# Patient Record
Sex: MALE | Race: BLACK OR AFRICAN AMERICAN | Employment: OTHER | URBAN - METROPOLITAN AREA
[De-identification: names, ages, dates, MRNs, and addresses within clinical notes are randomized per-mention and may not be internally consistent; named-entity substitution may affect disease eponyms.]

---

## 2017-01-03 ENCOUNTER — HOSPITAL ENCOUNTER (OUTPATIENT)
Dept: PHARMACY | Age: 67
Discharge: HOME | End: 2017-01-03
Payer: MEDICARE

## 2017-01-03 DIAGNOSIS — I48.91 ATRIAL FIBRILLATION, UNSPECIFIED TYPE (HCC): ICD-10-CM

## 2017-01-03 LAB
INR BLD: 2.1
PROTIME: 24.9 SECONDS

## 2017-01-03 PROCEDURE — 85610 PROTHROMBIN TIME: CPT

## 2017-01-03 PROCEDURE — G0463 HOSPITAL OUTPT CLINIC VISIT: HCPCS

## 2017-01-04 ENCOUNTER — TELEPHONE (OUTPATIENT)
Dept: UROLOGY | Age: 67
End: 2017-01-04

## 2017-01-05 ENCOUNTER — OFFICE VISIT (OUTPATIENT)
Dept: UROLOGY | Age: 67
End: 2017-01-05

## 2017-01-05 VITALS
DIASTOLIC BLOOD PRESSURE: 70 MMHG | HEIGHT: 71 IN | BODY MASS INDEX: 29.85 KG/M2 | SYSTOLIC BLOOD PRESSURE: 120 MMHG | HEART RATE: 90 BPM | WEIGHT: 213.2 LBS

## 2017-01-05 DIAGNOSIS — N48.1 BALANITIS: Primary | ICD-10-CM

## 2017-01-05 PROCEDURE — 99214 OFFICE O/P EST MOD 30 MIN: CPT | Performed by: UROLOGY

## 2017-01-05 RX ORDER — CLOTRIMAZOLE AND BETAMETHASONE DIPROPIONATE 10; .64 MG/G; MG/G
CREAM TOPICAL
Qty: 45 G | Refills: 2 | Status: ON HOLD | OUTPATIENT
Start: 2017-01-05 | End: 2017-11-03

## 2017-01-24 ENCOUNTER — HOSPITAL ENCOUNTER (OUTPATIENT)
Dept: PHARMACY | Age: 67
Discharge: HOME | End: 2017-01-24
Payer: MEDICARE

## 2017-01-24 DIAGNOSIS — I48.91 ATRIAL FIBRILLATION, UNSPECIFIED TYPE (HCC): ICD-10-CM

## 2017-01-24 LAB
INR BLD: 1.8
PROTIME: 21.6 SECONDS

## 2017-01-24 PROCEDURE — G0463 HOSPITAL OUTPT CLINIC VISIT: HCPCS

## 2017-01-24 PROCEDURE — 85610 PROTHROMBIN TIME: CPT

## 2017-01-30 ENCOUNTER — OFFICE VISIT (OUTPATIENT)
Dept: SURGERY | Age: 67
End: 2017-01-30

## 2017-01-30 VITALS
HEART RATE: 72 BPM | BODY MASS INDEX: 28.98 KG/M2 | HEIGHT: 71 IN | WEIGHT: 207 LBS | SYSTOLIC BLOOD PRESSURE: 90 MMHG | DIASTOLIC BLOOD PRESSURE: 58 MMHG

## 2017-01-30 DIAGNOSIS — E11.42 DIABETIC POLYNEUROPATHY ASSOCIATED WITH TYPE 2 DIABETES MELLITUS (HCC): ICD-10-CM

## 2017-01-30 LAB — GLUCOSE BLD-MCNC: 270 MG/DL

## 2017-01-30 PROCEDURE — G8427 DOCREV CUR MEDS BY ELIG CLIN: HCPCS | Performed by: INTERNAL MEDICINE

## 2017-01-30 PROCEDURE — G8598 ASA/ANTIPLAT THER USED: HCPCS | Performed by: INTERNAL MEDICINE

## 2017-01-30 PROCEDURE — 99213 OFFICE O/P EST LOW 20 MIN: CPT | Performed by: INTERNAL MEDICINE

## 2017-01-30 PROCEDURE — 82962 GLUCOSE BLOOD TEST: CPT | Performed by: INTERNAL MEDICINE

## 2017-01-30 PROCEDURE — G8484 FLU IMMUNIZE NO ADMIN: HCPCS | Performed by: INTERNAL MEDICINE

## 2017-01-30 PROCEDURE — 3046F HEMOGLOBIN A1C LEVEL >9.0%: CPT | Performed by: INTERNAL MEDICINE

## 2017-01-30 PROCEDURE — 4004F PT TOBACCO SCREEN RCVD TLK: CPT | Performed by: INTERNAL MEDICINE

## 2017-01-30 PROCEDURE — 3017F COLORECTAL CA SCREEN DOC REV: CPT | Performed by: INTERNAL MEDICINE

## 2017-01-30 PROCEDURE — G8419 CALC BMI OUT NRM PARAM NOF/U: HCPCS | Performed by: INTERNAL MEDICINE

## 2017-01-30 RX ORDER — GABAPENTIN 300 MG/1
300 CAPSULE ORAL 3 TIMES DAILY
Qty: 270 CAPSULE | Refills: 3 | Status: SHIPPED | OUTPATIENT
Start: 2017-01-30 | End: 2017-01-30 | Stop reason: SDUPTHER

## 2017-01-30 RX ORDER — ALBUTEROL SULFATE 2.5 MG/3ML
2.5 SOLUTION RESPIRATORY (INHALATION) EVERY 4 HOURS PRN
COMMUNITY
End: 2017-11-13 | Stop reason: SDUPTHER

## 2017-01-30 RX ORDER — ALLOPURINOL 100 MG/1
100 TABLET ORAL DAILY
COMMUNITY
End: 2017-03-31

## 2017-01-30 RX ORDER — BLOOD SUGAR DIAGNOSTIC
1 STRIP MISCELLANEOUS 2 TIMES DAILY
COMMUNITY
End: 2017-02-03 | Stop reason: SDUPTHER

## 2017-01-30 RX ORDER — GABAPENTIN 300 MG/1
300 CAPSULE ORAL 3 TIMES DAILY
Qty: 90 CAPSULE | Refills: 3 | Status: SHIPPED | OUTPATIENT
Start: 2017-01-30 | End: 2019-01-04 | Stop reason: SDUPTHER

## 2017-02-03 RX ORDER — SYRINGE-NEEDLE,INSULIN,0.5 ML 31 GX5/16"
SYRINGE, EMPTY DISPOSABLE MISCELLANEOUS
Qty: 300 EACH | Refills: 3 | Status: SHIPPED | OUTPATIENT
Start: 2017-02-03 | End: 2018-04-30 | Stop reason: SDUPTHER

## 2017-02-07 ASSESSMENT — ENCOUNTER SYMPTOMS: EYES NEGATIVE: 1

## 2017-02-14 ENCOUNTER — HOSPITAL ENCOUNTER (OUTPATIENT)
Dept: PHARMACY | Age: 67
Discharge: HOME | End: 2017-02-14
Payer: MEDICARE

## 2017-02-14 DIAGNOSIS — I48.91 ATRIAL FIBRILLATION, UNSPECIFIED TYPE (HCC): ICD-10-CM

## 2017-02-14 LAB
INR BLD: 2.5
PROTIME: 30.1 SECONDS

## 2017-02-14 PROCEDURE — G0463 HOSPITAL OUTPT CLINIC VISIT: HCPCS

## 2017-02-14 PROCEDURE — 85610 PROTHROMBIN TIME: CPT

## 2017-02-16 ENCOUNTER — HOSPITAL ENCOUNTER (OUTPATIENT)
Dept: GENERAL RADIOLOGY | Age: 67
Discharge: HOME | End: 2017-02-16
Payer: MEDICARE

## 2017-02-16 DIAGNOSIS — R06.02 SHORTNESS OF BREATH: ICD-10-CM

## 2017-02-16 PROCEDURE — 71020 XR CHEST STANDARD TWO VW: CPT

## 2017-03-10 ENCOUNTER — HOSPITAL ENCOUNTER (OUTPATIENT)
Dept: CARDIOLOGY | Age: 67
Discharge: HOME | End: 2017-03-10
Payer: MEDICARE

## 2017-03-10 PROCEDURE — 93290 INTERROG DEV EVAL ICPMS IP: CPT

## 2017-03-10 PROCEDURE — 93283 PRGRMG EVAL IMPLANTABLE DFB: CPT

## 2017-03-14 ENCOUNTER — HOSPITAL ENCOUNTER (OUTPATIENT)
Dept: PHARMACY | Age: 67
Discharge: HOME | End: 2017-03-14
Payer: MEDICARE

## 2017-03-14 DIAGNOSIS — I48.91 ATRIAL FIBRILLATION, UNSPECIFIED TYPE (HCC): ICD-10-CM

## 2017-03-14 DIAGNOSIS — E11.9 TYPE 2 DIABETES MELLITUS WITHOUT COMPLICATION, UNSPECIFIED LONG TERM INSULIN USE STATUS: ICD-10-CM

## 2017-03-14 LAB
INR BLD: 2.7
PROTIME: 32.9 SECONDS

## 2017-03-14 PROCEDURE — 85610 PROTHROMBIN TIME: CPT | Performed by: PHARMACIST

## 2017-03-14 PROCEDURE — G0463 HOSPITAL OUTPT CLINIC VISIT: HCPCS | Performed by: PHARMACIST

## 2017-03-14 RX ORDER — WARFARIN SODIUM 5 MG/1
TABLET ORAL
Qty: 15 TABLET | Refills: 0 | Status: SHIPPED | OUTPATIENT
Start: 2017-03-14 | End: 2017-05-31 | Stop reason: SDUPTHER

## 2017-03-14 RX ORDER — WARFARIN SODIUM 5 MG/1
TABLET ORAL
Qty: 110 TABLET | Refills: 1 | Status: SHIPPED | OUTPATIENT
Start: 2017-03-14 | End: 2017-03-14 | Stop reason: SDUPTHER

## 2017-03-28 ENCOUNTER — OFFICE VISIT (OUTPATIENT)
Dept: SURGERY | Age: 67
End: 2017-03-28

## 2017-03-28 VITALS
DIASTOLIC BLOOD PRESSURE: 74 MMHG | BODY MASS INDEX: 31.64 KG/M2 | SYSTOLIC BLOOD PRESSURE: 116 MMHG | WEIGHT: 226 LBS | HEIGHT: 71 IN | HEART RATE: 94 BPM

## 2017-03-28 LAB
GLUCOSE BLD-MCNC: 217 MG/DL
HBA1C MFR BLD: 10.9 %

## 2017-03-28 PROCEDURE — 3046F HEMOGLOBIN A1C LEVEL >9.0%: CPT | Performed by: INTERNAL MEDICINE

## 2017-03-28 PROCEDURE — 82962 GLUCOSE BLOOD TEST: CPT | Performed by: INTERNAL MEDICINE

## 2017-03-28 PROCEDURE — 4040F PNEUMOC VAC/ADMIN/RCVD: CPT | Performed by: INTERNAL MEDICINE

## 2017-03-28 PROCEDURE — 83036 HEMOGLOBIN GLYCOSYLATED A1C: CPT | Performed by: INTERNAL MEDICINE

## 2017-03-28 PROCEDURE — G8598 ASA/ANTIPLAT THER USED: HCPCS | Performed by: INTERNAL MEDICINE

## 2017-03-28 PROCEDURE — G8484 FLU IMMUNIZE NO ADMIN: HCPCS | Performed by: INTERNAL MEDICINE

## 2017-03-28 PROCEDURE — 3017F COLORECTAL CA SCREEN DOC REV: CPT | Performed by: INTERNAL MEDICINE

## 2017-03-28 PROCEDURE — 99213 OFFICE O/P EST LOW 20 MIN: CPT | Performed by: INTERNAL MEDICINE

## 2017-03-28 PROCEDURE — G8427 DOCREV CUR MEDS BY ELIG CLIN: HCPCS | Performed by: INTERNAL MEDICINE

## 2017-03-28 PROCEDURE — 1123F ACP DISCUSS/DSCN MKR DOCD: CPT | Performed by: INTERNAL MEDICINE

## 2017-03-28 PROCEDURE — G8417 CALC BMI ABV UP PARAM F/U: HCPCS | Performed by: INTERNAL MEDICINE

## 2017-03-28 PROCEDURE — 4004F PT TOBACCO SCREEN RCVD TLK: CPT | Performed by: INTERNAL MEDICINE

## 2017-03-28 ASSESSMENT — ENCOUNTER SYMPTOMS: EYES NEGATIVE: 1

## 2017-03-31 ENCOUNTER — OFFICE VISIT (OUTPATIENT)
Dept: FAMILY MEDICINE CLINIC | Age: 67
End: 2017-03-31

## 2017-03-31 VITALS
DIASTOLIC BLOOD PRESSURE: 66 MMHG | SYSTOLIC BLOOD PRESSURE: 110 MMHG | HEART RATE: 91 BPM | BODY MASS INDEX: 32.35 KG/M2 | TEMPERATURE: 97.5 F | OXYGEN SATURATION: 92 % | WEIGHT: 226 LBS | RESPIRATION RATE: 25 BRPM | HEIGHT: 70 IN

## 2017-03-31 DIAGNOSIS — I50.42 HEART FAILURE, SYSTOLIC AND DIASTOLIC, CHRONIC (HCC): ICD-10-CM

## 2017-03-31 DIAGNOSIS — I50.23 ACUTE ON CHRONIC SYSTOLIC HEART FAILURE (HCC): ICD-10-CM

## 2017-03-31 DIAGNOSIS — I48.91 ATRIAL FIBRILLATION, UNSPECIFIED TYPE (HCC): ICD-10-CM

## 2017-03-31 DIAGNOSIS — I50.1 LEFT HEART FAILURE (HCC): ICD-10-CM

## 2017-03-31 DIAGNOSIS — I48.92 ATRIAL FLUTTER, UNSPECIFIED TYPE (HCC): ICD-10-CM

## 2017-03-31 DIAGNOSIS — J43.9 PULMONARY EMPHYSEMA, UNSPECIFIED EMPHYSEMA TYPE (HCC): Primary | ICD-10-CM

## 2017-03-31 PROCEDURE — G8926 SPIRO NO PERF OR DOC: HCPCS | Performed by: FAMILY MEDICINE

## 2017-03-31 PROCEDURE — G8484 FLU IMMUNIZE NO ADMIN: HCPCS | Performed by: FAMILY MEDICINE

## 2017-03-31 PROCEDURE — 99213 OFFICE O/P EST LOW 20 MIN: CPT | Performed by: FAMILY MEDICINE

## 2017-03-31 PROCEDURE — G8417 CALC BMI ABV UP PARAM F/U: HCPCS | Performed by: FAMILY MEDICINE

## 2017-03-31 PROCEDURE — 1123F ACP DISCUSS/DSCN MKR DOCD: CPT | Performed by: FAMILY MEDICINE

## 2017-03-31 PROCEDURE — 4004F PT TOBACCO SCREEN RCVD TLK: CPT | Performed by: FAMILY MEDICINE

## 2017-03-31 PROCEDURE — 3017F COLORECTAL CA SCREEN DOC REV: CPT | Performed by: FAMILY MEDICINE

## 2017-03-31 PROCEDURE — 4040F PNEUMOC VAC/ADMIN/RCVD: CPT | Performed by: FAMILY MEDICINE

## 2017-03-31 PROCEDURE — G8598 ASA/ANTIPLAT THER USED: HCPCS | Performed by: FAMILY MEDICINE

## 2017-03-31 PROCEDURE — 3023F SPIROM DOC REV: CPT | Performed by: FAMILY MEDICINE

## 2017-03-31 PROCEDURE — G8427 DOCREV CUR MEDS BY ELIG CLIN: HCPCS | Performed by: FAMILY MEDICINE

## 2017-03-31 RX ORDER — SACUBITRIL AND VALSARTAN 24; 26 MG/1; MG/1
1 TABLET, FILM COATED ORAL 2 TIMES DAILY
Refills: 11 | Status: ON HOLD | COMMUNITY
Start: 2017-02-16 | End: 2020-09-12 | Stop reason: SDUPTHER

## 2017-03-31 RX ORDER — PREDNISOLONE ACETATE 10 MG/ML
SUSPENSION/ DROPS OPHTHALMIC
Refills: 1 | Status: ON HOLD | COMMUNITY
Start: 2017-02-17 | End: 2017-11-03

## 2017-03-31 RX ORDER — KETOROLAC TROMETHAMINE 5 MG/ML
SOLUTION OPHTHALMIC
Refills: 1 | Status: ON HOLD | COMMUNITY
Start: 2017-03-23 | End: 2017-11-03

## 2017-03-31 ASSESSMENT — ENCOUNTER SYMPTOMS
ABDOMINAL PAIN: 0
SHORTNESS OF BREATH: 1

## 2017-04-12 ENCOUNTER — TELEPHONE (OUTPATIENT)
Dept: PHARMACY | Age: 67
End: 2017-04-12

## 2017-04-12 ENCOUNTER — ANTI-COAG VISIT (OUTPATIENT)
Dept: PHARMACY | Age: 67
End: 2017-04-12

## 2017-04-12 DIAGNOSIS — I48.91 ATRIAL FIBRILLATION, UNSPECIFIED TYPE (HCC): ICD-10-CM

## 2017-04-23 RX ORDER — COLCHICINE 0.6 MG/1
TABLET, FILM COATED ORAL
Qty: 90 TABLET | Refills: 1 | Status: SHIPPED | OUTPATIENT
Start: 2017-04-23 | End: 2017-10-24 | Stop reason: SDUPTHER

## 2017-04-25 ENCOUNTER — ANTI-COAG VISIT (OUTPATIENT)
Dept: PHARMACY | Age: 67
End: 2017-04-25

## 2017-04-25 DIAGNOSIS — I48.91 ATRIAL FIBRILLATION, UNSPECIFIED TYPE (HCC): ICD-10-CM

## 2017-04-28 ENCOUNTER — HOSPITAL ENCOUNTER (OUTPATIENT)
Dept: PHARMACY | Age: 67
Discharge: HOME | End: 2017-04-28
Payer: MEDICARE

## 2017-04-28 DIAGNOSIS — I48.91 ATRIAL FIBRILLATION, UNSPECIFIED TYPE (HCC): ICD-10-CM

## 2017-04-28 LAB
INR BLD: 1.6
PROTIME: 19.7 SECONDS

## 2017-04-28 PROCEDURE — 85610 PROTHROMBIN TIME: CPT | Performed by: PHARMACIST

## 2017-04-28 PROCEDURE — 99211 OFF/OP EST MAY X REQ PHY/QHP: CPT | Performed by: PHARMACIST

## 2017-05-10 ENCOUNTER — HOSPITAL ENCOUNTER (INPATIENT)
Age: 67
LOS: 4 days | Discharge: HOME | DRG: 291 | End: 2017-05-14
Attending: FAMILY MEDICINE | Admitting: FAMILY MEDICINE
Payer: MEDICARE

## 2017-05-10 ENCOUNTER — APPOINTMENT (OUTPATIENT)
Dept: GENERAL RADIOLOGY | Age: 67
DRG: 291 | End: 2017-05-10
Payer: MEDICARE

## 2017-05-10 DIAGNOSIS — J18.9 PNEUMONIA DUE TO ORGANISM: Primary | ICD-10-CM

## 2017-05-10 DIAGNOSIS — I50.9 CONGESTIVE HEART FAILURE, UNSPECIFIED CONGESTIVE HEART FAILURE CHRONICITY, UNSPECIFIED CONGESTIVE HEART FAILURE TYPE: ICD-10-CM

## 2017-05-10 DIAGNOSIS — R74.8 CARDIAC ENZYMES ELEVATED: ICD-10-CM

## 2017-05-10 LAB
ALBUMIN SERPL-MCNC: 3.6 G/DL (ref 3.9–4.9)
ALP BLD-CCNC: 125 U/L (ref 35–104)
ALT SERPL-CCNC: 20 U/L (ref 0–41)
ANION GAP SERPL CALCULATED.3IONS-SCNC: 14 MEQ/L (ref 7–13)
AST SERPL-CCNC: 26 U/L (ref 0–40)
BILIRUB SERPL-MCNC: 2.8 MG/DL (ref 0–1.2)
BUN BLDV-MCNC: 18 MG/DL (ref 8–23)
CALCIUM SERPL-MCNC: 8.5 MG/DL (ref 8.6–10.2)
CHLORIDE BLD-SCNC: 92 MEQ/L (ref 98–107)
CK MB: 1.8 NG/ML (ref 0–6.7)
CO2: 22 MEQ/L (ref 22–29)
CREAT SERPL-MCNC: 1.02 MG/DL (ref 0.7–1.2)
CREATINE KINASE-MB INDEX: 0.4 % (ref 0–3.5)
GFR AFRICAN AMERICAN: >60
GFR NON-AFRICAN AMERICAN: >60
GLOBULIN: 3.6 G/DL (ref 2.3–3.5)
GLUCOSE BLD-MCNC: 208 MG/DL (ref 74–109)
GLUCOSE BLD-MCNC: 276 MG/DL (ref 60–115)
GLUCOSE BLD-MCNC: 303 MG/DL (ref 60–115)
HCT VFR BLD CALC: 35.9 % (ref 42–52)
HEMOGLOBIN: 11.4 G/DL (ref 14–18)
LIPASE: 10 U/L (ref 13–60)
MCH RBC QN AUTO: 27.1 PG (ref 27–31.3)
MCHC RBC AUTO-ENTMCNC: 31.7 % (ref 33–37)
MCV RBC AUTO: 85.6 FL (ref 80–100)
PDW BLD-RTO: 17.5 % (ref 11.5–14.5)
PERFORMED ON: ABNORMAL
PERFORMED ON: ABNORMAL
PLATELET # BLD: 198 K/UL (ref 130–400)
POTASSIUM SERPL-SCNC: 4.2 MEQ/L (ref 3.5–5.1)
PRO-BNP: 4729 PG/ML
RBC # BLD: 4.19 M/UL (ref 4.7–6.1)
SODIUM BLD-SCNC: 128 MEQ/L (ref 132–144)
TOTAL CK: 436 U/L (ref 0–190)
TOTAL PROTEIN: 7.2 G/DL (ref 6.4–8.1)
TROPONIN: 0.01 NG/ML (ref 0–0.01)
WBC # BLD: 10.8 K/UL (ref 4.8–10.8)

## 2017-05-10 PROCEDURE — 94640 AIRWAY INHALATION TREATMENT: CPT

## 2017-05-10 PROCEDURE — 6370000000 HC RX 637 (ALT 250 FOR IP): Performed by: PHYSICIAN ASSISTANT

## 2017-05-10 PROCEDURE — 82553 CREATINE MB FRACTION: CPT

## 2017-05-10 PROCEDURE — 36415 COLL VENOUS BLD VENIPUNCTURE: CPT

## 2017-05-10 PROCEDURE — 71010 XR CHEST PORTABLE: CPT

## 2017-05-10 PROCEDURE — 99285 EMERGENCY DEPT VISIT HI MDM: CPT

## 2017-05-10 PROCEDURE — 87040 BLOOD CULTURE FOR BACTERIA: CPT

## 2017-05-10 PROCEDURE — 6360000002 HC RX W HCPCS: Performed by: FAMILY MEDICINE

## 2017-05-10 PROCEDURE — 84484 ASSAY OF TROPONIN QUANT: CPT

## 2017-05-10 PROCEDURE — 83690 ASSAY OF LIPASE: CPT

## 2017-05-10 PROCEDURE — 96375 TX/PRO/DX INJ NEW DRUG ADDON: CPT

## 2017-05-10 PROCEDURE — 6370000000 HC RX 637 (ALT 250 FOR IP): Performed by: FAMILY MEDICINE

## 2017-05-10 PROCEDURE — 1210000000 HC MED SURG R&B

## 2017-05-10 PROCEDURE — 82550 ASSAY OF CK (CPK): CPT

## 2017-05-10 PROCEDURE — 6360000002 HC RX W HCPCS: Performed by: PHYSICIAN ASSISTANT

## 2017-05-10 PROCEDURE — 96365 THER/PROPH/DIAG IV INF INIT: CPT

## 2017-05-10 PROCEDURE — 96366 THER/PROPH/DIAG IV INF ADDON: CPT

## 2017-05-10 PROCEDURE — 93005 ELECTROCARDIOGRAM TRACING: CPT

## 2017-05-10 PROCEDURE — 2580000003 HC RX 258: Performed by: PHYSICIAN ASSISTANT

## 2017-05-10 PROCEDURE — 2500000003 HC RX 250 WO HCPCS: Performed by: FAMILY MEDICINE

## 2017-05-10 PROCEDURE — 85027 COMPLETE CBC AUTOMATED: CPT

## 2017-05-10 PROCEDURE — 80053 COMPREHEN METABOLIC PANEL: CPT

## 2017-05-10 PROCEDURE — 83880 ASSAY OF NATRIURETIC PEPTIDE: CPT

## 2017-05-10 PROCEDURE — 2580000003 HC RX 258: Performed by: FAMILY MEDICINE

## 2017-05-10 PROCEDURE — 94664 DEMO&/EVAL PT USE INHALER: CPT

## 2017-05-10 RX ORDER — SODIUM CHLORIDE 0.9 % (FLUSH) 0.9 %
10 SYRINGE (ML) INJECTION EVERY 12 HOURS SCHEDULED
Status: DISCONTINUED | OUTPATIENT
Start: 2017-05-10 | End: 2017-05-14 | Stop reason: HOSPADM

## 2017-05-10 RX ORDER — ACETAMINOPHEN 325 MG/1
650 TABLET ORAL EVERY 4 HOURS PRN
Status: DISCONTINUED | OUTPATIENT
Start: 2017-05-10 | End: 2017-05-14 | Stop reason: HOSPADM

## 2017-05-10 RX ORDER — IPRATROPIUM BROMIDE AND ALBUTEROL SULFATE 2.5; .5 MG/3ML; MG/3ML
1 SOLUTION RESPIRATORY (INHALATION) 3 TIMES DAILY
Status: DISCONTINUED | OUTPATIENT
Start: 2017-05-10 | End: 2017-05-14 | Stop reason: HOSPADM

## 2017-05-10 RX ORDER — METHYLPREDNISOLONE SODIUM SUCCINATE 125 MG/2ML
125 INJECTION, POWDER, LYOPHILIZED, FOR SOLUTION INTRAMUSCULAR; INTRAVENOUS ONCE
Status: COMPLETED | OUTPATIENT
Start: 2017-05-10 | End: 2017-05-10

## 2017-05-10 RX ORDER — DEXTROSE MONOHYDRATE 50 MG/ML
100 INJECTION, SOLUTION INTRAVENOUS PRN
Status: DISCONTINUED | OUTPATIENT
Start: 2017-05-10 | End: 2017-05-14 | Stop reason: HOSPADM

## 2017-05-10 RX ORDER — ASPIRIN 81 MG/1
324 TABLET, CHEWABLE ORAL ONCE
Status: COMPLETED | OUTPATIENT
Start: 2017-05-10 | End: 2017-05-10

## 2017-05-10 RX ORDER — DEXTROSE MONOHYDRATE 25 G/50ML
12.5 INJECTION, SOLUTION INTRAVENOUS PRN
Status: DISCONTINUED | OUTPATIENT
Start: 2017-05-10 | End: 2017-05-14 | Stop reason: HOSPADM

## 2017-05-10 RX ORDER — METHYLPREDNISOLONE SODIUM SUCCINATE 40 MG/ML
40 INJECTION, POWDER, LYOPHILIZED, FOR SOLUTION INTRAMUSCULAR; INTRAVENOUS EVERY 8 HOURS
Status: DISCONTINUED | OUTPATIENT
Start: 2017-05-10 | End: 2017-05-12

## 2017-05-10 RX ORDER — SODIUM CHLORIDE 0.9 % (FLUSH) 0.9 %
10 SYRINGE (ML) INJECTION PRN
Status: DISCONTINUED | OUTPATIENT
Start: 2017-05-10 | End: 2017-05-14 | Stop reason: HOSPADM

## 2017-05-10 RX ORDER — ONDANSETRON 2 MG/ML
4 INJECTION INTRAMUSCULAR; INTRAVENOUS EVERY 6 HOURS PRN
Status: DISCONTINUED | OUTPATIENT
Start: 2017-05-10 | End: 2017-05-14 | Stop reason: HOSPADM

## 2017-05-10 RX ORDER — NICOTINE POLACRILEX 4 MG
15 LOZENGE BUCCAL PRN
Status: DISCONTINUED | OUTPATIENT
Start: 2017-05-10 | End: 2017-05-14 | Stop reason: HOSPADM

## 2017-05-10 RX ORDER — IPRATROPIUM BROMIDE AND ALBUTEROL SULFATE 2.5; .5 MG/3ML; MG/3ML
1 SOLUTION RESPIRATORY (INHALATION)
Status: DISCONTINUED | OUTPATIENT
Start: 2017-05-10 | End: 2017-05-10

## 2017-05-10 RX ORDER — FUROSEMIDE 10 MG/ML
40 INJECTION INTRAMUSCULAR; INTRAVENOUS 2 TIMES DAILY
Status: DISCONTINUED | OUTPATIENT
Start: 2017-05-10 | End: 2017-05-11

## 2017-05-10 RX ADMIN — IPRATROPIUM BROMIDE AND ALBUTEROL SULFATE 1 AMPULE: 2.5; .5 SOLUTION RESPIRATORY (INHALATION) at 19:44

## 2017-05-10 RX ADMIN — METHYLPREDNISOLONE SODIUM SUCCINATE 40 MG: 40 INJECTION, POWDER, FOR SOLUTION INTRAMUSCULAR; INTRAVENOUS at 20:30

## 2017-05-10 RX ADMIN — INSULIN LISPRO 4 UNITS: 100 INJECTION, SOLUTION INTRAVENOUS; SUBCUTANEOUS at 22:09

## 2017-05-10 RX ADMIN — CEFTRIAXONE SODIUM 1 G: 1 INJECTION, POWDER, FOR SOLUTION INTRAMUSCULAR; INTRAVENOUS at 12:05

## 2017-05-10 RX ADMIN — IPRATROPIUM BROMIDE AND ALBUTEROL SULFATE 1 AMPULE: 2.5; .5 SOLUTION RESPIRATORY (INHALATION) at 11:24

## 2017-05-10 RX ADMIN — ASPIRIN 81 MG 324 MG: 81 TABLET ORAL at 12:32

## 2017-05-10 RX ADMIN — METHYLPREDNISOLONE SODIUM SUCCINATE 125 MG: 125 INJECTION, POWDER, FOR SOLUTION INTRAMUSCULAR; INTRAVENOUS at 11:19

## 2017-05-10 RX ADMIN — IPRATROPIUM BROMIDE AND ALBUTEROL SULFATE 1 AMPULE: 2.5; .5 SOLUTION RESPIRATORY (INHALATION) at 17:30

## 2017-05-10 RX ADMIN — DOXYCYCLINE 100 MG: 100 INJECTION, POWDER, LYOPHILIZED, FOR SOLUTION INTRAVENOUS at 15:44

## 2017-05-10 RX ADMIN — INSULIN LISPRO 6 UNITS: 100 INJECTION, SOLUTION INTRAVENOUS; SUBCUTANEOUS at 17:34

## 2017-05-10 RX ADMIN — NITROGLYCERIN 1 INCH: 20 OINTMENT TOPICAL at 12:33

## 2017-05-10 RX ADMIN — AZITHROMYCIN 500 MG: 500 INJECTION, POWDER, LYOPHILIZED, FOR SOLUTION INTRAVENOUS at 12:32

## 2017-05-10 RX ADMIN — SODIUM CHLORIDE, PRESERVATIVE FREE 10 ML: 5 INJECTION INTRAVENOUS at 20:33

## 2017-05-10 RX ADMIN — FUROSEMIDE 40 MG: 10 INJECTION, SOLUTION INTRAVENOUS at 17:35

## 2017-05-10 RX ADMIN — ENOXAPARIN SODIUM 40 MG: 40 INJECTION SUBCUTANEOUS at 15:44

## 2017-05-10 ASSESSMENT — PAIN DESCRIPTION - LOCATION: LOCATION: CHEST

## 2017-05-10 ASSESSMENT — ENCOUNTER SYMPTOMS
ABDOMINAL DISTENTION: 0
COLOR CHANGE: 0
EYE DISCHARGE: 0
RHINORRHEA: 0
NAUSEA: 0
CHOKING: 0
DIARRHEA: 0
SHORTNESS OF BREATH: 1
WHEEZING: 1
VOMITING: 0
STRIDOR: 0
COUGH: 1
CONSTIPATION: 0
SORE THROAT: 0
ABDOMINAL PAIN: 0

## 2017-05-10 ASSESSMENT — PAIN DESCRIPTION - PAIN TYPE: TYPE: ACUTE PAIN

## 2017-05-10 ASSESSMENT — PAIN DESCRIPTION - FREQUENCY: FREQUENCY: INTERMITTENT

## 2017-05-10 ASSESSMENT — PAIN SCALES - GENERAL: PAINLEVEL_OUTOF10: 3

## 2017-05-10 ASSESSMENT — PAIN DESCRIPTION - ORIENTATION: ORIENTATION: LEFT

## 2017-05-10 ASSESSMENT — PAIN DESCRIPTION - DESCRIPTORS: DESCRIPTORS: ACHING;TIGHTNESS

## 2017-05-10 ASSESSMENT — PAIN DESCRIPTION - ONSET: ONSET: PROGRESSIVE

## 2017-05-11 ENCOUNTER — APPOINTMENT (OUTPATIENT)
Dept: GENERAL RADIOLOGY | Age: 67
DRG: 291 | End: 2017-05-11
Payer: MEDICARE

## 2017-05-11 LAB
ANION GAP SERPL CALCULATED.3IONS-SCNC: 14 MEQ/L (ref 7–13)
BUN BLDV-MCNC: 33 MG/DL (ref 8–23)
CALCIUM SERPL-MCNC: 8.4 MG/DL (ref 8.6–10.2)
CHLORIDE BLD-SCNC: 95 MEQ/L (ref 98–107)
CO2: 23 MEQ/L (ref 22–29)
CREAT SERPL-MCNC: 1.16 MG/DL (ref 0.7–1.2)
GFR AFRICAN AMERICAN: >60
GFR NON-AFRICAN AMERICAN: >60
GLUCOSE BLD-MCNC: 235 MG/DL (ref 74–109)
GLUCOSE BLD-MCNC: 250 MG/DL (ref 60–115)
GLUCOSE BLD-MCNC: 260 MG/DL (ref 60–115)
GLUCOSE BLD-MCNC: 265 MG/DL (ref 60–115)
GLUCOSE BLD-MCNC: 289 MG/DL (ref 60–115)
HCT VFR BLD CALC: 32.4 % (ref 42–52)
HEMOGLOBIN: 10.6 G/DL (ref 14–18)
MAGNESIUM: 2 MG/DL (ref 1.7–2.3)
MCH RBC QN AUTO: 27.1 PG (ref 27–31.3)
MCHC RBC AUTO-ENTMCNC: 32.7 % (ref 33–37)
MCV RBC AUTO: 82.8 FL (ref 80–100)
PDW BLD-RTO: 17.5 % (ref 11.5–14.5)
PERFORMED ON: ABNORMAL
PLATELET # BLD: 190 K/UL (ref 130–400)
POTASSIUM SERPL-SCNC: 4.1 MEQ/L (ref 3.5–5.1)
RBC # BLD: 3.91 M/UL (ref 4.7–6.1)
SODIUM BLD-SCNC: 132 MEQ/L (ref 132–144)
WBC # BLD: 12.9 K/UL (ref 4.8–10.8)

## 2017-05-11 PROCEDURE — 71020 XR CHEST STANDARD TWO VW: CPT

## 2017-05-11 PROCEDURE — 6370000000 HC RX 637 (ALT 250 FOR IP): Performed by: INTERNAL MEDICINE

## 2017-05-11 PROCEDURE — 2700000000 HC OXYGEN THERAPY PER DAY

## 2017-05-11 PROCEDURE — 1210000000 HC MED SURG R&B

## 2017-05-11 PROCEDURE — 80048 BASIC METABOLIC PNL TOTAL CA: CPT

## 2017-05-11 PROCEDURE — 83735 ASSAY OF MAGNESIUM: CPT

## 2017-05-11 PROCEDURE — 94640 AIRWAY INHALATION TREATMENT: CPT

## 2017-05-11 PROCEDURE — 85027 COMPLETE CBC AUTOMATED: CPT

## 2017-05-11 PROCEDURE — 6370000000 HC RX 637 (ALT 250 FOR IP): Performed by: FAMILY MEDICINE

## 2017-05-11 PROCEDURE — 36415 COLL VENOUS BLD VENIPUNCTURE: CPT

## 2017-05-11 PROCEDURE — 2580000003 HC RX 258: Performed by: FAMILY MEDICINE

## 2017-05-11 PROCEDURE — 6360000002 HC RX W HCPCS: Performed by: FAMILY MEDICINE

## 2017-05-11 PROCEDURE — 2500000003 HC RX 250 WO HCPCS: Performed by: FAMILY MEDICINE

## 2017-05-11 RX ORDER — ASPIRIN 81 MG/1
81 TABLET, CHEWABLE ORAL DAILY
Status: DISCONTINUED | OUTPATIENT
Start: 2017-05-11 | End: 2017-05-13

## 2017-05-11 RX ORDER — METOPROLOL SUCCINATE 25 MG/1
25 TABLET, EXTENDED RELEASE ORAL DAILY
Status: DISCONTINUED | OUTPATIENT
Start: 2017-05-11 | End: 2017-05-14 | Stop reason: HOSPADM

## 2017-05-11 RX ORDER — CALCIUM CARBONATE 300MG(750)
400 TABLET,CHEWABLE ORAL DAILY
Status: DISCONTINUED | OUTPATIENT
Start: 2017-05-11 | End: 2017-05-11

## 2017-05-11 RX ORDER — WARFARIN SODIUM 5 MG/1
5 TABLET ORAL DAILY
Status: DISCONTINUED | OUTPATIENT
Start: 2017-05-11 | End: 2017-05-13

## 2017-05-11 RX ORDER — PREDNISOLONE ACETATE 10 MG/ML
1 SUSPENSION/ DROPS OPHTHALMIC 4 TIMES DAILY
Status: DISCONTINUED | OUTPATIENT
Start: 2017-05-11 | End: 2017-05-14 | Stop reason: HOSPADM

## 2017-05-11 RX ORDER — AMIODARONE HYDROCHLORIDE 200 MG/1
200 TABLET ORAL DAILY
Status: DISCONTINUED | OUTPATIENT
Start: 2017-05-11 | End: 2017-05-14 | Stop reason: HOSPADM

## 2017-05-11 RX ORDER — NITROGLYCERIN 0.4 MG/1
0.4 TABLET SUBLINGUAL EVERY 5 MIN PRN
Status: DISCONTINUED | OUTPATIENT
Start: 2017-05-11 | End: 2017-05-14 | Stop reason: HOSPADM

## 2017-05-11 RX ORDER — ALLOPURINOL 100 MG/1
100 TABLET ORAL DAILY
Status: DISCONTINUED | OUTPATIENT
Start: 2017-05-11 | End: 2017-05-14 | Stop reason: HOSPADM

## 2017-05-11 RX ORDER — DIGOXIN 125 MCG
125 TABLET ORAL DAILY
Status: DISCONTINUED | OUTPATIENT
Start: 2017-05-11 | End: 2017-05-14 | Stop reason: HOSPADM

## 2017-05-11 RX ORDER — KETOROLAC TROMETHAMINE 5 MG/ML
1 SOLUTION OPHTHALMIC 4 TIMES DAILY
Status: DISCONTINUED | OUTPATIENT
Start: 2017-05-11 | End: 2017-05-14 | Stop reason: HOSPADM

## 2017-05-11 RX ORDER — ATORVASTATIN CALCIUM 80 MG/1
80 TABLET, FILM COATED ORAL DAILY
Status: DISCONTINUED | OUTPATIENT
Start: 2017-05-11 | End: 2017-05-14 | Stop reason: HOSPADM

## 2017-05-11 RX ORDER — POTASSIUM CHLORIDE 20 MEQ/1
40 TABLET, EXTENDED RELEASE ORAL DAILY
Status: DISCONTINUED | OUTPATIENT
Start: 2017-05-11 | End: 2017-05-14 | Stop reason: HOSPADM

## 2017-05-11 RX ORDER — CLOPIDOGREL BISULFATE 75 MG/1
75 TABLET ORAL DAILY
Status: DISCONTINUED | OUTPATIENT
Start: 2017-05-11 | End: 2017-05-14 | Stop reason: HOSPADM

## 2017-05-11 RX ORDER — GABAPENTIN 100 MG/1
100 CAPSULE ORAL 3 TIMES DAILY
Status: DISCONTINUED | OUTPATIENT
Start: 2017-05-11 | End: 2017-05-12

## 2017-05-11 RX ORDER — FUROSEMIDE 40 MG/1
40 TABLET ORAL DAILY
Status: DISCONTINUED | OUTPATIENT
Start: 2017-05-11 | End: 2017-05-14 | Stop reason: HOSPADM

## 2017-05-11 RX ADMIN — ALLOPURINOL 100 MG: 100 TABLET ORAL at 19:02

## 2017-05-11 RX ADMIN — INSULIN LISPRO 6 UNITS: 100 INJECTION, SOLUTION INTRAVENOUS; SUBCUTANEOUS at 12:24

## 2017-05-11 RX ADMIN — IPRATROPIUM BROMIDE AND ALBUTEROL SULFATE 1 AMPULE: 2.5; .5 SOLUTION RESPIRATORY (INHALATION) at 19:44

## 2017-05-11 RX ADMIN — SODIUM CHLORIDE, PRESERVATIVE FREE 10 ML: 5 INJECTION INTRAVENOUS at 22:12

## 2017-05-11 RX ADMIN — METHYLPREDNISOLONE SODIUM SUCCINATE 40 MG: 40 INJECTION, POWDER, FOR SOLUTION INTRAMUSCULAR; INTRAVENOUS at 12:23

## 2017-05-11 RX ADMIN — INSULIN LISPRO 6 UNITS: 100 INJECTION, SOLUTION INTRAVENOUS; SUBCUTANEOUS at 17:55

## 2017-05-11 RX ADMIN — INSULIN LISPRO 3 UNITS: 100 INJECTION, SOLUTION INTRAVENOUS; SUBCUTANEOUS at 22:33

## 2017-05-11 RX ADMIN — ASPIRIN 81 MG 81 MG: 81 TABLET ORAL at 19:02

## 2017-05-11 RX ADMIN — FUROSEMIDE 40 MG: 10 INJECTION, SOLUTION INTRAVENOUS at 17:55

## 2017-05-11 RX ADMIN — ATORVASTATIN CALCIUM 80 MG: 80 TABLET, FILM COATED ORAL at 19:02

## 2017-05-11 RX ADMIN — IPRATROPIUM BROMIDE AND ALBUTEROL SULFATE 1 AMPULE: 2.5; .5 SOLUTION RESPIRATORY (INHALATION) at 09:22

## 2017-05-11 RX ADMIN — POTASSIUM CHLORIDE 40 MEQ: 20 TABLET, EXTENDED RELEASE ORAL at 19:02

## 2017-05-11 RX ADMIN — AMIODARONE HYDROCHLORIDE 200 MG: 200 TABLET ORAL at 19:02

## 2017-05-11 RX ADMIN — GABAPENTIN 100 MG: 100 CAPSULE ORAL at 21:20

## 2017-05-11 RX ADMIN — METHYLPREDNISOLONE SODIUM SUCCINATE 40 MG: 40 INJECTION, POWDER, FOR SOLUTION INTRAMUSCULAR; INTRAVENOUS at 03:41

## 2017-05-11 RX ADMIN — CLOPIDOGREL BISULFATE 75 MG: 75 TABLET, FILM COATED ORAL at 19:02

## 2017-05-11 RX ADMIN — METHYLPREDNISOLONE SODIUM SUCCINATE 40 MG: 40 INJECTION, POWDER, FOR SOLUTION INTRAMUSCULAR; INTRAVENOUS at 19:02

## 2017-05-11 RX ADMIN — PREDNISOLONE ACETATE 1 DROP: 10 SUSPENSION/ DROPS OPHTHALMIC at 22:12

## 2017-05-11 RX ADMIN — IPRATROPIUM BROMIDE AND ALBUTEROL SULFATE 1 AMPULE: 2.5; .5 SOLUTION RESPIRATORY (INHALATION) at 15:32

## 2017-05-11 RX ADMIN — KETOROLAC TROMETHAMINE 1 DROP: 5 SOLUTION OPHTHALMIC at 22:12

## 2017-05-11 RX ADMIN — ENOXAPARIN SODIUM 40 MG: 40 INJECTION SUBCUTANEOUS at 08:07

## 2017-05-11 RX ADMIN — DOXYCYCLINE 100 MG: 100 INJECTION, POWDER, LYOPHILIZED, FOR SOLUTION INTRAVENOUS at 03:41

## 2017-05-11 RX ADMIN — FUROSEMIDE 40 MG: 10 INJECTION, SOLUTION INTRAVENOUS at 08:07

## 2017-05-11 RX ADMIN — Medication 400 MG: at 19:02

## 2017-05-11 RX ADMIN — DOXYCYCLINE 100 MG: 100 INJECTION, POWDER, LYOPHILIZED, FOR SOLUTION INTRAVENOUS at 16:07

## 2017-05-11 RX ADMIN — ACETAMINOPHEN 650 MG: 325 TABLET ORAL at 06:48

## 2017-05-11 RX ADMIN — METOPROLOL SUCCINATE 25 MG: 25 TABLET, EXTENDED RELEASE ORAL at 19:02

## 2017-05-11 RX ADMIN — SACUBITRIL AND VALSARTAN 2 TABLET: 24; 26 TABLET, FILM COATED ORAL at 21:19

## 2017-05-11 RX ADMIN — INSULIN LISPRO 6 UNITS: 100 INJECTION, SOLUTION INTRAVENOUS; SUBCUTANEOUS at 08:07

## 2017-05-11 RX ADMIN — SODIUM CHLORIDE, PRESERVATIVE FREE 10 ML: 5 INJECTION INTRAVENOUS at 08:07

## 2017-05-11 RX ADMIN — DIGOXIN 125 MCG: 0.12 TABLET ORAL at 19:02

## 2017-05-11 ASSESSMENT — PAIN SCALES - GENERAL
PAINLEVEL_OUTOF10: 0
PAINLEVEL_OUTOF10: 6
PAINLEVEL_OUTOF10: 5

## 2017-05-12 ENCOUNTER — ANTI-COAG VISIT (OUTPATIENT)
Dept: PHARMACY | Age: 67
End: 2017-05-12

## 2017-05-12 DIAGNOSIS — I48.91 ATRIAL FIBRILLATION, UNSPECIFIED TYPE (HCC): ICD-10-CM

## 2017-05-12 LAB
ALBUMIN SERPL-MCNC: 3.3 G/DL (ref 3.9–4.9)
ALP BLD-CCNC: 100 U/L (ref 35–104)
ALT SERPL-CCNC: 18 U/L (ref 0–41)
ANION GAP SERPL CALCULATED.3IONS-SCNC: 13 MEQ/L (ref 7–13)
AST SERPL-CCNC: 19 U/L (ref 0–40)
BILIRUB SERPL-MCNC: 1.8 MG/DL (ref 0–1.2)
BUN BLDV-MCNC: 55 MG/DL (ref 8–23)
C-REACTIVE PROTEIN, HIGH SENSITIVITY: 126.6 MG/L (ref 0–5)
CALCIUM SERPL-MCNC: 8.4 MG/DL (ref 8.6–10.2)
CHLORIDE BLD-SCNC: 95 MEQ/L (ref 98–107)
CO2: 22 MEQ/L (ref 22–29)
CREAT SERPL-MCNC: 1.48 MG/DL (ref 0.7–1.2)
DIGOXIN LEVEL: 1 NG/ML (ref 0.8–2)
GFR AFRICAN AMERICAN: 57.7
GFR NON-AFRICAN AMERICAN: 47.7
GLOBULIN: 3.3 G/DL (ref 2.3–3.5)
GLUCOSE BLD-MCNC: 120 MG/DL (ref 60–115)
GLUCOSE BLD-MCNC: 191 MG/DL (ref 60–115)
GLUCOSE BLD-MCNC: 284 MG/DL (ref 74–109)
GLUCOSE BLD-MCNC: 299 MG/DL (ref 60–115)
GLUCOSE BLD-MCNC: 342 MG/DL (ref 60–115)
HCT VFR BLD CALC: 32.5 % (ref 42–52)
HEMOGLOBIN: 10.6 G/DL (ref 14–18)
INR BLD: 1.5
MAGNESIUM: 2.1 MG/DL (ref 1.7–2.3)
MCH RBC QN AUTO: 27.1 PG (ref 27–31.3)
MCHC RBC AUTO-ENTMCNC: 32.7 % (ref 33–37)
MCV RBC AUTO: 82.8 FL (ref 80–100)
PDW BLD-RTO: 17.6 % (ref 11.5–14.5)
PERFORMED ON: ABNORMAL
PLATELET # BLD: 223 K/UL (ref 130–400)
POTASSIUM SERPL-SCNC: 4.2 MEQ/L (ref 3.5–5.1)
PROTHROMBIN TIME: 15.9 SEC (ref 8.1–13.7)
RBC # BLD: 3.93 M/UL (ref 4.7–6.1)
SEDIMENTATION RATE, ERYTHROCYTE: 55 MM (ref 0–20)
SODIUM BLD-SCNC: 130 MEQ/L (ref 132–144)
TOTAL PROTEIN: 6.6 G/DL (ref 6.4–8.1)
WBC # BLD: 13 K/UL (ref 4.8–10.8)

## 2017-05-12 PROCEDURE — 80162 ASSAY OF DIGOXIN TOTAL: CPT

## 2017-05-12 PROCEDURE — 36415 COLL VENOUS BLD VENIPUNCTURE: CPT

## 2017-05-12 PROCEDURE — 1210000000 HC MED SURG R&B

## 2017-05-12 PROCEDURE — 85610 PROTHROMBIN TIME: CPT

## 2017-05-12 PROCEDURE — 86141 C-REACTIVE PROTEIN HS: CPT

## 2017-05-12 PROCEDURE — 6360000002 HC RX W HCPCS: Performed by: FAMILY MEDICINE

## 2017-05-12 PROCEDURE — 85027 COMPLETE CBC AUTOMATED: CPT

## 2017-05-12 PROCEDURE — 2580000003 HC RX 258: Performed by: FAMILY MEDICINE

## 2017-05-12 PROCEDURE — 6370000000 HC RX 637 (ALT 250 FOR IP): Performed by: FAMILY MEDICINE

## 2017-05-12 PROCEDURE — 2700000000 HC OXYGEN THERAPY PER DAY

## 2017-05-12 PROCEDURE — 94640 AIRWAY INHALATION TREATMENT: CPT

## 2017-05-12 PROCEDURE — 6370000000 HC RX 637 (ALT 250 FOR IP): Performed by: INTERNAL MEDICINE

## 2017-05-12 PROCEDURE — 83735 ASSAY OF MAGNESIUM: CPT

## 2017-05-12 PROCEDURE — 80053 COMPREHEN METABOLIC PANEL: CPT

## 2017-05-12 PROCEDURE — 2500000003 HC RX 250 WO HCPCS: Performed by: FAMILY MEDICINE

## 2017-05-12 PROCEDURE — 85652 RBC SED RATE AUTOMATED: CPT

## 2017-05-12 RX ORDER — METHYLPREDNISOLONE SODIUM SUCCINATE 40 MG/ML
40 INJECTION, POWDER, LYOPHILIZED, FOR SOLUTION INTRAMUSCULAR; INTRAVENOUS EVERY 12 HOURS
Status: DISCONTINUED | OUTPATIENT
Start: 2017-05-13 | End: 2017-05-14

## 2017-05-12 RX ORDER — GUAIFENESIN 600 MG/1
600 TABLET, EXTENDED RELEASE ORAL 2 TIMES DAILY
Status: DISCONTINUED | OUTPATIENT
Start: 2017-05-12 | End: 2017-05-14 | Stop reason: HOSPADM

## 2017-05-12 RX ORDER — PREGABALIN 75 MG/1
75 CAPSULE ORAL 2 TIMES DAILY
Status: DISCONTINUED | OUTPATIENT
Start: 2017-05-12 | End: 2017-05-14 | Stop reason: HOSPADM

## 2017-05-12 RX ADMIN — INSULIN LISPRO 8 UNITS: 100 INJECTION, SOLUTION INTRAVENOUS; SUBCUTANEOUS at 12:41

## 2017-05-12 RX ADMIN — DOXYCYCLINE 100 MG: 100 INJECTION, POWDER, LYOPHILIZED, FOR SOLUTION INTRAVENOUS at 03:07

## 2017-05-12 RX ADMIN — PREDNISOLONE ACETATE 1 DROP: 10 SUSPENSION/ DROPS OPHTHALMIC at 12:39

## 2017-05-12 RX ADMIN — POTASSIUM CHLORIDE 40 MEQ: 20 TABLET, EXTENDED RELEASE ORAL at 08:43

## 2017-05-12 RX ADMIN — ASPIRIN 81 MG 81 MG: 81 TABLET ORAL at 08:44

## 2017-05-12 RX ADMIN — IPRATROPIUM BROMIDE AND ALBUTEROL SULFATE 1 AMPULE: 2.5; .5 SOLUTION RESPIRATORY (INHALATION) at 21:03

## 2017-05-12 RX ADMIN — KETOROLAC TROMETHAMINE 1 DROP: 5 SOLUTION OPHTHALMIC at 12:44

## 2017-05-12 RX ADMIN — IPRATROPIUM BROMIDE AND ALBUTEROL SULFATE 1 AMPULE: 2.5; .5 SOLUTION RESPIRATORY (INHALATION) at 07:58

## 2017-05-12 RX ADMIN — IPRATROPIUM BROMIDE AND ALBUTEROL SULFATE 1 AMPULE: 2.5; .5 SOLUTION RESPIRATORY (INHALATION) at 13:25

## 2017-05-12 RX ADMIN — ATORVASTATIN CALCIUM 80 MG: 80 TABLET, FILM COATED ORAL at 22:39

## 2017-05-12 RX ADMIN — KETOROLAC TROMETHAMINE 1 DROP: 5 SOLUTION OPHTHALMIC at 08:51

## 2017-05-12 RX ADMIN — PREDNISOLONE ACETATE 1 DROP: 10 SUSPENSION/ DROPS OPHTHALMIC at 08:51

## 2017-05-12 RX ADMIN — FUROSEMIDE 40 MG: 40 TABLET ORAL at 08:44

## 2017-05-12 RX ADMIN — DIGOXIN 125 MCG: 0.12 TABLET ORAL at 08:43

## 2017-05-12 RX ADMIN — PREGABALIN 75 MG: 75 CAPSULE ORAL at 22:38

## 2017-05-12 RX ADMIN — SACUBITRIL AND VALSARTAN 2 TABLET: 24; 26 TABLET, FILM COATED ORAL at 22:38

## 2017-05-12 RX ADMIN — GUAIFENESIN 600 MG: 600 TABLET, EXTENDED RELEASE ORAL at 12:03

## 2017-05-12 RX ADMIN — DOXYCYCLINE 100 MG: 100 INJECTION, POWDER, LYOPHILIZED, FOR SOLUTION INTRAVENOUS at 16:02

## 2017-05-12 RX ADMIN — GUAIFENESIN 600 MG: 600 TABLET, EXTENDED RELEASE ORAL at 22:39

## 2017-05-12 RX ADMIN — Medication 400 MG: at 08:44

## 2017-05-12 RX ADMIN — METHYLPREDNISOLONE SODIUM SUCCINATE 40 MG: 40 INJECTION, POWDER, FOR SOLUTION INTRAMUSCULAR; INTRAVENOUS at 03:11

## 2017-05-12 RX ADMIN — AMIODARONE HYDROCHLORIDE 200 MG: 200 TABLET ORAL at 08:44

## 2017-05-12 RX ADMIN — SODIUM CHLORIDE, PRESERVATIVE FREE 10 ML: 5 INJECTION INTRAVENOUS at 22:39

## 2017-05-12 RX ADMIN — PREGABALIN 75 MG: 75 CAPSULE ORAL at 12:03

## 2017-05-12 RX ADMIN — INSULIN LISPRO 55 UNITS: 100 INJECTION, SUSPENSION SUBCUTANEOUS at 17:57

## 2017-05-12 RX ADMIN — INSULIN LISPRO 55 UNITS: 100 INJECTION, SUSPENSION SUBCUTANEOUS at 08:47

## 2017-05-12 RX ADMIN — METHYLPREDNISOLONE SODIUM SUCCINATE 40 MG: 40 INJECTION, POWDER, FOR SOLUTION INTRAMUSCULAR; INTRAVENOUS at 12:03

## 2017-05-12 RX ADMIN — KETOROLAC TROMETHAMINE 1 DROP: 5 SOLUTION OPHTHALMIC at 22:41

## 2017-05-12 RX ADMIN — METOPROLOL SUCCINATE 25 MG: 25 TABLET, EXTENDED RELEASE ORAL at 08:44

## 2017-05-12 RX ADMIN — SACUBITRIL AND VALSARTAN 2 TABLET: 24; 26 TABLET, FILM COATED ORAL at 08:43

## 2017-05-12 RX ADMIN — WARFARIN SODIUM 5 MG: 5 TABLET ORAL at 17:55

## 2017-05-12 RX ADMIN — SODIUM CHLORIDE, PRESERVATIVE FREE 10 ML: 5 INJECTION INTRAVENOUS at 08:47

## 2017-05-12 RX ADMIN — ALLOPURINOL 100 MG: 100 TABLET ORAL at 08:45

## 2017-05-12 RX ADMIN — KETOROLAC TROMETHAMINE 1 DROP: 5 SOLUTION OPHTHALMIC at 17:07

## 2017-05-12 RX ADMIN — INSULIN LISPRO 6 UNITS: 100 INJECTION, SOLUTION INTRAVENOUS; SUBCUTANEOUS at 08:49

## 2017-05-12 RX ADMIN — GABAPENTIN 100 MG: 100 CAPSULE ORAL at 08:43

## 2017-05-12 RX ADMIN — INSULIN LISPRO 2 UNITS: 100 INJECTION, SOLUTION INTRAVENOUS; SUBCUTANEOUS at 17:57

## 2017-05-12 RX ADMIN — PREDNISOLONE ACETATE 1 DROP: 10 SUSPENSION/ DROPS OPHTHALMIC at 17:04

## 2017-05-12 RX ADMIN — CLOPIDOGREL BISULFATE 75 MG: 75 TABLET, FILM COATED ORAL at 08:45

## 2017-05-12 RX ADMIN — PREDNISOLONE ACETATE 1 DROP: 10 SUSPENSION/ DROPS OPHTHALMIC at 22:41

## 2017-05-12 ASSESSMENT — PAIN SCALES - GENERAL
PAINLEVEL_OUTOF10: 5
PAINLEVEL_OUTOF10: 2
PAINLEVEL_OUTOF10: 0
PAINLEVEL_OUTOF10: 5

## 2017-05-12 ASSESSMENT — PAIN DESCRIPTION - LOCATION: LOCATION: FOOT

## 2017-05-12 ASSESSMENT — PAIN DESCRIPTION - DESCRIPTORS: DESCRIPTORS: PINS AND NEEDLES

## 2017-05-12 ASSESSMENT — PAIN DESCRIPTION - PAIN TYPE: TYPE: ACUTE PAIN

## 2017-05-12 ASSESSMENT — PAIN DESCRIPTION - FREQUENCY: FREQUENCY: CONTINUOUS

## 2017-05-13 LAB
ANION GAP SERPL CALCULATED.3IONS-SCNC: 12 MEQ/L (ref 7–13)
BUN BLDV-MCNC: 49 MG/DL (ref 8–23)
CALCIUM SERPL-MCNC: 8.7 MG/DL (ref 8.6–10.2)
CHLORIDE BLD-SCNC: 102 MEQ/L (ref 98–107)
CO2: 22 MEQ/L (ref 22–29)
CREAT SERPL-MCNC: 1.1 MG/DL (ref 0.7–1.2)
GFR AFRICAN AMERICAN: >60
GFR NON-AFRICAN AMERICAN: >60
GLUCOSE BLD-MCNC: 120 MG/DL (ref 60–115)
GLUCOSE BLD-MCNC: 126 MG/DL (ref 74–109)
GLUCOSE BLD-MCNC: 153 MG/DL (ref 60–115)
GLUCOSE BLD-MCNC: 186 MG/DL (ref 60–115)
GLUCOSE BLD-MCNC: 228 MG/DL (ref 60–115)
HCT VFR BLD CALC: 34.2 % (ref 42–52)
HEMOGLOBIN: 11 G/DL (ref 14–18)
INR BLD: 1.4
MAGNESIUM: 2.6 MG/DL (ref 1.7–2.3)
MCH RBC QN AUTO: 26.8 PG (ref 27–31.3)
MCHC RBC AUTO-ENTMCNC: 32.1 % (ref 33–37)
MCV RBC AUTO: 83.6 FL (ref 80–100)
PDW BLD-RTO: 17.7 % (ref 11.5–14.5)
PERFORMED ON: ABNORMAL
PLATELET # BLD: 239 K/UL (ref 130–400)
POTASSIUM SERPL-SCNC: 4.7 MEQ/L (ref 3.5–5.1)
PROTHROMBIN TIME: 14.8 SEC (ref 8.1–13.7)
RBC # BLD: 4.09 M/UL (ref 4.7–6.1)
SODIUM BLD-SCNC: 136 MEQ/L (ref 132–144)
WBC # BLD: 12.6 K/UL (ref 4.8–10.8)

## 2017-05-13 PROCEDURE — 94640 AIRWAY INHALATION TREATMENT: CPT

## 2017-05-13 PROCEDURE — 83735 ASSAY OF MAGNESIUM: CPT

## 2017-05-13 PROCEDURE — 6370000000 HC RX 637 (ALT 250 FOR IP): Performed by: FAMILY MEDICINE

## 2017-05-13 PROCEDURE — 85027 COMPLETE CBC AUTOMATED: CPT

## 2017-05-13 PROCEDURE — 2700000000 HC OXYGEN THERAPY PER DAY

## 2017-05-13 PROCEDURE — 85610 PROTHROMBIN TIME: CPT

## 2017-05-13 PROCEDURE — 6360000002 HC RX W HCPCS: Performed by: INTERNAL MEDICINE

## 2017-05-13 PROCEDURE — 94664 DEMO&/EVAL PT USE INHALER: CPT

## 2017-05-13 PROCEDURE — 1210000000 HC MED SURG R&B

## 2017-05-13 PROCEDURE — 6370000000 HC RX 637 (ALT 250 FOR IP): Performed by: INTERNAL MEDICINE

## 2017-05-13 PROCEDURE — 2580000003 HC RX 258: Performed by: FAMILY MEDICINE

## 2017-05-13 PROCEDURE — 2500000003 HC RX 250 WO HCPCS: Performed by: FAMILY MEDICINE

## 2017-05-13 PROCEDURE — 80048 BASIC METABOLIC PNL TOTAL CA: CPT

## 2017-05-13 PROCEDURE — 36415 COLL VENOUS BLD VENIPUNCTURE: CPT

## 2017-05-13 RX ORDER — WARFARIN SODIUM 7.5 MG/1
7.5 TABLET ORAL DAILY
Status: DISCONTINUED | OUTPATIENT
Start: 2017-05-13 | End: 2017-05-14 | Stop reason: HOSPADM

## 2017-05-13 RX ORDER — DOXYCYCLINE HYCLATE 100 MG
100 TABLET ORAL 2 TIMES DAILY
Qty: 14 TABLET | Refills: 0 | Status: SHIPPED | OUTPATIENT
Start: 2017-05-13 | End: 2017-05-20

## 2017-05-13 RX ADMIN — IPRATROPIUM BROMIDE AND ALBUTEROL SULFATE 1 AMPULE: 2.5; .5 SOLUTION RESPIRATORY (INHALATION) at 14:16

## 2017-05-13 RX ADMIN — DOXYCYCLINE 100 MG: 100 INJECTION, POWDER, LYOPHILIZED, FOR SOLUTION INTRAVENOUS at 02:57

## 2017-05-13 RX ADMIN — INSULIN LISPRO 2 UNITS: 100 INJECTION, SOLUTION INTRAVENOUS; SUBCUTANEOUS at 08:46

## 2017-05-13 RX ADMIN — DOXYCYCLINE 100 MG: 100 INJECTION, POWDER, LYOPHILIZED, FOR SOLUTION INTRAVENOUS at 14:51

## 2017-05-13 RX ADMIN — DIGOXIN 125 MCG: 0.12 TABLET ORAL at 08:38

## 2017-05-13 RX ADMIN — CLOPIDOGREL BISULFATE 75 MG: 75 TABLET, FILM COATED ORAL at 08:38

## 2017-05-13 RX ADMIN — PREDNISOLONE ACETATE 1 DROP: 10 SUSPENSION/ DROPS OPHTHALMIC at 21:32

## 2017-05-13 RX ADMIN — ACETAMINOPHEN 650 MG: 325 TABLET ORAL at 17:35

## 2017-05-13 RX ADMIN — PREDNISOLONE ACETATE 1 DROP: 10 SUSPENSION/ DROPS OPHTHALMIC at 08:36

## 2017-05-13 RX ADMIN — GUAIFENESIN 600 MG: 600 TABLET, EXTENDED RELEASE ORAL at 21:44

## 2017-05-13 RX ADMIN — IPRATROPIUM BROMIDE AND ALBUTEROL SULFATE 1 AMPULE: 2.5; .5 SOLUTION RESPIRATORY (INHALATION) at 21:06

## 2017-05-13 RX ADMIN — IPRATROPIUM BROMIDE AND ALBUTEROL SULFATE 1 AMPULE: 2.5; .5 SOLUTION RESPIRATORY (INHALATION) at 07:58

## 2017-05-13 RX ADMIN — Medication 400 MG: at 08:39

## 2017-05-13 RX ADMIN — GUAIFENESIN 600 MG: 600 TABLET, EXTENDED RELEASE ORAL at 08:38

## 2017-05-13 RX ADMIN — ATORVASTATIN CALCIUM 80 MG: 80 TABLET, FILM COATED ORAL at 21:44

## 2017-05-13 RX ADMIN — SODIUM CHLORIDE, PRESERVATIVE FREE 10 ML: 5 INJECTION INTRAVENOUS at 03:57

## 2017-05-13 RX ADMIN — KETOROLAC TROMETHAMINE 1 DROP: 5 SOLUTION OPHTHALMIC at 21:45

## 2017-05-13 RX ADMIN — KETOROLAC TROMETHAMINE 1 DROP: 5 SOLUTION OPHTHALMIC at 18:35

## 2017-05-13 RX ADMIN — SACUBITRIL AND VALSARTAN 2 TABLET: 24; 26 TABLET, FILM COATED ORAL at 21:48

## 2017-05-13 RX ADMIN — INSULIN LISPRO 2 UNITS: 100 INJECTION, SOLUTION INTRAVENOUS; SUBCUTANEOUS at 12:27

## 2017-05-13 RX ADMIN — METHYLPREDNISOLONE SODIUM SUCCINATE 40 MG: 40 INJECTION, POWDER, FOR SOLUTION INTRAMUSCULAR; INTRAVENOUS at 01:23

## 2017-05-13 RX ADMIN — INSULIN LISPRO 55 UNITS: 100 INJECTION, SUSPENSION SUBCUTANEOUS at 08:43

## 2017-05-13 RX ADMIN — POTASSIUM CHLORIDE 40 MEQ: 20 TABLET, EXTENDED RELEASE ORAL at 08:38

## 2017-05-13 RX ADMIN — FUROSEMIDE 40 MG: 40 TABLET ORAL at 08:39

## 2017-05-13 RX ADMIN — SODIUM CHLORIDE, PRESERVATIVE FREE 10 ML: 5 INJECTION INTRAVENOUS at 02:57

## 2017-05-13 RX ADMIN — ENOXAPARIN SODIUM 40 MG: 40 INJECTION SUBCUTANEOUS at 21:44

## 2017-05-13 RX ADMIN — PREDNISOLONE ACETATE 1 DROP: 10 SUSPENSION/ DROPS OPHTHALMIC at 13:00

## 2017-05-13 RX ADMIN — ALLOPURINOL 100 MG: 100 TABLET ORAL at 08:38

## 2017-05-13 RX ADMIN — SODIUM CHLORIDE, PRESERVATIVE FREE 10 ML: 5 INJECTION INTRAVENOUS at 21:54

## 2017-05-13 RX ADMIN — ASPIRIN 81 MG 81 MG: 81 TABLET ORAL at 08:39

## 2017-05-13 RX ADMIN — WARFARIN SODIUM 7.5 MG: 7.5 TABLET ORAL at 18:46

## 2017-05-13 RX ADMIN — KETOROLAC TROMETHAMINE 1 DROP: 5 SOLUTION OPHTHALMIC at 08:37

## 2017-05-13 RX ADMIN — PREGABALIN 75 MG: 75 CAPSULE ORAL at 21:44

## 2017-05-13 RX ADMIN — SODIUM CHLORIDE, PRESERVATIVE FREE 10 ML: 5 INJECTION INTRAVENOUS at 08:43

## 2017-05-13 RX ADMIN — SACUBITRIL AND VALSARTAN 2 TABLET: 24; 26 TABLET, FILM COATED ORAL at 08:38

## 2017-05-13 RX ADMIN — AMIODARONE HYDROCHLORIDE 200 MG: 200 TABLET ORAL at 08:39

## 2017-05-13 RX ADMIN — INSULIN LISPRO 55 UNITS: 100 INJECTION, SUSPENSION SUBCUTANEOUS at 18:35

## 2017-05-13 RX ADMIN — METOPROLOL SUCCINATE 25 MG: 25 TABLET, EXTENDED RELEASE ORAL at 08:38

## 2017-05-13 RX ADMIN — KETOROLAC TROMETHAMINE 1 DROP: 5 SOLUTION OPHTHALMIC at 13:00

## 2017-05-13 RX ADMIN — ENOXAPARIN SODIUM 40 MG: 40 INJECTION SUBCUTANEOUS at 14:52

## 2017-05-13 RX ADMIN — INSULIN LISPRO 2 UNITS: 100 INJECTION, SOLUTION INTRAVENOUS; SUBCUTANEOUS at 21:50

## 2017-05-13 RX ADMIN — PREGABALIN 75 MG: 75 CAPSULE ORAL at 08:38

## 2017-05-13 RX ADMIN — METHYLPREDNISOLONE SODIUM SUCCINATE 40 MG: 40 INJECTION, POWDER, FOR SOLUTION INTRAMUSCULAR; INTRAVENOUS at 12:27

## 2017-05-13 RX ADMIN — PREDNISOLONE ACETATE 1 DROP: 10 SUSPENSION/ DROPS OPHTHALMIC at 18:35

## 2017-05-13 ASSESSMENT — PAIN SCALES - GENERAL
PAINLEVEL_OUTOF10: 3
PAINLEVEL_OUTOF10: 5

## 2017-05-13 ASSESSMENT — PAIN DESCRIPTION - PAIN TYPE: TYPE: ACUTE PAIN

## 2017-05-13 ASSESSMENT — PAIN DESCRIPTION - LOCATION: LOCATION: FOOT

## 2017-05-13 ASSESSMENT — PAIN DESCRIPTION - ORIENTATION: ORIENTATION: LEFT

## 2017-05-13 ASSESSMENT — PAIN DESCRIPTION - FREQUENCY: FREQUENCY: CONTINUOUS

## 2017-05-14 VITALS
HEIGHT: 70 IN | SYSTOLIC BLOOD PRESSURE: 114 MMHG | HEART RATE: 86 BPM | BODY MASS INDEX: 31.5 KG/M2 | DIASTOLIC BLOOD PRESSURE: 69 MMHG | OXYGEN SATURATION: 94 % | WEIGHT: 220 LBS | TEMPERATURE: 97.3 F | RESPIRATION RATE: 16 BRPM

## 2017-05-14 LAB
GLUCOSE BLD-MCNC: 128 MG/DL (ref 60–115)
GLUCOSE BLD-MCNC: 138 MG/DL (ref 60–115)
INR BLD: 1.6
PERFORMED ON: ABNORMAL
PERFORMED ON: ABNORMAL
PROTHROMBIN TIME: 17.4 SEC (ref 8.1–13.7)

## 2017-05-14 PROCEDURE — 36415 COLL VENOUS BLD VENIPUNCTURE: CPT

## 2017-05-14 PROCEDURE — 6370000000 HC RX 637 (ALT 250 FOR IP): Performed by: INTERNAL MEDICINE

## 2017-05-14 PROCEDURE — 6360000002 HC RX W HCPCS: Performed by: INTERNAL MEDICINE

## 2017-05-14 PROCEDURE — 2580000003 HC RX 258: Performed by: FAMILY MEDICINE

## 2017-05-14 PROCEDURE — 85610 PROTHROMBIN TIME: CPT

## 2017-05-14 PROCEDURE — 6370000000 HC RX 637 (ALT 250 FOR IP): Performed by: FAMILY MEDICINE

## 2017-05-14 PROCEDURE — 93005 ELECTROCARDIOGRAM TRACING: CPT

## 2017-05-14 PROCEDURE — 94640 AIRWAY INHALATION TREATMENT: CPT

## 2017-05-14 PROCEDURE — 2500000003 HC RX 250 WO HCPCS: Performed by: FAMILY MEDICINE

## 2017-05-14 RX ORDER — METOPROLOL SUCCINATE 25 MG/1
25 TABLET, EXTENDED RELEASE ORAL DAILY
Qty: 30 TABLET | Refills: 3 | Status: SHIPPED | OUTPATIENT
Start: 2017-05-14 | End: 2017-05-14 | Stop reason: HOSPADM

## 2017-05-14 RX ORDER — WARFARIN SODIUM 7.5 MG/1
7.5 TABLET ORAL
Status: COMPLETED | OUTPATIENT
Start: 2017-05-14 | End: 2017-05-14

## 2017-05-14 RX ORDER — GUAIFENESIN 600 MG/1
600 TABLET, EXTENDED RELEASE ORAL 2 TIMES DAILY
Qty: 10 TABLET | Refills: 0 | Status: SHIPPED | OUTPATIENT
Start: 2017-05-14 | End: 2017-05-19

## 2017-05-14 RX ADMIN — DIGOXIN 125 MCG: 0.12 TABLET ORAL at 08:05

## 2017-05-14 RX ADMIN — FUROSEMIDE 40 MG: 40 TABLET ORAL at 08:05

## 2017-05-14 RX ADMIN — POTASSIUM CHLORIDE 40 MEQ: 20 TABLET, EXTENDED RELEASE ORAL at 08:05

## 2017-05-14 RX ADMIN — GUAIFENESIN 600 MG: 600 TABLET, EXTENDED RELEASE ORAL at 08:06

## 2017-05-14 RX ADMIN — IPRATROPIUM BROMIDE AND ALBUTEROL SULFATE 1 AMPULE: 2.5; .5 SOLUTION RESPIRATORY (INHALATION) at 07:23

## 2017-05-14 RX ADMIN — PREGABALIN 75 MG: 75 CAPSULE ORAL at 08:05

## 2017-05-14 RX ADMIN — SACUBITRIL AND VALSARTAN 2 TABLET: 24; 26 TABLET, FILM COATED ORAL at 08:05

## 2017-05-14 RX ADMIN — Medication 400 MG: at 08:05

## 2017-05-14 RX ADMIN — DOXYCYCLINE 100 MG: 100 INJECTION, POWDER, LYOPHILIZED, FOR SOLUTION INTRAVENOUS at 04:17

## 2017-05-14 RX ADMIN — PREDNISOLONE ACETATE 1 DROP: 10 SUSPENSION/ DROPS OPHTHALMIC at 08:03

## 2017-05-14 RX ADMIN — IPRATROPIUM BROMIDE AND ALBUTEROL SULFATE 1 AMPULE: 2.5; .5 SOLUTION RESPIRATORY (INHALATION) at 13:09

## 2017-05-14 RX ADMIN — PREDNISOLONE ACETATE 1 DROP: 10 SUSPENSION/ DROPS OPHTHALMIC at 12:34

## 2017-05-14 RX ADMIN — AMIODARONE HYDROCHLORIDE 200 MG: 200 TABLET ORAL at 08:05

## 2017-05-14 RX ADMIN — METOPROLOL SUCCINATE 25 MG: 25 TABLET, EXTENDED RELEASE ORAL at 08:06

## 2017-05-14 RX ADMIN — KETOROLAC TROMETHAMINE 1 DROP: 5 SOLUTION OPHTHALMIC at 08:03

## 2017-05-14 RX ADMIN — ALLOPURINOL 100 MG: 100 TABLET ORAL at 08:05

## 2017-05-14 RX ADMIN — WARFARIN SODIUM 7.5 MG: 7.5 TABLET ORAL at 14:35

## 2017-05-14 RX ADMIN — SODIUM CHLORIDE, PRESERVATIVE FREE 10 ML: 5 INJECTION INTRAVENOUS at 08:06

## 2017-05-14 RX ADMIN — INSULIN LISPRO 55 UNITS: 100 INJECTION, SUSPENSION SUBCUTANEOUS at 07:59

## 2017-05-14 RX ADMIN — ENOXAPARIN SODIUM 40 MG: 40 INJECTION SUBCUTANEOUS at 08:04

## 2017-05-14 RX ADMIN — CLOPIDOGREL BISULFATE 75 MG: 75 TABLET, FILM COATED ORAL at 08:06

## 2017-05-14 RX ADMIN — METHYLPREDNISOLONE SODIUM SUCCINATE 40 MG: 40 INJECTION, POWDER, FOR SOLUTION INTRAMUSCULAR; INTRAVENOUS at 00:55

## 2017-05-14 RX ADMIN — KETOROLAC TROMETHAMINE 1 DROP: 5 SOLUTION OPHTHALMIC at 12:34

## 2017-05-15 ENCOUNTER — CARE COORDINATION (OUTPATIENT)
Dept: CASE MANAGEMENT | Age: 67
End: 2017-05-15

## 2017-05-15 LAB
BLOOD CULTURE, ROUTINE: NORMAL
CULTURE, BLOOD 2: NORMAL
EKG ATRIAL RATE: 77 BPM
EKG ATRIAL RATE: 99 BPM
EKG P AXIS: 49 DEGREES
EKG P AXIS: 54 DEGREES
EKG P-R INTERVAL: 188 MS
EKG P-R INTERVAL: 208 MS
EKG Q-T INTERVAL: 370 MS
EKG Q-T INTERVAL: 402 MS
EKG QRS DURATION: 114 MS
EKG QRS DURATION: 118 MS
EKG QTC CALCULATION (BAZETT): 454 MS
EKG QTC CALCULATION (BAZETT): 474 MS
EKG R AXIS: -25 DEGREES
EKG R AXIS: -28 DEGREES
EKG T AXIS: 118 DEGREES
EKG T AXIS: 123 DEGREES
EKG VENTRICULAR RATE: 77 BPM
EKG VENTRICULAR RATE: 99 BPM

## 2017-05-17 ENCOUNTER — HOSPITAL ENCOUNTER (OUTPATIENT)
Dept: PHARMACY | Age: 67
Discharge: HOME | End: 2017-05-17
Payer: MEDICARE

## 2017-05-17 ENCOUNTER — TELEPHONE (OUTPATIENT)
Dept: PHARMACY | Age: 67
End: 2017-05-17

## 2017-05-17 DIAGNOSIS — I48.91 ATRIAL FIBRILLATION, UNSPECIFIED TYPE (HCC): ICD-10-CM

## 2017-05-17 LAB
INR BLD: 1.8
PROTIME: 21.4 SECONDS

## 2017-05-17 PROCEDURE — 85610 PROTHROMBIN TIME: CPT

## 2017-05-17 PROCEDURE — 99211 OFF/OP EST MAY X REQ PHY/QHP: CPT

## 2017-05-31 ENCOUNTER — HOSPITAL ENCOUNTER (OUTPATIENT)
Dept: PHARMACY | Age: 67
Discharge: HOME | End: 2017-05-31
Payer: MEDICARE

## 2017-05-31 DIAGNOSIS — I48.91 ATRIAL FIBRILLATION, UNSPECIFIED TYPE (HCC): ICD-10-CM

## 2017-05-31 DIAGNOSIS — E11.9 TYPE 2 DIABETES MELLITUS WITHOUT COMPLICATION, UNSPECIFIED LONG TERM INSULIN USE STATUS: ICD-10-CM

## 2017-05-31 LAB
INR BLD: 1.9
PROTIME: 23.2 SECONDS

## 2017-05-31 PROCEDURE — 85610 PROTHROMBIN TIME: CPT

## 2017-05-31 PROCEDURE — 99211 OFF/OP EST MAY X REQ PHY/QHP: CPT

## 2017-05-31 RX ORDER — WARFARIN SODIUM 5 MG/1
TABLET ORAL
Qty: 120 TABLET | Refills: 1 | Status: ON HOLD | OUTPATIENT
Start: 2017-05-31 | End: 2017-11-07

## 2017-06-01 ENCOUNTER — OFFICE VISIT (OUTPATIENT)
Dept: FAMILY MEDICINE CLINIC | Age: 67
End: 2017-06-01

## 2017-06-01 VITALS
SYSTOLIC BLOOD PRESSURE: 102 MMHG | DIASTOLIC BLOOD PRESSURE: 62 MMHG | WEIGHT: 226.2 LBS | OXYGEN SATURATION: 95 % | HEIGHT: 71 IN | BODY MASS INDEX: 31.67 KG/M2 | RESPIRATION RATE: 19 BRPM | TEMPERATURE: 98.6 F | HEART RATE: 91 BPM

## 2017-06-01 DIAGNOSIS — I50.23 ACUTE ON CHRONIC SYSTOLIC HEART FAILURE (HCC): Primary | ICD-10-CM

## 2017-06-01 DIAGNOSIS — J18.9 PNEUMONIA DUE TO ORGANISM: ICD-10-CM

## 2017-06-01 DIAGNOSIS — J43.9 PULMONARY EMPHYSEMA, UNSPECIFIED EMPHYSEMA TYPE (HCC): ICD-10-CM

## 2017-06-01 PROCEDURE — 1111F DSCHRG MED/CURRENT MED MERGE: CPT | Performed by: FAMILY MEDICINE

## 2017-06-01 PROCEDURE — 1123F ACP DISCUSS/DSCN MKR DOCD: CPT | Performed by: FAMILY MEDICINE

## 2017-06-01 PROCEDURE — 3017F COLORECTAL CA SCREEN DOC REV: CPT | Performed by: FAMILY MEDICINE

## 2017-06-01 PROCEDURE — 99213 OFFICE O/P EST LOW 20 MIN: CPT | Performed by: FAMILY MEDICINE

## 2017-06-01 PROCEDURE — G8598 ASA/ANTIPLAT THER USED: HCPCS | Performed by: FAMILY MEDICINE

## 2017-06-01 PROCEDURE — 4004F PT TOBACCO SCREEN RCVD TLK: CPT | Performed by: FAMILY MEDICINE

## 2017-06-01 PROCEDURE — G8417 CALC BMI ABV UP PARAM F/U: HCPCS | Performed by: FAMILY MEDICINE

## 2017-06-01 PROCEDURE — G8926 SPIRO NO PERF OR DOC: HCPCS | Performed by: FAMILY MEDICINE

## 2017-06-01 PROCEDURE — 3023F SPIROM DOC REV: CPT | Performed by: FAMILY MEDICINE

## 2017-06-01 PROCEDURE — G8427 DOCREV CUR MEDS BY ELIG CLIN: HCPCS | Performed by: FAMILY MEDICINE

## 2017-06-01 PROCEDURE — 4040F PNEUMOC VAC/ADMIN/RCVD: CPT | Performed by: FAMILY MEDICINE

## 2017-06-01 RX ORDER — POTASSIUM CHLORIDE 1500 MG/1
TABLET, EXTENDED RELEASE ORAL
Refills: 3 | Status: ON HOLD | COMMUNITY
Start: 2017-05-24 | End: 2017-11-03

## 2017-06-01 RX ORDER — METOLAZONE 2.5 MG/1
TABLET ORAL
Refills: 3 | Status: ON HOLD | COMMUNITY
Start: 2017-05-24 | End: 2017-11-03

## 2017-06-01 ASSESSMENT — ENCOUNTER SYMPTOMS: SHORTNESS OF BREATH: 0

## 2017-06-14 ENCOUNTER — HOSPITAL ENCOUNTER (OUTPATIENT)
Dept: PHARMACY | Age: 67
Setting detail: THERAPIES SERIES
Discharge: HOME | End: 2017-06-14
Payer: MEDICARE

## 2017-06-14 DIAGNOSIS — I48.91 ATRIAL FIBRILLATION, UNSPECIFIED TYPE (HCC): ICD-10-CM

## 2017-06-14 LAB
INR BLD: 4.4
PROTIME: 53.2 SECONDS

## 2017-06-14 PROCEDURE — 99211 OFF/OP EST MAY X REQ PHY/QHP: CPT

## 2017-06-14 PROCEDURE — 85610 PROTHROMBIN TIME: CPT

## 2017-06-28 ENCOUNTER — OFFICE VISIT (OUTPATIENT)
Dept: SURGERY | Age: 67
End: 2017-06-28

## 2017-06-28 ENCOUNTER — CARE COORDINATION (OUTPATIENT)
Dept: CASE MANAGEMENT | Age: 67
End: 2017-06-28

## 2017-06-28 ENCOUNTER — HOSPITAL ENCOUNTER (OUTPATIENT)
Dept: PHARMACY | Age: 67
Setting detail: THERAPIES SERIES
Discharge: HOME | End: 2017-06-28
Payer: MEDICARE

## 2017-06-28 VITALS
HEIGHT: 71 IN | BODY MASS INDEX: 31.22 KG/M2 | HEART RATE: 84 BPM | WEIGHT: 223 LBS | SYSTOLIC BLOOD PRESSURE: 116 MMHG | DIASTOLIC BLOOD PRESSURE: 74 MMHG

## 2017-06-28 DIAGNOSIS — I48.91 ATRIAL FIBRILLATION, UNSPECIFIED TYPE (HCC): ICD-10-CM

## 2017-06-28 LAB
GLUCOSE BLD-MCNC: 169 MG/DL
HBA1C MFR BLD: 7.5 %
INR BLD: 1.8
PROTIME: 21.8 SECONDS

## 2017-06-28 PROCEDURE — 83036 HEMOGLOBIN GLYCOSYLATED A1C: CPT | Performed by: INTERNAL MEDICINE

## 2017-06-28 PROCEDURE — 99213 OFFICE O/P EST LOW 20 MIN: CPT | Performed by: INTERNAL MEDICINE

## 2017-06-28 PROCEDURE — 82962 GLUCOSE BLOOD TEST: CPT | Performed by: INTERNAL MEDICINE

## 2017-06-28 PROCEDURE — 3046F HEMOGLOBIN A1C LEVEL >9.0%: CPT | Performed by: INTERNAL MEDICINE

## 2017-06-28 PROCEDURE — 4004F PT TOBACCO SCREEN RCVD TLK: CPT | Performed by: INTERNAL MEDICINE

## 2017-06-28 PROCEDURE — 3017F COLORECTAL CA SCREEN DOC REV: CPT | Performed by: INTERNAL MEDICINE

## 2017-06-28 PROCEDURE — G8427 DOCREV CUR MEDS BY ELIG CLIN: HCPCS | Performed by: INTERNAL MEDICINE

## 2017-06-28 PROCEDURE — G8417 CALC BMI ABV UP PARAM F/U: HCPCS | Performed by: INTERNAL MEDICINE

## 2017-06-28 PROCEDURE — 4040F PNEUMOC VAC/ADMIN/RCVD: CPT | Performed by: INTERNAL MEDICINE

## 2017-06-28 PROCEDURE — 1123F ACP DISCUSS/DSCN MKR DOCD: CPT | Performed by: INTERNAL MEDICINE

## 2017-06-28 PROCEDURE — G8598 ASA/ANTIPLAT THER USED: HCPCS | Performed by: INTERNAL MEDICINE

## 2017-06-28 PROCEDURE — 99211 OFF/OP EST MAY X REQ PHY/QHP: CPT

## 2017-06-28 PROCEDURE — 85610 PROTHROMBIN TIME: CPT

## 2017-06-28 ASSESSMENT — ENCOUNTER SYMPTOMS: EYES NEGATIVE: 1

## 2017-07-11 RX ORDER — ALLOPURINOL 100 MG/1
TABLET ORAL
Qty: 90 TABLET | Refills: 0 | Status: SHIPPED | OUTPATIENT
Start: 2017-07-11 | End: 2017-10-09 | Stop reason: SDUPTHER

## 2017-07-19 ENCOUNTER — HOSPITAL ENCOUNTER (OUTPATIENT)
Dept: PHARMACY | Age: 67
Setting detail: THERAPIES SERIES
Discharge: HOME | End: 2017-07-19
Payer: MEDICARE

## 2017-07-19 DIAGNOSIS — I48.91 ATRIAL FIBRILLATION, UNSPECIFIED TYPE (HCC): ICD-10-CM

## 2017-07-19 LAB
INR BLD: 2
PROTIME: 23.9 SECONDS

## 2017-07-19 PROCEDURE — 99211 OFF/OP EST MAY X REQ PHY/QHP: CPT

## 2017-07-19 PROCEDURE — 85610 PROTHROMBIN TIME: CPT

## 2017-07-26 ENCOUNTER — CARE COORDINATION (OUTPATIENT)
Dept: CARE COORDINATION | Age: 67
End: 2017-07-26

## 2017-08-16 ENCOUNTER — HOSPITAL ENCOUNTER (OUTPATIENT)
Dept: CARDIOLOGY | Age: 67
Discharge: HOME | End: 2017-08-16
Payer: MEDICARE

## 2017-08-16 ENCOUNTER — HOSPITAL ENCOUNTER (OUTPATIENT)
Dept: PHARMACY | Age: 67
Setting detail: THERAPIES SERIES
Discharge: HOME | End: 2017-08-16
Payer: MEDICARE

## 2017-08-16 DIAGNOSIS — I48.91 ATRIAL FIBRILLATION, UNSPECIFIED TYPE (HCC): ICD-10-CM

## 2017-08-16 LAB
INR BLD: 2
PROTIME: 24 SECONDS

## 2017-08-16 PROCEDURE — 99211 OFF/OP EST MAY X REQ PHY/QHP: CPT

## 2017-08-16 PROCEDURE — 93290 INTERROG DEV EVAL ICPMS IP: CPT

## 2017-08-16 PROCEDURE — 85610 PROTHROMBIN TIME: CPT

## 2017-08-16 PROCEDURE — 93283 PRGRMG EVAL IMPLANTABLE DFB: CPT

## 2017-08-25 ENCOUNTER — CARE COORDINATION (OUTPATIENT)
Dept: CARE COORDINATION | Age: 67
End: 2017-08-25

## 2017-09-13 ENCOUNTER — HOSPITAL ENCOUNTER (OUTPATIENT)
Dept: PHARMACY | Age: 67
Setting detail: THERAPIES SERIES
Discharge: HOME | End: 2017-09-13
Payer: MEDICARE

## 2017-09-13 DIAGNOSIS — I48.91 ATRIAL FIBRILLATION, UNSPECIFIED TYPE (HCC): ICD-10-CM

## 2017-09-13 LAB
INR BLD: 1.5
PROTIME: 18.1 SECONDS

## 2017-09-13 PROCEDURE — 99211 OFF/OP EST MAY X REQ PHY/QHP: CPT

## 2017-09-13 PROCEDURE — 85610 PROTHROMBIN TIME: CPT

## 2017-09-25 ENCOUNTER — HOSPITAL ENCOUNTER (OUTPATIENT)
Dept: PHARMACY | Age: 67
Setting detail: THERAPIES SERIES
Discharge: HOME | End: 2017-09-25
Payer: MEDICARE

## 2017-09-25 DIAGNOSIS — I48.91 ATRIAL FIBRILLATION, UNSPECIFIED TYPE (HCC): ICD-10-CM

## 2017-09-25 LAB
INR BLD: 2.1
PROTIME: 25.8 SECONDS

## 2017-09-25 PROCEDURE — 85610 PROTHROMBIN TIME: CPT

## 2017-09-25 PROCEDURE — 99211 OFF/OP EST MAY X REQ PHY/QHP: CPT

## 2017-09-28 ENCOUNTER — OFFICE VISIT (OUTPATIENT)
Dept: SURGERY | Age: 67
End: 2017-09-28

## 2017-09-28 VITALS
BODY MASS INDEX: 30.52 KG/M2 | HEIGHT: 71 IN | HEART RATE: 84 BPM | SYSTOLIC BLOOD PRESSURE: 108 MMHG | WEIGHT: 218 LBS | DIASTOLIC BLOOD PRESSURE: 70 MMHG

## 2017-09-28 DIAGNOSIS — Z23 ENCOUNTER FOR IMMUNIZATION: ICD-10-CM

## 2017-09-28 LAB
GLUCOSE BLD-MCNC: 216 MG/DL
HBA1C MFR BLD: 10.3 %

## 2017-09-28 PROCEDURE — G8427 DOCREV CUR MEDS BY ELIG CLIN: HCPCS | Performed by: INTERNAL MEDICINE

## 2017-09-28 PROCEDURE — 1123F ACP DISCUSS/DSCN MKR DOCD: CPT | Performed by: INTERNAL MEDICINE

## 2017-09-28 PROCEDURE — 3046F HEMOGLOBIN A1C LEVEL >9.0%: CPT | Performed by: INTERNAL MEDICINE

## 2017-09-28 PROCEDURE — 90662 IIV NO PRSV INCREASED AG IM: CPT | Performed by: INTERNAL MEDICINE

## 2017-09-28 PROCEDURE — G0008 ADMIN INFLUENZA VIRUS VAC: HCPCS | Performed by: INTERNAL MEDICINE

## 2017-09-28 PROCEDURE — 4004F PT TOBACCO SCREEN RCVD TLK: CPT | Performed by: INTERNAL MEDICINE

## 2017-09-28 PROCEDURE — 83036 HEMOGLOBIN GLYCOSYLATED A1C: CPT | Performed by: INTERNAL MEDICINE

## 2017-09-28 PROCEDURE — 3017F COLORECTAL CA SCREEN DOC REV: CPT | Performed by: INTERNAL MEDICINE

## 2017-09-28 PROCEDURE — 82962 GLUCOSE BLOOD TEST: CPT | Performed by: INTERNAL MEDICINE

## 2017-09-28 PROCEDURE — 4040F PNEUMOC VAC/ADMIN/RCVD: CPT | Performed by: INTERNAL MEDICINE

## 2017-09-28 PROCEDURE — 99213 OFFICE O/P EST LOW 20 MIN: CPT | Performed by: INTERNAL MEDICINE

## 2017-09-28 PROCEDURE — G8417 CALC BMI ABV UP PARAM F/U: HCPCS | Performed by: INTERNAL MEDICINE

## 2017-09-28 PROCEDURE — G8598 ASA/ANTIPLAT THER USED: HCPCS | Performed by: INTERNAL MEDICINE

## 2017-09-28 ASSESSMENT — ENCOUNTER SYMPTOMS: EYES NEGATIVE: 1

## 2017-09-28 NOTE — PROGRESS NOTES
solution, Take 2.5 mg by nebulization every 4 hours as needed for Wheezing, Disp: , Rfl:     gabapentin (NEURONTIN) 300 MG capsule, Take 1 capsule by mouth 3 times daily, Disp: 90 capsule, Rfl: 3    clotrimazole-betamethasone (LOTRISONE) 1-0.05 % cream, Apply topically 2 times daily. , Disp: 45 g, Rfl: 2    metoprolol succinate (TOPROL XL) 25 MG extended release tablet, Take 1 tablet by mouth daily, Disp: 30 tablet, Rfl: 3    nitroGLYCERIN (NITROSTAT) 0.4 MG SL tablet, Place 0.4 mg under the tongue every 5 minutes as needed for Chest pain, Disp: , Rfl:     clopidogrel (PLAVIX) 75 MG tablet, Take 75 mg by mouth daily, Disp: , Rfl:     atorvastatin (LIPITOR) 80 MG tablet, Take 80 mg by mouth daily, Disp: , Rfl:     amiodarone (CORDARONE) 200 MG tablet, Take 200 mg by mouth daily, Disp: , Rfl:     Magnesium 400 MG TABS, Take 400 mg by mouth daily , Disp: , Rfl:     DIGITEK 125 MCG tablet, TAKE 1 TABLET DAILY, Disp: 90 tablet, Rfl: 3    furosemide (LASIX) 40 MG tablet, TAKE 1 TABLET DAILY, Disp: 90 tablet, Rfl: 1    albuterol (PROAIR HFA) 108 (90 BASE) MCG/ACT inhaler, Inhale 2 puffs into the lungs every 4 hours as needed for Wheezing, Disp: 3 Inhaler, Rfl: 0      Review of Systems   Eyes: Negative. Genitourinary: Positive for impotence. Neurological: Positive for dizziness and tremors. All other systems reviewed and are negative. Vitals:    09/28/17 1100   BP: 108/70   Site: Left Arm   Position: Sitting   Cuff Size: Large Adult   Pulse: 84   Weight: 218 lb (98.9 kg)   Height: 5' 11\" (1.803 m)       Objective:   Physical Exam   Constitutional: He appears well-developed and well-nourished. HENT:   Head: Normocephalic and atraumatic. Cardiovascular: Normal rate and normal heart sounds. Pulmonary/Chest: Effort normal. He has no wheezes. He has no rales. Abdominal:   Obese    Musculoskeletal: Normal range of motion. Neurological: He is alert. Skin: Skin is warm.    Psychiatric: He has a normal mood and affect. Assessment:      1. Uncontrolled type 2 diabetes mellitus with complication, with long-term current use of insulin (HCC)  POCT Glucose    POCT glycosylated hemoglobin (Hb A1C)    Microalbumin / Creatinine Urine Ratio           Plan:      The current medical regimen is effective;  continue present plan and medications.     Orders Placed This Encounter   Procedures    POCT Glucose    POCT glycosylated hemoglobin (Hb A1C)     Orders Placed This Encounter   Procedures    INFLUENZA, HIGH DOSE, 65 YRS +, IM, PF, PREFILL SYR, 0.5ML (FLUZONE HD)    Microalbumin / Creatinine Urine Ratio     Standing Status:   Future     Standing Expiration Date:   9/28/2018    Basic Metabolic Panel     Standing Status:   Future     Standing Expiration Date:   9/28/2018    Hemoglobin A1C     Standing Status:   Future     Standing Expiration Date:   9/28/2018    POCT Glucose    POCT glycosylated hemoglobin (Hb A1C)

## 2017-09-28 NOTE — MR AVS SNAPSHOT
After Visit Summary             Hien Altamirano   2017 11:00 AM   Office Visit    Description:  Male : 1950   Provider:  Harsha Weinstein MD   Department:   [address] and Future Appointments         Below is a list of your follow-up and future appointments. This may not be a complete list as you may have made appointments directly with providers that we are not aware of or your providers may have made some for you. Please call your providers to confirm appointments. It is important to keep your appointments. Please bring your current insurance card, photo ID, co-pay, and all medication bottles to your appointment. If self-pay, payment is expected at the time of service. Your To-Do List     Future Appointments Provider Department Dept Phone    10/9/2017 10:45 AM [place] [facility] [phone]    2017 11:00 AM Kelly Beard MD [facility] [place] [facility] [phone]    Please arrive 15 minutes prior to appointment time, bring insurance card and photo ID.      Future Orders Complete By Expires    Basic Metabolic Panel [PDJ07 Custom]  2017    Hemoglobin A1C [LAB90 Custom]  2017    Microalbumin / Creatinine Urine Ratio [DYD640 Custom]  2017         Information from Your Visit        Department     Name Address Phone Fax    [facility] [place] [address] [facility] [address] [facility] [phone]      You Were Seen for:         Comments    Uncontrolled type 2 diabetes mellitus with complication, with long-term current use [facility] insulin [facility]   [[id number]]         Vital Signs     Blood Pressure Pulse Height Weight Body Mass Index Smoking Status    108/70 (Site: Left Arm, Position: Sitting, Cuff Size: Large Adult) 84 5' 11\" (1.803 m) 218 lb (98.9 kg) 30.4 kg/m2 Former Smoker      Additional Information about your Body Mass Index (BMI) Your BMI as listed above is considered obese (30 or more). BMI is an estimate of body fat, calculated from your height and weight. The higher your BMI, the greater your risk of heart disease, high blood pressure, type 2 diabetes, stroke, gallstones, arthritis, sleep apnea, and certain cancers. BMI is not perfect. It may overestimate body fat in athletes and people who are more muscular. Even a small weight loss (between 5 and 10 percent of your current weight) by decreasing your calorie intake and becoming more physically active will help lower your risk of developing or worsening diseases associated with obesity. Learn more at: [email or web address]             Medications and Orders      Your Current Medications Are              allopurinol (ZYLOPRIM) 100 MG tablet TAKE 1 TABLET DAILY    MUCINEX 600 MG extended release tablet TAKE 1 TABLET BY MOUTH 2 TIMES DAILY    metolazone (ZAROXOLYN) 2.5 MG tablet TAKE 1 TABLET BY MOUTH ONCE A DAY ON THURSDAY AND SUNDAY ONLY **TAKE 1/2 HOUR BEFRE MORNING LASIX**    KLOR-CON M20 20 MEQ extended release tablet TAKE 2 TABLETS BY MOUTH DAILY ON THURSDAY AND SUNDAY    warfarin (COUMADIN) 5 MG tablet Take as directed by [facility] EMERGENC[facility] AT [place] [facility]. Quantity equals 90 day supply.     COLCRYS 0.6 MG tablet TAKE 1 TABLET DAILY    ENTRESTO 24-26 MG per tablet TAKE 1 TABLET BY MOUTH EVERY MORNING    ketorolac (ACULAR) 0.5 % ophthalmic solution USE 1 DROP IN EYE 4 TIMES DAILY THE DAY PRIOR TO SURGERY, ONCE THE MORNING OF, AND 4 X PER DAY AFTER    prednisoLONE acetate (PRED FORTE) 1 % ophthalmic suspension PLACE 1 DROP IN BOTH EYES FOUR TIMES DAILY    insulin 70-30 (NOVOLIN 70/30 RELION) (70-30) 100 UNIT per ML injection vial 55 units twice a day    BD INSULIN SYRINGE ULTRAFINE 31G X 5/16\" 0.5 ML MISC USE TWICE A DAY    aspirin 81 MG tablet Take 81 mg by mouth daily albuterol (PROVENTIL) (2.5 MG/3ML) 0.083% nebulizer solution Take 2.5 mg by nebulization every 4 hours as needed for Wheezing    gabapentin (NEURONTIN) 300 MG capsule Take 1 capsule by mouth 3 times daily    clotrimazole-betamethasone (LOTRISONE) 1-0.05 % cream Apply topically 2 times daily.     metoprolol succinate (TOPROL XL) 25 MG extended release tablet Take 1 tablet by mouth daily    nitroGLYCERIN (NITROSTAT) 0.4 MG SL tablet Place 0.4 mg under the tongue every 5 minutes as needed for Chest pain    clopidogrel (PLAVIX) 75 MG tablet Take 75 mg by mouth daily    atorvastatin (LIPITOR) 80 MG tablet Take 80 mg by mouth daily    amiodarone (CORDARONE) 200 MG tablet Take 200 mg by mouth daily    Magnesium 400 MG TABS Take 400 mg by mouth daily     DIGITEK 125 MCG tablet TAKE 1 TABLET DAILY    furosemide (LASIX) 40 MG tablet TAKE 1 TABLET DAILY    albuterol (PROAIR HFA) 108 (90 BASE) MCG/ACT inhaler Inhale 2 puffs into the lungs every 4 hours as needed for Wheezing      Allergies              Penicillins Anaphylaxis      We Ordered/Performed the following           INFLUENZA, HIGH DOSE, 65 YRS +, IM, PF, PREFILL SYR, 0.5ML (FLUZONE HD)     POCT Glucose     POCT glycosylated hemoglobin (Hb A1C)          Result Summary for POCT Glucose      Result Information     Status          Final result (Collected: 9/28/2017 11:07 AM)           9/28/2017 11:07 AM      Component Results     Component Value Ref Range & Units Status    Glucose 216 mg/dL Final    random         Result Summary for POCT glycosylated hemoglobin (Hb A1C)      Result Information       Status          Abnormal Final result (Collected: 9/28/2017 11:07 AM)           9/28/2017 11:11 AM      Component Results     Component Value Ref Range & Units Status    Hemoglobin A1C 10.3 % Final               Additional Information        Basic Information     Date Of Birth Sex Race Ethnicity Preferred Language    1950 Male Black Non-/Non  [place]

## 2017-10-05 ENCOUNTER — OFFICE VISIT (OUTPATIENT)
Dept: UROLOGY | Age: 67
End: 2017-10-05

## 2017-10-05 VITALS — SYSTOLIC BLOOD PRESSURE: 110 MMHG | HEART RATE: 63 BPM | DIASTOLIC BLOOD PRESSURE: 66 MMHG

## 2017-10-05 DIAGNOSIS — N47.1 PHIMOSIS: ICD-10-CM

## 2017-10-05 DIAGNOSIS — N48.1 BALANITIS: Primary | ICD-10-CM

## 2017-10-05 PROCEDURE — G8427 DOCREV CUR MEDS BY ELIG CLIN: HCPCS | Performed by: UROLOGY

## 2017-10-05 PROCEDURE — 1123F ACP DISCUSS/DSCN MKR DOCD: CPT | Performed by: UROLOGY

## 2017-10-05 PROCEDURE — G8417 CALC BMI ABV UP PARAM F/U: HCPCS | Performed by: UROLOGY

## 2017-10-05 PROCEDURE — 4040F PNEUMOC VAC/ADMIN/RCVD: CPT | Performed by: UROLOGY

## 2017-10-05 PROCEDURE — 99214 OFFICE O/P EST MOD 30 MIN: CPT | Performed by: UROLOGY

## 2017-10-05 PROCEDURE — 4004F PT TOBACCO SCREEN RCVD TLK: CPT | Performed by: UROLOGY

## 2017-10-05 PROCEDURE — G8598 ASA/ANTIPLAT THER USED: HCPCS | Performed by: UROLOGY

## 2017-10-05 PROCEDURE — G8484 FLU IMMUNIZE NO ADMIN: HCPCS | Performed by: UROLOGY

## 2017-10-05 PROCEDURE — 3017F COLORECTAL CA SCREEN DOC REV: CPT | Performed by: UROLOGY

## 2017-10-05 NOTE — PROGRESS NOTES
[facility] EVALUATION NOTE                                                 H&P                                                                                                                                                 Reason for Visit  Phimosis    History of Present Illness  72-year-old male with history of phimosis  Recent cataract surgery  Patient also on Plavix and Coumadin for TIA      Urologic Review of Systems/Symptoms  Denies hematuria  Denies dysuria  Denies incontinence  Denies flank pain  Other Urologic: Phimosis has gotten worse with inability to retract foreskin    Review of Systems  Head and neck: No issues/reviewed  Cardiac: No recent issues/reviewed  Pulmonary: No issues/reviewed  Gastrointestinal: No issues/reviewed  Neurologic: No recent issues/reviewed  Extremities: No issues/reviewed  Lymphatics: No lymphadenopathy no change  Genitourinary: See above  Skin: No issues/reviewed  Hospitalization: Recent cataract surgery  Continues to take Coumadin and Plavix  All 14 categories of Review of Systems otherwise reviewed no other findings reported.     Past Medical History:   Diagnosis Date    CAD (coronary artery disease)     [facility]     COPD (chronic obstructive pulmonary disease) [facility]     Defibrillator activation     Diabetes mellitus with insulin therapy [facility]     Generalized and unspecified atherosclerosis     Gout     Hyperlipidemia     Hypertension     Pain in joint, multiple sites     Status post angioplasty     TIA (transient ischemic attack)     Tobacco abuse     Type II or unspecified type diabetes mellitus without mention of complication, not stated as uncontrolled      Past Surgical History:   Procedure Laterality Date    CORONARY ANGIOPLASTY  4/29/11    Dr Erik Kenney Bilateral     Cataracts     OTHER SURGICAL HISTORY      difibrillator      Social History     Social History    Marital status: Single Apply topically 2 times daily. 45 g 2    metoprolol succinate (TOPROL XL) 25 MG extended release tablet Take 1 tablet by mouth daily 30 tablet 3    nitroGLYCERIN (NITROSTAT) 0.4 MG SL tablet Place 0.4 mg under the tongue every 5 minutes as needed for Chest pain      clopidogrel (PLAVIX) 75 MG tablet Take 75 mg by mouth daily      atorvastatin (LIPITOR) 80 MG tablet Take 80 mg by mouth daily      amiodarone (CORDARONE) 200 MG tablet Take 200 mg by mouth daily      Magnesium 400 MG TABS Take 400 mg by mouth daily       DIGITEK 125 MCG tablet TAKE 1 TABLET DAILY 90 tablet 3    furosemide (LASIX) 40 MG tablet TAKE 1 TABLET DAILY 90 tablet 1    albuterol (PROAIR HFA) 108 (90 BASE) MCG/ACT inhaler Inhale 2 puffs into the lungs every 4 hours as needed for Wheezing 3 Inhaler 0     No current facility-administered medications for this visit. Penicillins  All reviewed and verified by Dr Sophie Knox on today's visit    PSA   Date Value Ref Range Status   04/20/2016 0.83 0.00 - 5.40 ng/mL Final     Comment:     When the Total PSA is between 3.00 and 10.00 ng/mL, consider  requesting a Free PSA to aid in diagnosis. 09/11/2013 1.0 0.0 - 4.0 ng/mL Final     Comment:     When the Total PSA is between 3.00 and 10.00 ng/mL, consider  requesting a Free PSA to aid in diagnosis. No results found for this visit on 10/05/17. Physical Exam  Vitals:    10/05/17 1326   BP: 110/66   Pulse: 63     Constitutional: patient is oriented to person, place, and time. patient appears well-developed. not in distress. Ears: Adequate hearing/no hearing loss  Head: Normocephalic. Atraumatic  Neck: Normal range of motion. Cardiovascular: Normal rate, BP reviewed. sinus rhythm  Pulmonary/Chest: Normal respiratory effort  no shortness of breath  Abdominal: Not distended. No hernias  Urologic Exam  Severe phimosis with mild balanitis. Testis within normal limits. Prostate exam not indicated .   Musculoskeletal: Normal range of motion. Patient is ambulatory with cane. Extremities: No lower extremity edema   Neurological: Cranial nerves intact no deficits   Skin: Skin is warm and dry. No lesions. Severe phimosis with balanitis and foreskin otherwise no lesions   Psychiatric:  Alert and oriented ×3, normal affect. Assessment  Circumcision under general anesthetic  All risks and benefits explained including infection, bleeding, swelling. Cardiology clearance  Plan  Patient has been scheduled tentatively October 25, 2017  All preoperative instructions given  Greater than 50% of 25 minutes spent consulting patient face-to-face  No orders of the defined types were placed in this encounter. No orders of the defined types were placed in this encounter. Arline Varma MD       Please note this report has been partially produced using speech recognition software  And may cause contain errors related to that system including grammar, punctuation and spelling as well as words and phrases that may seem inappropriate. If there are questions or concerns please feel free to contact me to clarify.

## 2017-10-05 NOTE — PROGRESS NOTES
Chaperone for Intimate Exam    1. Was chaperone offered as part of the rooming process? offered, declined   2. If Chaperone is declined by patient, NA: chaperone was available and exam completed  3.  Chaperone is n/a

## 2017-10-09 ENCOUNTER — HOSPITAL ENCOUNTER (OUTPATIENT)
Dept: PHARMACY | Age: 67
Setting detail: THERAPIES SERIES
Discharge: HOME | End: 2017-10-09
Payer: MEDICARE

## 2017-10-09 ENCOUNTER — CARE COORDINATION (OUTPATIENT)
Dept: CARE COORDINATION | Age: 67
End: 2017-10-09

## 2017-10-09 DIAGNOSIS — I48.91 ATRIAL FIBRILLATION, UNSPECIFIED TYPE (HCC): ICD-10-CM

## 2017-10-09 LAB
INR BLD: 1.5
PROTIME: 18.2 SECONDS

## 2017-10-09 PROCEDURE — 85610 PROTHROMBIN TIME: CPT | Performed by: PHARMACIST

## 2017-10-09 PROCEDURE — 99211 OFF/OP EST MAY X REQ PHY/QHP: CPT | Performed by: PHARMACIST

## 2017-10-09 RX ORDER — ALLOPURINOL 100 MG/1
TABLET ORAL
Qty: 90 TABLET | Refills: 0 | Status: SHIPPED | OUTPATIENT
Start: 2017-10-09 | End: 2018-01-07 | Stop reason: SDUPTHER

## 2017-10-09 NOTE — CARE COORDINATION
Ambulatory Care Coordination Note  10/9/2017  CM Risk Score: 11  Tasia Mortality Risk Score: 8.64    ACC: Jhoan Walters, RN    Summary Note: Called pt to discuss [facility] program, current needs and complete enrollment. LVMM requesting a call back to discuss. Goals Addressed     None          Prior to Admission medications    Medication Sig Start Date End Date Taking? Authorizing Provider   allopurinol (ZYLOPRIM) 100 MG tablet TAKE 1 TABLET DAILY 10/9/17   Jose Mendez MD   MUCINEX 600 MG extended release tablet TAKE 1 TABLET BY MOUTH 2 TIMES DAILY 5/14/17   Historical Provider, MD   metolazone (ZAROXOLYN) 2.5 MG tablet TAKE 1 TABLET BY MOUTH ONCE A DAY ON THURSDAY AND SUNDAY ONLY **TAKE 1/2 HOUR BEFRE MORNING LASIX** 5/24/17   Historical Provider, MD   KLOR-CON M20 20 MEQ extended release tablet TAKE 2 TABLETS BY MOUTH DAILY ON THURSDAY AND SUNDAY 5/24/17   Historical Provider, MD   warfarin (COUMADIN) 5 MG tablet Take as directed by [facility] AT [place] [facility]. Quantity equals 90 day supply.  5/31/17   Kenrick Mccarty MD   COLCRYS 0.6 MG tablet TAKE 1 TABLET DAILY 4/23/17   Jose Mendez MD   ENTRESTO 24-26 MG per tablet TAKE 1 TABLET BY MOUTH EVERY MORNING 2/16/17   Historical Provider, MD   ketorolac (ACULAR) 0.5 % ophthalmic solution USE 1 DROP IN EYE 4 TIMES DAILY THE DAY PRIOR TO SURGERY, ONCE THE MORNING OF, AND 4 X PER DAY AFTER 3/23/17   Historical Provider, MD   prednisoLONE acetate (PRED FORTE) 1 % ophthalmic suspension PLACE 1 DROP IN BOTH EYES FOUR TIMES DAILY 2/17/17   Historical Provider, MD   insulin 70-30 (NOVOLIN 70/30 RELION) (70-30) 100 UNIT per ML injection vial 55 units twice a day 3/28/17   Jackqueline Sandifer, MD   BD INSULIN SYRINGE ULTRAFINE 31G X 5/16\" 0.5 ML MISC USE TWICE A DAY 2/3/17   Jackqueline Sandifer, MD   aspirin 81 MG tablet Take 81 mg by mouth daily    Historical Provider, MD   albuterol (PROVENTIL) (2.5 MG/3ML) 0.083% nebulizer solution Take 2.5 mg by

## 2017-10-23 ENCOUNTER — HOSPITAL ENCOUNTER (OUTPATIENT)
Dept: PREADMISSION TESTING | Age: 67
Discharge: HOME | End: 2017-10-23
Payer: MEDICARE

## 2017-10-24 ENCOUNTER — OFFICE VISIT (OUTPATIENT)
Dept: UROLOGY | Age: 67
End: 2017-10-24

## 2017-10-24 VITALS
DIASTOLIC BLOOD PRESSURE: 60 MMHG | SYSTOLIC BLOOD PRESSURE: 130 MMHG | HEIGHT: 71 IN | HEART RATE: 102 BPM | WEIGHT: 226 LBS | BODY MASS INDEX: 31.64 KG/M2

## 2017-10-24 DIAGNOSIS — N48.1 BALANITIS: Primary | ICD-10-CM

## 2017-10-24 PROCEDURE — 4004F PT TOBACCO SCREEN RCVD TLK: CPT | Performed by: UROLOGY

## 2017-10-24 PROCEDURE — G8427 DOCREV CUR MEDS BY ELIG CLIN: HCPCS | Performed by: UROLOGY

## 2017-10-24 PROCEDURE — G8417 CALC BMI ABV UP PARAM F/U: HCPCS | Performed by: UROLOGY

## 2017-10-24 PROCEDURE — G8484 FLU IMMUNIZE NO ADMIN: HCPCS | Performed by: UROLOGY

## 2017-10-24 PROCEDURE — G8598 ASA/ANTIPLAT THER USED: HCPCS | Performed by: UROLOGY

## 2017-10-24 PROCEDURE — 1123F ACP DISCUSS/DSCN MKR DOCD: CPT | Performed by: UROLOGY

## 2017-10-24 PROCEDURE — 3017F COLORECTAL CA SCREEN DOC REV: CPT | Performed by: UROLOGY

## 2017-10-24 PROCEDURE — 4040F PNEUMOC VAC/ADMIN/RCVD: CPT | Performed by: UROLOGY

## 2017-10-24 PROCEDURE — 99213 OFFICE O/P EST LOW 20 MIN: CPT | Performed by: UROLOGY

## 2017-10-24 RX ORDER — CLOTRIMAZOLE AND BETAMETHASONE DIPROPIONATE 10; .64 MG/G; MG/G
CREAM TOPICAL
Qty: 45 G | Refills: 3 | Status: ON HOLD | OUTPATIENT
Start: 2017-10-24 | End: 2017-11-03

## 2017-10-24 NOTE — PROGRESS NOTES
Chaperone for Intimate Exam    1. Was chaperone offered as part of the rooming process? offered, declined   2. If Chaperone is declined by patient, NA: chaperone was available and exam completed  3.  Chaperone is n/a
History    Marital status: Single     Spouse name: N/A    Number of children: N/A    Years of education: N/A     Social History Main Topics    Smoking status: Former Smoker     Quit date: 1/1/2012    Smokeless tobacco: Current User    Alcohol use No    Drug use: Unknown    Sexual activity: Not Asked     Other Topics Concern    None     Social History Narrative    None     Family History   Problem Relation Age of Onset    Cancer Brother      Current Outpatient Prescriptions   Medication Sig Dispense Refill    clotrimazole-betamethasone (LOTRISONE) 1-0.05 % cream Apply topically 2 times daily. 45 g 3    allopurinol (ZYLOPRIM) 100 MG tablet TAKE 1 TABLET DAILY 90 tablet 0    MUCINEX 600 MG extended release tablet TAKE 1 TABLET BY MOUTH 2 TIMES DAILY  0    metolazone (ZAROXOLYN) 2.5 MG tablet TAKE 1 TABLET BY MOUTH ONCE A DAY ON THURSDAY AND SUNDAY ONLY **TAKE 1/2 HOUR BEFRE MORNING LASIX**  3    KLOR-CON M20 20 MEQ extended release tablet TAKE 2 TABLETS BY MOUTH DAILY ON THURSDAY AND SUNDAY  3    warfarin (COUMADIN) 5 MG tablet Take as directed by [facility] AT [place] [facility]. Quantity equals 90 day supply.  120 tablet 1    ENTRESTO 24-26 MG per tablet TAKE 1 TABLET BY MOUTH EVERY MORNING  11    ketorolac (ACULAR) 0.5 % ophthalmic solution USE 1 DROP IN EYE 4 TIMES DAILY THE DAY PRIOR TO SURGERY, ONCE THE MORNING OF, AND 4 X PER DAY AFTER  1    prednisoLONE acetate (PRED FORTE) 1 % ophthalmic suspension PLACE 1 DROP IN BOTH EYES FOUR TIMES DAILY  1    insulin 70-30 (NOVOLIN 70/30 RELION) (70-30) 100 UNIT per ML injection vial 55 units twice a day 8 vial 3    BD INSULIN SYRINGE ULTRAFINE 31G X 5/16\" 0.5 ML MISC USE TWICE A  each 3    aspirin 81 MG tablet Take 81 mg by mouth daily      albuterol (PROVENTIL) (2.5 MG/3ML) 0.083% nebulizer solution Take 2.5 mg by nebulization every 4 hours as needed for Wheezing      gabapentin (NEURONTIN) 300 MG capsule Take 1 capsule by

## 2017-11-03 ENCOUNTER — HOSPITAL ENCOUNTER (INPATIENT)
Age: 67
LOS: 4 days | Discharge: HOME | DRG: 813 | End: 2017-11-07
Attending: INTERNAL MEDICINE | Admitting: INTERNAL MEDICINE
Payer: MEDICARE

## 2017-11-03 ENCOUNTER — APPOINTMENT (OUTPATIENT)
Dept: CT IMAGING | Age: 67
DRG: 813 | End: 2017-11-03
Payer: MEDICARE

## 2017-11-03 ENCOUNTER — APPOINTMENT (OUTPATIENT)
Dept: GENERAL RADIOLOGY | Age: 67
DRG: 813 | End: 2017-11-03
Payer: MEDICARE

## 2017-11-03 DIAGNOSIS — E11.9 TYPE 2 DIABETES MELLITUS WITHOUT COMPLICATION, UNSPECIFIED LONG TERM INSULIN USE STATUS: ICD-10-CM

## 2017-11-03 DIAGNOSIS — J44.1 COPD EXACERBATION (HCC): ICD-10-CM

## 2017-11-03 DIAGNOSIS — R04.2 HEMOPTYSIS: Primary | ICD-10-CM

## 2017-11-03 DIAGNOSIS — R06.03 RESPIRATORY DISTRESS: ICD-10-CM

## 2017-11-03 DIAGNOSIS — I48.92 ATRIAL FLUTTER, UNSPECIFIED TYPE (HCC): ICD-10-CM

## 2017-11-03 LAB
ALBUMIN SERPL-MCNC: 3.9 G/DL (ref 3.9–4.9)
ALP BLD-CCNC: 132 U/L (ref 35–104)
ALT SERPL-CCNC: 14 U/L (ref 0–41)
ANION GAP SERPL CALCULATED.3IONS-SCNC: 15 MEQ/L (ref 7–13)
APTT: 29.3 SEC (ref 21.6–35.4)
AST SERPL-CCNC: 17 U/L (ref 0–40)
BILIRUB SERPL-MCNC: 1.8 MG/DL (ref 0–1.2)
BUN BLDV-MCNC: 12 MG/DL (ref 8–23)
CALCIUM SERPL-MCNC: 8.6 MG/DL (ref 8.6–10.2)
CHLORIDE BLD-SCNC: 97 MEQ/L (ref 98–107)
CO2: 24 MEQ/L (ref 22–29)
CREAT SERPL-MCNC: 0.93 MG/DL (ref 0.7–1.2)
EKG ATRIAL RATE: 111 BPM
EKG P AXIS: 72 DEGREES
EKG P-R INTERVAL: 208 MS
EKG Q-T INTERVAL: 362 MS
EKG QRS DURATION: 120 MS
EKG QTC CALCULATION (BAZETT): 492 MS
EKG R AXIS: -17 DEGREES
EKG T AXIS: 131 DEGREES
EKG VENTRICULAR RATE: 111 BPM
GFR AFRICAN AMERICAN: >60
GFR NON-AFRICAN AMERICAN: >60
GLOBULIN: 2.9 G/DL (ref 2.3–3.5)
GLUCOSE BLD-MCNC: 106 MG/DL (ref 74–109)
GLUCOSE BLD-MCNC: 174 MG/DL (ref 60–115)
HCT VFR BLD CALC: 37.8 % (ref 42–52)
HEMOGLOBIN: 12.3 G/DL (ref 14–18)
INR BLD: 1.4
LACTIC ACID: 2 MMOL/L (ref 0.5–2.2)
MCH RBC QN AUTO: 27.5 PG (ref 27–31.3)
MCHC RBC AUTO-ENTMCNC: 32.5 % (ref 33–37)
MCV RBC AUTO: 84.6 FL (ref 80–100)
PDW BLD-RTO: 15.4 % (ref 11.5–14.5)
PERFORMED ON: ABNORMAL
PLATELET # BLD: 162 K/UL (ref 130–400)
POTASSIUM SERPL-SCNC: 3.6 MEQ/L (ref 3.5–5.1)
PRO-BNP: 2920 PG/ML
PROTHROMBIN TIME: 15.1 SEC (ref 8.1–13.7)
RAPID INFLUENZA  B AGN: NEGATIVE
RAPID INFLUENZA A AGN: NEGATIVE
RBC # BLD: 4.46 M/UL (ref 4.7–6.1)
SODIUM BLD-SCNC: 136 MEQ/L (ref 132–144)
TOTAL PROTEIN: 6.8 G/DL (ref 6.4–8.1)
TROPONIN: <0.01 NG/ML (ref 0–0.01)
WBC # BLD: 5.3 K/UL (ref 4.8–10.8)

## 2017-11-03 PROCEDURE — 99285 EMERGENCY DEPT VISIT HI MDM: CPT

## 2017-11-03 PROCEDURE — C9113 INJ PANTOPRAZOLE SODIUM, VIA: HCPCS | Performed by: INTERNAL MEDICINE

## 2017-11-03 PROCEDURE — 83880 ASSAY OF NATRIURETIC PEPTIDE: CPT

## 2017-11-03 PROCEDURE — 6370000000 HC RX 637 (ALT 250 FOR IP): Performed by: NURSE PRACTITIONER

## 2017-11-03 PROCEDURE — 2580000003 HC RX 258: Performed by: NURSE PRACTITIONER

## 2017-11-03 PROCEDURE — 94640 AIRWAY INHALATION TREATMENT: CPT

## 2017-11-03 PROCEDURE — 94760 N-INVAS EAR/PLS OXIMETRY 1: CPT

## 2017-11-03 PROCEDURE — 6370000000 HC RX 637 (ALT 250 FOR IP): Performed by: INTERNAL MEDICINE

## 2017-11-03 PROCEDURE — 6360000002 HC RX W HCPCS: Performed by: INTERNAL MEDICINE

## 2017-11-03 PROCEDURE — 6360000004 HC RX CONTRAST MEDICATION: Performed by: STUDENT IN AN ORGANIZED HEALTH CARE EDUCATION/TRAINING PROGRAM

## 2017-11-03 PROCEDURE — 94664 DEMO&/EVAL PT USE INHALER: CPT

## 2017-11-03 PROCEDURE — 36415 COLL VENOUS BLD VENIPUNCTURE: CPT

## 2017-11-03 PROCEDURE — 2580000003 HC RX 258: Performed by: STUDENT IN AN ORGANIZED HEALTH CARE EDUCATION/TRAINING PROGRAM

## 2017-11-03 PROCEDURE — 84484 ASSAY OF TROPONIN QUANT: CPT

## 2017-11-03 PROCEDURE — 85027 COMPLETE CBC AUTOMATED: CPT

## 2017-11-03 PROCEDURE — 85610 PROTHROMBIN TIME: CPT

## 2017-11-03 PROCEDURE — 85730 THROMBOPLASTIN TIME PARTIAL: CPT

## 2017-11-03 PROCEDURE — 87040 BLOOD CULTURE FOR BACTERIA: CPT

## 2017-11-03 PROCEDURE — 83605 ASSAY OF LACTIC ACID: CPT

## 2017-11-03 PROCEDURE — 71010 XR CHEST PORTABLE: CPT

## 2017-11-03 PROCEDURE — 1210000000 HC MED SURG R&B

## 2017-11-03 PROCEDURE — 86403 PARTICLE AGGLUT ANTBDY SCRN: CPT

## 2017-11-03 PROCEDURE — 87070 CULTURE OTHR SPECIMN AEROBIC: CPT

## 2017-11-03 PROCEDURE — 93005 ELECTROCARDIOGRAM TRACING: CPT

## 2017-11-03 PROCEDURE — 6360000002 HC RX W HCPCS: Performed by: NURSE PRACTITIONER

## 2017-11-03 PROCEDURE — 2580000003 HC RX 258: Performed by: INTERNAL MEDICINE

## 2017-11-03 PROCEDURE — 87205 SMEAR GRAM STAIN: CPT

## 2017-11-03 PROCEDURE — 96374 THER/PROPH/DIAG INJ IV PUSH: CPT

## 2017-11-03 PROCEDURE — 80053 COMPREHEN METABOLIC PANEL: CPT

## 2017-11-03 PROCEDURE — 71275 CT ANGIOGRAPHY CHEST: CPT

## 2017-11-03 RX ORDER — ATORVASTATIN CALCIUM 80 MG/1
80 TABLET, FILM COATED ORAL DAILY
Status: DISCONTINUED | OUTPATIENT
Start: 2017-11-03 | End: 2017-11-07 | Stop reason: HOSPADM

## 2017-11-03 RX ORDER — 0.9 % SODIUM CHLORIDE 0.9 %
10 VIAL (ML) INJECTION 2 TIMES DAILY
Status: DISCONTINUED | OUTPATIENT
Start: 2017-11-03 | End: 2017-11-05

## 2017-11-03 RX ORDER — SODIUM CHLORIDE 0.9 % (FLUSH) 0.9 %
10 SYRINGE (ML) INJECTION PRN
Status: DISCONTINUED | OUTPATIENT
Start: 2017-11-03 | End: 2017-11-07 | Stop reason: HOSPADM

## 2017-11-03 RX ORDER — SODIUM CHLORIDE 0.9 % (FLUSH) 0.9 %
10 SYRINGE (ML) INJECTION EVERY 12 HOURS SCHEDULED
Status: DISCONTINUED | OUTPATIENT
Start: 2017-11-03 | End: 2017-11-04 | Stop reason: SDUPTHER

## 2017-11-03 RX ORDER — MORPHINE SULFATE 4 MG/ML
4 INJECTION, SOLUTION INTRAMUSCULAR; INTRAVENOUS
Status: DISCONTINUED | OUTPATIENT
Start: 2017-11-03 | End: 2017-11-07 | Stop reason: HOSPADM

## 2017-11-03 RX ORDER — SODIUM CHLORIDE 0.9 % (FLUSH) 0.9 %
10 SYRINGE (ML) INJECTION
Status: COMPLETED | OUTPATIENT
Start: 2017-11-03 | End: 2017-11-03

## 2017-11-03 RX ORDER — WARFARIN SODIUM 5 MG/1
5 TABLET ORAL DAILY
Status: DISCONTINUED | OUTPATIENT
Start: 2017-11-04 | End: 2017-11-03 | Stop reason: DRUGHIGH

## 2017-11-03 RX ORDER — ALLOPURINOL 100 MG/1
100 TABLET ORAL DAILY
Status: DISCONTINUED | OUTPATIENT
Start: 2017-11-04 | End: 2017-11-07 | Stop reason: HOSPADM

## 2017-11-03 RX ORDER — DEXTROSE MONOHYDRATE 50 MG/ML
100 INJECTION, SOLUTION INTRAVENOUS PRN
Status: DISCONTINUED | OUTPATIENT
Start: 2017-11-03 | End: 2017-11-07 | Stop reason: HOSPADM

## 2017-11-03 RX ORDER — DEXTROSE MONOHYDRATE 25 G/50ML
12.5 INJECTION, SOLUTION INTRAVENOUS PRN
Status: DISCONTINUED | OUTPATIENT
Start: 2017-11-03 | End: 2017-11-07 | Stop reason: HOSPADM

## 2017-11-03 RX ORDER — OXYCODONE HYDROCHLORIDE AND ACETAMINOPHEN 5; 325 MG/1; MG/1
2 TABLET ORAL EVERY 4 HOURS PRN
Status: DISCONTINUED | OUTPATIENT
Start: 2017-11-03 | End: 2017-11-07 | Stop reason: HOSPADM

## 2017-11-03 RX ORDER — METHYLPREDNISOLONE SODIUM SUCCINATE 40 MG/ML
40 INJECTION, POWDER, LYOPHILIZED, FOR SOLUTION INTRAMUSCULAR; INTRAVENOUS EVERY 8 HOURS
Status: DISCONTINUED | OUTPATIENT
Start: 2017-11-03 | End: 2017-11-03

## 2017-11-03 RX ORDER — METOPROLOL SUCCINATE 25 MG/1
25 TABLET, EXTENDED RELEASE ORAL DAILY
Status: DISCONTINUED | OUTPATIENT
Start: 2017-11-04 | End: 2017-11-07 | Stop reason: HOSPADM

## 2017-11-03 RX ORDER — METHYLPREDNISOLONE SODIUM SUCCINATE 125 MG/2ML
125 INJECTION, POWDER, LYOPHILIZED, FOR SOLUTION INTRAMUSCULAR; INTRAVENOUS ONCE
Status: COMPLETED | OUTPATIENT
Start: 2017-11-03 | End: 2017-11-03

## 2017-11-03 RX ORDER — PANTOPRAZOLE SODIUM 40 MG/10ML
40 INJECTION, POWDER, LYOPHILIZED, FOR SOLUTION INTRAVENOUS 2 TIMES DAILY
Status: DISCONTINUED | OUTPATIENT
Start: 2017-11-03 | End: 2017-11-05

## 2017-11-03 RX ORDER — WARFARIN SODIUM 5 MG/1
5 TABLET ORAL
Status: COMPLETED | OUTPATIENT
Start: 2017-11-03 | End: 2017-11-03

## 2017-11-03 RX ORDER — NICOTINE POLACRILEX 4 MG
15 LOZENGE BUCCAL PRN
Status: DISCONTINUED | OUTPATIENT
Start: 2017-11-03 | End: 2017-11-07 | Stop reason: HOSPADM

## 2017-11-03 RX ORDER — FAMOTIDINE 20 MG/1
20 TABLET, FILM COATED ORAL 2 TIMES DAILY
Status: DISCONTINUED | OUTPATIENT
Start: 2017-11-03 | End: 2017-11-03

## 2017-11-03 RX ORDER — PREDNISONE 20 MG/1
40 TABLET ORAL DAILY
Status: DISCONTINUED | OUTPATIENT
Start: 2017-11-03 | End: 2017-11-04

## 2017-11-03 RX ORDER — CLOPIDOGREL BISULFATE 75 MG/1
75 TABLET ORAL DAILY
Status: DISCONTINUED | OUTPATIENT
Start: 2017-11-04 | End: 2017-11-03

## 2017-11-03 RX ORDER — ONDANSETRON 2 MG/ML
4 INJECTION INTRAMUSCULAR; INTRAVENOUS EVERY 6 HOURS PRN
Status: DISCONTINUED | OUTPATIENT
Start: 2017-11-03 | End: 2017-11-07 | Stop reason: HOSPADM

## 2017-11-03 RX ORDER — OXYCODONE HYDROCHLORIDE AND ACETAMINOPHEN 5; 325 MG/1; MG/1
1 TABLET ORAL EVERY 4 HOURS PRN
Status: DISCONTINUED | OUTPATIENT
Start: 2017-11-03 | End: 2017-11-07 | Stop reason: HOSPADM

## 2017-11-03 RX ORDER — IPRATROPIUM BROMIDE AND ALBUTEROL SULFATE 2.5; .5 MG/3ML; MG/3ML
1 SOLUTION RESPIRATORY (INHALATION) ONCE
Status: COMPLETED | OUTPATIENT
Start: 2017-11-03 | End: 2017-11-03

## 2017-11-03 RX ORDER — DIGOXIN 125 MCG
125 TABLET ORAL DAILY
Status: DISCONTINUED | OUTPATIENT
Start: 2017-11-03 | End: 2017-11-07 | Stop reason: HOSPADM

## 2017-11-03 RX ORDER — MORPHINE SULFATE 2 MG/ML
2 INJECTION, SOLUTION INTRAMUSCULAR; INTRAVENOUS
Status: DISCONTINUED | OUTPATIENT
Start: 2017-11-03 | End: 2017-11-07 | Stop reason: HOSPADM

## 2017-11-03 RX ORDER — FUROSEMIDE 40 MG/1
40 TABLET ORAL DAILY
Status: DISCONTINUED | OUTPATIENT
Start: 2017-11-04 | End: 2017-11-04

## 2017-11-03 RX ORDER — ACETAMINOPHEN 325 MG/1
650 TABLET ORAL EVERY 4 HOURS PRN
Status: DISCONTINUED | OUTPATIENT
Start: 2017-11-03 | End: 2017-11-07 | Stop reason: HOSPADM

## 2017-11-03 RX ORDER — IPRATROPIUM BROMIDE AND ALBUTEROL SULFATE 2.5; .5 MG/3ML; MG/3ML
1 SOLUTION RESPIRATORY (INHALATION)
Status: DISCONTINUED | OUTPATIENT
Start: 2017-11-03 | End: 2017-11-04

## 2017-11-03 RX ORDER — IPRATROPIUM BROMIDE AND ALBUTEROL SULFATE 2.5; .5 MG/3ML; MG/3ML
1 SOLUTION RESPIRATORY (INHALATION)
Status: DISCONTINUED | OUTPATIENT
Start: 2017-11-04 | End: 2017-11-03

## 2017-11-03 RX ORDER — NITROGLYCERIN 0.4 MG/1
0.4 TABLET SUBLINGUAL EVERY 5 MIN PRN
Status: DISCONTINUED | OUTPATIENT
Start: 2017-11-03 | End: 2017-11-07 | Stop reason: HOSPADM

## 2017-11-03 RX ADMIN — SODIUM CHLORIDE, PRESERVATIVE FREE 10 ML: 5 INJECTION INTRAVENOUS at 16:24

## 2017-11-03 RX ADMIN — PREDNISONE 40 MG: 20 TABLET ORAL at 23:20

## 2017-11-03 RX ADMIN — IPRATROPIUM BROMIDE AND ALBUTEROL SULFATE 1 AMPULE: .5; 3 SOLUTION RESPIRATORY (INHALATION) at 22:08

## 2017-11-03 RX ADMIN — IOPAMIDOL 100 ML: 755 INJECTION, SOLUTION INTRAVENOUS at 16:24

## 2017-11-03 RX ADMIN — SODIUM CHLORIDE, PRESERVATIVE FREE 10 ML: 5 INJECTION INTRAVENOUS at 23:23

## 2017-11-03 RX ADMIN — ATORVASTATIN CALCIUM 80 MG: 80 TABLET, FILM COATED ORAL at 23:20

## 2017-11-03 RX ADMIN — METHYLPREDNISOLONE SODIUM SUCCINATE 125 MG: 125 INJECTION, POWDER, FOR SOLUTION INTRAMUSCULAR; INTRAVENOUS at 15:07

## 2017-11-03 RX ADMIN — WARFARIN SODIUM 5 MG: 5 TABLET ORAL at 23:20

## 2017-11-03 RX ADMIN — ALBUTEROL SULFATE 5 MG: 2.5 SOLUTION RESPIRATORY (INHALATION) at 15:02

## 2017-11-03 RX ADMIN — PANTOPRAZOLE SODIUM 40 MG: 40 INJECTION, POWDER, FOR SOLUTION INTRAVENOUS at 23:20

## 2017-11-03 RX ADMIN — SACUBITRIL AND VALSARTAN 1 TABLET: 24; 26 TABLET, FILM COATED ORAL at 23:20

## 2017-11-03 RX ADMIN — IPRATROPIUM BROMIDE AND ALBUTEROL SULFATE 1 AMPULE: .5; 3 SOLUTION RESPIRATORY (INHALATION) at 14:57

## 2017-11-03 RX ADMIN — INSULIN LISPRO 1 UNITS: 100 INJECTION, SOLUTION INTRAVENOUS; SUBCUTANEOUS at 23:24

## 2017-11-03 RX ADMIN — SODIUM CHLORIDE, PRESERVATIVE FREE 10 ML: 5 INJECTION INTRAVENOUS at 23:22

## 2017-11-03 RX ADMIN — DIGOXIN 125 MCG: 0.12 TABLET ORAL at 23:20

## 2017-11-03 ASSESSMENT — ENCOUNTER SYMPTOMS
SORE THROAT: 0
COUGH: 1
ABDOMINAL DISTENTION: 0
ABDOMINAL PAIN: 0
SINUS CONGESTION: 0
DIARRHEA: 0
SHORTNESS OF BREATH: 1
WHEEZING: 1
CONSTIPATION: 0
NAUSEA: 0
EYE DISCHARGE: 0
RHINORRHEA: 0
VOMITING: 0

## 2017-11-03 ASSESSMENT — PULMONARY FUNCTION TESTS
PEFR_L/MIN: 190
PEFR_L/MIN: 150

## 2017-11-03 NOTE — PROGRESS NOTES
Attempted to call report nurse unable as she is giving report to [facility] for transfer. Will try again.

## 2017-11-03 NOTE — ED PROVIDER NOTES
[facility] ED  eMERGENCY dEPARTMENT eNCOUnter      Pt Name: Zina Dean  MRN: [id number]  [place] 1950  Date of evaluation: 11/3/2017  Provider: Cecily Contreras NP     [address] [place] [address]       Chief Complaint   Patient presents with    Cough     onset yest known COPD    Shortness of Breath       HISTORY OF PRESENT ILLNESS   (Location/Symptom, Timing/Onset, Context/Setting, Quality, Duration, Modifying Factors, Severity) Note limiting factors. A 53-year-old male patient who presents to the emergency room with a cough and shortness of breath that he states that he has had for about 3 days. Patient does have a known history of COPD. Upon arrival to the emergency room patient was tachypnea can using accessory muscles to breathe. Hemoptysis upon arrival in multiple times throughout his ER stay. Patient denies any chest pain, shortness of breath with exertion and at rest. Patient denies any fever, chills, nausea, vomiting or diarrhea. Patient denies any recent travel or sick contacts. The history is provided by the patient. No  was used. Cough   Cough characteristics:  Productive  Sputum characteristics:  Bloody  Severity:  Severe  Duration:  3 days  Timing:  Constant  Progression:  Worsening  Chronicity:  Recurrent  Smoker: yes    Context: not animal exposure, not exposure to allergens, not fumes, not occupational exposure, not sick contacts, not smoke exposure, not upper respiratory infection, not weather changes and not with activity    Relieved by:  Nothing  Worsened by:   Activity, deep breathing, exposure to cold air, lying down and smoking  Associated symptoms: shortness of breath and wheezing    Associated symptoms: no chest pain, no chills, no diaphoresis, no ear fullness, no ear pain, no eye discharge, no fever, no headaches, no myalgias, no rash, no rhinorrhea, no sinus congestion, no sore throat and no weight loss    Risk factors: no chemical exposure, no recent infection and no recent travel    Shortness of Breath   Associated symptoms: cough and wheezing    Associated symptoms: no abdominal pain, no chest pain, no diaphoresis, no ear pain, no fever, no headaches, no rash, no sore throat and no vomiting        Nursing Notes were reviewed. REVIEW OF SYSTEMS    (2+ for level 4; 10+ for level 5)     Review of Systems   Constitutional: Negative for chills, diaphoresis, fever and weight loss. HENT: Negative for ear pain, rhinorrhea and sore throat. Eyes: Negative for discharge. Respiratory: Positive for cough, shortness of breath and wheezing. Cardiovascular: Positive for leg swelling. Negative for chest pain. Gastrointestinal: Negative for abdominal distention, abdominal pain, constipation, diarrhea, nausea and vomiting. Genitourinary: Negative for decreased urine volume, difficulty urinating and dysuria. Musculoskeletal: Negative for myalgias. Skin: Negative for rash. Neurological: Negative for dizziness, weakness, numbness and headaches. Psychiatric/Behavioral: Negative for agitation and behavioral problems. Except as noted above the remainder of the review of systems was reviewed and negative.      PAST MEDICAL HISTORY     Past Medical History:   Diagnosis Date    CAD (coronary artery disease)     Cardiomyopathy [facility]     COPD (chronic obstructive pulmonary disease) ([facility]     Defibrillator activation     Diabetes mellitus with insulin therapy ([facility]     Generalized and unspecified atherosclerosis     Gout     Hyperlipidemia     Hypertension     Pain in joint, multiple sites     Status post angioplasty     TIA (transient ischemic attack)     Tobacco abuse     Type II or unspecified type diabetes mellitus without mention of complication, not stated as uncontrolled        SURGICAL HISTORY       Past Surgical History:   Procedure Laterality Date    CORONARY ANGIOPLASTY  4/29/11    Dr Deborah Ramos  EYE SURGERY Bilateral     Cataracts     OTHER SURGICAL HISTORY      difibrillator        CURRENT MEDICATIONS       Previous Medications    ALBUTEROL (PROAIR HFA) 108 (90 BASE) MCG/ACT INHALER    Inhale 2 puffs into the lungs every 4 hours as needed for Wheezing    ALBUTEROL (PROVENTIL) (2.5 MG/3ML) 0.083% NEBULIZER SOLUTION    Take 2.5 mg by nebulization every 4 hours as needed for Wheezing    ALLOPURINOL (ZYLOPRIM) 100 MG TABLET    TAKE 1 TABLET DAILY    AMIODARONE (CORDARONE) 200 MG TABLET    Take 200 mg by mouth daily    ASPIRIN 81 MG TABLET    Take 81 mg by mouth daily    ATORVASTATIN (LIPITOR) 80 MG TABLET    Take 80 mg by mouth daily    BD INSULIN SYRINGE ULTRAFINE 31G X 5/16\" 0.5 ML MISC    USE TWICE A DAY    CLOPIDOGREL (PLAVIX) 75 MG TABLET    Take 75 mg by mouth daily    CLOTRIMAZOLE-BETAMETHASONE (LOTRISONE) 1-0.05 % CREAM    Apply topically 2 times daily. CLOTRIMAZOLE-BETAMETHASONE (LOTRISONE) 1-0.05 % CREAM    Apply topically 2 times daily.     COLCRYS 0.6 MG TABLET    TAKE 1 TABLET DAILY    DIGITEK 125 MCG TABLET    TAKE 1 TABLET DAILY    ENTRESTO 24-26 MG PER TABLET    TAKE 1 TABLET BY MOUTH EVERY MORNING    FUROSEMIDE (LASIX) 40 MG TABLET    TAKE 1 TABLET DAILY    GABAPENTIN (NEURONTIN) 300 MG CAPSULE    Take 1 capsule by mouth 3 times daily    INSULIN 70-30 (NOVOLIN 70/30 RELION) (70-30) 100 UNIT PER ML INJECTION VIAL    55 units twice a day    KETOROLAC (ACULAR) 0.5 % OPHTHALMIC SOLUTION    USE 1 DROP IN EYE 4 TIMES DAILY THE DAY PRIOR TO SURGERY, ONCE THE MORNING OF, AND 4 X PER DAY AFTER    KLOR-CON M20 20 MEQ EXTENDED RELEASE TABLET    TAKE 2 TABLETS BY MOUTH DAILY ON THURSDAY AND SUNDAY    MAGNESIUM 400 MG TABS    Take 400 mg by mouth daily     METOLAZONE (ZAROXOLYN) 2.5 MG TABLET    TAKE 1 TABLET BY MOUTH ONCE A DAY ON THURSDAY AND SUNDAY ONLY **TAKE 1/2 HOUR BEFRE MORNING LASIX**    METOPROLOL SUCCINATE (TOPROL XL) 25 MG EXTENDED RELEASE TABLET    Take 1 tablet by mouth daily

## 2017-11-03 NOTE — ED NOTES
resp called for treatment Dr Josemanuel Cameron alerted need for treatments          Rafa Duran RN  11/03/17 [id number]

## 2017-11-03 NOTE — H&P
Historical Provider, MD   metolazone (ZAROXOLYN) 2.5 MG tablet TAKE 1 TABLET BY MOUTH ONCE A DAY ON THURSDAY AND SUNDAY ONLY **TAKE 1/2 HOUR BEFRE MORNING LASIX** 5/24/17   Historical Provider, MD   KLOR-CON M20 20 MEQ extended release tablet TAKE 2 TABLETS BY MOUTH DAILY ON THURSDAY AND SUNDAY 5/24/17   Historical Provider, MD   warfarin (COUMADIN) 5 MG tablet Take as directed by [facility]. Quantity equals 90 day supply. 5/31/17   Howard Chan MD   ENTRESTO 24-26 MG per tablet TAKE 1 TABLET BY MOUTH EVERY MORNING 2/16/17   Historical Provider, MD   ketorolac (ACULAR) 0.5 % ophthalmic solution USE 1 DROP IN EYE 4 TIMES DAILY THE DAY PRIOR TO SURGERY, ONCE THE MORNING OF, AND 4 X PER DAY AFTER 3/23/17   Historical Provider, MD   prednisoLONE acetate (PRED FORTE) 1 % ophthalmic suspension PLACE 1 DROP IN BOTH EYES FOUR TIMES DAILY 2/17/17   Historical Provider, MD   insulin 70-30 (NOVOLIN 70/30 RELION) (70-30) 100 UNIT per ML injection vial 55 units twice a day 3/28/17   Ania Gloria MD   BD INSULIN SYRINGE ULTRAFINE 31G X 5/16\" 0.5 ML MISC USE TWICE A DAY 2/3/17   Ania Gloria MD   aspirin 81 MG tablet Take 81 mg by mouth daily    Historical Provider, MD   albuterol (PROVENTIL) (2.5 MG/3ML) 0.083% nebulizer solution Take 2.5 mg by nebulization every 4 hours as needed for Wheezing    Historical Provider, MD   gabapentin (NEURONTIN) 300 MG capsule Take 1 capsule by mouth 3 times daily 1/30/17   Ania Gloria MD   clotrimazole-betamethasone (LOTRISONE) 1-0.05 % cream Apply topically 2 times daily.  1/5/17   Annette Small MD   metoprolol succinate (TOPROL XL) 25 MG extended release tablet Take 1 tablet by mouth daily 12/12/16   Howard Chan MD   nitroGLYCERIN (NITROSTAT) 0.4 MG SL tablet Place 0.4 mg under the tongue every 5 minutes as needed for Chest pain    Historical Provider, MD   clopidogrel (PLAVIX) 75 MG tablet Take 75 mg by mouth daily    Historical Provider, MD monitor                 DVT Prophylaxis: coumadin and plavix  Diet:    Code Status: Prior    PT/OT Eval Status: eval and treat    Dispo - inpatient       Jennifer Baca CNP    Thank you Gemma Pandey MD for the opportunity to be involved in this patient's care. If you have any questions or concerns please feel free to contact me.

## 2017-11-04 ENCOUNTER — APPOINTMENT (OUTPATIENT)
Dept: GENERAL RADIOLOGY | Age: 67
DRG: 813 | End: 2017-11-04
Payer: MEDICARE

## 2017-11-04 PROBLEM — I50.42 CHRONIC COMBINED SYSTOLIC AND DIASTOLIC HEART FAILURE (HCC): Status: ACTIVE | Noted: 2017-11-04

## 2017-11-04 PROBLEM — R04.2 HEMOPTYSIS: Status: ACTIVE | Noted: 2017-11-04

## 2017-11-04 LAB
ANION GAP SERPL CALCULATED.3IONS-SCNC: 16 MEQ/L (ref 7–13)
BUN BLDV-MCNC: 18 MG/DL (ref 8–23)
CALCIUM SERPL-MCNC: 8.7 MG/DL (ref 8.6–10.2)
CHLORIDE BLD-SCNC: 97 MEQ/L (ref 98–107)
CO2: 25 MEQ/L (ref 22–29)
CREAT SERPL-MCNC: 1.06 MG/DL (ref 0.7–1.2)
GFR AFRICAN AMERICAN: >60
GFR NON-AFRICAN AMERICAN: >60
GLUCOSE BLD-MCNC: 180 MG/DL (ref 60–115)
GLUCOSE BLD-MCNC: 183 MG/DL (ref 74–109)
GLUCOSE BLD-MCNC: 209 MG/DL (ref 60–115)
GLUCOSE BLD-MCNC: 261 MG/DL (ref 60–115)
GLUCOSE BLD-MCNC: 61 MG/DL (ref 60–115)
HBA1C MFR BLD: 10.4 % (ref 4.8–5.9)
HCT VFR BLD CALC: 37.9 % (ref 42–52)
HEMOGLOBIN: 11.9 G/DL (ref 14–18)
HEMOGLOBIN: 12 G/DL (ref 14–18)
INR BLD: 1.6
MCH RBC QN AUTO: 27.2 PG (ref 27–31.3)
MCHC RBC AUTO-ENTMCNC: 31.7 % (ref 33–37)
MCV RBC AUTO: 85.8 FL (ref 80–100)
PDW BLD-RTO: 15.6 % (ref 11.5–14.5)
PERFORMED ON: ABNORMAL
PERFORMED ON: NORMAL
PLATELET # BLD: 164 K/UL (ref 130–400)
POTASSIUM SERPL-SCNC: 4.3 MEQ/L (ref 3.5–5.1)
PROTHROMBIN TIME: 17.3 SEC (ref 8.1–13.7)
RBC # BLD: 4.41 M/UL (ref 4.7–6.1)
SODIUM BLD-SCNC: 138 MEQ/L (ref 132–144)
WBC # BLD: 5.1 K/UL (ref 4.8–10.8)

## 2017-11-04 PROCEDURE — 85018 HEMOGLOBIN: CPT

## 2017-11-04 PROCEDURE — 99223 1ST HOSP IP/OBS HIGH 75: CPT | Performed by: INTERNAL MEDICINE

## 2017-11-04 PROCEDURE — 80048 BASIC METABOLIC PNL TOTAL CA: CPT

## 2017-11-04 PROCEDURE — 6360000002 HC RX W HCPCS: Performed by: INTERNAL MEDICINE

## 2017-11-04 PROCEDURE — 6370000000 HC RX 637 (ALT 250 FOR IP): Performed by: INTERNAL MEDICINE

## 2017-11-04 PROCEDURE — 71020 XR CHEST STANDARD TWO VW: CPT

## 2017-11-04 PROCEDURE — 85610 PROTHROMBIN TIME: CPT

## 2017-11-04 PROCEDURE — G8978 MOBILITY CURRENT STATUS: HCPCS

## 2017-11-04 PROCEDURE — 97162 PT EVAL MOD COMPLEX 30 MIN: CPT

## 2017-11-04 PROCEDURE — 6370000000 HC RX 637 (ALT 250 FOR IP): Performed by: NURSE PRACTITIONER

## 2017-11-04 PROCEDURE — 1210000000 HC MED SURG R&B

## 2017-11-04 PROCEDURE — 83036 HEMOGLOBIN GLYCOSYLATED A1C: CPT

## 2017-11-04 PROCEDURE — 2700000000 HC OXYGEN THERAPY PER DAY

## 2017-11-04 PROCEDURE — 2580000003 HC RX 258: Performed by: NURSE PRACTITIONER

## 2017-11-04 PROCEDURE — C9113 INJ PANTOPRAZOLE SODIUM, VIA: HCPCS | Performed by: INTERNAL MEDICINE

## 2017-11-04 PROCEDURE — G8979 MOBILITY GOAL STATUS: HCPCS

## 2017-11-04 PROCEDURE — 85027 COMPLETE CBC AUTOMATED: CPT

## 2017-11-04 PROCEDURE — 93306 TTE W/DOPPLER COMPLETE: CPT

## 2017-11-04 PROCEDURE — 2580000003 HC RX 258: Performed by: INTERNAL MEDICINE

## 2017-11-04 PROCEDURE — 36415 COLL VENOUS BLD VENIPUNCTURE: CPT

## 2017-11-04 PROCEDURE — G8980 MOBILITY D/C STATUS: HCPCS

## 2017-11-04 PROCEDURE — 94640 AIRWAY INHALATION TREATMENT: CPT

## 2017-11-04 RX ORDER — METHYLPREDNISOLONE SODIUM SUCCINATE 40 MG/ML
40 INJECTION, POWDER, LYOPHILIZED, FOR SOLUTION INTRAMUSCULAR; INTRAVENOUS EVERY 12 HOURS
Status: DISCONTINUED | OUTPATIENT
Start: 2017-11-04 | End: 2017-11-07 | Stop reason: HOSPADM

## 2017-11-04 RX ORDER — ALBUTEROL SULFATE 2.5 MG/3ML
2.5 SOLUTION RESPIRATORY (INHALATION)
Status: DISCONTINUED | OUTPATIENT
Start: 2017-11-04 | End: 2017-11-07 | Stop reason: HOSPADM

## 2017-11-04 RX ORDER — SODIUM CHLORIDE, SODIUM LACTATE, POTASSIUM CHLORIDE, CALCIUM CHLORIDE 600; 310; 30; 20 MG/100ML; MG/100ML; MG/100ML; MG/100ML
INJECTION, SOLUTION INTRAVENOUS CONTINUOUS
Status: DISCONTINUED | OUTPATIENT
Start: 2017-11-04 | End: 2017-11-05

## 2017-11-04 RX ORDER — FUROSEMIDE 10 MG/ML
20 INJECTION INTRAMUSCULAR; INTRAVENOUS 2 TIMES DAILY
Status: DISCONTINUED | OUTPATIENT
Start: 2017-11-04 | End: 2017-11-07 | Stop reason: HOSPADM

## 2017-11-04 RX ORDER — FUROSEMIDE 10 MG/ML
40 INJECTION INTRAMUSCULAR; INTRAVENOUS 2 TIMES DAILY
Status: DISCONTINUED | OUTPATIENT
Start: 2017-11-04 | End: 2017-11-04

## 2017-11-04 RX ORDER — WARFARIN SODIUM 7.5 MG/1
7.5 TABLET ORAL
Status: DISCONTINUED | OUTPATIENT
Start: 2017-11-04 | End: 2017-11-04

## 2017-11-04 RX ORDER — 0.9 % SODIUM CHLORIDE 0.9 %
10 VIAL (ML) INJECTION EVERY 12 HOURS SCHEDULED
Status: DISCONTINUED | OUTPATIENT
Start: 2017-11-05 | End: 2017-11-07 | Stop reason: HOSPADM

## 2017-11-04 RX ORDER — METOLAZONE 2.5 MG/1
2.5 TABLET ORAL ONCE
Status: DISCONTINUED | OUTPATIENT
Start: 2017-11-04 | End: 2017-11-06

## 2017-11-04 RX ORDER — 0.9 % SODIUM CHLORIDE 0.9 %
10 VIAL (ML) INJECTION PRN
Status: DISCONTINUED | OUTPATIENT
Start: 2017-11-04 | End: 2017-11-07 | Stop reason: HOSPADM

## 2017-11-04 RX ORDER — IPRATROPIUM BROMIDE AND ALBUTEROL SULFATE 2.5; .5 MG/3ML; MG/3ML
1 SOLUTION RESPIRATORY (INHALATION) 3 TIMES DAILY
Status: DISCONTINUED | OUTPATIENT
Start: 2017-11-04 | End: 2017-11-07 | Stop reason: HOSPADM

## 2017-11-04 RX ADMIN — IPRATROPIUM BROMIDE AND ALBUTEROL SULFATE 1 AMPULE: .5; 3 SOLUTION RESPIRATORY (INHALATION) at 08:29

## 2017-11-04 RX ADMIN — SODIUM CHLORIDE, POTASSIUM CHLORIDE, SODIUM LACTATE AND CALCIUM CHLORIDE: 600; 310; 30; 20 INJECTION, SOLUTION INTRAVENOUS at 13:47

## 2017-11-04 RX ADMIN — ALLOPURINOL 100 MG: 100 TABLET ORAL at 13:58

## 2017-11-04 RX ADMIN — FUROSEMIDE 20 MG: 10 INJECTION, SOLUTION INTRAVENOUS at 14:04

## 2017-11-04 RX ADMIN — INSULIN LISPRO 55 UNITS: 100 INJECTION, SUSPENSION SUBCUTANEOUS at 17:50

## 2017-11-04 RX ADMIN — DIGOXIN 125 MCG: 0.12 TABLET ORAL at 13:58

## 2017-11-04 RX ADMIN — IPRATROPIUM BROMIDE AND ALBUTEROL SULFATE 1 AMPULE: .5; 3 SOLUTION RESPIRATORY (INHALATION) at 14:10

## 2017-11-04 RX ADMIN — FUROSEMIDE 20 MG: 10 INJECTION, SOLUTION INTRAVENOUS at 17:29

## 2017-11-04 RX ADMIN — PANTOPRAZOLE SODIUM 40 MG: 40 INJECTION, POWDER, FOR SOLUTION INTRAVENOUS at 10:20

## 2017-11-04 RX ADMIN — SODIUM CHLORIDE, PRESERVATIVE FREE 10 ML: 5 INJECTION INTRAVENOUS at 21:16

## 2017-11-04 RX ADMIN — PREDNISONE 40 MG: 20 TABLET ORAL at 13:54

## 2017-11-04 RX ADMIN — METOPROLOL SUCCINATE 25 MG: 25 TABLET, FILM COATED, EXTENDED RELEASE ORAL at 13:55

## 2017-11-04 RX ADMIN — SODIUM CHLORIDE, PRESERVATIVE FREE 10 ML: 5 INJECTION INTRAVENOUS at 10:21

## 2017-11-04 RX ADMIN — IPRATROPIUM BROMIDE AND ALBUTEROL SULFATE 1 AMPULE: .5; 3 SOLUTION RESPIRATORY (INHALATION) at 21:48

## 2017-11-04 RX ADMIN — SACUBITRIL AND VALSARTAN 1 TABLET: 24; 26 TABLET, FILM COATED ORAL at 13:59

## 2017-11-04 RX ADMIN — SODIUM CHLORIDE, PRESERVATIVE FREE 10 ML: 5 INJECTION INTRAVENOUS at 10:22

## 2017-11-04 ASSESSMENT — PAIN SCALES - GENERAL: PAINLEVEL_OUTOF10: 0

## 2017-11-04 NOTE — PROGRESS NOTES
[facility] Respiratory Therapy Evaluation   Current Order:  Fadi Levy Q4 W/A      Home Regimen: PRN     Ordering Physician: Alphonse Irby  Re-evaluation Date:  11/6     Diagnosis: COPD Exac     Patient Status: Stable / Unstable + Physician notified    The following MDI Criteria must be met in order to convert aerosol to MDI with spacer. If unable to meet, MDI will be converted to aerosol:  []  Patient able to demonstrate the ability to use MDI effectively  []  Patient alert and cooperative  []  Patient able to take deep breath with 5-10 second hold  []  Medication(s) available in this delivery method   []  Peak flow greater than or equal to 200 ml/min            Current Order Substituted To  (same drug, same frequency)   Aerosol to MDI [] Albuterol Sulfate 0.083% unit dose by aerosol Albuterol Sulfate MDI 2 puffs by inhalation with spacer    [] Levalbuterol 1.25 mg unit dose by aerosol Levalbuterol MDI 2 puffs by inhalation with spacer    [] Levalbuterol 0.63 mg unit dose by aerosol Levalbuterol MDI 2 puffs by inhalation with spacer    [] Ipratropium Bromide 0.02% unit dose by aerosol Ipratropium Bromide MDI 2 puffs by inhalation with spacer    [] Duoneb (Ipratropium + Albuterol) unit dose by aerosol Ipratropium MDI + Albuterol MDI 2 puffs by inhalation w/spacer   MDI to Aerosol [] Albuterol Sulfate MDI Albuterol Sulfate 0.083% unit dose by aerosol    [] Levalbuterol MDI 2 puffs by inhalation Levalbuterol 1.25 mg unit dose by aerosol    [] Ipratropium Bromide MDI by inhalation Ipratropium Bromide 0.02% unit dose by aerosol    [] Combivent (Ipratropium + Albuterol) MDI by inhalation Duoneb (Ipratropium + Albuterol) unit dose by aerosol   Treatment Assessment [Frequency/Schedule]:  Change frequency to: ___________Duoneb TID & Albuterol Q2 PRN_______________________________________per Protocol, P&T, [facility]      Points 0 1 2 3 4   Pulmonary Status  Non-Smoker  []   Smoking history   < 20 pack years  []   Smoking history  ?  20 pack years  []   Pulmonary Disorder  (acute or chronic)  []   Severe or Chronic w/ Exacerbation  [x]     Surgical Status No [x]   Surgeries     General []   Surgery Lower []   Abdominal Thoracic or []   Upper Abdominal Thoracic with  PulmonaryDisorder  []     Chest X-ray Clear/Not  Ordered     []  Chronic Changes  Results Pending  [x]  Infiltrates, atelectasis, pleural effusion, or edema  []  Infiltrates in more than one lobe []  Infiltrate + Atelectasis, &/or pleural effusion  []    Respiratory Pattern Regular,  RR = 12-20 [x]  Increased,  RR = 21-25 []  DAVIS, irregular,  or RR = 26-30 []  Decreased FEV1  or RR = 31-35 []  Severe SOB, use  of accessory muscles, or RR ? 35  []    Mental Status Alert, oriented,  Cooperative [x]  Confused but Follows commands []  Lethargic or unable to follow commands []  Obtunded  []  Comatose  []    Breath Sounds Clear to  auscultation  []  Decreased unilaterally or  in bases only []  Decreased  bilaterally  [x]  Crackles or intermittent wheezes []  Wheezes []    Cough Strong, Spontan., & nonproductive [x]  Strong,  spontaneous, &  productive []  Weak,  Nonproductive []  Weak, productive or  with wheezes []  No spontaneous  cough or may require suctioning []    Level of Activity Ambulatory []  Ambulatory w/ Assist  [x]  Non-ambulatory []  Paraplegic []  Quadriplegic []    Total    Score:__8_____     Triage Score:____4____      Tri       Triage:     1. (>20) Freq: Q3    2. (16-20) Freq: Q4   3. (11-15) Freq: QID & Albuterol Q2 PRN    4. (6-10) Freq: TID & Albuterol Q2 PRN    5. (0-5) Freq Q4prn

## 2017-11-04 NOTE — PROGRESS NOTES
dose to as low as reasonably achievable. Assessment/Plan:  75 y/o M with a  PMHx of DMII, CAD, systolic and diastolic CHF, COPD who presented with SOB, two episodes of hemoptysis who then had a witnessed episode of hematemesis. Acute hypoxic respiratory failure secondary to a COPD Exacerbation       - PO Prednisone 40mg QD; bronchodilators and pulm consult       Chronic systolic and diastolic HF       - Pt euvolemic on my examination; cardiology was consulted; continue cardiac meds (Beta blocker and Entresto)       Hemoptysis and hemetemesis       - Will stop pts plavix and coumadin for now; will need to be restarted after assessment by GI       - Although it was reported he had 2 small specks of hemoptysis; afterwards there was a witnessed episode of hemetemesis; picture was shown to me about about a cupful       - GI consult; Q8H Hb, IV protonix 40mg BID; NPO for possible EGD       - Pulm consult as well      CAD       - Continue patients beta blocker; holding his plavix       A Fib      - Given that he's possible GIB will place him on telemetry      - Coumadin was stopped; pt was subtherapeutic    Active Hospital Problems    Diagnosis Date Noted    Chronic combined systolic and diastolic heart failure ([address] [I50.42] 11/04/2017    Hemoptysis [R04.2] 11/04/2017    COPD (chronic obstructive pulmonary disease) ([address] [J44.9]     Hyperlipidemia [E78.5]     Hypertension [I10]     Atrial flutter ([address] [I48.92] 04/10/2013    Uncontrolled type 2 diabetes mellitus with complication, with long-term current use of insulin ([address] [E11.8, E11.65, Z79.4] 05/09/2012    CAD (coronary artery disease) [I25.10] 05/09/2012     Additional work up or/and treatment plan may be added today or then after based on clinical progression. I am managing a portion of pt care. Some medical issues are handled by other specialists.  Additional work up and treatment should be done in out pt setting by pt PCP and other out pt providers. In addition to examining and evaluating pt, I spent additional time explaining care, normal and abnormal findings, and treatment plan. All of pt questions were answered. Counseling, diet and education were  provided. Case will be discussed with nursing staff when appropriate. Family will be updated if and when appropriate.       Diet: Diet NPO, After Midnight          - As he's NPO will start gentle maintenance fluids; will have to be careful as he has a history of CHF    Code Status: Full Code    PT/OT Eval     Electronically signed by Summer Rhoades MD on 11/4/2017 at 10:28 AM

## 2017-11-04 NOTE — CONSULTS
While he was in the emergency room, had  development of emesis with bloody sputum. PHYSICAL EXAMINATION:  GENERAL:  On exam, the patient appears to be in no acute distress,  wearing oxygen. HEENT:  Atraumatic, normocephalic. NECK:  There is no obvious bruits, but he does have some hepatojugular  reflux. HEART:  He has got mostly regular rhythm with occasional ectopic beat. LUNGS:  Clear to auscultation. No rales or wheeze. Clear to  auscultation, but has generalized decreased breath sounds and may be a  faint rhonchi. ABDOMEN:  Mildly protuberant and mildly distended; however, there are  no peritoneal signs and no liver tenderness is noted. EXTREMITIES:  Upper extremities are both symmetrical.  Lower  extremities show may be 1+ pitting edema. In general, he has had a diagnosis of COPD; although, there may be  some element of heart failure. The patient in general also has diabetes. He has a history of  hypertension. He is on history of paroxysmal atrial fibrillation, for  which he is on his Coumadin therapy. He had a mitral valve repair  without replacement. ASSESSMENT:  1. The patient with known coronary artery disease, status post  stenting, bypass, and subsequent stenting after bypass according to  the patient's history. 2.  Mitral valve repair. 3.  Paroxysmal atrial fibrillation - had been on Tikosyn, he is now on  Pacerone. 4.  Shortness of breath. 5.  Digoxin therapy. 6.  Entresto therapy. PLAN:  1. In general, we will renew his medications. 2.  We will review chest x-ray. 3.  The patient apparently has not had an echocardiogram in quite some  time. We will look at that. 4.  The patient does have an AICD and we can perhaps see electively if  that has been reviewed; although, there does not appear to be any  particular emergency to that. 5.  Further recommendations are pending.         Ridge Kirby MD    D: 11/04/2017 61:20:19       T: 11/04/2017 16:36:38 [email or web address]  Job#: [id number]     Doc#: [id number]

## 2017-11-04 NOTE — PROGRESS NOTES
Physical Therapy Med Surg Initial Assessment  Facility/Department: [facility]  Room: [place] [address]       NAME: Shanon Weaver  : 1950 (74 y.o.)  MRN: [id number]  CODE STATUS: Full Code    Date of Service: 2017    Patient Diagnosis(es): COPD (chronic obstructive pulmonary disease) ([facility] [J44.9]   Chief Complaint   Patient presents with    Cough     onset yest known COPD    Shortness of Breath     Patient Active Problem List    Diagnosis Date Noted    Chronic combined systolic and diastolic heart failure ([address] 2017    Hemoptysis 2017    Atrial fibrillation ([address] 10/01/2016    Pain in joint, multiple sites     COPD (chronic obstructive pulmonary disease) ([address]     Diabetes mellitus with insulin therapy ([address]     Hyperlipidemia     Hypertension     Generalized atherosclerosis     TIA (transient ischemic attack)     Mitral valve insufficiency 2013    Elevation of level of transaminase or lactic acid dehydrogenase (LDH) 04/10/2013    Atrial flutter ([address] 04/10/2013    Hyponatremia 2013    Disorder of pancreas 2013    Generalized ischemic myocardial dysfunction 2013    Coronary arteriosclerosis in native artery 2013    Acute on chronic systolic heart failure ([address] 2013    Uncontrolled type 2 diabetes mellitus with complication, with long-term current use of insulin ([address] 2012    CAD (coronary artery disease) 2012    Noncompliance 2012    Disorder of muscle 10/06/2004    Acute lymphadenitis 10/06/2004    Irritable bowel syndrome 10/06/2004    Left heart failure ([address] 2003    Atherosclerosis of coronary artery 2003    Tobacco dependence syndrome 2002        Past Medical History:   Diagnosis Date    CAD (coronary artery disease)     Cardiomyopathy ([address]     COPD (chronic obstructive pulmonary disease) ([address]     Defibrillator activation     Diabetes mellitus with insulin therapy ([address]  Generalized and unspecified atherosclerosis     Gout     Hyperlipidemia     Hypertension     Pain in joint, multiple sites     Status post angioplasty     TIA (transient ischemic attack)     Tobacco abuse     Type II or unspecified type diabetes mellitus without mention of complication, not stated as uncontrolled      Past Surgical History:   Procedure Laterality Date    CORONARY ANGIOPLASTY  4/29/11    Dr Kendrick Palacio Bilateral     Cataracts     OTHER SURGICAL HISTORY      difibrillator        Chart Reviewed: Yes  Patient assessed for rehabilitation services?: Yes  Family / Caregiver Present: No    Restrictions:  Restrictions/Precautions: Fall Risk  Body mass index is 31.52 kg/m². SUBJECTIVE: Subjective: \"I am stil not breathing very good. \"       Post Treatment Pain Screening:   Pain Screening  Patient Currently in Pain: No    Prior Level of Function:  Social/Functional History  Lives With: Alone  Type of Home: House  Home Layout: One level  Home Access: Stairs to enter without rails  Entrance Stairs - Number of Steps: 3  Bathroom Shower/Tub: Tub/Shower unit  Receives Help From: Family (cousin)  ADL Assistance: Independent  Homemaking Assistance: Independent  Homemaking Responsibilities: Yes  Ambulation Assistance: Independent  Transfer Assistance: Independent  Active : Yes    OBJECTIVE:   Vision/Hearing:  Vision: Within Functional Limits  Hearing: Within functional limits    Cognition:  Overall Orientation Status: Impaired  Orientation Level: Oriented X4  Follows Commands: Within Functional Limits    Observation/Palpation  Observation: no acute distress    ROM:  RLE AROM: WFL  LLE AROM : WFL    Strength:  Strength RLE  Strength RLE: WFL  Strength LLE  Strength LLE: WFL    Neuro:  Balance  Posture: Good  Sitting - Static: Good  Sitting - Dynamic: Good  Standing - Static: Good  Standing - Dynamic: Good     Motor Control  Gross Motor?: WFL  Sensation  Overall Sensation Status: Impaired    Bed mobility  Supine to Sit: Modified independent  Sit to Supine: Modified independent    Transfers  Sit to Stand: Independent  Stand to sit: Independent    Ambulation  Ambulation?: Yes  Ambulation 1  Surface: level tile  Device: No Device  Other Apparatus: O2 (3 LO2)  Assistance: Independent  Quality of Gait: reciprocal, no significant deviations  Distance: 110ft in room with turns  Comments: pt rated dyspnea 7/10 at end of amb trial, improved with seated rest break, ed in pursed lip breathing    Activity Tolerance  Activity Tolerance: Patient Tolerated treatment well          ASSESSMENT:   Body structures, Functions, Activity limitations: Decreased endurance  Decision Making: Low Complexity  History: med  Exam: low  Clinical Presentation: stable  No Skilled PT: Independent with functional mobility     Prognosis: Good  Patient Education: Pt educated in role of acute care PT, PT POC, benefits of mobility while in house, safety techniques, use of call light for assistance, d/c planning, d/c recommendation. Barriers to Learning: none    DISCHARGE RECOMMENDATIONS:  No Skilled PT: Independent with functional mobility     Pt is independent with all functional mobility. Pt states that he has no d/c needs or concerns for safe return home with PRN assistance from family. No skilled PT needs identified at this time. Please re-consult if there is a change in functional mobility status. REQUIRES PT FOLLOW UP: Yes      PLAN OF CARE:  Plan  Times per week: eval only  Safety Devices  Type of devices:  All fall risk precautions in place    G-Code:  PT G-Codes  Functional Assessment Tool Used: clinical judgement    Goals:  Patient goals : \"go home\"    Therapy Time:   Individual   Time In 1615   Time Out 1630   Minutes 16[phone] [facility], 11/04/17 at 4:50 PM

## 2017-11-04 NOTE — CONSULTS
Consults  See dictated consult on 11/4/2017  Assessment:  SOB---ct scan does not show any pleural effusions but cxr ? chf  Has significant Bullae on ct scan   Hx of CAD/Mitral valve repair/AICD/PAF/amiodarone rx  Hematemesis   Plan:  D/d the coumadin and hold plavix  IV lasix   Echo  Needs rx for copd as well  Batsheva Pinedo MD

## 2017-11-04 NOTE — PROGRESS NOTES
Clinical Pharmacy Note    Mk Son is a 74 y.o. male for whom pharmacy has been asked to manage warfarin therapy. Reason for Admission: COPD    Consulting Physician: Deepa Rob CNP  Warfarin dose prior to admission: 7.5mg MWF, 5mg STRS  (42.5mg TWD)  Warfarin Indication: afib  Target INR range: 2-3  Order for concomitant anticoagulant therapy: n/a    Outpatient warfarin provider: Dr Brittny Duarte / Thomas Silverman    Past Medical History:   Diagnosis Date    CAD (coronary artery disease)     [facility]     COPD (chronic obstructive pulmonary disease) ([facility]     Defibrillator activation     Diabetes mellitus with insulin therapy ([facility]     Generalized and unspecified atherosclerosis     Gout     Hyperlipidemia     Hypertension     Pain in joint, multiple sites     Status post angioplasty     TIA (transient ischemic attack)     Tobacco abuse     Type II or unspecified type diabetes mellitus without mention of complication, not stated as uncontrolled                Recent Labs      11/03/17   1619   INR  1.4     Recent Labs      11/03/17   1430   HGB  12.3*   HCT  37.8*   PLT  162       Current warfarin drug-drug interactions: Plavix, Zyloprim, prednisone    Date INR Warfarin Dose   11-03-17 1.4 Total 12.5mg                                    Outpatient warfarin dose= 42.5mg/week but last in-range INR= 2.1 on 9-25-17. Per RN, patient had taken 7.5mg warfarin today prior to admit. Will give additional 5mg warfarin now for total 12.5mg today. Daily PT/INR until stable within therapeutic range. Thank you for the consult.   Sandra Rebolledo [facility]  11/03/17  8:27 PM

## 2017-11-05 ENCOUNTER — ANESTHESIA (OUTPATIENT)
Dept: OPERATING ROOM | Age: 67
DRG: 813 | End: 2017-11-05
Payer: MEDICARE

## 2017-11-05 ENCOUNTER — ANESTHESIA EVENT (OUTPATIENT)
Dept: OPERATING ROOM | Age: 67
DRG: 813 | End: 2017-11-05
Payer: MEDICARE

## 2017-11-05 VITALS — SYSTOLIC BLOOD PRESSURE: 121 MMHG | OXYGEN SATURATION: 96 % | DIASTOLIC BLOOD PRESSURE: 63 MMHG

## 2017-11-05 LAB
ANION GAP SERPL CALCULATED.3IONS-SCNC: 13 MEQ/L (ref 7–13)
BUN BLDV-MCNC: 21 MG/DL (ref 8–23)
CALCIUM SERPL-MCNC: 8.5 MG/DL (ref 8.6–10.2)
CHLORIDE BLD-SCNC: 99 MEQ/L (ref 98–107)
CO2: 26 MEQ/L (ref 22–29)
CREAT SERPL-MCNC: 0.95 MG/DL (ref 0.7–1.2)
CULTURE, RESPIRATORY: ABNORMAL
CULTURE, RESPIRATORY: ABNORMAL
DIGOXIN LEVEL: 0.9 NG/ML (ref 0.8–2)
GFR AFRICAN AMERICAN: >60
GFR NON-AFRICAN AMERICAN: >60
GLUCOSE BLD-MCNC: 100 MG/DL (ref 60–115)
GLUCOSE BLD-MCNC: 117 MG/DL (ref 60–115)
GLUCOSE BLD-MCNC: 204 MG/DL (ref 60–115)
GLUCOSE BLD-MCNC: 237 MG/DL (ref 60–115)
GLUCOSE BLD-MCNC: 337 MG/DL (ref 60–115)
GLUCOSE BLD-MCNC: 75 MG/DL (ref 74–109)
GRAM STAIN RESULT: ABNORMAL
HEMOGLOBIN: 11.1 G/DL (ref 14–18)
HEMOGLOBIN: 11.2 G/DL (ref 14–18)
HEMOGLOBIN: 12.1 G/DL (ref 14–18)
ORGANISM: ABNORMAL
PERFORMED ON: ABNORMAL
PERFORMED ON: NORMAL
POTASSIUM SERPL-SCNC: 4 MEQ/L (ref 3.5–5.1)
SODIUM BLD-SCNC: 138 MEQ/L (ref 132–144)

## 2017-11-05 PROCEDURE — 99233 SBSQ HOSP IP/OBS HIGH 50: CPT | Performed by: INTERNAL MEDICINE

## 2017-11-05 PROCEDURE — 6370000000 HC RX 637 (ALT 250 FOR IP): Performed by: INTERNAL MEDICINE

## 2017-11-05 PROCEDURE — 2580000003 HC RX 258: Performed by: SPECIALIST

## 2017-11-05 PROCEDURE — 2700000000 HC OXYGEN THERAPY PER DAY

## 2017-11-05 PROCEDURE — 6360000002 HC RX W HCPCS: Performed by: INTERNAL MEDICINE

## 2017-11-05 PROCEDURE — 80048 BASIC METABOLIC PNL TOTAL CA: CPT

## 2017-11-05 PROCEDURE — 80162 ASSAY OF DIGOXIN TOTAL: CPT

## 2017-11-05 PROCEDURE — 2580000003 HC RX 258: Performed by: INTERNAL MEDICINE

## 2017-11-05 PROCEDURE — 2580000003 HC RX 258: Performed by: ANESTHESIOLOGY

## 2017-11-05 PROCEDURE — 3609012400 HC EGD TRANSORAL BIOPSY SINGLE/MULTIPLE: Performed by: SPECIALIST

## 2017-11-05 PROCEDURE — 6360000002 HC RX W HCPCS: Performed by: ANESTHESIOLOGY

## 2017-11-05 PROCEDURE — 2060000000 HC ICU INTERMEDIATE R&B

## 2017-11-05 PROCEDURE — 36415 COLL VENOUS BLD VENIPUNCTURE: CPT

## 2017-11-05 PROCEDURE — 94640 AIRWAY INHALATION TREATMENT: CPT

## 2017-11-05 PROCEDURE — C9113 INJ PANTOPRAZOLE SODIUM, VIA: HCPCS | Performed by: INTERNAL MEDICINE

## 2017-11-05 PROCEDURE — 3700000001 HC ADD 15 MINUTES (ANESTHESIA): Performed by: SPECIALIST

## 2017-11-05 PROCEDURE — 0DJ08ZZ INSPECTION OF UPPER INTESTINAL TRACT, VIA NATURAL OR ARTIFICIAL OPENING ENDOSCOPIC: ICD-10-PCS | Performed by: SPECIALIST

## 2017-11-05 PROCEDURE — 6370000000 HC RX 637 (ALT 250 FOR IP): Performed by: NURSE PRACTITIONER

## 2017-11-05 PROCEDURE — 7100000000 HC PACU RECOVERY - FIRST 15 MIN: Performed by: SPECIALIST

## 2017-11-05 PROCEDURE — 3700000000 HC ANESTHESIA ATTENDED CARE: Performed by: SPECIALIST

## 2017-11-05 PROCEDURE — 85018 HEMOGLOBIN: CPT

## 2017-11-05 RX ORDER — MAGNESIUM HYDROXIDE 1200 MG/15ML
LIQUID ORAL PRN
Status: DISCONTINUED | OUTPATIENT
Start: 2017-11-05 | End: 2017-11-05 | Stop reason: HOSPADM

## 2017-11-05 RX ORDER — DOBUTAMINE HYDROCHLORIDE 200 MG/100ML
2.5 INJECTION INTRAVENOUS CONTINUOUS
Status: DISCONTINUED | OUTPATIENT
Start: 2017-11-05 | End: 2017-11-07 | Stop reason: HOSPADM

## 2017-11-05 RX ORDER — SODIUM CHLORIDE 9 MG/ML
INJECTION, SOLUTION INTRAVENOUS CONTINUOUS
Status: DISCONTINUED | OUTPATIENT
Start: 2017-11-05 | End: 2017-11-05

## 2017-11-05 RX ORDER — SODIUM CHLORIDE, SODIUM LACTATE, POTASSIUM CHLORIDE, CALCIUM CHLORIDE 600; 310; 30; 20 MG/100ML; MG/100ML; MG/100ML; MG/100ML
INJECTION, SOLUTION INTRAVENOUS CONTINUOUS PRN
Status: DISCONTINUED | OUTPATIENT
Start: 2017-11-05 | End: 2017-11-05 | Stop reason: SDUPTHER

## 2017-11-05 RX ORDER — METOLAZONE 2.5 MG/1
2.5 TABLET ORAL ONCE
Status: COMPLETED | OUTPATIENT
Start: 2017-11-05 | End: 2017-11-05

## 2017-11-05 RX ORDER — LIDOCAINE HYDROCHLORIDE 10 MG/ML
1 INJECTION, SOLUTION EPIDURAL; INFILTRATION; INTRACAUDAL; PERINEURAL
Status: DISCONTINUED | OUTPATIENT
Start: 2017-11-05 | End: 2017-11-05 | Stop reason: HOSPADM

## 2017-11-05 RX ORDER — CLOPIDOGREL BISULFATE 75 MG/1
75 TABLET ORAL DAILY
Status: DISCONTINUED | OUTPATIENT
Start: 2017-11-05 | End: 2017-11-05

## 2017-11-05 RX ORDER — SODIUM CHLORIDE 0.9 % (FLUSH) 0.9 %
10 SYRINGE (ML) INJECTION PRN
Status: DISCONTINUED | OUTPATIENT
Start: 2017-11-05 | End: 2017-11-05 | Stop reason: HOSPADM

## 2017-11-05 RX ORDER — PANTOPRAZOLE SODIUM 40 MG/1
40 TABLET, DELAYED RELEASE ORAL
Status: DISCONTINUED | OUTPATIENT
Start: 2017-11-06 | End: 2017-11-07 | Stop reason: HOSPADM

## 2017-11-05 RX ORDER — PROPOFOL 10 MG/ML
INJECTION, EMULSION INTRAVENOUS PRN
Status: DISCONTINUED | OUTPATIENT
Start: 2017-11-05 | End: 2017-11-05 | Stop reason: SDUPTHER

## 2017-11-05 RX ORDER — WARFARIN SODIUM 2.5 MG/1
2.5 TABLET ORAL DAILY
Status: DISCONTINUED | OUTPATIENT
Start: 2017-11-05 | End: 2017-11-05

## 2017-11-05 RX ORDER — SODIUM CHLORIDE 0.9 % (FLUSH) 0.9 %
10 SYRINGE (ML) INJECTION EVERY 12 HOURS SCHEDULED
Status: DISCONTINUED | OUTPATIENT
Start: 2017-11-05 | End: 2017-11-05 | Stop reason: HOSPADM

## 2017-11-05 RX ADMIN — SODIUM CHLORIDE, POTASSIUM CHLORIDE, SODIUM LACTATE AND CALCIUM CHLORIDE: 600; 310; 30; 20 INJECTION, SOLUTION INTRAVENOUS at 08:20

## 2017-11-05 RX ADMIN — METHYLPREDNISOLONE SODIUM SUCCINATE 40 MG: 40 INJECTION, POWDER, FOR SOLUTION INTRAMUSCULAR; INTRAVENOUS at 00:02

## 2017-11-05 RX ADMIN — SODIUM CHLORIDE, POTASSIUM CHLORIDE, SODIUM LACTATE AND CALCIUM CHLORIDE: 600; 310; 30; 20 INJECTION, SOLUTION INTRAVENOUS at 00:04

## 2017-11-05 RX ADMIN — METOLAZONE 2.5 MG: 2.5 TABLET ORAL at 13:15

## 2017-11-05 RX ADMIN — IPRATROPIUM BROMIDE AND ALBUTEROL SULFATE 1 AMPULE: .5; 3 SOLUTION RESPIRATORY (INHALATION) at 08:15

## 2017-11-05 RX ADMIN — SODIUM CHLORIDE, PRESERVATIVE FREE 10 ML: 5 INJECTION INTRAVENOUS at 08:20

## 2017-11-05 RX ADMIN — SACUBITRIL AND VALSARTAN 1 TABLET: 24; 26 TABLET, FILM COATED ORAL at 00:02

## 2017-11-05 RX ADMIN — IPRATROPIUM BROMIDE AND ALBUTEROL SULFATE 1 AMPULE: .5; 3 SOLUTION RESPIRATORY (INHALATION) at 13:33

## 2017-11-05 RX ADMIN — PANTOPRAZOLE SODIUM 40 MG: 40 INJECTION, POWDER, FOR SOLUTION INTRAVENOUS at 08:20

## 2017-11-05 RX ADMIN — PROPOFOL 30 MG: 10 INJECTION, EMULSION INTRAVENOUS at 10:49

## 2017-11-05 RX ADMIN — FUROSEMIDE 20 MG: 10 INJECTION, SOLUTION INTRAVENOUS at 17:20

## 2017-11-05 RX ADMIN — IPRATROPIUM BROMIDE AND ALBUTEROL SULFATE 1 AMPULE: .5; 3 SOLUTION RESPIRATORY (INHALATION) at 19:49

## 2017-11-05 RX ADMIN — INSULIN LISPRO 55 UNITS: 100 INJECTION, SUSPENSION SUBCUTANEOUS at 17:21

## 2017-11-05 RX ADMIN — METHYLPREDNISOLONE SODIUM SUCCINATE 40 MG: 40 INJECTION, POWDER, FOR SOLUTION INTRAMUSCULAR; INTRAVENOUS at 12:48

## 2017-11-05 RX ADMIN — SODIUM CHLORIDE, PRESERVATIVE FREE 10 ML: 5 INJECTION INTRAVENOUS at 20:56

## 2017-11-05 RX ADMIN — IPRATROPIUM BROMIDE AND ALBUTEROL SULFATE 1 AMPULE: .5; 3 SOLUTION RESPIRATORY (INHALATION) at 04:19

## 2017-11-05 RX ADMIN — METOPROLOL SUCCINATE 25 MG: 25 TABLET, FILM COATED, EXTENDED RELEASE ORAL at 08:20

## 2017-11-05 RX ADMIN — PROPOFOL 50 MG: 10 INJECTION, EMULSION INTRAVENOUS at 10:46

## 2017-11-05 RX ADMIN — SODIUM CHLORIDE, POTASSIUM CHLORIDE, SODIUM LACTATE AND CALCIUM CHLORIDE: 600; 310; 30; 20 INJECTION, SOLUTION INTRAVENOUS at 10:41

## 2017-11-05 RX ADMIN — INSULIN LISPRO 2 UNITS: 100 INJECTION, SOLUTION INTRAVENOUS; SUBCUTANEOUS at 21:01

## 2017-11-05 RX ADMIN — PANTOPRAZOLE SODIUM 40 MG: 40 INJECTION, POWDER, FOR SOLUTION INTRAVENOUS at 00:02

## 2017-11-05 RX ADMIN — SACUBITRIL AND VALSARTAN 1 TABLET: 24; 26 TABLET, FILM COATED ORAL at 20:56

## 2017-11-05 RX ADMIN — ATORVASTATIN CALCIUM 80 MG: 80 TABLET, FILM COATED ORAL at 00:05

## 2017-11-05 RX ADMIN — DOBUTAMINE IN DEXTROSE 2.5 MCG/KG/MIN: 200 INJECTION, SOLUTION INTRAVENOUS at 16:14

## 2017-11-05 ASSESSMENT — PAIN SCALES - GENERAL: PAINLEVEL_OUTOF10: 0

## 2017-11-05 NOTE — PROGRESS NOTES
0. 95   GLUCOSE  106  183*  75   CALCIUM  8.6  8.7  8.5*   PROT  6.8   --    --    LABALBU  3.9   --    --    BILITOT  1.8*   --    --    ALKPHOS  132*   --    --    AST  17   --    --    ALT  14   --    --    LABGLOM  >60.0  >60.0  >60.0   GFRAA  >60.0  >60.0  >60.0   GLOB  2.9   --    --        MV Settings:     FiO2 : 3 %    No results for input(s): PHART, VAT4PWW, PO2ART, WOY3TFW, BEART, L0IQBYLF in the last 72 hours. O2 Device: Nasal cannula  O2 Flow Rate (L/min): 3 L/min    DIET CARDIAC; Carb Control: 5 carbs/meal (approximate 2000 kcals/day); Low Sodium (2 GM); Daily Fluid Restriction: 1500 ml     MEDICATIONS during current hospitalization:    Continuous Infusions:   DOBUTamine      dextrose         Scheduled Meds:   [START ON 11/6/2017] pantoprazole  40 mg Oral QAM AC    ipratropium-albuterol  1 ampule Inhalation TID    sacubitril-valsartan  1 tablet Oral BID    metolazone  2.5 mg Oral Once    furosemide  20 mg Intravenous BID    sodium chloride (PF)  10 mL Intravenous 2 times per day    methylPREDNISolone  40 mg Intravenous Q12H    allopurinol  100 mg Oral Daily    metoprolol succinate  25 mg Oral Daily    atorvastatin  80 mg Oral Daily    digoxin  125 mcg Oral Daily    insulin lispro protamine & lispro  55 Units Subcutaneous BID WC    insulin lispro  0-6 Units Subcutaneous TID WC    insulin lispro  0-3 Units Subcutaneous Nightly       PRN Meds:albuterol, sodium chloride (PF), nitroGLYCERIN, sodium chloride flush, acetaminophen, oxyCODONE-acetaminophen **OR** oxyCODONE-acetaminophen, morphine **OR** morphine, magnesium hydroxide, ondansetron, glucose, dextrose, glucagon (rDNA), dextrose    Radiology  Xr Chest Standard (2 Vw)    Result Date: 11/4/2017  XR CHEST STANDARD TWO VW: 11/4/2017 CLINICAL HISTORY:  COPD . Shortness of breath. COMPARISON: Portable chest and chest CTA 11/3/2017. Upright PA and lateral radiographs of the chest were obtained.  FINDINGS: Hyperinflation and coarsening of reconstruction, and/or weight based dosing when appropriate to reduce radiation dose to as low as reasonably achievable. Xr Chest Portable    Result Date: 11/3/2017  EXAMINATION: XR CHEST PORTABLE CLINICAL HISTORY:  shortness of breath COMPARISONS: May 11, 2017 FINDINGS: Single AP portable view the chest is obtained on November 3, 2017 at 1446 hours. There are multiple sternotomy sutures. There is an aortic valve prosthesis. There is moderate cardiomegaly. There is mild vascular congestion. There is a trace of perihilar edema. There is no large effusion. The mediastinum is not widened or shifted. The calcified aorta is not dilated. There is an unremarkable ICD/permanent pacemaker in place in left. CONCLUSION: CARDIOMEGALY. MILD CONGESTIVE HEART FAILURE FINDINGS. IMPRESSION AND SUGGESTION:  1. Acute hypoxic respiratory failure secondary to COPD exacerbation and CHF  2. Chronic systolic and diastolic congestive heart failure  3. Hemoptysis , improved. Likely due to anticoagulation rule out other etiology  4. Coronary artery disease  5. Atrial fibrillation  6. Hypertension  7. Diabetes mellitus     Patient is not coughing any blood today. Patient is currently on 3 lit O2.anticoagulation is on hold. If he has hemoptysis again recommend bronchoscopy. Hold anticoagulation at this time. He had a EGD which was negative for active bleeding. he is on nebulizer with DuoNeb 3 times a day and albuterol every 2 hours when necessary. IV Solu-Medrol 40 mg every 12 hourly. He is on Protonix 40 mg daily IV. CT chest report revealed no pulmonary embolism findings suggestive of severe bullous emphysema and developing interstitial pulmonary fibrosis.   Will follow       Electronically signed by Teresa Ramos MD, [facility] on 11/5/2017 at 2:14 PM

## 2017-11-05 NOTE — ANESTHESIA PRE PROCEDURE
Department of Anesthesiology  Preprocedure Note       Name:  Zina Dean   Age:  74 y.o.  :  1950                                          MRN:  [id number]         Date:  2017      Surgeon: Apolonia Baird):  Zulma Hebert MD    Procedure: Procedure(s):  EGD BIOPSY    Medications prior to admission:   Prior to Admission medications    Medication Sig Start Date End Date Taking? Authorizing Provider   COLCRYS 0.6 MG tablet TAKE 1 TABLET DAILY 10/24/17  Yes Jose Mendez MD   allopurinol (ZYLOPRIM) 100 MG tablet TAKE 1 TABLET DAILY 10/9/17  Yes Jose Mendez MD   warfarin (COUMADIN) 5 MG tablet Take as directed by [facility]. Quantity equals 90 day supply. Patient taking differently: Take 7.5 mg by mouth daily Take as directed by [facility]. Quantity equals 90 day supply.  17  Yes Kenrick Mccarty MD   ENTRESTO 24-26 MG per tablet TAKE 1 TABLET BY MOUTH EVERY MORNING 17  Yes Historical Provider, MD   insulin 70-30 (NOVOLIN 70/30 RELION) (70-30) 100 UNIT per ML injection vial 55 units twice a day 3/28/17  Yes Jackqueline Sandifer, MD   albuterol (PROVENTIL) (2.5 MG/3ML) 0.083% nebulizer solution Take 2.5 mg by nebulization every 4 hours as needed for Wheezing   Yes Historical Provider, MD   metoprolol succinate (TOPROL XL) 25 MG extended release tablet Take 1 tablet by mouth daily 16  Yes Kenrick Mccarty MD   clopidogrel (PLAVIX) 75 MG tablet Take 75 mg by mouth daily   Yes Historical Provider, MD   atorvastatin (LIPITOR) 80 MG tablet Take 80 mg by mouth daily   Yes Historical Provider, MD   DIGITEK 125 MCG tablet TAKE 1 TABLET DAILY 8/2/15  Yes Leona Sher MD   furosemide (LASIX) 40 MG tablet TAKE 1 TABLET DAILY 7/27/15  Yes Leona Sher MD   albuterol (PROAIR HFA) 108 (90 BASE) MCG/ACT inhaler Inhale 2 puffs into the lungs every 4 hours as needed for Wheezing 5/11/15  Yes Leona Sher MD   BD INSULIN SYRINGE ULTRAFINE 31G X 5/16\" 0.5 ML MISC USE TWICE A DAY 2/3/17   Kenny Franklin MD   gabapentin (NEURONTIN) 300 MG capsule Take 1 capsule by mouth 3 times daily 1/30/17   Kenny Franklin MD   nitroGLYCERIN (NITROSTAT) 0.4 MG SL tablet Place 0.4 mg under the tongue every 5 minutes as needed for Chest pain    Historical Provider, MD   amiodarone (CORDARONE) 200 MG tablet Take 200 mg by mouth daily    Historical Provider, MD       Current medications:    Current Facility-Administered Medications   Medication Dose Route Frequency Provider Last Rate Last Dose    sterile water for irrigation    PRN Maria Elena Justice MD   100 mL at 11/05/17 0925    ipratropium-albuterol (DUONEB) nebulizer solution 1 ampule  1 ampule Inhalation TID Joni Godinez MD   1 ampule at 11/05/17 0815    albuterol (PROVENTIL) nebulizer solution 2.5 mg  2.5 mg Nebulization Q2H PRN Joni Godinez MD        lactated ringers infusion   Intravenous Continuous Yessi Quinones  mL/hr at 11/05/17 0820      sacubitril-valsartan (ENTRESTO) 24-26 MG per tablet 1 tablet  1 tablet Oral BID Evy Kaminski MD   1 tablet at 11/05/17 0002    metolazone (ZAROXOLYN) tablet 2.5 mg  2.5 mg Oral Once Evy Kaminski MD   Stopped at 11/04/17 1353    furosemide (LASIX) injection 20 mg  20 mg Intravenous BID Evy Kaminski MD   20 mg at 11/04/17 1729    sodium chloride (PF) 0.9 % injection 10 mL  10 mL Intravenous 2 times per day Maria Elena Justice MD        sodium chloride (PF) 0.9 % injection 10 mL  10 mL Intravenous PRN Maria Elena Justice MD        methylPREDNISolone sodium (SOLU-MEDROL) injection 40 mg  40 mg Intravenous Q12H Joni Godinez MD   40 mg at 11/05/17 0002    allopurinol (ZYLOPRIM) tablet 100 mg  100 mg Oral Daily Domonique Banuelos CNP   100 mg at 11/04/17 1358    metoprolol succinate (TOPROL XL) extended release tablet 25 mg  25 mg Oral Daily Domonique Banuelos CNP   25 mg at 11/05/17 0820    atorvastatin (LIPITOR) tablet 80 mg  80 mg Oral Daily [place], [place]   Stopped at 11/05/17 3381    digoxin (LANOXIN) tablet 125 mcg  125 mcg Oral Daily [place], CNP   125 mcg at 11/04/17 1358    insulin lispro protamine & lispro (HUMALOG MIX) (75-25) 100 UNIT per ML injection vial SUSP 55 Units  55 Units Subcutaneous BID WC [place], CNP   55 Units at 11/04/17 1750    nitroGLYCERIN (NITROSTAT) SL tablet 0.4 mg  0.4 mg Sublingual Q5 Min PRN [place], CNP        sodium chloride flush 0.9 % injection 10 mL  10 mL Intravenous PRN [place], CNP        acetaminophen (TYLENOL) tablet 650 mg  650 mg Oral Q4H PRN [place], CNP        oxyCODONE-acetaminophen (PERCOCET) 5-325 MG per tablet 1 tablet  1 tablet Oral Q4H PRN [place], CNP        Or    oxyCODONE-acetaminophen (PERCOCET) 5-325 MG per tablet 2 tablet  2 tablet Oral Q4H PRN [place], CNP        morphine injection 2 mg  2 mg Intravenous Q2H PRN [place], CNP        Or    morphine injection 4 mg  4 mg Intravenous Q2H PRN [place], CNP        magnesium hydroxide (MILK OF MAGNESIA) 400 MG/5ML suspension 30 mL  30 mL Oral Daily PRN [place], CNP        ondansetron [facility]) injection 4 mg  4 mg Intravenous Q6H PRN [place], CNP        insulin lispro (HUMALOG) injection vial 0-6 Units  0-6 Units Subcutaneous TID WC [place], CNP   3 Units at 11/04/17 1744    insulin lispro (HUMALOG) injection vial 0-3 Units  0-3 Units Subcutaneous Nightly [place], CNP   1 Units at 11/03/17 2324    glucose (GLUTOSE) 40 % oral gel 15 g  15 g Oral PRN [place], CNP        dextrose 50 % solution 12.5 g  12.5 g Intravenous PRN [place], CNP        glucagon (rDNA) injection 1 mg  1 mg Intramuscular PRN [place], CNP        dextrose 5 % solution  100 mL/hr Intravenous PRN [place], CNP        pantoprazole (PROTONIX) injection 40 mg  40 mg Intravenous BID Aldair Chu MD   40 mg at 11/05/17 0820 unspecified type diabetes mellitus without mention of complication, not stated as uncontrolled        Past Surgical History:        Procedure Laterality Date    CORONARY ANGIOPLASTY  4/29/11    Dr Julien Primrose Bilateral     Cataracts     OTHER SURGICAL HISTORY      difibrillator        Social History:    Social History   Substance Use Topics    Smoking status: Former Smoker     Quit date: 1/1/2012    Smokeless tobacco: Current User    Alcohol use No                                Ready to quit: Not Answered  Counseling given: Not Answered      Vital Signs (Current):   Vitals:    11/04/17 2150 11/04/17 2151 11/05/17 0128 11/05/17 0801   BP:   120/77 117/65   Pulse:   82 93   Resp: 18  18 18   Temp:   97.2 °F (36.2 °C) 98.4 °F (36.9 °C)   TempSrc:   Oral Oral   SpO2: 100% 100% 99% 93%   Weight:       Height:                                                  BP Readings from Last 3 Encounters:   11/05/17 117/65   10/24/17 130/60   10/05/17 110/66       NPO Status:                                                                                 BMI:   Wt Readings from Last 3 Encounters:   11/03/17 226 lb (102.5 kg)   10/24/17 226 lb (102.5 kg)   09/28/17 218 lb (98.9 kg)     Body mass index is 31.52 kg/m².     CBC:   Lab Results   Component Value Date    WBC 5.1 11/04/2017    RBC 4.41 11/04/2017    RBC 4.75 09/16/2011    HGB 11.1 11/05/2017    HCT 37.9 11/04/2017    MCV 85.8 11/04/2017    RDW 15.6 11/04/2017     11/04/2017       CMP:   Lab Results   Component Value Date     11/05/2017    K 4.0 11/05/2017    CL 99 11/05/2017    CO2 26 11/05/2017    BUN 21 11/05/2017    CREATININE 0.95 11/05/2017    GFRAA >60.0 11/05/2017    LABGLOM >60.0 11/05/2017    GLUCOSE 75 11/05/2017    GLUCOSE 128 05/09/2012    PROT 6.8 11/03/2017    CALCIUM 8.5 11/05/2017    BILITOT 1.8 11/03/2017    ALKPHOS 132 11/03/2017    AST 17 11/03/2017    ALT 14 11/03/2017       POC Tests: Recent Labs      11/05/17   0815   POCGLU  100       Coags:   Lab Results   Component Value Date    PROTIME 17.3 11/04/2017    PROTIME 18.2 10/09/2017    PROTIME 19.8 09/20/2016    INR 1.6 11/04/2017    APTT 29.3 11/03/2017       HCG (If Applicable): No results found for: PREGTESTUR, PREGSERUM, HCG, HCGQUANT     ABGs: No results found for: PHART, PO2ART, AST0UDP, FLC9PQK, BEART, W8QJBBBA     Type & Screen (If Applicable):  No results found for: LABABO, LABRH    Anesthesia Evaluation    Airway: Mallampati: I  TM distance: >3 FB   Neck ROM: full  Mouth opening: > = 3 FB Dental:    (+) upper dentures and lower dentures      Pulmonary: breath sounds clear to auscultation  (+) COPD:                             Cardiovascular:    (+) hypertension:, CAD:, CABG/stent:, dysrhythmias: atrial fibrillation,       ECG reviewed  Rhythm: regular                      Neuro/Psych:   (+) TIA,             GI/Hepatic/Renal:             Endo/Other: negative ROS   (+) Type II DM, , blood dyscrasia: anemia and anticoagulation therapy:. Abdominal:           Vascular:                                    Anesthesia Plan      MAC     ASA 3 - emergent       Induction: intravenous. MIPS: Prophylactic antiemetics administered. Anesthetic plan and risks discussed with patient. Use of blood products discussed with patient whom.                    Linda Hollins MD   11/5/2017

## 2017-11-05 NOTE — PROGRESS NOTES
1140-Patient back into room 489 post EGD. Up ambulatory, steady. Denies pain. VSS and charted. Diet changed from NPO to Cardiac/DM, given boxed lunch. 1200-Notified Jossie Martinez of patient needing to be transferred to [address] [place] [address] due to Dobutamine drip.   Tigist put in.    1239-Calling report to [address] [place] [address].   Electronically signed by Ok Huggins RN on 11/5/2017 at 12:40 PM

## 2017-11-05 NOTE — PROGRESS NOTES
Report called to [facility] on [address]  Electronically signed by Javan Chow RN on 11/5/2017 at 1:46 PM

## 2017-11-05 NOTE — CONSULTS
Yahaira Sanz [address] [place] [address], [address] [facility]                                 CONSULTATION    PATIENT NAME: Em Ivy                   :             1950  MED REC NO:   [id number]                           ROOM:           A938  ACCOUNT NO:   [de-identified]                          ADMISSION DATE:  2017  PROVIDER:     Fauzia Clancy MD    CONSULT DATE:  2017    REASON FOR CONSULTATION:  GI bleeding. HISTORY OF PRESENT ILLNESS:  The patient is a 24-year-old male who was  admitted because of some exacerbation of his COPD. The patient also  had an episode of coughing up blood and vomiting blood. He has a  history of occasional heartburn. No history of any dysphagia. No  prior history of any ulcer disease. No history of any melena. PAST MEDICAL HISTORY:  Includes history of COPD, coronary artery  disease. He has a defibrillator, status post AICD, diabetes, gout,  hyperlipidemia, hypertension, and arthritis. SURGICAL HISTORY:  Includes coronary artery bypass graft surgery, had  angioplasties in the past.    HOME MEDICATIONS: Coumadin, Zyloprim, Mucinex, Zaroxolyn, ______,  prednisolone, Neurontin, Nitrostat, Plavix, Lipitor, amiodarone,  Digitek. ALLERGIES:  INCLUDE PENICILLIN. SOCIAL HISTORY:  He used to smoke in the past which he stopped  recently. No history of any alcohol use. FAMILY HISTORY:  Unremarkable. REVIEW OF SYSTEM:  Experiences shortness of breath on moderate to  severe exertion. No leg swelling. No fever, no chills. No  dysphagia. No urinary symptoms. No headache or seizures. PHYSICAL EXAMINATION  GENERAL:  A 24-year-old male who seems to be in no acute distress. VITAL SIGNS:  His respiratory rate is around 20 per minute. He is on  03 L of nasal cannula. Heart rate is around 88, fairly regular,  afebrile, blood pressure is 110/60. HEENT:  Oropharynx is normal.  NECK:  Supple.   HEART:  S1,

## 2017-11-05 NOTE — PROGRESS NOTES
[facility] Note      Patient: Diana Schneider  Unit/Bed: R218/X158-20  YOB: 1950  MRN: [id number]  Admit Date:  11/3/2017  Hospital Day: 2    Subjective Complaint:   No chest pain but some sob. Back from EGD--apparently no source of bleeding was found. Physical Examination:     BP (!) 101/55   Pulse 89   Temp 97.6 °F (36.4 °C) (Temporal)   Resp 16   Ht 5' 11\" (1.803 m)   Wt 226 lb (102.5 kg)   SpO2 98%   BMI 31.52 kg/m²       Intake/Output Summary (Last 24 hours) at 11/05/17 1155  Last data filed at 11/05/17 1118   Gross per 24 hour   Intake             2039 ml   Output              150 ml   Net             1889 ml                  Diana Schneider examined at bedside in in no apparent distress. Focused exam reveals:     Cardiac: Heart regular rate and rhythm     Lungs:  rales present- bilaterally    Extremities:   1+ edema    Telemetry:      normal sinus , atrial fibrillation - controlled, atrial fibrillation - rapid and paced         LABS:   CBC:   Recent Labs      11/03/17   1430   11/04/17   0648  11/04/17   1629  11/05/17   0149  11/05/17   0617   WBC  5.3   --   5.1   --    --    --    HGB  12.3*   < >  11.9*  12.0*  12.0*  11.2*  11.1*   PLT  162   --   164   --    --    --     < > = values in this interval not displayed. BMP:  Recent Labs      11/03/17   1430  11/04/17   0648  11/05/17   0617   NA  136  138  138   K  3.6  4.3  4.0   CL  97*  97*  99   CO2  24  25  26   BUN  12  18  21   CREATININE  0.93  1.06  0.95   GLUCOSE  106  183*  75              Troponin: No results for input(s): TROPONINT in the last 72 hours. BNP:   Recent Labs      11/03/17   1430   PROBNP  2,920      INR:   Recent Labs      11/03/17   1619  11/04/17   0647   INR  1.4  1.6      Mg: No results for input(s): MG in the last 72 hours.     Cardiac Testing:    EGD showed no source of bleeding   Echo shows ef about 10% with significant mr and pulm htn    Assessment:   With negative EGD, one wonders if pt has hemoptysis and swallowed the heme  Severe LV dysfunction and mr and sx's of sob  Plan:   1. Low dose dobutamine--not to titrate  2. Hold further anti-coagulation till source of bleeding is noted  3. Increase the diuretics  4.  See orders  Electronically signed by Mitesh Haro MD on 11/5/2017 at 11:55 AM

## 2017-11-05 NOTE — PROGRESS NOTES
Hospitalist Progress Note      PCP: Manohar Morrell MD    Date of Admission: 11/3/2017    Chief Complaint:   SOB    Subjective:  Pt has not had hemoptysis this morning; still feeling SOB. 10 point ROS was negative. Medications:  Reviewed    Infusion Medications    DOBUTamine      dextrose       Scheduled Medications    ipratropium-albuterol  1 ampule Inhalation TID    sacubitril-valsartan  1 tablet Oral BID    metolazone  2.5 mg Oral Once    furosemide  20 mg Intravenous BID    sodium chloride (PF)  10 mL Intravenous 2 times per day    methylPREDNISolone  40 mg Intravenous Q12H    allopurinol  100 mg Oral Daily    metoprolol succinate  25 mg Oral Daily    atorvastatin  80 mg Oral Daily    digoxin  125 mcg Oral Daily    insulin lispro protamine & lispro  55 Units Subcutaneous BID WC    insulin lispro  0-6 Units Subcutaneous TID WC    insulin lispro  0-3 Units Subcutaneous Nightly    pantoprazole  40 mg Intravenous BID    And    sodium chloride (PF)  10 mL Intravenous BID     PRN Meds: albuterol, sodium chloride (PF), nitroGLYCERIN, sodium chloride flush, acetaminophen, oxyCODONE-acetaminophen **OR** oxyCODONE-acetaminophen, morphine **OR** morphine, magnesium hydroxide, ondansetron, glucose, dextrose, glucagon (rDNA), dextrose      Intake/Output Summary (Last 24 hours) at 11/05/17 1359  Last data filed at 11/05/17 1118   Gross per 24 hour   Intake             2039 ml   Output              150 ml   Net             1889 ml     Exam:    /85   Pulse 89   Temp 97.6 °F (36.4 °C) (Temporal)   Resp 20   Ht 5' 11\" (1.803 m)   Wt 226 lb (102.5 kg)   SpO2 95%   BMI 31.52 kg/m²     General appearance: No apparent distress, appears stated age and cooperative. HEENT: Pupils equal, round, and reactive to light. Conjunctivae/corneas clear. Neck: Supple, with full range of motion. No jugular venous distention. Trachea midline.   Respiratory:  Decreased breath sounds throughout with end expiratory wheezes  Cardiovascular: Regular rate and rhythm with normal S1/S2 without murmurs, rubs or gallops. Abdomen: Soft, non-tender, non-distended with normal bowel sounds. Musculoskeletal: No clubbing, cyanosis or edema bilaterally. Skin: Skin color, texture, turgor normal.  No rashes or lesions. Neuro: Non Focal. Intact and symmetric  Psychiatric: Alert and oriented, thought content appropriate, normal insight  Capillary Refill: Brisk,< 3 seconds   Peripheral Pulses: +2 palpable, equal bilaterally     Labs:   Recent Labs      11/03/17   1430   11/04/17   0648  11/04/17   1629  11/05/17   0149  11/05/17   0617   WBC  5.3   --   5.1   --    --    --    HGB  12.3*   < >  11.9*  12.0*  12.0*  11.2*  11.1*   HCT  37.8*   --   37.9*   --    --    --    PLT  162   --   164   --    --    --     < > = values in this interval not displayed. Recent Labs      11/03/17   1430  11/04/17   0648  11/05/17   0617   NA  136  138  138   K  3.6  4.3  4.0   CL  97*  97*  99   CO2  24  25  26   BUN  12  18  21   CREATININE  0.93  1.06  0.95   CALCIUM  8.6  8.7  8.5*     Recent Labs      11/03/17   1430   AST  17   ALT  14   BILITOT  1.8*   ALKPHOS  132*     Recent Labs      11/03/17   1619  11/04/17   0647   INR  1.4  1.6     Recent Labs      11/03/17   1430   TROPONINI  <0.010       Urinalysis:      Lab Results   Component Value Date    NITRU Negative 10/05/2016    WBCUA 6-10 10/05/2016    BACTERIA Few 10/05/2016    RBCUA 3-5 10/05/2016    BLOODU SMALL 10/05/2016    SPECGRAV 1.025 10/05/2016    GLUCOSEU 100 10/05/2016     Radiology:  XR CHEST STANDARD (2 VW)   Final Result      STABLE CHEST. XR Chest Portable   Final Result      CTA Chest W WO Contrast   Final Result   1. IMPRESSION:   2.  NO EVIDENCE OF CENTRAL OR SEGMENTAL PULMONARY EMBOLISM. 3.  THERE IS SEVERE BULLOUS EMPHYSEMATOUS DISEASE WITH FINDINGS SUGGESTING DEVELOPING INTERSTITIAL PULMONARY FIBROSIS   4.  MEDIASTINAL ADENOPATHY.    5.  SMALL HIATAL HERNIA      All CT scans at this facility use dose modulation, iterative reconstruction, and/or weight based dosing when appropriate to reduce radiation dose to as low as reasonably achievable. Assessment/Plan:  75 y/o M with a  PMHx of DMII, CAD, systolic and diastolic CHF, COPD who presented with SOB, two episodes of hemoptysis who then had a witnessed episode of hematemesis. Acute hypoxic respiratory failure secondary to a COPD Exacerbation       - Pulm on board; Q12H IV steroids       Chronic systolic and diastolic HF       - Continue cardiac meds (Beta blocker and Entresto); pt going to be placed on a dobutamine drip today per their note       Hemoptysis and hemetemesis       - Seen by GI; no acute GIB; will change protonix to PO       - Pulm may need to do a bronchoscopy if he continues to have hemoptysis prior to restarting plavix and coumadin      CAD       - Continue patients beta blocker; holding his plavix       A Fib      -  Coumadin was stopped for concerns of a GIB; given the patient did have witnessed hemoptysis will defer a/c for now as per cardiology     - Discussed compliance with patient; he states he was subtherapeutic because he stopped his coumadin in anticipation of a circumcision    Active Hospital Problems    Diagnosis Date Noted    Chronic combined systolic and diastolic heart failure ([facility] [I50.42] 11/04/2017    Hemoptysis [R04.2] 11/04/2017    COPD [facility] obstructive [facility] [J44.9]     Hyperlipidemia [E78.5]     Hypertension [I10]     Atrial flutter ([facility] [I48.92] 04/10/2013    Uncontrolled type 2 diabetes mellitus with complication, with long-term current use of insulin ([facility] [E11.8, E11.65, Z79.4] 05/09/2012    CAD (coronary artery disease) [I25.10] 05/09/2012     Additional work up or/and treatment plan may be added today or then after based on clinical progression. I am managing a portion of pt care.  Some medical issues are handled by other specialists. Additional work up and treatment should be done in out pt setting by pt PCP and other out pt providers. In addition to examining and evaluating pt, I spent additional time explaining care, normal and abnormal findings, and treatment plan. All of pt questions were answered. Counseling, diet and education were  provided. Case will be discussed with nursing staff when appropriate. Family will be updated if and when appropriate. Diet: DIET CARDIAC; Carb Control: 5 carbs/meal (approximate 2000 kcals/day); Low Sodium (2 GM);  Daily Fluid Restriction: 1500 ml          - As he's NPO will start gentle maintenance fluids; will have to be careful as he has a history of CHF    Code Status: Full Code    PT/OT Eval     Electronically signed by Mica Bull MD on 11/5/2017 at 1:59 PM

## 2017-11-05 NOTE — PROGRESS NOTES
Pt expressed need for Prn breathing treatment, denied pain or nausea. Called Respiratory and they said they would come by and do that for her now. Patient assessment complete. Will continue to monitor pt.      Electronically signed by Catherine Sewell RN on 11/5/2017 at 4:14 AM

## 2017-11-06 LAB
ANION GAP SERPL CALCULATED.3IONS-SCNC: 13 MEQ/L (ref 7–13)
BUN BLDV-MCNC: 23 MG/DL (ref 8–23)
CALCIUM SERPL-MCNC: 9.4 MG/DL (ref 8.6–10.2)
CHLORIDE BLD-SCNC: 95 MEQ/L (ref 98–107)
CO2: 29 MEQ/L (ref 22–29)
CREAT SERPL-MCNC: 0.96 MG/DL (ref 0.7–1.2)
DIGOXIN LEVEL: 0.7 NG/ML (ref 0.8–2)
GFR AFRICAN AMERICAN: >60
GFR NON-AFRICAN AMERICAN: >60
GLUCOSE BLD-MCNC: 104 MG/DL (ref 60–115)
GLUCOSE BLD-MCNC: 104 MG/DL (ref 60–115)
GLUCOSE BLD-MCNC: 227 MG/DL (ref 60–115)
GLUCOSE BLD-MCNC: 239 MG/DL (ref 60–115)
GLUCOSE BLD-MCNC: 90 MG/DL (ref 74–109)
HEMOGLOBIN: 12.4 G/DL (ref 14–18)
HEMOGLOBIN: 13 G/DL (ref 14–18)
INR BLD: 3.5
PERFORMED ON: ABNORMAL
PERFORMED ON: ABNORMAL
PERFORMED ON: NORMAL
PERFORMED ON: NORMAL
POTASSIUM SERPL-SCNC: 4 MEQ/L (ref 3.5–5.1)
PROTHROMBIN TIME: 37.6 SEC (ref 8.1–13.7)
SODIUM BLD-SCNC: 137 MEQ/L (ref 132–144)

## 2017-11-06 PROCEDURE — 6370000000 HC RX 637 (ALT 250 FOR IP): Performed by: NURSE PRACTITIONER

## 2017-11-06 PROCEDURE — 2060000000 HC ICU INTERMEDIATE R&B

## 2017-11-06 PROCEDURE — 6370000000 HC RX 637 (ALT 250 FOR IP): Performed by: INTERNAL MEDICINE

## 2017-11-06 PROCEDURE — 85018 HEMOGLOBIN: CPT

## 2017-11-06 PROCEDURE — 94664 DEMO&/EVAL PT USE INHALER: CPT

## 2017-11-06 PROCEDURE — 80162 ASSAY OF DIGOXIN TOTAL: CPT

## 2017-11-06 PROCEDURE — 94640 AIRWAY INHALATION TREATMENT: CPT

## 2017-11-06 PROCEDURE — 2580000003 HC RX 258: Performed by: SPECIALIST

## 2017-11-06 PROCEDURE — 6360000002 HC RX W HCPCS: Performed by: INTERNAL MEDICINE

## 2017-11-06 PROCEDURE — 36415 COLL VENOUS BLD VENIPUNCTURE: CPT

## 2017-11-06 PROCEDURE — 99232 SBSQ HOSP IP/OBS MODERATE 35: CPT | Performed by: INTERNAL MEDICINE

## 2017-11-06 PROCEDURE — 2700000000 HC OXYGEN THERAPY PER DAY

## 2017-11-06 PROCEDURE — 93010 ELECTROCARDIOGRAM REPORT: CPT | Performed by: INTERNAL MEDICINE

## 2017-11-06 PROCEDURE — 85610 PROTHROMBIN TIME: CPT

## 2017-11-06 PROCEDURE — 80048 BASIC METABOLIC PNL TOTAL CA: CPT

## 2017-11-06 RX ORDER — DOXYCYCLINE HYCLATE 100 MG/1
100 CAPSULE ORAL EVERY 12 HOURS SCHEDULED
Status: DISCONTINUED | OUTPATIENT
Start: 2017-11-06 | End: 2017-11-07 | Stop reason: HOSPADM

## 2017-11-06 RX ADMIN — INSULIN LISPRO 25 UNITS: 100 INJECTION, SUSPENSION SUBCUTANEOUS at 12:10

## 2017-11-06 RX ADMIN — SACUBITRIL AND VALSARTAN 1 TABLET: 24; 26 TABLET, FILM COATED ORAL at 20:24

## 2017-11-06 RX ADMIN — PANTOPRAZOLE SODIUM 40 MG: 40 TABLET, DELAYED RELEASE ORAL at 05:43

## 2017-11-06 RX ADMIN — DOBUTAMINE IN DEXTROSE 2.5 MCG/KG/MIN: 200 INJECTION, SOLUTION INTRAVENOUS at 17:19

## 2017-11-06 RX ADMIN — DIGOXIN 125 MCG: 0.12 TABLET ORAL at 08:34

## 2017-11-06 RX ADMIN — METHYLPREDNISOLONE SODIUM SUCCINATE 40 MG: 40 INJECTION, POWDER, FOR SOLUTION INTRAMUSCULAR; INTRAVENOUS at 00:37

## 2017-11-06 RX ADMIN — METOPROLOL SUCCINATE 25 MG: 25 TABLET, FILM COATED, EXTENDED RELEASE ORAL at 08:33

## 2017-11-06 RX ADMIN — IPRATROPIUM BROMIDE AND ALBUTEROL SULFATE 1 AMPULE: .5; 3 SOLUTION RESPIRATORY (INHALATION) at 19:54

## 2017-11-06 RX ADMIN — FUROSEMIDE 20 MG: 10 INJECTION, SOLUTION INTRAVENOUS at 08:33

## 2017-11-06 RX ADMIN — INSULIN LISPRO 1 UNITS: 100 INJECTION, SOLUTION INTRAVENOUS; SUBCUTANEOUS at 21:16

## 2017-11-06 RX ADMIN — METHYLPREDNISOLONE SODIUM SUCCINATE 40 MG: 40 INJECTION, POWDER, FOR SOLUTION INTRAMUSCULAR; INTRAVENOUS at 12:09

## 2017-11-06 RX ADMIN — ATORVASTATIN CALCIUM 80 MG: 80 TABLET, FILM COATED ORAL at 08:33

## 2017-11-06 RX ADMIN — IPRATROPIUM BROMIDE AND ALBUTEROL SULFATE 1 AMPULE: .5; 3 SOLUTION RESPIRATORY (INHALATION) at 13:35

## 2017-11-06 RX ADMIN — FUROSEMIDE 20 MG: 10 INJECTION, SOLUTION INTRAVENOUS at 17:10

## 2017-11-06 RX ADMIN — IPRATROPIUM BROMIDE AND ALBUTEROL SULFATE 1 AMPULE: .5; 3 SOLUTION RESPIRATORY (INHALATION) at 07:25

## 2017-11-06 RX ADMIN — ALLOPURINOL 100 MG: 100 TABLET ORAL at 08:34

## 2017-11-06 RX ADMIN — SACUBITRIL AND VALSARTAN 1 TABLET: 24; 26 TABLET, FILM COATED ORAL at 08:34

## 2017-11-06 RX ADMIN — DOXYCYCLINE HYCLATE 100 MG: 100 CAPSULE, GELATIN COATED ORAL at 20:24

## 2017-11-06 RX ADMIN — SODIUM CHLORIDE, PRESERVATIVE FREE 10 ML: 5 INJECTION INTRAVENOUS at 08:34

## 2017-11-06 ASSESSMENT — PAIN SCALES - GENERAL
PAINLEVEL_OUTOF10: 0

## 2017-11-06 NOTE — PROGRESS NOTES
Cardiology Progress Note      Patient:  Kemal Barrera  YOB: 1950  MRN: [id number]   Acct: [de-identified]  Admit Date:  11/3/2017      SHARED VISIT CNP: Chet Morales NP  PRIMARY CARE PHYSICIAN: Bindu Cordova MD  CARDIOLOGIST:Dr. Mikaela Rob      Impression/Plan:  1. Acute hypoxemic respiratory failure secondary to COPD exacerbation/decompensated HF  2. Hemoptysis, no recurrence. Warfarin on hold since 11/3, appears to have received a dose 11/3, none since. INR this am supratherapeutic at 3.5, with no recurrence. 3. Hematemesis, s/p EGD with no obvious source of bleeding. Previously on dual antiplatelet tx with ASA/plavix, ideally would resume at minimum low dose ASA. Last interventional procedure was GAYLE to LIMA in 2015  4. ICM with LVEF 10%, mild lower extremity edema, but no signs central congestion. Initiated on low dose IV dobutamine yesterday. Repeat echo pending  5. NSVT s/p ICD  6. PAF, presently in NSR. Confusion regarding medications on admission, review of office notes shows patient to be taking amiodarone 200mg daily, this has not been resumed on admission. May need to re-evaluate PFTs given pulmonary fibrosis seen on CT of chest. Currently on toprol XL, no recurrence of AF. Anticoagulation presently on hold, will need input from pulmonary prior to resumption. 7. CAD s/p CABG 2013, PCI/DESof LIMA 1025  8.  VHD, s/p mitral annuloplasty 2013, repeat echo pending    Chief Complaint/Active Symptoms: No further hemoptysis, improved dyspnea, denies chest pain    Objective:   BP (!) 118/54   Pulse 96   Temp 98.2 °F (36.8 °C) (Oral)   Resp 18   Ht 5' 11\" (1.803 m)   Wt 222 lb 10.6 oz (101 kg)   SpO2 96%   BMI 31.06 kg/m²    Wt Readings from Last 3 Encounters:   11/06/17 222 lb 10.6 oz (101 kg)   10/24/17 226 lb (102.5 kg)   09/28/17 218 lb (98.9 kg)       TELEMETRY: normal sinus                             Rhythm Strip: NSR-ST  NYHA Class: III    Physical Exam:  Physical Exam Constitutional: He is oriented to person, place, and time. He appears well-developed and well-nourished. No distress. HENT:   Head: Normocephalic. Eyes: Pupils are equal, round, and reactive to light. Neck: No JVD present. Cardiovascular: Normal rate. Intermittent tachycardia  2/6 systolic murmur   Pulmonary/Chest: Effort normal.   Diminished at bases otherwise CTA   Abdominal: Soft. Bowel sounds are normal. He exhibits no distension. Musculoskeletal: Normal range of motion. Trace bilateral ankle edema   Neurological: He is alert and oriented to person, place, and time. Skin: Skin is warm and dry. Psychiatric: He has a normal mood and affect.         Medications:    pantoprazole  40 mg Oral QAM AC    ipratropium-albuterol  1 ampule Inhalation TID    sacubitril-valsartan  1 tablet Oral BID    metolazone  2.5 mg Oral Once    furosemide  20 mg Intravenous BID    sodium chloride (PF)  10 mL Intravenous 2 times per day    methylPREDNISolone  40 mg Intravenous Q12H    allopurinol  100 mg Oral Daily    metoprolol succinate  25 mg Oral Daily    atorvastatin  80 mg Oral Daily    digoxin  125 mcg Oral Daily    insulin lispro protamine & lispro  55 Units Subcutaneous BID WC    insulin lispro  0-6 Units Subcutaneous TID WC    insulin lispro  0-3 Units Subcutaneous Nightly      DOBUTamine 2.5 mcg/kg/min (11/05/17 1614)    dextrose         albuterol 2.5 mg Q2H PRN   sodium chloride (PF) 10 mL PRN   nitroGLYCERIN 0.4 mg Q5 Min PRN   sodium chloride flush 10 mL PRN   acetaminophen 650 mg Q4H PRN   oxyCODONE-acetaminophen 1 tablet Q4H PRN   Or     oxyCODONE-acetaminophen 2 tablet Q4H PRN   morphine 2 mg Q2H PRN   Or     morphine 4 mg Q2H PRN   magnesium hydroxide 30 mL Daily PRN   ondansetron 4 mg Q6H PRN   glucose 15 g PRN   dextrose 12.5 g PRN   glucagon (rDNA) 1 mg PRN   dextrose 100 mL/hr PRN       Diagnostics:    Xr Chest Standard (2 Vw)    Result Date: 11/4/2017  XR CHEST STANDARD TWO VW: 11/4/2017 CLINICAL HISTORY:  COPD . Shortness of breath. COMPARISON: Portable chest and chest CTA 11/3/2017. Upright PA and lateral radiographs of the chest were obtained. FINDINGS: Hyperinflation and coarsening of the bronchovascular structures consistent with COPD appears unchanged. The heart remains moderately enlarged with postoperative change from previous mitral valve replacement, CABG, and a left subclavian dual-lead pacer/defibrillator. There are no developing infiltrates, pleural effusions, pneumothorax, or displaced fractures identified. STABLE CHEST. Cta Chest W Wo Contrast    Result Date: 11/3/2017  The EXAMINATION: CT scan of the chest with contrast (pulmonary embolism protocol) INDICATION: Wound not the greatest shortness of breath. COMPARISON: None TECHNIQUE: Helical CT was performed through the chest utilizing 100 cc of Isovue-370 intravenous contrast.  Images were obtained with bolus tracking in order to opacify the pulmonary arteries. There is no comparison available. Both multiplanar and volume rendered reconstruction was performed. FINDINGS: There are no findings for any gross central or proximal pulmonary emboli. There is severe bullous emphysematous disease in the upper two thirds of the lung parenchyma. There is a patchy mosaic appearance lung parenchyma that can be seen with small airway disease. There is some small areas of honeycombing developing suggesting developing interstitial pulmonary fibrosis. There is areas of atelectasis, scarring seen at the medial aspect of the left middle lobe and at the base of the left upper lobe left lingular region. There is a small area of dependent atelectasis right lower lobe. No pleural effusions. No pneumothoraces. There is mediastinal adenopathy. The cardiac silhouette is enlarged. There is a small hiatal hernia. Osseous structures are intact. 1.  IMPRESSION: 2.  NO EVIDENCE OF CENTRAL OR SEGMENTAL PULMONARY EMBOLISM.  3.  THERE IS SEVERE

## 2017-11-06 NOTE — PROGRESS NOTES
Hospitalist Progress Note      PCP: Rene Armendariz MD    Date of Admission: 11/3/2017    subjective:   No complaints, no hemoptysis      Medications:  Reviewed    Infusion Medications    DOBUTamine 2.5 mcg/kg/min (11/05/17 1614)    dextrose       Scheduled Medications    pantoprazole  40 mg Oral QAM AC    ipratropium-albuterol  1 ampule Inhalation TID    sacubitril-valsartan  1 tablet Oral BID    metolazone  2.5 mg Oral Once    furosemide  20 mg Intravenous BID    sodium chloride (PF)  10 mL Intravenous 2 times per day    methylPREDNISolone  40 mg Intravenous Q12H    allopurinol  100 mg Oral Daily    metoprolol succinate  25 mg Oral Daily    atorvastatin  80 mg Oral Daily    digoxin  125 mcg Oral Daily    insulin lispro protamine & lispro  55 Units Subcutaneous BID WC    insulin lispro  0-6 Units Subcutaneous TID WC    insulin lispro  0-3 Units Subcutaneous Nightly     PRN Meds: albuterol, sodium chloride (PF), nitroGLYCERIN, sodium chloride flush, acetaminophen, oxyCODONE-acetaminophen **OR** oxyCODONE-acetaminophen, morphine **OR** morphine, magnesium hydroxide, ondansetron, glucose, dextrose, glucagon (rDNA), dextrose      Intake/Output Summary (Last 24 hours) at 11/06/17 0925  Last data filed at 11/06/17 0543   Gross per 24 hour   Intake           682.84 ml   Output             1975 ml   Net         -1292.16 ml     Exam:    BP (!) 118/54   Pulse 96   Temp 98.2 °F (36.8 °C) (Oral)   Resp 18   Ht 5' 11\" (1.803 m)   Wt 222 lb 10.6 oz (101 kg)   SpO2 96%   BMI 31.06 kg/m²     General appearance: No apparent distress, appears stated age and cooperative. HEENT: Pupils equal, round, and reactive to light. Conjunctivae/corneas clear. Neck: Supple, with full range of motion. No jugular venous distention. Trachea midline.   Respiratory:  Decreased breath sounds throughout with end expiratory wheezes  Cardiovascular: Regular rate and rhythm with normal S1/S2 without murmurs, rubs or dose modulation, iterative reconstruction, and/or weight based dosing when appropriate to reduce radiation dose to as low as reasonably achievable. Radiology  Xr Chest Standard (2 Vw)     Result Date: 11/4/2017  XR CHEST STANDARD TWO VW: 11/4/2017 CLINICAL HISTORY:  COPD . Shortness of breath. COMPARISON: Portable chest and chest CTA 11/3/2017. Upright PA and lateral radiographs of the chest were obtained. FINDINGS: Hyperinflation and coarsening of the bronchovascular structures consistent with COPD appears unchanged. The heart remains moderately enlarged with postoperative change from previous mitral valve replacement, CABG, and a left subclavian dual-lead pacer/defibrillator. There are no developing infiltrates, pleural effusions, pneumothorax, or displaced fractures identified.      STABLE CHEST.      Cta Chest W Wo Contrast     Result Date: 11/3/2017  The EXAMINATION: CT scan of the chest with contrast (pulmonary embolism protocol) INDICATION: Wound not the greatest shortness of breath. COMPARISON: None TECHNIQUE: Helical CT was performed through the chest utilizing 100 cc of Isovue-370 intravenous contrast.  Images were obtained with bolus tracking in order to opacify the pulmonary arteries. There is no comparison available. Both multiplanar and volume rendered reconstruction was performed. FINDINGS: There are no findings for any gross central or proximal pulmonary emboli. There is severe bullous emphysematous disease in the upper two thirds of the lung parenchyma. There is a patchy mosaic appearance lung parenchyma that can be seen with small airway disease. There is some small areas of honeycombing developing suggesting developing interstitial pulmonary fibrosis. There is areas of atelectasis, scarring seen at the medial aspect of the left middle lobe and at the base of the left upper lobe left lingular region. There is a small area of dependent atelectasis right lower lobe.  No pleural effusions. No pneumothoraces. There is mediastinal adenopathy. The cardiac silhouette is enlarged. There is a small hiatal hernia. Osseous structures are intact.       1.  IMPRESSION: 2.  NO EVIDENCE OF CENTRAL OR SEGMENTAL PULMONARY EMBOLISM. 3.  THERE IS SEVERE BULLOUS EMPHYSEMATOUS DISEASE WITH FINDINGS SUGGESTING DEVELOPING INTERSTITIAL PULMONARY FIBROSIS 4.  MEDIASTINAL ADENOPATHY. 5.  SMALL HIATAL HERNIA All CT scans at this facility use dose modulation, iterative reconstruction, and/or weight based dosing when appropriate to reduce radiation dose to as low as reasonably achievable.      Xr Chest Portable     Result Date: 11/3/2017  EXAMINATION: XR CHEST PORTABLE CLINICAL HISTORY:  shortness of breath COMPARISONS: May 11, 2017 FINDINGS: Single AP portable view the chest is obtained on November 3, 2017 at 1446 hours. There are multiple sternotomy sutures. There is an aortic valve prosthesis. There is moderate cardiomegaly. There is mild vascular congestion. There is a trace of perihilar edema. There is no large effusion. The mediastinum is not widened or shifted. The calcified aorta is not dilated. There is an unremarkable ICD/permanent pacemaker in place in left. CONCLUSION: CARDIOMEGALY. MILD CONGESTIVE HEART FAILURE FINDINGS.              Assessment/Plan:  73 y/o M with a  PMHx of DMII, CAD, systolic and diastolic CHF, COPD who presented with SOB, two episodes of hemoptysis who then had a witnessed episode of hematemesis. 1.Acute hypoxic respiratory failure secondary to a COPD Exacerbation       - Pulm on board; Q12H IV steroids, duonebs      2.  Chronic systolic and diastolic HF       - Continue cardiac meds (Beta blocker and Entresto); pt is on a  dobutamine drip      3  Hemoptysis and hemetemesis       - Seen by GI; no acute GIB; will change protonix to PO       - Pulm may need to do a bronchoscopy if he continues to have hemoptysis prior to restarting plavix and coumadin      4CAD - Continue patients beta blocker; holding his plavix      5 A Fib  -  Coumadin was stopped for concerns of a GIB; given the patient did have witnessed hemoptysis                    will defer a/c for now as per cardiology          -  he was subtherapeutic because he stopped his coumadin in anticipation of a circumcision    6. Hemorrhagic disorder  d/t extrinsic circulating anticoagulants present on admission -monitoring      7. Acute on chronic diastolic (congestive) heart failure present on admission. ireceiving dobutamine,lasix, metalazone,cardiology input appreciated         Active Hospital Problems    Diagnosis Date Noted    [facility] [I50.42] 11/04/2017    Hemoptysis [R04.2] 11/04/2017    COPD (chronic obstructive pulmonary disease) ([facility] [J44.9]     Hyperlipidemia [E78.5]     Hypertension [I10]     Atrial flutter ([facility] [I48.92] 04/10/2013    Uncontrolled type 2 diabetes mellitus with complication, with long-term current use of insulin ([facility]) [E11.8, E11.65, Z79.4] 05/09/2012    CAD (coronary artery disease) [I25.10] 05/09/2012       Diet: DIET CARDIAC; Carb Control: 5 carbs/meal (approximate 2000 kcals/day); Low Sodium (2 GM);  Daily Fluid Restriction: 1500 ml          - As he's NPO will start gentle maintenance fluids; will have to be careful as he has a history of CHF    Code Status: Full Code    PT/OT Eval     Electronically signed by Evangelina Rivas MD on 11/6/2017 at 9:25 AM

## 2017-11-06 NOTE — PROGRESS NOTES
[facility] Respiratory Therapy Evaluation   Current Order:  DUONEB TID AND ALBUTEROL Q2 PRN      Home Regimen: PRN      Ordering Physician: Santiago English  Re-evaluation Date:  11/9     Diagnosis: COPD EXAC      Patient Status: Stable / Unstable + Physician notified    The following MDI Criteria must be met in order to convert aerosol to MDI with spacer.  If unable to meet, MDI will be converted to aerosol:  []  Patient able to demonstrate the ability to use MDI effectively  []  Patient alert and cooperative  []  Patient able to take deep breath with 5-10 second hold  []  Medication(s) available in this delivery method   []  Peak flow greater than or equal to 200 ml/min            Current Order Substituted To  (same drug, same frequency)   Aerosol to MDI [] Albuterol Sulfate 0.083% unit dose by aerosol Albuterol Sulfate MDI 2 puffs by inhalation with spacer    [] Levalbuterol 1.25 mg unit dose by aerosol Levalbuterol MDI 2 puffs by inhalation with spacer    [] Levalbuterol 0.63 mg unit dose by aerosol Levalbuterol MDI 2 puffs by inhalation with spacer    [] Ipratropium Bromide 0.02% unit dose by aerosol Ipratropium Bromide MDI 2 puffs by inhalation with spacer    [] Duoneb (Ipratropium + Albuterol) unit dose by aerosol Ipratropium MDI + Albuterol MDI 2 puffs by inhalation w/spacer   MDI to Aerosol [] Albuterol Sulfate MDI Albuterol Sulfate 0.083% unit dose by aerosol    [] Levalbuterol MDI 2 puffs by inhalation Levalbuterol 1.25 mg unit dose by aerosol    [] Ipratropium Bromide MDI by inhalation Ipratropium Bromide 0.02% unit dose by aerosol    [] Combivent (Ipratropium + Albuterol) MDI by inhalation Duoneb (Ipratropium + Albuterol) unit dose by aerosol   Treatment Assessment [Frequency/Schedule]:  Change frequency to: _____NO CHANGES TO CURRENT ORDER_____________________________________________per Protocol, P&T, MEC      Points 0 1 2 3 4   Pulmonary Status  Non-Smoker  []   Smoking history   < 20 pack years  []   Smoking

## 2017-11-06 NOTE — PROGRESS NOTES
INPATIENT PROGRESS NOTES    PATIENT NAME: Gris Fuentes  MRN: [id number]  SERVICE DATE:  November 6, 2017   SERVICE TIME:  11:34 AM      PRIMARY SERVICE: Pulmonary Disease    CHIEF COMPLAINTS: Cough and hemoptysis    INTERVAL HPI: Patient seen and examined at bedside, Interval Notes, orders reviewed. Nursing notes noted  Patient reports feeling better today. He has been coughing and expectorating thick yellow or brown sputum but no blood today. He coughed up some dried blood which was dark yesterday. His breathing is improved he denies chest pains. OBJECTIVE    Body mass index is 31.06 kg/m². PHYSICAL EXAM:  Vitals:  BP (!) 118/54   Pulse 96   Temp 98.2 °F (36.8 °C) (Oral)   Resp 18   Ht 5' 11\" (1.803 m)   Wt 222 lb 10.6 oz (101 kg)   SpO2 96%   BMI 31.06 kg/m²   General: Patient is  Alert, awake . comfortable in bed, No distress. Head: Atraumatic , Normocephalic   Eyes: PERRL. No sclera icterus. No conjunctival injection. No discharge   ENT: No nasal  discharge. Pharynx clear. Neck:  Trachea midline. No thyromegaly, no JVD, No cervical adenopathy. Chest : Bilaterally symmetrical ,Normal effort,  No accessory muscle use  Lung : . Fair BS bilateral, decreased BS at bases. No Rales. No wheezing. No rhonchi. No dullness on percussion. Heart[de-identified] Normal  rate. Regular rhythm. No mumur ,  Rub or gallop  ABD: Non-tender. Non-distended. No masses. No organmegaly. Normal bowel sounds. No hernia. EXT: No Pitting both leg , No Cyanosis No clubbing  Neuro: no focal weakness  Skin: Warm and dry. No erythema rash on exposed extremities. DATA:   Recent Labs      11/03/17   1430   11/04/17   0648   11/05/17   2034  11/06/17   0747   WBC  5.3   --   5.1   --    --    --    HGB  12.3*   < >  11.9*  12.0*   < >  12.1*  12.4*   HCT  37.8*   --   37.9*   --    --    --    MCV  84.6   --   85.8   --    --    --    PLT  162   --   164   --    --    --     < > = values in this interval not displayed.      Recent dextrose, glucagon (rDNA), dextrose    Radiology  Xr Chest Standard (2 Vw)    Result Date: 11/4/2017  XR CHEST STANDARD TWO VW: 11/4/2017 CLINICAL HISTORY:  COPD . Shortness of breath. COMPARISON: Portable chest and chest CTA 11/3/2017. Upright PA and lateral radiographs of the chest were obtained. FINDINGS: Hyperinflation and coarsening of the bronchovascular structures consistent with COPD appears unchanged. The heart remains moderately enlarged with postoperative change from previous mitral valve replacement, CABG, and a left subclavian dual-lead pacer/defibrillator. There are no developing infiltrates, pleural effusions, pneumothorax, or displaced fractures identified. STABLE CHEST. Cta Chest W Wo Contrast    Result Date: 11/3/2017  The EXAMINATION: CT scan of the chest with contrast (pulmonary embolism protocol) INDICATION: Wound not the greatest shortness of breath. COMPARISON: None TECHNIQUE: Helical CT was performed through the chest utilizing 100 cc of Isovue-370 intravenous contrast.  Images were obtained with bolus tracking in order to opacify the pulmonary arteries. There is no comparison available. Both multiplanar and volume rendered reconstruction was performed. FINDINGS: There are no findings for any gross central or proximal pulmonary emboli. There is severe bullous emphysematous disease in the upper two thirds of the lung parenchyma. There is a patchy mosaic appearance lung parenchyma that can be seen with small airway disease. There is some small areas of honeycombing developing suggesting developing interstitial pulmonary fibrosis. There is areas of atelectasis, scarring seen at the medial aspect of the left middle lobe and at the base of the left upper lobe left lingular region. There is a small area of dependent atelectasis right lower lobe. No pleural effusions. No pneumothoraces. There is mediastinal adenopathy. The cardiac silhouette is enlarged. There is a small hiatal hernia. Osseous structures are intact. 1.  IMPRESSION: 2.  NO EVIDENCE OF CENTRAL OR SEGMENTAL PULMONARY EMBOLISM. 3.  THERE IS SEVERE BULLOUS EMPHYSEMATOUS DISEASE WITH FINDINGS SUGGESTING DEVELOPING INTERSTITIAL PULMONARY FIBROSIS 4.  MEDIASTINAL ADENOPATHY. 5.  SMALL HIATAL HERNIA All CT scans at this facility use dose modulation, iterative reconstruction, and/or weight based dosing when appropriate to reduce radiation dose to as low as reasonably achievable. Xr Chest Portable    Result Date: 11/3/2017  EXAMINATION: XR CHEST PORTABLE CLINICAL HISTORY:  shortness of breath COMPARISONS: May 11, 2017 FINDINGS: Single AP portable view the chest is obtained on November 3, 2017 at 1446 hours. There are multiple sternotomy sutures. There is an aortic valve prosthesis. There is moderate cardiomegaly. There is mild vascular congestion. There is a trace of perihilar edema. There is no large effusion. The mediastinum is not widened or shifted. The calcified aorta is not dilated. There is an unremarkable ICD/permanent pacemaker in place in left. CONCLUSION: CARDIOMEGALY. MILD CONGESTIVE HEART FAILURE FINDINGS. IMPRESSION AND SUGGESTION:  1.  Acute hypoxic respiratory failure secondary to COPD exacerbation and CHF, patient is clinically improving  2.  Chronic systolic and diastolic congestive heart failure  3.  Hemoptysis , improved. Likely due to anticoagulation rule out other etiology, patient is now expectorating purulent type sputum.   Infectious reason should also be considered and treated with empiric antibiotics  4.  Coronary artery disease  5.  Atrial fibrillation            Electronically signed by Graham Wilks MD, FCCP on 11/6/2017 at 11:34 AM

## 2017-11-06 NOTE — CARE COORDINATION
LSW spoke to Pt, he lives alone and uses a cane. Pt stated he does drive. Pt said he does have friends that stop by to check on him. LSW will follow as needed. .Electronically signed by BART Lundberg on 11/6/2017 at 2:55 PM

## 2017-11-07 VITALS
DIASTOLIC BLOOD PRESSURE: 61 MMHG | SYSTOLIC BLOOD PRESSURE: 120 MMHG | TEMPERATURE: 98.4 F | RESPIRATION RATE: 18 BRPM | OXYGEN SATURATION: 98 % | HEIGHT: 71 IN | BODY MASS INDEX: 31.17 KG/M2 | WEIGHT: 222.66 LBS | HEART RATE: 92 BPM

## 2017-11-07 LAB
ANION GAP SERPL CALCULATED.3IONS-SCNC: 14 MEQ/L (ref 7–13)
BUN BLDV-MCNC: 28 MG/DL (ref 8–23)
CALCIUM SERPL-MCNC: 9.3 MG/DL (ref 8.6–10.2)
CHLORIDE BLD-SCNC: 91 MEQ/L (ref 98–107)
CO2: 30 MEQ/L (ref 22–29)
CREAT SERPL-MCNC: 0.98 MG/DL (ref 0.7–1.2)
GFR AFRICAN AMERICAN: >60
GFR NON-AFRICAN AMERICAN: >60
GLUCOSE BLD-MCNC: 111 MG/DL (ref 60–115)
GLUCOSE BLD-MCNC: 161 MG/DL (ref 74–109)
GLUCOSE BLD-MCNC: 194 MG/DL (ref 60–115)
GLUCOSE BLD-MCNC: 280 MG/DL (ref 60–115)
HEMOGLOBIN: 12.3 G/DL (ref 14–18)
INR BLD: 2.2
PERFORMED ON: ABNORMAL
PERFORMED ON: ABNORMAL
PERFORMED ON: NORMAL
POTASSIUM SERPL-SCNC: 3.9 MEQ/L (ref 3.5–5.1)
PROTHROMBIN TIME: 23.6 SEC (ref 8.1–13.7)
SODIUM BLD-SCNC: 135 MEQ/L (ref 132–144)

## 2017-11-07 PROCEDURE — 6370000000 HC RX 637 (ALT 250 FOR IP): Performed by: NURSE PRACTITIONER

## 2017-11-07 PROCEDURE — 6370000000 HC RX 637 (ALT 250 FOR IP): Performed by: INTERNAL MEDICINE

## 2017-11-07 PROCEDURE — 6360000002 HC RX W HCPCS: Performed by: INTERNAL MEDICINE

## 2017-11-07 PROCEDURE — 36415 COLL VENOUS BLD VENIPUNCTURE: CPT

## 2017-11-07 PROCEDURE — 94640 AIRWAY INHALATION TREATMENT: CPT

## 2017-11-07 PROCEDURE — G8989 SELF CARE D/C STATUS: HCPCS

## 2017-11-07 PROCEDURE — G8988 SELF CARE GOAL STATUS: HCPCS

## 2017-11-07 PROCEDURE — 85018 HEMOGLOBIN: CPT

## 2017-11-07 PROCEDURE — 99232 SBSQ HOSP IP/OBS MODERATE 35: CPT | Performed by: INTERNAL MEDICINE

## 2017-11-07 PROCEDURE — G8987 SELF CARE CURRENT STATUS: HCPCS

## 2017-11-07 PROCEDURE — 85610 PROTHROMBIN TIME: CPT

## 2017-11-07 PROCEDURE — 2700000000 HC OXYGEN THERAPY PER DAY

## 2017-11-07 PROCEDURE — 2580000003 HC RX 258: Performed by: SPECIALIST

## 2017-11-07 PROCEDURE — 94760 N-INVAS EAR/PLS OXIMETRY 1: CPT

## 2017-11-07 PROCEDURE — 80048 BASIC METABOLIC PNL TOTAL CA: CPT

## 2017-11-07 PROCEDURE — 97165 OT EVAL LOW COMPLEX 30 MIN: CPT

## 2017-11-07 RX ORDER — METOPROLOL SUCCINATE 25 MG/1
25 TABLET, EXTENDED RELEASE ORAL DAILY
Qty: 30 TABLET | Refills: 3 | Status: SHIPPED | OUTPATIENT
Start: 2017-11-07 | End: 2018-12-17 | Stop reason: ALTCHOICE

## 2017-11-07 RX ORDER — WARFARIN SODIUM 5 MG/1
TABLET ORAL
Qty: 120 TABLET | Refills: 1 | Status: SHIPPED | OUTPATIENT
Start: 2017-11-07 | End: 2017-12-01 | Stop reason: SDUPTHER

## 2017-11-07 RX ORDER — PANTOPRAZOLE SODIUM 40 MG/1
40 TABLET, DELAYED RELEASE ORAL
Qty: 30 TABLET | Refills: 3 | Status: SHIPPED | OUTPATIENT
Start: 2017-11-08

## 2017-11-07 RX ORDER — PREDNISONE 10 MG/1
TABLET ORAL
Qty: 40 TABLET | Refills: 0 | Status: SHIPPED | OUTPATIENT
Start: 2017-11-07 | End: 2018-02-12

## 2017-11-07 RX ORDER — DOXYCYCLINE HYCLATE 100 MG
100 TABLET ORAL 2 TIMES DAILY
Qty: 20 TABLET | Refills: 0 | Status: SHIPPED | OUTPATIENT
Start: 2017-11-07 | End: 2017-11-17

## 2017-11-07 RX ADMIN — METHYLPREDNISOLONE SODIUM SUCCINATE 40 MG: 40 INJECTION, POWDER, FOR SOLUTION INTRAMUSCULAR; INTRAVENOUS at 03:47

## 2017-11-07 RX ADMIN — METHYLPREDNISOLONE SODIUM SUCCINATE 40 MG: 40 INJECTION, POWDER, FOR SOLUTION INTRAMUSCULAR; INTRAVENOUS at 11:26

## 2017-11-07 RX ADMIN — DOXYCYCLINE HYCLATE 100 MG: 100 CAPSULE, GELATIN COATED ORAL at 08:29

## 2017-11-07 RX ADMIN — IPRATROPIUM BROMIDE AND ALBUTEROL SULFATE 1 AMPULE: .5; 3 SOLUTION RESPIRATORY (INHALATION) at 07:00

## 2017-11-07 RX ADMIN — FUROSEMIDE 20 MG: 10 INJECTION, SOLUTION INTRAVENOUS at 08:35

## 2017-11-07 RX ADMIN — DIGOXIN 125 MCG: 0.12 TABLET ORAL at 08:29

## 2017-11-07 RX ADMIN — ALLOPURINOL 100 MG: 100 TABLET ORAL at 08:29

## 2017-11-07 RX ADMIN — PANTOPRAZOLE SODIUM 40 MG: 40 TABLET, DELAYED RELEASE ORAL at 06:31

## 2017-11-07 RX ADMIN — SODIUM CHLORIDE, PRESERVATIVE FREE 10 ML: 5 INJECTION INTRAVENOUS at 08:30

## 2017-11-07 RX ADMIN — INSULIN LISPRO 55 UNITS: 100 INJECTION, SUSPENSION SUBCUTANEOUS at 08:30

## 2017-11-07 RX ADMIN — SACUBITRIL AND VALSARTAN 1 TABLET: 24; 26 TABLET, FILM COATED ORAL at 08:29

## 2017-11-07 RX ADMIN — ATORVASTATIN CALCIUM 80 MG: 80 TABLET, FILM COATED ORAL at 08:29

## 2017-11-07 RX ADMIN — METOPROLOL SUCCINATE 25 MG: 25 TABLET, FILM COATED, EXTENDED RELEASE ORAL at 08:29

## 2017-11-07 RX ADMIN — IPRATROPIUM BROMIDE AND ALBUTEROL SULFATE 1 AMPULE: .5; 3 SOLUTION RESPIRATORY (INHALATION) at 13:11

## 2017-11-07 ASSESSMENT — PAIN SCALES - GENERAL
PAINLEVEL_OUTOF10: 0

## 2017-11-07 NOTE — PROGRESS NOTES
Hospitalist Progress Note      PCP: Bindu Cordova MD    Date of Admission: 11/3/2017    subjective:   No complaints, no hemoptysis      Medications:  Reviewed    Infusion Medications    DOBUTamine 2.5 mcg/kg/min (11/06/17 7349)    dextrose       Scheduled Medications    doxycycline  100 mg Oral 2 times per day    pantoprazole  40 mg Oral QAM AC    ipratropium-albuterol  1 ampule Inhalation TID    sacubitril-valsartan  1 tablet Oral BID    furosemide  20 mg Intravenous BID    sodium chloride (PF)  10 mL Intravenous 2 times per day    methylPREDNISolone  40 mg Intravenous Q12H    allopurinol  100 mg Oral Daily    metoprolol succinate  25 mg Oral Daily    atorvastatin  80 mg Oral Daily    digoxin  125 mcg Oral Daily    insulin lispro protamine & lispro  55 Units Subcutaneous BID WC    insulin lispro  0-6 Units Subcutaneous TID WC    insulin lispro  0-3 Units Subcutaneous Nightly     PRN Meds: albuterol, sodium chloride (PF), nitroGLYCERIN, sodium chloride flush, acetaminophen, oxyCODONE-acetaminophen **OR** oxyCODONE-acetaminophen, morphine **OR** morphine, magnesium hydroxide, ondansetron, glucose, dextrose, glucagon (rDNA), dextrose      Intake/Output Summary (Last 24 hours) at 11/07/17 1012  Last data filed at 11/07/17 0658   Gross per 24 hour   Intake              546 ml   Output             1400 ml   Net             -854 ml     Exam:    /65   Pulse 81   Temp 97.7 °F (36.5 °C) (Oral)   Resp 16   Ht 5' 11\" (1.803 m)   Wt 222 lb 10.6 oz (101 kg)   SpO2 95%   BMI 31.06 kg/m²     General appearance: No apparent distress, appears stated age and cooperative. HEENT: Pupils equal, round, and reactive to light. Conjunctivae/corneas clear. Neck: Supple, with full range of motion. No jugular venous distention. Trachea midline.   Respiratory:  Decreased breath sounds throughout with end expiratory wheezes  Cardiovascular: Regular rate and rhythm with normal S1/S2 without murmurs, rubs or HISTORY:  COPD . Shortness of breath. COMPARISON: Portable chest and chest CTA 11/3/2017. Upright PA and lateral radiographs of the chest were obtained. FINDINGS: Hyperinflation and coarsening of the bronchovascular structures consistent with COPD appears unchanged. The heart remains moderately enlarged with postoperative change from previous mitral valve replacement, CABG, and a left subclavian dual-lead pacer/defibrillator. There are no developing infiltrates, pleural effusions, pneumothorax, or displaced fractures identified.      STABLE CHEST.      Cta Chest W Wo Contrast     Result Date: 11/3/2017  The EXAMINATION: CT scan of the chest with contrast (pulmonary embolism protocol) INDICATION: Wound not the greatest shortness of breath. COMPARISON: None TECHNIQUE: Helical CT was performed through the chest utilizing 100 cc of Isovue-370 intravenous contrast.  Images were obtained with bolus tracking in order to opacify the pulmonary arteries. There is no comparison available. Both multiplanar and volume rendered reconstruction was performed. FINDINGS: There are no findings for any gross central or proximal pulmonary emboli. There is severe bullous emphysematous disease in the upper two thirds of the lung parenchyma. There is a patchy mosaic appearance lung parenchyma that can be seen with small airway disease. There is some small areas of honeycombing developing suggesting developing interstitial pulmonary fibrosis. There is areas of atelectasis, scarring seen at the medial aspect of the left middle lobe and at the base of the left upper lobe left lingular region. There is a small area of dependent atelectasis right lower lobe. No pleural effusions. No pneumothoraces. There is mediastinal adenopathy. The cardiac silhouette is enlarged. There is a small hiatal hernia. Osseous structures are intact.       1.  IMPRESSION: 2.  NO EVIDENCE OF CENTRAL OR SEGMENTAL PULMONARY EMBOLISM.  3.  THERE IS SEVERE BULLOUS

## 2017-11-07 NOTE — FLOWSHEET NOTE
Responsible for patient care from 11 a.m. To 7 p.m. Harles Old Patient on assessment denies any chest pain or acute distress. Lungs clear/dim. PP+2. Edema +1 BLE, but \"better \" per patient. O2 continues at 2L/NC. IV Dobutamine continues as ordered at 2.5 mcg/kg/min, infusing without difficulty at 7.7 cc/hr right forearm. MP SR. No acute distress. Call light in reach. Will note deviation. Stable.

## 2017-11-07 NOTE — PROGRESS NOTES
INPATIENT PROGRESS NOTES    PATIENT NAME: Fran Huynh  MRN: [id number]  SERVICE DATE:  November 7, 2017   SERVICE TIME:  11:42 AM      PRIMARY SERVICE: Pulmonary Disease    CHIEF COMPLAINTS: Shortness breath and cough    INTERVAL HPI: Patient seen and examined at bedside, Interval Notes, orders reviewed. Nursing notes noted  Patient reports significant improvement in his shortness of breath today. His cough is improved as well. He denies pleuritic chest pain. His oxygenating well at 95% currently. CT scan of the chest was reviewed and showed scattered areas of segmental and subsegmental atelectasis with associated mild postinflammatory fibrotic change. He did not have significant groundglass infiltrates to suggest amiodarone toxicity. Previous high-resolution CT a few months ago was also reviewed and failed to show evidence of ovulation disorder interstitial disease to suggest amiodarone toxicity. OBJECTIVE    Body mass index is 31.06 kg/m². PHYSICAL EXAM:  Vitals:  /65   Pulse 81   Temp 97.7 °F (36.5 °C) (Oral)   Resp 16   Ht 5' 11\" (1.803 m)   Wt 222 lb 10.6 oz (101 kg)   SpO2 95%   BMI 31.06 kg/m²   General: Patient is  Alert, awake . comfortable in bed, No distress. Head: Atraumatic , Normocephalic   Eyes: PERRL. No sclera icterus. No conjunctival injection. No discharge   ENT: No nasal  discharge. Pharynx clear. Neck:  Trachea midline. No thyromegaly, no JVD, No cervical adenopathy. Chest : Bilaterally symmetrical ,Normal effort,  No accessory muscle use  Lung : . Fair BS bilateral, decreased BS at bases. No Rales. No wheezing. No rhonchi. No dullness on percussion. Heart[de-identified] Normal  rate. Regular rhythm. No mumur ,  Rub or gallop  ABD: Non-tender. Non-distended. No masses. No organmegaly. Normal bowel sounds. No hernia. EXT: No Pitting both leg , No Cyanosis No clubbing  Neuro: no focal weakness  Skin: Warm and dry. No erythema rash on exposed extremities.       DATA:   Recent Labs      11/06/17   0747  11/06/17   1950   HGB  12.4*  13.0*     Recent Labs      11/06/17   0513  11/07/17   0525   NA  137  135   K  4.0  3.9   CL  95*  91*   CO2  29  30*   BUN  23  28*   CREATININE  0.96  0.98   GLUCOSE  90  161*   CALCIUM  9.4  9.3   LABGLOM  >60.0  >60.0   GFRAA  >60.0  >60.0       MV Settings:     FiO2 : 3 %    No results for input(s): PHART, UAJ3SRV, PO2ART, FMJ0ALX, BEART, R7HKPJEW in the last 72 hours. O2 Device: Nasal cannula  O2 Flow Rate (L/min): 2 L/min    DIET CARDIAC; Carb Control: 5 carbs/meal (approximate 2000 kcals/day); Low Sodium (2 GM); Daily Fluid Restriction: 1500 ml     MEDICATIONS during current hospitalization:    Continuous Infusions:   DOBUTamine 2.5 mcg/kg/min (11/06/17 5432)    dextrose         Scheduled Meds:   doxycycline  100 mg Oral 2 times per day    pantoprazole  40 mg Oral QAM AC    ipratropium-albuterol  1 ampule Inhalation TID    sacubitril-valsartan  1 tablet Oral BID    furosemide  20 mg Intravenous BID    sodium chloride (PF)  10 mL Intravenous 2 times per day    methylPREDNISolone  40 mg Intravenous Q12H    allopurinol  100 mg Oral Daily    metoprolol succinate  25 mg Oral Daily    atorvastatin  80 mg Oral Daily    digoxin  125 mcg Oral Daily    insulin lispro protamine & lispro  55 Units Subcutaneous BID WC    insulin lispro  0-6 Units Subcutaneous TID WC    insulin lispro  0-3 Units Subcutaneous Nightly       PRN Meds:albuterol, sodium chloride (PF), nitroGLYCERIN, sodium chloride flush, acetaminophen, oxyCODONE-acetaminophen **OR** oxyCODONE-acetaminophen, morphine **OR** morphine, magnesium hydroxide, ondansetron, glucose, dextrose, glucagon (rDNA), dextrose    Radiology  Xr Chest Standard (2 Vw)    Result Date: 11/4/2017  XR CHEST STANDARD TWO VW: 11/4/2017 CLINICAL HISTORY:  COPD . Shortness of breath. COMPARISON: Portable chest and chest CTA 11/3/2017. Upright PA and lateral radiographs of the chest were obtained.  FINDINGS: use dose modulation, iterative reconstruction, and/or weight based dosing when appropriate to reduce radiation dose to as low as reasonably achievable. Xr Chest Portable    Result Date: 11/3/2017  EXAMINATION: XR CHEST PORTABLE CLINICAL HISTORY:  shortness of breath COMPARISONS: May 11, 2017 FINDINGS: Single AP portable view the chest is obtained on November 3, 2017 at 1446 hours. There are multiple sternotomy sutures. There is an aortic valve prosthesis. There is moderate cardiomegaly. There is mild vascular congestion. There is a trace of perihilar edema. There is no large effusion. The mediastinum is not widened or shifted. The calcified aorta is not dilated. There is an unremarkable ICD/permanent pacemaker in place in left. CONCLUSION: CARDIOMEGALY. MILD CONGESTIVE HEART FAILURE FINDINGS. IMPRESSION AND SUGGESTION:  1. Severe COPD with acute exacerbation currently doing much better with treatment  2. Cardiomyopathy without significant decompensated heart failure  3.  Bullous emphysema with scattered subsegmental atelectasis and fibrosis with no evidence of amiodarone toxicity clinically or radiographically  Agree with discharge planning, send patient home with tapering doses of steroids and continue treatment for COPD at home as before follow-up in 2 weeks            Electronically signed by Errol Oneil MD, FCCP on 11/7/2017 at 11:42 AM

## 2017-11-07 NOTE — PROGRESS NOTES
with no obvious source of bleeding. Previously on dual antiplatelet tx with ASA/plavix, ideally would resume at minimum low dose ASA. Last interventional procedure was GAYLE to LIMA in 2015  4. ICM with LVEF 10%, mild lower extremity edema, but no signs central congestion. Initiated on low dose IV dobutamine yesterday. Repeat echo pending  5. NSVT s/p ICD  6. PAF, presently in NSR. Confusion regarding medications on admission, review of office notes shows patient to be taking amiodarone 200mg daily, this has not been resumed on admission. May need to re-evaluate PFTs given pulmonary fibrosis seen on CT of chest. Currently on toprol XL, no recurrence of AF. Anticoagulation presently on hold, will need input from pulmonary prior to resumption. 7. CAD s/p CABG 2013, PCI/DESof LIMA 1025    Plan:  1. Reviewed Chest CT with . Hiis impression is that the CT chest does not suggest Amiodarone induced pulmonary changes. In this pt therefore,risk/benefit favours continuing Amiodarone,given high risk of recurrence of atrial fibrillation/VT. 2. Resume warfarin with INR goal of 2.0  3. DC ASA and continue Plavix  4. Stop Dobutamine  5. OK to DC from Cardiology stand point  6. High readmission risk  7.  Close OP fu  Thankyou  Electronically signed by Gabriela Weir MD on 11/7/2017 at 10:54 AM

## 2017-11-07 NOTE — PROGRESS NOTES
[facility] OCCUPATIONAL THERAPY EVALUATION - ACUTE     Date: 2017  Patient Name: Zina Dean        MRN: [id number]  Account: [de-identified]   : 1950  (74 y.o.)  Room: [place] [id number]    Chart Review:  Diagnosis:  The primary encounter diagnosis was Hemoptysis. Diagnoses of COPD exacerbation ([facility] and Respiratory distress were also pertinent to this visit.   Past Medical History:   Diagnosis Date    CAD (coronary artery disease)     Cardiomyopathy ([facility]     [facility]     Defibrillator activation     Diabetes mellitus with insulin therapy ([facility]     Generalized and unspecified atherosclerosis     Gout     Hyperlipidemia     Hypertension     Pain in joint, multiple sites     Status post angioplasty     TIA (transient ischemic attack)     Tobacco abuse     Type II or unspecified type diabetes mellitus without mention of complication, not stated as uncontrolled      Past Surgical History:   Procedure Laterality Date    CORONARY ANGIOPLASTY  11    Dr Kendrick Palacio Bilateral     Cataracts     OTHER SURGICAL HISTORY      difibrillator     CA EGD TRANSORAL BIOPSY SINGLE/MULTIPLE N/A 2017    EGD BIOPSY performed by Zulma Hebert MD at [facility] OR     Precautions:  IV,O2  Restrictions/Precautions: Fall Risk    Evaluation and Pt. rights have been reviewed: [x]Yes   [] No   If no why not:   Falls safety interventions in place  [x]Yes   [] No    Comments:     Subjective: Pt seen alone    Prior living arrangement:      Support contact: Self    Pt lives: [x] Alone   [] With spouse   [] Other   Comment:    Home: [x] Single level   []  Two level   []  Split level     []  Apartment:     Entrance:  Stairs: 3 Hand rails 1,    Inside: Stairs:   Hand rails:    Bathroom: [] Bath tub   [x] Tub/Shower combo   []  Shower stall    Location:      DME: [x] W/W   [x] Gerald Sneed   [] Rollator   []  W/C   [] Ivis Hartman   [] Shower Chair [] BSC  [] Dressing  AE  [] Other:      Previous Functional Status:  Pt reports that he was independent with adl self care and homemaking tasks without the use of device. Pain:   Start of session: 0/10  Description:    Location:    End of session: 0/10  Action: [x] No Action Necessary    [] Patient reports pain at acceptable level for treatment  [] Nursing notified    [] Other      Objective:  Observation:  Pt supine in bed    Orientation: Oriented to  [x] Person   [x] Place  [x]Time    Vision:   [x]  WFL   [] Impaired  Comments:      Hearing:  [x] WFL   [] Impaired  Comments:    Sensation:   [x] WFL   []  Impaired   Comments:      Cognition:   [x] WFL   [] Impaired  Comments:    Communication:   [x] WFL   [] Impaired  Comments:    Range of Motion:  R UE AROM/PROM: [x]  WFL [] Impaired  Comments:   L UE AROM/PROM:  [x]  WFL [] Impaired  Comments:     Strength:   R UE Strength: []1    [] 2   [] 2+   [] 3   [] 3+   [] 4   [] 4+  [x] 5  Comments:   L UE Strength:  []1    [] 2   [] 2+   [] 3   [] 3+  [] 4   [] 4+   [x] 5  Comments:     Quality of Movement:  [x] Good   [] Fair   [] Poor     Coordination:  Gross motor: [x] WFL   [] Impaired   Fine motor: [x] WFL   [] Impaired     Functional Mobility:  Toilet Transfers:  N/T  Bed Transfer:  independent  Sit to stand: independent  Bed to Chair:  independent    Seated Balance:      Static: [x] Good  [] Fair   [] Poor   Dynamic: [x]  Good  [] Fair   [] Poor     Standing Balance:     Static: [x] Good   [] Fair  [] Poor   Dynamic: [x] Good   [] Fair   [] Poor     Functional Endurance: [x] Good  [] Fair  [] Poor     ADLs  Feeding:   Positive hand to mouth  UE Dressing:  independent  LB Dressing:  independent  Bathing:  N/T  Toileting: N/T  Grooming: independent      Patient Goal: To return to home  Discussed and agreed upon: [x] Yes   [] No         Comments:     Assessment/Discharge Disposition:Pt exhibits baseline ADL function at this time.   No further OT indicated at this

## 2017-11-07 NOTE — CARE COORDINATION
PATIENT WILL LIKELY BE DISCHARGED TODAY. SPOKE WITH PATIENT AND HE WILL CALL FOR A RIDE ONCE PAPERWORK IS READY. FARHANA GARCIA AWARE.   Electronically signed by Margaret Mcarthur RN on 11/7/17 at 2:18 PM

## 2017-11-08 ENCOUNTER — CARE COORDINATION (OUTPATIENT)
Dept: CASE MANAGEMENT | Age: 67
End: 2017-11-08

## 2017-11-08 LAB
BLOOD CULTURE, ROUTINE: NORMAL
CULTURE, BLOOD 2: NORMAL

## 2017-11-08 NOTE — DISCHARGE SUMMARY
Discharge Summary Note  Patient ID:  Glory Hamilton  [id number]  89 y.o.  1950    Admit date: 11/3/2017    Discharge date and time: 11/7/2017  5:57 PM     Admitting Physician: Blanca Ross MD     Discharge Physician: Ashvin Chambers MD    Admission Diagnoses:   COPD (chronic obstructive pulmonary disease) [facility]) [J44.9]    Discharge Diagnoses: 1. Acute hypoxic respiratory failure secondary to a COPD                                  Exacerbation  2. Chronic systolic and diastolic HF  3. Hemoptysis and hemetemesis  4. CAD   5  A Fib    6. Hemorrhagic disorder  d/t extrinsic circulating    7.Acute on chronic diastolic (congestive) heart failure     Admission Condition: poor    Discharged Condition: good    Hospital Course: 53-year-old male admitted with acute on chronic systolic and diastolic heart failure and and acute hypoxic respiratory failure due to an acute COPD exacerbation. The patient responded well to diuretic therapy and bronchodilator therapy. The patient's amiodarone which had previously been on hold was resumed at 200 mg daily. Fibrosis was noted per CT scan of the chest. This may warrant a reevaluation per cardiology in the days ahead. An upper endoscopy was performed due to concerns for a n upper gastrointestinal bleed. This was within normal limits. The patient has a history of paroxysmal atrial fibrillation. Given the concern for gastrointestinal bleed anticoagulation via Coumadin is presently on hold. Given the patient's clinical improvement he was discharged home in stable condition With tapering doses of steroids and treatment for COPD per pulmonary and cardiology recommendations. Follow-up appointments were scheduled with cardiology and pulmonary medicine.     Consults: cardiology, pulmonary/intensive care and GI    Significant Diagnostic Studies:   Radiology:  XR CHEST STANDARD (2 VW)   Final Result       STABLE CHEST.                       XR Chest Portable   Final Result       CTA Chest W small areas of honeycombing developing suggesting developing interstitial pulmonary fibrosis. There is areas of atelectasis, scarring seen at the medial aspect of the left middle lobe and at the base of the left upper lobe left lingular region. There is a small area of dependent atelectasis right lower lobe. No pleural effusions. No pneumothoraces. There is mediastinal adenopathy. The cardiac silhouette is enlarged. There is a small hiatal hernia. Osseous structures are intact.       1.  IMPRESSION: 2.  NO EVIDENCE OF CENTRAL OR SEGMENTAL PULMONARY EMBOLISM. 3.  THERE IS SEVERE BULLOUS EMPHYSEMATOUS DISEASE WITH FINDINGS SUGGESTING DEVELOPING INTERSTITIAL PULMONARY FIBROSIS 4.  MEDIASTINAL ADENOPATHY. 5.  SMALL HIATAL HERNIA All CT scans at this facility use dose modulation, iterative reconstruction, and/or weight based dosing when appropriate to reduce radiation dose to as low as reasonably achievable.      Xr Chest Portable     Result Date: 11/3/2017  EXAMINATION: XR CHEST PORTABLE CLINICAL HISTORY:  shortness of breath COMPARISONS: May 11, 2017 FINDINGS: Single AP portable view the chest is obtained on November 3, 2017 at 1446 hours. There are multiple sternotomy sutures. There is an aortic valve prosthesis. There is moderate cardiomegaly. There is mild vascular congestion. There is a trace of perihilar edema. There is no large effusion. The mediastinum is not widened or shifted. The calcified aorta is not dilated. There is an unremarkable ICD/permanent pacemaker in place in left. CONCLUSION: CARDIOMEGALY.  MILD CONGESTIVE HEART FAILURE FINDINGS.             Treatments: diuretics, bronchodilator,    Discharge Exam:  As per progress note on the day of discharge    Disposition: home    Patient Instructions:     Discharge Medications:   Cleaster Alba Medication Instructions ZOS:[id number]    Printed on:11/07/17 7398   Medication Information                      albuterol (PROAIR HFA) 108 (90 BASE) MCG/ACT

## 2017-11-13 ENCOUNTER — OFFICE VISIT (OUTPATIENT)
Dept: FAMILY MEDICINE CLINIC | Age: 67
End: 2017-11-13

## 2017-11-13 ENCOUNTER — HOSPITAL ENCOUNTER (OUTPATIENT)
Dept: PHARMACY | Age: 67
Setting detail: THERAPIES SERIES
Discharge: HOME | End: 2017-11-13
Payer: MEDICARE

## 2017-11-13 ENCOUNTER — CARE COORDINATOR VISIT (OUTPATIENT)
Dept: FAMILY MEDICINE CLINIC | Age: 67
End: 2017-11-13

## 2017-11-13 VITALS
WEIGHT: 227 LBS | HEIGHT: 71 IN | OXYGEN SATURATION: 98 % | SYSTOLIC BLOOD PRESSURE: 116 MMHG | BODY MASS INDEX: 31.78 KG/M2 | DIASTOLIC BLOOD PRESSURE: 70 MMHG | RESPIRATION RATE: 19 BRPM | HEART RATE: 95 BPM

## 2017-11-13 DIAGNOSIS — J44.1 COPD EXACERBATION (HCC): Primary | ICD-10-CM

## 2017-11-13 DIAGNOSIS — I50.23 ACUTE ON CHRONIC SYSTOLIC HEART FAILURE (HCC): ICD-10-CM

## 2017-11-13 DIAGNOSIS — I48.91 ATRIAL FIBRILLATION, UNSPECIFIED TYPE (HCC): ICD-10-CM

## 2017-11-13 LAB
INR BLD: 3.3
PROTIME: 39.5 SECONDS

## 2017-11-13 PROCEDURE — 99211 OFF/OP EST MAY X REQ PHY/QHP: CPT | Performed by: PHARMACIST

## 2017-11-13 PROCEDURE — 4004F PT TOBACCO SCREEN RCVD TLK: CPT | Performed by: FAMILY MEDICINE

## 2017-11-13 PROCEDURE — G8427 DOCREV CUR MEDS BY ELIG CLIN: HCPCS | Performed by: FAMILY MEDICINE

## 2017-11-13 PROCEDURE — 99213 OFFICE O/P EST LOW 20 MIN: CPT | Performed by: FAMILY MEDICINE

## 2017-11-13 PROCEDURE — 1123F ACP DISCUSS/DSCN MKR DOCD: CPT | Performed by: FAMILY MEDICINE

## 2017-11-13 PROCEDURE — G8598 ASA/ANTIPLAT THER USED: HCPCS | Performed by: FAMILY MEDICINE

## 2017-11-13 PROCEDURE — 3017F COLORECTAL CA SCREEN DOC REV: CPT | Performed by: FAMILY MEDICINE

## 2017-11-13 PROCEDURE — G8417 CALC BMI ABV UP PARAM F/U: HCPCS | Performed by: FAMILY MEDICINE

## 2017-11-13 PROCEDURE — 1111F DSCHRG MED/CURRENT MED MERGE: CPT | Performed by: FAMILY MEDICINE

## 2017-11-13 PROCEDURE — G8926 SPIRO NO PERF OR DOC: HCPCS | Performed by: FAMILY MEDICINE

## 2017-11-13 PROCEDURE — 3023F SPIROM DOC REV: CPT | Performed by: FAMILY MEDICINE

## 2017-11-13 PROCEDURE — 85610 PROTHROMBIN TIME: CPT | Performed by: PHARMACIST

## 2017-11-13 PROCEDURE — 4040F PNEUMOC VAC/ADMIN/RCVD: CPT | Performed by: FAMILY MEDICINE

## 2017-11-13 PROCEDURE — G8484 FLU IMMUNIZE NO ADMIN: HCPCS | Performed by: FAMILY MEDICINE

## 2017-11-13 RX ORDER — ALBUTEROL SULFATE 2.5 MG/3ML
2.5 SOLUTION RESPIRATORY (INHALATION) EVERY 6 HOURS PRN
Qty: 120 EACH | Refills: 0 | Status: SHIPPED | OUTPATIENT
Start: 2017-11-13 | End: 2017-12-20 | Stop reason: SDUPTHER

## 2017-11-13 RX ORDER — NITROGLYCERIN 0.4 MG/1
0.4 TABLET SUBLINGUAL EVERY 5 MIN PRN
Qty: 25 TABLET | Refills: 0 | Status: SHIPPED | OUTPATIENT
Start: 2017-11-13 | End: 2019-11-26 | Stop reason: SDUPTHER

## 2017-11-13 ASSESSMENT — ENCOUNTER SYMPTOMS
CHEST TIGHTNESS: 1
ABDOMINAL PAIN: 0

## 2017-11-13 NOTE — PROGRESS NOTES
INSULIN SYRINGE ULTRAFINE 31G X 5/16\" 0.5 ML MISC USE TWICE A  each 3    albuterol (PROVENTIL) (2.5 MG/3ML) 0.083% nebulizer solution Take 2.5 mg by nebulization every 4 hours as needed for Wheezing      gabapentin (NEURONTIN) 300 MG capsule Take 1 capsule by mouth 3 times daily 90 capsule 3    nitroGLYCERIN (NITROSTAT) 0.4 MG SL tablet Place 0.4 mg under the tongue every 5 minutes as needed for Chest pain      clopidogrel (PLAVIX) 75 MG tablet Take 75 mg by mouth daily      atorvastatin (LIPITOR) 80 MG tablet Take 80 mg by mouth daily      amiodarone (CORDARONE) 200 MG tablet Take 200 mg by mouth daily      DIGITEK 125 MCG tablet TAKE 1 TABLET DAILY 90 tablet 3    furosemide (LASIX) 40 MG tablet TAKE 1 TABLET DAILY 90 tablet 1    albuterol (PROAIR HFA) 108 (90 BASE) MCG/ACT inhaler Inhale 2 puffs into the lungs every 4 hours as needed for Wheezing 3 Inhaler 0     No current facility-administered medications for this visit. Family History   Problem Relation Age of Onset   Norman Kim Cancer Brother      Past Medical History:   Diagnosis Date    CAD (coronary artery disease)     [facility] [facility]     COPD [facility]chronic obstructive [facility] disease) [facility]     Defibrillator activation     Diabetes mellitus with insulin therapy ([facility] [facility]     Generalized and unspecified atherosclerosis     Gout     Hyperlipidemia     Hypertension     Pain in joint, multiple sites     Status post angioplasty     TIA (transient ischemic attack)     Tobacco abuse     Type II or unspecified type diabetes mellitus without mention of complication, not stated as uncontrolled      Objective:   /70 (Site: Right Arm, Position: Sitting, Cuff Size: Small Adult)   Pulse 95   Resp 19   Ht 5' 11\" (1.803 m)   Wt 227 lb (103 kg)   SpO2 98% Comment: using 2L continuous Oxygen  BMI 31.66 kg/m²     Physical Exam  Heent: T.Ms normal Nares patent but mucosa swollen Mild pharyngeal injection. No                Exudate.   No lip

## 2017-11-16 ASSESSMENT — ENCOUNTER SYMPTOMS: DYSPNEA ASSOCIATED WITH: EXERTION

## 2017-11-16 NOTE — CARE COORDINATION
How would you rate their home environment in terms of safety and stability (including domestic violence, insecure housing, neighbor harassment)?:  Consistently safe, supportive, stable, no identified problems   How do daily activities impact on the patient's well-being? (include current or anticipated unemployment, work, caregiving, access to transportation or other):  Some general dissatisfaction but no concern   How would you rate their social network (family, work, friends)?:  Restricted participation with some degree of social isolation   How would you rate their financial resources (including ability to afford all required medical care)?:  Financially secure, some resource challenges   How wells does the patient now understand their health and well-being (symptoms, signs or risk factors) and what they need to do to manage their health?:  Reasonable to good understanding and already engages in managing health or is willing to undertake better management   How well do you think your patient can engage in healthcare discussions? (Barriers include language, deafness, aphasia, alcohol or drug problems, learning difficulties, concentration): Adequate communication, with or without minor barriers   Do other services need to be involved to help this patient?:  Other care/services in place and adequate   Are current services involved with this patient well-coordinated? (Include coordination with other services you are now recommendation): All required care/services in place and well-coordinated   Suggested Interventions and Community Resources   Zone Management Tools: In Process         Set up/Review Goals, Set up/Review an Education Plan              Prior to Admission medications    Medication Sig Start Date End Date Taking?  Authorizing Provider   albuterol (PROVENTIL) (2.5 MG/3ML) 0.083% nebulizer solution Take 3 mLs by nebulization every 6 hours as needed for Wheezing 11/13/17   Mandeep Ramos MD nitroGLYCERIN (NITROSTAT) 0.4 MG SL tablet Place 1 tablet under the tongue every 5 minutes as needed for Chest pain 11/13/17   Estiven Gómez MD   predniSONE (DELTASONE) 10 MG tablet 4 tablets daily for 4 days, 3 tablets daily for 4 days, 2 tablets daily for 4 days, then 1 tablet daily for 4 days 11/7/17   Magdy Keller MD   doxycycline hyclate (VIBRA-TABS) 100 MG tablet Take 1 tablet by mouth 2 times daily for 10 days 11/7/17 11/17/17  Magdy Keller MD   metoprolol succinate (TOPROL XL) 25 MG extended release tablet Take 1 tablet by mouth daily 11/7/17   Alberto Kenney MD   pantoprazole (PROTONIX) 40 MG tablet Take 1 tablet by mouth every morning (before breakfast) 11/8/17   Alberto Kenney MD   warfarin (COUMADIN) 5 MG tablet Take as directed by [facility] AT [place] [facility]. Quantity equals 90 day supply.  11/7/17   Alberto Kenney MD   COLCRYS 0.6 MG tablet TAKE 1 TABLET DAILY 10/24/17   Estiven Gómez MD   allopurinol (ZYLOPRIM) 100 MG tablet TAKE 1 TABLET DAILY 10/9/17   Estiven Gómez MD   ENTRESTO 24-26 MG per tablet TAKE 1 TABLET BY MOUTH EVERY MORNING 2/16/17   Historical Provider, MD   insulin 70-30 (NOVOLIN 70/30 RELION) (70-30) 100 UNIT per ML injection vial 55 units twice a day 3/28/17   Miki Snowden MD   BD INSULIN SYRINGE ULTRAFINE 31G X 5/16\" 0.5 ML MISC USE TWICE A DAY 2/3/17   Miki Snowden MD   gabapentin (NEURONTIN) 300 MG capsule Take 1 capsule by mouth 3 times daily 1/30/17   Miki Snowden MD   clopidogrel (PLAVIX) 75 MG tablet Take 75 mg by mouth daily    Historical Provider, MD   atorvastatin (LIPITOR) 80 MG tablet Take 80 mg by mouth daily    Historical Provider, MD   amiodarone (CORDARONE) 200 MG tablet Take 200 mg by mouth daily    Historical Provider, MD   DIGITEK 125 MCG tablet TAKE 1 TABLET DAILY 8/2/15   Arie Chaves MD   furosemide (LASIX) 40 MG tablet TAKE 1 TABLET DAILY 7/27/15   Arie Chaves MD   albuterol (PROAIR HFA) 108 (90 BASE) MCG/ACT inhaler

## 2017-11-16 NOTE — CARE COORDINATION
Ambulatory Care Coordination Note  11/16/2017  CM Risk Score: Alexander Dozier Mortality Risk Score: 8.64    ACC: Diana Khan, RN    Summary Note: Called pt to follow up on progress and review additional pt education. No answer and not able to leave a message. Will mail out pt education materials on DM II. A1c, BG goal ranges, Plate Method, and S/S to report. Care Coordination Interventions    Program Enrollment:  Complex Care  Referral from Primary Care Provider:  No  Suggested Interventions and Community Resources  Zone Management Tools: In Process (Comment: CHF, COPD)           Prior to Admission medications    Medication Sig Start Date End Date Taking? Authorizing Provider   albuterol (PROVENTIL) (2.5 MG/3ML) 0.083% nebulizer solution Take 3 mLs by nebulization every 6 hours as needed for Wheezing 11/13/17   Lorne Pal MD   nitroGLYCERIN (NITROSTAT) 0.4 MG SL tablet Place 1 tablet under the tongue every 5 minutes as needed for Chest pain 11/13/17   Lorne Pal MD   predniSONE (DELTASONE) 10 MG tablet 4 tablets daily for 4 days, 3 tablets daily for 4 days, 2 tablets daily for 4 days, then 1 tablet daily for 4 days 11/7/17   Marianela Ray MD   doxycycline hyclate (VIBRA-TABS) 100 MG tablet Take 1 tablet by mouth 2 times daily for 10 days 11/7/17 11/17/17  Marianela Ray MD   metoprolol succinate (TOPROL XL) 25 MG extended release tablet Take 1 tablet by mouth daily 11/7/17   Nahun Loaiza MD   pantoprazole (PROTONIX) 40 MG tablet Take 1 tablet by mouth every morning (before breakfast) 11/8/17   Nahun Loaiza MD   warfarin (COUMADIN) 5 MG tablet Take as directed by [facility] AT [place] [facility]. Quantity equals 90 day supply.  11/7/17   Nahun Loaiza MD   COLCRYS 0.6 MG tablet TAKE 1 TABLET DAILY 10/24/17   Lorne Pal MD   allopurinol (ZYLOPRIM) 100 MG tablet TAKE 1 TABLET DAILY 10/9/17   Lorne Pal MD   ENTRESTO 24-26 MG per tablet TAKE 1 TABLET BY MOUTH EVERY

## 2017-11-21 ENCOUNTER — HOSPITAL ENCOUNTER (OUTPATIENT)
Dept: PHARMACY | Age: 67
Setting detail: THERAPIES SERIES
Discharge: HOME | End: 2017-11-21
Payer: MEDICARE

## 2017-11-21 DIAGNOSIS — I48.91 ATRIAL FIBRILLATION, UNSPECIFIED TYPE (HCC): ICD-10-CM

## 2017-11-21 LAB
INR BLD: 3.9
PROTIME: 46.3 SECONDS

## 2017-11-21 PROCEDURE — 85610 PROTHROMBIN TIME: CPT

## 2017-11-21 PROCEDURE — 99211 OFF/OP EST MAY X REQ PHY/QHP: CPT

## 2017-11-22 ENCOUNTER — HOSPITAL ENCOUNTER (OUTPATIENT)
Dept: CARDIOLOGY | Age: 67
Discharge: HOME | End: 2017-11-22
Payer: MEDICARE

## 2017-11-22 PROCEDURE — 93283 PRGRMG EVAL IMPLANTABLE DFB: CPT

## 2017-11-22 PROCEDURE — 93290 INTERROG DEV EVAL ICPMS IP: CPT

## 2017-11-27 ENCOUNTER — OFFICE VISIT (OUTPATIENT)
Dept: PULMONOLOGY | Age: 67
End: 2017-11-27

## 2017-11-27 VITALS
BODY MASS INDEX: 31.36 KG/M2 | OXYGEN SATURATION: 96 % | HEART RATE: 91 BPM | SYSTOLIC BLOOD PRESSURE: 120 MMHG | WEIGHT: 224 LBS | TEMPERATURE: 96.1 F | HEIGHT: 71 IN | DIASTOLIC BLOOD PRESSURE: 64 MMHG

## 2017-11-27 DIAGNOSIS — J44.9 CHRONIC OBSTRUCTIVE PULMONARY DISEASE, UNSPECIFIED COPD TYPE (HCC): Primary | ICD-10-CM

## 2017-11-27 DIAGNOSIS — R04.2 HEMOPTYSIS: ICD-10-CM

## 2017-11-27 DIAGNOSIS — R09.02 HYPOXEMIA: ICD-10-CM

## 2017-11-27 PROCEDURE — G8484 FLU IMMUNIZE NO ADMIN: HCPCS | Performed by: INTERNAL MEDICINE

## 2017-11-27 PROCEDURE — 1123F ACP DISCUSS/DSCN MKR DOCD: CPT | Performed by: INTERNAL MEDICINE

## 2017-11-27 PROCEDURE — 3023F SPIROM DOC REV: CPT | Performed by: INTERNAL MEDICINE

## 2017-11-27 PROCEDURE — 1036F TOBACCO NON-USER: CPT | Performed by: INTERNAL MEDICINE

## 2017-11-27 PROCEDURE — 4040F PNEUMOC VAC/ADMIN/RCVD: CPT | Performed by: INTERNAL MEDICINE

## 2017-11-27 PROCEDURE — 99214 OFFICE O/P EST MOD 30 MIN: CPT | Performed by: INTERNAL MEDICINE

## 2017-11-27 PROCEDURE — G8926 SPIRO NO PERF OR DOC: HCPCS | Performed by: INTERNAL MEDICINE

## 2017-11-27 PROCEDURE — G8427 DOCREV CUR MEDS BY ELIG CLIN: HCPCS | Performed by: INTERNAL MEDICINE

## 2017-11-27 PROCEDURE — 1111F DSCHRG MED/CURRENT MED MERGE: CPT | Performed by: INTERNAL MEDICINE

## 2017-11-27 PROCEDURE — G8417 CALC BMI ABV UP PARAM F/U: HCPCS | Performed by: INTERNAL MEDICINE

## 2017-11-27 PROCEDURE — G8598 ASA/ANTIPLAT THER USED: HCPCS | Performed by: INTERNAL MEDICINE

## 2017-11-27 PROCEDURE — 3017F COLORECTAL CA SCREEN DOC REV: CPT | Performed by: INTERNAL MEDICINE

## 2017-11-27 ASSESSMENT — ENCOUNTER SYMPTOMS
VOMITING: 0
SORE THROAT: 0
CHEST TIGHTNESS: 0
NAUSEA: 0
SHORTNESS OF BREATH: 1
COUGH: 1
DIARRHEA: 0
WHEEZING: 1
VOICE CHANGE: 0
RHINORRHEA: 0
EYE ITCHING: 0
ABDOMINAL PAIN: 0

## 2017-11-27 NOTE — PROGRESS NOTES
Subjective:     Ash Macario is a 74 y.o. male who complains today of:     Chief Complaint   Patient presents with    Follow-Up from [address] [place] [address]       HPI  Patient was in hospital for COPD exacerbation and hemoptysis. He has no hemoptysis once plavix and warfarin on hold. He is back on anticoagulation. Patient is off antibiotics and prednisone. He is on proair HFA and nebulizer with duoneb QID  C/o shortness of breath , worse with exertion. C/o Wheezing mostly on left side,  Cough with  White. Sputum  No Hemoptysis  No Chest pain or pleuritic pain  No Fever or chills. C/o Rhinorrhea and postnasal drip.         Allergies:  Penicillins  Past Medical History:   Diagnosis Date    CAD (coronary artery disease)     Cardiomy[facility] ([facility] [address]     COPD (chronic obstructive pulmonary disease) ([facility] [address] [facility]     Defibrillator activation     Diabetes mellitus with insulin therapy ([facility] [address] [facility]     Generalized and unspecified atherosclerosis     Gout     Hyperlipidemia     Hypertension     Pain in joint, multiple sites     Status post angioplasty     TIA (transient ischemic attack)     Tobacco abuse     Type II or unspecified type diabetes mellitus without mention of complication, not stated as uncontrolled      Past Surgical History:   Procedure Laterality Date    CARDIAC CATHETERIZATION      CORONARY ANGIOPLASTY  4/29/11    Dr Korin Guy CORONARY ARTERY BYPASS GRAFT      EYE SURGERY Bilateral     Cataracts     OTHER SURGICAL HISTORY      difibrillator     [place] EGD TRANSORAL BIOPSY SINGLE/MULTIPLE N/A 11/5/2017    EGD BIOPSY performed by Selwyn Eisenmenger, MD at [facility] [place] [place] OR     Family History   Problem Relation Age of Onset    Cancer Brother     Diabetes Mother     Heart Disease Father     Emphysema Father      Social History     Social History    Marital status: Single     Spouse name: N/A    Number of children: N/A    Years of education: N/A     Occupational History    Not on file. Social History Main Topics    Smoking status: Former Smoker     Packs/day: 1.50     Years: 25.00     Quit date: 1/1/2012    Smokeless tobacco: Never Used    Alcohol use No    Drug use: No    Sexual activity: Not on file     Other Topics Concern    Not on file     Social History Narrative    No narrative on file         Review of Systems   Constitutional: Negative for chills, diaphoresis, fatigue and fever. HENT: Negative for congestion, mouth sores, nosebleeds, postnasal drip, rhinorrhea, sneezing, sore throat and voice change. Eyes: Negative for itching and visual disturbance. Respiratory: Positive for cough, shortness of breath and wheezing. Negative for chest tightness. Cardiovascular: Negative. Negative for chest pain, palpitations and leg swelling. Gastrointestinal: Negative for abdominal pain, diarrhea, nausea and vomiting. Genitourinary: Negative for difficulty urinating and hematuria. Musculoskeletal: Negative for arthralgias, joint swelling and myalgias. Skin: Negative for rash. Allergic/Immunologic: Negative for environmental allergies. Neurological: Negative for dizziness, tremors, weakness and headaches. Psychiatric/Behavioral: Negative for behavioral problems and sleep disturbance. Objective:     Vitals:    11/27/17 0943   BP: 120/64   Site: Right Arm   Position: Sitting   Cuff Size: Large Adult   Pulse: 91   Temp: 96.1 °F (35.6 °C)   TempSrc: Temporal   SpO2: 96%   Weight: 224 lb (101.6 kg)   Height: 5' 11\" (1.803 m)         Physical Exam   Constitutional: He is oriented to person, place, and time. He appears well-developed and well-nourished. HENT:   Head: Normocephalic and atraumatic. Nose: Nose normal.   Mouth/Throat: Oropharynx is clear and moist.   Eyes: Conjunctivae and EOM are normal. Pupils are equal, round, and reactive to light. Neck: No JVD present.  No tracheal deviation SYRINGE ULTRAFINE 31G X 5/16\" 0.5 ML MISC USE TWICE A  each 3    gabapentin (NEURONTIN) 300 MG capsule Take 1 capsule by mouth 3 times daily 90 capsule 3    clopidogrel (PLAVIX) 75 MG tablet Take 75 mg by mouth daily      atorvastatin (LIPITOR) 80 MG tablet Take 80 mg by mouth daily      amiodarone (CORDARONE) 200 MG tablet Take 200 mg by mouth daily      DIGITEK 125 MCG tablet TAKE 1 TABLET DAILY 90 tablet 3    furosemide (LASIX) 40 MG tablet TAKE 1 TABLET DAILY 90 tablet 1    albuterol (PROAIR HFA) 108 (90 BASE) MCG/ACT inhaler Inhale 2 puffs into the lungs every 4 hours as needed for Wheezing 3 Inhaler 0     No current facility-administered medications for this visit. Results for orders placed during the hospital encounter of 11/03/17   XR CHEST STANDARD (2 VW)    Narrative XR CHEST STANDARD TWO VW: 11/4/2017    CLINICAL HISTORY:  COPD . Shortness of breath. COMPARISON: Portable chest and chest CTA 11/3/2017. Upright PA and lateral radiographs of the chest were obtained. FINDINGS:     Hyperinflation and coarsening of the bronchovascular structures consistent with COPD appears unchanged. The heart remains moderately enlarged with postoperative change from previous mitral valve replacement, CABG, and a left subclavian dual-lead pacer/defibrillator. There are no developing infiltrates, pleural effusions, pneumothorax, or displaced fractures identified. Impression STABLE CHEST.           ]  Results for orders placed during the hospital encounter of 11/03/17   XR Chest Portable    Narrative EXAMINATION: XR CHEST PORTABLE    CLINICAL HISTORY:  shortness of breath     COMPARISONS: May 11, 2017    FINDINGS: Single AP portable view the chest is obtained on November 3, 2017 at 1446 hours. There are multiple sternotomy sutures. There is an aortic valve prosthesis. There is moderate cardiomegaly. There is mild vascular congestion. There is a trace of   perihilar edema.  There is no large effusion. The mediastinum is not widened or shifted. The calcified aorta is not dilated. There is an unremarkable ICD/permanent pacemaker in place in left. CONCLUSION: CARDIOMEGALY. MILD CONGESTIVE HEART FAILURE FINDINGS. Assessment/Plan:     1. Chronic obstructive pulmonary disease, unspecified COPD type ([address]  C/o shortness of breath , worse with exertion. C/o Wheezing mostly on left side, c/o Cough with  White. Sputum. He will continue O2 2 lit with sleep and prn. h2e is on nebulizer with duoneb QID and proair HFA prn    2. Hemoptysis  Resolved , likely due to anticoagulant and antiplatelet agent. CXR show COPD no active disease. 3. Hypoxemia  He is on O2  2 lit with sleep and activity during daytime, continue same, Spo2 96%       Return in about 4 months (around 3/27/2018) for COPD, hypoxia on O2.       Betty Caceres MD

## 2017-12-01 ENCOUNTER — HOSPITAL ENCOUNTER (OUTPATIENT)
Dept: PULMONOLOGY | Age: 67
Discharge: HOME | End: 2017-12-01
Payer: MEDICARE

## 2017-12-01 DIAGNOSIS — E11.9 TYPE 2 DIABETES MELLITUS WITHOUT COMPLICATION, UNSPECIFIED LONG TERM INSULIN USE STATUS: ICD-10-CM

## 2017-12-01 PROCEDURE — 94060 EVALUATION OF WHEEZING: CPT

## 2017-12-01 PROCEDURE — 6360000002 HC RX W HCPCS: Performed by: INTERNAL MEDICINE

## 2017-12-01 PROCEDURE — 94726 PLETHYSMOGRAPHY LUNG VOLUMES: CPT

## 2017-12-01 PROCEDURE — 94729 DIFFUSING CAPACITY: CPT

## 2017-12-01 RX ORDER — ALBUTEROL SULFATE 2.5 MG/3ML
2.5 SOLUTION RESPIRATORY (INHALATION) ONCE
Status: COMPLETED | OUTPATIENT
Start: 2017-12-01 | End: 2017-12-01

## 2017-12-01 RX ORDER — WARFARIN SODIUM 5 MG/1
TABLET ORAL
Qty: 120 TABLET | Refills: 1 | Status: SHIPPED | OUTPATIENT
Start: 2017-12-01 | End: 2018-07-18 | Stop reason: SDUPTHER

## 2017-12-01 RX ADMIN — ALBUTEROL SULFATE 2.5 MG: 2.5 SOLUTION RESPIRATORY (INHALATION) at 14:23

## 2017-12-05 ENCOUNTER — HOSPITAL ENCOUNTER (OUTPATIENT)
Dept: PHARMACY | Age: 67
Setting detail: THERAPIES SERIES
Discharge: HOME | End: 2017-12-05
Payer: MEDICARE

## 2017-12-05 DIAGNOSIS — I48.91 ATRIAL FIBRILLATION, UNSPECIFIED TYPE (HCC): ICD-10-CM

## 2017-12-05 LAB
INR BLD: 1.1
PROTIME: 13.1 SECONDS

## 2017-12-05 PROCEDURE — 85610 PROTHROMBIN TIME: CPT | Performed by: PHARMACIST

## 2017-12-05 PROCEDURE — 99211 OFF/OP EST MAY X REQ PHY/QHP: CPT | Performed by: PHARMACIST

## 2017-12-06 PROCEDURE — 94726 PLETHYSMOGRAPHY LUNG VOLUMES: CPT | Performed by: INTERNAL MEDICINE

## 2017-12-06 PROCEDURE — 94729 DIFFUSING CAPACITY: CPT | Performed by: INTERNAL MEDICINE

## 2017-12-06 PROCEDURE — 94060 EVALUATION OF WHEEZING: CPT | Performed by: INTERNAL MEDICINE

## 2017-12-06 NOTE — PROCEDURES
[address]                       [address], [address] [facility]                                PULMONARY FUNCTION    PATIENT NAME: Shanthi Curtis                    :        1950  MED REC NO:   [id number]                            ROOM:  ACCOUNT NO:   [de-identified]                           ADMIT DATE: 2017  PROVIDER:     Reyes Arista, MD    DATE OF PROCEDURE:  2017    PFTs were done on this 61-year-old patient who is 5 feet 11 inches, weighs  220 pounds with 36-year smoking history of 2 packs a day, quit 4 years ago,  presenting with dyspnea and cough as well as wheezing. Spirometry showed a forced vital capacity of 3.04 L, which is 74% of  predicted. FEV1 was 1.52 L, which is 48% of predicted. FEV1/FVC ratio was  severely reduced to 50%. FEF 25-75% was severely reduced to 0.53 L per  second which is 18% of predicted. Significant improvement was noted in  FEV1 and in terminal airflow after bronchodilator therapy. MVV was  severely reduced. Lung volumes done by body plethysmography showed moderate reduction in  total lung capacity to 4.86 L, which is 67% of predicted. Residual volume  was 1.9 L, which is 78% of predicted. RV/TLC ratio was within normal  limits at 39%. Diffusion capacity was severely reduced to 9.89, which is 39% of predicted. Airway resistance and airway conductance were within normal limits. OVERALL IMPRESSION:  This study is consistent with mixed disorder of severe  obstructive ventilatory impairment associated with moderate restrictive  abnormality as well as severe diffusion impairment.         Ketan Kuhn MD    D: 2017 8:28:06       T: 2017 9:40:42     [email or web address]  Job#: [id number]     Doc#: [id number]    CC:

## 2017-12-11 ENCOUNTER — OFFICE VISIT (OUTPATIENT)
Dept: FAMILY MEDICINE CLINIC | Age: 67
End: 2017-12-11

## 2017-12-11 VITALS
BODY MASS INDEX: 31.5 KG/M2 | TEMPERATURE: 97.6 F | WEIGHT: 225 LBS | SYSTOLIC BLOOD PRESSURE: 138 MMHG | DIASTOLIC BLOOD PRESSURE: 80 MMHG | HEART RATE: 86 BPM | RESPIRATION RATE: 16 BRPM | HEIGHT: 71 IN

## 2017-12-11 DIAGNOSIS — I50.23 ACUTE ON CHRONIC SYSTOLIC HEART FAILURE (HCC): Primary | ICD-10-CM

## 2017-12-11 DIAGNOSIS — Z12.5 PROSTATE CANCER SCREENING: ICD-10-CM

## 2017-12-11 PROCEDURE — G8427 DOCREV CUR MEDS BY ELIG CLIN: HCPCS | Performed by: FAMILY MEDICINE

## 2017-12-11 PROCEDURE — G8484 FLU IMMUNIZE NO ADMIN: HCPCS | Performed by: FAMILY MEDICINE

## 2017-12-11 PROCEDURE — 1036F TOBACCO NON-USER: CPT | Performed by: FAMILY MEDICINE

## 2017-12-11 PROCEDURE — 99213 OFFICE O/P EST LOW 20 MIN: CPT | Performed by: FAMILY MEDICINE

## 2017-12-11 PROCEDURE — G8598 ASA/ANTIPLAT THER USED: HCPCS | Performed by: FAMILY MEDICINE

## 2017-12-11 PROCEDURE — 1123F ACP DISCUSS/DSCN MKR DOCD: CPT | Performed by: FAMILY MEDICINE

## 2017-12-11 PROCEDURE — G8417 CALC BMI ABV UP PARAM F/U: HCPCS | Performed by: FAMILY MEDICINE

## 2017-12-11 PROCEDURE — 3017F COLORECTAL CA SCREEN DOC REV: CPT | Performed by: FAMILY MEDICINE

## 2017-12-11 PROCEDURE — 4040F PNEUMOC VAC/ADMIN/RCVD: CPT | Performed by: FAMILY MEDICINE

## 2017-12-11 RX ORDER — ALBUTEROL SULFATE 2.5 MG/3ML
2.5 SOLUTION RESPIRATORY (INHALATION) EVERY 6 HOURS PRN
Qty: 120 EACH | Refills: 0 | Status: CANCELLED | OUTPATIENT
Start: 2017-12-11

## 2017-12-11 ASSESSMENT — PATIENT HEALTH QUESTIONNAIRE - PHQ9
2. FEELING DOWN, DEPRESSED OR HOPELESS: 0
1. LITTLE INTEREST OR PLEASURE IN DOING THINGS: 0
SUM OF ALL RESPONSES TO PHQ9 QUESTIONS 1 & 2: 0
SUM OF ALL RESPONSES TO PHQ QUESTIONS 1-9: 0

## 2017-12-11 NOTE — PROGRESS NOTES
Subjective:      Patient ID: Kemal Barrera is a 74 y.o. male    HPI  Here in follow up for heart failure. Admits weight himself daily and has not been missing any medication doses. Did see pulmonary and has been seeing cardiology as well. No respiratory problems  Review of Systems  Reviewed allergy, medical, social, surgical, family and med list changes and updated   Files  Social History     Social History    Marital status: Single     Spouse name: N/A    Number of children: N/A    Years of education: N/A     Social History Main Topics    Smoking status: Former Smoker     Packs/day: 1.50     Years: 25.00     Quit date: 1/1/2012    Smokeless tobacco: Never Used    Alcohol use No    Drug use: No    Sexual activity: Not Asked     Other Topics Concern    None     Social History Narrative    None     Current Outpatient Prescriptions   Medication Sig Dispense Refill    warfarin (COUMADIN) 5 MG tablet Take as directed by [facility] AT [place] [facility]. Quantity equals 90 day supply.  120 tablet 1    Oxygen Concentrator       albuterol (PROVENTIL) (2.5 MG/3ML) 0.083% nebulizer solution Take 3 mLs by nebulization every 6 hours as needed for Wheezing 120 each 0    nitroGLYCERIN (NITROSTAT) 0.4 MG SL tablet Place 1 tablet under the tongue every 5 minutes as needed for Chest pain 25 tablet 0    predniSONE (DELTASONE) 10 MG tablet 4 tablets daily for 4 days, 3 tablets daily for 4 days, 2 tablets daily for 4 days, then 1 tablet daily for 4 days 40 tablet 0    metoprolol succinate (TOPROL XL) 25 MG extended release tablet Take 1 tablet by mouth daily 30 tablet 3    pantoprazole (PROTONIX) 40 MG tablet Take 1 tablet by mouth every morning (before breakfast) 30 tablet 3    COLCRYS 0.6 MG tablet TAKE 1 TABLET DAILY 90 tablet 0    allopurinol (ZYLOPRIM) 100 MG tablet TAKE 1 TABLET DAILY 90 tablet 0    ENTRESTO 24-26 MG per tablet TAKE 1 TABLET BY MOUTH EVERY MORNING  11    insulin 70-30 (NOVOLIN 70/30 RELION) (70-30) 100 UNIT per ML injection vial 55 units twice a day 8 vial 3    BD INSULIN SYRINGE ULTRAFINE 31G X 5/16\" 0.5 ML MISC USE TWICE A  each 3    gabapentin (NEURONTIN) 300 MG capsule Take 1 capsule by mouth 3 times daily 90 capsule 3    clopidogrel (PLAVIX) 75 MG tablet Take 75 mg by mouth daily      atorvastatin (LIPITOR) 80 MG tablet Take 80 mg by mouth daily      amiodarone (CORDARONE) 200 MG tablet Take 200 mg by mouth daily      DIGITEK 125 MCG tablet TAKE 1 TABLET DAILY 90 tablet 3    furosemide (LASIX) 40 MG tablet TAKE 1 TABLET DAILY 90 tablet 1    albuterol (PROAIR HFA) 108 (90 BASE) MCG/ACT inhaler Inhale 2 puffs into the lungs every 4 hours as needed for Wheezing 3 Inhaler 0     No current facility-administered medications for this visit. Family History   Problem Relation Age of Onset   Newton Jess Cancer Brother     Diabetes Mother     Heart Disease Father     Emphysema Father      Past Medical History:   Diagnosis Date    CAD (coronary artery disease)     Cardiomyopathy ([address]     COPD (chronic obstructive pulmonary disease) ([address]     Defibrillator activation     Diabetes mellitus with insulin therapy ([address]     Generalized and unspecified atherosclerosis     Gout     Hyperlipidemia     Hypertension     Pain in joint, multiple sites     Status post angioplasty     TIA (transient ischemic attack)     Tobacco abuse     Type II or unspecified type diabetes mellitus without mention of complication, not stated as uncontrolled      Objective:   /80   Pulse 86   Temp 97.6 °F (36.4 °C) (Tympanic)   Resp 16   Ht 5' 11\" (1.803 m)   Wt 225 lb (102.1 kg)   BMI 31.38 kg/m²     Physical Exam  .    Lungs:clear and equal breath sounds. No wheezes or rales. Heart:rate reg. No murmur. No gallops   Extremities: trace edema of both legs   Gen: In no acute distress    Assessment:         1. Acute on chronic systolic heart failure ([address]     2.  Prostate

## 2017-12-12 ENCOUNTER — HOSPITAL ENCOUNTER (OUTPATIENT)
Dept: PHARMACY | Age: 67
Setting detail: THERAPIES SERIES
Discharge: HOME | End: 2017-12-12
Payer: MEDICARE

## 2017-12-12 DIAGNOSIS — I48.91 ATRIAL FIBRILLATION, UNSPECIFIED TYPE (HCC): ICD-10-CM

## 2017-12-12 LAB
INR BLD: 1.9
PROTIME: 22.7 SECONDS

## 2017-12-12 PROCEDURE — 85610 PROTHROMBIN TIME: CPT | Performed by: PHARMACIST

## 2017-12-12 PROCEDURE — 99211 OFF/OP EST MAY X REQ PHY/QHP: CPT | Performed by: PHARMACIST

## 2017-12-12 NOTE — PROGRESS NOTES
Mr. Ash Macario is a 74 y.o. y/o male with history of Afib who presents today for anticoagulation monitoring and adjustment. INR 1.9 is sl subtherapeutic for this patient (goal range 2-3) and is reflective of 42.5 mg TWD. Most of this dose was front loaded 6 and 7 days ago due to previous 1.1 INR  Patient verifies current dosing regimen, patient able to verbally recall dose  Patient reports no missed doses since last INR   Patient denies s/sx clotting and/or stroke  Patient denies hematuria, epistaxis, rectal bleeding  Patient denies changes in diet, alcohol, or tobacco use  Reviewed medication list and drug allergies with patient, updated any medication additions or modifications accordingly  Patient is no longer on prednisone according to medication profile.   Patient also denies any pending medical or dental procedures scheduled at this time  Patient was instructed to start 40mg TWD and RTC 2 weeks

## 2017-12-13 ENCOUNTER — CARE COORDINATION (OUTPATIENT)
Dept: CARE COORDINATION | Age: 67
End: 2017-12-13

## 2017-12-20 DIAGNOSIS — J44.9 CHRONIC OBSTRUCTIVE PULMONARY DISEASE, UNSPECIFIED COPD TYPE (HCC): Primary | ICD-10-CM

## 2017-12-20 RX ORDER — ALBUTEROL SULFATE 2.5 MG/3ML
2.5 SOLUTION RESPIRATORY (INHALATION) EVERY 6 HOURS PRN
Qty: 120 EACH | Refills: 5 | Status: SHIPPED | OUTPATIENT
Start: 2017-12-20 | End: 2018-02-08 | Stop reason: SDUPTHER

## 2017-12-27 RX ORDER — HUMAN INSULIN 100 [USP'U]/ML
INJECTION, SUSPENSION SUBCUTANEOUS
Qty: 80 ML | Refills: 3 | Status: SHIPPED | OUTPATIENT
Start: 2017-12-27 | End: 2018-12-13 | Stop reason: SDUPTHER

## 2017-12-29 ENCOUNTER — TELEPHONE (OUTPATIENT)
Dept: PHARMACY | Age: 67
End: 2017-12-29

## 2017-12-29 DIAGNOSIS — I48.91 ATRIAL FIBRILLATION, UNSPECIFIED TYPE (HCC): ICD-10-CM

## 2018-01-04 ENCOUNTER — HOSPITAL ENCOUNTER (OUTPATIENT)
Dept: PHARMACY | Age: 68
Setting detail: THERAPIES SERIES
Discharge: HOME | End: 2018-01-04
Payer: MEDICARE

## 2018-01-04 DIAGNOSIS — I48.91 ATRIAL FIBRILLATION, UNSPECIFIED TYPE (HCC): ICD-10-CM

## 2018-01-04 LAB
INR BLD: 1.9
PROTIME: 22.3 SECONDS

## 2018-01-04 PROCEDURE — 99211 OFF/OP EST MAY X REQ PHY/QHP: CPT | Performed by: PHARMACIST

## 2018-01-04 PROCEDURE — 85610 PROTHROMBIN TIME: CPT | Performed by: PHARMACIST

## 2018-01-04 NOTE — PROGRESS NOTES
Mr. Toñito Price is a 74 y.o. y/o male with history of Afib who presents today for anticoagulation monitoring and adjustment.   INR 1.9 is sl subtherapeutic for this patient (goal range 2-3) and is reflective of 40 mg TWD  Patient verifies current dosing regimen, patient able to verbally recall dose  Patient reports no  missed doses since last INR   Patient denies s/sx clotting and/or stroke  Patient denies hematuria, epistaxis, rectal bleeding  Patient denies changes in diet, alcohol, or tobacco use  Reviewed medication list and drug allergies with patient, updated any medication additions or modifications accordingly  Patient also denies any pending medical or dental procedures scheduled at this time  Patient was instructed to continue 40mg TWD and RTC 2 weeks

## 2018-01-08 RX ORDER — ALLOPURINOL 100 MG/1
TABLET ORAL
Qty: 90 TABLET | Refills: 0 | Status: SHIPPED | OUTPATIENT
Start: 2018-01-08 | End: 2018-04-07 | Stop reason: SDUPTHER

## 2018-01-11 ENCOUNTER — CARE COORDINATION (OUTPATIENT)
Dept: CARE COORDINATION | Age: 68
End: 2018-01-11

## 2018-01-11 NOTE — CARE COORDINATION
Ambulatory Care Coordination Note  1/11/2018  CM Risk Score: 11  Tasia Mortality Risk Score: 8.64    ACC: Mellisa Coronel RN    Summary Note: Called pt to follow up on progress, discuss any new or ongoing concerns, and to reinforce/ provide pt education. Pt states he has been doing well. He feels he is back on track for his BG he is usually running 110's-150's. He Denies any SOB or increase in weight. Discussed at length S/S to report and importance and benefits of self monitoring. Pt verbalized understanding. He is agreeable to receiving additional pt education materials and follow up phone call. Care Coordination Interventions    Program Enrollment:  Complex Care  Referral from Primary Care Provider:  No  Suggested Interventions and Community Resources  Zone Management Tools: In Process (Comment: CHF, COPD)         Goals Addressed     None          Prior to Admission medications    Medication Sig Start Date End Date Taking? Authorizing Provider   allopurinol (ZYLOPRIM) 100 MG tablet TAKE 1 TABLET DAILY 1/8/18   Bhavik Sue MD   NOVOLIN 70/30 (70-30) 100 UNIT/ML injection vial INJECT 55 UNITS TWICE A DAY 12/27/17   Bridgette Delaney MD   albuterol (PROVENTIL) (2.5 MG/3ML) 0.083% nebulizer solution Take 3 mLs by nebulization every 6 hours as needed for Wheezing 12/20/17   Marilyn Echavarria MD   warfarin (COUMADIN) 5 MG tablet Take as directed by [facility] AT [place] [facility]. Quantity equals 90 day supply.  12/1/17   Bhavik Sue MD   Oxygen Concentrator     Historical Provider, MD   nitroGLYCERIN (NITROSTAT) 0.4 MG SL tablet Place 1 tablet under the tongue every 5 minutes as needed for Chest pain 11/13/17   Bhavik Sue MD   predniSONE (DELTASONE) 10 MG tablet 4 tablets daily for 4 days, 3 tablets daily for 4 days, 2 tablets daily for 4 days, then 1 tablet daily for 4 days 11/7/17   Nanda Fuentes MD   metoprolol succinate (TOPROL XL) 25 MG extended release tablet Take 1

## 2018-01-18 ENCOUNTER — HOSPITAL ENCOUNTER (OUTPATIENT)
Dept: PHARMACY | Age: 68
Setting detail: THERAPIES SERIES
Discharge: HOME | End: 2018-01-18
Payer: MEDICARE

## 2018-01-18 DIAGNOSIS — I48.91 ATRIAL FIBRILLATION, UNSPECIFIED TYPE (HCC): ICD-10-CM

## 2018-01-18 LAB
INR BLD: 1.9
PROTIME: 22.6 SECONDS

## 2018-01-18 PROCEDURE — 99211 OFF/OP EST MAY X REQ PHY/QHP: CPT | Performed by: PHARMACIST

## 2018-01-18 PROCEDURE — 85610 PROTHROMBIN TIME: CPT | Performed by: PHARMACIST

## 2018-01-24 RX ORDER — COLCHICINE 0.6 MG/1
TABLET, FILM COATED ORAL
Qty: 90 TABLET | Refills: 0 | Status: SHIPPED | OUTPATIENT
Start: 2018-01-24 | End: 2018-04-22 | Stop reason: SDUPTHER

## 2018-01-25 ENCOUNTER — CARE COORDINATION (OUTPATIENT)
Dept: FAMILY MEDICINE CLINIC | Age: 68
End: 2018-01-25

## 2018-02-01 ENCOUNTER — HOSPITAL ENCOUNTER (OUTPATIENT)
Dept: PHARMACY | Age: 68
Setting detail: THERAPIES SERIES
Discharge: HOME | End: 2018-02-01
Payer: MEDICARE

## 2018-02-01 DIAGNOSIS — I48.91 ATRIAL FIBRILLATION, UNSPECIFIED TYPE (HCC): ICD-10-CM

## 2018-02-01 LAB
INR BLD: 2.7
PROTIME: 32.4 SECONDS

## 2018-02-01 PROCEDURE — 85610 PROTHROMBIN TIME: CPT | Performed by: PHARMACIST

## 2018-02-01 PROCEDURE — 99211 OFF/OP EST MAY X REQ PHY/QHP: CPT | Performed by: PHARMACIST

## 2018-02-08 ENCOUNTER — CARE COORDINATION (OUTPATIENT)
Dept: FAMILY MEDICINE CLINIC | Age: 68
End: 2018-02-08

## 2018-02-08 DIAGNOSIS — J44.9 CHRONIC OBSTRUCTIVE PULMONARY DISEASE, UNSPECIFIED COPD TYPE (HCC): ICD-10-CM

## 2018-02-08 ASSESSMENT — ENCOUNTER SYMPTOMS: DYSPNEA ASSOCIATED WITH: EXERTION

## 2018-02-08 NOTE — LETTER
Toñito Price  [address] [facility]      Dear Manny Carrera,    I hope this letter finds you doing well! I am sending you a few patient education handouts that I felt would be useful to you you. I have highlighted or made note of a few things that I would like to emphasize or at least stress the importance of. I hope you find these helpful. Please look them over and let me know if you have any questions or would like to meet with me in the future to discuss. You can contact me at any of the numbers listed below.             Sincerely,      King Sierra RN, [address] PCP   [address], [address]  Direct line: [phone]  Office: [phone]

## 2018-02-12 ENCOUNTER — OFFICE VISIT (OUTPATIENT)
Dept: UROLOGY | Age: 68
End: 2018-02-12
Payer: MEDICARE

## 2018-02-12 VITALS
SYSTOLIC BLOOD PRESSURE: 110 MMHG | HEART RATE: 94 BPM | HEIGHT: 71 IN | BODY MASS INDEX: 31.36 KG/M2 | DIASTOLIC BLOOD PRESSURE: 72 MMHG | WEIGHT: 224 LBS

## 2018-02-12 DIAGNOSIS — R30.0 DYSURIA: Primary | ICD-10-CM

## 2018-02-12 LAB
BILIRUBIN, POC: ABNORMAL
BLOOD URINE, POC: ABNORMAL
CLARITY, POC: CLEAR
COLOR, POC: YELLOW
GLUCOSE URINE, POC: ABNORMAL
KETONES, POC: ABNORMAL
LEUKOCYTE EST, POC: ABNORMAL
NITRITE, POC: ABNORMAL
PH, POC: 7
PROTEIN, POC: ABNORMAL
SPECIFIC GRAVITY, POC: 1.01
UROBILINOGEN, POC: 2

## 2018-02-12 PROCEDURE — 99212 OFFICE O/P EST SF 10 MIN: CPT | Performed by: UROLOGY

## 2018-02-12 PROCEDURE — 81003 URINALYSIS AUTO W/O SCOPE: CPT | Performed by: UROLOGY

## 2018-02-13 RX ORDER — ALBUTEROL SULFATE 2.5 MG/3ML
2.5 SOLUTION RESPIRATORY (INHALATION) EVERY 6 HOURS PRN
Qty: 120 EACH | Refills: 5 | Status: SHIPPED | OUTPATIENT
Start: 2018-02-13

## 2018-02-14 LAB — URINE CULTURE, ROUTINE: NORMAL

## 2018-02-20 ENCOUNTER — HOSPITAL ENCOUNTER (OUTPATIENT)
Dept: PHARMACY | Age: 68
Setting detail: THERAPIES SERIES
Discharge: HOME | End: 2018-02-20
Payer: MEDICARE

## 2018-02-20 DIAGNOSIS — I48.91 ATRIAL FIBRILLATION, UNSPECIFIED TYPE (HCC): ICD-10-CM

## 2018-02-20 LAB
INR BLD: 1.8
PROTIME: 21.8 SECONDS

## 2018-02-20 PROCEDURE — 85610 PROTHROMBIN TIME: CPT | Performed by: PHARMACIST

## 2018-02-20 PROCEDURE — 99211 OFF/OP EST MAY X REQ PHY/QHP: CPT | Performed by: PHARMACIST

## 2018-02-20 NOTE — PROGRESS NOTES
Mr. Chelsi Guzman is a 74 y.o. y/o male with history of Afib who presents today for anticoagulation monitoring and adjustment. INR 1.8 is subtherapeutic for this patient (goal range 2-3) and is reflective of 42.5mg TWD mg TWD  Patient verifies current dosing regimen, patient able to verbally recall dose  Patient reports no  missed doses since last INR   Patient denies s/sx clotting and/or stroke  Patient denies hematuria, epistaxis, rectal bleeding  Patient denies changes in diet, alcohol, or tobacco use  Reviewed medication list and drug allergies with patient, updated any medication additions or modifications accordingly. Pt has upcoming urology (says will get [facility] ok for this) and defib procedures , not scheduled yet.   Patient also denies any pending dental procedures scheduled at this time  Patient was instructed to take 10mg today only then usual 42.5mg TWD and RTC 2 weeks

## 2018-02-21 ENCOUNTER — HOSPITAL ENCOUNTER (OUTPATIENT)
Dept: CARDIOLOGY | Age: 68
Discharge: HOME | End: 2018-02-21
Payer: MEDICARE

## 2018-02-21 PROCEDURE — 93290 INTERROG DEV EVAL ICPMS IP: CPT

## 2018-02-21 PROCEDURE — 93283 PRGRMG EVAL IMPLANTABLE DFB: CPT

## 2018-03-06 ENCOUNTER — HOSPITAL ENCOUNTER (OUTPATIENT)
Dept: PHARMACY | Age: 68
Setting detail: THERAPIES SERIES
Discharge: HOME | End: 2018-03-06
Payer: MEDICARE

## 2018-03-06 DIAGNOSIS — I48.91 ATRIAL FIBRILLATION, UNSPECIFIED TYPE (HCC): ICD-10-CM

## 2018-03-06 LAB
INR BLD: 3.1
PROTIME: 37.2 SECONDS

## 2018-03-06 PROCEDURE — 85610 PROTHROMBIN TIME: CPT

## 2018-03-06 PROCEDURE — 99211 OFF/OP EST MAY X REQ PHY/QHP: CPT

## 2018-03-08 DIAGNOSIS — Z12.5 PROSTATE CANCER SCREENING: ICD-10-CM

## 2018-03-08 LAB — PROSTATE SPECIFIC ANTIGEN: 1.05 NG/ML (ref 0–5.4)

## 2018-03-15 ENCOUNTER — OFFICE VISIT (OUTPATIENT)
Dept: FAMILY MEDICINE CLINIC | Age: 68
End: 2018-03-15
Payer: MEDICARE

## 2018-03-15 VITALS
HEIGHT: 71 IN | RESPIRATION RATE: 18 BRPM | TEMPERATURE: 98 F | DIASTOLIC BLOOD PRESSURE: 88 MMHG | OXYGEN SATURATION: 96 % | HEART RATE: 78 BPM | WEIGHT: 223 LBS | SYSTOLIC BLOOD PRESSURE: 136 MMHG | BODY MASS INDEX: 31.22 KG/M2

## 2018-03-15 DIAGNOSIS — I48.92 ATRIAL FLUTTER, UNSPECIFIED TYPE (HCC): ICD-10-CM

## 2018-03-15 DIAGNOSIS — J44.1 COPD EXACERBATION (HCC): Primary | ICD-10-CM

## 2018-03-15 DIAGNOSIS — I50.1 LEFT HEART FAILURE (HCC): ICD-10-CM

## 2018-03-15 DIAGNOSIS — I48.91 ATRIAL FIBRILLATION, UNSPECIFIED TYPE (HCC): ICD-10-CM

## 2018-03-15 DIAGNOSIS — I50.23 ACUTE ON CHRONIC SYSTOLIC HEART FAILURE (HCC): ICD-10-CM

## 2018-03-15 DIAGNOSIS — J44.9 CHRONIC OBSTRUCTIVE PULMONARY DISEASE, UNSPECIFIED COPD TYPE (HCC): ICD-10-CM

## 2018-03-15 DIAGNOSIS — I50.42 CHRONIC COMBINED SYSTOLIC AND DIASTOLIC HEART FAILURE (HCC): ICD-10-CM

## 2018-03-15 DIAGNOSIS — I50.42 HEART FAILURE, SYSTOLIC AND DIASTOLIC, CHRONIC (HCC): ICD-10-CM

## 2018-03-15 PROCEDURE — 99213 OFFICE O/P EST LOW 20 MIN: CPT | Performed by: FAMILY MEDICINE

## 2018-03-15 RX ORDER — DOXYCYCLINE HYCLATE 100 MG
100 TABLET ORAL 2 TIMES DAILY
Qty: 20 TABLET | Refills: 0 | Status: SHIPPED | OUTPATIENT
Start: 2018-03-15 | End: 2018-03-25

## 2018-03-15 RX ORDER — PREDNISONE 10 MG/1
TABLET ORAL
Qty: 30 TABLET | Refills: 0 | Status: ON HOLD | OUTPATIENT
Start: 2018-03-15 | End: 2018-05-15

## 2018-03-15 ASSESSMENT — ENCOUNTER SYMPTOMS
ABDOMINAL PAIN: 0
TROUBLE SWALLOWING: 0

## 2018-03-15 NOTE — PROGRESS NOTES
Subjective:      Patient ID: Gabriel Miranda is a 74 y.o. male    HPI  Here in follow up from recent psa but had just seen urology last month. Does have follow up planned with urology later in year   Still having problems with respiratory status and seeing [facility] and pulmonary. Did get cold about a week ago. No fever. No sore throat but bringing up discolored phlegm currently. On 2 liters of oxygen  Review of Systems   Constitutional: Positive for activity change. Negative for unexpected weight change. HENT: Negative for ear pain and trouble swallowing. Cardiovascular: Negative for chest pain and palpitations. Gastrointestinal: Negative for abdominal pain. Skin: Negative for rash. Neurological: Negative for dizziness. Reviewed allergy, medical, social, surgical, family and med list changes and updated   Files--reviewed psa which was normal   Social History     Social History    Marital status: Single     Spouse name: N/A    Number of children: N/A    Years of education: N/A     Social History Main Topics    Smoking status: Former Smoker     Packs/day: 1.50     Years: 25.00     Quit date: 1/1/2012    Smokeless tobacco: Never Used    Alcohol use No    Drug use: No    Sexual activity: Not Asked     Other Topics Concern    None     Social History Narrative    None     Current Outpatient Prescriptions   Medication Sig Dispense Refill    albuterol (PROVENTIL) (2.5 MG/3ML) 0.083% nebulizer solution Take 3 mLs by nebulization every 6 hours as needed for Wheezing 120 each 5    COLCRYS 0.6 MG tablet TAKE 1 TABLET DAILY (NEEDS FOLLOW UP PRIOR TO ANY MORE REFILLS) 90 tablet 0    allopurinol (ZYLOPRIM) 100 MG tablet TAKE 1 TABLET DAILY 90 tablet 0    NOVOLIN 70/30 (70-30) 100 UNIT/ML injection vial INJECT 55 UNITS TWICE A DAY 80 mL 3    warfarin (COUMADIN) 5 MG tablet Take as directed by [facility] [place] AT [place] [facility]. Quantity equals 90 day supply.  120 tablet 1    Oxygen 136/88   Pulse 78   Temp 98 °F (36.7 °C) (Tympanic)   Resp 18   Ht 5' 11\" (1.803 m)   Wt 223 lb (101.2 kg)   BMI 31.10 kg/m²     Physical Exam  Heent: T.Ms normal Nares patent but mucosa swollen Mild pharyngeal injection. No                Exudate. No lip or tongue lesions. Neck: Minimal anter cervical adenopathy. No masses or thyroid asymmetry  Lungs:  End expiratory wheeze bilaterally    Heart: Rate reg  1/6 syst murmur along llsb  Gen;:  No acute distress  Extremities;  Trace edema of both legs   Assessment:      1. COPD exacerbation ([address]     2. Chronic obstructive pulmonary disease, unspecified COPD type ([address]     3. Heart failure, systolic and diastolic, chronic ([address]     4. Atrial fibrillation, unspecified type ([address]     5. Atrial flutter, unspecified type ([address]     6. Uncontrolled type 2 diabetes mellitus with complication, with long-term current use of insulin ([address]     7. Left heart failure ([address]     8. Acute on chronic systolic heart failure ([address]     9.  Chronic combined systolic and diastolic heart failure (HCC)        Plan:    continue current medications   A fib and flutter followed by cardiology and contemplating defbrillator  f/u with pulmonary as already planned for copd   f/u with endocrine for diabetes which is poorly controlled   Orders Placed This Encounter   Medications    predniSONE (DELTASONE) 10 MG tablet     Simg daily x 4d, 30mg daily x 3d, 20mg x 2d, 10mg x 1d, then d/c     Dispense:  30 tablet     Refill:  0    doxycycline hyclate (VIBRA-TABS) 100 MG tablet     Sig: Take 1 tablet by mouth 2 times daily for 10 days     Dispense:  20 tablet     Refill:  0   use albuterol prn  F/u in 10 days if needed other wise in 4 months

## 2018-03-15 NOTE — PROGRESS NOTES
Subjective:      Patient ID: Franco Acuña is a 74 y.o. male. HPI  Treatment Adherence:   Medication compliance:  {Desc; compliance:5303::\"compliant most of the time\"}  Diet compliance:  {Desc; compliance:5303::\"compliant most of the time\"}  Weight trend: {INCREASING/DECREASING/STABLE:72425}  Current exercise: {EXERCISE MZSA:321222682}  Barriers: {Barriers to success:49331}    Hypertension:  Home blood pressure monitoring: {NO/YES:8608481902::\"No\"}. He {is/is not:9024} adherent to a low sodium diet. Patient {denies/complains:11643} {Symptoms of Hypertension, Denies:23960}. Antihypertensive medication side effects: {Hypertension med side effects:5728::\"no medication side effects noted\"}. Use of agents associated with hypertension: {bp agents assoc with hypertension:511::\"none\"}. Sodium (mEq/L)   Date Value   11/07/2017 135    BUN (mg/dL)   Date Value   11/07/2017 28 (H)    Glucose (mg/dL)   Date Value   11/07/2017 161 (H)   05/09/2012 128 (H)      Potassium (mEq/L)   Date Value   11/07/2017 3.9    CREATININE (mg/dL)   Date Value   11/07/2017 0.98         Hyperlipidemia:  No new myalgias or GI upset on {RP HYPERLIPIDEMIA MEDS:15715}.      Lab Results   Component Value Date    CHOL 102 08/23/2016    TRIG 64 08/23/2016    HDL 45 08/23/2016    LDLCALC 44 08/23/2016     Lab Results   Component Value Date    ALT 14 11/03/2017    AST 17 11/03/2017          Review of Systems    Objective:   Physical Exam    Assessment / Plan:

## 2018-03-20 ENCOUNTER — APPOINTMENT (OUTPATIENT)
Dept: PHARMACY | Age: 68
End: 2018-03-20
Payer: MEDICARE

## 2018-03-20 ENCOUNTER — TELEPHONE (OUTPATIENT)
Dept: PHARMACY | Age: 68
End: 2018-03-20

## 2018-03-20 DIAGNOSIS — I48.91 ATRIAL FIBRILLATION, UNSPECIFIED TYPE (HCC): ICD-10-CM

## 2018-03-21 ENCOUNTER — CARE COORDINATION (OUTPATIENT)
Dept: FAMILY MEDICINE CLINIC | Age: 68
End: 2018-03-21

## 2018-03-22 ENCOUNTER — TELEPHONE (OUTPATIENT)
Dept: INFECTIOUS DISEASES | Age: 68
End: 2018-03-22

## 2018-03-22 NOTE — TELEPHONE ENCOUNTER
Dr Anny Hatch staff called to Referr a patient with Re-Current UTI. Patient needs a clearance from ID, they are requesting an Appt with Dr Kathy Gruber. Asked for a current U/a and C&S, meds and allergies  Please fax over for Dr Kathy Gruber to Review. Will assist with an appt once Dr Kathy Gruber approves. Staff verbalized understanding.

## 2018-03-23 ENCOUNTER — OFFICE VISIT (OUTPATIENT)
Dept: INFECTIOUS DISEASES | Age: 68
End: 2018-03-23
Payer: MEDICARE

## 2018-03-23 ENCOUNTER — HOSPITAL ENCOUNTER (OUTPATIENT)
Dept: GENERAL RADIOLOGY | Age: 68
Discharge: HOME | End: 2018-03-25
Payer: MEDICARE

## 2018-03-23 VITALS
RESPIRATION RATE: 18 BRPM | TEMPERATURE: 98.2 F | SYSTOLIC BLOOD PRESSURE: 118 MMHG | BODY MASS INDEX: 30.38 KG/M2 | DIASTOLIC BLOOD PRESSURE: 79 MMHG | HEART RATE: 80 BPM | HEIGHT: 71 IN | OXYGEN SATURATION: 97 % | WEIGHT: 217 LBS

## 2018-03-23 DIAGNOSIS — J44.0 ACUTE BRONCHITIS WITH CHRONIC OBSTRUCTIVE PULMONARY DISEASE (COPD) (HCC): ICD-10-CM

## 2018-03-23 DIAGNOSIS — J20.9 ACUTE BRONCHITIS WITH CHRONIC OBSTRUCTIVE PULMONARY DISEASE (COPD) (HCC): ICD-10-CM

## 2018-03-23 DIAGNOSIS — N48.1 BALANITIS: Primary | ICD-10-CM

## 2018-03-23 DIAGNOSIS — N48.1 BALANITIS: ICD-10-CM

## 2018-03-23 LAB
AMORPHOUS: ABNORMAL
BACTERIA: ABNORMAL /HPF
BILIRUBIN URINE: NEGATIVE
BLOOD, URINE: NEGATIVE
CASTS: ABNORMAL /LPF
CLARITY: CLEAR
COLOR: ABNORMAL
EPITHELIAL CELLS, UA: ABNORMAL /HPF
GLUCOSE URINE: NEGATIVE MG/DL
KETONES, URINE: ABNORMAL MG/DL
LEUKOCYTE ESTERASE, URINE: ABNORMAL
NITRITE, URINE: NEGATIVE
PH UA: 6 (ref 5–9)
PROTEIN UA: 100 MG/DL
RBC UA: ABNORMAL /HPF (ref 0–2)
SPECIFIC GRAVITY UA: 1.02 (ref 1–1.03)
URINE REFLEX TO CULTURE: YES
UROBILINOGEN, URINE: 2 E.U./DL
WBC UA: ABNORMAL /HPF (ref 0–5)

## 2018-03-23 PROCEDURE — 99203 OFFICE O/P NEW LOW 30 MIN: CPT | Performed by: INTERNAL MEDICINE

## 2018-03-23 PROCEDURE — 71046 X-RAY EXAM CHEST 2 VIEWS: CPT

## 2018-03-23 NOTE — PROGRESS NOTES
Examination chaperoned by Wing Dumont.
 No narrative on file     Family History   Problem Relation Age of Onset    Cancer Brother     Diabetes Mother     Heart Disease Father     Emphysema Father      Allergies   Allergen Reactions    Penicillins Anaphylaxis     Current Outpatient Prescriptions on File Prior to Visit   Medication Sig Dispense Refill    predniSONE (DELTASONE) 10 MG tablet 40mg daily x 4d, 30mg daily x 3d, 20mg x 2d, 10mg x 1d, then d/c 30 tablet 0    doxycycline hyclate (VIBRA-TABS) 100 MG tablet Take 1 tablet by mouth 2 times daily for 10 days 20 tablet 0    albuterol (PROVENTIL) (2.5 MG/3ML) 0.083% nebulizer solution Take 3 mLs by nebulization every 6 hours as needed for Wheezing 120 each 5    COLCRYS 0.6 MG tablet TAKE 1 TABLET DAILY (NEEDS FOLLOW UP PRIOR TO ANY MORE REFILLS) 90 tablet 0    allopurinol (ZYLOPRIM) 100 MG tablet TAKE 1 TABLET DAILY 90 tablet 0    NOVOLIN 70/30 (70-30) 100 UNIT/ML injection vial INJECT 55 UNITS TWICE A DAY 80 mL 3    warfarin (COUMADIN) 5 MG tablet Take as directed by [facility] AT [place] [facility]. Quantity equals 90 day supply.  120 tablet 1    Oxygen Concentrator       nitroGLYCERIN (NITROSTAT) 0.4 MG SL tablet Place 1 tablet under the tongue every 5 minutes as needed for Chest pain 25 tablet 0    metoprolol succinate (TOPROL XL) 25 MG extended release tablet Take 1 tablet by mouth daily (Patient taking differently: Take 12.5 mg by mouth daily ) 30 tablet 3    pantoprazole (PROTONIX) 40 MG tablet Take 1 tablet by mouth every morning (before breakfast) 30 tablet 3    ENTRESTO 24-26 MG per tablet 2 times daily TAKE 1 TABLET BY MOUTH EVERY MORNING  11    BD INSULIN SYRINGE ULTRAFINE 31G X 5/16\" 0.5 ML MISC USE TWICE A  each 3    gabapentin (NEURONTIN) 300 MG capsule Take 1 capsule by mouth 3 times daily 90 capsule 3    clopidogrel (PLAVIX) 75 MG tablet Take 75 mg by mouth daily      atorvastatin (LIPITOR) 80 MG tablet Take 80 mg by mouth daily     

## 2018-03-25 LAB — URINE CULTURE, ROUTINE: NORMAL

## 2018-03-27 ENCOUNTER — HOSPITAL ENCOUNTER (OUTPATIENT)
Dept: PHARMACY | Age: 68
Setting detail: THERAPIES SERIES
Discharge: HOME | End: 2018-03-27
Payer: MEDICARE

## 2018-03-27 DIAGNOSIS — I48.91 ATRIAL FIBRILLATION, UNSPECIFIED TYPE (HCC): ICD-10-CM

## 2018-03-27 LAB
INR BLD: 4.8
PROTIME: 57.9 SECONDS

## 2018-03-27 PROCEDURE — 99211 OFF/OP EST MAY X REQ PHY/QHP: CPT

## 2018-03-27 PROCEDURE — 85610 PROTHROMBIN TIME: CPT

## 2018-03-30 ENCOUNTER — OFFICE VISIT (OUTPATIENT)
Dept: PULMONOLOGY | Age: 68
End: 2018-03-30
Payer: MEDICARE

## 2018-03-30 VITALS
SYSTOLIC BLOOD PRESSURE: 120 MMHG | HEIGHT: 71 IN | TEMPERATURE: 97.4 F | HEART RATE: 95 BPM | WEIGHT: 223 LBS | BODY MASS INDEX: 31.22 KG/M2 | DIASTOLIC BLOOD PRESSURE: 72 MMHG | OXYGEN SATURATION: 97 %

## 2018-03-30 DIAGNOSIS — R09.02 HYPOXIA: ICD-10-CM

## 2018-03-30 DIAGNOSIS — J44.9 CHRONIC OBSTRUCTIVE PULMONARY DISEASE, UNSPECIFIED COPD TYPE (HCC): Primary | ICD-10-CM

## 2018-03-30 DIAGNOSIS — I50.1 LEFT HEART FAILURE (HCC): ICD-10-CM

## 2018-03-30 DIAGNOSIS — J61 ASBESTOSIS (HCC): ICD-10-CM

## 2018-03-30 DIAGNOSIS — E66.9 OBESITY (BMI 30-39.9): ICD-10-CM

## 2018-03-30 PROCEDURE — 99214 OFFICE O/P EST MOD 30 MIN: CPT | Performed by: INTERNAL MEDICINE

## 2018-03-30 RX ORDER — BUDESONIDE AND FORMOTEROL FUMARATE DIHYDRATE 160; 4.5 UG/1; UG/1
2 AEROSOL RESPIRATORY (INHALATION) 2 TIMES DAILY
Qty: 3 INHALER | Refills: 3 | Status: SHIPPED | OUTPATIENT
Start: 2018-03-30 | End: 2021-01-04 | Stop reason: SDUPTHER

## 2018-03-31 ASSESSMENT — ENCOUNTER SYMPTOMS
RHINORRHEA: 0
COUGH: 1
VOMITING: 0
CHEST TIGHTNESS: 0
DIARRHEA: 0
SORE THROAT: 0
EYE ITCHING: 0
WHEEZING: 1
SHORTNESS OF BREATH: 1
NAUSEA: 0
ABDOMINAL PAIN: 0
VOICE CHANGE: 0

## 2018-04-02 ENCOUNTER — APPOINTMENT (OUTPATIENT)
Dept: GENERAL RADIOLOGY | Age: 68
End: 2018-04-02
Attending: INTERNAL MEDICINE
Payer: MEDICARE

## 2018-04-02 ENCOUNTER — HOSPITAL ENCOUNTER (OUTPATIENT)
Dept: GENERAL RADIOLOGY | Age: 68
Discharge: HOME | End: 2018-04-04
Payer: MEDICARE

## 2018-04-02 ENCOUNTER — HOSPITAL ENCOUNTER (OUTPATIENT)
Dept: CARDIAC CATH/INVASIVE PROCEDURES | Age: 68
Discharge: HOME | End: 2018-04-03
Attending: INTERNAL MEDICINE | Admitting: INTERNAL MEDICINE
Payer: MEDICARE

## 2018-04-02 PROBLEM — I50.42 CHF (CONGESTIVE HEART FAILURE), NYHA CLASS III, CHRONIC, COMBINED (HCC): Status: ACTIVE | Noted: 2018-04-02

## 2018-04-02 LAB
ALBUMIN SERPL-MCNC: 3.6 G/DL (ref 3.9–4.9)
ALP BLD-CCNC: 105 U/L (ref 35–104)
ALT SERPL-CCNC: 32 U/L (ref 0–41)
ANION GAP SERPL CALCULATED.3IONS-SCNC: 17 MEQ/L (ref 7–13)
AST SERPL-CCNC: 19 U/L (ref 0–40)
BILIRUB SERPL-MCNC: 2.2 MG/DL (ref 0–1.2)
BUN BLDV-MCNC: 15 MG/DL (ref 8–23)
CALCIUM SERPL-MCNC: 8.7 MG/DL (ref 8.6–10.2)
CHLORIDE BLD-SCNC: 102 MEQ/L (ref 98–107)
CO2: 24 MEQ/L (ref 22–29)
CREAT SERPL-MCNC: 1.01 MG/DL (ref 0.7–1.2)
EKG ATRIAL RATE: 83 BPM
EKG P AXIS: 45 DEGREES
EKG P-R INTERVAL: 210 MS
EKG Q-T INTERVAL: 436 MS
EKG QRS DURATION: 154 MS
EKG QTC CALCULATION (BAZETT): 493 MS
EKG R AXIS: -41 DEGREES
EKG T AXIS: 100 DEGREES
EKG VENTRICULAR RATE: 77 BPM
GFR AFRICAN AMERICAN: >60
GFR NON-AFRICAN AMERICAN: >60
GLOBULIN: 2.4 G/DL (ref 2.3–3.5)
GLUCOSE BLD-MCNC: 143 MG/DL (ref 74–109)
GLUCOSE BLD-MCNC: 305 MG/DL (ref 60–115)
HCT VFR BLD CALC: 36.6 % (ref 42–52)
HEMOGLOBIN: 11.8 G/DL (ref 14–18)
INR BLD: 1.2
MCH RBC QN AUTO: 28 PG (ref 27–31.3)
MCHC RBC AUTO-ENTMCNC: 32.3 % (ref 33–37)
MCV RBC AUTO: 86.7 FL (ref 80–100)
PDW BLD-RTO: 16.1 % (ref 11.5–14.5)
PERFORMED ON: ABNORMAL
PLATELET # BLD: 141 K/UL (ref 130–400)
POTASSIUM REFLEX MAGNESIUM: 3.7 MEQ/L (ref 3.5–5.1)
PROTHROMBIN TIME: 12.5 SEC (ref 8.1–13.7)
RBC # BLD: 4.22 M/UL (ref 4.7–6.1)
SODIUM BLD-SCNC: 143 MEQ/L (ref 132–144)
TOTAL PROTEIN: 6 G/DL (ref 6.4–8.1)
WBC # BLD: 5.1 K/UL (ref 4.8–10.8)

## 2018-04-02 PROCEDURE — 6360000002 HC RX W HCPCS

## 2018-04-02 PROCEDURE — 6360000002 HC RX W HCPCS: Performed by: INTERNAL MEDICINE

## 2018-04-02 PROCEDURE — 33224 INSERT PACING LEAD & CONNECT: CPT | Performed by: INTERNAL MEDICINE

## 2018-04-02 PROCEDURE — C1900 LEAD, CORONARY VENOUS: HCPCS

## 2018-04-02 PROCEDURE — 96375 TX/PRO/DX INJ NEW DRUG ADDON: CPT

## 2018-04-02 PROCEDURE — 2580000003 HC RX 258

## 2018-04-02 PROCEDURE — 85027 COMPLETE CBC AUTOMATED: CPT

## 2018-04-02 PROCEDURE — G0378 HOSPITAL OBSERVATION PER HR: HCPCS

## 2018-04-02 PROCEDURE — 93005 ELECTROCARDIOGRAM TRACING: CPT

## 2018-04-02 PROCEDURE — C1725 CATH, TRANSLUMIN NON-LASER: HCPCS

## 2018-04-02 PROCEDURE — 71045 X-RAY EXAM CHEST 1 VIEW: CPT

## 2018-04-02 PROCEDURE — C1882 AICD, OTHER THAN SING/DUAL: HCPCS

## 2018-04-02 PROCEDURE — 6370000000 HC RX 637 (ALT 250 FOR IP): Performed by: INTERNAL MEDICINE

## 2018-04-02 PROCEDURE — 80053 COMPREHEN METABOLIC PANEL: CPT

## 2018-04-02 PROCEDURE — 6370000000 HC RX 637 (ALT 250 FOR IP)

## 2018-04-02 PROCEDURE — 94664 DEMO&/EVAL PT USE INHALER: CPT

## 2018-04-02 PROCEDURE — 94640 AIRWAY INHALATION TREATMENT: CPT

## 2018-04-02 PROCEDURE — C1887 CATHETER, GUIDING: HCPCS

## 2018-04-02 PROCEDURE — 85610 PROTHROMBIN TIME: CPT

## 2018-04-02 PROCEDURE — 71046 X-RAY EXAM CHEST 2 VIEWS: CPT

## 2018-04-02 PROCEDURE — C1892 INTRO/SHEATH,FIXED,PEEL-AWAY: HCPCS

## 2018-04-02 RX ORDER — AMIODARONE HYDROCHLORIDE 200 MG/1
200 TABLET ORAL DAILY
Status: DISCONTINUED | OUTPATIENT
Start: 2018-04-03 | End: 2018-04-03 | Stop reason: HOSPADM

## 2018-04-02 RX ORDER — ACETAMINOPHEN 325 MG/1
650 TABLET ORAL EVERY 4 HOURS PRN
Status: DISCONTINUED | OUTPATIENT
Start: 2018-04-02 | End: 2018-04-03 | Stop reason: HOSPADM

## 2018-04-02 RX ORDER — METOPROLOL SUCCINATE 25 MG/1
25 TABLET, EXTENDED RELEASE ORAL DAILY
Status: DISCONTINUED | OUTPATIENT
Start: 2018-04-03 | End: 2018-04-03 | Stop reason: HOSPADM

## 2018-04-02 RX ORDER — ALBUTEROL SULFATE 2.5 MG/3ML
2.5 SOLUTION RESPIRATORY (INHALATION) EVERY 6 HOURS PRN
Status: DISCONTINUED | OUTPATIENT
Start: 2018-04-02 | End: 2018-04-02

## 2018-04-02 RX ORDER — ALBUTEROL SULFATE 2.5 MG/3ML
2.5 SOLUTION RESPIRATORY (INHALATION) EVERY 4 HOURS PRN
Status: DISCONTINUED | OUTPATIENT
Start: 2018-04-02 | End: 2018-04-03 | Stop reason: HOSPADM

## 2018-04-02 RX ORDER — VANCOMYCIN HYDROCHLORIDE 1 G/200ML
1000 INJECTION, SOLUTION INTRAVENOUS ONCE
Status: COMPLETED | OUTPATIENT
Start: 2018-04-02 | End: 2018-04-02

## 2018-04-02 RX ORDER — GABAPENTIN 300 MG/1
300 CAPSULE ORAL 3 TIMES DAILY
Status: DISCONTINUED | OUTPATIENT
Start: 2018-04-02 | End: 2018-04-03 | Stop reason: HOSPADM

## 2018-04-02 RX ORDER — VANCOMYCIN HYDROCHLORIDE 1 G/200ML
1000 INJECTION, SOLUTION INTRAVENOUS ONCE
Status: COMPLETED | OUTPATIENT
Start: 2018-04-03 | End: 2018-04-03

## 2018-04-02 RX ORDER — ATORVASTATIN CALCIUM 80 MG/1
80 TABLET, FILM COATED ORAL DAILY
Status: DISCONTINUED | OUTPATIENT
Start: 2018-04-03 | End: 2018-04-03 | Stop reason: HOSPADM

## 2018-04-02 RX ORDER — BUDESONIDE AND FORMOTEROL FUMARATE DIHYDRATE 160; 4.5 UG/1; UG/1
2 AEROSOL RESPIRATORY (INHALATION) 2 TIMES DAILY
Status: DISCONTINUED | OUTPATIENT
Start: 2018-04-02 | End: 2018-04-02 | Stop reason: CLARIF

## 2018-04-02 RX ORDER — SODIUM CHLORIDE 450 MG/100ML
INJECTION, SOLUTION INTRAVENOUS CONTINUOUS
Status: DISCONTINUED | OUTPATIENT
Start: 2018-04-02 | End: 2018-04-03 | Stop reason: HOSPADM

## 2018-04-02 RX ORDER — CLOPIDOGREL BISULFATE 75 MG/1
75 TABLET ORAL DAILY
Status: DISCONTINUED | OUTPATIENT
Start: 2018-04-03 | End: 2018-04-03 | Stop reason: HOSPADM

## 2018-04-02 RX ORDER — SODIUM CHLORIDE 0.9 % (FLUSH) 0.9 %
10 SYRINGE (ML) INJECTION EVERY 12 HOURS SCHEDULED
Status: DISCONTINUED | OUTPATIENT
Start: 2018-04-02 | End: 2018-04-03 | Stop reason: HOSPADM

## 2018-04-02 RX ORDER — PREDNISONE 10 MG/1
10 TABLET ORAL DAILY
Status: DISCONTINUED | OUTPATIENT
Start: 2018-04-03 | End: 2018-04-03 | Stop reason: HOSPADM

## 2018-04-02 RX ORDER — FUROSEMIDE 40 MG/1
40 TABLET ORAL DAILY
Status: DISCONTINUED | OUTPATIENT
Start: 2018-04-03 | End: 2018-04-03 | Stop reason: HOSPADM

## 2018-04-02 RX ORDER — NITROGLYCERIN 0.4 MG/1
0.4 TABLET SUBLINGUAL EVERY 5 MIN PRN
Status: DISCONTINUED | OUTPATIENT
Start: 2018-04-02 | End: 2018-04-03 | Stop reason: HOSPADM

## 2018-04-02 RX ORDER — DIGOXIN 125 MCG
125 TABLET ORAL DAILY
Status: DISCONTINUED | OUTPATIENT
Start: 2018-04-03 | End: 2018-04-03 | Stop reason: HOSPADM

## 2018-04-02 RX ORDER — KETOROLAC TROMETHAMINE 30 MG/ML
15 INJECTION, SOLUTION INTRAMUSCULAR; INTRAVENOUS EVERY 6 HOURS
Status: DISCONTINUED | OUTPATIENT
Start: 2018-04-02 | End: 2018-04-03 | Stop reason: HOSPADM

## 2018-04-02 RX ORDER — PANTOPRAZOLE SODIUM 40 MG/1
40 TABLET, DELAYED RELEASE ORAL
Status: DISCONTINUED | OUTPATIENT
Start: 2018-04-03 | End: 2018-04-03 | Stop reason: HOSPADM

## 2018-04-02 RX ORDER — SODIUM CHLORIDE 0.9 % (FLUSH) 0.9 %
10 SYRINGE (ML) INJECTION PRN
Status: DISCONTINUED | OUTPATIENT
Start: 2018-04-02 | End: 2018-04-03 | Stop reason: HOSPADM

## 2018-04-02 RX ORDER — ALBUTEROL SULFATE 90 UG/1
2 AEROSOL, METERED RESPIRATORY (INHALATION) EVERY 4 HOURS PRN
Status: DISCONTINUED | OUTPATIENT
Start: 2018-04-02 | End: 2018-04-03 | Stop reason: HOSPADM

## 2018-04-02 RX ORDER — ONDANSETRON 2 MG/ML
4 INJECTION INTRAMUSCULAR; INTRAVENOUS EVERY 6 HOURS PRN
Status: DISCONTINUED | OUTPATIENT
Start: 2018-04-02 | End: 2018-04-03 | Stop reason: HOSPADM

## 2018-04-02 RX ORDER — WARFARIN SODIUM 5 MG/1
5 TABLET ORAL DAILY
Status: DISCONTINUED | OUTPATIENT
Start: 2018-04-03 | End: 2018-04-03 | Stop reason: HOSPADM

## 2018-04-02 RX ADMIN — SACUBITRIL AND VALSARTAN 1 TABLET: 24; 26 TABLET, FILM COATED ORAL at 21:51

## 2018-04-02 RX ADMIN — Medication 2 PUFF: at 22:26

## 2018-04-02 RX ADMIN — VANCOMYCIN HYDROCHLORIDE 1000 MG: 1 INJECTION, SOLUTION INTRAVENOUS at 15:29

## 2018-04-02 RX ADMIN — KETOROLAC TROMETHAMINE 15 MG: 30 INJECTION, SOLUTION INTRAMUSCULAR at 21:52

## 2018-04-02 RX ADMIN — GABAPENTIN 300 MG: 300 CAPSULE ORAL at 21:51

## 2018-04-02 RX ADMIN — ALBUTEROL SULFATE 2.5 MG: 2.5 SOLUTION RESPIRATORY (INHALATION) at 22:28

## 2018-04-02 ASSESSMENT — PAIN SCALES - GENERAL
PAINLEVEL_OUTOF10: 2
PAINLEVEL_OUTOF10: 2

## 2018-04-02 ASSESSMENT — PAIN DESCRIPTION - PAIN TYPE: TYPE: SURGICAL PAIN

## 2018-04-02 ASSESSMENT — PAIN DESCRIPTION - LOCATION: LOCATION: CHEST

## 2018-04-03 ENCOUNTER — APPOINTMENT (OUTPATIENT)
Dept: GENERAL RADIOLOGY | Age: 68
End: 2018-04-03
Attending: INTERNAL MEDICINE
Payer: MEDICARE

## 2018-04-03 VITALS
TEMPERATURE: 98.1 F | RESPIRATION RATE: 18 BRPM | DIASTOLIC BLOOD PRESSURE: 72 MMHG | WEIGHT: 217 LBS | HEIGHT: 71 IN | SYSTOLIC BLOOD PRESSURE: 123 MMHG | OXYGEN SATURATION: 96 % | BODY MASS INDEX: 30.38 KG/M2 | HEART RATE: 83 BPM

## 2018-04-03 LAB
GLUCOSE BLD-MCNC: 78 MG/DL (ref 60–115)
PERFORMED ON: NORMAL

## 2018-04-03 PROCEDURE — 6370000000 HC RX 637 (ALT 250 FOR IP): Performed by: INTERNAL MEDICINE

## 2018-04-03 PROCEDURE — G0378 HOSPITAL OBSERVATION PER HR: HCPCS

## 2018-04-03 PROCEDURE — 94640 AIRWAY INHALATION TREATMENT: CPT

## 2018-04-03 PROCEDURE — 96365 THER/PROPH/DIAG IV INF INIT: CPT

## 2018-04-03 PROCEDURE — 71046 X-RAY EXAM CHEST 2 VIEWS: CPT

## 2018-04-03 PROCEDURE — 2580000003 HC RX 258: Performed by: INTERNAL MEDICINE

## 2018-04-03 PROCEDURE — 93284 PRGRMG EVAL IMPLANTABLE DFB: CPT

## 2018-04-03 PROCEDURE — 6360000002 HC RX W HCPCS: Performed by: INTERNAL MEDICINE

## 2018-04-03 RX ADMIN — METOPROLOL SUCCINATE 25 MG: 25 TABLET, EXTENDED RELEASE ORAL at 09:16

## 2018-04-03 RX ADMIN — CLOPIDOGREL BISULFATE 75 MG: 75 TABLET ORAL at 09:16

## 2018-04-03 RX ADMIN — FUROSEMIDE 40 MG: 40 TABLET ORAL at 09:16

## 2018-04-03 RX ADMIN — Medication 10 ML: at 09:17

## 2018-04-03 RX ADMIN — AMIODARONE HYDROCHLORIDE 200 MG: 200 TABLET ORAL at 09:16

## 2018-04-03 RX ADMIN — DIGOXIN 125 MCG: 125 TABLET ORAL at 09:16

## 2018-04-03 RX ADMIN — PREDNISONE 10 MG: 10 TABLET ORAL at 09:16

## 2018-04-03 RX ADMIN — INSULIN LISPRO 55 UNITS: 100 INJECTION, SUSPENSION SUBCUTANEOUS at 09:20

## 2018-04-03 RX ADMIN — GABAPENTIN 300 MG: 300 CAPSULE ORAL at 09:16

## 2018-04-03 RX ADMIN — ATORVASTATIN CALCIUM 80 MG: 80 TABLET, FILM COATED ORAL at 09:16

## 2018-04-03 RX ADMIN — PANTOPRAZOLE SODIUM 40 MG: 40 TABLET, DELAYED RELEASE ORAL at 09:16

## 2018-04-03 RX ADMIN — Medication 2 PUFF: at 08:19

## 2018-04-03 RX ADMIN — VANCOMYCIN HYDROCHLORIDE 1000 MG: 1 INJECTION, SOLUTION INTRAVENOUS at 06:42

## 2018-04-03 RX ADMIN — SACUBITRIL AND VALSARTAN 1 TABLET: 24; 26 TABLET, FILM COATED ORAL at 09:16

## 2018-04-03 ASSESSMENT — PAIN SCALES - GENERAL
PAINLEVEL_OUTOF10: 0
PAINLEVEL_OUTOF10: 0

## 2018-04-04 ENCOUNTER — CARE COORDINATION (OUTPATIENT)
Dept: CASE MANAGEMENT | Age: 68
End: 2018-04-04

## 2018-04-04 DIAGNOSIS — I50.42 CHF (CONGESTIVE HEART FAILURE), NYHA CLASS III, CHRONIC, COMBINED (HCC): Primary | ICD-10-CM

## 2018-04-04 PROCEDURE — 1111F DSCHRG MED/CURRENT MED MERGE: CPT | Performed by: FAMILY MEDICINE

## 2018-04-05 PROCEDURE — 93010 ELECTROCARDIOGRAM REPORT: CPT | Performed by: INTERNAL MEDICINE

## 2018-04-09 RX ORDER — ALLOPURINOL 100 MG/1
TABLET ORAL
Qty: 90 TABLET | Refills: 0 | Status: SHIPPED | OUTPATIENT
Start: 2018-04-09 | End: 2018-07-07 | Stop reason: SDUPTHER

## 2018-04-10 ENCOUNTER — HOSPITAL ENCOUNTER (OUTPATIENT)
Dept: PHARMACY | Age: 68
Setting detail: THERAPIES SERIES
Discharge: HOME | End: 2018-04-10
Payer: MEDICARE

## 2018-04-10 LAB
INR BLD: 1.6
PROTIME: 19.8 SECONDS

## 2018-04-10 PROCEDURE — 85610 PROTHROMBIN TIME: CPT | Performed by: PHARMACIST

## 2018-04-10 PROCEDURE — 99211 OFF/OP EST MAY X REQ PHY/QHP: CPT | Performed by: PHARMACIST

## 2018-04-23 RX ORDER — COLCHICINE 0.6 MG/1
TABLET, FILM COATED ORAL
Qty: 90 TABLET | Refills: 0 | Status: SHIPPED | OUTPATIENT
Start: 2018-04-23 | End: 2018-07-22 | Stop reason: SDUPTHER

## 2018-04-24 ENCOUNTER — TELEPHONE (OUTPATIENT)
Dept: PHARMACY | Age: 68
End: 2018-04-24

## 2018-04-24 DIAGNOSIS — I48.91 ATRIAL FIBRILLATION, UNSPECIFIED TYPE (HCC): ICD-10-CM

## 2018-04-30 ENCOUNTER — CARE COORDINATION (OUTPATIENT)
Dept: FAMILY MEDICINE CLINIC | Age: 68
End: 2018-04-30

## 2018-05-01 ENCOUNTER — TELEPHONE (OUTPATIENT)
Dept: PHARMACY | Age: 68
End: 2018-05-01

## 2018-05-01 DIAGNOSIS — I48.91 ATRIAL FIBRILLATION, UNSPECIFIED TYPE (HCC): ICD-10-CM

## 2018-05-10 ENCOUNTER — CARE COORDINATION (OUTPATIENT)
Dept: FAMILY MEDICINE CLINIC | Age: 68
End: 2018-05-10

## 2018-05-10 ASSESSMENT — ENCOUNTER SYMPTOMS: DYSPNEA ASSOCIATED WITH: MINIMAL EXERTION

## 2018-05-15 ENCOUNTER — HOSPITAL ENCOUNTER (INPATIENT)
Age: 68
LOS: 2 days | Discharge: HOME | DRG: 293 | End: 2018-05-17
Attending: INTERNAL MEDICINE | Admitting: INTERNAL MEDICINE
Payer: MEDICARE

## 2018-05-15 ENCOUNTER — TELEPHONE (OUTPATIENT)
Dept: PHARMACY | Age: 68
End: 2018-05-15

## 2018-05-15 ENCOUNTER — APPOINTMENT (OUTPATIENT)
Dept: GENERAL RADIOLOGY | Age: 68
DRG: 293 | End: 2018-05-15
Payer: MEDICARE

## 2018-05-15 DIAGNOSIS — R06.09 DYSPNEA ON EXERTION: ICD-10-CM

## 2018-05-15 DIAGNOSIS — J44.1 COPD EXACERBATION (HCC): ICD-10-CM

## 2018-05-15 DIAGNOSIS — I50.21 ACUTE SYSTOLIC CHF (CONGESTIVE HEART FAILURE) (HCC): ICD-10-CM

## 2018-05-15 DIAGNOSIS — E11.9 TYPE 2 DIABETES MELLITUS WITHOUT COMPLICATION, UNSPECIFIED LONG TERM INSULIN USE STATUS: ICD-10-CM

## 2018-05-15 DIAGNOSIS — I50.9 CONGESTIVE HEART FAILURE, UNSPECIFIED CONGESTIVE HEART FAILURE CHRONICITY, UNSPECIFIED CONGESTIVE HEART FAILURE TYPE: Primary | ICD-10-CM

## 2018-05-15 DIAGNOSIS — I48.91 ATRIAL FIBRILLATION, UNSPECIFIED TYPE (HCC): ICD-10-CM

## 2018-05-15 PROBLEM — I50.43 CHF (CONGESTIVE HEART FAILURE), NYHA CLASS I, ACUTE ON CHRONIC, COMBINED (HCC): Status: ACTIVE | Noted: 2018-05-15

## 2018-05-15 LAB
ALBUMIN SERPL-MCNC: 3.4 G/DL (ref 3.9–4.9)
ALP BLD-CCNC: 132 U/L (ref 35–104)
ALT SERPL-CCNC: 15 U/L (ref 0–41)
ANION GAP SERPL CALCULATED.3IONS-SCNC: 16 MEQ/L (ref 7–13)
APTT: 42.3 SEC (ref 21.6–35.4)
AST SERPL-CCNC: 13 U/L (ref 0–40)
BASOPHILS ABSOLUTE: 0 K/UL (ref 0–0.2)
BASOPHILS RELATIVE PERCENT: 0.5 %
BILIRUB SERPL-MCNC: 2.2 MG/DL (ref 0–1.2)
BUN BLDV-MCNC: 15 MG/DL (ref 8–23)
CALCIUM SERPL-MCNC: 8.5 MG/DL (ref 8.6–10.2)
CHLORIDE BLD-SCNC: 97 MEQ/L (ref 98–107)
CO2: 22 MEQ/L (ref 22–29)
CREAT SERPL-MCNC: 1.04 MG/DL (ref 0.7–1.2)
EKG ATRIAL RATE: 94 BPM
EKG P AXIS: 52 DEGREES
EKG P-R INTERVAL: 140 MS
EKG Q-T INTERVAL: 410 MS
EKG QRS DURATION: 134 MS
EKG QTC CALCULATION (BAZETT): 512 MS
EKG R AXIS: 89 DEGREES
EKG T AXIS: -47 DEGREES
EKG VENTRICULAR RATE: 94 BPM
EOSINOPHILS ABSOLUTE: 0 K/UL (ref 0–0.7)
EOSINOPHILS RELATIVE PERCENT: 0.2 %
GFR AFRICAN AMERICAN: >60
GFR NON-AFRICAN AMERICAN: >60
GLOBULIN: 2.7 G/DL (ref 2.3–3.5)
GLUCOSE BLD-MCNC: 152 MG/DL (ref 60–115)
GLUCOSE BLD-MCNC: 251 MG/DL (ref 60–115)
GLUCOSE BLD-MCNC: 263 MG/DL (ref 74–109)
HCT VFR BLD CALC: 32.2 % (ref 42–52)
HEMOGLOBIN: 10.6 G/DL (ref 14–18)
INR BLD: 3.2
LYMPHOCYTES ABSOLUTE: 0.7 K/UL (ref 1–4.8)
LYMPHOCYTES RELATIVE PERCENT: 10.9 %
MCH RBC QN AUTO: 27.6 PG (ref 27–31.3)
MCHC RBC AUTO-ENTMCNC: 32.9 % (ref 33–37)
MCV RBC AUTO: 84.1 FL (ref 80–100)
MONOCYTES ABSOLUTE: 0.5 K/UL (ref 0.2–0.8)
MONOCYTES RELATIVE PERCENT: 7.6 %
NEUTROPHILS ABSOLUTE: 5.1 K/UL (ref 1.4–6.5)
NEUTROPHILS RELATIVE PERCENT: 80.8 %
PDW BLD-RTO: 15.6 % (ref 11.5–14.5)
PERFORMED ON: ABNORMAL
PERFORMED ON: ABNORMAL
PLATELET # BLD: 213 K/UL (ref 130–400)
POTASSIUM SERPL-SCNC: 4 MEQ/L (ref 3.5–5.1)
PRO-BNP: 5096 PG/ML
PROTHROMBIN TIME: 33.5 SEC (ref 9.6–12.3)
RBC # BLD: 3.83 M/UL (ref 4.7–6.1)
SODIUM BLD-SCNC: 135 MEQ/L (ref 132–144)
TOTAL CK: 159 U/L (ref 0–190)
TOTAL PROTEIN: 6.1 G/DL (ref 6.4–8.1)
TROPONIN: <0.01 NG/ML (ref 0–0.01)
WBC # BLD: 6.3 K/UL (ref 4.8–10.8)

## 2018-05-15 PROCEDURE — 80053 COMPREHEN METABOLIC PANEL: CPT

## 2018-05-15 PROCEDURE — 6360000002 HC RX W HCPCS: Performed by: PHYSICIAN ASSISTANT

## 2018-05-15 PROCEDURE — 6370000000 HC RX 637 (ALT 250 FOR IP): Performed by: INTERNAL MEDICINE

## 2018-05-15 PROCEDURE — 36415 COLL VENOUS BLD VENIPUNCTURE: CPT

## 2018-05-15 PROCEDURE — 2060000000 HC ICU INTERMEDIATE R&B

## 2018-05-15 PROCEDURE — 85025 COMPLETE CBC W/AUTO DIFF WBC: CPT

## 2018-05-15 PROCEDURE — 6360000002 HC RX W HCPCS: Performed by: INTERNAL MEDICINE

## 2018-05-15 PROCEDURE — 94664 DEMO&/EVAL PT USE INHALER: CPT

## 2018-05-15 PROCEDURE — 94760 N-INVAS EAR/PLS OXIMETRY 1: CPT

## 2018-05-15 PROCEDURE — 2580000003 HC RX 258: Performed by: INTERNAL MEDICINE

## 2018-05-15 PROCEDURE — 85610 PROTHROMBIN TIME: CPT

## 2018-05-15 PROCEDURE — 93005 ELECTROCARDIOGRAM TRACING: CPT

## 2018-05-15 PROCEDURE — 71045 X-RAY EXAM CHEST 1 VIEW: CPT

## 2018-05-15 PROCEDURE — 87040 BLOOD CULTURE FOR BACTERIA: CPT

## 2018-05-15 PROCEDURE — 94640 AIRWAY INHALATION TREATMENT: CPT

## 2018-05-15 PROCEDURE — 85730 THROMBOPLASTIN TIME PARTIAL: CPT

## 2018-05-15 PROCEDURE — 83880 ASSAY OF NATRIURETIC PEPTIDE: CPT

## 2018-05-15 PROCEDURE — 84484 ASSAY OF TROPONIN QUANT: CPT

## 2018-05-15 PROCEDURE — 82550 ASSAY OF CK (CPK): CPT

## 2018-05-15 PROCEDURE — 99285 EMERGENCY DEPT VISIT HI MDM: CPT

## 2018-05-15 RX ORDER — FUROSEMIDE 10 MG/ML
40 INJECTION INTRAMUSCULAR; INTRAVENOUS 2 TIMES DAILY
Status: DISCONTINUED | OUTPATIENT
Start: 2018-05-15 | End: 2018-05-17 | Stop reason: HOSPADM

## 2018-05-15 RX ORDER — FUROSEMIDE 10 MG/ML
INJECTION INTRAMUSCULAR; INTRAVENOUS
Status: DISPENSED
Start: 2018-05-15 | End: 2018-05-16

## 2018-05-15 RX ORDER — ATORVASTATIN CALCIUM 80 MG/1
80 TABLET, FILM COATED ORAL DAILY
Status: DISCONTINUED | OUTPATIENT
Start: 2018-05-15 | End: 2018-05-17 | Stop reason: HOSPADM

## 2018-05-15 RX ORDER — NITROGLYCERIN 0.4 MG/1
0.4 TABLET SUBLINGUAL EVERY 5 MIN PRN
Status: DISCONTINUED | OUTPATIENT
Start: 2018-05-15 | End: 2018-05-17 | Stop reason: HOSPADM

## 2018-05-15 RX ORDER — ALBUTEROL SULFATE 90 UG/1
2 AEROSOL, METERED RESPIRATORY (INHALATION) EVERY 4 HOURS PRN
Status: DISCONTINUED | OUTPATIENT
Start: 2018-05-15 | End: 2018-05-17 | Stop reason: HOSPADM

## 2018-05-15 RX ORDER — SODIUM CHLORIDE 0.9 % (FLUSH) 0.9 %
10 SYRINGE (ML) INJECTION EVERY 12 HOURS SCHEDULED
Status: DISCONTINUED | OUTPATIENT
Start: 2018-05-15 | End: 2018-05-17 | Stop reason: HOSPADM

## 2018-05-15 RX ORDER — ONDANSETRON 2 MG/ML
4 INJECTION INTRAMUSCULAR; INTRAVENOUS EVERY 6 HOURS PRN
Status: DISCONTINUED | OUTPATIENT
Start: 2018-05-15 | End: 2018-05-17 | Stop reason: HOSPADM

## 2018-05-15 RX ORDER — ALBUTEROL SULFATE 2.5 MG/3ML
2.5 SOLUTION RESPIRATORY (INHALATION) EVERY 6 HOURS PRN
Status: DISCONTINUED | OUTPATIENT
Start: 2018-05-15 | End: 2018-05-15

## 2018-05-15 RX ORDER — SODIUM CHLORIDE 0.9 % (FLUSH) 0.9 %
10 SYRINGE (ML) INJECTION PRN
Status: DISCONTINUED | OUTPATIENT
Start: 2018-05-15 | End: 2018-05-17 | Stop reason: HOSPADM

## 2018-05-15 RX ORDER — NICOTINE POLACRILEX 4 MG
15 LOZENGE BUCCAL PRN
Status: DISCONTINUED | OUTPATIENT
Start: 2018-05-15 | End: 2018-05-17 | Stop reason: HOSPADM

## 2018-05-15 RX ORDER — WARFARIN SODIUM 7.5 MG/1
7.5 TABLET ORAL DAILY
Status: DISCONTINUED | OUTPATIENT
Start: 2018-05-15 | End: 2018-05-16 | Stop reason: DRUGHIGH

## 2018-05-15 RX ORDER — FUROSEMIDE 10 MG/ML
80 INJECTION INTRAMUSCULAR; INTRAVENOUS ONCE
Status: COMPLETED | OUTPATIENT
Start: 2018-05-15 | End: 2018-05-15

## 2018-05-15 RX ORDER — DIGOXIN 125 MCG
125 TABLET ORAL DAILY
Status: DISCONTINUED | OUTPATIENT
Start: 2018-05-15 | End: 2018-05-17 | Stop reason: HOSPADM

## 2018-05-15 RX ORDER — METOPROLOL SUCCINATE 25 MG/1
25 TABLET, EXTENDED RELEASE ORAL DAILY
Status: DISCONTINUED | OUTPATIENT
Start: 2018-05-15 | End: 2018-05-17 | Stop reason: HOSPADM

## 2018-05-15 RX ORDER — DEXTROSE MONOHYDRATE 25 G/50ML
12.5 INJECTION, SOLUTION INTRAVENOUS PRN
Status: DISCONTINUED | OUTPATIENT
Start: 2018-05-15 | End: 2018-05-17 | Stop reason: HOSPADM

## 2018-05-15 RX ORDER — PANTOPRAZOLE SODIUM 40 MG/1
40 TABLET, DELAYED RELEASE ORAL
Status: DISCONTINUED | OUTPATIENT
Start: 2018-05-16 | End: 2018-05-17 | Stop reason: HOSPADM

## 2018-05-15 RX ORDER — CLOPIDOGREL BISULFATE 75 MG/1
75 TABLET ORAL DAILY
Status: DISCONTINUED | OUTPATIENT
Start: 2018-05-15 | End: 2018-05-17 | Stop reason: HOSPADM

## 2018-05-15 RX ORDER — LISINOPRIL 5 MG/1
5 TABLET ORAL DAILY
Status: DISCONTINUED | OUTPATIENT
Start: 2018-05-15 | End: 2018-05-15

## 2018-05-15 RX ORDER — ALBUTEROL SULFATE 2.5 MG/3ML
2.5 SOLUTION RESPIRATORY (INHALATION) 3 TIMES DAILY
Status: DISCONTINUED | OUTPATIENT
Start: 2018-05-16 | End: 2018-05-17 | Stop reason: HOSPADM

## 2018-05-15 RX ORDER — ALLOPURINOL 100 MG/1
100 TABLET ORAL DAILY
Status: DISCONTINUED | OUTPATIENT
Start: 2018-05-15 | End: 2018-05-17 | Stop reason: HOSPADM

## 2018-05-15 RX ORDER — DEXTROSE MONOHYDRATE 50 MG/ML
100 INJECTION, SOLUTION INTRAVENOUS PRN
Status: DISCONTINUED | OUTPATIENT
Start: 2018-05-15 | End: 2018-05-17 | Stop reason: HOSPADM

## 2018-05-15 RX ORDER — GABAPENTIN 300 MG/1
300 CAPSULE ORAL 3 TIMES DAILY
Status: DISCONTINUED | OUTPATIENT
Start: 2018-05-15 | End: 2018-05-17 | Stop reason: HOSPADM

## 2018-05-15 RX ORDER — ALBUTEROL SULFATE 2.5 MG/3ML
2.5 SOLUTION RESPIRATORY (INHALATION)
Status: DISCONTINUED | OUTPATIENT
Start: 2018-05-15 | End: 2018-05-17 | Stop reason: HOSPADM

## 2018-05-15 RX ORDER — AMIODARONE HYDROCHLORIDE 200 MG/1
200 TABLET ORAL DAILY
Status: DISCONTINUED | OUTPATIENT
Start: 2018-05-15 | End: 2018-05-17 | Stop reason: HOSPADM

## 2018-05-15 RX ADMIN — FUROSEMIDE 80 MG: 10 INJECTION, SOLUTION INTRAVENOUS at 15:22

## 2018-05-15 RX ADMIN — ATORVASTATIN CALCIUM 80 MG: 80 TABLET, FILM COATED ORAL at 21:11

## 2018-05-15 RX ADMIN — GABAPENTIN 300 MG: 300 CAPSULE ORAL at 21:11

## 2018-05-15 RX ADMIN — SACUBITRIL AND VALSARTAN 1 TABLET: 24; 26 TABLET, FILM COATED ORAL at 21:11

## 2018-05-15 RX ADMIN — Medication 10 ML: at 21:16

## 2018-05-15 RX ADMIN — ENOXAPARIN SODIUM 40 MG: 40 INJECTION SUBCUTANEOUS at 21:11

## 2018-05-15 RX ADMIN — Medication 2 PUFF: at 19:40

## 2018-05-15 RX ADMIN — ALBUTEROL SULFATE 2.5 MG: 2.5 SOLUTION RESPIRATORY (INHALATION) at 20:37

## 2018-05-15 RX ADMIN — WARFARIN SODIUM 7.5 MG: 7.5 TABLET ORAL at 21:21

## 2018-05-15 ASSESSMENT — ENCOUNTER SYMPTOMS
VOMITING: 0
RHINORRHEA: 0
ABDOMINAL PAIN: 0
NAUSEA: 0
EYE DISCHARGE: 0
BACK PAIN: 0
WHEEZING: 0
SHORTNESS OF BREATH: 1
COUGH: 0
RECTAL PAIN: 0
DIARRHEA: 0
CHOKING: 0
ABDOMINAL DISTENTION: 0
CHEST TIGHTNESS: 0
SORE THROAT: 0
CONSTIPATION: 0
STRIDOR: 0
COLOR CHANGE: 0

## 2018-05-15 ASSESSMENT — PAIN SCALES - GENERAL
PAINLEVEL_OUTOF10: 0

## 2018-05-16 PROBLEM — I50.43 CHF (CONGESTIVE HEART FAILURE), NYHA CLASS IV, ACUTE ON CHRONIC, COMBINED (HCC): Status: ACTIVE | Noted: 2018-05-16

## 2018-05-16 LAB
ANION GAP SERPL CALCULATED.3IONS-SCNC: 15 MEQ/L (ref 7–13)
BUN BLDV-MCNC: 14 MG/DL (ref 8–23)
CALCIUM SERPL-MCNC: 8.4 MG/DL (ref 8.6–10.2)
CHLORIDE BLD-SCNC: 97 MEQ/L (ref 98–107)
CO2: 25 MEQ/L (ref 22–29)
CREAT SERPL-MCNC: 0.99 MG/DL (ref 0.7–1.2)
GFR AFRICAN AMERICAN: >60
GFR NON-AFRICAN AMERICAN: >60
GLUCOSE BLD-MCNC: 102 MG/DL (ref 74–109)
GLUCOSE BLD-MCNC: 113 MG/DL (ref 60–115)
GLUCOSE BLD-MCNC: 140 MG/DL (ref 60–115)
GLUCOSE BLD-MCNC: 172 MG/DL (ref 60–115)
GLUCOSE BLD-MCNC: 85 MG/DL (ref 60–115)
MAGNESIUM: 1.6 MG/DL (ref 1.7–2.3)
PERFORMED ON: ABNORMAL
PERFORMED ON: ABNORMAL
PERFORMED ON: NORMAL
PERFORMED ON: NORMAL
POTASSIUM SERPL-SCNC: 3.6 MEQ/L (ref 3.5–5.1)
PRO-BNP: 4018 PG/ML
SODIUM BLD-SCNC: 137 MEQ/L (ref 132–144)

## 2018-05-16 PROCEDURE — 6360000002 HC RX W HCPCS: Performed by: INTERNAL MEDICINE

## 2018-05-16 PROCEDURE — 36415 COLL VENOUS BLD VENIPUNCTURE: CPT

## 2018-05-16 PROCEDURE — 6370000000 HC RX 637 (ALT 250 FOR IP): Performed by: INTERNAL MEDICINE

## 2018-05-16 PROCEDURE — 2700000000 HC OXYGEN THERAPY PER DAY

## 2018-05-16 PROCEDURE — 94640 AIRWAY INHALATION TREATMENT: CPT

## 2018-05-16 PROCEDURE — 2060000000 HC ICU INTERMEDIATE R&B

## 2018-05-16 PROCEDURE — 80048 BASIC METABOLIC PNL TOTAL CA: CPT

## 2018-05-16 PROCEDURE — 83880 ASSAY OF NATRIURETIC PEPTIDE: CPT

## 2018-05-16 PROCEDURE — 83735 ASSAY OF MAGNESIUM: CPT

## 2018-05-16 PROCEDURE — 94760 N-INVAS EAR/PLS OXIMETRY 1: CPT

## 2018-05-16 PROCEDURE — 93010 ELECTROCARDIOGRAM REPORT: CPT | Performed by: INTERNAL MEDICINE

## 2018-05-16 PROCEDURE — 2580000003 HC RX 258: Performed by: INTERNAL MEDICINE

## 2018-05-16 RX ORDER — POTASSIUM CHLORIDE 1.5 G/1.77G
60 POWDER, FOR SOLUTION ORAL ONCE
Status: COMPLETED | OUTPATIENT
Start: 2018-05-16 | End: 2018-05-16

## 2018-05-16 RX ADMIN — GABAPENTIN 300 MG: 300 CAPSULE ORAL at 07:58

## 2018-05-16 RX ADMIN — AMIODARONE HYDROCHLORIDE 200 MG: 200 TABLET ORAL at 07:58

## 2018-05-16 RX ADMIN — DIGOXIN 125 MCG: 125 TABLET ORAL at 07:58

## 2018-05-16 RX ADMIN — ATORVASTATIN CALCIUM 80 MG: 80 TABLET, FILM COATED ORAL at 21:04

## 2018-05-16 RX ADMIN — GABAPENTIN 300 MG: 300 CAPSULE ORAL at 14:33

## 2018-05-16 RX ADMIN — POTASSIUM CHLORIDE 60 MEQ: 1.5 POWDER, FOR SOLUTION ORAL at 21:05

## 2018-05-16 RX ADMIN — SACUBITRIL AND VALSARTAN 1 TABLET: 24; 26 TABLET, FILM COATED ORAL at 07:58

## 2018-05-16 RX ADMIN — ALBUTEROL SULFATE 2.5 MG: 2.5 SOLUTION RESPIRATORY (INHALATION) at 13:52

## 2018-05-16 RX ADMIN — Medication 2 PUFF: at 07:39

## 2018-05-16 RX ADMIN — CLOPIDOGREL BISULFATE 75 MG: 75 TABLET ORAL at 07:58

## 2018-05-16 RX ADMIN — METOPROLOL SUCCINATE 25 MG: 25 TABLET, EXTENDED RELEASE ORAL at 07:58

## 2018-05-16 RX ADMIN — FUROSEMIDE 40 MG: 10 INJECTION, SOLUTION INTRAVENOUS at 07:58

## 2018-05-16 RX ADMIN — ALBUTEROL SULFATE 2.5 MG: 2.5 SOLUTION RESPIRATORY (INHALATION) at 07:39

## 2018-05-16 RX ADMIN — PANTOPRAZOLE SODIUM 40 MG: 40 TABLET, DELAYED RELEASE ORAL at 07:58

## 2018-05-16 RX ADMIN — Medication 2 PUFF: at 19:30

## 2018-05-16 RX ADMIN — MAGNESIUM HYDROXIDE 30 ML: 400 SUSPENSION ORAL at 21:13

## 2018-05-16 RX ADMIN — INSULIN LISPRO 55 UNITS: 100 INJECTION, SUSPENSION SUBCUTANEOUS at 17:30

## 2018-05-16 RX ADMIN — SACUBITRIL AND VALSARTAN 1 TABLET: 24; 26 TABLET, FILM COATED ORAL at 21:13

## 2018-05-16 RX ADMIN — INSULIN LISPRO 55 UNITS: 100 INJECTION, SUSPENSION SUBCUTANEOUS at 08:54

## 2018-05-16 RX ADMIN — ALLOPURINOL 100 MG: 100 TABLET ORAL at 07:58

## 2018-05-16 RX ADMIN — FUROSEMIDE 40 MG: 10 INJECTION, SOLUTION INTRAVENOUS at 17:30

## 2018-05-16 RX ADMIN — Medication 10 ML: at 07:58

## 2018-05-16 RX ADMIN — ALBUTEROL SULFATE 2.5 MG: 2.5 SOLUTION RESPIRATORY (INHALATION) at 19:30

## 2018-05-16 RX ADMIN — GABAPENTIN 300 MG: 300 CAPSULE ORAL at 21:04

## 2018-05-16 ASSESSMENT — PAIN SCALES - GENERAL
PAINLEVEL_OUTOF10: 0

## 2018-05-17 VITALS
WEIGHT: 198.19 LBS | OXYGEN SATURATION: 95 % | BODY MASS INDEX: 27.75 KG/M2 | HEART RATE: 83 BPM | RESPIRATION RATE: 16 BRPM | TEMPERATURE: 97.5 F | SYSTOLIC BLOOD PRESSURE: 111 MMHG | HEIGHT: 71 IN | DIASTOLIC BLOOD PRESSURE: 59 MMHG

## 2018-05-17 LAB
ANION GAP SERPL CALCULATED.3IONS-SCNC: 13 MEQ/L (ref 7–13)
BUN BLDV-MCNC: 18 MG/DL (ref 8–23)
CALCIUM SERPL-MCNC: 8.5 MG/DL (ref 8.6–10.2)
CHLORIDE BLD-SCNC: 98 MEQ/L (ref 98–107)
CO2: 25 MEQ/L (ref 22–29)
CREAT SERPL-MCNC: 1.13 MG/DL (ref 0.7–1.2)
GFR AFRICAN AMERICAN: >60
GFR NON-AFRICAN AMERICAN: >60
GLUCOSE BLD-MCNC: 214 MG/DL (ref 60–115)
GLUCOSE BLD-MCNC: 51 MG/DL (ref 74–109)
INR BLD: 3.7
MAGNESIUM: 1.9 MG/DL (ref 1.7–2.3)
PERFORMED ON: ABNORMAL
POTASSIUM SERPL-SCNC: 3.9 MEQ/L (ref 3.5–5.1)
PROTHROMBIN TIME: 39.7 SEC (ref 9.6–12.3)
SODIUM BLD-SCNC: 136 MEQ/L (ref 132–144)

## 2018-05-17 PROCEDURE — 36415 COLL VENOUS BLD VENIPUNCTURE: CPT

## 2018-05-17 PROCEDURE — 6360000002 HC RX W HCPCS: Performed by: INTERNAL MEDICINE

## 2018-05-17 PROCEDURE — 94640 AIRWAY INHALATION TREATMENT: CPT

## 2018-05-17 PROCEDURE — 6370000000 HC RX 637 (ALT 250 FOR IP): Performed by: INTERNAL MEDICINE

## 2018-05-17 PROCEDURE — 2580000003 HC RX 258: Performed by: INTERNAL MEDICINE

## 2018-05-17 PROCEDURE — 85610 PROTHROMBIN TIME: CPT

## 2018-05-17 PROCEDURE — 83735 ASSAY OF MAGNESIUM: CPT

## 2018-05-17 PROCEDURE — 80048 BASIC METABOLIC PNL TOTAL CA: CPT

## 2018-05-17 RX ORDER — POTASSIUM CHLORIDE 20 MEQ/1
20 TABLET, EXTENDED RELEASE ORAL 2 TIMES DAILY
Qty: 60 TABLET | Refills: 3 | Status: ON HOLD | OUTPATIENT
Start: 2018-05-17 | End: 2020-09-12 | Stop reason: SDUPTHER

## 2018-05-17 RX ORDER — FUROSEMIDE 40 MG/1
40 TABLET ORAL 2 TIMES DAILY
Qty: 90 TABLET | Refills: 1 | Status: ON HOLD | OUTPATIENT
Start: 2018-05-17 | End: 2018-12-06

## 2018-05-17 RX ADMIN — Medication 10 ML: at 09:31

## 2018-05-17 RX ADMIN — Medication 2 PUFF: at 07:02

## 2018-05-17 RX ADMIN — CLOPIDOGREL BISULFATE 75 MG: 75 TABLET ORAL at 09:32

## 2018-05-17 RX ADMIN — GABAPENTIN 300 MG: 300 CAPSULE ORAL at 09:31

## 2018-05-17 RX ADMIN — AMIODARONE HYDROCHLORIDE 200 MG: 200 TABLET ORAL at 09:32

## 2018-05-17 RX ADMIN — ALBUTEROL SULFATE 2.5 MG: 2.5 SOLUTION RESPIRATORY (INHALATION) at 07:02

## 2018-05-17 RX ADMIN — SACUBITRIL AND VALSARTAN 1 TABLET: 24; 26 TABLET, FILM COATED ORAL at 09:32

## 2018-05-17 RX ADMIN — DIGOXIN 125 MCG: 125 TABLET ORAL at 09:32

## 2018-05-17 RX ADMIN — METOPROLOL SUCCINATE 25 MG: 25 TABLET, EXTENDED RELEASE ORAL at 09:32

## 2018-05-17 RX ADMIN — PANTOPRAZOLE SODIUM 40 MG: 40 TABLET, DELAYED RELEASE ORAL at 06:14

## 2018-05-17 RX ADMIN — FUROSEMIDE 40 MG: 10 INJECTION, SOLUTION INTRAVENOUS at 09:31

## 2018-05-17 RX ADMIN — ALBUTEROL SULFATE 2.5 MG: 2.5 SOLUTION RESPIRATORY (INHALATION) at 11:22

## 2018-05-17 RX ADMIN — ALLOPURINOL 100 MG: 100 TABLET ORAL at 09:32

## 2018-05-17 ASSESSMENT — PAIN SCALES - GENERAL: PAINLEVEL_OUTOF10: 0

## 2018-05-18 ENCOUNTER — TELEPHONE (OUTPATIENT)
Dept: PHARMACY | Facility: CLINIC | Age: 68
End: 2018-05-18

## 2018-05-18 ENCOUNTER — CARE COORDINATION (OUTPATIENT)
Dept: CASE MANAGEMENT | Age: 68
End: 2018-05-18

## 2018-05-20 LAB
BLOOD CULTURE, ROUTINE: NORMAL
CULTURE, BLOOD 2: NORMAL

## 2018-05-23 ENCOUNTER — OFFICE VISIT (OUTPATIENT)
Dept: FAMILY MEDICINE CLINIC | Age: 68
End: 2018-05-23
Payer: MEDICARE

## 2018-05-23 ENCOUNTER — CARE COORDINATION (OUTPATIENT)
Dept: FAMILY MEDICINE CLINIC | Age: 68
End: 2018-05-23

## 2018-05-23 VITALS
SYSTOLIC BLOOD PRESSURE: 112 MMHG | HEART RATE: 89 BPM | BODY MASS INDEX: 30.92 KG/M2 | RESPIRATION RATE: 16 BRPM | HEIGHT: 70 IN | DIASTOLIC BLOOD PRESSURE: 84 MMHG | TEMPERATURE: 97.6 F | OXYGEN SATURATION: 98 % | WEIGHT: 216 LBS

## 2018-05-23 DIAGNOSIS — N39.0 URINARY TRACT INFECTION WITHOUT HEMATURIA, SITE UNSPECIFIED: ICD-10-CM

## 2018-05-23 DIAGNOSIS — I50.23 ACUTE ON CHRONIC SYSTOLIC HEART FAILURE (HCC): Primary | ICD-10-CM

## 2018-05-23 LAB
BILIRUBIN, POC: ABNORMAL
BLOOD URINE, POC: ABNORMAL
CLARITY, POC: CLEAR
COLOR, POC: YELLOW
GLUCOSE URINE, POC: ABNORMAL
KETONES, POC: ABNORMAL
LEUKOCYTE EST, POC: ABNORMAL
NITRITE, POC: ABNORMAL
PH, POC: 6.5
PROTEIN, POC: ABNORMAL
SPECIFIC GRAVITY, POC: 1.01
UROBILINOGEN, POC: ABNORMAL

## 2018-05-23 PROCEDURE — 81003 URINALYSIS AUTO W/O SCOPE: CPT | Performed by: FAMILY MEDICINE

## 2018-05-23 PROCEDURE — 99213 OFFICE O/P EST LOW 20 MIN: CPT | Performed by: FAMILY MEDICINE

## 2018-05-23 RX ORDER — DOXYCYCLINE HYCLATE 100 MG
100 TABLET ORAL 2 TIMES DAILY
Qty: 14 TABLET | Refills: 0 | Status: SHIPPED | OUTPATIENT
Start: 2018-05-23 | End: 2018-05-23 | Stop reason: SDUPTHER

## 2018-05-23 RX ORDER — DOXYCYCLINE HYCLATE 100 MG
100 TABLET ORAL 2 TIMES DAILY
Qty: 14 TABLET | Refills: 0 | Status: SHIPPED | OUTPATIENT
Start: 2018-05-23 | End: 2018-05-30

## 2018-05-23 ASSESSMENT — ENCOUNTER SYMPTOMS: ABDOMINAL PAIN: 0

## 2018-05-25 LAB — URINE CULTURE, ROUTINE: NORMAL

## 2018-05-31 ENCOUNTER — HOSPITAL ENCOUNTER (OUTPATIENT)
Dept: PHARMACY | Age: 68
Setting detail: THERAPIES SERIES
Discharge: HOME | End: 2018-05-31
Payer: MEDICARE

## 2018-05-31 DIAGNOSIS — I48.91 ATRIAL FIBRILLATION, UNSPECIFIED TYPE (HCC): ICD-10-CM

## 2018-05-31 LAB
INR BLD: 2
PROTIME: 23.6 SECONDS

## 2018-05-31 PROCEDURE — 99211 OFF/OP EST MAY X REQ PHY/QHP: CPT | Performed by: PHARMACIST

## 2018-05-31 PROCEDURE — 85610 PROTHROMBIN TIME: CPT | Performed by: PHARMACIST

## 2018-06-14 ENCOUNTER — HOSPITAL ENCOUNTER (OUTPATIENT)
Dept: PHARMACY | Age: 68
Setting detail: THERAPIES SERIES
Discharge: HOME | End: 2018-06-14
Payer: MEDICARE

## 2018-06-14 DIAGNOSIS — I48.91 ATRIAL FIBRILLATION, UNSPECIFIED TYPE (HCC): ICD-10-CM

## 2018-06-14 LAB
INR BLD: 4
PROTIME: 48.2 SECONDS

## 2018-06-14 PROCEDURE — 85610 PROTHROMBIN TIME: CPT

## 2018-06-14 PROCEDURE — 99211 OFF/OP EST MAY X REQ PHY/QHP: CPT

## 2018-06-26 ENCOUNTER — TELEPHONE (OUTPATIENT)
Dept: PULMONOLOGY | Age: 68
End: 2018-06-26

## 2018-06-28 ENCOUNTER — HOSPITAL ENCOUNTER (OUTPATIENT)
Dept: PHARMACY | Age: 68
Setting detail: THERAPIES SERIES
Discharge: HOME | End: 2018-06-28
Payer: MEDICARE

## 2018-06-28 DIAGNOSIS — I48.91 ATRIAL FIBRILLATION, UNSPECIFIED TYPE (HCC): ICD-10-CM

## 2018-06-28 LAB
INR BLD: 3.1
PROTIME: 37.7 SECONDS

## 2018-06-28 PROCEDURE — 99211 OFF/OP EST MAY X REQ PHY/QHP: CPT | Performed by: PHARMACIST

## 2018-06-28 PROCEDURE — 85610 PROTHROMBIN TIME: CPT | Performed by: PHARMACIST

## 2018-07-03 ENCOUNTER — OFFICE VISIT (OUTPATIENT)
Dept: PULMONOLOGY | Age: 68
End: 2018-07-03
Payer: MEDICARE

## 2018-07-03 VITALS
DIASTOLIC BLOOD PRESSURE: 60 MMHG | RESPIRATION RATE: 16 BRPM | WEIGHT: 214 LBS | HEIGHT: 70 IN | HEART RATE: 84 BPM | BODY MASS INDEX: 30.64 KG/M2 | SYSTOLIC BLOOD PRESSURE: 124 MMHG | TEMPERATURE: 96.6 F | OXYGEN SATURATION: 95 %

## 2018-07-03 DIAGNOSIS — R09.02 HYPOXIA: ICD-10-CM

## 2018-07-03 DIAGNOSIS — E66.9 OBESITY (BMI 30-39.9): ICD-10-CM

## 2018-07-03 DIAGNOSIS — J44.9 CHRONIC OBSTRUCTIVE PULMONARY DISEASE, UNSPECIFIED COPD TYPE (HCC): Primary | ICD-10-CM

## 2018-07-03 DIAGNOSIS — I50.42 CHF (CONGESTIVE HEART FAILURE), NYHA CLASS III, CHRONIC, COMBINED (HCC): ICD-10-CM

## 2018-07-03 PROCEDURE — 99214 OFFICE O/P EST MOD 30 MIN: CPT | Performed by: INTERNAL MEDICINE

## 2018-07-03 ASSESSMENT — ENCOUNTER SYMPTOMS
VOICE CHANGE: 0
WHEEZING: 1
NAUSEA: 0
EYE ITCHING: 0
DIARRHEA: 0
VOMITING: 0
ABDOMINAL PAIN: 0
COUGH: 1
RHINORRHEA: 0
CHEST TIGHTNESS: 0
SHORTNESS OF BREATH: 1
SORE THROAT: 0

## 2018-07-03 NOTE — PROGRESS NOTES
Subjective:     Israel Lopez is a 79 y.o. male who complains today of:     Chief Complaint   Patient presents with    Follow-up     three month f/u for Chest Xray results. HPI  Patient is using 2 lit O2 with activity and sleep. C/o shortness of breath with exertion. Occasional Wheezing   C/o dry Cough   No Hemoptysis  No Chest tightness   No Chest pain with radiation  or pleuritic pain  No Fever or chills. No Rhinorrhea and postnasal drip. Patient want to buy OxyGo  POC from [facility], he has old POC and LCD screen broke. Allergies:  Penicillins  Past Medical History:   Diagnosis Date    CAD (coronary artery disease)     [facility]     COPD (chronic obstructive pulmonary disease) [facility]     Defibrillator activation     Diabetes mellitus with insulin therapy ([facility]     Generalized and unspecified atherosclerosis     Gout     Hyperlipidemia     Hypertension     Pain in joint, multiple sites     Status post angioplasty     TIA (transient ischemic attack)     Tobacco abuse     Type II or unspecified type diabetes mellitus without mention of complication, not stated as uncontrolled      Past Surgical History:   Procedure Laterality Date    CARDIAC CATHETERIZATION      CORONARY ANGIOPLASTY  4/29/11    Dr Hill Sue CORONARY ARTERY BYPASS GRAFT      EYE SURGERY Bilateral     Cataracts     OTHER SURGICAL HISTORY      difibrillator     ID EGD TRANSORAL BIOPSY SINGLE/MULTIPLE N/A 11/5/2017    EGD BIOPSY performed by Reny Alicia MD at [facility]     Family History   Problem Relation Age of Onset    Cancer Brother     Diabetes Mother     Heart Disease Father     Emphysema Father      Social History     Social History    Marital status: Single     Spouse name: N/A    Number of children: N/A    Years of education: N/A     Occupational History    Not on file.      Social History Main Topics    Smoking status: Former Smoker     Packs/day: 1.50     Years: 25.00     Quit date: 1/1/2012    Smokeless tobacco: Never Used    Alcohol use No    Drug use: No    Sexual activity: Not on file     Other Topics Concern    Not on file     Social History Narrative    No narrative on file         Review of Systems   Constitutional: Negative for chills, diaphoresis, fatigue and fever. HENT: Negative for congestion, mouth sores, nosebleeds, postnasal drip, rhinorrhea, sneezing, sore throat and voice change. Eyes: Negative for itching and visual disturbance. Respiratory: Positive for cough, shortness of breath and wheezing. Negative for chest tightness. Cardiovascular: Negative. Negative for chest pain, palpitations and leg swelling. Gastrointestinal: Negative for abdominal pain, diarrhea, nausea and vomiting. Genitourinary: Negative for difficulty urinating and hematuria. Musculoskeletal: Negative for arthralgias, joint swelling and myalgias. Skin: Negative for rash. Allergic/Immunologic: Negative for environmental allergies. Neurological: Negative for dizziness, tremors, weakness and headaches. Psychiatric/Behavioral: Negative for behavioral problems and sleep disturbance. Objective:     Vitals:    07/03/18 1054   BP: 124/60   Pulse: 84   Resp: 16   Temp: 96.6 °F (35.9 °C)   TempSrc: Tympanic   SpO2: 95%   Weight: 214 lb (97.1 kg)   Height: 5' 10\" (1.778 m)         Physical Exam   Constitutional: He is oriented to person, place, and time. He appears well-developed and well-nourished. HENT:   Head: Normocephalic and atraumatic. Nose: Nose normal.   Mouth/Throat: Oropharynx is clear and moist.   Eyes: Conjunctivae and EOM are normal. Pupils are equal, round, and reactive to light. Neck: No JVD present. No tracheal deviation present. No thyromegaly present. Cardiovascular: Normal rate and regular rhythm. Exam reveals no gallop and no friction rub.     No murmur differently: Take 12.5 mg by mouth daily ) 30 tablet 3    pantoprazole (PROTONIX) 40 MG tablet Take 1 tablet by mouth every morning (before breakfast) 30 tablet 3    ENTRESTO 24-26 MG per tablet Take 1 tablet by mouth 2 times daily   11    gabapentin (NEURONTIN) 300 MG capsule Take 1 capsule by mouth 3 times daily 90 capsule 3    clopidogrel (PLAVIX) 75 MG tablet Take 75 mg by mouth daily      atorvastatin (LIPITOR) 80 MG tablet Take 80 mg by mouth daily      amiodarone (CORDARONE) 200 MG tablet Take 200 mg by mouth daily      DIGITEK 125 MCG tablet TAKE 1 TABLET DAILY 90 tablet 3    albuterol (PROAIR HFA) 108 (90 BASE) MCG/ACT inhaler Inhale 2 puffs into the lungs every 4 hours as needed for Wheezing 3 Inhaler 0     No current facility-administered medications for this visit. Results for orders placed during the hospital encounter of 04/02/18   XR CHEST STANDARD (2 VW)    Narrative EXAMINATION: XR CHEST (2 VW)    CLINICAL HISTORY: Pacemaker installation follow-up    COMPARISONS: April 2, 2018. FINDINGS: The patient underwent ICD/pacemaker revision on April 2, 2018, replacing the previous 2-lead device with a 3-lead device and added a left ventricular vein pacemaker lead. There is no pneumothorax. The mediastinum is not widened or shifted. Cardiomegaly is unchanged. Central vascular prominence is mild. There is no pulmonary edema. There is no pleural effusion. The position is unremarkable. CONCLUSION: UNCOMPLICATED ICD/PERMANENT PACEMAKER REVISION, WITHOUT NEW ABNORMAL COMPARED TO YESTERDAY. XR CHEST STANDARD (2 VW)    Narrative EXAMINATION: XR CHEST (2 VW), 4/2/2018 1:00 PM     History: 29-year-old male, shortness of breath    COMPARISON:  March 23, 2018    FINDINGS:  Chest 2 views: Increased lung markings in the lung bases. The costophrenic angles are clear. There is cardiomegaly. There is a left chest wall cardiac device with 2 leads. The pulmonary vascularity is normal size.  There are median sternotomy wires. There are   mediastinal surgical clips. Impression Increased lung markings in the lung bases may represent mild interstitial edema. There is cardiomegaly. Correlate for CHF.     ]  Results for orders placed during the hospital encounter of 05/15/18   XR CHEST PORTABLE    Narrative EXAMINATION: AP erect portable     CLINICAL HISTORY: Short of breath    COMPARISONS: April 3, 2018     FINDINGS:    Two views of the chest are submitted. There is a left-sided ICD device leads overlying the cardiac silhouette. Unchanged. There are multiple median sternotomy wires. The cardiac silhouette is enlarged. The mediastinum is unremarkable. Pulmonary vascular is mildly congested. , lungs are hyperinflated and there is increased interstitial markings. Right sided trachea. Superimposed area of patchy to coalescent infiltrate right lower lobe. .  No effusions. Pneumothoraces. Impression PULMONARY VASCULAR IS Y CONGESTED SUGGESTING COMPONENT OF CHF SUPERIMPOSED UPON COPD. SUPERIMPOSED AREA OF PATCHY TO COALESCENT INFILTRATE RIGHT LOWER LOBE. Jody Hsieh Assessment/Plan:     1. Chronic obstructive pulmonary disease, unspecified COPD type ([address]  Patient is on albuterol nebulizer every 6 hours when necessary albuterol inhaler 2 puffs every 4 hours when necessary and Symbicort 2 puffs twice a day. He is having short of breath with exertion and occasional wheezing continue bronchodilator as before    2. Hypoxia  Patient is on 2 L O2 with activity sleep. He wants to get a POC. He is ambulatory pulse oximetry done and he his low as O2 saturation was 91% on room air. Patient was advised she does not need oxygen with activity will continue oxygen with sleep and when necessary during daytime if he gets short of breath. 3. Obesity (BMI 30-39. 9)  Patient patient is advised try to lose weight.  obesity related risk explained to the patient ,  Current weight:  214 lb (97.1 kg) Lbs. BMI:  Body mass index is 30.71 kg/m². 4. CHF (congestive heart failure), NYHA class III, chronic, combined ([facility]  He is on Lasix 40 mg daily, digoxin 125 µg daily      Return in about 4 months (around 11/3/2018) for COPD, hypoxia on O2.       Karl Ontiveros MD

## 2018-07-09 ENCOUNTER — CARE COORDINATION (OUTPATIENT)
Dept: FAMILY MEDICINE CLINIC | Age: 68
End: 2018-07-09

## 2018-07-09 RX ORDER — ALLOPURINOL 100 MG/1
TABLET ORAL
Qty: 90 TABLET | Refills: 0 | Status: SHIPPED | OUTPATIENT
Start: 2018-07-09 | End: 2018-10-06 | Stop reason: SDUPTHER

## 2018-07-16 ENCOUNTER — OFFICE VISIT (OUTPATIENT)
Dept: FAMILY MEDICINE CLINIC | Age: 68
End: 2018-07-16
Payer: MEDICARE

## 2018-07-16 VITALS
RESPIRATION RATE: 14 BRPM | OXYGEN SATURATION: 97 % | HEIGHT: 70 IN | BODY MASS INDEX: 31.39 KG/M2 | WEIGHT: 219.3 LBS | DIASTOLIC BLOOD PRESSURE: 78 MMHG | SYSTOLIC BLOOD PRESSURE: 128 MMHG | TEMPERATURE: 97.2 F | HEART RATE: 92 BPM

## 2018-07-16 DIAGNOSIS — R30.0 DYSURIA: ICD-10-CM

## 2018-07-16 DIAGNOSIS — R31.29 MICROSCOPIC HEMATURIA: ICD-10-CM
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
## 2018-07-16 DIAGNOSIS — M25.511 ACUTE PAIN OF RIGHT SHOULDER: ICD-10-CM

## 2018-07-16 LAB
APPEARANCE FLUID: CLEAR
BILIRUBIN, POC: ABNORMAL
BLOOD URINE, POC: ABNORMAL
CLARITY, POC: CLEAR
COLOR, POC: YELLOW
GLUCOSE URINE, POC: ABNORMAL
KETONES, POC: ABNORMAL
LEUKOCYTE EST, POC: ABNORMAL
NITRITE, POC: ABNORMAL
PH, POC: 5.5
PROTEIN, POC: ABNORMAL
SPECIFIC GRAVITY, POC: 1.02
UROBILINOGEN, POC: ABNORMAL

## 2018-07-16 PROCEDURE — 99213 OFFICE O/P EST LOW 20 MIN: CPT | Performed by: FAMILY MEDICINE

## 2018-07-16 PROCEDURE — 81002 URINALYSIS NONAUTO W/O SCOPE: CPT | Performed by: FAMILY MEDICINE

## 2018-07-16 RX ORDER — DOXYCYCLINE HYCLATE 100 MG
100 TABLET ORAL 2 TIMES DAILY
Qty: 20 TABLET | Refills: 0 | Status: SHIPPED | OUTPATIENT
Start: 2018-07-16 | End: 2018-07-16 | Stop reason: SDUPTHER

## 2018-07-16 RX ORDER — DOXYCYCLINE HYCLATE 100 MG
100 TABLET ORAL 2 TIMES DAILY
Qty: 20 TABLET | Refills: 0 | Status: SHIPPED | OUTPATIENT
Start: 2018-07-16 | End: 2018-07-26

## 2018-07-16 ASSESSMENT — ENCOUNTER SYMPTOMS: ABDOMINAL PAIN: 0

## 2018-07-16 NOTE — PROGRESS NOTES
 NOVOLIN 70/30 (70-30) 100 UNIT/ML injection vial INJECT 55 UNITS TWICE A DAY (Patient taking differently: sliding scale) 80 mL 3    warfarin (COUMADIN) 5 MG tablet Take as directed by [facility] AT [facility]. Quantity equals 90 day supply. (Patient taking differently: Take 7.5 mg by mouth daily Take 7.5 mg on Monday, Wednesday, and Friday and 5 mg all other days) 120 tablet 1    nitroGLYCERIN (NITROSTAT) 0.4 MG SL tablet Place 1 tablet under the tongue every 5 minutes as needed for Chest pain 25 tablet 0    metoprolol succinate (TOPROL XL) 25 MG extended release tablet Take 1 tablet by mouth daily (Patient taking differently: Take 12.5 mg by mouth daily ) 30 tablet 3    pantoprazole (PROTONIX) 40 MG tablet Take 1 tablet by mouth every morning (before breakfast) 30 tablet 3    ENTRESTO 24-26 MG per tablet Take 1 tablet by mouth 2 times daily   11    gabapentin (NEURONTIN) 300 MG capsule Take 1 capsule by mouth 3 times daily 90 capsule 3    clopidogrel (PLAVIX) 75 MG tablet Take 75 mg by mouth daily      atorvastatin (LIPITOR) 80 MG tablet Take 80 mg by mouth daily      amiodarone (CORDARONE) 200 MG tablet Take 200 mg by mouth daily      DIGITEK 125 MCG tablet TAKE 1 TABLET DAILY 90 tablet 3    albuterol (PROAIR HFA) 108 (90 BASE) MCG/ACT inhaler Inhale 2 puffs into the lungs every 4 hours as needed for Wheezing 3 Inhaler 0     No current facility-administered medications for this visit.       Family History   Problem Relation Age of Onset    Cancer Brother     Diabetes Mother     Heart Disease Father     Emphysema Father      Past Medical History:   Diagnosis Date    CAD (coronary artery disease)     Cardiomyopathy ([address]     COPD (chronic obstructive pulmonary disease) ([address]     Defibrillator activation     Diabetes mellitus with insulin therapy ([address]     Generalized and unspecified atherosclerosis     Gout     Hyperlipidemia     Hypertension     Pain in joint, multiple sites     Status post angioplasty     TIA (transient ischemic attack)     Tobacco abuse     Type II or unspecified type diabetes mellitus without mention of complication, not stated as uncontrolled    + arthritic changes but no fx on xray shoulder   Objective:   /78   Pulse 92   Temp 97.2 °F (36.2 °C)   Resp 14   Ht 5' 10\" (1.778 m)   Wt 219 lb 4.8 oz (99.5 kg)   SpO2 97%   BMI 31.47 kg/m²     Physical Exam  Neck:  Normal rom  Shoulder:    No  Swelling of shoulder on right side                         Mild diffuse Tenderness of shoulder                          Rotator cuff strength 5/5                        Mildly reduced  Internal /external rotation of      Right  Shoulder  Pulses:   2+ Radial pulses  Equal  Lungs:  Clear equal breath sounds bilat  Heart:    Rate reg  1/6 syst murmur along llsb   Abdomen; B.S present. Soft. Non tender. No hepatosplenomegaly. No masses. Back; No CVA tenderness bilaterally   Assessment:        Diagnosis Orders   1. Fall, initial encounter  XR SHOULDER RIGHT (MIN 2 VIEWS)   2. Acute pain of right shoulder  XR SHOULDER RIGHT (MIN 2 VIEWS)   3. Dysuria  POCT Urinalysis no Micro    Urine Culture      Plan:       Orders Placed This Encounter   Procedures    Urine Culture     Standing Status:   Future     Standing Expiration Date:   7/16/2019     Order Specific Question:   Specify (ex-cath, midstream, cysto, etc)?      Answer:   mid    XR SHOULDER RIGHT (MIN 2 VIEWS)     Standing Status:   Future     Number of Occurrences:   1     Standing Expiration Date:   7/16/2019   [facility]     Referral Priority:   Routine     Referral Type:   Eval and Treat     Referral Reason:   Specialty Services Required     Referred to Provider:   Sixto Bennett MD     Requested Specialty:   Urology     Number of Visits Requested:   1    External Referral - Abdi Galicia MD - Knee & Shoulder Arthroscopy, Sports Med     Referral Priority:   Routine     Referral Type:   Eval and Treat     Referral Reason:   Specialty Services Required     Referred to Provider:   Lakhwinder Villasenor MD     Requested Specialty:   Orthopedic Surgery     Number of Visits Requested:   1    POCT Urinalysis no Micro     Orders Placed This Encounter   Medications    DISCONTD: doxycycline hyclate (VIBRA-TABS) 100 MG tablet     Sig: Take 1 tablet by mouth 2 times daily for 10 days     Dispense:  20 tablet     Refill:  0    doxycycline hyclate (VIBRA-TABS) 100 MG tablet     Sig: Take 1 tablet by mouth 2 times daily for 10 days     Dispense:  20 tablet     Refill:  0   f/u in 3 months.   Stressed importance of follow up with specialities

## 2018-07-17 ENCOUNTER — HOSPITAL ENCOUNTER (OUTPATIENT)
Dept: PHARMACY | Age: 68
Setting detail: THERAPIES SERIES
Discharge: HOME | End: 2018-07-17
Payer: MEDICARE

## 2018-07-17 DIAGNOSIS — I48.91 ATRIAL FIBRILLATION, UNSPECIFIED TYPE (HCC): ICD-10-CM

## 2018-07-17 LAB
INR BLD: 2.5
PROTIME: 29.5 SECONDS

## 2018-07-17 PROCEDURE — 99211 OFF/OP EST MAY X REQ PHY/QHP: CPT | Performed by: PHARMACIST

## 2018-07-17 PROCEDURE — 85610 PROTHROMBIN TIME: CPT | Performed by: PHARMACIST

## 2018-07-17 NOTE — PROGRESS NOTES
Mr. Bridgett Delgado is a 79 y.o. y/o male with history of Afib who presents today for anticoagulation monitoring and adjustment.   INR 2.5 is therapeutic for this patient (goal range 2-3) and is reflective of 42.5 mg TWD  Patient verifies current dosing regimen, patient able to verbally recall dose  Patient reports no  missed doses since last INR   Patient denies s/sx clotting and/or stroke  Patient is currently on portable O2 due to difficult breathing  Patient denies hematuria, epistaxis, rectal bleeding  Patient denies changes in diet, alcohol, or tobacco use  Reviewed medication list and drug allergies with patient, updated any medication additions or modifications accordingly  Patient also denies any pending medical or dental procedures scheduled at this time  Patient was instructed to continue 42.5mg TWD and RTC 3 weeks

## 2018-07-18 DIAGNOSIS — E11.9 TYPE 2 DIABETES MELLITUS WITHOUT COMPLICATION, UNSPECIFIED LONG TERM INSULIN USE STATUS: ICD-10-CM

## 2018-07-18 RX ORDER — WARFARIN SODIUM 5 MG/1
TABLET ORAL
Qty: 120 TABLET | Refills: 1 | Status: SHIPPED | OUTPATIENT
Start: 2018-07-18 | End: 2019-01-15 | Stop reason: SDUPTHER

## 2018-07-19 LAB
ORGANISM: ABNORMAL
URINE CULTURE, ROUTINE: ABNORMAL
URINE CULTURE, ROUTINE: ABNORMAL

## 2018-07-23 RX ORDER — COLCHICINE 0.6 MG/1
TABLET, FILM COATED ORAL
Qty: 90 TABLET | Refills: 0 | Status: SHIPPED | OUTPATIENT
Start: 2018-07-23 | End: 2019-11-26 | Stop reason: ALTCHOICE

## 2018-07-23 RX ORDER — NITROFURANTOIN 25; 75 MG/1; MG/1
100 CAPSULE ORAL 2 TIMES DAILY
Qty: 20 CAPSULE | Refills: 0 | Status: SHIPPED | OUTPATIENT
Start: 2018-07-23 | End: 2018-08-02

## 2018-07-30 RX ORDER — SYRINGE-NEEDLE,INSULIN,0.5 ML 31 GX5/16"
SYRINGE, EMPTY DISPOSABLE MISCELLANEOUS
Qty: 300 EACH | Refills: 0 | Status: SHIPPED | OUTPATIENT
Start: 2018-07-30 | End: 2020-01-02 | Stop reason: SDUPTHER

## 2018-08-06 ENCOUNTER — HOSPITAL ENCOUNTER (OUTPATIENT)
Dept: GENERAL RADIOLOGY | Age: 68
Discharge: HOME | End: 2018-08-08
Payer: MEDICARE

## 2018-08-06 DIAGNOSIS — Z79.899 HIGH RISK MEDICATION USE: ICD-10-CM

## 2018-08-06 LAB
ALBUMIN SERPL-MCNC: 3.6 G/DL (ref 3.9–4.9)
ALP BLD-CCNC: 142 U/L (ref 35–104)
ALT SERPL-CCNC: 52 U/L (ref 0–41)
ANION GAP SERPL CALCULATED.3IONS-SCNC: 14 MEQ/L (ref 7–13)
AST SERPL-CCNC: 49 U/L (ref 0–40)
BILIRUB SERPL-MCNC: 1.1 MG/DL (ref 0–1.2)
BILIRUBIN DIRECT: 0.3 MG/DL (ref 0–0.3)
BILIRUBIN, INDIRECT: 0.8 MG/DL (ref 0–0.6)
BUN BLDV-MCNC: 13 MG/DL (ref 8–23)
CALCIUM SERPL-MCNC: 8.7 MG/DL (ref 8.6–10.2)
CHLORIDE BLD-SCNC: 98 MEQ/L (ref 98–107)
CO2: 26 MEQ/L (ref 22–29)
CREAT SERPL-MCNC: 1.07 MG/DL (ref 0.7–1.2)
GFR AFRICAN AMERICAN: >60
GFR NON-AFRICAN AMERICAN: >60
GLUCOSE BLD-MCNC: 169 MG/DL (ref 74–109)
POTASSIUM SERPL-SCNC: 4.2 MEQ/L (ref 3.5–5.1)
SODIUM BLD-SCNC: 138 MEQ/L (ref 132–144)
T4 FREE: 1.73 NG/DL (ref 0.93–1.7)
TOTAL PROTEIN: 6.9 G/DL (ref 6.4–8.1)
TSH SERPL DL<=0.05 MIU/L-ACNC: 1.62 UIU/ML (ref 0.27–4.2)

## 2018-08-06 PROCEDURE — 71046 X-RAY EXAM CHEST 2 VIEWS: CPT

## 2018-08-07 ENCOUNTER — HOSPITAL ENCOUNTER (OUTPATIENT)
Dept: PHARMACY | Age: 68
Setting detail: THERAPIES SERIES
Discharge: HOME | End: 2018-08-07
Payer: MEDICARE

## 2018-08-07 ENCOUNTER — OFFICE VISIT (OUTPATIENT)
Dept: UROLOGY | Age: 68
End: 2018-08-07
Payer: MEDICARE

## 2018-08-07 VITALS
HEIGHT: 71 IN | DIASTOLIC BLOOD PRESSURE: 80 MMHG | HEART RATE: 90 BPM | BODY MASS INDEX: 30.1 KG/M2 | WEIGHT: 215 LBS | SYSTOLIC BLOOD PRESSURE: 118 MMHG

## 2018-08-07 DIAGNOSIS — R30.0 DYSURIA: Primary | ICD-10-CM

## 2018-08-07 DIAGNOSIS — I48.91 ATRIAL FIBRILLATION, UNSPECIFIED TYPE (HCC): ICD-10-CM

## 2018-08-07 DIAGNOSIS — R33.9 URINARY RETENTION: ICD-10-CM

## 2018-08-07 LAB
BILIRUBIN, POC: ABNORMAL
BLOOD URINE, POC: ABNORMAL
CLARITY, POC: CLEAR
COLOR, POC: YELLOW
GLUCOSE URINE, POC: ABNORMAL
INR BLD: 4.8
KETONES, POC: ABNORMAL
LEUKOCYTE EST, POC: ABNORMAL
NITRITE, POC: ABNORMAL
PH, POC: 8.5
POST VOID RESIDUAL (PVR): NORMAL ML
PROTEIN, POC: ABNORMAL
PROTIME: 57.8 SECONDS
SPECIFIC GRAVITY, POC: 1.01
UROBILINOGEN, POC: 1

## 2018-08-07 PROCEDURE — 51798 US URINE CAPACITY MEASURE: CPT | Performed by: UROLOGY

## 2018-08-07 PROCEDURE — 99214 OFFICE O/P EST MOD 30 MIN: CPT | Performed by: UROLOGY

## 2018-08-07 PROCEDURE — 85610 PROTHROMBIN TIME: CPT | Performed by: PHARMACIST

## 2018-08-07 PROCEDURE — 81003 URINALYSIS AUTO W/O SCOPE: CPT | Performed by: UROLOGY

## 2018-08-07 PROCEDURE — 99211 OFF/OP EST MAY X REQ PHY/QHP: CPT | Performed by: PHARMACIST

## 2018-08-07 RX ORDER — DOXYCYCLINE HYCLATE 100 MG/1
100 CAPSULE ORAL 2 TIMES DAILY
Qty: 20 CAPSULE | Refills: 0 | Status: SHIPPED | OUTPATIENT
Start: 2018-08-07 | End: 2018-10-05 | Stop reason: ALTCHOICE

## 2018-08-07 NOTE — PROGRESS NOTES
yellow     Clarity, UA clear     Glucose, UA POC neg     Bilirubin, UA neg     Ketones, UA neg     Spec Grav, UA 1.015     Blood, UA POC small     pH, UA 8.5     Protein, UA POC 100mg     Urobilinogen, UA 1.0     Leukocytes, UA large     Nitrite, UA neg    poct post void residual   Result Value Ref Range    post void residual 28 ml ml    Narrative    A point of care test   Post Void Residual was completed by performing  ultrasound scan of the bladder and  reviewed by Dr Iftikhar Chavira       Physical Exam  Vitals:    08/07/18 0953   BP: 118/80   Pulse: 90   Weight: 215 lb (97.5 kg)   Height: 5' 11\" (1.803 m)     Constitutional: patient is oriented to person, place, and time. patient appears well-developed. not in distress. Ears: Adequate hearing/no hearing loss  Head: Normocephalic. Atraumatic  Neck: Normal range of motion. Cardiovascular: Normal rate, BP reviewed. sinus rhythm  Pulmonary/Chest: Normal respiratory effort  no shortness of breath  Abdominal: Not distended. No hernias  Urologic Exam  Phimotic foreskin with vitiligo. Testis normal.  Neurologic exam otherwise unchanged . Musculoskeletal: Normal range of motion. Normal strength. Extremities: No edema   Neurological: Cranial nerves intact   Skin: Skin is warm and dry. No lesions. No rashes   Psychiatric:  Alert and oriented ×3.   Assessment/Medical Necessity-Decision Making  Severe phimosis  Plan  Schedule circumcision  Cardiac clearance from cardiologist patient will see his cardiologist tomorrow  All risks and benefits of surgery described in detail  Greater than 50% of 35 minutes spent consulting patient face-to-face  Orders Placed This Encounter   Procedures    POCT Urinalysis No Micro (Auto)    poct post void residual     Orders Placed This Encounter   Medications    doxycycline hyclate (VIBRAMYCIN) 100 MG capsule     Sig: Take 1 capsule by mouth 2 times daily     Dispense:  20 capsule     Refill:  0     Tessie Borden MD       Please note

## 2018-08-07 NOTE — PROGRESS NOTES
Mr. Donald Collier is a 79 y.o. y/o male with history of Afib who presents today for anticoagulation monitoring and adjustment. INR 4.8 is supratherapeutic for this patient (goal range 2-3) and is reflective of 42.5 mg TWD  Patient verifies current dosing regimen, patient able to verbally recall dose  Patient reports no  missed doses since last INR   Patient denies s/sx clotting and/or stroke  Patient denies hematuria, epistaxis, rectal bleeding  Patient denies changes in diet, alcohol, or tobacco use  Reviewed medication list and drug allergies with patient, updated any medication additions or modifications accordingly  Patient is currently taking nitrofurantoin for a UTI. States he will be finishing medication in 2 days. Patient also denies any pending medical or dental procedures scheduled at this time; however, he is seeing a urologist today for a urinary problem.   Patient was instructed to hold today's dose, take 2.5mg instead of 7.5mg tomorrow, then resume 42.5mg TWD (abx tx will be finished in 2 days)  and RTC 2 weeks

## 2018-08-10 ENCOUNTER — HOSPITAL ENCOUNTER (OUTPATIENT)
Dept: PULMONOLOGY | Age: 68
Discharge: HOME | End: 2018-08-10
Payer: MEDICARE

## 2018-08-10 PROCEDURE — 94729 DIFFUSING CAPACITY: CPT

## 2018-08-10 PROCEDURE — 94060 EVALUATION OF WHEEZING: CPT

## 2018-08-10 PROCEDURE — 94726 PLETHYSMOGRAPHY LUNG VOLUMES: CPT | Performed by: INTERNAL MEDICINE

## 2018-08-10 PROCEDURE — 6360000002 HC RX W HCPCS: Performed by: INTERNAL MEDICINE

## 2018-08-10 PROCEDURE — 94060 EVALUATION OF WHEEZING: CPT | Performed by: INTERNAL MEDICINE

## 2018-08-10 PROCEDURE — 94726 PLETHYSMOGRAPHY LUNG VOLUMES: CPT

## 2018-08-10 PROCEDURE — 94729 DIFFUSING CAPACITY: CPT | Performed by: INTERNAL MEDICINE

## 2018-08-10 RX ORDER — ALBUTEROL SULFATE 2.5 MG/3ML
2.5 SOLUTION RESPIRATORY (INHALATION) ONCE
Status: COMPLETED | OUTPATIENT
Start: 2018-08-10 | End: 2018-08-10

## 2018-08-10 RX ADMIN — ALBUTEROL SULFATE 2.5 MG: 2.5 SOLUTION RESPIRATORY (INHALATION) at 13:09

## 2018-08-20 ENCOUNTER — HOSPITAL ENCOUNTER (OUTPATIENT)
Dept: PREADMISSION TESTING | Age: 68
Discharge: HOME | End: 2018-08-24
Payer: MEDICARE

## 2018-08-20 VITALS
TEMPERATURE: 98 F | HEIGHT: 71 IN | HEART RATE: 80 BPM | WEIGHT: 209.8 LBS | OXYGEN SATURATION: 95 % | BODY MASS INDEX: 29.37 KG/M2 | SYSTOLIC BLOOD PRESSURE: 116 MMHG | RESPIRATION RATE: 16 BRPM | DIASTOLIC BLOOD PRESSURE: 70 MMHG

## 2018-08-20 LAB
ANION GAP SERPL CALCULATED.3IONS-SCNC: 14 MEQ/L (ref 7–13)
BUN BLDV-MCNC: 27 MG/DL (ref 8–23)
CALCIUM SERPL-MCNC: 9.5 MG/DL (ref 8.6–10.2)
CHLORIDE BLD-SCNC: 98 MEQ/L (ref 98–107)
CO2: 25 MEQ/L (ref 22–29)
CREAT SERPL-MCNC: 1.28 MG/DL (ref 0.7–1.2)
GFR AFRICAN AMERICAN: >60
GFR NON-AFRICAN AMERICAN: 56.2
GLUCOSE BLD-MCNC: 179 MG/DL (ref 74–109)
HCT VFR BLD CALC: 41.1 % (ref 42–52)
HEMOGLOBIN: 13.5 G/DL (ref 14–18)
MCH RBC QN AUTO: 28.1 PG (ref 27–31.3)
MCHC RBC AUTO-ENTMCNC: 32.9 % (ref 33–37)
MCV RBC AUTO: 85.6 FL (ref 80–100)
PDW BLD-RTO: 16.3 % (ref 11.5–14.5)
PLATELET # BLD: 160 K/UL (ref 130–400)
POTASSIUM SERPL-SCNC: 3.9 MEQ/L (ref 3.5–5.1)
RBC # BLD: 4.81 M/UL (ref 4.7–6.1)
SODIUM BLD-SCNC: 137 MEQ/L (ref 132–144)
WBC # BLD: 4.6 K/UL (ref 4.8–10.8)

## 2018-08-20 PROCEDURE — 85027 COMPLETE CBC AUTOMATED: CPT

## 2018-08-20 PROCEDURE — 80048 BASIC METABOLIC PNL TOTAL CA: CPT

## 2018-08-20 RX ORDER — SODIUM CHLORIDE 0.9 % (FLUSH) 0.9 %
10 SYRINGE (ML) INJECTION EVERY 12 HOURS SCHEDULED
Status: CANCELLED | OUTPATIENT
Start: 2018-08-29

## 2018-08-20 RX ORDER — CIPROFLOXACIN 2 MG/ML
400 INJECTION, SOLUTION INTRAVENOUS ONCE
Status: CANCELLED | OUTPATIENT
Start: 2018-08-29 | End: 2018-08-29

## 2018-08-20 RX ORDER — LIDOCAINE HYDROCHLORIDE 10 MG/ML
1 INJECTION, SOLUTION EPIDURAL; INFILTRATION; INTRACAUDAL; PERINEURAL
Status: CANCELLED | OUTPATIENT
Start: 2018-08-29 | End: 2018-08-29

## 2018-08-20 RX ORDER — SODIUM CHLORIDE, SODIUM LACTATE, POTASSIUM CHLORIDE, CALCIUM CHLORIDE 600; 310; 30; 20 MG/100ML; MG/100ML; MG/100ML; MG/100ML
INJECTION, SOLUTION INTRAVENOUS CONTINUOUS
Status: CANCELLED | OUTPATIENT
Start: 2018-08-29

## 2018-08-20 RX ORDER — SODIUM CHLORIDE 0.9 % (FLUSH) 0.9 %
10 SYRINGE (ML) INJECTION PRN
Status: CANCELLED | OUTPATIENT
Start: 2018-08-29

## 2018-08-21 ENCOUNTER — HOSPITAL ENCOUNTER (OUTPATIENT)
Dept: PHARMACY | Age: 68
Setting detail: THERAPIES SERIES
Discharge: HOME | End: 2018-08-21
Payer: MEDICARE

## 2018-08-21 DIAGNOSIS — I48.91 ATRIAL FIBRILLATION, UNSPECIFIED TYPE (HCC): ICD-10-CM

## 2018-08-21 LAB
INR BLD: 1.1
PROTIME: 13.6 SECONDS

## 2018-08-21 PROCEDURE — 99211 OFF/OP EST MAY X REQ PHY/QHP: CPT

## 2018-08-21 PROCEDURE — 85610 PROTHROMBIN TIME: CPT

## 2018-08-29 ENCOUNTER — ANESTHESIA (OUTPATIENT)
Dept: OPERATING ROOM | Age: 68
End: 2018-08-29
Payer: MEDICARE

## 2018-08-29 ENCOUNTER — ANESTHESIA EVENT (OUTPATIENT)
Dept: OPERATING ROOM | Age: 68
End: 2018-08-29
Payer: MEDICARE

## 2018-08-29 ENCOUNTER — HOSPITAL ENCOUNTER (OUTPATIENT)
Age: 68
Setting detail: OUTPATIENT SURGERY
Discharge: HOME | End: 2018-08-29
Attending: UROLOGY | Admitting: UROLOGY
Payer: MEDICARE

## 2018-08-29 VITALS
HEART RATE: 94 BPM | TEMPERATURE: 98.7 F | OXYGEN SATURATION: 94 % | SYSTOLIC BLOOD PRESSURE: 122 MMHG | DIASTOLIC BLOOD PRESSURE: 74 MMHG | BODY MASS INDEX: 29.82 KG/M2 | HEIGHT: 71 IN | WEIGHT: 213 LBS | RESPIRATION RATE: 16 BRPM

## 2018-08-29 VITALS — SYSTOLIC BLOOD PRESSURE: 110 MMHG | DIASTOLIC BLOOD PRESSURE: 70 MMHG | TEMPERATURE: 97.7 F | OXYGEN SATURATION: 92 %

## 2018-08-29 DIAGNOSIS — Z48.816 SURGICAL AFTERCARE, GENITOURINARY SYSTEM: Primary | ICD-10-CM

## 2018-08-29 LAB
GLUCOSE BLD-MCNC: 138 MG/DL (ref 60–115)
GLUCOSE BLD-MCNC: 175 MG/DL (ref 60–115)
PERFORMED ON: ABNORMAL
PERFORMED ON: ABNORMAL

## 2018-08-29 PROCEDURE — 6360000002 HC RX W HCPCS: Performed by: NURSE PRACTITIONER

## 2018-08-29 PROCEDURE — 3600000002 HC SURGERY LEVEL 2 BASE: Performed by: UROLOGY

## 2018-08-29 PROCEDURE — 54161 CIRCUM 28 DAYS OR OLDER: CPT | Performed by: UROLOGY

## 2018-08-29 PROCEDURE — A4648 IMPLANTABLE TISSUE MARKER: HCPCS | Performed by: UROLOGY

## 2018-08-29 PROCEDURE — 7100000001 HC PACU RECOVERY - ADDTL 15 MIN: Performed by: UROLOGY

## 2018-08-29 PROCEDURE — 2500000003 HC RX 250 WO HCPCS: Performed by: NURSE PRACTITIONER

## 2018-08-29 PROCEDURE — 2709999900 HC NON-CHARGEABLE SUPPLY: Performed by: UROLOGY

## 2018-08-29 PROCEDURE — 7100000000 HC PACU RECOVERY - FIRST 15 MIN: Performed by: UROLOGY

## 2018-08-29 PROCEDURE — 2500000003 HC RX 250 WO HCPCS: Performed by: NURSE ANESTHETIST, CERTIFIED REGISTERED

## 2018-08-29 PROCEDURE — 7100000011 HC PHASE II RECOVERY - ADDTL 15 MIN: Performed by: UROLOGY

## 2018-08-29 PROCEDURE — 3700000000 HC ANESTHESIA ATTENDED CARE: Performed by: UROLOGY

## 2018-08-29 PROCEDURE — 2580000003 HC RX 258: Performed by: NURSE ANESTHETIST, CERTIFIED REGISTERED

## 2018-08-29 PROCEDURE — 7100000010 HC PHASE II RECOVERY - FIRST 15 MIN: Performed by: UROLOGY

## 2018-08-29 PROCEDURE — 6370000000 HC RX 637 (ALT 250 FOR IP): Performed by: UROLOGY

## 2018-08-29 PROCEDURE — 2500000003 HC RX 250 WO HCPCS: Performed by: UROLOGY

## 2018-08-29 PROCEDURE — 3700000001 HC ADD 15 MINUTES (ANESTHESIA): Performed by: UROLOGY

## 2018-08-29 PROCEDURE — 88304 TISSUE EXAM BY PATHOLOGIST: CPT

## 2018-08-29 PROCEDURE — 2580000003 HC RX 258: Performed by: NURSE PRACTITIONER

## 2018-08-29 PROCEDURE — 2580000003 HC RX 258: Performed by: UROLOGY

## 2018-08-29 PROCEDURE — 6360000002 HC RX W HCPCS: Performed by: NURSE ANESTHETIST, CERTIFIED REGISTERED

## 2018-08-29 PROCEDURE — 3600000012 HC SURGERY LEVEL 2 ADDTL 15MIN: Performed by: UROLOGY

## 2018-08-29 RX ORDER — LIDOCAINE HYDROCHLORIDE 10 MG/ML
INJECTION, SOLUTION EPIDURAL; INFILTRATION; INTRACAUDAL; PERINEURAL PRN
Status: DISCONTINUED | OUTPATIENT
Start: 2018-08-29 | End: 2018-08-29 | Stop reason: HOSPADM

## 2018-08-29 RX ORDER — SODIUM CHLORIDE 0.9 % (FLUSH) 0.9 %
10 SYRINGE (ML) INJECTION PRN
Status: DISCONTINUED | OUTPATIENT
Start: 2018-08-29 | End: 2018-08-29 | Stop reason: HOSPADM

## 2018-08-29 RX ORDER — ONDANSETRON 2 MG/ML
4 INJECTION INTRAMUSCULAR; INTRAVENOUS
Status: DISCONTINUED | OUTPATIENT
Start: 2018-08-29 | End: 2018-08-29 | Stop reason: HOSPADM

## 2018-08-29 RX ORDER — LIDOCAINE HYDROCHLORIDE 10 MG/ML
INJECTION, SOLUTION EPIDURAL; INFILTRATION; INTRACAUDAL; PERINEURAL
Status: DISCONTINUED
Start: 2018-08-29 | End: 2018-08-29 | Stop reason: HOSPADM

## 2018-08-29 RX ORDER — SODIUM CHLORIDE 0.9 % (FLUSH) 0.9 %
10 SYRINGE (ML) INJECTION EVERY 12 HOURS SCHEDULED
Status: DISCONTINUED | OUTPATIENT
Start: 2018-08-29 | End: 2018-08-29 | Stop reason: HOSPADM

## 2018-08-29 RX ORDER — LIDOCAINE HYDROCHLORIDE 10 MG/ML
1 INJECTION, SOLUTION EPIDURAL; INFILTRATION; INTRACAUDAL; PERINEURAL
Status: COMPLETED | OUTPATIENT
Start: 2018-08-29 | End: 2018-08-29

## 2018-08-29 RX ORDER — BUPIVACAINE HYDROCHLORIDE 2.5 MG/ML
INJECTION, SOLUTION EPIDURAL; INFILTRATION; INTRACAUDAL PRN
Status: DISCONTINUED | OUTPATIENT
Start: 2018-08-29 | End: 2018-08-29 | Stop reason: HOSPADM

## 2018-08-29 RX ORDER — PROPOFOL 10 MG/ML
INJECTION, EMULSION INTRAVENOUS PRN
Status: DISCONTINUED | OUTPATIENT
Start: 2018-08-29 | End: 2018-08-29 | Stop reason: SDUPTHER

## 2018-08-29 RX ORDER — DIPHENHYDRAMINE HYDROCHLORIDE 50 MG/ML
12.5 INJECTION INTRAMUSCULAR; INTRAVENOUS
Status: DISCONTINUED | OUTPATIENT
Start: 2018-08-29 | End: 2018-08-29 | Stop reason: HOSPADM

## 2018-08-29 RX ORDER — CIPROFLOXACIN 2 MG/ML
400 INJECTION, SOLUTION INTRAVENOUS ONCE
Status: COMPLETED | OUTPATIENT
Start: 2018-08-29 | End: 2018-08-29

## 2018-08-29 RX ORDER — SODIUM CHLORIDE, SODIUM LACTATE, POTASSIUM CHLORIDE, CALCIUM CHLORIDE 600; 310; 30; 20 MG/100ML; MG/100ML; MG/100ML; MG/100ML
INJECTION, SOLUTION INTRAVENOUS CONTINUOUS PRN
Status: DISCONTINUED | OUTPATIENT
Start: 2018-08-29 | End: 2018-08-29 | Stop reason: SDUPTHER

## 2018-08-29 RX ORDER — ONDANSETRON 2 MG/ML
INJECTION INTRAMUSCULAR; INTRAVENOUS PRN
Status: DISCONTINUED | OUTPATIENT
Start: 2018-08-29 | End: 2018-08-29 | Stop reason: SDUPTHER

## 2018-08-29 RX ORDER — GINSENG 100 MG
CAPSULE ORAL PRN
Status: DISCONTINUED | OUTPATIENT
Start: 2018-08-29 | End: 2018-08-29 | Stop reason: HOSPADM

## 2018-08-29 RX ORDER — HYDROCODONE BITARTRATE AND ACETAMINOPHEN 5; 325 MG/1; MG/1
1 TABLET ORAL PRN
Status: DISCONTINUED | OUTPATIENT
Start: 2018-08-29 | End: 2018-08-29 | Stop reason: HOSPADM

## 2018-08-29 RX ORDER — HYDROCODONE BITARTRATE AND ACETAMINOPHEN 5; 325 MG/1; MG/1
2 TABLET ORAL PRN
Status: DISCONTINUED | OUTPATIENT
Start: 2018-08-29 | End: 2018-08-29 | Stop reason: HOSPADM

## 2018-08-29 RX ORDER — SODIUM CHLORIDE, SODIUM LACTATE, POTASSIUM CHLORIDE, CALCIUM CHLORIDE 600; 310; 30; 20 MG/100ML; MG/100ML; MG/100ML; MG/100ML
INJECTION, SOLUTION INTRAVENOUS CONTINUOUS
Status: DISCONTINUED | OUTPATIENT
Start: 2018-08-29 | End: 2018-08-29 | Stop reason: HOSPADM

## 2018-08-29 RX ORDER — FENTANYL CITRATE 50 UG/ML
INJECTION, SOLUTION INTRAMUSCULAR; INTRAVENOUS PRN
Status: DISCONTINUED | OUTPATIENT
Start: 2018-08-29 | End: 2018-08-29 | Stop reason: SDUPTHER

## 2018-08-29 RX ORDER — MIDAZOLAM HYDROCHLORIDE 1 MG/ML
INJECTION INTRAMUSCULAR; INTRAVENOUS PRN
Status: DISCONTINUED | OUTPATIENT
Start: 2018-08-29 | End: 2018-08-29 | Stop reason: SDUPTHER

## 2018-08-29 RX ORDER — FENTANYL CITRATE 50 UG/ML
50 INJECTION, SOLUTION INTRAMUSCULAR; INTRAVENOUS EVERY 10 MIN PRN
Status: DISCONTINUED | OUTPATIENT
Start: 2018-08-29 | End: 2018-08-29 | Stop reason: HOSPADM

## 2018-08-29 RX ORDER — MAGNESIUM HYDROXIDE 1200 MG/15ML
LIQUID ORAL CONTINUOUS PRN
Status: DISCONTINUED | OUTPATIENT
Start: 2018-08-29 | End: 2018-08-29 | Stop reason: HOSPADM

## 2018-08-29 RX ORDER — HYDROCODONE BITARTRATE AND ACETAMINOPHEN 5; 325 MG/1; MG/1
1 TABLET ORAL EVERY 6 HOURS PRN
Qty: 20 TABLET | Refills: 0 | Status: SHIPPED | OUTPATIENT
Start: 2018-08-29 | End: 2018-09-03

## 2018-08-29 RX ORDER — MEPERIDINE HYDROCHLORIDE 25 MG/ML
12.5 INJECTION INTRAMUSCULAR; INTRAVENOUS; SUBCUTANEOUS EVERY 5 MIN PRN
Status: DISCONTINUED | OUTPATIENT
Start: 2018-08-29 | End: 2018-08-29 | Stop reason: HOSPADM

## 2018-08-29 RX ORDER — LIDOCAINE HYDROCHLORIDE 10 MG/ML
INJECTION, SOLUTION INFILTRATION; PERINEURAL PRN
Status: DISCONTINUED | OUTPATIENT
Start: 2018-08-29 | End: 2018-08-29 | Stop reason: SDUPTHER

## 2018-08-29 RX ORDER — METOCLOPRAMIDE HYDROCHLORIDE 5 MG/ML
10 INJECTION INTRAMUSCULAR; INTRAVENOUS
Status: DISCONTINUED | OUTPATIENT
Start: 2018-08-29 | End: 2018-08-29 | Stop reason: HOSPADM

## 2018-08-29 RX ADMIN — ONDANSETRON HYDROCHLORIDE 4 MG: 2 INJECTION, SOLUTION INTRAMUSCULAR; INTRAVENOUS at 10:15

## 2018-08-29 RX ADMIN — CIPROFLOXACIN 400 MG: 2 INJECTION, SOLUTION INTRAVENOUS at 10:00

## 2018-08-29 RX ADMIN — PROPOFOL 150 MG: 10 INJECTION, EMULSION INTRAVENOUS at 10:04

## 2018-08-29 RX ADMIN — LIDOCAINE HYDROCHLORIDE 50 MG: 10 INJECTION, SOLUTION INFILTRATION; PERINEURAL at 10:04

## 2018-08-29 RX ADMIN — SODIUM CHLORIDE, POTASSIUM CHLORIDE, SODIUM LACTATE AND CALCIUM CHLORIDE: 600; 310; 30; 20 INJECTION, SOLUTION INTRAVENOUS at 09:33

## 2018-08-29 RX ADMIN — SODIUM CHLORIDE, POTASSIUM CHLORIDE, SODIUM LACTATE AND CALCIUM CHLORIDE 1000 ML: 600; 310; 30; 20 INJECTION, SOLUTION INTRAVENOUS at 09:33

## 2018-08-29 RX ADMIN — FENTANYL CITRATE 50 MCG: 50 INJECTION, SOLUTION INTRAMUSCULAR; INTRAVENOUS at 10:26

## 2018-08-29 RX ADMIN — FENTANYL CITRATE 50 MCG: 50 INJECTION, SOLUTION INTRAMUSCULAR; INTRAVENOUS at 10:04

## 2018-08-29 RX ADMIN — LIDOCAINE HYDROCHLORIDE 0.1 ML: 10 INJECTION, SOLUTION EPIDURAL; INFILTRATION; INTRACAUDAL; PERINEURAL at 09:32

## 2018-08-29 RX ADMIN — MIDAZOLAM HYDROCHLORIDE 2 MG: 1 INJECTION, SOLUTION INTRAMUSCULAR; INTRAVENOUS at 10:00

## 2018-08-29 ASSESSMENT — PULMONARY FUNCTION TESTS
PIF_VALUE: 11
PIF_VALUE: 10
PIF_VALUE: 1
PIF_VALUE: 11
PIF_VALUE: 10
PIF_VALUE: 11
PIF_VALUE: 10
PIF_VALUE: 11
PIF_VALUE: 11
PIF_VALUE: 0
PIF_VALUE: 10
PIF_VALUE: 11
PIF_VALUE: 15
PIF_VALUE: 11
PIF_VALUE: 10
PIF_VALUE: 1
PIF_VALUE: 11
PIF_VALUE: 11
PIF_VALUE: 1
PIF_VALUE: 10
PIF_VALUE: 11
PIF_VALUE: 12
PIF_VALUE: 12
PIF_VALUE: 11
PIF_VALUE: 10
PIF_VALUE: 10
PIF_VALUE: 11
PIF_VALUE: 10
PIF_VALUE: 12
PIF_VALUE: 11
PIF_VALUE: 10
PIF_VALUE: 11
PIF_VALUE: 12
PIF_VALUE: 11
PIF_VALUE: 12
PIF_VALUE: 1
PIF_VALUE: 0
PIF_VALUE: 11
PIF_VALUE: 18
PIF_VALUE: 11
PIF_VALUE: 11
PIF_VALUE: 10
PIF_VALUE: 11
PIF_VALUE: 10
PIF_VALUE: 0
PIF_VALUE: 10
PIF_VALUE: 12
PIF_VALUE: 11
PIF_VALUE: 12
PIF_VALUE: 1
PIF_VALUE: 10
PIF_VALUE: 11
PIF_VALUE: 0
PIF_VALUE: 11
PIF_VALUE: 11
PIF_VALUE: 1
PIF_VALUE: 10
PIF_VALUE: 19
PIF_VALUE: 11
PIF_VALUE: 12

## 2018-08-29 ASSESSMENT — ENCOUNTER SYMPTOMS: SHORTNESS OF BREATH: 1

## 2018-08-29 ASSESSMENT — PAIN - FUNCTIONAL ASSESSMENT: PAIN_FUNCTIONAL_ASSESSMENT: 0-10

## 2018-08-29 ASSESSMENT — PAIN SCALES - GENERAL: PAINLEVEL_OUTOF10: 0

## 2018-08-29 NOTE — PROGRESS NOTES
Patient states he uses oxygen on and off at home and feels fine with out it postop.  Sats are 91 on room air.lungs are clear bilateral.

## 2018-08-29 NOTE — ANESTHESIA PRE PROCEDURE
M25.50    COPD (chronic obstructive pulmonary disease) ([facility]) J44.9    Diabetes mellitus with insulin therapy ([facility] E11.9, Z79.4    Hyperlipidemia E78.5    Hypertension I10    Generalized atherosclerosis I70.91    TIA (transient ischemic attack) G45.9    Elevation of level of transaminase or lactic acid dehydrogenase (LDH) R74.0    Tobacco dependence syndrome F17.200    Disorder of muscle M62.9    Mitral valve insufficiency I34.0    Acute lymphadenitis L04.9    Disorder of pancreas K86.9    Left heart failure (HCC) I50.1    Irritable bowel syndrome K58.9    Hyponatremia E87.1    Atherosclerosis of coronary artery I25.10    Generalized ischemic myocardial dysfunction I25.5    Coronary arteriosclerosis in native artery I25.10    Atrial flutter ([facility]) I48.92    Acute on chronic systolic CHF (congestive heart failure), NYHA class 4 ([facility]) I50.23    Atrial fibrillation ([facility]) I48.91    Chronic combined systolic and diastolic heart failure ([facility]) I50.42    Hemoptysis R04.2    SOB (shortness of breath) R06.02    CHF (congestive heart failure), NYHA class III, chronic, combined ([facility]) I50.42    CHF (congestive heart failure), NYHA class I, acute on chronic, combined ([facility]) V51.26    Acute systolic CHF (congestive heart failure) ([facility]) I50.21    CHF (congestive heart failure), NYHA class IV, acute on chronic, combined ([facility]) I50.43       Past Medical History:        Diagnosis Date    Arthritis     CAD (coronary artery disease)     Cardiomyopathy [facility]     COPD (chronic obstructive pulmonary disease) [facility]     Defibrillator activation     Diabetes mellitus with insulin therapy [facility]     Generalized and unspecified atherosclerosis     Gout     Hyperlipidemia     Hypertension     Pain in joint, multiple sites     Prolonged emergence from general anesthesia     Status post angioplasty     TIA (transient ischemic attack)     Tobacco abuse     Type II or unspecified type diabetes mellitus without

## 2018-09-06 ENCOUNTER — OFFICE VISIT (OUTPATIENT)
Dept: UROLOGY | Age: 68
End: 2018-09-06

## 2018-09-06 VITALS
BODY MASS INDEX: 29.96 KG/M2 | HEIGHT: 71 IN | SYSTOLIC BLOOD PRESSURE: 100 MMHG | WEIGHT: 214 LBS | DIASTOLIC BLOOD PRESSURE: 64 MMHG | HEART RATE: 88 BPM

## 2018-09-06 DIAGNOSIS — N47.1 PHIMOSIS: Primary | ICD-10-CM

## 2018-09-06 PROCEDURE — 99024 POSTOP FOLLOW-UP VISIT: CPT | Performed by: UROLOGY

## 2018-09-11 ENCOUNTER — APPOINTMENT (OUTPATIENT)
Dept: PHARMACY | Age: 68
End: 2018-09-11
Payer: MEDICARE

## 2018-09-11 ENCOUNTER — HOSPITAL ENCOUNTER (OUTPATIENT)
Dept: PHARMACY | Age: 68
Setting detail: THERAPIES SERIES
Discharge: HOME | End: 2018-09-11
Payer: MEDICARE

## 2018-09-11 DIAGNOSIS — I48.91 ATRIAL FIBRILLATION, UNSPECIFIED TYPE (HCC): ICD-10-CM

## 2018-09-11 LAB
INR BLD: 2.3
PROTIME: 28.1 SECONDS

## 2018-09-11 PROCEDURE — 99211 OFF/OP EST MAY X REQ PHY/QHP: CPT | Performed by: PHARMACIST

## 2018-09-11 PROCEDURE — 85610 PROTHROMBIN TIME: CPT | Performed by: PHARMACIST

## 2018-09-11 NOTE — PROGRESS NOTES
Mr. David Sue is a 79 y.o. y/o male with history of Afib who presents today for anticoagulation monitoring and adjustment. INR 2.3 is therapeutic for this patient (goal range 2-3) and is reflective of 42.5 mg TWD  Patient verifies current dosing regimen, patient able to verbally recall dose  Patient reports no  missed doses in the last week  Patient denies s/sx clotting and/or stroke  Patient denies hematuria, epistaxis, rectal bleeding  Patient denies changes in diet, alcohol, or tobacco use  Reviewed medication list and drug allergies with patient, updated any medication additions or modifications accordingly  Patient also denies any pending medical or dental procedures scheduled at this time  Patient had a Circumcision on 8/2918 and was off warfarin for 7 days prior to surgery. Patient states he still has some bleeding from the operative site. Patient was instructed to contact the surgeon if this dose not resolve in the next few days.   Patient was instructed to continue 42.5mg TWD and RTC 2 weeks

## 2018-09-18 ENCOUNTER — CARE COORDINATION (OUTPATIENT)
Dept: FAMILY MEDICINE CLINIC | Age: 68
End: 2018-09-18

## 2018-09-18 NOTE — CARE COORDINATION
(70-30) 100 UNIT/ML injection vial INJECT 55 UNITS TWICE A DAY  Patient taking differently: sliding scale 12/27/17   Nisha Walters MD   nitroGLYCERIN (NITROSTAT) 0.4 MG SL tablet Place 1 tablet under the tongue every 5 minutes as needed for Chest pain 11/13/17   Lyric Conde MD   metoprolol succinate (TOPROL XL) 25 MG extended release tablet Take 1 tablet by mouth daily  Patient taking differently: Take 12.5 mg by mouth daily  11/7/17   Abad iKnney MD   pantoprazole (PROTONIX) 40 MG tablet Take 1 tablet by mouth every morning (before breakfast) 11/8/17   Abad Kinney MD   ENTRESTO 24-26 MG per tablet Take 1 tablet by mouth 2 times daily  2/16/17   Historical Provider, MD   gabapentin (NEURONTIN) 300 MG capsule Take 1 capsule by mouth 3 times daily 1/30/17   Nisha Walters MD   clopidogrel (PLAVIX) 75 MG tablet Take 75 mg by mouth daily    Historical Provider, MD   atorvastatin (LIPITOR) 80 MG tablet Take 80 mg by mouth daily    Historical Provider, MD   amiodarone (CORDARONE) 200 MG tablet Take 200 mg by mouth daily    Historical Provider, MD   DIGITEK 125 MCG tablet TAKE 1 TABLET DAILY 8/2/15   Russel Wing MD   albuterol (PROAIR HFA) 108 (90 BASE) MCG/ACT inhaler Inhale 2 puffs into the lungs every 4 hours as needed for Wheezing 5/11/15   Russel Wing MD       Future Appointments  Date Time Provider Gordo Krugeri   9/25/2018 10:00 AM [address]   10/5/2018 10:30 AM Swapna Duran MD [facility]   10/15/2018 9:30 AM Lyric Conde  [place]   11/16/2018 9:45 AM Husam Cantu MD [address] [place] [address]

## 2018-09-20 ENCOUNTER — HOSPITAL ENCOUNTER (OUTPATIENT)
Dept: GENERAL RADIOLOGY | Age: 68
Discharge: HOME | End: 2018-09-22
Payer: MEDICARE

## 2018-09-20 DIAGNOSIS — Z95.810 HISTORY OF IMPLANTABLE CARDIOVERTER-DEFIBRILLATOR (ICD) PLACEMENT: ICD-10-CM

## 2018-09-20 PROCEDURE — 71046 X-RAY EXAM CHEST 2 VIEWS: CPT

## 2018-09-25 ENCOUNTER — HOSPITAL ENCOUNTER (OUTPATIENT)
Dept: PHARMACY | Age: 68
Setting detail: THERAPIES SERIES
Discharge: HOME | End: 2018-09-25
Payer: MEDICARE

## 2018-09-25 DIAGNOSIS — I48.91 ATRIAL FIBRILLATION, UNSPECIFIED TYPE (HCC): ICD-10-CM

## 2018-09-25 LAB
INR BLD: 2.3
PROTIME: 28.1 SECONDS

## 2018-09-25 PROCEDURE — 85610 PROTHROMBIN TIME: CPT | Performed by: PHARMACIST

## 2018-09-25 PROCEDURE — 99211 OFF/OP EST MAY X REQ PHY/QHP: CPT | Performed by: PHARMACIST

## 2018-10-05 ENCOUNTER — OFFICE VISIT (OUTPATIENT)
Dept: UROLOGY | Age: 68
End: 2018-10-05

## 2018-10-05 VITALS
SYSTOLIC BLOOD PRESSURE: 118 MMHG | BODY MASS INDEX: 29.82 KG/M2 | DIASTOLIC BLOOD PRESSURE: 70 MMHG | WEIGHT: 213 LBS | HEIGHT: 71 IN | HEART RATE: 95 BPM

## 2018-10-05 DIAGNOSIS — N47.1 PHIMOSIS: Primary | ICD-10-CM

## 2018-10-05 PROCEDURE — 99024 POSTOP FOLLOW-UP VISIT: CPT | Performed by: UROLOGY

## 2018-10-07 RX ORDER — ALLOPURINOL 100 MG/1
TABLET ORAL
Qty: 90 TABLET | Refills: 0 | Status: SHIPPED | OUTPATIENT
Start: 2018-10-07 | End: 2019-01-05 | Stop reason: SDUPTHER

## 2018-10-15 ENCOUNTER — OFFICE VISIT (OUTPATIENT)
Dept: FAMILY MEDICINE CLINIC | Age: 68
End: 2018-10-15
Payer: MEDICARE

## 2018-10-15 ENCOUNTER — CARE COORDINATION (OUTPATIENT)
Dept: FAMILY MEDICINE CLINIC | Age: 68
End: 2018-10-15

## 2018-10-15 VITALS
SYSTOLIC BLOOD PRESSURE: 128 MMHG | OXYGEN SATURATION: 98 % | BODY MASS INDEX: 29.83 KG/M2 | WEIGHT: 213.1 LBS | TEMPERATURE: 97.3 F | DIASTOLIC BLOOD PRESSURE: 74 MMHG | HEART RATE: 97 BPM | RESPIRATION RATE: 14 BRPM | HEIGHT: 71 IN

## 2018-10-15 DIAGNOSIS — Z91.81 AT HIGH RISK FOR FALLS: ICD-10-CM

## 2018-10-15 DIAGNOSIS — I50.42 HEART FAILURE, SYSTOLIC AND DIASTOLIC, CHRONIC (HCC): Primary | ICD-10-CM

## 2018-10-15 DIAGNOSIS — J44.9 CHRONIC OBSTRUCTIVE PULMONARY DISEASE, UNSPECIFIED COPD TYPE (HCC): ICD-10-CM

## 2018-10-15 PROCEDURE — G0008 ADMIN INFLUENZA VIRUS VAC: HCPCS | Performed by: FAMILY MEDICINE

## 2018-10-15 PROCEDURE — 99213 OFFICE O/P EST LOW 20 MIN: CPT | Performed by: FAMILY MEDICINE

## 2018-10-15 PROCEDURE — 90662 IIV NO PRSV INCREASED AG IM: CPT | Performed by: FAMILY MEDICINE

## 2018-10-15 PROCEDURE — G0444 DEPRESSION SCREEN ANNUAL: HCPCS | Performed by: FAMILY MEDICINE

## 2018-10-15 ASSESSMENT — ENCOUNTER SYMPTOMS
DYSPNEA ASSOCIATED WITH: EXERTION
ABDOMINAL PAIN: 0

## 2018-10-15 ASSESSMENT — PATIENT HEALTH QUESTIONNAIRE - PHQ9
SUM OF ALL RESPONSES TO PHQ QUESTIONS 1-9: 22
7. TROUBLE CONCENTRATING ON THINGS, SUCH AS READING THE NEWSPAPER OR WATCHING TELEVISION: 3
8. MOVING OR SPEAKING SO SLOWLY THAT OTHER PEOPLE COULD HAVE NOTICED. OR THE OPPOSITE, BEING SO FIGETY OR RESTLESS THAT YOU HAVE BEEN MOVING AROUND A LOT MORE THAN USUAL: 3
3. TROUBLE FALLING OR STAYING ASLEEP: 3
1. LITTLE INTEREST OR PLEASURE IN DOING THINGS: 3
4. FEELING TIRED OR HAVING LITTLE ENERGY: 3
6. FEELING BAD ABOUT YOURSELF - OR THAT YOU ARE A FAILURE OR HAVE LET YOURSELF OR YOUR FAMILY DOWN: 3
9. THOUGHTS THAT YOU WOULD BE BETTER OFF DEAD, OR OF HURTING YOURSELF: 0
5. POOR APPETITE OR OVEREATING: 1
SUM OF ALL RESPONSES TO PHQ QUESTIONS 1-9: 22
2. FEELING DOWN, DEPRESSED OR HOPELESS: 3
10. IF YOU CHECKED OFF ANY PROBLEMS, HOW DIFFICULT HAVE THESE PROBLEMS MADE IT FOR YOU TO DO YOUR WORK, TAKE CARE OF THINGS AT HOME, OR GET ALONG WITH OTHER PEOPLE: 1
SUM OF ALL RESPONSES TO PHQ9 QUESTIONS 1 & 2: 6

## 2018-10-17 ENCOUNTER — TELEPHONE (OUTPATIENT)
Dept: PHARMACY | Age: 68
End: 2018-10-17

## 2018-10-17 DIAGNOSIS — I48.91 ATRIAL FIBRILLATION, UNSPECIFIED TYPE (HCC): ICD-10-CM

## 2018-10-24 ENCOUNTER — HOSPITAL ENCOUNTER (OUTPATIENT)
Dept: PHARMACY | Age: 68
Setting detail: THERAPIES SERIES
Discharge: HOME | End: 2018-10-24
Payer: MEDICARE

## 2018-10-24 DIAGNOSIS — I48.91 ATRIAL FIBRILLATION, UNSPECIFIED TYPE (HCC): ICD-10-CM

## 2018-10-24 PROCEDURE — 99211 OFF/OP EST MAY X REQ PHY/QHP: CPT | Performed by: PHARMACIST

## 2018-10-24 PROCEDURE — 85610 PROTHROMBIN TIME: CPT | Performed by: PHARMACIST

## 2018-10-24 NOTE — PROGRESS NOTES
Mr. Ibrahima Brown is a 79 y.o. y/o male with history of Afib who presents today for anticoagulation monitoring and adjustment. INR 4.7 is supratherapeutic for this patient (goal range 2-3) and is reflective of 42.5 mg TWD  Patient verifies current dosing regimen, patient able to verbally recall dose  Patient reports no  missed doses since last INR   Patient denies s/sx clotting and/or stroke  Patient denies hematuria, epistaxis, rectal bleeding  Patient denies changes in  alcohol, or tobacco use  Reviewed medication list and drug allergies with patient, updated any medication additions or modifications accordingly. Pt says he has been drinking extra cranberry juice the last couple weeks, poss reason for high INR. Pt refused to get recheck of INR with lab draw (INR>4. 5). Pt was advised to keep weekly  intake of cranberry consistent (small amounts only).    Patient also denies any pending medical or dental procedures scheduled at this time  Patient was instructed to hold dose today and decrease TWD 11% to 37.5mg and RTC 2 weeks

## 2018-11-12 ENCOUNTER — OFFICE VISIT (OUTPATIENT)
Dept: FAMILY MEDICINE CLINIC | Age: 68
End: 2018-11-12
Payer: MEDICARE

## 2018-11-12 ENCOUNTER — HOSPITAL ENCOUNTER (OUTPATIENT)
Dept: PHARMACY | Age: 68
Setting detail: THERAPIES SERIES
Discharge: HOME | End: 2018-11-12
Payer: MEDICARE

## 2018-11-12 DIAGNOSIS — I48.91 ATRIAL FIBRILLATION, UNSPECIFIED TYPE (HCC): ICD-10-CM

## 2018-11-12 DIAGNOSIS — Z23 NEED FOR PNEUMOCOCCAL VACCINATION: Primary | ICD-10-CM

## 2018-11-12 PROCEDURE — 85610 PROTHROMBIN TIME: CPT | Performed by: PHARMACIST

## 2018-11-12 PROCEDURE — 99211 OFF/OP EST MAY X REQ PHY/QHP: CPT | Performed by: PHARMACIST

## 2018-11-12 PROCEDURE — 90670 PCV13 VACCINE IM: CPT | Performed by: FAMILY MEDICINE

## 2018-11-12 PROCEDURE — G0009 ADMIN PNEUMOCOCCAL VACCINE: HCPCS | Performed by: FAMILY MEDICINE

## 2018-11-14 ENCOUNTER — HOSPITAL ENCOUNTER (OUTPATIENT)
Dept: CARDIOLOGY | Age: 68
Discharge: HOME | End: 2018-11-14
Payer: MEDICARE

## 2018-11-14 PROCEDURE — 93290 INTERROG DEV EVAL ICPMS IP: CPT

## 2018-11-14 PROCEDURE — 93284 PRGRMG EVAL IMPLANTABLE DFB: CPT

## 2018-11-16 ENCOUNTER — OFFICE VISIT (OUTPATIENT)
Dept: PULMONOLOGY | Age: 68
End: 2018-11-16
Payer: MEDICARE

## 2018-11-16 VITALS
OXYGEN SATURATION: 97 % | HEIGHT: 71 IN | WEIGHT: 206 LBS | RESPIRATION RATE: 16 BRPM | DIASTOLIC BLOOD PRESSURE: 62 MMHG | BODY MASS INDEX: 28.84 KG/M2 | HEART RATE: 99 BPM | SYSTOLIC BLOOD PRESSURE: 130 MMHG | TEMPERATURE: 98.2 F

## 2018-11-16 DIAGNOSIS — J44.9 CHRONIC OBSTRUCTIVE PULMONARY DISEASE, UNSPECIFIED COPD TYPE (HCC): Primary | ICD-10-CM

## 2018-11-16 DIAGNOSIS — R06.02 SOB (SHORTNESS OF BREATH): ICD-10-CM

## 2018-11-16 DIAGNOSIS — R09.02 HYPOXIA: ICD-10-CM

## 2018-11-16 PROCEDURE — 99214 OFFICE O/P EST MOD 30 MIN: CPT | Performed by: INTERNAL MEDICINE

## 2018-11-16 ASSESSMENT — ENCOUNTER SYMPTOMS
NAUSEA: 0
VOMITING: 0
SORE THROAT: 0
WHEEZING: 1
EYE ITCHING: 0
SHORTNESS OF BREATH: 0
ABDOMINAL PAIN: 0
VOICE CHANGE: 0
DIARRHEA: 0
CHEST TIGHTNESS: 0
RHINORRHEA: 0
COUGH: 1

## 2018-11-26 ENCOUNTER — TELEPHONE (OUTPATIENT)
Dept: PHARMACY | Age: 68
End: 2018-11-26

## 2018-11-26 DIAGNOSIS — I48.91 ATRIAL FIBRILLATION, UNSPECIFIED TYPE (HCC): ICD-10-CM

## 2018-11-29 ENCOUNTER — CARE COORDINATION (OUTPATIENT)
Dept: CARE COORDINATION | Age: 68
End: 2018-11-29

## 2018-11-30 ENCOUNTER — APPOINTMENT (OUTPATIENT)
Dept: CT IMAGING | Age: 68
DRG: 291 | End: 2018-11-30
Payer: MEDICARE

## 2018-11-30 ENCOUNTER — HOSPITAL ENCOUNTER (INPATIENT)
Age: 68
LOS: 6 days | Discharge: HOME | DRG: 291 | End: 2018-12-06
Attending: STUDENT IN AN ORGANIZED HEALTH CARE EDUCATION/TRAINING PROGRAM | Admitting: INTERNAL MEDICINE
Payer: MEDICARE

## 2018-11-30 DIAGNOSIS — I50.23 ACUTE ON CHRONIC SYSTOLIC HEART FAILURE (HCC): ICD-10-CM

## 2018-11-30 DIAGNOSIS — I50.82 BIVENTRICULAR CONGESTIVE HEART FAILURE (HCC): Primary | ICD-10-CM

## 2018-11-30 DIAGNOSIS — I95.9 TRANSIENT HYPOTENSION: ICD-10-CM

## 2018-11-30 PROBLEM — I50.9 HEART FAILURE (HCC): Status: ACTIVE | Noted: 2018-11-30

## 2018-11-30 LAB
ALBUMIN SERPL-MCNC: 3.7 G/DL (ref 3.9–4.9)
ALP BLD-CCNC: 221 U/L (ref 35–104)
ALT SERPL-CCNC: 22 U/L (ref 0–41)
ANION GAP SERPL CALCULATED.3IONS-SCNC: 14 MEQ/L (ref 7–13)
APTT: 36.9 SEC (ref 21.6–35.4)
AST SERPL-CCNC: 27 U/L (ref 0–40)
BACTERIA: NEGATIVE /HPF
BASOPHILS ABSOLUTE: 0 K/UL (ref 0–0.2)
BASOPHILS RELATIVE PERCENT: 0.3 %
BILIRUB SERPL-MCNC: 2.4 MG/DL (ref 0–1.2)
BILIRUBIN URINE: ABNORMAL
BLOOD, URINE: ABNORMAL
BUN BLDV-MCNC: 26 MG/DL (ref 8–23)
C-REACTIVE PROTEIN, HIGH SENSITIVITY: 6.6 MG/L (ref 0–5)
CALCIUM SERPL-MCNC: 9.8 MG/DL (ref 8.6–10.2)
CHLORIDE BLD-SCNC: 100 MEQ/L (ref 98–107)
CHOLESTEROL, TOTAL: 80 MG/DL (ref 0–199)
CLARITY: CLEAR
CO2: 24 MEQ/L (ref 22–29)
COLOR: ABNORMAL
CREAT SERPL-MCNC: 1.33 MG/DL (ref 0.7–1.2)
EOSINOPHILS ABSOLUTE: 0 K/UL (ref 0–0.7)
EOSINOPHILS RELATIVE PERCENT: 0.2 %
EPITHELIAL CELLS, UA: NORMAL /HPF (ref 0–5)
GFR AFRICAN AMERICAN: >60
GFR NON-AFRICAN AMERICAN: 53.7
GLOBULIN: 3.7 G/DL (ref 2.3–3.5)
GLUCOSE BLD-MCNC: 143 MG/DL (ref 74–109)
GLUCOSE URINE: NEGATIVE MG/DL
HCT VFR BLD CALC: 37 % (ref 42–52)
HDLC SERPL-MCNC: 40 MG/DL (ref 40–59)
HEMOGLOBIN: 11.9 G/DL (ref 14–18)
HYALINE CASTS: NORMAL /HPF (ref 0–5)
INR BLD: 3.8
KETONES, URINE: ABNORMAL MG/DL
LACTIC ACID: 1.7 MMOL/L (ref 0.5–2.2)
LDL CHOLESTEROL CALCULATED: 26 MG/DL (ref 0–129)
LEUKOCYTE ESTERASE, URINE: ABNORMAL
LIPASE: 16 U/L (ref 13–60)
LYMPHOCYTES ABSOLUTE: 0.7 K/UL (ref 1–4.8)
LYMPHOCYTES RELATIVE PERCENT: 13.1 %
MAGNESIUM: 1.8 MG/DL (ref 1.7–2.3)
MCH RBC QN AUTO: 27.1 PG (ref 27–31.3)
MCHC RBC AUTO-ENTMCNC: 32.3 % (ref 33–37)
MCV RBC AUTO: 83.8 FL (ref 80–100)
MONOCYTES ABSOLUTE: 0.6 K/UL (ref 0.2–0.8)
MONOCYTES RELATIVE PERCENT: 11.4 %
NEUTROPHILS ABSOLUTE: 4.2 K/UL (ref 1.4–6.5)
NEUTROPHILS RELATIVE PERCENT: 75 %
NITRITE, URINE: NEGATIVE
PDW BLD-RTO: 15.6 % (ref 11.5–14.5)
PH UA: 5 (ref 5–9)
PLATELET # BLD: 203 K/UL (ref 130–400)
POC CREATININE WHOLE BLOOD: 1.3
POTASSIUM SERPL-SCNC: 3.9 MEQ/L (ref 3.5–5.1)
PRO-BNP: 7604 PG/ML
PRO-BNP: 8368 PG/ML
PROCALCITONIN: 0.11 NG/ML (ref 0–0.15)
PROTEIN UA: 100 MG/DL
PROTHROMBIN TIME: 37.5 SEC (ref 9.6–12.3)
RBC # BLD: 4.41 M/UL (ref 4.7–6.1)
RBC UA: NORMAL /HPF (ref 0–2)
SODIUM BLD-SCNC: 138 MEQ/L (ref 132–144)
SPECIFIC GRAVITY UA: 1.02 (ref 1–1.03)
TOTAL CK: 159 U/L (ref 0–190)
TOTAL PROTEIN: 7.4 G/DL (ref 6.4–8.1)
TRIGL SERPL-MCNC: 72 MG/DL (ref 0–200)
TROPONIN: <0.01 NG/ML (ref 0–0.01)
TROPONIN: <0.01 NG/ML (ref 0–0.01)
TSH SERPL DL<=0.05 MIU/L-ACNC: <0.01 UIU/ML (ref 0.27–4.2)
URINE REFLEX TO CULTURE: YES
UROBILINOGEN, URINE: 1 E.U./DL
WBC # BLD: 5.6 K/UL (ref 4.8–10.8)
WBC UA: NORMAL /HPF (ref 0–5)

## 2018-11-30 PROCEDURE — 6360000004 HC RX CONTRAST MEDICATION: Performed by: STUDENT IN AN ORGANIZED HEALTH CARE EDUCATION/TRAINING PROGRAM

## 2018-11-30 PROCEDURE — 2060000000 HC ICU INTERMEDIATE R&B

## 2018-11-30 PROCEDURE — 94640 AIRWAY INHALATION TREATMENT: CPT

## 2018-11-30 PROCEDURE — 81001 URINALYSIS AUTO W/SCOPE: CPT

## 2018-11-30 PROCEDURE — 84484 ASSAY OF TROPONIN QUANT: CPT

## 2018-11-30 PROCEDURE — 6360000002 HC RX W HCPCS: Performed by: STUDENT IN AN ORGANIZED HEALTH CARE EDUCATION/TRAINING PROGRAM

## 2018-11-30 PROCEDURE — S0028 INJECTION, FAMOTIDINE, 20 MG: HCPCS | Performed by: STUDENT IN AN ORGANIZED HEALTH CARE EDUCATION/TRAINING PROGRAM

## 2018-11-30 PROCEDURE — 94664 DEMO&/EVAL PT USE INHALER: CPT

## 2018-11-30 PROCEDURE — 93005 ELECTROCARDIOGRAM TRACING: CPT

## 2018-11-30 PROCEDURE — 85610 PROTHROMBIN TIME: CPT

## 2018-11-30 PROCEDURE — 6370000000 HC RX 637 (ALT 250 FOR IP): Performed by: INTERNAL MEDICINE

## 2018-11-30 PROCEDURE — 84443 ASSAY THYROID STIM HORMONE: CPT

## 2018-11-30 PROCEDURE — 96374 THER/PROPH/DIAG INJ IV PUSH: CPT

## 2018-11-30 PROCEDURE — 85025 COMPLETE CBC W/AUTO DIFF WBC: CPT

## 2018-11-30 PROCEDURE — 99285 EMERGENCY DEPT VISIT HI MDM: CPT

## 2018-11-30 PROCEDURE — P9046 ALBUMIN (HUMAN), 25%, 20 ML: HCPCS | Performed by: STUDENT IN AN ORGANIZED HEALTH CARE EDUCATION/TRAINING PROGRAM

## 2018-11-30 PROCEDURE — 83880 ASSAY OF NATRIURETIC PEPTIDE: CPT

## 2018-11-30 PROCEDURE — 84145 PROCALCITONIN (PCT): CPT

## 2018-11-30 PROCEDURE — 2580000003 HC RX 258: Performed by: INTERNAL MEDICINE

## 2018-11-30 PROCEDURE — 74177 CT ABD & PELVIS W/CONTRAST: CPT

## 2018-11-30 PROCEDURE — 80061 LIPID PANEL: CPT

## 2018-11-30 PROCEDURE — 96375 TX/PRO/DX INJ NEW DRUG ADDON: CPT

## 2018-11-30 PROCEDURE — 83690 ASSAY OF LIPASE: CPT

## 2018-11-30 PROCEDURE — 80053 COMPREHEN METABOLIC PANEL: CPT

## 2018-11-30 PROCEDURE — 83605 ASSAY OF LACTIC ACID: CPT

## 2018-11-30 PROCEDURE — 83735 ASSAY OF MAGNESIUM: CPT

## 2018-11-30 PROCEDURE — 71275 CT ANGIOGRAPHY CHEST: CPT

## 2018-11-30 PROCEDURE — 86141 C-REACTIVE PROTEIN HS: CPT

## 2018-11-30 PROCEDURE — 87086 URINE CULTURE/COLONY COUNT: CPT

## 2018-11-30 PROCEDURE — 87040 BLOOD CULTURE FOR BACTERIA: CPT

## 2018-11-30 PROCEDURE — 82550 ASSAY OF CK (CPK): CPT

## 2018-11-30 PROCEDURE — 36415 COLL VENOUS BLD VENIPUNCTURE: CPT

## 2018-11-30 PROCEDURE — 2500000003 HC RX 250 WO HCPCS: Performed by: STUDENT IN AN ORGANIZED HEALTH CARE EDUCATION/TRAINING PROGRAM

## 2018-11-30 PROCEDURE — 85730 THROMBOPLASTIN TIME PARTIAL: CPT

## 2018-11-30 RX ORDER — GABAPENTIN 300 MG/1
300 CAPSULE ORAL 3 TIMES DAILY
Status: DISCONTINUED | OUTPATIENT
Start: 2018-11-30 | End: 2018-12-06 | Stop reason: HOSPADM

## 2018-11-30 RX ORDER — FUROSEMIDE 10 MG/ML
20 INJECTION INTRAMUSCULAR; INTRAVENOUS ONCE
Status: COMPLETED | OUTPATIENT
Start: 2018-11-30 | End: 2018-11-30

## 2018-11-30 RX ORDER — NITROGLYCERIN 0.4 MG/1
0.4 TABLET SUBLINGUAL EVERY 5 MIN PRN
Status: DISCONTINUED | OUTPATIENT
Start: 2018-11-30 | End: 2018-12-02 | Stop reason: SDUPTHER

## 2018-11-30 RX ORDER — WARFARIN SODIUM 5 MG/1
5 TABLET ORAL DAILY
Status: DISCONTINUED | OUTPATIENT
Start: 2018-11-30 | End: 2018-12-05

## 2018-11-30 RX ORDER — DEXAMETHASONE SODIUM PHOSPHATE 10 MG/ML
10 INJECTION INTRAMUSCULAR; INTRAVENOUS ONCE
Status: COMPLETED | OUTPATIENT
Start: 2018-11-30 | End: 2018-11-30

## 2018-11-30 RX ORDER — ATORVASTATIN CALCIUM 80 MG/1
80 TABLET, FILM COATED ORAL DAILY
Status: DISCONTINUED | OUTPATIENT
Start: 2018-12-01 | End: 2018-12-06 | Stop reason: HOSPADM

## 2018-11-30 RX ORDER — ALBUMIN (HUMAN) 12.5 G/50ML
50 SOLUTION INTRAVENOUS ONCE
Status: COMPLETED | OUTPATIENT
Start: 2018-11-30 | End: 2018-11-30

## 2018-11-30 RX ORDER — AMIODARONE HYDROCHLORIDE 200 MG/1
200 TABLET ORAL DAILY
Status: DISCONTINUED | OUTPATIENT
Start: 2018-12-01 | End: 2018-12-06 | Stop reason: HOSPADM

## 2018-11-30 RX ORDER — SODIUM CHLORIDE 0.9 % (FLUSH) 0.9 %
10 SYRINGE (ML) INJECTION PRN
Status: DISCONTINUED | OUTPATIENT
Start: 2018-11-30 | End: 2018-12-02 | Stop reason: SDUPTHER

## 2018-11-30 RX ORDER — FUROSEMIDE 40 MG/1
40 TABLET ORAL 2 TIMES DAILY
Status: DISCONTINUED | OUTPATIENT
Start: 2018-11-30 | End: 2018-12-01

## 2018-11-30 RX ORDER — METOPROLOL SUCCINATE 25 MG/1
25 TABLET, EXTENDED RELEASE ORAL DAILY
Status: DISCONTINUED | OUTPATIENT
Start: 2018-12-01 | End: 2018-12-01

## 2018-11-30 RX ORDER — NITROGLYCERIN 0.4 MG/1
0.4 TABLET SUBLINGUAL EVERY 5 MIN PRN
Status: DISCONTINUED | OUTPATIENT
Start: 2018-11-30 | End: 2018-12-06 | Stop reason: HOSPADM

## 2018-11-30 RX ORDER — DIGOXIN 125 MCG
125 TABLET ORAL DAILY
Status: DISCONTINUED | OUTPATIENT
Start: 2018-12-01 | End: 2018-12-01

## 2018-11-30 RX ORDER — COLCHICINE 0.6 MG/1
0.6 TABLET ORAL 2 TIMES DAILY
Status: DISCONTINUED | OUTPATIENT
Start: 2018-11-30 | End: 2018-12-06 | Stop reason: HOSPADM

## 2018-11-30 RX ORDER — ALBUTEROL SULFATE 90 UG/1
2 AEROSOL, METERED RESPIRATORY (INHALATION) EVERY 4 HOURS PRN
Status: DISCONTINUED | OUTPATIENT
Start: 2018-11-30 | End: 2018-12-06 | Stop reason: HOSPADM

## 2018-11-30 RX ORDER — SODIUM CHLORIDE 0.9 % (FLUSH) 0.9 %
10 SYRINGE (ML) INJECTION EVERY 12 HOURS SCHEDULED
Status: DISCONTINUED | OUTPATIENT
Start: 2018-11-30 | End: 2018-12-02 | Stop reason: SDUPTHER

## 2018-11-30 RX ORDER — POTASSIUM CHLORIDE 20 MEQ/1
20 TABLET, EXTENDED RELEASE ORAL 2 TIMES DAILY
Status: DISCONTINUED | OUTPATIENT
Start: 2018-11-30 | End: 2018-12-06 | Stop reason: HOSPADM

## 2018-11-30 RX ORDER — DOBUTAMINE HYDROCHLORIDE 200 MG/100ML
5 INJECTION INTRAVENOUS CONTINUOUS
Status: DISCONTINUED | OUTPATIENT
Start: 2018-11-30 | End: 2018-12-06 | Stop reason: HOSPADM

## 2018-11-30 RX ORDER — ONDANSETRON 2 MG/ML
4 INJECTION INTRAMUSCULAR; INTRAVENOUS EVERY 6 HOURS PRN
Status: DISCONTINUED | OUTPATIENT
Start: 2018-11-30 | End: 2018-12-06 | Stop reason: HOSPADM

## 2018-11-30 RX ORDER — ALLOPURINOL 100 MG/1
100 TABLET ORAL 2 TIMES DAILY
Status: DISCONTINUED | OUTPATIENT
Start: 2018-11-30 | End: 2018-12-06 | Stop reason: HOSPADM

## 2018-11-30 RX ORDER — CLOPIDOGREL BISULFATE 75 MG/1
75 TABLET ORAL DAILY
Status: DISCONTINUED | OUTPATIENT
Start: 2018-12-01 | End: 2018-12-06 | Stop reason: HOSPADM

## 2018-11-30 RX ORDER — PANTOPRAZOLE SODIUM 40 MG/1
40 TABLET, DELAYED RELEASE ORAL
Status: DISCONTINUED | OUTPATIENT
Start: 2018-12-01 | End: 2018-12-06 | Stop reason: HOSPADM

## 2018-11-30 RX ORDER — ALBUTEROL SULFATE 2.5 MG/3ML
2.5 SOLUTION RESPIRATORY (INHALATION) EVERY 6 HOURS PRN
Status: DISCONTINUED | OUTPATIENT
Start: 2018-11-30 | End: 2018-12-06 | Stop reason: HOSPADM

## 2018-11-30 RX ORDER — ASPIRIN 81 MG/1
81 TABLET, CHEWABLE ORAL DAILY
Status: DISCONTINUED | OUTPATIENT
Start: 2018-12-01 | End: 2018-12-06 | Stop reason: HOSPADM

## 2018-11-30 RX ORDER — ATORVASTATIN CALCIUM 20 MG/1
20 TABLET, FILM COATED ORAL NIGHTLY
Status: DISCONTINUED | OUTPATIENT
Start: 2018-11-30 | End: 2018-11-30 | Stop reason: SDUPTHER

## 2018-11-30 RX ADMIN — Medication 2 PUFF: at 20:59

## 2018-11-30 RX ADMIN — ALLOPURINOL 100 MG: 100 TABLET ORAL at 21:32

## 2018-11-30 RX ADMIN — COLCHICINE 0.6 MG: 0.6 TABLET, FILM COATED ORAL at 21:32

## 2018-11-30 RX ADMIN — FUROSEMIDE 40 MG: 40 TABLET ORAL at 21:32

## 2018-11-30 RX ADMIN — DOBUTAMINE HYDROCHLORIDE 5 MCG/KG/MIN: 200 INJECTION INTRAVENOUS at 21:32

## 2018-11-30 RX ADMIN — ALBUMIN (HUMAN) 50 G: 0.25 INJECTION, SOLUTION INTRAVENOUS at 18:07

## 2018-11-30 RX ADMIN — POTASSIUM CHLORIDE 20 MEQ: 20 TABLET, EXTENDED RELEASE ORAL at 21:32

## 2018-11-30 RX ADMIN — DEXAMETHASONE SODIUM PHOSPHATE 10 MG: 10 INJECTION INTRAMUSCULAR; INTRAVENOUS at 15:48

## 2018-11-30 RX ADMIN — FAMOTIDINE 20 MG: 10 INJECTION, SOLUTION INTRAVENOUS at 15:47

## 2018-11-30 RX ADMIN — Medication 10 ML: at 21:33

## 2018-11-30 RX ADMIN — GABAPENTIN 300 MG: 300 CAPSULE ORAL at 21:32

## 2018-11-30 RX ADMIN — IOPAMIDOL 100 ML: 755 INJECTION, SOLUTION INTRAVENOUS at 16:17

## 2018-11-30 RX ADMIN — FUROSEMIDE 20 MG: 10 INJECTION, SOLUTION INTRAVENOUS at 18:07

## 2018-11-30 ASSESSMENT — ENCOUNTER SYMPTOMS
SHORTNESS OF BREATH: 1
BACK PAIN: 0
SINUS PRESSURE: 0
COUGH: 1
TROUBLE SWALLOWING: 0
DIARRHEA: 0
ANAL BLEEDING: 0
CHEST TIGHTNESS: 0
VOMITING: 1
BLOOD IN STOOL: 0
CONSTIPATION: 0
ABDOMINAL PAIN: 1
NAUSEA: 1

## 2018-11-30 ASSESSMENT — PAIN SCALES - GENERAL: PAINLEVEL_OUTOF10: 4

## 2018-11-30 NOTE — ED TRIAGE NOTES
Pt arrived to ER from [facility] with c/o hypotension. Pt states he has had some shortness of breath. Pt has had some of a cough. Pt is A&OX4, skin is appropriate for ethnicity, warm, dry, resp even and unalbored.

## 2018-12-01 ENCOUNTER — APPOINTMENT (OUTPATIENT)
Dept: GENERAL RADIOLOGY | Age: 68
DRG: 291 | End: 2018-12-01
Payer: MEDICARE

## 2018-12-01 PROBLEM — E44.0 MODERATE MALNUTRITION (HCC): Status: ACTIVE | Noted: 2018-12-01

## 2018-12-01 LAB
CHOLESTEROL, TOTAL: 77 MG/DL (ref 0–199)
GFR AFRICAN AMERICAN: >60
GFR NON-AFRICAN AMERICAN: 55
GLUCOSE BLD-MCNC: 242 MG/DL (ref 60–115)
HCT VFR BLD CALC: 33.8 % (ref 42–52)
HDLC SERPL-MCNC: 41 MG/DL (ref 40–59)
HEMOGLOBIN: 11.1 G/DL (ref 14–18)
INR BLD: 3.7
LACTIC ACID, SEPSIS: 2.6 MMOL/L (ref 0.5–1.9)
LACTIC ACID, SEPSIS: 3.9 MMOL/L (ref 0.5–1.9)
LDL CHOLESTEROL CALCULATED: 24 MG/DL (ref 0–129)
MAGNESIUM: 1.7 MG/DL (ref 1.7–2.3)
MCH RBC QN AUTO: 27.4 PG (ref 27–31.3)
MCHC RBC AUTO-ENTMCNC: 32.8 % (ref 33–37)
MCV RBC AUTO: 83.5 FL (ref 80–100)
PDW BLD-RTO: 15.4 % (ref 11.5–14.5)
PERFORMED ON: ABNORMAL
PERFORMED ON: ABNORMAL
PLATELET # BLD: 187 K/UL (ref 130–400)
POC CREATININE: 1.3 MG/DL (ref 0.8–1.3)
POC SAMPLE TYPE: ABNORMAL
PROTHROMBIN TIME: 38.9 SEC (ref 9.6–12.3)
RBC # BLD: 4.05 M/UL (ref 4.7–6.1)
TRIGL SERPL-MCNC: 59 MG/DL (ref 0–200)
TROPONIN: <0.01 NG/ML (ref 0–0.01)
WBC # BLD: 3.1 K/UL (ref 4.8–10.8)

## 2018-12-01 PROCEDURE — 2700000000 HC OXYGEN THERAPY PER DAY

## 2018-12-01 PROCEDURE — 6360000002 HC RX W HCPCS: Performed by: STUDENT IN AN ORGANIZED HEALTH CARE EDUCATION/TRAINING PROGRAM

## 2018-12-01 PROCEDURE — 2580000003 HC RX 258: Performed by: INTERNAL MEDICINE

## 2018-12-01 PROCEDURE — 84484 ASSAY OF TROPONIN QUANT: CPT

## 2018-12-01 PROCEDURE — 80061 LIPID PANEL: CPT

## 2018-12-01 PROCEDURE — 6360000002 HC RX W HCPCS: Performed by: INTERNAL MEDICINE

## 2018-12-01 PROCEDURE — 94760 N-INVAS EAR/PLS OXIMETRY 1: CPT

## 2018-12-01 PROCEDURE — 83735 ASSAY OF MAGNESIUM: CPT

## 2018-12-01 PROCEDURE — 6370000000 HC RX 637 (ALT 250 FOR IP): Performed by: INTERNAL MEDICINE

## 2018-12-01 PROCEDURE — 83605 ASSAY OF LACTIC ACID: CPT

## 2018-12-01 PROCEDURE — 71046 X-RAY EXAM CHEST 2 VIEWS: CPT

## 2018-12-01 PROCEDURE — 85027 COMPLETE CBC AUTOMATED: CPT

## 2018-12-01 PROCEDURE — 36415 COLL VENOUS BLD VENIPUNCTURE: CPT

## 2018-12-01 PROCEDURE — 93005 ELECTROCARDIOGRAM TRACING: CPT

## 2018-12-01 PROCEDURE — 85610 PROTHROMBIN TIME: CPT

## 2018-12-01 PROCEDURE — 94640 AIRWAY INHALATION TREATMENT: CPT

## 2018-12-01 PROCEDURE — 2060000000 HC ICU INTERMEDIATE R&B

## 2018-12-01 RX ORDER — SODIUM CHLORIDE 0.9 % (FLUSH) 0.9 %
10 SYRINGE (ML) INJECTION PRN
Status: DISCONTINUED | OUTPATIENT
Start: 2018-12-01 | End: 2018-12-06 | Stop reason: HOSPADM

## 2018-12-01 RX ORDER — SODIUM CHLORIDE 0.9 % (FLUSH) 0.9 %
10 SYRINGE (ML) INJECTION EVERY 12 HOURS SCHEDULED
Status: DISCONTINUED | OUTPATIENT
Start: 2018-12-01 | End: 2018-12-06 | Stop reason: HOSPADM

## 2018-12-01 RX ORDER — FUROSEMIDE 10 MG/ML
80 INJECTION INTRAMUSCULAR; INTRAVENOUS 2 TIMES DAILY
Status: DISCONTINUED | OUTPATIENT
Start: 2018-12-01 | End: 2018-12-03

## 2018-12-01 RX ORDER — DIGOXIN 125 MCG
125 TABLET ORAL DAILY
Status: COMPLETED | OUTPATIENT
Start: 2018-12-01 | End: 2018-12-01

## 2018-12-01 RX ORDER — METOPROLOL SUCCINATE 25 MG/1
25 TABLET, EXTENDED RELEASE ORAL 2 TIMES DAILY
Status: DISCONTINUED | OUTPATIENT
Start: 2018-12-02 | End: 2018-12-05

## 2018-12-01 RX ORDER — DIGOXIN 250 MCG
250 TABLET ORAL DAILY
Status: DISCONTINUED | OUTPATIENT
Start: 2018-12-02 | End: 2018-12-06 | Stop reason: HOSPADM

## 2018-12-01 RX ADMIN — DOBUTAMINE HYDROCHLORIDE 5 MCG/KG/MIN: 200 INJECTION INTRAVENOUS at 15:47

## 2018-12-01 RX ADMIN — COLCHICINE 0.6 MG: 0.6 TABLET, FILM COATED ORAL at 21:32

## 2018-12-01 RX ADMIN — GABAPENTIN 300 MG: 300 CAPSULE ORAL at 21:32

## 2018-12-01 RX ADMIN — FUROSEMIDE 40 MG: 40 TABLET ORAL at 09:30

## 2018-12-01 RX ADMIN — SACUBITRIL AND VALSARTAN 1 TABLET: 24; 26 TABLET, FILM COATED ORAL at 09:30

## 2018-12-01 RX ADMIN — ALLOPURINOL 100 MG: 100 TABLET ORAL at 21:32

## 2018-12-01 RX ADMIN — ALLOPURINOL 100 MG: 100 TABLET ORAL at 09:30

## 2018-12-01 RX ADMIN — CLOPIDOGREL BISULFATE 75 MG: 75 TABLET ORAL at 09:30

## 2018-12-01 RX ADMIN — Medication 2 PUFF: at 18:56

## 2018-12-01 RX ADMIN — ASPIRIN 81 MG 81 MG: 81 TABLET ORAL at 09:30

## 2018-12-01 RX ADMIN — DIGOXIN 125 MCG: 125 TABLET ORAL at 11:13

## 2018-12-01 RX ADMIN — DIGOXIN 125 MCG: 125 TABLET ORAL at 15:59

## 2018-12-01 RX ADMIN — PANTOPRAZOLE SODIUM 40 MG: 40 TABLET, DELAYED RELEASE ORAL at 09:30

## 2018-12-01 RX ADMIN — PHYTONADIONE 1 MG: 10 INJECTION, EMULSION INTRAMUSCULAR; INTRAVENOUS; SUBCUTANEOUS at 21:32

## 2018-12-01 RX ADMIN — GABAPENTIN 300 MG: 300 CAPSULE ORAL at 15:50

## 2018-12-01 RX ADMIN — COLCHICINE 0.6 MG: 0.6 TABLET, FILM COATED ORAL at 11:11

## 2018-12-01 RX ADMIN — POTASSIUM CHLORIDE 20 MEQ: 20 TABLET, EXTENDED RELEASE ORAL at 11:14

## 2018-12-01 RX ADMIN — AMIODARONE HYDROCHLORIDE 200 MG: 200 TABLET ORAL at 09:30

## 2018-12-01 RX ADMIN — FUROSEMIDE 80 MG: 10 INJECTION, SOLUTION INTRAMUSCULAR; INTRAVENOUS at 17:54

## 2018-12-01 RX ADMIN — SACUBITRIL AND VALSARTAN 1 TABLET: 24; 26 TABLET, FILM COATED ORAL at 21:32

## 2018-12-01 RX ADMIN — POTASSIUM CHLORIDE 20 MEQ: 20 TABLET, EXTENDED RELEASE ORAL at 21:32

## 2018-12-01 RX ADMIN — GABAPENTIN 300 MG: 300 CAPSULE ORAL at 09:30

## 2018-12-01 RX ADMIN — ATORVASTATIN CALCIUM 80 MG: 80 TABLET, FILM COATED ORAL at 21:32

## 2018-12-01 RX ADMIN — Medication 2 PUFF: at 08:18

## 2018-12-01 RX ADMIN — Medication 10 ML: at 21:31

## 2018-12-01 ASSESSMENT — PAIN SCALES - GENERAL
PAINLEVEL_OUTOF10: 0

## 2018-12-01 NOTE — PROGRESS NOTES
[facility] Respiratory Therapy Evaluation   Current Order:  Albuterol neb Q6 PRN/MDI Q4 PRN      Home Regimen: Yes      Ordering Physician: Shantal Mcneil  Re-evaluation Date:  ---     Diagnosis: Heart Failure      Patient Status: Stable / Unstable + Physician notified    The following MDI Criteria must be met in order to convert aerosol to MDI with spacer.  If unable to meet, MDI will be converted to aerosol:  []  Patient able to demonstrate the ability to use MDI effectively  []  Patient alert and cooperative  []  Patient able to take deep breath with 5-10 second hold  []  Medication(s) available in this delivery method   []  Peak flow greater than or equal to 200 ml/min            Current Order Substituted To  (same drug, same frequency)   Aerosol to MDI [] Albuterol Sulfate 0.083% unit dose by aerosol Albuterol Sulfate MDI 2 puffs by inhalation with spacer    [] Levalbuterol 1.25 mg unit dose by aerosol Levalbuterol MDI 2 puffs by inhalation with spacer    [] Levalbuterol 0.63 mg unit dose by aerosol Levalbuterol MDI 2 puffs by inhalation with spacer    [] Ipratropium Bromide 0.02% unit dose by aerosol Ipratropium Bromide MDI 2 puffs by inhalation with spacer    [] Duoneb (Ipratropium + Albuterol) unit dose by aerosol Ipratropium MDI + Albuterol MDI 2 puffs by inhalation w/spacer   MDI to Aerosol [] Albuterol Sulfate MDI Albuterol Sulfate 0.083% unit dose by aerosol    [] Levalbuterol MDI 2 puffs by inhalation Levalbuterol 1.25 mg unit dose by aerosol    [] Ipratropium Bromide MDI by inhalation Ipratropium Bromide 0.02% unit dose by aerosol    [] Combivent (Ipratropium + Albuterol) MDI by inhalation Duoneb (Ipratropium + Albuterol) unit dose by aerosol   Treatment Assessment [Frequency/Schedule]:  Change frequency to: ___________No Changes_______________________________________per Protocol, P&T, MEC      Points 0 1 2 3 4   Pulmonary Status  Non-Smoker  []   Smoking history   < 20 pack years  []   Smoking

## 2018-12-01 NOTE — PROGRESS NOTES
Nutrition Assessment    Type and Reason for Visit: Initial, Positive Nutrition Screen, Consult, Patient Education    Nutrition Recommendations:Modify current diet   Change to Dental soft, 2gNa diet, Carb Control 4    Nutrition Assessment: Pt at nutritional risk related to chronic disease ( CHF. DM) in addition to c/o chronic mouth/gum pain (\"for years\") with subsequent weight loss, mild musle loss    Malnutrition Assessment:  · Malnutrition Status: Meets the criteria for moderate malnutrition  · Context: Chronic illness  · Findings of the 6 clinical characteristics of malnutrition (Minimum of 2 out of 6 clinical characteristics is required to make the diagnosis of moderate or severe Protein Calorie Malnutrition based on AND/ASPEN Guidelines):  1. Energy Intake-Less than 75% of estimated energy requirement for greater than or equal to 1 month,      2. Weight Loss-10% loss or greater,  (10 months)  3. Fat Loss-Mild subcutaneous fat loss, Orbital  4. Muscle Loss-Moderate muscle mass loss, Clavicles (pectoralis and deltoids), Temples (temporalis muscle), Scapula (trapezius)  5. Fluid Accumulation-Moderate to severe fluid accumulation, Extremities  6.  Strength-Normal (handshake firm)    Nutrition Risk Level:  Moderate    Nutrient Needs:  · Estimated Daily Total Kcal: 2827-9717 ( 22-25kcal/kg)  · Estimated Daily Protein (g): 101-109 (1.3-1.4 g/kg IBW)  · Estimated Daily Total Fluid (ml/day): ~2000 ( 1 ml/kcal)    Nutrition Diagnosis:   · Problem: Inadequate oral intake  · Etiology: related to Partial or complete edentulism, Pain     Signs and symptoms:  as evidenced by Diet history of poor intake, Weight loss    Objective Information:  · Nutrition-Focused Physical Findings: 2-3+ edema BLE  · Wound Type: None  · Current Nutrition Therapies:  · Oral Diet Orders: Low Fat   · Oral Diet intake: 51-75%  · Oral Nutrition Supplement (ONS) Orders: None  · Anthropometric Measures:  · Ht: 5' 11\" (180.3 cm)   · Admission Body

## 2018-12-01 NOTE — H&P
Ranjeet Walter MD   warfarin (COUMADIN) 5 MG tablet Take as directed by [facility] EMERGENCY [facility] AT [place] [facility]. Quantity equals 90 day supply. Patient taking differently: Take 5 mg by mouth daily Take as directed by [facility] EMERGENC[facility] [place] [facility]. Quantity equals 90 day supply.  7/18/18  Yes Ginger Adams MD   Oxygen Concentrator Oxi Go POC 6/27/18  Yes Ana Dudley MD   furosemide (LASIX) 40 MG tablet Take 1 tablet by mouth 2 times daily 5/17/18  Yes Ginger Adams MD   potassium chloride (KLOR-CON M) 20 MEQ extended release tablet Take 1 tablet by mouth 2 times daily 5/17/18  Yes Ginger Adams MD   budesonide-formoterol (SYMBICORT) 160-4.5 MCG/ACT AERO Inhale 2 puffs into the lungs 2 times daily 3/30/18  Yes Ana Dudley MD   albuterol (PROVENTIL) (2.5 MG/3ML) 0.083% nebulizer solution Take 3 mLs by nebulization every 6 hours as needed for Wheezing 2/13/18  Yes Ana Dudley MD   NOVOLIN 70/30 (70-30) 100 UNIT/ML injection vial INJECT 55 UNITS TWICE A DAY  Patient taking differently: sliding scale 12/27/17  Yes Marilyn Love MD   nitroGLYCERIN (NITROSTAT) 0.4 MG SL tablet Place 1 tablet under the tongue every 5 minutes as needed for Chest pain 11/13/17  Yes Ranjeet Walter MD   metoprolol succinate (TOPROL XL) 25 MG extended release tablet Take 1 tablet by mouth daily 11/7/17  Yes Ginger Adams MD   pantoprazole (PROTONIX) 40 MG tablet Take 1 tablet by mouth every morning (before breakfast) 11/8/17  Yes Ginger Adams MD   ENTRESTO 24-26 MG per tablet Take 1 tablet by mouth 2 times daily  2/16/17  Yes Historical Provider, MD   gabapentin (NEURONTIN) 300 MG capsule Take 1 capsule by mouth 3 times daily 1/30/17  Yes Marilyn Love MD   clopidogrel (PLAVIX) 75 MG tablet Take 75 mg by mouth daily   Yes Historical Provider, MD   atorvastatin (LIPITOR) 80 MG tablet Take 80 mg by mouth daily   Yes Historical Provider, MD   amiodarone (CORDARONE) 200 MG tablet Take 200 mg by mouth daily Yes Historical Provider, MD   DIGITEK 125 MCG tablet TAKE 1 TABLET DAILY 8/2/15  Yes Blanca Caceres MD   albuterol (PROAIR HFA) 108 (90 BASE) MCG/ACT inhaler Inhale 2 puffs into the lungs every 4 hours as needed for Wheezing 5/11/15  Yes Blanca Caceres MD       Scheduled Meds:   digoxin  125 mcg Oral Daily    amiodarone  200 mg Oral Daily    clopidogrel  75 mg Oral Daily    atorvastatin  80 mg Oral Daily    gabapentin  300 mg Oral TID    sacubitril-valsartan  1 tablet Oral BID    metoprolol succinate  25 mg Oral Daily    pantoprazole  40 mg Oral QAM AC    insulin lispro protamine & lispro  25 Units Subcutaneous BID AC    mometasone-formoterol  2 puff Inhalation BID    furosemide  40 mg Oral BID    potassium chloride  20 mEq Oral BID    warfarin  5 mg Oral Daily    colchicine  0.6 mg Oral BID    allopurinol  100 mg Oral BID    sodium chloride flush  10 mL Intravenous 2 times per day    aspirin  81 mg Oral Daily     Continuous Infusions:   DOBUTamine 5 mcg/kg/min (11/30/18 2132)     PRN Meds:albuterol sulfate HFA, nitroGLYCERIN, albuterol, sodium chloride flush, magnesium hydroxide, ondansetron, nitroGLYCERIN    Allergies   Allergen Reactions    Dye [Iodides] Shortness Of Breath    Penicillins Anaphylaxis       Social History     Social History    Marital status:      Spouse name: N/A    Number of children: N/A    Years of education: N/A     Occupational History    Not on file.      Social History Main Topics    Smoking status: Former Smoker     Packs/day: 1.50     Years: 25.00     Quit date: 1/1/2012    Smokeless tobacco: Never Used    Alcohol use No    Drug use: No    Sexual activity: Not on file     Other Topics Concern    Not on file     Social History Narrative    No narrative on file       Family History   Problem Relation Age of Onset    Cancer Brother     Diabetes Mother     Heart Disease Father     Emphysema Father        Review Of Systems:    14 point ROS Hematocrit 37.0 (L) 42.0 - 52.0 %    MCV 83.8 80.0 - 100.0 fL    MCH 27.1 27.0 - 31.3 pg    MCHC 32.3 (L) 33.0 - 37.0 %    RDW 15.6 (H) 11.5 - 14.5 %    Platelets 799 562 - 431 K/uL    Neutrophils % 75.0 %    Lymphocytes % 13.1 %    Monocytes % 11.4 %    Eosinophils % 0.2 %    Basophils % 0.3 %    Neutrophils # 4.2 1.4 - 6.5 K/uL    Lymphocytes # 0.7 (L) 1.0 - 4.8 K/uL    Monocytes # 0.6 0.2 - 0.8 K/uL    Eosinophils # 0.0 0.0 - 0.7 K/uL    Basophils # 0.0 0.0 - 0.2 K/uL   CK    Collection Time: 11/30/18  2:45 PM   Result Value Ref Range    Total  0 - 190 U/L   Comprehensive Metabolic Panel    Collection Time: 11/30/18  2:45 PM   Result Value Ref Range    Sodium 138 132 - 144 mEq/L    Potassium 3.9 3.5 - 5.1 mEq/L    Chloride 100 98 - 107 mEq/L    CO2 24 22 - 29 mEq/L    Anion Gap 14 (H) 7 - 13 mEq/L    Glucose 143 (H) 74 - 109 mg/dL    BUN 26 (H) 8 - 23 mg/dL    CREATININE 1.33 (H) 0.70 - 1.20 mg/dL    GFR Non-African American 53.7 (L) >60    GFR  >60.0 >60    Calcium 9.8 8.6 - 10.2 mg/dL    Total Protein 7.4 6.4 - 8.1 g/dL    Alb 3.7 (L) 3.9 - 4.9 g/dL    Total Bilirubin 2.4 (H) 0.0 - 1.2 mg/dL    Alkaline Phosphatase 221 (H) 35 - 104 U/L    ALT 22 0 - 41 U/L    AST 27 0 - 40 U/L    Globulin 3.7 (H) 2.3 - 3.5 g/dL   High sensitivity CRP    Collection Time: 11/30/18  2:45 PM   Result Value Ref Range    CRP High Sensitivity 6.6 (H) 0.0 - 5.0 mg/L   Lactic Acid, Plasma    Collection Time: 11/30/18  2:45 PM   Result Value Ref Range    Lactic Acid 1.7 0.5 - 2.2 mmol/L   Lipid Panel    Collection Time: 11/30/18  2:45 PM   Result Value Ref Range    Cholesterol, Total 80 0 - 199 mg/dL    Triglycerides 72 0 - 200 mg/dL    HDL 40 40 - 59 mg/dL    LDL Calculated 26 0 - 129 mg/dL   Magnesium    Collection Time: 11/30/18  2:45 PM   Result Value Ref Range    Magnesium 1.8 1.7 - 2.3 mg/dL   Protime-INR    Collection Time: 11/30/18  2:45 PM   Result Value Ref Range    Protime 37.5 (H) 9.6 - 12.3 sec    INR

## 2018-12-01 NOTE — PLAN OF CARE
Problem: Falls - Risk of:  Goal: Will remain free from falls  Will remain free from falls   Outcome: Ongoing    Goal: Absence of physical injury  Absence of physical injury   Outcome: Ongoing      Problem: Fluid Volume - Imbalance:  Goal: Absence of imbalanced fluid volume signs and symptoms  Absence of imbalanced fluid volume signs and symptoms  Outcome: Ongoing

## 2018-12-02 LAB
ALBUMIN SERPL-MCNC: 3.9 G/DL (ref 3.9–4.9)
ALP BLD-CCNC: 183 U/L (ref 35–104)
ALT SERPL-CCNC: 29 U/L (ref 0–41)
ANION GAP SERPL CALCULATED.3IONS-SCNC: 16 MEQ/L (ref 7–13)
AST SERPL-CCNC: 35 U/L (ref 0–40)
BILIRUB SERPL-MCNC: 2.2 MG/DL (ref 0–1.2)
BUN BLDV-MCNC: 40 MG/DL (ref 8–23)
C-REACTIVE PROTEIN, HIGH SENSITIVITY: 3.4 MG/L (ref 0–5)
CALCIUM SERPL-MCNC: 9.4 MG/DL (ref 8.6–10.2)
CHLORIDE BLD-SCNC: 98 MEQ/L (ref 98–107)
CO2: 22 MEQ/L (ref 22–29)
CREAT SERPL-MCNC: 1.29 MG/DL (ref 0.7–1.2)
DIGOXIN LEVEL: 1.1 NG/ML (ref 0.8–2)
GFR AFRICAN AMERICAN: >60
GFR NON-AFRICAN AMERICAN: 55.6
GLOBULIN: 3.8 G/DL (ref 2.3–3.5)
GLUCOSE BLD-MCNC: 120 MG/DL (ref 60–115)
GLUCOSE BLD-MCNC: 189 MG/DL (ref 74–109)
GLUCOSE BLD-MCNC: 196 MG/DL (ref 60–115)
GLUCOSE BLD-MCNC: 301 MG/DL (ref 60–115)
GLUCOSE BLD-MCNC: 84 MG/DL (ref 60–115)
HCT VFR BLD CALC: 34.1 % (ref 42–52)
HEMOGLOBIN: 11.1 G/DL (ref 14–18)
INR BLD: 2.6
MAGNESIUM: 1.7 MG/DL (ref 1.7–2.3)
MCH RBC QN AUTO: 27.3 PG (ref 27–31.3)
MCHC RBC AUTO-ENTMCNC: 32.5 % (ref 33–37)
MCV RBC AUTO: 84 FL (ref 80–100)
PDW BLD-RTO: 15.5 % (ref 11.5–14.5)
PERFORMED ON: ABNORMAL
PERFORMED ON: NORMAL
PLATELET # BLD: 190 K/UL (ref 130–400)
POTASSIUM SERPL-SCNC: 4.1 MEQ/L (ref 3.5–5.1)
PROTHROMBIN TIME: 27.4 SEC (ref 9.6–12.3)
RBC # BLD: 4.06 M/UL (ref 4.7–6.1)
SEDIMENTATION RATE, ERYTHROCYTE: 24 MM (ref 0–20)
SODIUM BLD-SCNC: 136 MEQ/L (ref 132–144)
TOTAL PROTEIN: 7.7 G/DL (ref 6.4–8.1)
TROPONIN: <0.01 NG/ML (ref 0–0.01)
URINE CULTURE, ROUTINE: NORMAL
WBC # BLD: 8.6 K/UL (ref 4.8–10.8)

## 2018-12-02 PROCEDURE — 94640 AIRWAY INHALATION TREATMENT: CPT

## 2018-12-02 PROCEDURE — 83735 ASSAY OF MAGNESIUM: CPT

## 2018-12-02 PROCEDURE — 85610 PROTHROMBIN TIME: CPT

## 2018-12-02 PROCEDURE — 85652 RBC SED RATE AUTOMATED: CPT

## 2018-12-02 PROCEDURE — 2700000000 HC OXYGEN THERAPY PER DAY

## 2018-12-02 PROCEDURE — 6370000000 HC RX 637 (ALT 250 FOR IP): Performed by: INTERNAL MEDICINE

## 2018-12-02 PROCEDURE — 80053 COMPREHEN METABOLIC PANEL: CPT

## 2018-12-02 PROCEDURE — 94760 N-INVAS EAR/PLS OXIMETRY 1: CPT

## 2018-12-02 PROCEDURE — 84484 ASSAY OF TROPONIN QUANT: CPT

## 2018-12-02 PROCEDURE — 80162 ASSAY OF DIGOXIN TOTAL: CPT

## 2018-12-02 PROCEDURE — 2060000000 HC ICU INTERMEDIATE R&B

## 2018-12-02 PROCEDURE — 6360000002 HC RX W HCPCS: Performed by: INTERNAL MEDICINE

## 2018-12-02 PROCEDURE — 86141 C-REACTIVE PROTEIN HS: CPT

## 2018-12-02 PROCEDURE — 93005 ELECTROCARDIOGRAM TRACING: CPT

## 2018-12-02 PROCEDURE — 85027 COMPLETE CBC AUTOMATED: CPT

## 2018-12-02 PROCEDURE — 36415 COLL VENOUS BLD VENIPUNCTURE: CPT

## 2018-12-02 PROCEDURE — 2580000003 HC RX 258: Performed by: INTERNAL MEDICINE

## 2018-12-02 RX ORDER — DEXTROSE MONOHYDRATE 25 G/50ML
12.5 INJECTION, SOLUTION INTRAVENOUS PRN
Status: DISCONTINUED | OUTPATIENT
Start: 2018-12-02 | End: 2018-12-06 | Stop reason: HOSPADM

## 2018-12-02 RX ORDER — NICOTINE POLACRILEX 4 MG
15 LOZENGE BUCCAL PRN
Status: DISCONTINUED | OUTPATIENT
Start: 2018-12-02 | End: 2018-12-06 | Stop reason: HOSPADM

## 2018-12-02 RX ORDER — DEXTROSE MONOHYDRATE 50 MG/ML
100 INJECTION, SOLUTION INTRAVENOUS PRN
Status: DISCONTINUED | OUTPATIENT
Start: 2018-12-02 | End: 2018-12-06 | Stop reason: HOSPADM

## 2018-12-02 RX ADMIN — METOPROLOL SUCCINATE 25 MG: 25 TABLET, EXTENDED RELEASE ORAL at 08:36

## 2018-12-02 RX ADMIN — CLOPIDOGREL BISULFATE 75 MG: 75 TABLET ORAL at 08:37

## 2018-12-02 RX ADMIN — Medication 2 PUFF: at 19:08

## 2018-12-02 RX ADMIN — AMIODARONE HYDROCHLORIDE 200 MG: 200 TABLET ORAL at 08:36

## 2018-12-02 RX ADMIN — WARFARIN SODIUM 5 MG: 5 TABLET ORAL at 16:49

## 2018-12-02 RX ADMIN — ATORVASTATIN CALCIUM 80 MG: 80 TABLET, FILM COATED ORAL at 08:36

## 2018-12-02 RX ADMIN — GABAPENTIN 300 MG: 300 CAPSULE ORAL at 14:15

## 2018-12-02 RX ADMIN — DIGOXIN 250 MCG: 0.25 TABLET ORAL at 08:36

## 2018-12-02 RX ADMIN — FUROSEMIDE 80 MG: 10 INJECTION, SOLUTION INTRAMUSCULAR; INTRAVENOUS at 16:49

## 2018-12-02 RX ADMIN — POTASSIUM CHLORIDE 20 MEQ: 20 TABLET, EXTENDED RELEASE ORAL at 08:37

## 2018-12-02 RX ADMIN — COLCHICINE 0.6 MG: 0.6 TABLET, FILM COATED ORAL at 20:27

## 2018-12-02 RX ADMIN — Medication 10 ML: at 08:37

## 2018-12-02 RX ADMIN — GABAPENTIN 300 MG: 300 CAPSULE ORAL at 20:27

## 2018-12-02 RX ADMIN — SACUBITRIL AND VALSARTAN 1 TABLET: 24; 26 TABLET, FILM COATED ORAL at 20:27

## 2018-12-02 RX ADMIN — INSULIN LISPRO 25 UNITS: 100 INJECTION, SUSPENSION SUBCUTANEOUS at 11:30

## 2018-12-02 RX ADMIN — GABAPENTIN 300 MG: 300 CAPSULE ORAL at 08:39

## 2018-12-02 RX ADMIN — POTASSIUM CHLORIDE 20 MEQ: 20 TABLET, EXTENDED RELEASE ORAL at 20:27

## 2018-12-02 RX ADMIN — SACUBITRIL AND VALSARTAN 1 TABLET: 24; 26 TABLET, FILM COATED ORAL at 08:37

## 2018-12-02 RX ADMIN — ALLOPURINOL 100 MG: 100 TABLET ORAL at 08:37

## 2018-12-02 RX ADMIN — COLCHICINE 0.6 MG: 0.6 TABLET, FILM COATED ORAL at 08:36

## 2018-12-02 RX ADMIN — PANTOPRAZOLE SODIUM 40 MG: 40 TABLET, DELAYED RELEASE ORAL at 06:08

## 2018-12-02 RX ADMIN — Medication 10 ML: at 20:28

## 2018-12-02 RX ADMIN — Medication 2 PUFF: at 08:32

## 2018-12-02 RX ADMIN — METOPROLOL SUCCINATE 25 MG: 25 TABLET, EXTENDED RELEASE ORAL at 20:27

## 2018-12-02 RX ADMIN — ALLOPURINOL 100 MG: 100 TABLET ORAL at 20:27

## 2018-12-02 RX ADMIN — FUROSEMIDE 80 MG: 10 INJECTION, SOLUTION INTRAMUSCULAR; INTRAVENOUS at 08:36

## 2018-12-02 RX ADMIN — ASPIRIN 81 MG 81 MG: 81 TABLET ORAL at 08:37

## 2018-12-02 ASSESSMENT — PAIN SCALES - GENERAL
PAINLEVEL_OUTOF10: 0
PAINLEVEL_OUTOF10: 0

## 2018-12-02 NOTE — PLAN OF CARE
Problem: Falls - Risk of:  Goal: Will remain free from falls  Will remain free from falls   Outcome: Ongoing    Goal: Absence of physical injury  Absence of physical injury   Outcome: Ongoing      Problem: Fluid Volume - Imbalance:  Goal: Absence of imbalanced fluid volume signs and symptoms  Absence of imbalanced fluid volume signs and symptoms   Outcome: Ongoing      Problem: Nutrition  Goal: Optimal nutrition therapy  Outcome: Ongoing

## 2018-12-02 NOTE — FLOWSHEET NOTE
Pt complains of bloody urine. Urine assessed and is lee. Pts INR was elevated today - the coumadin was held. Call to attending for orders. Will continue to monitor. Electronically signed by Benny Arias RN on 12/1/2018 at 7:45 PM     Orders received. Electronically signed by Benny Arias RN on 12/1/2018 at 8:04 PM    Vitamin was given earlier this shift as ordered. Pt still complains of scant amounts of bloody discharge from urethra. Urine remains lee with no visible blood in it.  Electronically signed by Benny Arias RN on 12/2/2018 at 3:40 AM

## 2018-12-02 NOTE — PROGRESS NOTES
CARDIOLOGY [address] PROGRESS NOTE         12/2/2018      Shell Herrera    [id number]  1950    Rounding MD: Pauly Kaur MD ,[address] [facility]    Primary Cardiologist: Siri Oshea MD     Reason for Admission:  CHF exacerbation.       SUBJECTIVE:     Patient has      Cardiac and general ROS otherwise negative and unchanged.       Assessment:     1. SOB  2. Edema  3. Orthopnea  4. Elevated Lactic Acid, RO infection. 5. CHF, Systolic, Acute on Chronic, Stage D, Class 3-4  6. ICM LVEF 20%  7. CAD post CABG, negative troponins to date. 8. VHD, Post MV repair 2013  9. PAFIB, in SR  10. HTN  11. HLP  12. DM  13. LTAC on Warfarin  14. High Risk Medication, on Amiodarone, Digoxin and Warfarin. 15. COPD  12. CKI  17. TIA Hx  18. Past Tobacco  19. Rosana Historian        Plan:     1. Cardiac Supportive Care  2. Telemetry Monitoring and Serial Labs. 3. Inotropic IV Medications as needed. 4. Continue Diuresis with Albumin and Lasix  5. RO Infection. 6. Maximize Cardiac Medications as tolerated  7. Further recommendations to follow. 8. See orders.           HISTORY OF PRESENT ILLNESS:      The patient is a 79 y.o. male who presented to the office [place] [address] with worsening shortness of breath, edema and fatigue. He is felt to have worsening congestive heart failure and was sent to the emergency room for further evaluation. There congested heart failure was confirmed and patient was admitted for further evaluation and treatment.     Patient follows with Dr. Ravi Neff, cardiology.     Electronic medical records at both [facility] and [place] [address] were reviewed. Patient was interviewed and examined. Rosana historian.     He has above-noted past history including ischemic cardiomyopathy, prior coronary bypass surgery, ventricular tachycardia, atrial fibrillation and BiV AICD. He's on chronic anticoagulation Coumadin therapy.     He states over the last several weeks she's been having worsening edema shortness of breath fatigue. name, place and time.  Following all commands and moving all extremties  SKIN:  no bruising or bleeding, normal skin color, texture, turgor and no rashes     Data:        LABS:      Recent Results (from the past 24 hour(s))   Lactate, Sepsis    Collection Time: 12/01/18  3:19 PM   Result Value Ref Range    Lactic Acid, Sepsis 3.9 (HH) 0.5 - 1.9 mmol/L   Lactate, Sepsis    Collection Time: 12/01/18  4:16 PM   Result Value Ref Range    Lactic Acid, Sepsis 2.6 (H) 0.5 - 1.9 mmol/L   Protime-INR    Collection Time: 12/01/18  4:16 PM   Result Value Ref Range    Protime 38.9 (H) 9.6 - 12.3 sec    INR 3.7    POCT Glucose    Collection Time: 12/01/18  8:20 PM   Result Value Ref Range    POC Glucose 242 (H) 60 - 115 mg/dl    Performed on ACCU-CHEK    EKG 12 Lead    Collection Time: 12/02/18  1:00 AM   Result Value Ref Range    Ventricular Rate 104 BPM    Atrial Rate 104 BPM    P-R Interval 120 ms    QRS Duration 166 ms    Q-T Interval 418 ms    QTc Calculation (Bazett) 549 ms    P Axis 61 degrees    R Axis 188 degrees    T Axis 27 degrees   CBC    Collection Time: 12/02/18  5:30 AM   Result Value Ref Range    WBC 8.6 4.8 - 10.8 K/uL    RBC 4.06 (L) 4.70 - 6.10 M/uL    Hemoglobin 11.1 (L) 14.0 - 18.0 g/dL    Hematocrit 34.1 (L) 42.0 - 52.0 %    MCV 84.0 80.0 - 100.0 fL    MCH 27.3 27.0 - 31.3 pg    MCHC 32.5 (L) 33.0 - 37.0 %    RDW 15.5 (H) 11.5 - 14.5 %    Platelets 158 265 - 621 K/uL   Comprehensive Metabolic Panel    Collection Time: 12/02/18  5:30 AM   Result Value Ref Range    Sodium 136 132 - 144 mEq/L    Potassium 4.1 3.5 - 5.1 mEq/L    Chloride 98 98 - 107 mEq/L    CO2 22 22 - 29 mEq/L    Anion Gap 16 (H) 7 - 13 mEq/L    Glucose 189 (H) 74 - 109 mg/dL    BUN 40 (H) 8 - 23 mg/dL    CREATININE 1.29 (H) 0.70 - 1.20 mg/dL    GFR Non-African American 55.6 (L) >60    GFR  >60.0 >60    Calcium 9.4 8.6 - 10.2 mg/dL    Total Protein 7.7 6.4 - 8.1 g/dL    Alb 3.9 3.9 - 4.9 g/dL    Total Bilirubin 2.2 (H) 0.0 -

## 2018-12-03 ENCOUNTER — APPOINTMENT (OUTPATIENT)
Dept: GENERAL RADIOLOGY | Age: 68
DRG: 291 | End: 2018-12-03
Payer: MEDICARE

## 2018-12-03 LAB
ALBUMIN SERPL-MCNC: 3.6 G/DL (ref 3.9–4.9)
ALP BLD-CCNC: 171 U/L (ref 35–104)
ALT SERPL-CCNC: 36 U/L (ref 0–41)
ANION GAP SERPL CALCULATED.3IONS-SCNC: 13 MEQ/L (ref 7–13)
AST SERPL-CCNC: 38 U/L (ref 0–40)
BILIRUB SERPL-MCNC: 2.3 MG/DL (ref 0–1.2)
BUN BLDV-MCNC: 37 MG/DL (ref 8–23)
C-REACTIVE PROTEIN, HIGH SENSITIVITY: 1.8 MG/L (ref 0–5)
CALCIUM SERPL-MCNC: 9.1 MG/DL (ref 8.6–10.2)
CHLORIDE BLD-SCNC: 98 MEQ/L (ref 98–107)
CO2: 25 MEQ/L (ref 22–29)
CREAT SERPL-MCNC: 0.99 MG/DL (ref 0.7–1.2)
EKG ATRIAL RATE: 104 BPM
EKG ATRIAL RATE: 108 BPM
EKG ATRIAL RATE: 93 BPM
EKG ATRIAL RATE: 98 BPM
EKG P AXIS: 43 DEGREES
EKG P AXIS: 55 DEGREES
EKG P AXIS: 60 DEGREES
EKG P AXIS: 61 DEGREES
EKG P-R INTERVAL: 120 MS
EKG P-R INTERVAL: 128 MS
EKG P-R INTERVAL: 134 MS
EKG P-R INTERVAL: 182 MS
EKG Q-T INTERVAL: 352 MS
EKG Q-T INTERVAL: 418 MS
EKG Q-T INTERVAL: 426 MS
EKG Q-T INTERVAL: 432 MS
EKG QRS DURATION: 114 MS
EKG QRS DURATION: 166 MS
EKG QRS DURATION: 172 MS
EKG QRS DURATION: 180 MS
EKG QTC CALCULATION (BAZETT): 471 MS
EKG QTC CALCULATION (BAZETT): 529 MS
EKG QTC CALCULATION (BAZETT): 549 MS
EKG QTC CALCULATION (BAZETT): 551 MS
EKG R AXIS: -21 DEGREES
EKG R AXIS: 186 DEGREES
EKG R AXIS: 188 DEGREES
EKG R AXIS: 234 DEGREES
EKG T AXIS: 20 DEGREES
EKG T AXIS: 27 DEGREES
EKG T AXIS: 33 DEGREES
EKG T AXIS: 91 DEGREES
EKG VENTRICULAR RATE: 104 BPM
EKG VENTRICULAR RATE: 108 BPM
EKG VENTRICULAR RATE: 93 BPM
EKG VENTRICULAR RATE: 98 BPM
GFR AFRICAN AMERICAN: >60
GFR NON-AFRICAN AMERICAN: >60
GLOBULIN: 3.2 G/DL (ref 2.3–3.5)
GLUCOSE BLD-MCNC: 104 MG/DL (ref 60–115)
GLUCOSE BLD-MCNC: 112 MG/DL (ref 74–109)
GLUCOSE BLD-MCNC: 121 MG/DL (ref 60–115)
GLUCOSE BLD-MCNC: 126 MG/DL (ref 60–115)
GLUCOSE BLD-MCNC: 170 MG/DL (ref 60–115)
GLUCOSE BLD-MCNC: 73 MG/DL (ref 60–115)
HCT VFR BLD CALC: 35.8 % (ref 42–52)
HEMOGLOBIN: 11.7 G/DL (ref 14–18)
INR BLD: 1.7
MAGNESIUM: 1.7 MG/DL (ref 1.7–2.3)
MCH RBC QN AUTO: 27.5 PG (ref 27–31.3)
MCHC RBC AUTO-ENTMCNC: 32.8 % (ref 33–37)
MCV RBC AUTO: 84 FL (ref 80–100)
PDW BLD-RTO: 15.5 % (ref 11.5–14.5)
PERFORMED ON: ABNORMAL
PERFORMED ON: NORMAL
PERFORMED ON: NORMAL
PLATELET # BLD: 193 K/UL (ref 130–400)
POTASSIUM SERPL-SCNC: 4 MEQ/L (ref 3.5–5.1)
PROTHROMBIN TIME: 17.8 SEC (ref 9.6–12.3)
RBC # BLD: 4.26 M/UL (ref 4.7–6.1)
SEDIMENTATION RATE, ERYTHROCYTE: 16 MM (ref 0–20)
SODIUM BLD-SCNC: 136 MEQ/L (ref 132–144)
TOTAL PROTEIN: 6.8 G/DL (ref 6.4–8.1)
WBC # BLD: 7.8 K/UL (ref 4.8–10.8)

## 2018-12-03 PROCEDURE — 94640 AIRWAY INHALATION TREATMENT: CPT

## 2018-12-03 PROCEDURE — 71046 X-RAY EXAM CHEST 2 VIEWS: CPT

## 2018-12-03 PROCEDURE — 85610 PROTHROMBIN TIME: CPT

## 2018-12-03 PROCEDURE — 2060000000 HC ICU INTERMEDIATE R&B

## 2018-12-03 PROCEDURE — 80053 COMPREHEN METABOLIC PANEL: CPT

## 2018-12-03 PROCEDURE — 85027 COMPLETE CBC AUTOMATED: CPT

## 2018-12-03 PROCEDURE — 99222 1ST HOSP IP/OBS MODERATE 55: CPT | Performed by: INTERNAL MEDICINE

## 2018-12-03 PROCEDURE — 6360000002 HC RX W HCPCS: Performed by: STUDENT IN AN ORGANIZED HEALTH CARE EDUCATION/TRAINING PROGRAM

## 2018-12-03 PROCEDURE — 2580000003 HC RX 258: Performed by: INTERNAL MEDICINE

## 2018-12-03 PROCEDURE — 93010 ELECTROCARDIOGRAM REPORT: CPT | Performed by: INTERNAL MEDICINE

## 2018-12-03 PROCEDURE — 36415 COLL VENOUS BLD VENIPUNCTURE: CPT

## 2018-12-03 PROCEDURE — 6360000002 HC RX W HCPCS: Performed by: INTERNAL MEDICINE

## 2018-12-03 PROCEDURE — 86141 C-REACTIVE PROTEIN HS: CPT

## 2018-12-03 PROCEDURE — 94761 N-INVAS EAR/PLS OXIMETRY MLT: CPT

## 2018-12-03 PROCEDURE — 6370000000 HC RX 637 (ALT 250 FOR IP): Performed by: INTERNAL MEDICINE

## 2018-12-03 PROCEDURE — 2700000000 HC OXYGEN THERAPY PER DAY

## 2018-12-03 PROCEDURE — 93005 ELECTROCARDIOGRAM TRACING: CPT

## 2018-12-03 PROCEDURE — 83735 ASSAY OF MAGNESIUM: CPT

## 2018-12-03 PROCEDURE — 85652 RBC SED RATE AUTOMATED: CPT

## 2018-12-03 RX ORDER — WARFARIN SODIUM 7.5 MG/1
7.5 TABLET ORAL
Status: DISCONTINUED | OUTPATIENT
Start: 2018-12-03 | End: 2018-12-04

## 2018-12-03 RX ADMIN — Medication 2 PUFF: at 07:44

## 2018-12-03 RX ADMIN — ASPIRIN 81 MG 81 MG: 81 TABLET ORAL at 07:57

## 2018-12-03 RX ADMIN — FUROSEMIDE 80 MG: 10 INJECTION, SOLUTION INTRAMUSCULAR; INTRAVENOUS at 20:56

## 2018-12-03 RX ADMIN — AMIODARONE HYDROCHLORIDE 200 MG: 200 TABLET ORAL at 07:57

## 2018-12-03 RX ADMIN — PANTOPRAZOLE SODIUM 40 MG: 40 TABLET, DELAYED RELEASE ORAL at 03:39

## 2018-12-03 RX ADMIN — FUROSEMIDE 80 MG: 10 INJECTION, SOLUTION INTRAMUSCULAR; INTRAVENOUS at 07:56

## 2018-12-03 RX ADMIN — COLCHICINE 0.6 MG: 0.6 TABLET, FILM COATED ORAL at 07:57

## 2018-12-03 RX ADMIN — POTASSIUM CHLORIDE 20 MEQ: 20 TABLET, EXTENDED RELEASE ORAL at 07:57

## 2018-12-03 RX ADMIN — ALLOPURINOL 100 MG: 100 TABLET ORAL at 20:56

## 2018-12-03 RX ADMIN — SACUBITRIL AND VALSARTAN 1 TABLET: 24; 26 TABLET, FILM COATED ORAL at 07:57

## 2018-12-03 RX ADMIN — COLCHICINE 0.6 MG: 0.6 TABLET, FILM COATED ORAL at 20:56

## 2018-12-03 RX ADMIN — Medication 2 PUFF: at 19:33

## 2018-12-03 RX ADMIN — GABAPENTIN 300 MG: 300 CAPSULE ORAL at 07:57

## 2018-12-03 RX ADMIN — DIGOXIN 250 MCG: 0.25 TABLET ORAL at 07:57

## 2018-12-03 RX ADMIN — CLOPIDOGREL BISULFATE 75 MG: 75 TABLET ORAL at 07:57

## 2018-12-03 RX ADMIN — ALLOPURINOL 100 MG: 100 TABLET ORAL at 07:58

## 2018-12-03 RX ADMIN — GABAPENTIN 300 MG: 300 CAPSULE ORAL at 13:41

## 2018-12-03 RX ADMIN — ATORVASTATIN CALCIUM 80 MG: 80 TABLET, FILM COATED ORAL at 07:57

## 2018-12-03 RX ADMIN — METOPROLOL SUCCINATE 25 MG: 25 TABLET, EXTENDED RELEASE ORAL at 07:57

## 2018-12-03 RX ADMIN — DOBUTAMINE HYDROCHLORIDE 5 MCG/KG/MIN: 200 INJECTION INTRAVENOUS at 06:23

## 2018-12-03 RX ADMIN — Medication 10 ML: at 07:58

## 2018-12-03 RX ADMIN — SACUBITRIL AND VALSARTAN 1 TABLET: 24; 26 TABLET, FILM COATED ORAL at 20:56

## 2018-12-03 RX ADMIN — POTASSIUM CHLORIDE 20 MEQ: 20 TABLET, EXTENDED RELEASE ORAL at 20:56

## 2018-12-03 RX ADMIN — GABAPENTIN 300 MG: 300 CAPSULE ORAL at 20:56

## 2018-12-03 RX ADMIN — WARFARIN SODIUM 5 MG: 5 TABLET ORAL at 18:55

## 2018-12-03 RX ADMIN — FUROSEMIDE 5 MG/HR: 10 INJECTION, SOLUTION INTRAVENOUS at 22:48

## 2018-12-03 ASSESSMENT — PAIN SCALES - GENERAL
PAINLEVEL_OUTOF10: 4
PAINLEVEL_OUTOF10: 3
PAINLEVEL_OUTOF10: 3

## 2018-12-04 ENCOUNTER — TELEPHONE (OUTPATIENT)
Dept: PHARMACY | Age: 68
End: 2018-12-04

## 2018-12-04 DIAGNOSIS — I48.91 ATRIAL FIBRILLATION, UNSPECIFIED TYPE (HCC): ICD-10-CM

## 2018-12-04 PROBLEM — E05.80 AMIODARONE-INDUCED HYPERTHYROIDISM: Status: ACTIVE | Noted: 2018-12-04

## 2018-12-04 PROBLEM — T46.2X5A AMIODARONE-INDUCED HYPERTHYROIDISM: Status: ACTIVE | Noted: 2018-12-04

## 2018-12-04 LAB
ANION GAP SERPL CALCULATED.3IONS-SCNC: 14 MEQ/L (ref 7–13)
BILIRUBIN URINE: NEGATIVE
BLOOD, URINE: NEGATIVE
BUN BLDV-MCNC: 37 MG/DL (ref 8–23)
CALCIUM SERPL-MCNC: 9.4 MG/DL (ref 8.6–10.2)
CHLORIDE BLD-SCNC: 95 MEQ/L (ref 98–107)
CLARITY: CLEAR
CO2: 25 MEQ/L (ref 22–29)
COLOR: YELLOW
CREAT SERPL-MCNC: 1.42 MG/DL (ref 0.7–1.2)
GFR AFRICAN AMERICAN: >60
GFR NON-AFRICAN AMERICAN: 49.8
GLUCOSE BLD-MCNC: 100 MG/DL (ref 60–115)
GLUCOSE BLD-MCNC: 120 MG/DL (ref 74–109)
GLUCOSE BLD-MCNC: 140 MG/DL (ref 60–115)
GLUCOSE BLD-MCNC: 170 MG/DL (ref 60–115)
GLUCOSE BLD-MCNC: 85 MG/DL (ref 60–115)
GLUCOSE URINE: NEGATIVE MG/DL
HBA1C MFR BLD: 8.8 % (ref 4.8–5.9)
INR BLD: 1.8
KETONES, URINE: NEGATIVE MG/DL
LEUKOCYTE ESTERASE, URINE: NEGATIVE
NITRITE, URINE: NEGATIVE
PERFORMED ON: ABNORMAL
PERFORMED ON: ABNORMAL
PERFORMED ON: NORMAL
PERFORMED ON: NORMAL
PH UA: 6 (ref 5–9)
POTASSIUM SERPL-SCNC: 4 MEQ/L (ref 3.5–5.1)
PROTEIN UA: NEGATIVE MG/DL
PROTHROMBIN TIME: 19.4 SEC (ref 9.6–12.3)
SODIUM BLD-SCNC: 134 MEQ/L (ref 132–144)
SPECIFIC GRAVITY UA: 1.01 (ref 1–1.03)
T4 FREE: 4.94 NG/DL (ref 0.93–1.7)
UROBILINOGEN, URINE: 1 E.U./DL

## 2018-12-04 PROCEDURE — 6370000000 HC RX 637 (ALT 250 FOR IP): Performed by: INTERNAL MEDICINE

## 2018-12-04 PROCEDURE — 2580000003 HC RX 258: Performed by: INTERNAL MEDICINE

## 2018-12-04 PROCEDURE — 36415 COLL VENOUS BLD VENIPUNCTURE: CPT

## 2018-12-04 PROCEDURE — 94640 AIRWAY INHALATION TREATMENT: CPT

## 2018-12-04 PROCEDURE — 6360000002 HC RX W HCPCS: Performed by: STUDENT IN AN ORGANIZED HEALTH CARE EDUCATION/TRAINING PROGRAM

## 2018-12-04 PROCEDURE — 83036 HEMOGLOBIN GLYCOSYLATED A1C: CPT

## 2018-12-04 PROCEDURE — APPNB15 APP NON BILLABLE TIME 0-15 MINS: Performed by: PHYSICIAN ASSISTANT

## 2018-12-04 PROCEDURE — 99232 SBSQ HOSP IP/OBS MODERATE 35: CPT | Performed by: INTERNAL MEDICINE

## 2018-12-04 PROCEDURE — 6360000002 HC RX W HCPCS: Performed by: INTERNAL MEDICINE

## 2018-12-04 PROCEDURE — 2060000000 HC ICU INTERMEDIATE R&B

## 2018-12-04 PROCEDURE — 81003 URINALYSIS AUTO W/O SCOPE: CPT

## 2018-12-04 PROCEDURE — 2700000000 HC OXYGEN THERAPY PER DAY

## 2018-12-04 PROCEDURE — 84439 ASSAY OF FREE THYROXINE: CPT

## 2018-12-04 PROCEDURE — 80048 BASIC METABOLIC PNL TOTAL CA: CPT

## 2018-12-04 PROCEDURE — 6370000000 HC RX 637 (ALT 250 FOR IP): Performed by: STUDENT IN AN ORGANIZED HEALTH CARE EDUCATION/TRAINING PROGRAM

## 2018-12-04 PROCEDURE — 85610 PROTHROMBIN TIME: CPT

## 2018-12-04 RX ORDER — METOLAZONE 5 MG/1
5 TABLET ORAL ONCE
Status: COMPLETED | OUTPATIENT
Start: 2018-12-04 | End: 2018-12-04

## 2018-12-04 RX ORDER — METHIMAZOLE 10 MG/1
20 TABLET ORAL 3 TIMES DAILY
Status: DISCONTINUED | OUTPATIENT
Start: 2018-12-04 | End: 2018-12-06 | Stop reason: HOSPADM

## 2018-12-04 RX ADMIN — METOPROLOL SUCCINATE 25 MG: 25 TABLET, EXTENDED RELEASE ORAL at 21:04

## 2018-12-04 RX ADMIN — ALBUTEROL SULFATE 2.5 MG: 2.5 SOLUTION RESPIRATORY (INHALATION) at 20:18

## 2018-12-04 RX ADMIN — WARFARIN SODIUM 5 MG: 5 TABLET ORAL at 18:28

## 2018-12-04 RX ADMIN — COLCHICINE 0.6 MG: 0.6 TABLET, FILM COATED ORAL at 19:34

## 2018-12-04 RX ADMIN — DOBUTAMINE HYDROCHLORIDE 5 MCG/KG/MIN: 200 INJECTION INTRAVENOUS at 00:28

## 2018-12-04 RX ADMIN — POTASSIUM CHLORIDE 20 MEQ: 20 TABLET, EXTENDED RELEASE ORAL at 19:34

## 2018-12-04 RX ADMIN — ASPIRIN 81 MG 81 MG: 81 TABLET ORAL at 08:10

## 2018-12-04 RX ADMIN — Medication 2 PUFF: at 20:18

## 2018-12-04 RX ADMIN — DIGOXIN 250 MCG: 0.25 TABLET ORAL at 08:10

## 2018-12-04 RX ADMIN — METHIMAZOLE 20 MG: 10 TABLET ORAL at 08:10

## 2018-12-04 RX ADMIN — CLOPIDOGREL BISULFATE 75 MG: 75 TABLET ORAL at 08:10

## 2018-12-04 RX ADMIN — PANTOPRAZOLE SODIUM 40 MG: 40 TABLET, DELAYED RELEASE ORAL at 06:25

## 2018-12-04 RX ADMIN — ATORVASTATIN CALCIUM 80 MG: 80 TABLET, FILM COATED ORAL at 08:11

## 2018-12-04 RX ADMIN — GABAPENTIN 300 MG: 300 CAPSULE ORAL at 19:35

## 2018-12-04 RX ADMIN — SACUBITRIL AND VALSARTAN 1 TABLET: 24; 26 TABLET, FILM COATED ORAL at 19:34

## 2018-12-04 RX ADMIN — METOLAZONE 5 MG: 5 TABLET ORAL at 14:26

## 2018-12-04 RX ADMIN — POTASSIUM CHLORIDE 20 MEQ: 20 TABLET, EXTENDED RELEASE ORAL at 08:11

## 2018-12-04 RX ADMIN — Medication 2 PUFF: at 07:07

## 2018-12-04 RX ADMIN — METHIMAZOLE 20 MG: 10 TABLET ORAL at 19:34

## 2018-12-04 RX ADMIN — FUROSEMIDE 5 MG/HR: 10 INJECTION, SOLUTION INTRAVENOUS at 16:03

## 2018-12-04 RX ADMIN — AMIODARONE HYDROCHLORIDE 200 MG: 200 TABLET ORAL at 08:10

## 2018-12-04 RX ADMIN — METHIMAZOLE 20 MG: 10 TABLET ORAL at 14:23

## 2018-12-04 RX ADMIN — GABAPENTIN 300 MG: 300 CAPSULE ORAL at 14:23

## 2018-12-04 RX ADMIN — GABAPENTIN 300 MG: 300 CAPSULE ORAL at 08:10

## 2018-12-04 RX ADMIN — ALLOPURINOL 100 MG: 100 TABLET ORAL at 19:34

## 2018-12-04 RX ADMIN — COLCHICINE 0.6 MG: 0.6 TABLET, FILM COATED ORAL at 08:10

## 2018-12-04 RX ADMIN — Medication 10 ML: at 19:35

## 2018-12-04 RX ADMIN — ALLOPURINOL 100 MG: 100 TABLET ORAL at 08:10

## 2018-12-04 RX ADMIN — DOBUTAMINE HYDROCHLORIDE 5 MCG/KG/MIN: 200 INJECTION INTRAVENOUS at 19:30

## 2018-12-04 ASSESSMENT — PAIN SCALES - GENERAL
PAINLEVEL_OUTOF10: 0

## 2018-12-04 NOTE — PROGRESS NOTES
Endocrinology Progress Note    Assessment and Plan:   Assessment-  1. Type 2 insulin-dependent diabetes  2. Hyperglycemia  3. Amiodarone induced hyperthyroidism        Plan-  1. Humalog mix 75\25, 14 units twice a day  2. Medium dose sliding scale coverage  3. Monitor glycemic control closely  4. Tapazole 20 mg by mouth 3 times a day  5. Patient may be discharged home from endocrinology standpoint    POC Glucose:   Recent Labs      12/03/18   2110  12/03/18   2228  12/04/18   0648  12/04/18   1052  12/04/18   1605   POCGLU  73  104  85  170*  100     HGBA1C:  Recent Labs      12/04/18   0531   LABA1C  8.8*     CBC:   Recent Labs      12/02/18   0530  12/03/18   0549   WBC  8.6  7.8   HGB  11.1*  11.7*   PLT  190  193     CMP:    Recent Labs      12/02/18   0530  12/03/18   0550  12/04/18   0531   NA  136  136  134   K  4.1  4.0  4.0   CL  98  98  95*   CO2  22  25  25   BUN  40*  37*  37*   CREATININE  1.29*  0.99  1.42*   GLUCOSE  189*  112*  120*   CALCIUM  9.4  9.1  9.4   LABGLOM  55.6*  >60.0  49.8*         CC:   Chief Complaint   Patient presents with    Hypotension     Sent from Dr. Flor Skinner office       Subjective: Interval History: Patient is a 79year-old2 insulin-dependent male admitted for CHF exacerbation. Patient has been on amiodarone and was noted to be hyperthyroid. Tapazole 20 mg 3 times a day was started. Glycemic control is improving on current insulin dosing regiment.     Review of systems: denies polyuria, polydipsia, ABD pain, flank pain, N/V/D, or diaphoresis  Medications:   Scheduled Meds:   methimazole  20 mg Oral TID    insulin lispro protamine & lispro  14 Units Subcutaneous BID AC    sodium chloride flush  10 mL Intravenous 2 times per day    metoprolol succinate  25 mg Oral BID    digoxin  250 mcg Oral Daily    insulin lispro  0-12 Units Subcutaneous TID WC    insulin lispro  0-6 Units Subcutaneous Nightly    amiodarone  200 mg Oral Daily    clopidogrel  75 mg Oral Daily    syndrome     Hyponatremia     Atherosclerosis of coronary artery     Generalized ischemic myocardial dysfunction     Coronary arteriosclerosis in native artery     Atrial flutter (HCC)     Acute on chronic systolic CHF (congestive heart failure), NYHA class 4 (HCC)     Atrial fibrillation (HCC)     Chronic combined systolic and diastolic heart failure (HCC)     Hemoptysis     SOB (shortness of breath)     CHF (congestive heart failure), NYHA class III, chronic, combined (HCC)     CHF (congestive heart failure), NYHA class I, acute on chronic, combined ([address]     Acute systolic CHF (congestive heart failure) (HCC)     CHF (congestive heart failure), NYHA class IV, acute on chronic, combined ([address]     Phimosis/redundant prepuce     Heart failure ([address]     Moderate malnutrition ([address]            Electronically signed by FALGUNI Rosado on 12/4/2018 at 5:41 PM

## 2018-12-04 NOTE — FLOWSHEET NOTE
Received a call back from Dr. Horacio Sanchez office and was told by a staff member that Dr. Darci March said Joseph Ceron was aware and to continue everything as it currently is. \"

## 2018-12-04 NOTE — PROGRESS NOTES
index.  1. SOB  2. Edema  3. Orthopnea  4. Elevated Lactic Acid, RO infection. 5. CHF, Systolic, Acute on Chronic, Stage D, Class 3-4  6. ICM LVEF 20%  7. CAD post CABG, negative troponins to date. 8. VHD, Post MV repair 2013  9. PAFIB, in SR  10. HTN  11. HLP  12. DM  13. LTAC on Warfarin  14. High Risk Medication, on Amiodarone, Digoxin and Warfarin. 15. COPD  12. CKI  17. TIA Hx  18. Past Tobacco  Plan:  1. IV lasix drip  2. Continue Dobutamine infusion  3. Will enroll in CHF clinic at [facility]. 4. Increase warfarin as INR is subtherapeutic  5.  Switch lasix to infusion    Electronically signed by Miesha Medrano MD on 12/3/2018 at 9:25 PM

## 2018-12-04 NOTE — TELEPHONE ENCOUNTER
2nd missed call  Left message to schedule appointment  Updated tracker to date that reflects when patient should be contacted next

## 2018-12-04 NOTE — CONSULTS
weakness  Psychiatric/Behavioral: anxiety, depression, hallucinations  Skin: rash, itching    Physical exam:   Constitutional:  VITALS:  BP (!) 88/49   Pulse 100   Temp 98.2 °F (36.8 °C) (Oral)   Resp 18   Ht 5' 11\" (1.803 m)   Wt 202 lb 8 oz (91.9 kg)   SpO2 100%   BMI 28.24 kg/m²     General: alert, in no apparent distress  HEENT: normocephalic, atraumatic, anicteric  Neck: supple, no mass  Lungs: non-labored respirations, clear to auscultation bilaterally  Heart: regular rate and rhythm, no murmurs or rubs  Abdomen: soft, non-tender, non-distended  MSK: no joint swelling or tenderness  Ext: no cyanosis, no peripheral edema  Neuro: alert and oriented, no gross abnormalities  Psych: normal mood and affect    Data/  CBC:   Lab Results   Component Value Date    WBC 7.8 12/03/2018    RBC 4.26 12/03/2018    RBC 4.75 09/16/2011    HGB 11.7 12/03/2018    HCT 35.8 12/03/2018    MCV 84.0 12/03/2018    MCH 27.5 12/03/2018    MCHC 32.8 12/03/2018    RDW 15.5 12/03/2018     12/03/2018    MPV 9.4 08/19/2015     BMP:    Lab Results   Component Value Date     12/04/2018    K 4.0 12/04/2018    K 3.7 04/02/2018    CL 95 12/04/2018    CO2 25 12/04/2018    BUN 37 12/04/2018    LABALBU 3.6 12/03/2018    CREATININE 1.42 12/04/2018    CALCIUM 9.4 12/04/2018    GFRAA >60.0 12/04/2018    LABGLOM 49.8 12/04/2018    GLUCOSE 120 12/04/2018    GLUCOSE 128 05/09/2012     Xr Chest Standard (2 Vw)    Result Date: 12/3/2018  EXAMINATION: XR CHEST (2 VW) CLINICAL HISTORY:  SOB . COMPARISONS: December 1, 2018 September 20, 2018 FINDINGS: Frontal and lateral views the chest were obtained. There is continued cardiomegaly, unchanged from recent prior examinations. Mild central vascular prominence persists. There faintly visible Kerley B lines at the right base, suggesting mild degree of interstitial edema. There is no airspace opacity. The mediastinum is not widened or shifted. The calcified aorta is not dilated.  The chest wall is weight based dosing when appropriate to reduce radiation dose to as low as reasonably achievable. Assessment:  79y.o. year old male with history s/f CAD s/p CABG, A.fib, T2DM, HFrEF (LVEF 20%), CVA, COPD who presented for SOB and leg swelling currently being managed for acute decompensated HF. Nephrology consulted for BERTRAM. \    1. CKD stage II: baseline Scr ~1.2-1.3 w/ eGFR still >60 ml/min, now having slow trend up likely due to CRS. Previous UA on admission s/o poor PO intake. Not enough to  at this point in time  2. Volume overload: 2/2 decompensated HF, on lasix gtt and dobutamine    Plan:  - will give metolazone 5 mg, continue lasix gtt  - checking urinalysis again   - will continue to trend labs and will w/u further if function worsens    Thank you for the consultation. Will continue to follow  Please do not hesitate to call with questions.     Arvind Manning MD

## 2018-12-05 LAB
ANION GAP SERPL CALCULATED.3IONS-SCNC: 14 MEQ/L (ref 7–13)
BLOOD CULTURE, ROUTINE: NORMAL
BUN BLDV-MCNC: 33 MG/DL (ref 8–23)
CALCIUM SERPL-MCNC: 9.7 MG/DL (ref 8.6–10.2)
CHLORIDE BLD-SCNC: 92 MEQ/L (ref 98–107)
CO2: 28 MEQ/L (ref 22–29)
CREAT SERPL-MCNC: 1.26 MG/DL (ref 0.7–1.2)
CULTURE, BLOOD 2: NORMAL
GFR AFRICAN AMERICAN: >60
GFR NON-AFRICAN AMERICAN: 57.1
GLUCOSE BLD-MCNC: 100 MG/DL (ref 74–109)
GLUCOSE BLD-MCNC: 113 MG/DL (ref 60–115)
GLUCOSE BLD-MCNC: 121 MG/DL (ref 60–115)
GLUCOSE BLD-MCNC: 128 MG/DL (ref 60–115)
GLUCOSE BLD-MCNC: 177 MG/DL (ref 60–115)
INR BLD: 1.9
PERFORMED ON: ABNORMAL
PERFORMED ON: NORMAL
POTASSIUM SERPL-SCNC: 4.2 MEQ/L (ref 3.5–5.1)
PROTHROMBIN TIME: 19 SEC (ref 9–11.5)
SODIUM BLD-SCNC: 134 MEQ/L (ref 132–144)

## 2018-12-05 PROCEDURE — 36415 COLL VENOUS BLD VENIPUNCTURE: CPT

## 2018-12-05 PROCEDURE — 6370000000 HC RX 637 (ALT 250 FOR IP): Performed by: INTERNAL MEDICINE

## 2018-12-05 PROCEDURE — 2580000003 HC RX 258: Performed by: INTERNAL MEDICINE

## 2018-12-05 PROCEDURE — 94640 AIRWAY INHALATION TREATMENT: CPT

## 2018-12-05 PROCEDURE — 99232 SBSQ HOSP IP/OBS MODERATE 35: CPT | Performed by: INTERNAL MEDICINE

## 2018-12-05 PROCEDURE — 94761 N-INVAS EAR/PLS OXIMETRY MLT: CPT

## 2018-12-05 PROCEDURE — 6360000002 HC RX W HCPCS: Performed by: INTERNAL MEDICINE

## 2018-12-05 PROCEDURE — 80048 BASIC METABOLIC PNL TOTAL CA: CPT

## 2018-12-05 PROCEDURE — 85610 PROTHROMBIN TIME: CPT

## 2018-12-05 PROCEDURE — 2060000000 HC ICU INTERMEDIATE R&B

## 2018-12-05 PROCEDURE — 6360000002 HC RX W HCPCS: Performed by: STUDENT IN AN ORGANIZED HEALTH CARE EDUCATION/TRAINING PROGRAM

## 2018-12-05 RX ORDER — WARFARIN SODIUM 5 MG/1
5 TABLET ORAL
Status: COMPLETED | OUTPATIENT
Start: 2018-12-05 | End: 2018-12-05

## 2018-12-05 RX ORDER — METOPROLOL SUCCINATE 25 MG/1
12.5 TABLET, EXTENDED RELEASE ORAL 2 TIMES DAILY
Status: DISCONTINUED | OUTPATIENT
Start: 2018-12-05 | End: 2018-12-06 | Stop reason: HOSPADM

## 2018-12-05 RX ADMIN — WARFARIN SODIUM 5 MG: 5 TABLET ORAL at 17:24

## 2018-12-05 RX ADMIN — GABAPENTIN 300 MG: 300 CAPSULE ORAL at 19:44

## 2018-12-05 RX ADMIN — ALLOPURINOL 100 MG: 100 TABLET ORAL at 19:44

## 2018-12-05 RX ADMIN — PANTOPRAZOLE SODIUM 40 MG: 40 TABLET, DELAYED RELEASE ORAL at 05:40

## 2018-12-05 RX ADMIN — SACUBITRIL AND VALSARTAN 1 TABLET: 24; 26 TABLET, FILM COATED ORAL at 22:10

## 2018-12-05 RX ADMIN — METHIMAZOLE 20 MG: 10 TABLET ORAL at 07:53

## 2018-12-05 RX ADMIN — GABAPENTIN 300 MG: 300 CAPSULE ORAL at 13:59

## 2018-12-05 RX ADMIN — Medication 10 ML: at 19:44

## 2018-12-05 RX ADMIN — DIGOXIN 250 MCG: 0.25 TABLET ORAL at 07:52

## 2018-12-05 RX ADMIN — POTASSIUM CHLORIDE 20 MEQ: 20 TABLET, EXTENDED RELEASE ORAL at 17:25

## 2018-12-05 RX ADMIN — ALBUTEROL SULFATE 2.5 MG: 2.5 SOLUTION RESPIRATORY (INHALATION) at 07:39

## 2018-12-05 RX ADMIN — INSULIN LISPRO 14 UNITS: 100 INJECTION, SUSPENSION SUBCUTANEOUS at 07:58

## 2018-12-05 RX ADMIN — FUROSEMIDE 5 MG/HR: 10 INJECTION, SOLUTION INTRAVENOUS at 14:23

## 2018-12-05 RX ADMIN — POTASSIUM CHLORIDE 20 MEQ: 20 TABLET, EXTENDED RELEASE ORAL at 07:52

## 2018-12-05 RX ADMIN — COLCHICINE 0.6 MG: 0.6 TABLET, FILM COATED ORAL at 19:44

## 2018-12-05 RX ADMIN — METHIMAZOLE 20 MG: 10 TABLET ORAL at 19:43

## 2018-12-05 RX ADMIN — SACUBITRIL AND VALSARTAN 1 TABLET: 24; 26 TABLET, FILM COATED ORAL at 07:54

## 2018-12-05 RX ADMIN — DOBUTAMINE HYDROCHLORIDE 5 MCG/KG/MIN: 200 INJECTION INTRAVENOUS at 17:24

## 2018-12-05 RX ADMIN — AMIODARONE HYDROCHLORIDE 200 MG: 200 TABLET ORAL at 07:51

## 2018-12-05 RX ADMIN — GABAPENTIN 300 MG: 300 CAPSULE ORAL at 07:52

## 2018-12-05 RX ADMIN — ALLOPURINOL 100 MG: 100 TABLET ORAL at 07:52

## 2018-12-05 RX ADMIN — Medication 2 PUFF: at 07:39

## 2018-12-05 RX ADMIN — ASPIRIN 81 MG 81 MG: 81 TABLET ORAL at 07:52

## 2018-12-05 RX ADMIN — CLOPIDOGREL BISULFATE 75 MG: 75 TABLET ORAL at 07:52

## 2018-12-05 RX ADMIN — Medication 2 PUFF: at 19:11

## 2018-12-05 RX ADMIN — INSULIN LISPRO 14 UNITS: 100 INJECTION, SUSPENSION SUBCUTANEOUS at 17:27

## 2018-12-05 RX ADMIN — METHIMAZOLE 20 MG: 10 TABLET ORAL at 13:59

## 2018-12-05 RX ADMIN — ATORVASTATIN CALCIUM 80 MG: 80 TABLET, FILM COATED ORAL at 19:43

## 2018-12-05 RX ADMIN — COLCHICINE 0.6 MG: 0.6 TABLET, FILM COATED ORAL at 07:52

## 2018-12-05 ASSESSMENT — PAIN SCALES - GENERAL
PAINLEVEL_OUTOF10: 0

## 2018-12-05 NOTE — PROGRESS NOTES
Clinical Pharmacy Note    Susie Lange is a 79 y.o. male for whom pharmacy has been asked to manage warfarin therapy. Reason for Admission: CHF exacerbation    Consulting Physician: Freeman Lucas  Warfarin dose prior to admission:  32.5 mg TWD  Warfarin Indication: A fib  Target INR range: 2-3  Order for concomitant anticoagulant therapy:  none    Outpatient warfarin provider: Barbar Brittle, MD     Past Medical History:   Diagnosis Date    Arthritis     CAD (coronary artery disease)     [facility]     [facility]     Defibrillator activation     Diabetes mellitus with insulin therapy ([facility]     Generalized and unspecified atherosclerosis     Gout     Hyperlipidemia     Hypertension     Lung disease     Pain in joint, multiple sites     Prolonged emergence from general anesthesia     Status post angioplasty     TIA (transient ischemic attack)     Tobacco abuse     Type II or unspecified type diabetes mellitus without mention of complication, not stated as uncontrolled                Recent Labs      12/05/18   0550   INR  1.9     Recent Labs      12/03/18   0549   HGB  11.7*   HCT  35.8*   PLT  193       Current warfarin drug-drug interactions: aspirin, allopurinol, amiodarone, methimazole, & clopidogrel    Date INR Warfarin Dose   11/30/18 3.8 held   12/1/18 3.7 held   12/2/18 2.6 5 mg   12/3/18 1.7 5 mg   12/4/18 1.8 5 mg   12/5/18 1.9 5 mg          After holding warfarin for 2 days (11/30-12/1) the INR is trending upward with warfarin 5 mg doses, so will order warfarin 5 mg for this evening also. Daily PT/INR until stable within therapeutic range. Thank you for the consult.    Becky Hartman  [facility]

## 2018-12-05 NOTE — PROGRESS NOTES
Measures:  · Ht: 5' 11\" (180.3 cm)   · Current Body Wt: 193 lb (87.5 kg)  · Admission Body Wt: 203 lb (92.1 kg)  · Usual Body Wt: 224 lb (101.6 kg) ((2/18), 215# ( 8/18))  · % Weight Change:  ,  ~31# (13.8%) acutal losses greater due to edema x 9 months  · Ideal Body Wt: 172 lb (78 kg), % Ideal Body > 100%, however masked by edema  · BMI Classification: BMI 25.0 - 29.9 Overweight    Nutrition Interventions:   Continue with current diet  Education Initiated (2gNA diet handout provided)    Nutrition Evaluation:   · Evaluation: Progressing toward goals   · Goals: po > 75% meals, anticipate further weight loss as edmea resolves    · Monitoring: Meal Intake, Weight, I&O, Chewing/Swallowing, Diet Tolerance      Electronically signed by Natalie Benitez RD, LD on 12/5/18 at 3:08 PM

## 2018-12-06 VITALS
HEIGHT: 71 IN | SYSTOLIC BLOOD PRESSURE: 106 MMHG | HEART RATE: 92 BPM | BODY MASS INDEX: 26.88 KG/M2 | RESPIRATION RATE: 18 BRPM | TEMPERATURE: 98.2 F | DIASTOLIC BLOOD PRESSURE: 60 MMHG | WEIGHT: 192 LBS | OXYGEN SATURATION: 96 %

## 2018-12-06 LAB
ANION GAP SERPL CALCULATED.3IONS-SCNC: 16 MEQ/L (ref 7–13)
BUN BLDV-MCNC: 44 MG/DL (ref 8–23)
CALCIUM SERPL-MCNC: 10 MG/DL (ref 8.6–10.2)
CHLORIDE BLD-SCNC: 89 MEQ/L (ref 98–107)
CO2: 25 MEQ/L (ref 22–29)
CREAT SERPL-MCNC: 1.33 MG/DL (ref 0.7–1.2)
GFR AFRICAN AMERICAN: >60
GFR NON-AFRICAN AMERICAN: 53.7
GLUCOSE BLD-MCNC: 120 MG/DL (ref 74–109)
GLUCOSE BLD-MCNC: 134 MG/DL (ref 60–115)
GLUCOSE BLD-MCNC: 160 MG/DL (ref 60–115)
GLUCOSE BLD-MCNC: 90 MG/DL (ref 60–115)
INR BLD: 2.5
PERFORMED ON: ABNORMAL
PERFORMED ON: ABNORMAL
PERFORMED ON: NORMAL
POTASSIUM SERPL-SCNC: 4 MEQ/L (ref 3.5–5.1)
PROTHROMBIN TIME: 24.8 SEC (ref 9–11.5)
SODIUM BLD-SCNC: 130 MEQ/L (ref 132–144)

## 2018-12-06 PROCEDURE — 94760 N-INVAS EAR/PLS OXIMETRY 1: CPT

## 2018-12-06 PROCEDURE — 6370000000 HC RX 637 (ALT 250 FOR IP): Performed by: INTERNAL MEDICINE

## 2018-12-06 PROCEDURE — 80048 BASIC METABOLIC PNL TOTAL CA: CPT

## 2018-12-06 PROCEDURE — 94640 AIRWAY INHALATION TREATMENT: CPT

## 2018-12-06 PROCEDURE — 6360000002 HC RX W HCPCS: Performed by: INTERNAL MEDICINE

## 2018-12-06 PROCEDURE — 36415 COLL VENOUS BLD VENIPUNCTURE: CPT

## 2018-12-06 PROCEDURE — 85610 PROTHROMBIN TIME: CPT

## 2018-12-06 PROCEDURE — 2580000003 HC RX 258: Performed by: INTERNAL MEDICINE

## 2018-12-06 PROCEDURE — 99232 SBSQ HOSP IP/OBS MODERATE 35: CPT | Performed by: INTERNAL MEDICINE

## 2018-12-06 PROCEDURE — 6360000002 HC RX W HCPCS: Performed by: STUDENT IN AN ORGANIZED HEALTH CARE EDUCATION/TRAINING PROGRAM

## 2018-12-06 RX ORDER — METHIMAZOLE 10 MG/1
20 TABLET ORAL 3 TIMES DAILY
Qty: 180 TABLET | Refills: 3 | Status: SHIPPED | OUTPATIENT
Start: 2018-12-06 | End: 2018-12-13 | Stop reason: SDUPTHER

## 2018-12-06 RX ORDER — FUROSEMIDE 40 MG/1
40 TABLET ORAL DAILY
Qty: 90 TABLET | Refills: 1 | Status: ON HOLD | OUTPATIENT
Start: 2018-12-06 | End: 2019-11-20 | Stop reason: HOSPADM

## 2018-12-06 RX ORDER — WARFARIN SODIUM 4 MG/1
4 TABLET ORAL
Status: COMPLETED | OUTPATIENT
Start: 2018-12-06 | End: 2018-12-06

## 2018-12-06 RX ADMIN — METHIMAZOLE 20 MG: 10 TABLET ORAL at 14:31

## 2018-12-06 RX ADMIN — GABAPENTIN 300 MG: 300 CAPSULE ORAL at 08:47

## 2018-12-06 RX ADMIN — COLCHICINE 0.6 MG: 0.6 TABLET, FILM COATED ORAL at 08:47

## 2018-12-06 RX ADMIN — METOPROLOL SUCCINATE 12.5 MG: 25 TABLET, EXTENDED RELEASE ORAL at 08:47

## 2018-12-06 RX ADMIN — PANTOPRAZOLE SODIUM 40 MG: 40 TABLET, DELAYED RELEASE ORAL at 08:47

## 2018-12-06 RX ADMIN — POTASSIUM CHLORIDE 20 MEQ: 20 TABLET, EXTENDED RELEASE ORAL at 08:47

## 2018-12-06 RX ADMIN — DOBUTAMINE HYDROCHLORIDE 5 MCG/KG/MIN: 200 INJECTION INTRAVENOUS at 14:46

## 2018-12-06 RX ADMIN — AMIODARONE HYDROCHLORIDE 200 MG: 200 TABLET ORAL at 08:47

## 2018-12-06 RX ADMIN — FUROSEMIDE 5 MG/HR: 10 INJECTION, SOLUTION INTRAVENOUS at 14:31

## 2018-12-06 RX ADMIN — Medication 2 PUFF: at 19:23

## 2018-12-06 RX ADMIN — ASPIRIN 81 MG 81 MG: 81 TABLET ORAL at 08:47

## 2018-12-06 RX ADMIN — WARFARIN SODIUM 4 MG: 4 TABLET ORAL at 17:18

## 2018-12-06 RX ADMIN — GABAPENTIN 300 MG: 300 CAPSULE ORAL at 14:00

## 2018-12-06 RX ADMIN — DIGOXIN 250 MCG: 0.25 TABLET ORAL at 08:47

## 2018-12-06 RX ADMIN — METHIMAZOLE 20 MG: 10 TABLET ORAL at 08:47

## 2018-12-06 RX ADMIN — CLOPIDOGREL BISULFATE 75 MG: 75 TABLET ORAL at 08:47

## 2018-12-06 RX ADMIN — SACUBITRIL AND VALSARTAN 1 TABLET: 24; 26 TABLET, FILM COATED ORAL at 08:46

## 2018-12-06 RX ADMIN — Medication 2 PUFF: at 07:18

## 2018-12-06 RX ADMIN — INSULIN LISPRO 14 UNITS: 100 INJECTION, SUSPENSION SUBCUTANEOUS at 08:00

## 2018-12-06 RX ADMIN — INSULIN LISPRO 14 UNITS: 100 INJECTION, SUSPENSION SUBCUTANEOUS at 17:18

## 2018-12-06 RX ADMIN — ALLOPURINOL 100 MG: 100 TABLET ORAL at 08:47

## 2018-12-06 ASSESSMENT — PAIN SCALES - GENERAL: PAINLEVEL_OUTOF10: 0

## 2018-12-06 NOTE — PROGRESS NOTES
Clinical Pharmacy Note    Warfarin consult follow-up    Recent Labs      12/06/18   0540   INR  2.5     No results for input(s): HGB, HCT, PLT in the last 72 hours. Notes:  Date INR Warfarin Dose   11/30/18 3.8 held   12/1/18 3.7 held   12/2/18 2.6 5 mg   12/3/18 1.7 5 mg   12/4/18 1.8 5 mg   12/5/18 1.9 5 mg   12/06/18 2.5 4 mg                                 INR of 2.5 is therapeutic for this patient, goal range 2-3 (A-Fib). Will dose with warfarin 4 mg today due to jump of 0.6 in INR past 24 hours. Daily PT/INR until stable within therapeutic range. ALFREDO Rodríguez. Ph.  12/6/2018  10:12 AM

## 2018-12-06 NOTE — PROGRESS NOTES
Nephrology Progress Note    Assessment:  79y.o. year old male with history s/f CAD s/p CABG, A.fib, T2DM, HFrEF (LVEF 20%), CVA, COPD who presented for SOB and leg swelling currently being managed for acute decompensated HF. Nephrology consulted for BERTRAM. \     1. CKD stage II: baseline Scr ~1.2-1.3 w/ eGFR still >60 ml/min, slow trend up likely due to CRS. 2. Volume overload: 2/2 decompensated HF, on lasix gtt and dobutamine     Plan:  - can change back to lasix 40 mg QD, may eventually need to go back to BID but that dose will likely drop BP and goal is to have metoprolol at an adequate dose for his CHF and A.fib   - BMP pending just to assess baseline   - ok for discharge from renal standpoint      Thank you for the consultation. Will continue to follow  Please do not hesitate to call with questions.     Patient Active Problem List:     Uncontrolled type 2 diabetes mellitus with complication, with long-term current use of insulin (HCC)     CAD (coronary artery disease)     Noncompliance     Pain in joint, multiple sites     COPD (chronic obstructive pulmonary disease) ([facility]     Diabetes mellitus with insulin therapy ([facility]     Hyperlipidemia     Hypertension     Generalized atherosclerosis     TIA (transient ischemic attack)     Elevation of level of transaminase or lactic acid dehydrogenase (LDH)     Tobacco dependence syndrome     Disorder of muscle     Mitral valve insufficiency     Acute lymphadenitis     Disorder of pancreas     Left heart failure (HCC)     Irritable bowel syndrome     Hyponatremia     Atherosclerosis of coronary artery     Generalized ischemic myocardial dysfunction     Coronary arteriosclerosis in native artery     Atrial flutter (HCC)     Acute on chronic systolic CHF (congestive heart failure), NYHA class 4 (HCC)     Atrial fibrillation (HCC)     Chronic combined systolic and diastolic heart failure (HCC)     Hemoptysis     SOB (shortness of breath)     CHF (congestive heart failure), NYHA

## 2018-12-06 NOTE — PROGRESS NOTES
12/05/18 1724    albuterol sulfate  (90 Base) MCG/ACT inhaler 2 puff  2 puff Inhalation Q4H PRN Ben Bell MD        amiodarone (CORDARONE) tablet 200 mg  200 mg Oral Daily Ben Bell MD   200 mg at 12/05/18 0751    clopidogrel (PLAVIX) tablet 75 mg  75 mg Oral Daily Ben Bell MD   75 mg at 12/05/18 9469    atorvastatin (LIPITOR) tablet 80 mg  80 mg Oral Daily Ben Bell MD   80 mg at 12/05/18 1943    gabapentin (NEURONTIN) capsule 300 mg  300 mg Oral TID Ben Bell MD   300 mg at 12/05/18 1944    sacubitril-valsartan (ENTRESTO) 24-26 MG per tablet 1 tablet  1 tablet Oral BID Yoni Randall MD   1 tablet at 12/05/18 2210    pantoprazole (PROTONIX) tablet 40 mg  40 mg Oral QAM  Kobe Villarreal MD   40 mg at 12/05/18 0540    nitroGLYCERIN (NITROSTAT) SL tablet 0.4 mg  0.4 mg Sublingual Q5 Min PRN Ben Bell MD        albuterol (PROVENTIL) nebulizer solution 2.5 mg  2.5 mg Nebulization Q6H PRN Ben Bell MD   2.5 mg at 12/05/18 0739    mometasone-formoterol (DULERA) 200-5 MCG/ACT inhaler 2 puff  2 puff Inhalation BID Ben Bell MD   2 puff at 12/05/18 1911    potassium chloride (KLOR-CON M) extended release tablet 20 mEq  20 mEq Oral BID Ben Bell MD   20 mEq at 12/05/18 1725    colchicine (COLCRYS) tablet 0.6 mg  0.6 mg Oral BID Ben Bell MD   0.6 mg at 12/05/18 1944    allopurinol (ZYLOPRIM) tablet 100 mg  100 mg Oral BID Ben Bell MD   100 mg at 12/05/18 1944    magnesium hydroxide (MILK OF MAGNESIA) 400 MG/5ML suspension 30 mL  30 mL Oral Daily PRN Ben Bell MD        ondansetron (ZOFRAN) injection 4 mg  4 mg Intravenous Q6H PRN Ben Bell MD        aspirin chewable tablet 81 mg  81 mg Oral Daily Ben Bell MD   81 mg at 12/05/18 3754     Allergies   Allergen Reactions    Dye [Iodides] Shortness Of Breath    Penicillins Anaphylaxis     Active

## 2018-12-07 NOTE — DISCHARGE SUMMARY
kidney. The abdominal aorta is heavily calcified and not dilated. Inferior vena cava is unremarkable. Retroperitoneal lymph nodes are numerous small. There is no retroperitoneal hematoma. Urinary bladder is nearly empty and unremarkable. The prostate is small. There is a small hiatal hernia. The stomach is unremarkable. The small bowel is unremarkable. There is diverticulosis without diverticulitis. There is a small volume of stool in the colon. There is no ventral hernia. Degenerative disease in the spine is greatest at the lumbosacral junction. There are no acute compression fractures. There is no sign of underlying skeletal pathology in the dorsal and lumbar spine. NO PULMONARY EMBOLISM. THERE IS CARDIOMEGALY, WITH EVIDENCE OF ELEVATED RIGHT HEART FILLING PRESSURE, PERHAPS REPRESENTING AN ELEMENT OF CONGESTIVE HEART FAILURE, WITHOUT PULMONARY EDEMA AND WITHOUT PLEURAL EFFUSIONS. THERE IS NO ACUTE PROCESS IN THE ABDOMINAL CAVITY, RETROPERITONEUM OR PELVIS. All CT scans at this facility use dose modulation, iterative reconstruction, and/or weight based dosing when appropriate to reduce radiation dose to as low as reasonably achievable. Ct Abdomen Pelvis W Iv Contrast Additional Contrast? None    Result Date: 11/30/2018  EXAMINATION:  CTA CHEST W WO CONTRAST, CT ABDOMEN PELVIS W IV CONTRAST CLINICAL HISTORY:   SOB, no wheezing, tachycardic and hypotension r/o pulmonary embolism and or atypical pneumonia. Epigastric pain. Vomiting. COMPARISONS:  September 20, 2018 two-view chest x-ray TECHNIQUE:  Spiral axial images of the thorax abdomen pelvis were obtained following intravenous administration of Isovue-300. CT pulmonary angiography was performed. Multiplanar two-dimensional and maximum intensity projection three-dimensional reformatting was performed at the CT scanner console. FINDINGS:  CT pulmonary angiography shows no evidence of filling defects in the pulmonary artery and its branches.  There is moderate

## 2018-12-09 PROCEDURE — 93010 ELECTROCARDIOGRAM REPORT: CPT | Performed by: INTERNAL MEDICINE

## 2018-12-10 ENCOUNTER — OFFICE VISIT (OUTPATIENT)
Dept: INTERNAL MEDICINE CLINIC | Age: 68
End: 2018-12-10
Payer: MEDICARE

## 2018-12-10 ENCOUNTER — TELEPHONE (OUTPATIENT)
Dept: FAMILY MEDICINE CLINIC | Age: 68
End: 2018-12-10

## 2018-12-10 VITALS
SYSTOLIC BLOOD PRESSURE: 98 MMHG | OXYGEN SATURATION: 96 % | WEIGHT: 201.6 LBS | DIASTOLIC BLOOD PRESSURE: 63 MMHG | TEMPERATURE: 98.2 F | BODY MASS INDEX: 28.22 KG/M2 | HEIGHT: 71 IN | HEART RATE: 89 BPM

## 2018-12-10 DIAGNOSIS — Z12.11 SCREEN FOR COLON CANCER: ICD-10-CM

## 2018-12-10 DIAGNOSIS — I50.23 ACUTE ON CHRONIC SYSTOLIC HEART FAILURE (HCC): ICD-10-CM

## 2018-12-10 DIAGNOSIS — Z23 NEED FOR TDAP VACCINATION: ICD-10-CM

## 2018-12-10 DIAGNOSIS — R19.7 DIARRHEA, UNSPECIFIED TYPE: Primary | ICD-10-CM

## 2018-12-10 PROBLEM — Z98.62 STATUS POST ANGIOPLASTY: Status: ACTIVE | Noted: 2018-12-10

## 2018-12-10 PROBLEM — R42 DIZZINESS: Status: ACTIVE | Noted: 2018-12-10

## 2018-12-10 PROBLEM — E87.6 HYPOKALEMIA: Status: ACTIVE | Noted: 2018-12-10

## 2018-12-10 PROBLEM — N17.9 AKI (ACUTE KIDNEY INJURY) (HCC): Status: ACTIVE | Noted: 2018-12-10

## 2018-12-10 PROBLEM — E66.3 OVERWEIGHT: Status: ACTIVE | Noted: 2018-12-10

## 2018-12-10 PROBLEM — Z45.02 ICD (IMPLANTABLE CARDIOVERTER-DEFIBRILLATOR) DISCHARGE: Status: ACTIVE | Noted: 2018-12-10

## 2018-12-10 PROBLEM — R50.9 FEVER: Status: ACTIVE | Noted: 2018-12-10

## 2018-12-10 PROBLEM — R05.9 COUGH IN ADULT: Status: ACTIVE | Noted: 2018-12-10

## 2018-12-10 PROBLEM — E87.5 HYPERKALEMIA: Status: ACTIVE | Noted: 2018-12-10

## 2018-12-10 PROBLEM — Z79.01 ANTICOAGULANT LONG-TERM USE: Status: ACTIVE | Noted: 2018-12-10

## 2018-12-10 PROBLEM — I95.9 HYPOTENSION (ARTERIAL): Status: ACTIVE | Noted: 2018-12-10

## 2018-12-10 PROBLEM — Z01.810 PREOP CARDIOVASCULAR EXAM: Status: ACTIVE | Noted: 2018-12-10

## 2018-12-10 PROBLEM — R05.9 COUGH: Status: ACTIVE | Noted: 2018-12-10

## 2018-12-10 PROBLEM — M10.9 GOUT, ARTHRITIS: Status: ACTIVE | Noted: 2018-12-10

## 2018-12-10 PROBLEM — I42.9 CARDIOMYOPATHY (HCC): Status: ACTIVE | Noted: 2018-12-10

## 2018-12-10 PROBLEM — I47.29 PAROXYSMAL VENTRICULAR TACHYCARDIA: Status: ACTIVE | Noted: 2018-12-10

## 2018-12-10 PROBLEM — M25.40 SWELLING OF MULTIPLE JOINTS: Status: ACTIVE | Noted: 2018-12-10

## 2018-12-10 PROBLEM — H53.8 BLURRED VISION, BILATERAL: Status: ACTIVE | Noted: 2018-12-10

## 2018-12-10 PROBLEM — Z87.891 FORMER SMOKER: Status: ACTIVE | Noted: 2018-12-10

## 2018-12-10 PROBLEM — Z95.810 PRESENCE OF AUTOMATIC IMPLANTABLE CARDIOVERTER-DEFIBRILLATOR: Status: ACTIVE | Noted: 2018-12-10

## 2018-12-10 PROBLEM — R53.83 FATIGUE: Status: ACTIVE | Noted: 2018-12-10

## 2018-12-10 PROBLEM — Z79.899 HIGH RISK MEDICATION USE: Status: ACTIVE | Noted: 2018-12-10

## 2018-12-10 PROBLEM — E66.9 OBESE: Status: ACTIVE | Noted: 2018-12-10

## 2018-12-10 PROBLEM — R07.9 CHEST PAIN: Status: ACTIVE | Noted: 2018-12-10

## 2018-12-10 LAB
ALBUMIN SERPL-MCNC: 3.8 G/DL (ref 3.9–4.9)
ALP BLD-CCNC: 311 U/L (ref 35–104)
ALT SERPL-CCNC: 93 U/L (ref 0–41)
ANION GAP SERPL CALCULATED.3IONS-SCNC: 15 MEQ/L (ref 7–13)
AST SERPL-CCNC: 63 U/L (ref 0–40)
BILIRUB SERPL-MCNC: 1.3 MG/DL (ref 0–1.2)
BUN BLDV-MCNC: 42 MG/DL (ref 8–23)
CALCIUM SERPL-MCNC: 9 MG/DL (ref 8.6–10.2)
CHLORIDE BLD-SCNC: 98 MEQ/L (ref 98–107)
CO2: 23 MEQ/L (ref 22–29)
CREAT SERPL-MCNC: 1.44 MG/DL (ref 0.7–1.2)
GFR AFRICAN AMERICAN: 59.3
GFR NON-AFRICAN AMERICAN: 49
GLOBULIN: 3.5 G/DL (ref 2.3–3.5)
GLUCOSE BLD-MCNC: 117 MG/DL (ref 74–109)
POTASSIUM SERPL-SCNC: 4.3 MEQ/L (ref 3.5–5.1)
SODIUM BLD-SCNC: 136 MEQ/L (ref 132–144)
TOTAL PROTEIN: 7.3 G/DL (ref 6.4–8.1)

## 2018-12-10 PROCEDURE — 90715 TDAP VACCINE 7 YRS/> IM: CPT | Performed by: FAMILY MEDICINE

## 2018-12-10 PROCEDURE — 90471 IMMUNIZATION ADMIN: CPT | Performed by: FAMILY MEDICINE

## 2018-12-10 PROCEDURE — 99214 OFFICE O/P EST MOD 30 MIN: CPT | Performed by: FAMILY MEDICINE

## 2018-12-10 NOTE — CARE COORDINATION
PT FROM HOME ALONE-- PLAN TO RETURN SAME- [facility] WILL FOLLOW- PT AGREEABLE.   IMM SECOND PAGE REVIEWED/ SIGNED AND COPY GIVEN/ LH
instructions?:  Yes   Following discharge instructions?:  Yes   If not why? Is there any lingering symptoms? No- just new symptom of congestion and cold like symptoms, diarrhea, pt advised to contact PCP and report symptoms and get earlier appt. How is your appetite-good  How much are you drinking-Im drinking ok  What are you eating-My family is helping me cook meals  Any other problems i.e. Constipation, other symptoms? no  Are there any new complaints of pain? No      New Medications at discharge?:     YES, PT OBTAINED NEW MED-TAPAZOLE                      Medication Reconciliation by phone -    Yes    Were there discrepancies in medications? No  If YES, were discrepancies addressed? Not Applicable    Understands Medications? Yes   Questions about medications from pt or caregiver? No   Questions addressed? Not Applicable                Has all of medication and/or prescriptions   Yes  Taking Medications? Yes  Can you swallow your pills?   Yes    Disease specific zones sheet reviewed with patient/caregiver- Yes  Education mailed to patient's home- No

## 2018-12-10 NOTE — PROGRESS NOTES
times daily 90 capsule 3    clopidogrel (PLAVIX) 75 MG tablet Take 75 mg by mouth daily      atorvastatin (LIPITOR) 80 MG tablet Take 80 mg by mouth daily      amiodarone (CORDARONE) 200 MG tablet Take 200 mg by mouth daily      DIGITEK 125 MCG tablet TAKE 1 TABLET DAILY 90 tablet 3    albuterol (PROAIR HFA) 108 (90 BASE) MCG/ACT inhaler Inhale 2 puffs into the lungs every 4 hours as needed for Wheezing 3 Inhaler 0    methimazole (TAPAZOLE) 10 MG tablet Take 2 tablets by mouth 3 times daily 180 tablet 3    warfarin (COUMADIN) 5 MG tablet Take as directed by [facility]. Quantity equals 90 day supply. (Patient taking differently: Take 5 mg by mouth daily Take as directed by [facility] [place] [facility]. Quantity equals 90 day supply.) 120 tablet 1     No current facility-administered medications for this visit. ROS  CONSTITUTIONAL: The patient denies fevers, chills, sweats and body ache. HEENT: Denies headache, blurry vision, eye pain, tinnitus, vertigo,  sore throat, neck or thyroid masses. RESPIRATORY: Denies cough, sputum, hemoptysis. CARDIAC: Denies chest pain, pressure, palpitations, Denies lower extremity edema. GASTROINTESTINAL: Denies abdominal pain. Admits to diarrhea with some episodes of constipation. GENITOURINARY: Denies dysuria, hematuria, nocturia or frequency, urinary incontinence. NEUROLOGIC: Denies headaches, dizziness, syncope, weakness  MUSCULOSKELETAL: denies changes in range of motion, joint pain, stiffness. ENDOCRINOLOGY: Denies heat or cold intolerance. HEMATOLOGY: Denies easy bleeding or blood transfusion,anemia  DERMATOLOGY: Denies changes in moles or pigmentation changes. PSYCHIATRY: Denies depression, agitation or anxiety.     Past Medical History:   Diagnosis Date    Arthritis     CAD (coronary artery disease)     [facility]omy[facility]     COPD (chronic obstructive pulmonary disease) ([facility]    

## 2018-12-11 ENCOUNTER — TELEPHONE (OUTPATIENT)
Dept: PHARMACY | Age: 68
End: 2018-12-11

## 2018-12-11 DIAGNOSIS — R19.7 DIARRHEA, UNSPECIFIED TYPE: ICD-10-CM

## 2018-12-11 DIAGNOSIS — I48.91 ATRIAL FIBRILLATION, UNSPECIFIED TYPE (HCC): ICD-10-CM

## 2018-12-12 LAB — LACTOFERRIN, FECAL: NEGATIVE

## 2018-12-13 ENCOUNTER — OFFICE VISIT (OUTPATIENT)
Dept: FAMILY MEDICINE CLINIC | Age: 68
End: 2018-12-13
Payer: MEDICARE

## 2018-12-13 ENCOUNTER — OFFICE VISIT (OUTPATIENT)
Dept: ENDOCRINOLOGY | Age: 68
End: 2018-12-13
Payer: MEDICARE

## 2018-12-13 VITALS
HEIGHT: 71 IN | OXYGEN SATURATION: 91 % | BODY MASS INDEX: 28.28 KG/M2 | HEART RATE: 92 BPM | WEIGHT: 202 LBS | DIASTOLIC BLOOD PRESSURE: 61 MMHG | SYSTOLIC BLOOD PRESSURE: 95 MMHG

## 2018-12-13 VITALS
HEIGHT: 71 IN | SYSTOLIC BLOOD PRESSURE: 116 MMHG | TEMPERATURE: 98.1 F | OXYGEN SATURATION: 98 % | DIASTOLIC BLOOD PRESSURE: 70 MMHG | RESPIRATION RATE: 14 BRPM | WEIGHT: 202.6 LBS | HEART RATE: 96 BPM | BODY MASS INDEX: 28.36 KG/M2

## 2018-12-13 DIAGNOSIS — E05.90 HYPERTHYROIDISM: ICD-10-CM

## 2018-12-13 DIAGNOSIS — R74.8 ELEVATED LIVER ENZYMES: ICD-10-CM

## 2018-12-13 DIAGNOSIS — R19.7 DIARRHEA, UNSPECIFIED TYPE: ICD-10-CM

## 2018-12-13 DIAGNOSIS — I50.42 HEART FAILURE, SYSTOLIC AND DIASTOLIC, CHRONIC (HCC): Primary | ICD-10-CM

## 2018-12-13 LAB
ANION GAP SERPL CALCULATED.3IONS-SCNC: 15 MEQ/L (ref 7–13)
BUN BLDV-MCNC: 41 MG/DL (ref 8–23)
CALCIUM SERPL-MCNC: 9.2 MG/DL (ref 8.6–10.2)
CHLORIDE BLD-SCNC: 99 MEQ/L (ref 98–107)
CO2: 24 MEQ/L (ref 22–29)
CREAT SERPL-MCNC: 1.21 MG/DL (ref 0.7–1.2)
GFR AFRICAN AMERICAN: >60
GFR NON-AFRICAN AMERICAN: 59.9
GLUCOSE BLD-MCNC: 152 MG/DL (ref 74–109)
GLUCOSE BLD-MCNC: 186 MG/DL
HCT VFR BLD CALC: 35.8 % (ref 42–52)
HEMOGLOBIN: 11.7 G/DL (ref 14–18)
MCH RBC QN AUTO: 27.3 PG (ref 27–31.3)
MCHC RBC AUTO-ENTMCNC: 32.8 % (ref 33–37)
MCV RBC AUTO: 83.3 FL (ref 80–100)
PDW BLD-RTO: 15.5 % (ref 11.5–14.5)
PLATELET # BLD: 180 K/UL (ref 130–400)
POTASSIUM SERPL-SCNC: 3.9 MEQ/L (ref 3.5–5.1)
RBC # BLD: 4.3 M/UL (ref 4.7–6.1)
SODIUM BLD-SCNC: 138 MEQ/L (ref 132–144)
T4 FREE: 6.95 NG/DL (ref 0.93–1.7)
TSH SERPL DL<=0.05 MIU/L-ACNC: <0.01 UIU/ML (ref 0.27–4.2)
WBC # BLD: 6.6 K/UL (ref 4.8–10.8)

## 2018-12-13 PROCEDURE — 82962 GLUCOSE BLOOD TEST: CPT | Performed by: INTERNAL MEDICINE

## 2018-12-13 PROCEDURE — 99214 OFFICE O/P EST MOD 30 MIN: CPT | Performed by: FAMILY MEDICINE

## 2018-12-13 PROCEDURE — 99213 OFFICE O/P EST LOW 20 MIN: CPT | Performed by: INTERNAL MEDICINE

## 2018-12-13 PROCEDURE — 1111F DSCHRG MED/CURRENT MED MERGE: CPT | Performed by: FAMILY MEDICINE

## 2018-12-13 RX ORDER — METHIMAZOLE 10 MG/1
20 TABLET ORAL 3 TIMES DAILY
Qty: 180 TABLET | Refills: 3
Start: 2018-12-13 | End: 2019-01-12

## 2018-12-13 ASSESSMENT — ENCOUNTER SYMPTOMS: ABDOMINAL PAIN: 0

## 2018-12-14 LAB — GI BACTERIAL PATHOGENS BY PCR: NORMAL

## 2018-12-17 ENCOUNTER — OFFICE VISIT (OUTPATIENT)
Dept: INTERNAL MEDICINE CLINIC | Age: 68
End: 2018-12-17
Payer: MEDICARE

## 2018-12-17 VITALS
HEIGHT: 71 IN | TEMPERATURE: 98.1 F | HEART RATE: 96 BPM | OXYGEN SATURATION: 97 % | WEIGHT: 206.2 LBS | SYSTOLIC BLOOD PRESSURE: 119 MMHG | BODY MASS INDEX: 28.87 KG/M2 | DIASTOLIC BLOOD PRESSURE: 73 MMHG

## 2018-12-17 DIAGNOSIS — K58.0 IRRITABLE BOWEL SYNDROME WITH DIARRHEA: Primary | ICD-10-CM

## 2018-12-17 LAB
CONTROL: NORMAL
HEMOCCULT STL QL: NEGATIVE

## 2018-12-17 PROCEDURE — 99213 OFFICE O/P EST LOW 20 MIN: CPT | Performed by: FAMILY MEDICINE

## 2018-12-17 RX ORDER — LOPERAMIDE HYDROCHLORIDE 2 MG/1
CAPSULE ORAL
Qty: 30 CAPSULE | Refills: 0 | Status: ON HOLD | OUTPATIENT
Start: 2018-12-17 | End: 2019-05-27 | Stop reason: HOSPADM

## 2018-12-17 NOTE — PATIENT INSTRUCTIONS
Patient Education        Diet for Irritable Bowel Syndrome: Care Instructions  Your Care Instructions    Irritable bowel syndrome, or IBS, is a problem with the intestines. IBS can cause belly pain, bloating, gas, constipation, and diarrhea. Most people can control their symptoms by changing their diet and easing stress. No specific foods cause everyone with IBS to have symptoms. Doctors don't offer a specific diet to manage symptoms. But many people find that they feel better when they stop eating certain foods. A high-fiber diet may help if you have constipation. Follow-up care is a key part of your treatment and safety. Be sure to make and go to all appointments, and call your doctor if you are having problems. It's also a good idea to know your test results and keep a list of the medicines you take. How can you care for yourself at home? To reduce constipation  · Include fruits, vegetables, beans, and whole grains in your diet each day. These foods are high in fiber. Slowly increase the amount of fiber you eat. This helps you avoid a lot of gas. · Drink plenty of fluids, enough so that your urine is light yellow or clear like water. If you have kidney, heart, or liver disease and have to limit fluids, talk with your doctor before you increase the amount of fluids you drink. · Get some exercise every day. Build up slowly to 30 to 60 minutes a day on 5 or more days of the week. · Take a fiber supplement, such as Citrucel or Metamucil, every day if needed. Read and follow all instructions on the label. · Schedule time each day for a bowel movement. Having a daily routine may help. Take your time and do not strain when having a bowel movement. · Check with your doctor before you increase the amount of fiber in your diet. For some people who have IBS, eating more fiber may make some symptoms worse. This includes bloating.   To reduce diarrhea  You may try giving up foods or drinks one at a time to see whether information. Patient Education          loperamide  Pronunciation:  soraya PER a mide  Brand:  Diamode, Imodium A-D, Imodium A-D EZ Chews, Imodium A-D New Formula  What is the most important information I should know about loperamide? You should not use loperamide if you have ulcerative colitis, bloody or tarry stools, diarrhea with a high fever, or diarrhea caused by antibiotic medication. Loperamide is safe when used as directed. TAKING TOO MUCH LOPERAMIDE CAN CAUSE SERIOUS HEART PROBLEMS OR DEATH. Serious heart problems may also happen if you take loperamide with other medicines. Ask a doctor or pharmacist about safely using medications together. Do not give loperamide to a child younger than 3years old. What is loperamide? Loperamide slows the rhythm of digestion so that the small intestines have more time to absorb fluid and nutrients from the foods you eat. Loperamide is used to treat diarrhea. Loperamide is also used to reduce the amount of stool in people who have an ileostomy (re-routing of the bowel through a surgical opening in the stomach). Loperamide may also be used for purposes not listed in this medication guide. What should I discuss with my healthcare provider before taking loperamide? You should not use loperamide if you are allergic to it, or if you have:  · stomach pain without diarrhea;  · diarrhea with a high fever;  · ulcerative colitis;  · diarrhea that is caused by a bacterial infection; or  · stools that are bloody, black, or tarry. Do not give loperamide to a child younger than 3years old. Do not give this medicine to an older child or teenager without a doctor's advice. Ask a doctor or pharmacist if it is safe for you to use this medicine if you have:  · a fever;  · mucus in your stools;  · liver disease; or  · AIDS (acquired immunodeficiency syndrome). Ask your doctor before using loperamide to treat diarrhea caused by taking an antibiotic (Clostridium difficile).   Ask problems. Avoid becoming dehydrated by drinking plenty of fluids. Avoid vigorous exercise or exposure to hot weather if you are dehydrated. Loperamide liquid may contain alcohol. Avoid driving or hazardous activity until you know how this medicine will affect you. Your reactions could be impaired. What are the possible side effects of loperamide? Get emergency medical help if you have signs of an allergic reaction (hives, difficult breathing, swelling in your face or throat) or a severe skin reaction (fever, sore throat, burning in your eyes, skin pain, red or purple skin rash that spreads and causes blistering and peeling). Stop taking loperamide and call your doctor at once if you have:  · diarrhea that is watery or bloody;  · stomach pain or bloating;  · ongoing or worsening diarrhea; or  · fast or pounding heartbeats, fluttering in your chest, shortness of breath, and sudden dizziness (like you might pass out). Common side effects may include:  · constipation;  · dizziness, drowsiness;  · nausea; or  · stomach cramps. This is not a complete list of side effects and others may occur. Call your doctor for medical advice about side effects. You may report side effects to FDA at 2-004-FDA-9081. What other drugs will affect loperamide? Sometimes it is not safe to use certain medications at the same time. Some drugs can affect your blood levels of other drugs you take. Ask a doctor or pharmacist about safely using medications together. Loperamide can cause a serious heart problem. Your risk may be higher if you also use certain other medicines for infections, heart problems, depression, mental illness, cancer, malaria, or HIV. Many drugs can affect loperamide. This includes prescription and over-the-counter medicines, vitamins, and herbal products. Not all possible interactions are listed here. Tell your doctor about all your current medicines and any medicine you start or stop using.   Where can I get more

## 2018-12-21 ENCOUNTER — OFFICE VISIT (OUTPATIENT)
Dept: GASTROENTEROLOGY | Age: 68
End: 2018-12-21
Payer: MEDICARE

## 2018-12-21 VITALS
WEIGHT: 211.4 LBS | DIASTOLIC BLOOD PRESSURE: 60 MMHG | BODY MASS INDEX: 29.6 KG/M2 | OXYGEN SATURATION: 96 % | HEART RATE: 100 BPM | HEIGHT: 71 IN | SYSTOLIC BLOOD PRESSURE: 116 MMHG

## 2018-12-21 DIAGNOSIS — R19.7 DIARRHEA, UNSPECIFIED TYPE: Primary | ICD-10-CM

## 2018-12-21 PROCEDURE — 99205 OFFICE O/P NEW HI 60 MIN: CPT | Performed by: INTERNAL MEDICINE

## 2018-12-21 ASSESSMENT — ENCOUNTER SYMPTOMS
BACK PAIN: 0
EYE ITCHING: 0
SHORTNESS OF BREATH: 0
COUGH: 0
VOMITING: 0
EYE PAIN: 0
EYE DISCHARGE: 0
CONSTIPATION: 0
ABDOMINAL PAIN: 0
DIARRHEA: 1
APNEA: 0

## 2018-12-23 NOTE — PROGRESS NOTES
failure (HCC)    Hemoptysis    SOB (shortness of breath)    CHF (congestive heart failure), NYHA class III, chronic, combined ([address]    CHF (congestive heart failure), NYHA class I, acute on chronic, combined (HCC)    Acute systolic CHF (congestive heart failure) (HCC)    CHF (congestive heart failure), NYHA class IV, acute on chronic, combined ([address]    Phimosis/redundant prepuce    Heart failure (HCC)    Moderate malnutrition ([address]    Amiodarone-induced hyperthyroidism    Hyperthyroidism    Uncontrolled type 2 diabetes mellitus with hyperglycemia ([address]    BERTRAM (acute kidney injury) ([address]    Anticoagulant long-term use    Blurred vision, bilateral    Cardiomyopathy ([address]    Chest pain    Cough    Cough in adult    Dizziness    Fatigue    Fever    Former smoker    Gout, arthritis    High risk medication use    Hyperkalemia    Hypokalemia    Hypotension (arterial)    ICD (implantable cardioverter-defibrillator) discharge    Obese    Overweight    Paroxysmal ventricular tachycardia (HCC)    Preop cardiovascular exam    Presence of automatic implantable cardioverter-defibrillator    Status post angioplasty    Swelling of multiple joints     Allergies   Allergen Reactions    Dye [Iodides] Shortness Of Breath    Penicillins Anaphylaxis       Current Outpatient Prescriptions:     insulin 70-30 (NOVOLIN 70/30) (70-30) 100 UNIT per ML injection vial, INJECT 25 UNITS TWICE A DAY, Disp: 80 mL, Rfl: 3    methimazole (TAPAZOLE) 10 MG tablet, Take 2 tablets by mouth 3 times daily, Disp: 180 tablet, Rfl: 3    furosemide (LASIX) 40 MG tablet, Take 1 tablet by mouth daily, Disp: 90 tablet, Rfl: 1    allopurinol (ZYLOPRIM) 100 MG tablet, TAKE 1 TABLET DAILY, Disp: 90 tablet, Rfl: 0    BD INSULIN SYRINGE ULTRAFINE 31G X 5/16\" 0.5 ML MISC, USE TWICE A DAY, Disp: 300 each, Rfl: 0    COLCRYS 0.6 MG tablet, TAKE 1 TABLET DAILY (NEEDS FOLLOW UP PRIOR TO ANY MORE REFILLS), Disp: 90 tablet, Rfl: 0  

## 2018-12-26 ENCOUNTER — TELEPHONE (OUTPATIENT)
Dept: PHARMACY | Age: 68
End: 2018-12-26

## 2018-12-26 DIAGNOSIS — I48.91 ATRIAL FIBRILLATION, UNSPECIFIED TYPE (HCC): ICD-10-CM

## 2018-12-26 DIAGNOSIS — I50.21 ACUTE SYSTOLIC CHF (CONGESTIVE HEART FAILURE) (HCC): ICD-10-CM

## 2018-12-26 DIAGNOSIS — R19.7 DIARRHEA, UNSPECIFIED TYPE: ICD-10-CM

## 2018-12-26 LAB
ANION GAP SERPL CALCULATED.3IONS-SCNC: 16 MEQ/L (ref 7–13)
BUN BLDV-MCNC: 31 MG/DL (ref 8–23)
CALCIUM SERPL-MCNC: 9.3 MG/DL (ref 8.6–10.2)
CHLORIDE BLD-SCNC: 95 MEQ/L (ref 98–107)
CO2: 26 MEQ/L (ref 22–29)
CREAT SERPL-MCNC: 1.43 MG/DL (ref 0.7–1.2)
GFR AFRICAN AMERICAN: 59.7
GFR NON-AFRICAN AMERICAN: 49.4
GLUCOSE BLD-MCNC: 102 MG/DL (ref 74–109)
POTASSIUM SERPL-SCNC: 3.9 MEQ/L (ref 3.5–5.1)
SODIUM BLD-SCNC: 137 MEQ/L (ref 132–144)
T4 FREE: >7.77 NG/DL (ref 0.93–1.7)
TSH REFLEX: <0.01 UIU/ML (ref 0.27–4.2)

## 2018-12-27 ENCOUNTER — TELEPHONE (OUTPATIENT)
Dept: GASTROENTEROLOGY | Age: 68
End: 2018-12-27

## 2018-12-27 LAB
C DIFFICILE TOXIN, EIA: NORMAL
CRYPTOSPORIDIUM ANTIGEN STOOL: NORMAL
GI BACTERIAL PATHOGENS BY PCR: NORMAL
GIARDIA ANTIGEN STOOL: NORMAL

## 2018-12-27 NOTE — TELEPHONE ENCOUNTER
----- Message from Jerad Chong MD sent at 12/26/2018  4:15 PM EST -----  Thyroid test is abnormal. Should follow with PCP. Thank you.

## 2019-01-03 ENCOUNTER — OFFICE VISIT (OUTPATIENT)
Dept: FAMILY MEDICINE CLINIC | Age: 69
End: 2019-01-03
Payer: MEDICARE

## 2019-01-03 ENCOUNTER — HOSPITAL ENCOUNTER (OUTPATIENT)
Dept: PHARMACY | Age: 69
Setting detail: THERAPIES SERIES
Discharge: HOME | End: 2019-01-03
Payer: MEDICARE

## 2019-01-03 VITALS
DIASTOLIC BLOOD PRESSURE: 70 MMHG | SYSTOLIC BLOOD PRESSURE: 108 MMHG | RESPIRATION RATE: 14 BRPM | TEMPERATURE: 97.5 F | BODY MASS INDEX: 28.87 KG/M2 | HEIGHT: 71 IN | OXYGEN SATURATION: 98 % | HEART RATE: 91 BPM | WEIGHT: 206.2 LBS

## 2019-01-03 DIAGNOSIS — I48.91 ATRIAL FIBRILLATION, UNSPECIFIED TYPE (HCC): ICD-10-CM

## 2019-01-03 DIAGNOSIS — R74.8 ELEVATED LIVER ENZYMES: ICD-10-CM

## 2019-01-03 DIAGNOSIS — E05.90 HYPERTHYROIDISM: Primary | ICD-10-CM

## 2019-01-03 LAB — INTERNATIONAL NORMALIZATION RATIO, POC: 4.9

## 2019-01-03 PROCEDURE — 85610 PROTHROMBIN TIME: CPT | Performed by: PHARMACIST

## 2019-01-03 PROCEDURE — 99213 OFFICE O/P EST LOW 20 MIN: CPT | Performed by: FAMILY MEDICINE

## 2019-01-03 PROCEDURE — 99211 OFF/OP EST MAY X REQ PHY/QHP: CPT | Performed by: PHARMACIST

## 2019-01-03 RX ORDER — METOPROLOL SUCCINATE 25 MG/1
25 TABLET, EXTENDED RELEASE ORAL 2 TIMES DAILY
COMMUNITY
End: 2019-12-04 | Stop reason: SDUPTHER

## 2019-01-03 ASSESSMENT — ENCOUNTER SYMPTOMS: ABDOMINAL PAIN: 0

## 2019-01-04 ENCOUNTER — OFFICE VISIT (OUTPATIENT)
Dept: ENDOCRINOLOGY | Age: 69
End: 2019-01-04
Payer: MEDICARE

## 2019-01-04 ENCOUNTER — TELEPHONE (OUTPATIENT)
Dept: PHARMACY | Age: 69
End: 2019-01-04

## 2019-01-04 VITALS
SYSTOLIC BLOOD PRESSURE: 107 MMHG | BODY MASS INDEX: 28.56 KG/M2 | HEIGHT: 71 IN | HEART RATE: 102 BPM | DIASTOLIC BLOOD PRESSURE: 60 MMHG | OXYGEN SATURATION: 93 % | WEIGHT: 204 LBS

## 2019-01-04 DIAGNOSIS — E05.90 HYPERTHYROIDISM: ICD-10-CM

## 2019-01-04 DIAGNOSIS — E11.42 DIABETIC POLYNEUROPATHY ASSOCIATED WITH TYPE 2 DIABETES MELLITUS (HCC): ICD-10-CM

## 2019-01-04 LAB — GLUCOSE BLD-MCNC: 121 MG/DL

## 2019-01-04 PROCEDURE — 99213 OFFICE O/P EST LOW 20 MIN: CPT | Performed by: INTERNAL MEDICINE

## 2019-01-04 PROCEDURE — 82962 GLUCOSE BLOOD TEST: CPT | Performed by: INTERNAL MEDICINE

## 2019-01-04 RX ORDER — GABAPENTIN 300 MG/1
300 CAPSULE ORAL 3 TIMES DAILY
Qty: 90 CAPSULE | Refills: 3 | Status: SHIPPED | OUTPATIENT
Start: 2019-01-04 | End: 2019-06-25 | Stop reason: SDUPTHER

## 2019-01-07 ENCOUNTER — HOSPITAL ENCOUNTER (OUTPATIENT)
Dept: PHARMACY | Age: 69
Setting detail: THERAPIES SERIES
Discharge: HOME | End: 2019-01-07
Payer: MEDICARE

## 2019-01-07 DIAGNOSIS — I48.91 ATRIAL FIBRILLATION, UNSPECIFIED TYPE (HCC): ICD-10-CM

## 2019-01-07 LAB — INTERNATIONAL NORMALIZATION RATIO, POC: 5.7

## 2019-01-07 PROCEDURE — 99211 OFF/OP EST MAY X REQ PHY/QHP: CPT | Performed by: PHARMACIST

## 2019-01-07 PROCEDURE — 85610 PROTHROMBIN TIME: CPT | Performed by: PHARMACIST

## 2019-01-07 RX ORDER — ALLOPURINOL 100 MG/1
TABLET ORAL
Qty: 90 TABLET | Refills: 0 | Status: SHIPPED | OUTPATIENT
Start: 2019-01-07 | End: 2019-09-12 | Stop reason: SDUPTHER

## 2019-01-09 PROBLEM — R05.9 COUGH: Status: RESOLVED | Noted: 2018-12-10 | Resolved: 2019-01-09

## 2019-01-09 PROBLEM — Z01.810 PREOP CARDIOVASCULAR EXAM: Status: RESOLVED | Noted: 2018-12-10 | Resolved: 2019-01-09

## 2019-01-10 ENCOUNTER — HOSPITAL ENCOUNTER (OUTPATIENT)
Dept: PHARMACY | Age: 69
Setting detail: THERAPIES SERIES
Discharge: HOME | End: 2019-01-10
Payer: MEDICARE

## 2019-01-10 DIAGNOSIS — I48.91 ATRIAL FIBRILLATION, UNSPECIFIED TYPE (HCC): ICD-10-CM

## 2019-01-10 LAB — INTERNATIONAL NORMALIZATION RATIO, POC: 2.2

## 2019-01-10 PROCEDURE — 85610 PROTHROMBIN TIME: CPT | Performed by: PHARMACIST

## 2019-01-10 PROCEDURE — 99211 OFF/OP EST MAY X REQ PHY/QHP: CPT | Performed by: PHARMACIST

## 2019-01-14 ENCOUNTER — HOSPITAL ENCOUNTER (OUTPATIENT)
Dept: PHARMACY | Age: 69
Setting detail: THERAPIES SERIES
Discharge: HOME | End: 2019-01-14
Payer: MEDICARE

## 2019-01-14 DIAGNOSIS — I48.91 ATRIAL FIBRILLATION, UNSPECIFIED TYPE (HCC): ICD-10-CM

## 2019-01-14 LAB — INTERNATIONAL NORMALIZATION RATIO, POC: 1.3

## 2019-01-14 PROCEDURE — 99211 OFF/OP EST MAY X REQ PHY/QHP: CPT

## 2019-01-14 PROCEDURE — 85610 PROTHROMBIN TIME: CPT

## 2019-01-15 RX ORDER — WARFARIN SODIUM 5 MG/1
TABLET ORAL
Qty: 120 TABLET | Refills: 1 | Status: SHIPPED | OUTPATIENT
Start: 2019-01-15 | End: 2020-03-16 | Stop reason: SDUPTHER

## 2019-01-17 ENCOUNTER — CARE COORDINATION (OUTPATIENT)
Dept: CARE COORDINATION | Age: 69
End: 2019-01-17

## 2019-01-17 RX ORDER — DOXYCYCLINE HYCLATE 100 MG
TABLET ORAL
Refills: 0 | Status: ON HOLD | COMMUNITY
Start: 2019-01-03 | End: 2019-05-27 | Stop reason: HOSPADM

## 2019-01-20 ENCOUNTER — HOSPITAL ENCOUNTER (EMERGENCY)
Age: 69
Discharge: LEFT BEFORE DISCHARGE | End: 2019-01-21
Payer: MEDICARE

## 2019-01-20 ENCOUNTER — APPOINTMENT (OUTPATIENT)
Dept: CT IMAGING | Age: 69
End: 2019-01-20
Payer: MEDICARE

## 2019-01-20 ENCOUNTER — APPOINTMENT (OUTPATIENT)
Dept: GENERAL RADIOLOGY | Age: 69
End: 2019-01-20
Payer: MEDICARE

## 2019-01-20 DIAGNOSIS — R06.02 SHORTNESS OF BREATH: Primary | ICD-10-CM

## 2019-01-20 DIAGNOSIS — R00.0 TACHYCARDIA: ICD-10-CM

## 2019-01-20 DIAGNOSIS — L85.3 DRY SKIN DERMATITIS: ICD-10-CM

## 2019-01-20 LAB
ALBUMIN SERPL-MCNC: 4 G/DL (ref 3.9–4.9)
ALP BLD-CCNC: 355 U/L (ref 35–104)
ALT SERPL-CCNC: 31 U/L (ref 0–41)
ANION GAP SERPL CALCULATED.3IONS-SCNC: 12 MEQ/L (ref 7–13)
AST SERPL-CCNC: 35 U/L (ref 0–40)
BACTERIA: NEGATIVE /HPF
BASOPHILS ABSOLUTE: 0 K/UL (ref 0–0.2)
BASOPHILS RELATIVE PERCENT: 0.7 %
BILIRUB SERPL-MCNC: 1.4 MG/DL (ref 0–1.2)
BILIRUBIN URINE: NEGATIVE
BLOOD, URINE: ABNORMAL
BUN BLDV-MCNC: 41 MG/DL (ref 8–23)
CALCIUM SERPL-MCNC: 9.5 MG/DL (ref 8.6–10.2)
CHLORIDE BLD-SCNC: 97 MEQ/L (ref 98–107)
CLARITY: CLEAR
CO2: 23 MEQ/L (ref 22–29)
COLOR: YELLOW
CREAT SERPL-MCNC: 1.19 MG/DL (ref 0.7–1.2)
EKG ATRIAL RATE: 61 BPM
EKG P AXIS: 25 DEGREES
EKG P-R INTERVAL: 184 MS
EKG Q-T INTERVAL: 320 MS
EKG QRS DURATION: 112 MS
EKG QTC CALCULATION (BAZETT): 456 MS
EKG R AXIS: -41 DEGREES
EKG T AXIS: 111 DEGREES
EKG VENTRICULAR RATE: 122 BPM
EOSINOPHILS ABSOLUTE: 0 K/UL (ref 0–0.7)
EOSINOPHILS RELATIVE PERCENT: 0 %
EPITHELIAL CELLS, UA: ABNORMAL /HPF (ref 0–5)
GFR AFRICAN AMERICAN: >60
GFR NON-AFRICAN AMERICAN: >60
GLOBULIN: 3.8 G/DL (ref 2.3–3.5)
GLUCOSE BLD-MCNC: 147 MG/DL (ref 74–109)
GLUCOSE URINE: NEGATIVE MG/DL
HCT VFR BLD CALC: 36.6 % (ref 42–52)
HEMOGLOBIN: 12.5 G/DL (ref 14–18)
HYALINE CASTS: ABNORMAL /HPF (ref 0–5)
INR BLD: 1.7
KETONES, URINE: NEGATIVE MG/DL
LEUKOCYTE ESTERASE, URINE: NEGATIVE
LYMPHOCYTES ABSOLUTE: 1.3 K/UL (ref 1–4.8)
LYMPHOCYTES RELATIVE PERCENT: 23.6 %
MCH RBC QN AUTO: 27.7 PG (ref 27–31.3)
MCHC RBC AUTO-ENTMCNC: 34.2 % (ref 33–37)
MCV RBC AUTO: 80.9 FL (ref 80–100)
MONOCYTES ABSOLUTE: 0.6 K/UL (ref 0.2–0.8)
MONOCYTES RELATIVE PERCENT: 10.5 %
NEUTROPHILS ABSOLUTE: 3.6 K/UL (ref 1.4–6.5)
NEUTROPHILS RELATIVE PERCENT: 65.2 %
NITRITE, URINE: NEGATIVE
PDW BLD-RTO: 16.5 % (ref 11.5–14.5)
PH UA: 5 (ref 5–9)
PLATELET # BLD: 230 K/UL (ref 130–400)
POTASSIUM SERPL-SCNC: 4.1 MEQ/L (ref 3.5–5.1)
PRO-BNP: 9993 PG/ML
PROTEIN UA: 30 MG/DL
PROTHROMBIN TIME: 16.7 SEC (ref 9–11.5)
RBC # BLD: 4.53 M/UL (ref 4.7–6.1)
RBC UA: ABNORMAL /HPF (ref 0–5)
SODIUM BLD-SCNC: 132 MEQ/L (ref 132–144)
SPECIFIC GRAVITY UA: 1.02 (ref 1–1.03)
TOTAL CK: 91 U/L (ref 0–190)
TOTAL PROTEIN: 7.8 G/DL (ref 6.4–8.1)
TROPONIN: <0.01 NG/ML (ref 0–0.01)
URINE REFLEX TO CULTURE: YES
UROBILINOGEN, URINE: 1 E.U./DL
WBC # BLD: 5.6 K/UL (ref 4.8–10.8)
WBC UA: ABNORMAL /HPF (ref 0–5)

## 2019-01-20 PROCEDURE — 96374 THER/PROPH/DIAG INJ IV PUSH: CPT

## 2019-01-20 PROCEDURE — 81001 URINALYSIS AUTO W/SCOPE: CPT

## 2019-01-20 PROCEDURE — 6360000002 HC RX W HCPCS: Performed by: NURSE PRACTITIONER

## 2019-01-20 PROCEDURE — 84484 ASSAY OF TROPONIN QUANT: CPT

## 2019-01-20 PROCEDURE — 71275 CT ANGIOGRAPHY CHEST: CPT

## 2019-01-20 PROCEDURE — 6370000000 HC RX 637 (ALT 250 FOR IP): Performed by: NURSE PRACTITIONER

## 2019-01-20 PROCEDURE — 71045 X-RAY EXAM CHEST 1 VIEW: CPT

## 2019-01-20 PROCEDURE — 85610 PROTHROMBIN TIME: CPT

## 2019-01-20 PROCEDURE — 96375 TX/PRO/DX INJ NEW DRUG ADDON: CPT

## 2019-01-20 PROCEDURE — 36415 COLL VENOUS BLD VENIPUNCTURE: CPT

## 2019-01-20 PROCEDURE — 83880 ASSAY OF NATRIURETIC PEPTIDE: CPT

## 2019-01-20 PROCEDURE — 6360000004 HC RX CONTRAST MEDICATION: Performed by: RADIOLOGY

## 2019-01-20 PROCEDURE — 85025 COMPLETE CBC W/AUTO DIFF WBC: CPT

## 2019-01-20 PROCEDURE — 99285 EMERGENCY DEPT VISIT HI MDM: CPT

## 2019-01-20 PROCEDURE — 82550 ASSAY OF CK (CPK): CPT

## 2019-01-20 PROCEDURE — 93005 ELECTROCARDIOGRAM TRACING: CPT

## 2019-01-20 PROCEDURE — 80053 COMPREHEN METABOLIC PANEL: CPT

## 2019-01-20 PROCEDURE — 2580000003 HC RX 258: Performed by: NURSE PRACTITIONER

## 2019-01-20 PROCEDURE — 87086 URINE CULTURE/COLONY COUNT: CPT

## 2019-01-20 RX ORDER — HYDROXYZINE PAMOATE 25 MG/1
25 CAPSULE ORAL ONCE
Status: COMPLETED | OUTPATIENT
Start: 2019-01-20 | End: 2019-01-20

## 2019-01-20 RX ORDER — FUROSEMIDE 10 MG/ML
40 INJECTION INTRAMUSCULAR; INTRAVENOUS ONCE
Status: COMPLETED | OUTPATIENT
Start: 2019-01-20 | End: 2019-01-20

## 2019-01-20 RX ORDER — METHYLPREDNISOLONE SODIUM SUCCINATE 125 MG/2ML
125 INJECTION, POWDER, LYOPHILIZED, FOR SOLUTION INTRAMUSCULAR; INTRAVENOUS EVERY 6 HOURS
Status: DISCONTINUED | OUTPATIENT
Start: 2019-01-20 | End: 2019-01-21 | Stop reason: HOSPADM

## 2019-01-20 RX ORDER — 0.9 % SODIUM CHLORIDE 0.9 %
500 INTRAVENOUS SOLUTION INTRAVENOUS ONCE
Status: COMPLETED | OUTPATIENT
Start: 2019-01-20 | End: 2019-01-21

## 2019-01-20 RX ORDER — DIPHENHYDRAMINE HYDROCHLORIDE 50 MG/ML
50 INJECTION INTRAMUSCULAR; INTRAVENOUS ONCE
Status: COMPLETED | OUTPATIENT
Start: 2019-01-20 | End: 2019-01-20

## 2019-01-20 RX ADMIN — FUROSEMIDE 40 MG: 10 INJECTION, SOLUTION INTRAMUSCULAR; INTRAVENOUS at 21:50

## 2019-01-20 RX ADMIN — HYDROXYZINE PAMOATE 25 MG: 25 CAPSULE ORAL at 22:19

## 2019-01-20 RX ADMIN — SODIUM CHLORIDE 500 ML: 9 INJECTION, SOLUTION INTRAVENOUS at 21:50

## 2019-01-20 RX ADMIN — METHYLPREDNISOLONE SODIUM SUCCINATE 125 MG: 125 INJECTION, POWDER, FOR SOLUTION INTRAMUSCULAR; INTRAVENOUS at 22:33

## 2019-01-20 RX ADMIN — IOPAMIDOL 100 ML: 612 INJECTION, SOLUTION INTRAVENOUS at 23:28

## 2019-01-20 RX ADMIN — DIPHENHYDRAMINE HYDROCHLORIDE 50 MG: 50 INJECTION, SOLUTION INTRAMUSCULAR; INTRAVENOUS at 22:33

## 2019-01-20 ASSESSMENT — ENCOUNTER SYMPTOMS
SHORTNESS OF BREATH: 1
COUGH: 0
NAUSEA: 0
DIARRHEA: 0
TROUBLE SWALLOWING: 0
CHEST TIGHTNESS: 0
ABDOMINAL PAIN: 0
RHINORRHEA: 0
COLOR CHANGE: 0
ABDOMINAL DISTENTION: 0
BACK PAIN: 0
WHEEZING: 0
SORE THROAT: 0
VOMITING: 0

## 2019-01-20 ASSESSMENT — PAIN DESCRIPTION - ORIENTATION: ORIENTATION: LEFT;RIGHT

## 2019-01-20 ASSESSMENT — PAIN DESCRIPTION - PAIN TYPE: TYPE: ACUTE PAIN

## 2019-01-20 ASSESSMENT — PAIN SCALES - GENERAL: PAINLEVEL_OUTOF10: 10

## 2019-01-20 ASSESSMENT — PAIN DESCRIPTION - LOCATION: LOCATION: NECK;BACK;LEG

## 2019-01-21 VITALS
OXYGEN SATURATION: 100 % | DIASTOLIC BLOOD PRESSURE: 51 MMHG | TEMPERATURE: 98.2 F | RESPIRATION RATE: 20 BRPM | SYSTOLIC BLOOD PRESSURE: 113 MMHG | HEART RATE: 108 BPM | BODY MASS INDEX: 24.41 KG/M2 | WEIGHT: 175 LBS

## 2019-01-21 PROCEDURE — 93010 ELECTROCARDIOGRAM REPORT: CPT | Performed by: INTERNAL MEDICINE

## 2019-01-21 RX ORDER — HYDROXYZINE HYDROCHLORIDE 25 MG/1
25 TABLET, FILM COATED ORAL EVERY 6 HOURS PRN
Qty: 40 TABLET | Refills: 0 | Status: SHIPPED | OUTPATIENT
Start: 2019-01-21 | End: 2019-01-31

## 2019-01-22 ENCOUNTER — OFFICE VISIT (OUTPATIENT)
Dept: FAMILY MEDICINE CLINIC | Age: 69
End: 2019-01-22
Payer: MEDICARE

## 2019-01-22 VITALS
OXYGEN SATURATION: 98 % | HEART RATE: 101 BPM | DIASTOLIC BLOOD PRESSURE: 70 MMHG | BODY MASS INDEX: 27.16 KG/M2 | HEIGHT: 71 IN | WEIGHT: 194 LBS | SYSTOLIC BLOOD PRESSURE: 118 MMHG | RESPIRATION RATE: 14 BRPM | TEMPERATURE: 97.4 F

## 2019-01-22 DIAGNOSIS — R74.8 ELEVATED ALKALINE PHOSPHATASE LEVEL: ICD-10-CM

## 2019-01-22 DIAGNOSIS — L29.9 CHRONIC PRURITUS: ICD-10-CM

## 2019-01-22 DIAGNOSIS — L85.3 XEROSIS OF SKIN: Primary | ICD-10-CM

## 2019-01-22 LAB
GAMMA GLUTAMYL TRANSFERASE: 170 U/L (ref 0–60)
URINE CULTURE, ROUTINE: NORMAL

## 2019-01-22 PROCEDURE — 99213 OFFICE O/P EST LOW 20 MIN: CPT | Performed by: FAMILY MEDICINE

## 2019-01-22 ASSESSMENT — ENCOUNTER SYMPTOMS: ABDOMINAL PAIN: 0

## 2019-01-24 ENCOUNTER — HOSPITAL ENCOUNTER (OUTPATIENT)
Dept: PHARMACY | Age: 69
Setting detail: THERAPIES SERIES
Discharge: HOME | End: 2019-01-24
Payer: MEDICARE

## 2019-01-24 DIAGNOSIS — I48.91 ATRIAL FIBRILLATION, UNSPECIFIED TYPE (HCC): ICD-10-CM

## 2019-01-24 LAB — INTERNATIONAL NORMALIZATION RATIO, POC: 2.7

## 2019-01-24 PROCEDURE — 85610 PROTHROMBIN TIME: CPT

## 2019-01-24 PROCEDURE — 99211 OFF/OP EST MAY X REQ PHY/QHP: CPT

## 2019-01-25 ENCOUNTER — OFFICE VISIT (OUTPATIENT)
Dept: GASTROENTEROLOGY | Age: 69
End: 2019-01-25
Payer: MEDICARE

## 2019-01-25 VITALS
HEART RATE: 77 BPM | TEMPERATURE: 97.9 F | OXYGEN SATURATION: 99 % | DIASTOLIC BLOOD PRESSURE: 78 MMHG | HEIGHT: 71 IN | BODY MASS INDEX: 27.19 KG/M2 | SYSTOLIC BLOOD PRESSURE: 112 MMHG | WEIGHT: 194.2 LBS

## 2019-01-25 DIAGNOSIS — R19.7 DIARRHEA, UNSPECIFIED TYPE: Primary | ICD-10-CM

## 2019-01-25 PROCEDURE — 99214 OFFICE O/P EST MOD 30 MIN: CPT | Performed by: INTERNAL MEDICINE

## 2019-01-25 RX ORDER — SODIUM, POTASSIUM,MAG SULFATES 17.5-3.13G
SOLUTION, RECONSTITUTED, ORAL ORAL
Qty: 1 BOTTLE | Refills: 0 | Status: SHIPPED | OUTPATIENT
Start: 2019-01-25 | End: 2019-03-13

## 2019-01-25 ASSESSMENT — ENCOUNTER SYMPTOMS
DIARRHEA: 1
APNEA: 0
SHORTNESS OF BREATH: 0
BACK PAIN: 0
EYE ITCHING: 0
EYE DISCHARGE: 0
COUGH: 0
CONSTIPATION: 0
VOMITING: 0
EYE PAIN: 0
ABDOMINAL PAIN: 0

## 2019-02-01 ENCOUNTER — OFFICE VISIT (OUTPATIENT)
Dept: ENDOCRINOLOGY | Age: 69
End: 2019-02-01
Payer: MEDICARE

## 2019-02-01 ENCOUNTER — HOSPITAL ENCOUNTER (OUTPATIENT)
Dept: PHARMACY | Age: 69
Setting detail: THERAPIES SERIES
Discharge: HOME | End: 2019-02-01
Payer: MEDICARE

## 2019-02-01 VITALS
DIASTOLIC BLOOD PRESSURE: 66 MMHG | SYSTOLIC BLOOD PRESSURE: 97 MMHG | HEART RATE: 96 BPM | OXYGEN SATURATION: 97 % | HEIGHT: 71 IN | BODY MASS INDEX: 27.02 KG/M2 | WEIGHT: 193 LBS

## 2019-02-01 DIAGNOSIS — E05.90 HYPERTHYROIDISM: ICD-10-CM

## 2019-02-01 DIAGNOSIS — I48.91 ATRIAL FIBRILLATION, UNSPECIFIED TYPE (HCC): ICD-10-CM

## 2019-02-01 LAB
ANION GAP SERPL CALCULATED.3IONS-SCNC: 15 MEQ/L (ref 7–13)
BUN BLDV-MCNC: 32 MG/DL (ref 8–23)
CALCIUM SERPL-MCNC: 9.4 MG/DL (ref 8.6–10.2)
CHLORIDE BLD-SCNC: 98 MEQ/L (ref 98–107)
CO2: 25 MEQ/L (ref 22–29)
CREAT SERPL-MCNC: 1.51 MG/DL (ref 0.7–1.2)
GFR AFRICAN AMERICAN: 56.1
GFR NON-AFRICAN AMERICAN: 46.3
GLUCOSE BLD-MCNC: 110 MG/DL (ref 74–109)
GLUCOSE BLD-MCNC: 113 MG/DL
HCT VFR BLD CALC: 38.7 % (ref 42–52)
HEMOGLOBIN: 12.9 G/DL (ref 14–18)
INTERNATIONAL NORMALIZATION RATIO, POC: 1.2
MCH RBC QN AUTO: 26.6 PG (ref 27–31.3)
MCHC RBC AUTO-ENTMCNC: 33.4 % (ref 33–37)
MCV RBC AUTO: 79.5 FL (ref 80–100)
PDW BLD-RTO: 16.7 % (ref 11.5–14.5)
PLATELET # BLD: 227 K/UL (ref 130–400)
POTASSIUM SERPL-SCNC: 4 MEQ/L (ref 3.5–5.1)
RBC # BLD: 4.86 M/UL (ref 4.7–6.1)
SODIUM BLD-SCNC: 138 MEQ/L (ref 132–144)
T4 FREE: 3.21 NG/DL (ref 0.93–1.7)
TSH REFLEX: <0.01 UIU/ML (ref 0.27–4.2)
WBC # BLD: 5.5 K/UL (ref 4.8–10.8)

## 2019-02-01 PROCEDURE — 85610 PROTHROMBIN TIME: CPT

## 2019-02-01 PROCEDURE — 99213 OFFICE O/P EST LOW 20 MIN: CPT | Performed by: INTERNAL MEDICINE

## 2019-02-01 PROCEDURE — 82962 GLUCOSE BLOOD TEST: CPT | Performed by: INTERNAL MEDICINE

## 2019-02-01 PROCEDURE — 99211 OFF/OP EST MAY X REQ PHY/QHP: CPT

## 2019-02-08 ENCOUNTER — HOSPITAL ENCOUNTER (OUTPATIENT)
Dept: PHARMACY | Age: 69
Setting detail: THERAPIES SERIES
Discharge: HOME | End: 2019-02-08
Payer: MEDICARE

## 2019-02-08 ENCOUNTER — CARE COORDINATION (OUTPATIENT)
Dept: CARE COORDINATION | Age: 69
End: 2019-02-08

## 2019-02-08 ENCOUNTER — OFFICE VISIT (OUTPATIENT)
Dept: FAMILY MEDICINE CLINIC | Age: 69
End: 2019-02-08
Payer: MEDICARE

## 2019-02-08 VITALS
DIASTOLIC BLOOD PRESSURE: 70 MMHG | WEIGHT: 195 LBS | BODY MASS INDEX: 27.3 KG/M2 | RESPIRATION RATE: 14 BRPM | HEART RATE: 99 BPM | TEMPERATURE: 97.3 F | OXYGEN SATURATION: 98 % | SYSTOLIC BLOOD PRESSURE: 118 MMHG | HEIGHT: 71 IN

## 2019-02-08 DIAGNOSIS — R74.8 ELEVATED ALKALINE PHOSPHATASE LEVEL: Primary | ICD-10-CM

## 2019-02-08 DIAGNOSIS — L29.9 CHRONIC PRURITUS: ICD-10-CM

## 2019-02-08 DIAGNOSIS — I48.91 ATRIAL FIBRILLATION, UNSPECIFIED TYPE (HCC): ICD-10-CM

## 2019-02-08 DIAGNOSIS — Z12.5 PROSTATE CANCER SCREENING: ICD-10-CM

## 2019-02-08 DIAGNOSIS — R63.4 WEIGHT LOSS: ICD-10-CM

## 2019-02-08 LAB
ALBUMIN SERPL-MCNC: 3.8 G/DL (ref 3.5–4.6)
ALP BLD-CCNC: 348 U/L (ref 35–104)
ALT SERPL-CCNC: 27 U/L (ref 0–41)
AST SERPL-CCNC: 24 U/L (ref 0–40)
BILIRUB SERPL-MCNC: 1.5 MG/DL (ref 0.2–0.7)
BILIRUBIN DIRECT: 0.7 MG/DL (ref 0–0.4)
BILIRUBIN, INDIRECT: 0.8 MG/DL (ref 0–0.6)
INTERNATIONAL NORMALIZATION RATIO, POC: 1.7
PROSTATE SPECIFIC ANTIGEN: 1.15 NG/ML (ref 0–5.4)
TOTAL PROTEIN: 7.6 G/DL (ref 6.3–8)

## 2019-02-08 PROCEDURE — 99213 OFFICE O/P EST LOW 20 MIN: CPT | Performed by: FAMILY MEDICINE

## 2019-02-08 PROCEDURE — 85610 PROTHROMBIN TIME: CPT

## 2019-02-08 PROCEDURE — 99211 OFF/OP EST MAY X REQ PHY/QHP: CPT

## 2019-02-08 RX ORDER — ATORVASTATIN CALCIUM 80 MG/1
TABLET, FILM COATED ORAL
Status: ON HOLD | COMMUNITY
Start: 2019-02-06 | End: 2019-05-27 | Stop reason: HOSPADM

## 2019-02-08 RX ORDER — HYDROXYZINE HYDROCHLORIDE 25 MG/1
TABLET, FILM COATED ORAL
Refills: 0 | Status: ON HOLD | COMMUNITY
Start: 2019-01-21 | End: 2019-05-27 | Stop reason: HOSPADM

## 2019-02-13 ENCOUNTER — HOSPITAL ENCOUNTER (OUTPATIENT)
Dept: CARDIOLOGY | Age: 69
Discharge: HOME | End: 2019-02-13
Payer: MEDICARE

## 2019-02-13 PROCEDURE — 93284 PRGRMG EVAL IMPLANTABLE DFB: CPT

## 2019-02-13 PROCEDURE — 93290 INTERROG DEV EVAL ICPMS IP: CPT

## 2019-02-13 RX ORDER — METHIMAZOLE 10 MG/1
TABLET ORAL
Qty: 180 TABLET | Refills: 3 | Status: SHIPPED | OUTPATIENT
Start: 2019-02-13 | End: 2019-02-15 | Stop reason: SINTOL

## 2019-02-15 ENCOUNTER — OFFICE VISIT (OUTPATIENT)
Dept: ENDOCRINOLOGY | Age: 69
End: 2019-02-15
Payer: MEDICARE

## 2019-02-15 ENCOUNTER — TELEPHONE (OUTPATIENT)
Dept: GASTROENTEROLOGY | Age: 69
End: 2019-02-15

## 2019-02-15 VITALS
OXYGEN SATURATION: 93 % | HEART RATE: 93 BPM | WEIGHT: 192 LBS | SYSTOLIC BLOOD PRESSURE: 105 MMHG | DIASTOLIC BLOOD PRESSURE: 65 MMHG | HEIGHT: 71 IN | BODY MASS INDEX: 26.88 KG/M2

## 2019-02-15 DIAGNOSIS — E05.90 HYPERTHYROIDISM: ICD-10-CM

## 2019-02-15 LAB — GLUCOSE BLD-MCNC: 127 MG/DL

## 2019-02-15 PROCEDURE — 82962 GLUCOSE BLOOD TEST: CPT | Performed by: INTERNAL MEDICINE

## 2019-02-15 PROCEDURE — 99213 OFFICE O/P EST LOW 20 MIN: CPT | Performed by: INTERNAL MEDICINE

## 2019-02-15 RX ORDER — PROPYLTHIOURACIL 50 MG/1
TABLET ORAL
Qty: 180 TABLET | Refills: 3 | Status: ON HOLD | OUTPATIENT
Start: 2019-02-15 | End: 2019-05-27 | Stop reason: HOSPADM

## 2019-02-18 ENCOUNTER — HOSPITAL ENCOUNTER (OUTPATIENT)
Dept: PHARMACY | Age: 69
Setting detail: THERAPIES SERIES
Discharge: HOME | End: 2019-02-18
Payer: MEDICARE

## 2019-02-18 ENCOUNTER — TELEPHONE (OUTPATIENT)
Dept: FAMILY MEDICINE CLINIC | Age: 69
End: 2019-02-18

## 2019-02-18 DIAGNOSIS — I48.91 ATRIAL FIBRILLATION, UNSPECIFIED TYPE (HCC): ICD-10-CM

## 2019-02-18 LAB — INTERNATIONAL NORMALIZATION RATIO, POC: 2.6

## 2019-02-18 PROCEDURE — 99211 OFF/OP EST MAY X REQ PHY/QHP: CPT

## 2019-02-18 PROCEDURE — 85610 PROTHROMBIN TIME: CPT

## 2019-02-20 ENCOUNTER — CARE COORDINATION (OUTPATIENT)
Dept: CARE COORDINATION | Age: 69
End: 2019-02-20

## 2019-02-20 RX ORDER — TRIAMCINOLONE ACETONIDE 1 MG/G
CREAM TOPICAL
Refills: 5 | Status: ON HOLD | COMMUNITY
Start: 2019-02-12 | End: 2019-05-27 | Stop reason: HOSPADM

## 2019-02-25 ENCOUNTER — APPOINTMENT (OUTPATIENT)
Dept: PHARMACY | Age: 69
End: 2019-02-25
Payer: MEDICARE

## 2019-02-25 ENCOUNTER — TELEPHONE (OUTPATIENT)
Dept: PHARMACY | Age: 69
End: 2019-02-25

## 2019-02-25 ENCOUNTER — TELEPHONE (OUTPATIENT)
Dept: GASTROENTEROLOGY | Age: 69
End: 2019-02-25

## 2019-02-25 ENCOUNTER — OFFICE VISIT (OUTPATIENT)
Dept: ENDOCRINOLOGY | Age: 69
End: 2019-02-25
Payer: MEDICARE

## 2019-02-25 VITALS
HEART RATE: 103 BPM | SYSTOLIC BLOOD PRESSURE: 122 MMHG | DIASTOLIC BLOOD PRESSURE: 74 MMHG | HEIGHT: 71 IN | WEIGHT: 193 LBS | BODY MASS INDEX: 27.02 KG/M2 | OXYGEN SATURATION: 95 %

## 2019-02-25 DIAGNOSIS — I48.91 ATRIAL FIBRILLATION, UNSPECIFIED TYPE (HCC): ICD-10-CM

## 2019-02-25 DIAGNOSIS — E05.90 HYPERTHYROIDISM: ICD-10-CM

## 2019-02-25 DIAGNOSIS — L29.9 ITCHING: ICD-10-CM

## 2019-02-25 LAB
ANION GAP SERPL CALCULATED.3IONS-SCNC: 20 MEQ/L (ref 9–15)
BUN BLDV-MCNC: 30 MG/DL (ref 8–23)
CALCIUM SERPL-MCNC: 9 MG/DL (ref 8.5–9.9)
CHLORIDE BLD-SCNC: 96 MEQ/L (ref 95–107)
CO2: 22 MEQ/L (ref 20–31)
CREAT SERPL-MCNC: 1.52 MG/DL (ref 0.7–1.2)
GFR AFRICAN AMERICAN: 55.6
GFR NON-AFRICAN AMERICAN: 46
GLUCOSE BLD-MCNC: 116 MG/DL (ref 70–99)
GLUCOSE BLD-MCNC: 122 MG/DL
HBA1C MFR BLD: 7.9 % (ref 4.8–5.9)
POTASSIUM SERPL-SCNC: 4.5 MEQ/L (ref 3.4–4.9)
SODIUM BLD-SCNC: 138 MEQ/L (ref 135–144)
T4 FREE: 1.89 NG/DL (ref 0.84–1.68)
TSH REFLEX: 0.04 UIU/ML (ref 0.44–3.86)

## 2019-02-25 PROCEDURE — 82962 GLUCOSE BLOOD TEST: CPT | Performed by: INTERNAL MEDICINE

## 2019-02-25 PROCEDURE — 99213 OFFICE O/P EST LOW 20 MIN: CPT | Performed by: INTERNAL MEDICINE

## 2019-02-26 ENCOUNTER — HOSPITAL ENCOUNTER (OUTPATIENT)
Dept: ULTRASOUND IMAGING | Age: 69
Discharge: HOME | End: 2019-02-28
Payer: MEDICARE

## 2019-02-26 DIAGNOSIS — R63.4 WEIGHT LOSS: ICD-10-CM

## 2019-02-26 DIAGNOSIS — R74.8 ELEVATED ALKALINE PHOSPHATASE LEVEL: ICD-10-CM

## 2019-02-26 PROCEDURE — 76705 ECHO EXAM OF ABDOMEN: CPT

## 2019-03-01 ENCOUNTER — HOSPITAL ENCOUNTER (OUTPATIENT)
Dept: PHARMACY | Age: 69
Setting detail: THERAPIES SERIES
Discharge: HOME | End: 2019-03-01
Payer: MEDICARE

## 2019-03-01 DIAGNOSIS — I48.91 ATRIAL FIBRILLATION, UNSPECIFIED TYPE (HCC): ICD-10-CM

## 2019-03-01 LAB — INTERNATIONAL NORMALIZATION RATIO, POC: 2.2

## 2019-03-01 PROCEDURE — 85610 PROTHROMBIN TIME: CPT

## 2019-03-01 PROCEDURE — 99211 OFF/OP EST MAY X REQ PHY/QHP: CPT

## 2019-03-04 ASSESSMENT — ENCOUNTER SYMPTOMS: ROS SKIN COMMENTS: ITCHING

## 2019-03-13 ENCOUNTER — OFFICE VISIT (OUTPATIENT)
Dept: FAMILY MEDICINE CLINIC | Age: 69
End: 2019-03-13
Payer: MEDICARE

## 2019-03-13 ENCOUNTER — CARE COORDINATION (OUTPATIENT)
Dept: CARE COORDINATION | Age: 69
End: 2019-03-13

## 2019-03-13 ENCOUNTER — TELEPHONE (OUTPATIENT)
Dept: PHARMACY | Facility: CLINIC | Age: 69
End: 2019-03-13

## 2019-03-13 VITALS
SYSTOLIC BLOOD PRESSURE: 122 MMHG | RESPIRATION RATE: 14 BRPM | OXYGEN SATURATION: 99 % | TEMPERATURE: 98.2 F | BODY MASS INDEX: 27.64 KG/M2 | DIASTOLIC BLOOD PRESSURE: 82 MMHG | HEART RATE: 107 BPM | WEIGHT: 197.4 LBS | HEIGHT: 71 IN

## 2019-03-13 DIAGNOSIS — J44.9 CHRONIC OBSTRUCTIVE PULMONARY DISEASE, UNSPECIFIED COPD TYPE (HCC): ICD-10-CM

## 2019-03-13 DIAGNOSIS — R74.8 ELEVATED ALKALINE PHOSPHATASE LEVEL: Primary | ICD-10-CM

## 2019-03-13 DIAGNOSIS — I50.42 HEART FAILURE, SYSTOLIC AND DIASTOLIC, CHRONIC (HCC): Primary | ICD-10-CM

## 2019-03-13 DIAGNOSIS — I25.10 CORONARY ARTERIOSCLEROSIS IN NATIVE ARTERY: ICD-10-CM

## 2019-03-13 PROCEDURE — 99213 OFFICE O/P EST LOW 20 MIN: CPT | Performed by: FAMILY MEDICINE

## 2019-03-13 PROCEDURE — G8510 SCR DEP NEG, NO PLAN REQD: HCPCS | Performed by: FAMILY MEDICINE

## 2019-03-13 RX ORDER — MONTELUKAST SODIUM 10 MG/1
TABLET ORAL
Refills: 0 | COMMUNITY
Start: 2019-03-07 | End: 2019-09-12 | Stop reason: SDUPTHER

## 2019-03-13 RX ORDER — METOLAZONE 2.5 MG/1
2.5 TABLET ORAL DAILY
Status: ON HOLD | COMMUNITY
End: 2019-11-20 | Stop reason: SDUPTHER

## 2019-03-13 ASSESSMENT — PATIENT HEALTH QUESTIONNAIRE - PHQ9
1. LITTLE INTEREST OR PLEASURE IN DOING THINGS: 0
SUM OF ALL RESPONSES TO PHQ QUESTIONS 1-9: 0
SUM OF ALL RESPONSES TO PHQ QUESTIONS 1-9: 0
SUM OF ALL RESPONSES TO PHQ9 QUESTIONS 1 & 2: 0
2. FEELING DOWN, DEPRESSED OR HOPELESS: 0

## 2019-03-13 ASSESSMENT — ENCOUNTER SYMPTOMS
ABDOMINAL PAIN: 0
SHORTNESS OF BREATH: 0

## 2019-03-22 ENCOUNTER — TELEPHONE (OUTPATIENT)
Dept: PHARMACY | Age: 69
End: 2019-03-22

## 2019-03-22 DIAGNOSIS — I48.91 ATRIAL FIBRILLATION, UNSPECIFIED TYPE (HCC): ICD-10-CM

## 2019-03-28 ENCOUNTER — HOSPITAL ENCOUNTER (OUTPATIENT)
Dept: PHARMACY | Age: 69
Setting detail: THERAPIES SERIES
Discharge: HOME | End: 2019-03-28
Payer: MEDICARE

## 2019-03-28 DIAGNOSIS — I48.91 ATRIAL FIBRILLATION, UNSPECIFIED TYPE (HCC): ICD-10-CM

## 2019-03-28 LAB — INTERNATIONAL NORMALIZATION RATIO, POC: 1.1

## 2019-03-28 PROCEDURE — 99211 OFF/OP EST MAY X REQ PHY/QHP: CPT

## 2019-03-28 PROCEDURE — 85610 PROTHROMBIN TIME: CPT

## 2019-03-29 ENCOUNTER — OFFICE VISIT (OUTPATIENT)
Dept: PULMONOLOGY | Age: 69
End: 2019-03-29
Payer: MEDICARE

## 2019-03-29 VITALS
RESPIRATION RATE: 16 BRPM | HEIGHT: 71 IN | DIASTOLIC BLOOD PRESSURE: 80 MMHG | OXYGEN SATURATION: 97 % | TEMPERATURE: 97.2 F | SYSTOLIC BLOOD PRESSURE: 110 MMHG | HEART RATE: 92 BPM | WEIGHT: 194 LBS | BODY MASS INDEX: 27.16 KG/M2

## 2019-03-29 DIAGNOSIS — R06.02 SOB (SHORTNESS OF BREATH): ICD-10-CM

## 2019-03-29 DIAGNOSIS — J44.9 CHRONIC OBSTRUCTIVE PULMONARY DISEASE, UNSPECIFIED COPD TYPE (HCC): Primary | ICD-10-CM

## 2019-03-29 DIAGNOSIS — R09.02 HYPOXIA: ICD-10-CM

## 2019-03-29 PROCEDURE — 99214 OFFICE O/P EST MOD 30 MIN: CPT | Performed by: INTERNAL MEDICINE

## 2019-03-29 ASSESSMENT — ENCOUNTER SYMPTOMS
VOICE CHANGE: 0
NAUSEA: 0
COUGH: 1
EYE ITCHING: 0
ABDOMINAL PAIN: 0
CHEST TIGHTNESS: 0
VOMITING: 0
SHORTNESS OF BREATH: 1
SORE THROAT: 0
WHEEZING: 0
DIARRHEA: 0
RHINORRHEA: 0

## 2019-04-03 ENCOUNTER — CARE COORDINATION (OUTPATIENT)
Dept: CARE COORDINATION | Age: 69
End: 2019-04-03

## 2019-04-03 ASSESSMENT — ENCOUNTER SYMPTOMS: DYSPNEA ASSOCIATED WITH: EXERTION

## 2019-04-03 NOTE — CARE COORDINATION
Ambulatory Care Coordination Note  4/3/2019  CM Risk Score: 11  Tasia Mortality Risk Score: 41    ACC: Neo Glaser RN    Summary Note: Called pt to follow up on progress, discuss new issues or concerns, and reinforce/ provide pt education. Discussed pt current POC as he understands it. Pt states he has all of his appts scheduled. Encouraged pt to call clinical pharmacists back to discuss his medications and provided pt with the call back number again. Pt states he is taking medications as directed and is able to describe medication regimen. Encouraged pt to complete education. Pt has been complaint with elf monitoring and is able to describe s/s to report. Reinforced previous education on importance and benefits of self monitoring. Pt verbalized understanding. Pt denies any new issues, concerns or ACC needs at this time. Discussed [employer] graduation and pt is agreeable wit option to contact [employer] int the future if needs change. Care Coordination Interventions    Program Enrollment:  Complex Care  Referral from Primary Care Provider:  No  Suggested Interventions and Community Resources  Cardiac Rehab:  Not Started  Diabetes Education:  Declined  Fall Risk Prevention:  Completed  Pharmacist:  Completed (Comment: Referral created 3/13/19)  Specialty Services Referral:  Completed (Comment: GI)  Zone Management Tools:  Completed (Comment: CHF, COPD, DM II)  Other Services or Interventions:  LOC and POS options 1/17/19         Goals Addressed                 This Visit's Progress     COMPLETED: Medication Management        I will take my medication as directed. I will notify my provider of any problems with medications, like adverse effects or side effects. Barriers: overwhelmed by complexity of regimen  Plan for overcoming my barriers:  Will work with [employer] , clinical pharmacist, and providers  Confidence: 7/10  Anticipated Goal Completion Date: 4/1/19           COMPLETED: Self Monitoring   On track tablet TAKE 1 TABLET DAILY 1/7/19   Ramiro Vazquez DO   gabapentin (NEURONTIN) 300 MG capsule Take 1 capsule by mouth 3 times daily for 30 days. . 1/4/19 2/8/19  Sue Montiel MD   metoprolol succinate (TOPROL XL) 25 MG extended release tablet Take 25 mg by mouth 2 times daily     Historical Provider, MD   loperamide (IMODIUM) 2 MG capsule Start: 4 mg POx1, then 2 mg PO after each loose stool.  Max: 16 mg/day 12/17/18   Mark Rico MD   insulin 70-30 (NOVOLIN 70/30) (70-30) 100 UNIT per ML injection vial INJECT 25 UNITS TWICE A DAY 12/13/18   Sue Montiel MD   furosemide (LASIX) 40 MG tablet Take 1 tablet by mouth daily  Patient taking differently: Take 40 mg by mouth Take 1 tab in the AM & 1/2 tab 2:00pm 12/6/18   Alyse Jain MD   BD INSULIN SYRINGE ULTRAFINE 31G X 5/16\" 0.5 ML MISC USE TWICE A DAY 7/30/18   Sue Montiel MD   COLCRYS 0.6 MG tablet TAKE 1 TABLET DAILY (NEEDS FOLLOW UP PRIOR TO ANY MORE REFILLS) 7/23/18   Malick Lozano MD   Oxygen Concentrator Oxi Go POC 6/27/18   Lorrie Kilpatrick MD   potassium chloride (KLOR-CON M) 20 MEQ extended release tablet Take 1 tablet by mouth 2 times daily  Patient taking differently: Take 20 mEq by mouth Take 1/2 tab BID 5/17/18   Melisa Ruano MD   budesonide-formoterol (SYMBICORT) 160-4.5 MCG/ACT AERO Inhale 2 puffs into the lungs 2 times daily 3/30/18   Lorrie Kilpatrick MD   albuterol (PROVENTIL) (2.5 MG/3ML) 0.083% nebulizer solution Take 3 mLs by nebulization every 6 hours as needed for Wheezing 2/13/18   Lorrie Kilpatrick MD   nitroGLYCERIN (NITROSTAT) 0.4 MG SL tablet Place 1 tablet under the tongue every 5 minutes as needed for Chest pain 11/13/17   Malick Lozano MD   pantoprazole (PROTONIX) 40 MG tablet Take 1 tablet by mouth every morning (before breakfast) 11/8/17   Melisa Ruano MD   ENTRESTO 24-26 MG per tablet Take 1 tablet by mouth 2 times daily  2/16/17   Historical Provider, MD   clopidogrel (PLAVIX) 75 MG tablet Take 75 mg by mouth daily    Historical Provider, MD   DIGITEK 125 MCG tablet TAKE 1 TABLET DAILY 8/2/15   Jasbir Alarcon MD   albuterol (PROAIR HFA) 108 (90 BASE) MCG/ACT inhaler Inhale 2 puffs into the lungs every 4 hours as needed for Wheezing 5/11/15   Jasbir Alarcon MD       Future Appointments   Date Time Provider Gordo Sam   4/4/2019  9:15 AM [address]   7/29/2019  9:45 AM Nithya Sutherland MD [address]   8/13/2019 10:30 AM Hannah Perez MD [facility] EMERGENCY [facility]     ,   Diabetes Assessment    Medic Alert ID:  No  Meal Planning:  Plate Method, Avoidance of concentrated sweets   How often do you test your blood sugar?:  Daily   Do you have barriers with adherence to non-pharmacologic self-management interventions?  (Nutrition/Exercise/Self-Monitoring):  Yes   Have you ever had to go to the ED for symptoms of low blood sugar?:  No       Do you have hyperglycemia symptoms?:  No   Do you have hypoglycemia symptoms?:  No   Last Blood Sugar Value:  132   Blood Sugar Monitoring Regimen:  Morning Fasting, Before Meals   Blood Sugar Trends:  No Change      ,   Congestive Heart Failure Assessment    Are you currently restricting fluids?:  No Restriction  Do you understand a low sodium diet?:  Yes  Do you understand how to read food labels?:  Yes (Comment: pt states he reads them \"sometimes\")  How many restaurant meals do you eat per week?:  1-2  Do you salt your food before tasting it?:  No         Symptoms:   CHF associated angina: Neg, CHF associated dyspnea on exertion: Pos, CHF associated fatigue: Pos, CHF associated leg swelling: Neg, CHF associated orthostatic hypotension: Neg, CHF associated PND: Neg, CHF associated shortness of breath: Neg, CHF associated weakness: Neg      Symptom course:  stable  Patient-reported weight (lb):  195 (Comment: Home scale)  Weight trend:  stable (Comment: Per pt)  Salt intake watch compared to last visit:  stable      and COPD Assessment    Does the patient understand envrionmental exposure?:  Yes  Is the patient able to verbalize Rescue vs. Long Acting medications?:  Yes  Does the patient have a nebulizer?:  Yes  Does the patient use a space with inhaled medications?:  Yes     No patient-reported symptoms         Symptoms:   COPD associated increased fatigue: Pos      Symptom course:  stable  Breathlessness:  exertion  Increase use of rapid acting/rescue inhaled medications?:  No  Change in chronic cough?:  No/At Baseline  Change in sputum?:  No/At Baseline  Self Monitoring - SaO2:  Yes  Baseline SaO2 Readin (Comment: O2 3L)  Have you had a recent diagnosis of pneumonia either by PCP or at a hospital?:  No

## 2019-04-04 ENCOUNTER — HOSPITAL ENCOUNTER (OUTPATIENT)
Dept: PHARMACY | Age: 69
Setting detail: THERAPIES SERIES
Discharge: HOME | End: 2019-04-04
Payer: MEDICARE

## 2019-04-04 DIAGNOSIS — I48.91 ATRIAL FIBRILLATION, UNSPECIFIED TYPE (HCC): ICD-10-CM

## 2019-04-04 LAB — INTERNATIONAL NORMALIZATION RATIO, POC: 1.1

## 2019-04-04 PROCEDURE — 99211 OFF/OP EST MAY X REQ PHY/QHP: CPT

## 2019-04-04 PROCEDURE — 85610 PROTHROMBIN TIME: CPT

## 2019-04-04 NOTE — PROGRESS NOTES
Mr. Tomasz Camp is a 81 y.o. y/o male with history of Afib who presents today for anticoagulation monitoring and adjustment. INR 1.1 is subtherapeutic for this patient (goal range 2-3) and is reflective of 35 mg TWD  Patient verifies current dosing regimen, patient able to verbally recall dose  Patient reports no  missed doses since last INR   Patient denies s/sx clotting and/or stroke  Patient denies hematuria, epistaxis, rectal bleeding  Patient denies changes in alcohol, or tobacco use    Patient drastically changed diet, used to eat no greens, now eating greens every day. States he has not missed any doses of warfarin and is taking the peach colored 5mg tab.      Reviewed medication list and drug allergies with patient, updated any medication additions or modifications accordingly  Patient also denies any pending medical or dental procedures scheduled at this time  Patient was instructed to take 10 mg today then increase TWD to 42.5 mg TWD (21.4% increase) and RTC 10 days    Steven Parish, PharmD  Staff Pharmacist  4/4/2019 9:33 AM

## 2019-04-15 ENCOUNTER — HOSPITAL ENCOUNTER (OUTPATIENT)
Dept: PHARMACY | Age: 69
Setting detail: THERAPIES SERIES
Discharge: HOME | End: 2019-04-15
Payer: MEDICARE

## 2019-04-15 DIAGNOSIS — I48.91 ATRIAL FIBRILLATION, UNSPECIFIED TYPE (HCC): ICD-10-CM

## 2019-04-15 LAB — INTERNATIONAL NORMALIZATION RATIO, POC: 1.3

## 2019-04-15 PROCEDURE — 85610 PROTHROMBIN TIME: CPT

## 2019-04-15 PROCEDURE — 99211 OFF/OP EST MAY X REQ PHY/QHP: CPT

## 2019-04-15 NOTE — PROGRESS NOTES
Mr. Eric Ball is a 81 y.o. y/o male with history of Afib who presents today for anticoagulation monitoring and adjustment. INR 1.3 is subtherapeutic for this patient (goal range 2-3) and is reflective of 42.5 mg TWD  Patient verifies current dosing regimen, patient able to verbally recall dose  Patient reports no  missed doses since last INR   Patient denies s/sx clotting and/or stroke  Patient denies hematuria, epistaxis, rectal bleeding  Patient denies changes in diet, alcohol, or tobacco use. Diet includes more vegetables lately. Reviewed medication list and drug allergies with patient, updated any medication additions or modifications accordingly  Patient also denies any pending medical or dental procedures scheduled at this time  Patient was instructed to increase warfarin to 45mg TWD  and RTC in 1 week.

## 2019-04-22 ENCOUNTER — HOSPITAL ENCOUNTER (OUTPATIENT)
Dept: PHARMACY | Age: 69
Setting detail: THERAPIES SERIES
Discharge: HOME | End: 2019-04-22
Payer: MEDICARE

## 2019-04-22 DIAGNOSIS — I48.91 ATRIAL FIBRILLATION, UNSPECIFIED TYPE (HCC): ICD-10-CM

## 2019-04-22 LAB — INTERNATIONAL NORMALIZATION RATIO, POC: 1.4

## 2019-04-22 PROCEDURE — 99211 OFF/OP EST MAY X REQ PHY/QHP: CPT

## 2019-04-22 PROCEDURE — 85610 PROTHROMBIN TIME: CPT

## 2019-04-22 NOTE — PROGRESS NOTES
Mr. Jenny Aden is a 81 y.o. y/o male with history of Afib who presents today for anticoagulation monitoring and adjustment. INR 1.4 is subtherapeutic for this patient (goal range 2-3) and is reflective of 47.5 mg over last 7 days  Patient verifies current dosing regimen, patient able to verbally recall dose  Patient reports no  missed doses since last INR   Patient denies s/sx clotting and/or stroke  Patient denies hematuria, epistaxis, rectal bleeding  Patient denies changes in diet, alcohol, or tobacco use  Patient is taking PTU and off amiodarone which may be responsible to low INRs  Reviewed medication list and drug allergies with patient, updated any medication additions or modifications accordingly  Patient also denies any pending medical or dental procedures scheduled at this time  Patient was instructed to increase warfarin to 47.5mg TWD (but will boost this week's dose by 2.5mg) and RTC in 10 days.

## 2019-05-01 ENCOUNTER — HOSPITAL ENCOUNTER (OUTPATIENT)
Dept: PHARMACY | Age: 69
Setting detail: THERAPIES SERIES
Discharge: HOME | End: 2019-05-01
Payer: MEDICARE

## 2019-05-01 DIAGNOSIS — I48.91 ATRIAL FIBRILLATION, UNSPECIFIED TYPE (HCC): ICD-10-CM

## 2019-05-01 LAB — INTERNATIONAL NORMALIZATION RATIO, POC: 1.1

## 2019-05-01 PROCEDURE — 85610 PROTHROMBIN TIME: CPT

## 2019-05-01 PROCEDURE — 99211 OFF/OP EST MAY X REQ PHY/QHP: CPT

## 2019-05-01 NOTE — PROGRESS NOTES
Mr. Jasiel Collins is a 81 y.o. y/o male with history of Afib who presents today for anticoagulation monitoring and adjustment. INR 1.1 is subtherapeutic for this patient (goal range 2-3) and is reflective of 50 mg TWD  Patient verifies current dosing regimen, patient able to verbally recall dose  Patient reports no  missed doses since last INR   Patient denies s/sx clotting and/or stroke  Patient denies hematuria, epistaxis, rectal bleeding  Patient denies changes in diet, alcohol, or tobacco use  Reviewed medication list and drug allergies with patient, updated any medication additions or modifications accordingly    It appears low INRs are due to 2 medication changes, amiodarone was stopped in January 2019 which would decrease the INR over a course of 1-3 months and PTU was added on in February 2019 which would also decrease the INR    Patient also denies any pending medical or dental procedures scheduled at this time    Patient is very reluctant to taking more than 1.5 tab of warfarin per day, I wanted to start 2 tabs per day but he would not agree to it.     Patient was instructed to start 60 mg TWD and RTC 1 week    Ruben Lira, PharmD  Staff Pharmacist  5/1/2019 10:24 AM

## 2019-05-08 ENCOUNTER — HOSPITAL ENCOUNTER (OUTPATIENT)
Dept: PHARMACY | Age: 69
Setting detail: THERAPIES SERIES
Discharge: HOME | End: 2019-05-08
Payer: MEDICARE

## 2019-05-08 DIAGNOSIS — I48.91 ATRIAL FIBRILLATION, UNSPECIFIED TYPE (HCC): ICD-10-CM

## 2019-05-08 LAB — INTERNATIONAL NORMALIZATION RATIO, POC: 2.1

## 2019-05-08 PROCEDURE — 99211 OFF/OP EST MAY X REQ PHY/QHP: CPT

## 2019-05-08 PROCEDURE — 85610 PROTHROMBIN TIME: CPT

## 2019-05-08 NOTE — PROGRESS NOTES
Mr. Terry Javier is a 81 y.o. y/o male with history of Afib who presents today for anticoagulation monitoring and adjustment. INR 2.1 is therapeutic for this patient (goal range 2-3) and is reflective of 60 mg TWD  Patient verifies current dosing regimen, patient able to verbally recall dose  Patient reports 0  missed doses since last INR   Patient denies s/sx clotting and/or stroke  Patient denies hematuria, epistaxis, rectal bleeding  Patient denies changes in diet, alcohol, or tobacco use  Reviewed medication list and drug allergies with patient, updated any medication additions or modifications accordingly  Patient also denies any pending medical or dental procedures scheduled at this time  Patient was instructed to continue 60mg TWD and RTC 2 weeks  Pt prefers 3 weeks, understands risks. Pt has severe itching that he believes is related to to PTU he takes prescribes by Dr Marcos Tsai. The all over body itching is so severe it causes him to bleed all over body and keeps him awake at night. I referred pt to follow up with both PCP and Dr Marcos Tsai in regards to unbearable side effects.

## 2019-05-14 ENCOUNTER — HOSPITAL ENCOUNTER (OUTPATIENT)
Dept: CARDIOLOGY | Age: 69
Discharge: HOME | End: 2019-05-14
Payer: MEDICARE

## 2019-05-14 PROCEDURE — 93284 PRGRMG EVAL IMPLANTABLE DFB: CPT

## 2019-05-14 PROCEDURE — 93290 INTERROG DEV EVAL ICPMS IP: CPT

## 2019-05-25 ENCOUNTER — APPOINTMENT (OUTPATIENT)
Dept: GENERAL RADIOLOGY | Age: 69
DRG: 308 | End: 2019-05-25
Payer: MEDICARE

## 2019-05-25 ENCOUNTER — HOSPITAL ENCOUNTER (INPATIENT)
Age: 69
LOS: 2 days | Discharge: HOME | DRG: 308 | End: 2019-05-27
Attending: EMERGENCY MEDICINE | Admitting: INTERNAL MEDICINE
Payer: MEDICARE

## 2019-05-25 DIAGNOSIS — I48.0 PAROXYSMAL A-FIB (HCC): Primary | ICD-10-CM

## 2019-05-25 DIAGNOSIS — I47.20 VENTRICULAR TACHYCARDIA: ICD-10-CM

## 2019-05-25 LAB
ALBUMIN SERPL-MCNC: 3.9 G/DL (ref 3.5–4.6)
ALP BLD-CCNC: 192 U/L (ref 35–104)
ALT SERPL-CCNC: 23 U/L (ref 0–41)
ANION GAP SERPL CALCULATED.3IONS-SCNC: 18 MEQ/L (ref 9–15)
APTT: 31.7 SEC (ref 21.6–35.4)
AST SERPL-CCNC: 22 U/L (ref 0–40)
BASOPHILS ABSOLUTE: 0 K/UL (ref 0–0.2)
BASOPHILS RELATIVE PERCENT: 0.6 %
BILIRUB SERPL-MCNC: 2.2 MG/DL (ref 0.2–0.7)
BUN BLDV-MCNC: 42 MG/DL (ref 8–23)
CALCIUM SERPL-MCNC: 9.4 MG/DL (ref 8.5–9.9)
CHLORIDE BLD-SCNC: 92 MEQ/L (ref 95–107)
CO2: 22 MEQ/L (ref 20–31)
CREAT SERPL-MCNC: 1.73 MG/DL (ref 0.7–1.2)
EOSINOPHILS ABSOLUTE: 0 K/UL (ref 0–0.7)
EOSINOPHILS RELATIVE PERCENT: 0.2 %
GFR AFRICAN AMERICAN: 47.9
GFR NON-AFRICAN AMERICAN: 39.6
GLOBULIN: 3.1 G/DL (ref 2.3–3.5)
GLUCOSE BLD-MCNC: 164 MG/DL (ref 70–99)
HCT VFR BLD CALC: 40 % (ref 42–52)
HEMOGLOBIN: 13.6 G/DL (ref 14–18)
INR BLD: 1.9
LYMPHOCYTES ABSOLUTE: 1.9 K/UL (ref 1–4.8)
LYMPHOCYTES RELATIVE PERCENT: 32.2 %
MCH RBC QN AUTO: 28.2 PG (ref 27–31.3)
MCHC RBC AUTO-ENTMCNC: 33.9 % (ref 33–37)
MCV RBC AUTO: 83.1 FL (ref 80–100)
MONOCYTES ABSOLUTE: 0.7 K/UL (ref 0.2–0.8)
MONOCYTES RELATIVE PERCENT: 12.1 %
NEUTROPHILS ABSOLUTE: 3.3 K/UL (ref 1.4–6.5)
NEUTROPHILS RELATIVE PERCENT: 54.9 %
PDW BLD-RTO: 18.8 % (ref 11.5–14.5)
PLATELET # BLD: 206 K/UL (ref 130–400)
POTASSIUM SERPL-SCNC: 4 MEQ/L (ref 3.4–4.9)
PROTHROMBIN TIME: 18.4 SEC (ref 9–11.5)
RBC # BLD: 4.81 M/UL (ref 4.7–6.1)
REASON FOR REJECTION: NORMAL
REJECTED TEST: NORMAL
SODIUM BLD-SCNC: 132 MEQ/L (ref 135–144)
TOTAL PROTEIN: 7 G/DL (ref 6.3–8)
TROPONIN: 0.03 NG/ML (ref 0–0.01)
TROPONIN: 0.08 NG/ML (ref 0–0.01)
TROPONIN: 0.08 NG/ML (ref 0–0.01)
WBC # BLD: 6 K/UL (ref 4.8–10.8)

## 2019-05-25 PROCEDURE — 6370000000 HC RX 637 (ALT 250 FOR IP): Performed by: INTERNAL MEDICINE

## 2019-05-25 PROCEDURE — 36415 COLL VENOUS BLD VENIPUNCTURE: CPT

## 2019-05-25 PROCEDURE — 85610 PROTHROMBIN TIME: CPT

## 2019-05-25 PROCEDURE — 99285 EMERGENCY DEPT VISIT HI MDM: CPT

## 2019-05-25 PROCEDURE — 2060000000 HC ICU INTERMEDIATE R&B

## 2019-05-25 PROCEDURE — 2580000003 HC RX 258: Performed by: EMERGENCY MEDICINE

## 2019-05-25 PROCEDURE — 85730 THROMBOPLASTIN TIME PARTIAL: CPT

## 2019-05-25 PROCEDURE — 2580000003 HC RX 258: Performed by: INTERNAL MEDICINE

## 2019-05-25 PROCEDURE — 84484 ASSAY OF TROPONIN QUANT: CPT

## 2019-05-25 PROCEDURE — 96376 TX/PRO/DX INJ SAME DRUG ADON: CPT

## 2019-05-25 PROCEDURE — 6360000002 HC RX W HCPCS: Performed by: EMERGENCY MEDICINE

## 2019-05-25 PROCEDURE — 96375 TX/PRO/DX INJ NEW DRUG ADDON: CPT

## 2019-05-25 PROCEDURE — 80053 COMPREHEN METABOLIC PANEL: CPT

## 2019-05-25 PROCEDURE — 85025 COMPLETE CBC W/AUTO DIFF WBC: CPT

## 2019-05-25 PROCEDURE — 6360000002 HC RX W HCPCS: Performed by: INTERNAL MEDICINE

## 2019-05-25 PROCEDURE — 93005 ELECTROCARDIOGRAM TRACING: CPT | Performed by: EMERGENCY MEDICINE

## 2019-05-25 PROCEDURE — 71045 X-RAY EXAM CHEST 1 VIEW: CPT

## 2019-05-25 RX ORDER — FUROSEMIDE 40 MG/1
40 TABLET ORAL DAILY
Status: DISCONTINUED | OUTPATIENT
Start: 2019-05-26 | End: 2019-05-27 | Stop reason: HOSPADM

## 2019-05-25 RX ORDER — ATORVASTATIN CALCIUM 40 MG/1
40 TABLET, FILM COATED ORAL NIGHTLY
Status: DISCONTINUED | OUTPATIENT
Start: 2019-05-25 | End: 2019-05-25

## 2019-05-25 RX ORDER — WARFARIN SODIUM 5 MG/1
5 TABLET ORAL DAILY
Status: DISCONTINUED | OUTPATIENT
Start: 2019-05-25 | End: 2019-05-25 | Stop reason: DRUGHIGH

## 2019-05-25 RX ORDER — SODIUM CHLORIDE 0.9 % (FLUSH) 0.9 %
10 SYRINGE (ML) INJECTION PRN
Status: DISCONTINUED | OUTPATIENT
Start: 2019-05-25 | End: 2019-05-27 | Stop reason: HOSPADM

## 2019-05-25 RX ORDER — NITROGLYCERIN 0.4 MG/1
0.4 TABLET SUBLINGUAL EVERY 5 MIN PRN
Status: DISCONTINUED | OUTPATIENT
Start: 2019-05-25 | End: 2019-05-27 | Stop reason: HOSPADM

## 2019-05-25 RX ORDER — TRIAMCINOLONE ACETONIDE 1 MG/G
CREAM TOPICAL 3 TIMES DAILY
Status: DISCONTINUED | OUTPATIENT
Start: 2019-05-25 | End: 2019-05-27 | Stop reason: HOSPADM

## 2019-05-25 RX ORDER — LOPERAMIDE HYDROCHLORIDE 2 MG/1
2 CAPSULE ORAL 3 TIMES DAILY PRN
Status: DISCONTINUED | OUTPATIENT
Start: 2019-05-25 | End: 2019-05-27 | Stop reason: HOSPADM

## 2019-05-25 RX ORDER — DOXYCYCLINE HYCLATE 100 MG/1
100 CAPSULE ORAL EVERY 12 HOURS SCHEDULED
Status: DISCONTINUED | OUTPATIENT
Start: 2019-05-25 | End: 2019-05-26

## 2019-05-25 RX ORDER — METOPROLOL SUCCINATE 25 MG/1
25 TABLET, EXTENDED RELEASE ORAL 2 TIMES DAILY
Status: DISCONTINUED | OUTPATIENT
Start: 2019-05-25 | End: 2019-05-27 | Stop reason: HOSPADM

## 2019-05-25 RX ORDER — MONTELUKAST SODIUM 10 MG/1
10 TABLET ORAL NIGHTLY
Status: DISCONTINUED | OUTPATIENT
Start: 2019-05-25 | End: 2019-05-27 | Stop reason: HOSPADM

## 2019-05-25 RX ORDER — POTASSIUM CHLORIDE 750 MG/1
10 TABLET, FILM COATED, EXTENDED RELEASE ORAL 2 TIMES DAILY WITH MEALS
Status: DISCONTINUED | OUTPATIENT
Start: 2019-05-26 | End: 2019-05-27 | Stop reason: HOSPADM

## 2019-05-25 RX ORDER — WARFARIN SODIUM 7.5 MG/1
7.5 TABLET ORAL
Status: COMPLETED | OUTPATIENT
Start: 2019-05-25 | End: 2019-05-25

## 2019-05-25 RX ORDER — SODIUM CHLORIDE 0.9 % (FLUSH) 0.9 %
10 SYRINGE (ML) INJECTION EVERY 12 HOURS SCHEDULED
Status: DISCONTINUED | OUTPATIENT
Start: 2019-05-25 | End: 2019-05-27 | Stop reason: HOSPADM

## 2019-05-25 RX ORDER — METOLAZONE 2.5 MG/1
2.5 TABLET ORAL DAILY
Status: DISCONTINUED | OUTPATIENT
Start: 2019-05-26 | End: 2019-05-27 | Stop reason: HOSPADM

## 2019-05-25 RX ORDER — PANTOPRAZOLE SODIUM 40 MG/1
40 TABLET, DELAYED RELEASE ORAL
Status: DISCONTINUED | OUTPATIENT
Start: 2019-05-26 | End: 2019-05-27 | Stop reason: HOSPADM

## 2019-05-25 RX ORDER — ATORVASTATIN CALCIUM 80 MG/1
80 TABLET, FILM COATED ORAL NIGHTLY
Status: DISCONTINUED | OUTPATIENT
Start: 2019-05-25 | End: 2019-05-26

## 2019-05-25 RX ORDER — ALBUTEROL SULFATE 2.5 MG/3ML
2.5 SOLUTION RESPIRATORY (INHALATION) EVERY 6 HOURS PRN
Status: DISCONTINUED | OUTPATIENT
Start: 2019-05-25 | End: 2019-05-27 | Stop reason: HOSPADM

## 2019-05-25 RX ORDER — ASPIRIN 81 MG/1
81 TABLET, CHEWABLE ORAL DAILY
Status: DISCONTINUED | OUTPATIENT
Start: 2019-05-26 | End: 2019-05-26

## 2019-05-25 RX ORDER — ONDANSETRON 2 MG/ML
4 INJECTION INTRAMUSCULAR; INTRAVENOUS EVERY 6 HOURS PRN
Status: DISCONTINUED | OUTPATIENT
Start: 2019-05-25 | End: 2019-05-25

## 2019-05-25 RX ORDER — ALBUTEROL SULFATE 90 UG/1
2 AEROSOL, METERED RESPIRATORY (INHALATION) EVERY 4 HOURS PRN
Status: DISCONTINUED | OUTPATIENT
Start: 2019-05-25 | End: 2019-05-27 | Stop reason: HOSPADM

## 2019-05-25 RX ORDER — HYDROXYZINE HYDROCHLORIDE 10 MG/1
10 TABLET, FILM COATED ORAL EVERY 6 HOURS PRN
Status: DISCONTINUED | OUTPATIENT
Start: 2019-05-25 | End: 2019-05-27 | Stop reason: HOSPADM

## 2019-05-25 RX ORDER — CLOPIDOGREL BISULFATE 75 MG/1
75 TABLET ORAL DAILY
Status: DISCONTINUED | OUTPATIENT
Start: 2019-05-26 | End: 2019-05-27 | Stop reason: HOSPADM

## 2019-05-25 RX ORDER — LANOLIN ALCOHOL/MO/W.PET/CERES
CREAM (GRAM) TOPICAL 2 TIMES DAILY
Status: DISCONTINUED | OUTPATIENT
Start: 2019-05-25 | End: 2019-05-27 | Stop reason: HOSPADM

## 2019-05-25 RX ORDER — ASPIRIN 81 MG/1
81 TABLET, CHEWABLE ORAL DAILY
Status: DISCONTINUED | OUTPATIENT
Start: 2019-05-26 | End: 2019-05-25

## 2019-05-25 RX ORDER — ALLOPURINOL 100 MG/1
100 TABLET ORAL DAILY
Status: DISCONTINUED | OUTPATIENT
Start: 2019-05-26 | End: 2019-05-27 | Stop reason: HOSPADM

## 2019-05-25 RX ADMIN — HYDROXYZINE HYDROCHLORIDE 10 MG: 10 TABLET, FILM COATED ORAL at 22:53

## 2019-05-25 RX ADMIN — WARFARIN SODIUM 7.5 MG: 7.5 TABLET ORAL at 22:51

## 2019-05-25 RX ADMIN — Medication: at 22:51

## 2019-05-25 RX ADMIN — MONTELUKAST 10 MG: 10 TABLET, FILM COATED ORAL at 22:53

## 2019-05-25 RX ADMIN — DEXTROSE MONOHYDRATE 150 MG: 50 INJECTION, SOLUTION INTRAVENOUS at 04:01

## 2019-05-25 RX ADMIN — Medication 10 ML: at 10:12

## 2019-05-25 RX ADMIN — TRIAMCINOLONE ACETONIDE: 1 CREAM TOPICAL at 22:51

## 2019-05-25 RX ADMIN — DEXTROSE MONOHYDRATE 0.5 MG/MIN: 50 INJECTION, SOLUTION INTRAVENOUS at 13:49

## 2019-05-25 RX ADMIN — DEXTROSE MONOHYDRATE 1 MG/MIN: 50 INJECTION, SOLUTION INTRAVENOUS at 04:16

## 2019-05-25 RX ADMIN — DOXYCYCLINE HYCLATE 100 MG: 100 CAPSULE ORAL at 22:53

## 2019-05-25 ASSESSMENT — ENCOUNTER SYMPTOMS
VOMITING: 0
BLOOD IN STOOL: 0
NAUSEA: 0
PHOTOPHOBIA: 0
COUGH: 0
EYE DISCHARGE: 0
ABDOMINAL PAIN: 0
SORE THROAT: 0
DIARRHEA: 0
SHORTNESS OF BREATH: 0
BACK PAIN: 0
ABDOMINAL DISTENTION: 0
CHEST TIGHTNESS: 0
WHEEZING: 0

## 2019-05-25 ASSESSMENT — PAIN SCALES - GENERAL
PAINLEVEL_OUTOF10: 0

## 2019-05-25 NOTE — ED PROVIDER NOTES
[address] [facility] ED  eMERGENCY dEPARTMENT eNCOUnter      Pt Name: Marlene Garcia  MRN: [id number]  Josephinegfnabila 1950  Date of evaluation: 5/25/2019  Provider: Yossi Camacho MD    CHIEF COMPLAINT       Chief Complaint   Patient presents with    Chest Pain         HISTORY OF PRESENT ILLNESS   (Location/Symptom, Timing/Onset,Context/Setting, Quality, Duration, Modifying Factors, Severity)  Note limiting factors. Marlene Garcia is a 81 y.o. male who presents to the emergency department for evaluation defibrillator firing. Patient has a defibrillator and it fired 5 times this evening. He felt his heart beating fast and then it started firing. He denies current chest pain or symptoms. He presents here with a normal sinus rhythm. No related fever or chills. He was not exerting himself when he was walking around the house and cleaning a little bit in the hallway. He's been compliant with his medications. Currently is symptom-free as mentioned. No shortness of breath    HPI    NursingNotes were reviewed. REVIEW OF SYSTEMS    (2-9 systems for level 4, 10 or more for level 5)     Review of Systems   Constitutional: Negative for chills and diaphoresis. HENT: Negative for congestion, ear pain, mouth sores and sore throat. Eyes: Negative for photophobia and discharge. Respiratory: Negative for cough, chest tightness, shortness of breath and wheezing. Cardiovascular: Negative for chest pain and palpitations. Gastrointestinal: Negative for abdominal distention, abdominal pain, blood in stool, diarrhea, nausea and vomiting. Endocrine: Negative for cold intolerance. Genitourinary: Negative for difficulty urinating. Musculoskeletal: Negative for arthralgias, back pain and myalgias. Skin: Negative for pallor and rash. Allergic/Immunologic: Negative for immunocompromised state. Neurological: Negative for dizziness and syncope. Hematological: Negative for adenopathy. Psychiatric/Behavioral: Negative for agitation, confusion and hallucinations. The patient is not nervous/anxious. All other systems reviewed and are negative. Except as noted above the remainder of the review of systems was reviewed and negative.        PAST MEDICAL HISTORY     Past Medical History:   Diagnosis Date    Arthritis     CAD (coronary artery disease)     Cardiomyopathy ([facility]     COPD (chronic obstructive pulmonary disease) ([facility]     Defibrillator activation     Diabetes mellitus with insulin therapy ([facility]     Generalized and unspecified atherosclerosis     Gout     Hyperlipidemia     Hypertension     Lung disease     Pain in joint, multiple sites     Prolonged emergence from general anesthesia     Status post angioplasty     TIA (transient ischemic attack)     Tobacco abuse     Type II or unspecified type diabetes mellitus without mention of complication, not stated as uncontrolled          SURGICALHISTORY       Past Surgical History:   Procedure Laterality Date    CARDIAC CATHETERIZATION      COLONOSCOPY      CORONARY ANGIOPLASTY  4/29/11    Dr Rogers Bowman CORONARY ARTERY BYPASS GRAFT      ENDOSCOPY, COLON, DIAGNOSTIC      EYE SURGERY Bilateral     Cataracts     OTHER SURGICAL HISTORY      difibrillator     IA CIRCUMCISION,OTHER,28+ D/O N/A 8/29/2018    CIRCUMCISION performed by Gypsy iWng MD at [facility] EGD TRANSORAL BIOPSY SINGLE/MULTIPLE N/A 11/5/2017    EGD BIOPSY performed by Lemuel Halsted, MD at [address] [facility]       Previous Medications    ALBUTEROL (PROAIR HFA) 108 (90 BASE) MCG/ACT INHALER    Inhale 2 puffs into the lungs every 4 hours as needed for Wheezing    ALBUTEROL (PROVENTIL) (2.5 MG/3ML) 0.083% NEBULIZER SOLUTION    Take 3 mLs by nebulization every 6 hours as needed for Wheezing    ALLOPURINOL (ZYLOPRIM) 100 MG TABLET    TAKE 1 TABLET DAILY    ASPIRIN 81 MG TABLET    Take 81 mg by mouth daily    ATORVASTATIN (LIPITOR) 80 MG TABLET        BD INSULIN SYRINGE ULTRAFINE 31G X 5/16\" 0.5 ML MISC    USE TWICE A DAY    BUDESONIDE-FORMOTEROL (SYMBICORT) 160-4.5 MCG/ACT AERO    Inhale 2 puffs into the lungs 2 times daily    CETAPHIL (CETAPHIL) CREAM    Apply topically bid to skin    CLOPIDOGREL (PLAVIX) 75 MG TABLET    Take 75 mg by mouth daily    COLCRYS 0.6 MG TABLET    TAKE 1 TABLET DAILY (NEEDS FOLLOW UP PRIOR TO ANY MORE REFILLS)    DIGITEK 125 MCG TABLET    TAKE 1 TABLET DAILY    DOXYCYCLINE HYCLATE (VIBRA-TABS) 100 MG TABLET    TAKE 1 TABLET EVERY 12 HOURS DAILY X 10 DAYS    ENOXAPARIN (LOVENOX) 80 MG/0.8ML INJECTION    Inject 80 mg sq bid as instructed (begin prior to procedure when INR < 2, stop pm prior to & am of procedure, then resume until INR > 2)    ENTRESTO 24-26 MG PER TABLET    Take 1 tablet by mouth 2 times daily     FUROSEMIDE (LASIX) 40 MG TABLET    Take 1 tablet by mouth daily    GABAPENTIN (NEURONTIN) 300 MG CAPSULE    Take 1 capsule by mouth 3 times daily for 30 days. Satya Motto HYDROXYZINE (ATARAX) 25 MG TABLET    TAKE 1 TABLET BY MOUTH EVERY 6 HOURS AS NEEDED FOR ITCHING    INSULIN 70-30 (NOVOLIN 70/30) (70-30) 100 UNIT PER ML INJECTION VIAL    INJECT 25 UNITS TWICE A DAY    LOPERAMIDE (IMODIUM) 2 MG CAPSULE    Start: 4 mg POx1, then 2 mg PO after each loose stool.  Max: 16 mg/day    METOLAZONE (ZAROXOLYN) 2.5 MG TABLET    Take 2.5 mg by mouth daily    METOPROLOL SUCCINATE (TOPROL XL) 25 MG EXTENDED RELEASE TABLET    Take 25 mg by mouth 2 times daily     MONTELUKAST (SINGULAIR) 10 MG TABLET    TAKE 1 TABLET BY MOUTH DAILY AT BEDTIME    NITROGLYCERIN (NITROSTAT) 0.4 MG SL TABLET    Place 1 tablet under the tongue every 5 minutes as needed for Chest pain    OXYGEN CONCENTRATOR    Oxi Go POC    PANTOPRAZOLE (PROTONIX) 40 MG TABLET    Take 1 tablet by mouth every morning (before breakfast)    POTASSIUM CHLORIDE (KLOR-CON M) 20 MEQ EXTENDED RELEASE TABLET    Take 1 tablet by mouth 2 times daily    PROPYLTHIOURACIL (PTU) 50 MG TABLET    3 po bid    TRIAMCINOLONE (KENALOG) 0.1 % CREAM    APPLY TO ITCHY AREAS ON BODY 2X/DAY X 2-3 WEEKS THEN AS NEEDED FOR ITCH-DONT USE ON FACE/SKIN FOLDS    WARFARIN (COUMADIN) 5 MG TABLET    Take as directed by [facility] [place] [facility]. Quantity equals 90 day supply. ALLERGIES     Dye [iodides];  Penicillins; and Tapazole [methimazole]    FAMILY HISTORY       Family History   Problem Relation Age of Onset    Cancer Brother     Diabetes Mother     Heart Disease Father     Emphysema Father           SOCIAL HISTORY       Social History     Socioeconomic History    Marital status:      Spouse name: None    Number of children: None    Years of education: None    Highest education level: None   Occupational History    None   Social Needs    Financial resource strain: None    Food insecurity:     Worry: None     Inability: None    Transportation needs:     Medical: None     Non-medical: None   Tobacco Use    Smoking status: Former Smoker     Packs/day: 1.50     Years: 25.00     Pack years: 37.50     Last attempt to quit: 2012     Years since quittin.4    Smokeless tobacco: Never Used   Substance and Sexual Activity    Alcohol use: No    Drug use: No    Sexual activity: None   Lifestyle    Physical activity:     Days per week: None     Minutes per session: None    Stress: None   Relationships    Social connections:     Talks on phone: None     Gets together: None     Attends Anabaptist service: None     Active member of club or organization: None     Attends meetings of clubs or organizations: None     Relationship status: None    Intimate partner violence:     Fear of current or ex partner: None     Emotionally abused: None     Physically abused: None     Forced sexual activity: None   Other Topics Concern    None   Social History Narrative    None SCREENINGS      @FLOW([id number])@      PHYSICAL EXAM    (up to 7 for level 4, 8 or more for level 5)     ED Triage Vitals [05/25/19 0051]   BP Temp Temp Source Pulse Resp SpO2 Height Weight   113/71 98.7 °F (37.1 °C) Oral 96 16 98 % 5' 11\" (1.803 m) 205 lb (93 kg)       Physical Exam   Constitutional: He is oriented to person, place, and time. He appears well-developed. HENT:   Head: Normocephalic. Nose: Nose normal.   Eyes: Pupils are equal, round, and reactive to light. Conjunctivae are normal.   Neck: Normal range of motion. Neck supple. Cardiovascular: Normal rate, regular rhythm, normal heart sounds and intact distal pulses. Pulmonary/Chest: Effort normal and breath sounds normal.   Abdominal: Soft. Bowel sounds are normal. There is no tenderness. There is no guarding. Musculoskeletal: Normal range of motion. Neurological: He is alert and oriented to person, place, and time. Skin: Skin is warm and dry. Capillary refill takes less than 2 seconds. Psychiatric: He has a normal mood and affect. Thought content normal.   Nursing note and vitals reviewed. DIAGNOSTIC RESULTS     EKG: All EKG's are interpreted by the Emergency Department Physician who either signs or Co-signsthis chart in the absence of a cardiologist.  EKG shows atrial sensed particular paced rhythm. Secondary wide-complex from that. No ischemic changes. Leftward axis. Rate is 90. Abnormal EKG.     RADIOLOGY:   Non-plain filmimages such as CT, Ultrasound and MRI are read by the radiologist. Plain radiographic images are visualized and preliminarily interpreted by the emergency physician with the below findings:    Portal chest x-ray is unremarkable    Interpretation per the Radiologist below, if available at the time ofthis note:    XR CHEST PORTABLE    (Results Pending)         ED BEDSIDE ULTRASOUND:   Performed by ED Physician - none    LABS:  Labs Reviewed   TROPONIN - Abnormal; Notable for the following components: Result Value    Troponin 0.032 (*)     All other components within normal limits    Narrative:     CALL  [facility] tel. [phone],  Troponin results called to and read back by FALGUNI León, 05/25/2019 01:33,  by LORI   CBC WITH AUTO DIFFERENTIAL - Abnormal; Notable for the following components:    Hemoglobin 13.6 (*)     Hematocrit 40.0 (*)     RDW 18.8 (*)     All other components within normal limits   PROTIME-INR - Abnormal; Notable for the following components:    Protime 18.4 (*)     All other components within normal limits   COMPREHENSIVE METABOLIC PANEL - Abnormal; Notable for the following components:    Sodium 132 (*)     Chloride 92 (*)     Anion Gap 18 (*)     Glucose 164 (*)     BUN 42 (*)     CREATININE 1.73 (*)     GFR Non- 39.6 (*)     GFR  47.9 (*)     Total Bilirubin 2.2 (*)     Alkaline Phosphatase 192 (*)     All other components within normal limits    Narrative:     REDRAW   APTT   SPECIMEN REJECTION       All other labs were within normal range or not returned as of this dictation. EMERGENCY DEPARTMENT COURSE and DIFFERENTIAL DIAGNOSIS/MDM:   Vitals:    Vitals:    05/25/19 0051 05/25/19 0242 05/25/19 0347   BP: 113/71 102/68 91/64   Pulse: 96 101 86   Resp: 16 16 17   Temp: 98.7 °F (37.1 °C)     TempSrc: Oral     SpO2: 98% 98% 98%   Weight: 205 lb (93 kg)     Height: 5' 11\" (1.803 m)              MDM spoke to cardiology covering Dr. Min Gagnon. He is recommending amiodarone bolus and drip. Patient was admitted to the ICU    The pacemaker defibrillator was interrogated and found to have delivered 5 shocks were unsuccessful tachycardia shocks was successful. He's had no recurrence here in the emergency department. Critical care time for this patient is 37 minutes excluding separate billable procedures        CONSULTS:  None    PROCEDURES:  Unless otherwise noted below, none     Procedures    FINAL IMPRESSION      1.  Ventricular tachycardia ([address]

## 2019-05-25 NOTE — ED NOTES
Patient is aware he is being admitted, belongings form completed.       Jordyn Roblero RN  05/25/19 [id number]

## 2019-05-25 NOTE — H&P
[facility]    History & Physical      Date:   5/25/2019  Patient name:  Shemar Selby  Date of admission:  5/25/2019 12:48 AM  MRN:   [id number]  YOB: 1950    Reason for Admission:  Ventricular tachycardia   Chief Compaint:  ICD firing x5  History Obtained From:  Patient and medical records both at [facility] and [facility]. Primary Cardiologist: Viridiana Carlin MD [facility]    Admitting  Cardiologist:  Viridiana Carlin MD [facility]      History of Present Illness:    Shemar Selby is a 81 y.o.  male who is being admitted to [address] by [facility]. Previous [facility] and [facility] records have been reviewed in detail. Per ER notes Shemar Selby is a 81 y.o. male who presents to the emergency department for evaluation defibrillator firing. Patient has a defibrillator and it fired 5 times this evening. He felt his heart beating fast and then it started firing. He denies current chest pain or symptoms. He presents here with a normal sinus rhythm. No related fever or chills. He was not exerting himself when he was walking around the house and cleaning a little bit in the hallway. He's been compliant with his medications. Currently is symptom-free as mentioned. No shortness of breath             Cardiac History:     PCP: Dr. Crowley Backers: Per last [facility] visit 3/27/19    Patient with ischemic cardiomyopathy ,chronic systolic biventricular heart failure functional class III, with high risk of decompensation, is being seen in 4 week follow-up.e reports feeling overall okay, does not complain of abdominal distention, his weightis being maintained close to his dry weight, he reports compliance with medications. He reports feeling well overall, but yesterday had 5 ICD shocks. He denies any worsening of his shortness of breath, his weight has been stable and he reports compliance with his medications.   He has had extensive pruritus rash, and blood has been oozing  from his skin pores because of the itching and scratching, which is felt to be related to PTU. Denies orthopnea paroxysmal nocturnal dyspnea or significant weight change. Denies dietary or medical noncompliance with the exception of PTU which patient could not tolerate. Has had nonsustained ventricular tachycardia on recent device interrogation. I talked about uptitrating his Jerene Severe is afraid that his blood pressure will drop, and he will get dizzy. He has previously not tolerated higher doses of Entresto. We have not been able to maximize his heart failure therapy because of arterial hypotension. He continues to remain on high-risk medications,fluctuating thyroid levels and hyperthyroidism are addressed by Dr. Rodríguez Early. No recent ICD discharges. Most recent electrophysiology note from February 2019 was reviewed. at that time there was discussion about evaluation at tertiary care center for advanced heart failure therapy, patient says he still thinking about this, and does not wish to proceed at this time. He is also not interested in being seen in the heart failure clinic of [facility]. Cardiac History:  1. Chronic biventricular systolic heart failure functional class III  with high risk of decompensation to class IV with very fine line  between volume overload and symptomatic arterial hypotension. 2. High-risk medication - amiodarone. Amiodarone was discontinued because of thyroid toxicity and concern for liver toxicity. 3. Increased atrial fibrillation burden , probably related to hyperthyroidism, which in turn may be related to amiodarone, which in turn was used for treating his paroxysmal atrial fibrillation and ventricular tachycardia. 4. LV ejection fraction about 20%. 5. Atherosclerotic native vessel coronary artery disease with remote  coronary artery bypass grafting and mitral valve repair in 2013.   CABG 4/13 LIMA to LAD SVG to diagonal SVG to OM SVG to PDA with MVR     Lexiscan Myoview 2014-severe LV Medical History:   Diagnosis Date    Arthritis     CAD (coronary artery disease)     [facility]     COPD (chronic obstructive pulmonary disease) [facility]     Defibrillator activation     Diabetes mellitus with insulin therapy ([facility]     Generalized and unspecified atherosclerosis     Gout     Hyperlipidemia     Hypertension     Lung disease     Pain in joint, multiple sites     Prolonged emergence from general anesthesia     Status post angioplasty     TIA (transient ischemic attack)     Tobacco abuse     Type II or unspecified type diabetes mellitus without mention of complication, not stated as uncontrolled        Past Surgical History:    Past Surgical History:   Procedure Laterality Date    CARDIAC CATHETERIZATION      COLONOSCOPY      CORONARY ANGIOPLASTY  4/29/11    Dr Norbert Bo CORONARY ARTERY BYPASS GRAFT  2012    ENDOSCOPY, COLON, DIAGNOSTIC      EYE SURGERY Bilateral     Cataracts     OTHER SURGICAL HISTORY      difibrillator     UT CIRCUMCISION,OTHER,28+ D/O N/A 8/29/2018    CIRCUMCISION performed by Lyndsey Rae MD at [facility] EGD TRANSORAL BIOPSY SINGLE/MULTIPLE N/A 11/5/2017    EGD BIOPSY performed by Chip Kurtz MD at [address] [place] [address]:    Dye [iodides]; Penicillins; and Tapazole [methimazole]    Home Medications:    Prior to Admission medications    Medication Sig Start Date End Date Taking?  Authorizing Provider   montelukast (SINGULAIR) 10 MG tablet TAKE 1 TABLET BY MOUTH DAILY AT BEDTIME 3/7/19  Yes Historical Provider, MD   metolazone (ZAROXOLYN) 2.5 MG tablet Take 2.5 mg by mouth daily   Yes Historical Provider, MD   aspirin 81 MG tablet Take 81 mg by mouth daily   Yes Historical Provider, MD   propylthiouracil (PTU) 50 MG tablet 3 po bid 2/15/19  Yes Yina Aguila MD   atorvastatin (LIPITOR) 80 MG tablet  2/6/19  Yes Historical Provider, MD   warfarin (COUMADIN) 5 MG tablet Take as directed by [facility] EMERGENCY [facility]. Quantity equals 90 day supply.  1/15/19  Yes Dina Noble MD   allopurinol (ZYLOPRIM) 100 MG tablet TAKE 1 TABLET DAILY 1/7/19  Yes Robinson Balderas DO   metoprolol succinate (TOPROL XL) 25 MG extended release tablet Take 25 mg by mouth 2 times daily    Yes Historical Provider, MD   insulin 70-30 (NOVOLIN 70/30) (70-30) 100 UNIT per ML injection vial INJECT 25 UNITS TWICE A DAY 12/13/18  Yes Leann Ledesma MD   furosemide (LASIX) 40 MG tablet Take 1 tablet by mouth daily  Patient taking differently: Take 40 mg by mouth Take 1 tab in the AM & 1/2 tab 2:00pm 12/6/18  Yes Cuate Whipple MD   COLCRYS 0.6 MG tablet TAKE 1 TABLET DAILY (NEEDS FOLLOW UP PRIOR TO ANY MORE REFILLS) 7/23/18  Yes Jordon Babcock MD   Oxygen Concentrator Oxi Go POC 6/27/18  Yes Aldair Cerda MD   potassium chloride (KLOR-CON M) 20 MEQ extended release tablet Take 1 tablet by mouth 2 times daily  Patient taking differently: Take 20 mEq by mouth Take 1/2 tab BID 5/17/18  Yes Deja Hutchison MD   budesonide-formoterol (SYMBICORT) 160-4.5 MCG/ACT AERO Inhale 2 puffs into the lungs 2 times daily 3/30/18  Yes Aldair Cerda MD   pantoprazole (PROTONIX) 40 MG tablet Take 1 tablet by mouth every morning (before breakfast) 11/8/17  Yes Deja Hutchison MD   ENTRESTO 24-26 MG per tablet Take 1 tablet by mouth 2 times daily  2/16/17  Yes Historical Provider, MD   clopidogrel (PLAVIX) 75 MG tablet Take 75 mg by mouth daily   Yes Historical Provider, MD   DIGITEK 125 MCG tablet TAKE 1 TABLET DAILY 8/2/15  Yes Jeniffer Pineda MD   triamcinolone (KENALOG) 0.1 % cream APPLY TO ITCHY AREAS ON BODY 2X/DAY X 2-3 WEEKS THEN AS NEEDED FOR ITCH-DONT USE ON FACE/SKIN FOLDS 2/12/19   Historical Provider, MD   enoxaparin (LOVENOX) 80 MG/0.8ML injection Inject 80 mg sq bid as instructed (begin prior to procedure when INR < 2, stop pm prior to & am of procedure, then resume until INR > 2) 2/18/19   Monroe Vernon MD   hydrOXYzine (ATARAX) 25 MG tablet TAKE 1 TABLET BY MOUTH EVERY 6 HOURS AS NEEDED FOR ITCHING 1/21/19   Historical Provider, MD   cetaphil (CETAPHIL) cream Apply topically bid to skin 1/22/19   Hannah Perez MD   doxycycline hyclate (VIBRA-TABS) 100 MG tablet TAKE 1 TABLET EVERY 12 HOURS DAILY X 10 DAYS 1/3/19   Historical Provider, MD   gabapentin (NEURONTIN) 300 MG capsule Take 1 capsule by mouth 3 times daily for 30 days. . 1/4/19 2/8/19  Jaycob Bustamante MD   loperamide (IMODIUM) 2 MG capsule Start: 4 mg POx1, then 2 mg PO after each loose stool.  Max: 16 mg/day 12/17/18   Shaila Pineda MD   BD INSULIN SYRINGE ULTRAFINE 31G X 5/16\" 0.5 ML MISC USE TWICE A DAY 7/30/18   Jaycob Bustamante MD   albuterol (PROVENTIL) (2.5 MG/3ML) 0.083% nebulizer solution Take 3 mLs by nebulization every 6 hours as needed for Wheezing 2/13/18   Nithya Sutherland MD   nitroGLYCERIN (NITROSTAT) 0.4 MG SL tablet Place 1 tablet under the tongue every 5 minutes as needed for Chest pain 11/13/17   Hannah Perez MD   albuterol [facility]) 108 (90 BASE) MCG/ACT inhaler Inhale 2 puffs into the lungs every 4 hours as needed for Wheezing 5/11/15   Jasbir Alarcon MD       Current Medications:   amiodarone 450mg/250ml D5W infusion 1 mg/min (05/25/19 0416)       Current Facility-Administered Medications   Medication Dose Route Frequency Provider Last Rate Last Dose    amiodarone (CORDARONE) 450 mg in dextrose 5 % 250 mL infusion  1 mg/min Intravenous Continuous Terry Luciano MD 33.3 mL/hr at 05/25/19 0416 1 mg/min at 05/25/19 0416    sodium chloride flush 0.9 % injection 10 mL  10 mL Intravenous 2 times per day Emma Campbell,         sodium chloride flush 0.9 % injection 10 mL  10 mL Intravenous PRN Emma Campbell, DO        magnesium hydroxide (MILK OF MAGNESIA) 400 MG/5ML suspension 30 mL  30 mL Oral Daily PRN Emma Campbell DO        ondansetron [facility]) injection 4 mg  4 mg Intravenous Q6H PRN Rebbeca Suzanne, DO        atorvastatin (LIPITOR) tablet 40 mg  40 mg Oral Nightly Rebbeca Suzanne DO        [START ON 2019] aspirin chewable tablet 81 mg  81 mg Oral Daily Rebbeca Suzanne DO           SocialHistory:   Social History     Tobacco Use    Smoking status: Former Smoker     Packs/day: 1.50     Years: 25.00     Pack years: 37.50     Last attempt to quit: 2012     Years since quittin.4    Smokeless tobacco: Never Used   Substance Use Topics    Alcohol use: No        Family History:   Family History   Problem Relation Age of Onset    Cancer Brother     Diabetes Mother     Heart Disease Father     Emphysema Father            Review of Systems    Constitutional: Negative for chills and diaphoresis. HENT: Negative for congestion, ear pain, mouth sores and sore throat. Eyes: Negative for photophobia and discharge. Respiratory: Negative for cough, chest tightness, shortness of breath and wheezing. Cardiovascular: Negative for chest pain , positive for ICD shocks ×5. Gastrointestinal: Negative for abdominal distention, abdominal pain, blood in stool, diarrhea, nausea and vomiting. Endocrine: Negative for heat or cold intolerence. Genitourinary: Negative for difficulty urinating. Musculoskeletal: Negative for arthralgias, back pain and myalgias. Skin: Positive for extensive pruritis and rash from scratching. Neurological: Positive  For dizziness ,negative for syncope. Cannot read. Hematological: Negative for bleeding diathesis. Psychiatric/Behavioral: Negative for agitation, confusion and hallucinations. The patient is not nervous/anxious.   Slow to respond  All other systems reviewed and are negative.        Except as noted above the remainder of the review of systems was reviewed and negative.            Physical Examination:  BP 98/70   Pulse 86   Temp 98.7 °F (37.1 °C) (Oral)   Resp 18   Ht 5' 11\" (1.803 m)   Wt 205 lb (93 kg)   SpO2 97% BMI 28.59 kg/m²    No intake or output data in the 24 hours ending 05/25/19 0929  Patient Vitals for the past 96 hrs (Last 3 readings):   Weight   05/25/19 0051 205 lb (93 kg)        Physical Exam       Constitutional: He is oriented to person, place, and time. He appears comfortable. Yaa January HENT:   Head: Normocephalic. Eyes: No icterus. Neck: Normal range of motion. Neck supple. Cardiovascular: Normal rate, regular rhythm, S3 gallop,no murmur  and intact distal pulses. Pulmonary/Chest: Effort normal and breath sounds distal,no crepitations or rhonchi. Abdominal: Soft. Bowel sounds are normal. There is no tenderness. There is no guarding. Musculoskeletal: No active inflammation. Neurological: He is alert and oriented to person, place, and time. Slow to respond,but has good insight into his medical status. Skin: Skin is warm and dry. Psychiatric: He has a normal mood and affect. Thought content normal.   Nursing note and vitals reviewed.         Diagnostics:    EKG: normal sinus rhythm, unchanged from previous tracings,a sensed,V paced ,QTC 420msec. Telemetry: paced.       Lab Data:  BMP:  Recent Labs     05/25/19  0143   *   K 4.0   CL 92*   CO2 22   BUN 42*   CREATININE 1.73*   LABGLOM 39.6*       Cardiac Enzymes:  Recent Labs     05/25/19  0059 05/25/19  0707   TROPONINI 0.032* 0.077*       CBC:   Lab Results   Component Value Date    WBC 6.0 05/25/2019    RBC 4.81 05/25/2019    RBC 4.75 09/16/2011    HGB 13.6 05/25/2019    HCT 40.0 05/25/2019     05/25/2019       CMP:    Lab Results   Component Value Date     05/25/2019    K 4.0 05/25/2019    K 3.7 04/02/2018    CL 92 05/25/2019    CO2 22 05/25/2019    BUN 42 05/25/2019    CREATININE 1.73 05/25/2019    GFRAA 47.9 05/25/2019    LABGLOM 39.6 05/25/2019    GLUCOSE 164 05/25/2019    GLUCOSE 128 05/09/2012    CALCIUM 9.4 05/25/2019       Hepatic Function Panel:   Lab Results   Component Value Date    ALKPHOS 192 05/25/2019    ALT 23 05/25/2019    AST 22 05/25/2019    PROT 7.0 05/25/2019    BILITOT 2.2 05/25/2019    BILIDIR 0.7 02/08/2019    IBILI 0.8 02/08/2019    LABALBU 3.9 05/25/2019       Magnesium:    Lab Results   Component Value Date    MG 1.7 12/03/2018       PT/INR:   Lab Results   Component Value Date    PROTIME 18.4 05/25/2019    PROTIME 28.1 09/25/2018    PROTIME 19.8 09/20/2016    INR 1.9 05/25/2019     Recent Labs     05/25/19  0059   INR 1.9        HgBA1c:    Lab Results   Component Value Date    LABA1C 7.9 02/25/2019       Lipid Profile:    Lab Results   Component Value Date    TRIG 59 12/01/2018    HDL 41 12/01/2018    LDLCALC 24 12/01/2018       TSH:    Lab Results   Component Value Date    TSH <0.010 12/13/2018       ABG:  No results found for: PH, PO2, PCO2    PRO-BNP:   Lab Results   Component Value Date    PROBNP 0,237 01/20/2019    PROBNP 8,368 11/30/2018        Radiology:   Xr Chest Portable    Result Date: 5/25/2019  XR CHEST PORTABLE : 5/25/2019 CLINICAL HISTORY: Chest pain after AICD firing. COMPARISON: Portable chest and chest CTA 1/20/2019. TECHNIQUE: A portable upright AP radiograph of the chest was obtained. FINDINGS: Mild vascular congestion and predominantly interstitial infiltrates of the mid to lower lung fields is consistent with mild cardiac decompensation. The heart remains moderately enlarged, exaggerated by technique. Postoperative changes from previous CABG and a left subclavian AICD are again noted. There is no dense consolidation, significant pleural effusion, pneumothorax, or other significant changes identified. PROBABLE MILD TO MODERATE CARDIAC DECOMPENSATION. NO SIGNIFICANT PLEURAL EFFUSION OR FOCAL CONSOLIDATI    Impression:      Chronic decompensated biventricular class III systolic congestive heart failure. Sustained ventricular tachycardia with life-saving shock therapy. Chronic kidney disease stage III  History of atrial fibrillation, on high risk medication warfarin.   History of treatment with high risk medication amiodarone with subsequent hyperthyroidism, for which patient is followed by endocrinology, and amiodarone had to be held. Patient is now back on amiodarone. Extensive pruritus and rash, felt to be related to PTU, patient had stopped this medication. Chronic kidney disease stage III  High risk of decompensation due to multiple comorbidities and underlying severe LV systolic dysfunction  History of elevated liver enzymes currently normal.  Biventricular ICD in place, currently pacing  the ventricle around 90% of the time. History of remote coronary artery bypass grafting. Last cardiac catheterization:8/15 with GAYLE to LAD via LIMA   Poor prognosis with high risk of decompensation and recurrent hospital admissions. Have been following very closely. Active Problems:    Ventricular tachycardia ([address]  Resolved Problems:    * No resolved hospital problems.  *      Patient Active Problem List   Diagnosis    Uncontrolled type 2 diabetes mellitus with complication, with long-term current use of insulin ([address]    CAD (coronary artery disease)    Noncompliance    Pain in joint, multiple sites    COPD (chronic obstructive pulmonary disease) ([address]    Diabetes mellitus with insulin therapy ([address]    Hyperlipidemia    Hypertension    Generalized atherosclerosis    TIA (transient ischemic attack)    Elevation of level of transaminase or lactic acid dehydrogenase (LDH)    Tobacco dependence syndrome    Disorder of muscle    Mitral valve insufficiency    Acute lymphadenitis    Disorder of pancreas    Left heart failure (HCC)    Irritable bowel syndrome    Hyponatremia    Atherosclerosis of coronary artery    Generalized ischemic myocardial dysfunction    Coronary arteriosclerosis in native artery    Atrial flutter (HCC)    Acute on chronic systolic CHF (congestive heart failure), NYHA class 4 (HCC)    Atrial fibrillation (HCC)    Chronic combined systolic and diastolic heart failure (HCC)    Hemoptysis    SOB (shortness of breath)    CHF (congestive heart failure), NYHA class III, chronic, combined ([address]    CHF (congestive heart failure), NYHA class I, acute on chronic, combined (HCC)    Acute systolic CHF (congestive heart failure) (HCC)    CHF (congestive heart failure), NYHA class IV, acute on chronic, combined ([address]    Phimosis/redundant prepuce    Heart failure (HCC)    Moderate malnutrition (HCC)    Amiodarone-induced hyperthyroidism    Hyperthyroidism    Uncontrolled type 2 diabetes mellitus with hyperglycemia ([address]    BERTRAM (acute kidney injury) ([address]    Anticoagulant long-term use    Blurred vision, bilateral    Cardiomyopathy ([address]    Chest pain    Cough in adult    Dizziness    Fatigue    Fever    Former smoker    Gout, arthritis    High risk medication use    Hyperkalemia    Hypokalemia    Hypotension (arterial)    ICD (implantable cardioverter-defibrillator) discharge    Obese    Overweight    Paroxysmal ventricular tachycardia (HCC)    Presence of automatic implantable cardioverter-defibrillator    Status post angioplasty    Swelling of multiple joints    Hypoxia    Ventricular tachycardia (HCC)       Recommendations:  1. Continue IV amiodarone. Patient has not had any intracardiac tachycardia since admission. 2. Endocrinology consultation to address thyroid issues and diabetes. 3. EP consultation  4. Keep magnesium greater than 2 potassium greater than 4  5. Given clinical risk ,if cardiac catheterization is needed ,will need to transfer out.     Orders Placed This Encounter   Procedures    XR CHEST PORTABLE    Troponin    CBC Auto Differential    Protime-INR    APTT    SPECIMEN REJECTION    Comprehensive Metabolic Panel    Troponin    CBC    DIET GENERAL; No Caffeine    Cardiac monitoring    Vital signs per unit routine    Telemetry monitoring    Daily weights    Intake and output    Tobacco cessation education    Up as tolerated    Full Code    Initiate Oxygen Therapy Protocol    EKG 12 Lead    EKG 12 lead    Insert peripheral IV    PATIENT STATUS (FROM ED OR OR/PROCEDURAL) Inpatient       Discussed with patient and nursing.     Electronically signed by Mehreen Paige MD, [address] [facility] on 5/25/2019 at 9:29 Magaly Sotelo M.D.                              [phone]

## 2019-05-25 NOTE — CONSULTS
Dictated    Admitted for ICD shocks due to atrial fibrillation with RVR  S/p Biv ICD  Was on amiodarone, had elevation of LFT's and TSH. Plan:  Continue with IV amiodarone  Check LFT/ TSH  ECG daily for QTc  Resume anticoagulation with warfarin.     Israel Walker MD

## 2019-05-25 NOTE — ED NOTES
Patient's Defib/Pacer interrogated per this RN with the [address] [place] [address]  05/25/19 5438

## 2019-05-25 NOTE — ED NOTES
Bed: 07  Expected date:   Expected time:   Means of arrival:   Comments:  bakari Austin, [facility]  05/25/19 [id number]

## 2019-05-25 NOTE — PROGRESS NOTES
3152 Patient is unable to accurately identify home meds. Some meds states he Annarosalba Larios been wanting to have PCP update home meds list so it is accurate because [facility] are sending him meds that are discontinued and its isnt safe and they charge him for them\". alert and oriented. Uses urinal. Ventricular paced on tele. VS stable. Denies chest pain.

## 2019-05-25 NOTE — CONSULTS
[address]                      [facility], [address] [facility]                                  CONSULTATION    PATIENT NAME: Edwin Akers                    :        1950  MED REC NO:   [id number]                            ROOM:       IC14  ACCOUNT NO:   [de-identified]                           ADMIT DATE: 2019  PROVIDER:     Germaine Mirza MD    CONSULT DATE:  2019    REQUESTING PHYSICIAN:  Dr. Bubba Hernandez. REASON FOR CONSULTATION:  ICD shocks. HISTORY OF PRESENT ILLNESS:  The patient is a 56-year-old male very well  known to my service. He was seen last time in my office on 2019. He has a history of COPD, congestive heart failure, and end-stage  cardiomyopathy with chronic systolic right ventricular heart failure [place] Heart Association class IV. Has a history of coronary artery  disease with prior coronary artery bypass graft surgery and mitral valve  repair in , ejection fraction of 20%. He underwent implantation of  a dual-chamber ICD in 2014 with upgrade of this device in 2018 for  biventricular ICD. He was using long-term antiarrhythmic therapy,  initially dofetilide, but this medication was discontinued due to  evidence of ventricular tachycardia through device interrogations. He  was placed on amiodarone. He was receiving this for many years until  2018 when he developed significant amount of elevation of the LFTs as  well as abnormal TSH. He has been off amiodarone since then. He also  had some skin rash and itching. He states that he continues having the  itching sensation of his skin. He was diagnosed with hyperthyroidism  and he was placed on Tapazole. He was seen last time in my office on  2018. At that time, the device was still showing episodes of  nonsustained ventricular tachycardia and supraventricular tachycardia  but no more adjustment of medical therapies could be done.   They were  brief episodes. He states that he was in his usual state of health  yesterday, came to the house, and then suddenly he started feeling his  heart racing. He underwent 5 shocks from the device. The documentation  that I have from the device interrogation is minimal during this  evaluation. Apparently, the patient was shocked 5 times for atrial  fibrillation with right ventricular response. Current EKG shows atrial  paced rhythm, ventricular paced rhythm at a rate of 90 beats per minute,  QRS duration 142 msec, QT corrected 420 msec. He was placed on IV  amiodarone. Currently, he feels well. His laboratory data also showed  sodium of 132, potassium 4.0, chloride 92, BUN 42, creatinine 1.73,  glucose of 164, and calcium of 9.4. Two troponins mildly elevated at  0.084 and 0.077 (flat pattern). ALT of 23, AST of 22 with glucose of  164. He also had a WBC of 6.0, hemoglobin of 13.6, hematocrit 40.0, and  platelet count of 722. Looking at his cardiac data, he has a history of  chronic right ventricular heart failure. He also has a history of  coronary artery disease with remote coronary artery bypass graft surgery  with mitral valve repair in 2013 with bypass in 04/2013 with LIMA to  LAD, SVG to diagonal, SVG to obtuse marginal, SVG to PDA with mitral  valve replacement. His stress test in 2014 showed severe left  ventricular dysfunction with no major reversible defects. His cardiac  catheterization in 08/2015 showed left main 30%, %, LAD 70%,  proximal mid SVG to RCA widely patent, SVG to diagonal patent with 30%  proximal narrowing of the LIMA to LAD patent, apical LAD with tandem 70%  stenosis. He underwent PCI with drug-eluting stent of the 70% stenosis  of the LAD without any complications. ALLERGIES:  To DYE, CONTRAST DYE, PENICILLIN, and TAPAZOLE. MEDICATIONS:  Please see MAR. SOCIAL HISTORY:  Former smoker. No alcohol or illicit drugs.     FAMILY HISTORY:  Positive for cancer, diabetes, heart disease, and COPD. PHYSICAL EXAMINATION:  VITAL SIGNS:  Blood pressure of 98/69 mmHg, heart rate of 80 beats per  minute, respiratory rate was 18, and temperature 36.9. GENERAL:  The patient was alert and oriented x3. HEENT:  Head normocephalic. HEENT normal.  NECK:  Supple. LUNGS:  Bibasilar rales. CARDIOVASCULAR:  Regular rate and rhythm with device in the left  pectoral area, healing well. No signs of hematoma or infection. ABDOMEN:  Soft. Bowel sounds present. EXTREMITIES:  With mild edema in the lower extremities bilaterally. NEUROLOGIC:  The patient is alert and oriented x3. No motor or sensory  deficit. SKIN:  No cyanosis. No pallor. CLINICAL IMPRESSION:  1. ICD shocks most likely due to atrial fibrillation with rapid  ventricular response. 2.  History of atrial fibrillation. 3.  History of high-risk medication, initially on dofetilide and then on  amiodarone but this medication was discontinued due to significant  elevation of LFTs and TSH. 4.  Hyperthyroidism on treatment therapy. 5.  History of coronary artery disease with coronary artery bypass graft  surgery and percutaneous coronary interventions as described above. 6.  History of dual-chamber ICD with upgrade to biventricular ICD as  described above. 7.  Congestive heart failure, [place] Heart Association class II.  8.  Ischemic cardiomyopathy. 9.  Chronic kidney disease. PLAN/RECOMMENDATIONS:  1.  I agree with IV amiodarone for now. The patient should go on IV  amiodarone per protocol for now. 2.  We will get better device interrogation most likely for what I think  he was shocked for atrial fibrillation with rapid ventricular response  but I need the intracardiac electrograms. 3.  Get LFTs and TSH. He has significant amount of abnormalities on  these values when he used amiodarone in the past.  4.  EKG daily for QT evaluation.   5.  The patient will be followed in my office in 4 weeks for  reevaluation of antiarrhythmic therapy.       Jann Goldberg MD    D: 05/25/2019 15:01:14       T: 05/25/2019 19:21:23     [id number]  Job#: [id number]     Doc#: [id number]    CC:

## 2019-05-25 NOTE — PROGRESS NOTES
0800 Assumed care of patient. Assessment complete. Patient A/O X4, Amiodarone infusing well. Denies any pain and or discomfort at present. 1030 seen by Dr Bubba Hernandez, update given, see orders. 1140 Dr Bubba Hernandez notified of am troponins. Order received to stop troponin levels. Oked to transfer to [facility].  1400 Up the bathroom with stand by assist for moderate brown BM, sharon well. Sitting up in chair at present. No changes in assessment. 1745 Only received mash potatoes on dinner tray, dietary called to bring patient more food. Patient remains up in chair and sharon well. Denies wanting to go back to bed. No change in assessment. [address] again called for patients tray reminder. Going to bring right up.

## 2019-05-26 LAB
ANION GAP SERPL CALCULATED.3IONS-SCNC: 12 MEQ/L (ref 9–15)
BUN BLDV-MCNC: 44 MG/DL (ref 8–23)
CALCIUM SERPL-MCNC: 9 MG/DL (ref 8.5–9.9)
CHLORIDE BLD-SCNC: 93 MEQ/L (ref 95–107)
CO2: 23 MEQ/L (ref 20–31)
CREAT SERPL-MCNC: 1.51 MG/DL (ref 0.7–1.2)
DIGOXIN LEVEL: 0.7 NG/ML (ref 0.8–2)
GFR AFRICAN AMERICAN: 56
GFR NON-AFRICAN AMERICAN: 46.3
GLUCOSE BLD-MCNC: 106 MG/DL (ref 60–115)
GLUCOSE BLD-MCNC: 123 MG/DL (ref 70–99)
GLUCOSE BLD-MCNC: 199 MG/DL (ref 60–115)
HBA1C MFR BLD: 8.1 % (ref 4.8–5.9)
HCT VFR BLD CALC: 37.7 % (ref 42–52)
HEMOGLOBIN: 12.6 G/DL (ref 14–18)
INR BLD: 2.1
MAGNESIUM: 2 MG/DL (ref 1.7–2.4)
MCH RBC QN AUTO: 27.7 PG (ref 27–31.3)
MCHC RBC AUTO-ENTMCNC: 33.3 % (ref 33–37)
MCV RBC AUTO: 83.1 FL (ref 80–100)
PDW BLD-RTO: 18.2 % (ref 11.5–14.5)
PERFORMED ON: ABNORMAL
PERFORMED ON: NORMAL
PLATELET # BLD: 193 K/UL (ref 130–400)
POTASSIUM SERPL-SCNC: 4 MEQ/L (ref 3.4–4.9)
PROTHROMBIN TIME: 20.5 SEC (ref 9–11.5)
RBC # BLD: 4.54 M/UL (ref 4.7–6.1)
SODIUM BLD-SCNC: 128 MEQ/L (ref 135–144)
T4 FREE: 1.01 NG/DL (ref 0.84–1.68)
TSH SERPL DL<=0.05 MIU/L-ACNC: 10.61 UIU/ML (ref 0.44–3.86)
WBC # BLD: 4.4 K/UL (ref 4.8–10.8)

## 2019-05-26 PROCEDURE — 2700000000 HC OXYGEN THERAPY PER DAY

## 2019-05-26 PROCEDURE — 99223 1ST HOSP IP/OBS HIGH 75: CPT | Performed by: INTERNAL MEDICINE

## 2019-05-26 PROCEDURE — 84439 ASSAY OF FREE THYROXINE: CPT

## 2019-05-26 PROCEDURE — 2580000003 HC RX 258: Performed by: INTERNAL MEDICINE

## 2019-05-26 PROCEDURE — 83036 HEMOGLOBIN GLYCOSYLATED A1C: CPT

## 2019-05-26 PROCEDURE — 85610 PROTHROMBIN TIME: CPT

## 2019-05-26 PROCEDURE — 6370000000 HC RX 637 (ALT 250 FOR IP): Performed by: INTERNAL MEDICINE

## 2019-05-26 PROCEDURE — 84443 ASSAY THYROID STIM HORMONE: CPT

## 2019-05-26 PROCEDURE — 80162 ASSAY OF DIGOXIN TOTAL: CPT

## 2019-05-26 PROCEDURE — 80048 BASIC METABOLIC PNL TOTAL CA: CPT

## 2019-05-26 PROCEDURE — 94640 AIRWAY INHALATION TREATMENT: CPT

## 2019-05-26 PROCEDURE — 83735 ASSAY OF MAGNESIUM: CPT

## 2019-05-26 PROCEDURE — 2060000000 HC ICU INTERMEDIATE R&B

## 2019-05-26 PROCEDURE — 93005 ELECTROCARDIOGRAM TRACING: CPT | Performed by: INTERNAL MEDICINE

## 2019-05-26 PROCEDURE — 36415 COLL VENOUS BLD VENIPUNCTURE: CPT

## 2019-05-26 PROCEDURE — 94664 DEMO&/EVAL PT USE INHALER: CPT

## 2019-05-26 PROCEDURE — 6360000002 HC RX W HCPCS: Performed by: INTERNAL MEDICINE

## 2019-05-26 PROCEDURE — 85027 COMPLETE CBC AUTOMATED: CPT

## 2019-05-26 PROCEDURE — 96366 THER/PROPH/DIAG IV INF ADDON: CPT

## 2019-05-26 PROCEDURE — 94761 N-INVAS EAR/PLS OXIMETRY MLT: CPT

## 2019-05-26 PROCEDURE — 96365 THER/PROPH/DIAG IV INF INIT: CPT

## 2019-05-26 RX ORDER — AMIODARONE HYDROCHLORIDE 200 MG/1
200 TABLET ORAL 2 TIMES DAILY
Status: DISCONTINUED | OUTPATIENT
Start: 2019-05-27 | End: 2019-05-27 | Stop reason: HOSPADM

## 2019-05-26 RX ADMIN — MONTELUKAST 10 MG: 10 TABLET, FILM COATED ORAL at 21:21

## 2019-05-26 RX ADMIN — FUROSEMIDE 40 MG: 40 TABLET ORAL at 10:27

## 2019-05-26 RX ADMIN — Medication: at 21:21

## 2019-05-26 RX ADMIN — ALLOPURINOL 100 MG: 100 TABLET ORAL at 10:27

## 2019-05-26 RX ADMIN — POTASSIUM CHLORIDE 10 MEQ: 750 TABLET, FILM COATED, EXTENDED RELEASE ORAL at 16:49

## 2019-05-26 RX ADMIN — MOMETASONE FUROATE AND FORMOTEROL FUMARATE DIHYDRATE 2 PUFF: 200; 5 AEROSOL RESPIRATORY (INHALATION) at 09:08

## 2019-05-26 RX ADMIN — Medication: at 10:28

## 2019-05-26 RX ADMIN — METOPROLOL SUCCINATE 25 MG: 25 TABLET, EXTENDED RELEASE ORAL at 21:21

## 2019-05-26 RX ADMIN — DEXTROSE MONOHYDRATE 0.5 MG/MIN: 50 INJECTION, SOLUTION INTRAVENOUS at 07:30

## 2019-05-26 RX ADMIN — Medication 10 ML: at 21:22

## 2019-05-26 RX ADMIN — PANTOPRAZOLE SODIUM 40 MG: 40 TABLET, DELAYED RELEASE ORAL at 05:50

## 2019-05-26 RX ADMIN — CLOPIDOGREL BISULFATE 75 MG: 75 TABLET ORAL at 10:27

## 2019-05-26 RX ADMIN — WARFARIN SODIUM 7.5 MG: 2.5 TABLET ORAL at 16:49

## 2019-05-26 RX ADMIN — SACUBITRIL AND VALSARTAN 1 TABLET: 24; 26 TABLET, FILM COATED ORAL at 21:21

## 2019-05-26 RX ADMIN — METOLAZONE 2.5 MG: 2.5 TABLET ORAL at 10:27

## 2019-05-26 RX ADMIN — HYDROXYZINE HYDROCHLORIDE 10 MG: 10 TABLET, FILM COATED ORAL at 05:50

## 2019-05-26 RX ADMIN — MOMETASONE FUROATE AND FORMOTEROL FUMARATE DIHYDRATE 2 PUFF: 200; 5 AEROSOL RESPIRATORY (INHALATION) at 19:45

## 2019-05-26 RX ADMIN — POTASSIUM CHLORIDE 10 MEQ: 750 TABLET, FILM COATED, EXTENDED RELEASE ORAL at 10:27

## 2019-05-26 ASSESSMENT — PAIN SCALES - GENERAL: PAINLEVEL_OUTOF10: 0

## 2019-05-26 NOTE — PROGRESS NOTES
Pulmonary Disorder  (acute or chronic)  [x]   Severe or Chronic w/ Exacerbation  []     Surgical Status No [x]   Surgeries     General []   Surgery Lower []   Abdominal Thoracic or []   Upper Abdominal Thoracic with  PulmonaryDisorder  []     Chest X-ray Clear/Not  Ordered     [x]  Chronic Changes  Results Pending  []  Infiltrates, atelectasis, pleural effusion, or edema  []  Infiltrates in more than one lobe []  Infiltrate + Atelectasis, &/or pleural effusion  []    Respiratory Pattern Regular,  RR = 12-20 []  Increased,  RR = 21-25 []  DAVIS, irregular,  or RR = 26-30 []  Decreased FEV1  or RR = 31-35 []  Severe SOB, use  of accessory muscles, or RR ? 35  []    Mental Status Alert, oriented,  Cooperative [x]  Confused but Follows commands []  Lethargic or unable to follow commands []  Obtunded  []  Comatose  []    Breath Sounds Clear to  auscultation  [x]  Decreased unilaterally or  in bases only []  Decreased  bilaterally  []  Crackles or intermittent wheezes []  Wheezes []    Cough Strong, Spontan., & nonproductive [x]  Strong,  spontaneous, &  productive []  Weak,  Nonproductive []  Weak, productive or  with wheezes []  No spontaneous  cough or may require suctioning []    Level of Activity Ambulatory [x]  Ambulatory w/ Assist  []  Non-ambulatory []  Paraplegic []  Quadriplegic []    Total    Score:____3___     Triage Score:____5____      Tri       Triage:     1. (>20) Freq: Q3    2. (16-20) Freq: Q4   3. (11-15) Freq: QID & Albuterol Q2 PRN    4. (6-10) Freq: TID & Albuterol Q2 PRN    5. (0-5) Freq Q4prn

## 2019-05-26 NOTE — PROGRESS NOTES
Clinical Pharmacy Note    Ruthy Sexton is a 81 y.o. male for whom pharmacy has been asked to manage warfarin therapy. Reason for Admission: sustained VT    Consulting Physician: Lara Steve  Warfarin dose prior to admission: 10mg MWF, 7.5mg TRSS  Warfarin Indication: a fib  Target INR range: 2-3  Order for concomitant anticoagulant therapy: Lovenox 1mg/kg BID    Outpatient warfarin provider: Mackenzie    Past Medical History:   Diagnosis Date    Arthritis     CAD (coronary artery disease)     Cardiomyopathy ([facility]     COPD [facility]ch[facility] obstructive [facility] disease[facility]     Defibrillator activation     Diabetes mellitus with insulin therapy ([facility]     Generalized and unspecified atherosclerosis     Gout     Hyperlipidemia     Hypertension     Lung disease     Pain in joint, multiple sites     Prolonged emergence from general anesthesia     Status post angioplasty     TIA (transient ischemic attack)     Tobacco abuse     Type II or unspecified type diabetes mellitus without mention of complication, not stated as uncontrolled                Recent Labs     05/25/19  0059   INR 1.9     Recent Labs     05/25/19  0059   HGB 13.6*   HCT 40.0*          Current warfarin drug-drug interactions:   Amiodarone, allopurinol, doxycycline    Date INR Warfarin Dose   5/25/19 1.9 7.5mg                                   Will resume home dose for tonight in addition to bridging with Lovenox until INR > 2. D/C Lovenox when INR > 2 per Dr Lara Steve. Daily PT/INR until stable within therapeutic range. ALFREDO Carrera. Ph.  5/25/2019  10:17 PM    Thank you for the consult.

## 2019-05-26 NOTE — PROGRESS NOTES
[facility] OF [facility] [place] Note      Patient: Francois Bradshaw  Unit/Bed: R067/C722-38  YOB: 1950  MRN: [id number]  Admit Date:  5/25/2019  HOSPITAL day # 2      Subjective Complaint:   Denies any chest pain with exertion or at rest, palpitations, syncope, or edema. .     Physical Examination:     BP (!) 156/145   Pulse 85   Temp 98.2 °F (36.8 °C) (Oral)   Resp 16   Ht 5' 11\" (1.803 m)   Wt 205 lb (93 kg)   SpO2 100%   BMI 28.59 kg/m²         Intake/Output Summary (Last 24 hours) at 5/26/2019 1903  Last data filed at 5/26/2019 1718  Gross per 24 hour   Intake 647 ml   Output 1600 ml   Net -953 ml     Weights  Wt Readings from Last 3 Encounters:   05/25/19 205 lb (93 kg)   03/29/19 194 lb (88 kg)   03/13/19 197 lb 6.4 oz (89.5 kg)       General:  Awake, alert, oriented X 3. No apparent distress. Heart:  Regular rate Regular rhythm, S1> S2 no murmurs, gallops, or rubs. Lungs: CTA bilaterally, bilat symmetrical expansion, no wheeze, rales, or rhonchi. Abdomen: Bowel sounds present, soft, nontender,  no peritoneal signs  Extremities:  No clubbing No edema Distal pulses are palpable  Skin: Warm and dry  Mood and affect: Appropriate for the circumstance. Telemetry:      paced         LABS:   CBC:   Recent Labs     05/25/19  0059 05/26/19 0618   WBC 6.0 4.4*   HGB 13.6* 12.6*    193      BMP:    Recent Labs     05/25/19  0143 05/26/19 0618   * 128*   K 4.0 4.0   CL 92* 93*   CO2 22 23   BUN 42* 44*   CREATININE 1.73* 1.51*   GLUCOSE 164* 123*              Troponin: No results for input(s): TROPONINT in the last 72 hours. BNP: No results for input(s): PROBNP in the last 72 hours. INR:   Recent Labs     05/25/19  0059 05/26/19 0618   INR 1.9 2.1      Mg:   Recent Labs     05/26/19  0618   MG 2.0       Cardiac Testing:    EKG sinus,Vpaced,QTc 520msec    Assessment:       Chronic decompensated biventricular class III systolic congestive heart failure.   Sustained ventricular tachycardia with life-saving shock therapy. Chronic kidney disease stage III  History of atrial fibrillation, on high risk medication warfarin. History of treatment with high risk medication amiodarone with subsequent hyperthyroidism, for which patient is followed by endocrinology, and amiodarone had to be held. Patient is now back on amiodarone. Extensive pruritus and rash, felt to be related to PTU, patient had stopped this medication. Chronic kidney disease stage III  High risk of decompensation due to multiple comorbidities and underlying severe LV systolic dysfunction  History of elevated liver enzymes currently normal.  Biventricular ICD in place, currently pacing  the ventricle around 90% of the time. History of remote coronary artery bypass grafting. Last cardiac catheterization:8/15 with GAYLE to LAD via LIMA   Poor prognosis with high risk of decompensation and recurrent hospital admissions. Have been following very closely.     TSH is less than 0.010. Plan:   DC IV Amiodarone  Start PO Amiodarone 200mg bid with daily EKG  There is already some QT prolongation  Appreciate EP and Endocrinology input  Device interrogation Tuesday to distinguish if arrhythmia was afib with RVR or VT or both  INR now therapeutic. I will DC statins and Vibramycin. ? ?AV sohan ablation and continued BIV pacing if arrhythmia was afib with RVR ,given hyperthyroidism,Will discuss with EP     Electronically signed by Pam Dimas MD on 5/26/2019 at 7:03 PM

## 2019-05-26 NOTE — FLOWSHEET NOTE
2050  Sitting up in chair respirations regular and unlabored  Amid Report called to [address] pt transferred to [address]  With  Tele monitor  Per wheelchair.

## 2019-05-26 NOTE — CONSULTS
[address]                      [address], [address] [facility]                                  CONSULTATION    PATIENT NAME: Jese Shelby                    :        1950  MED REC NO:   [id number]                            ROOM:       W167  ACCOUNT NO:   [de-identified]                           ADMIT DATE: 2019  PROVIDER:     Elisa Acosta MD    CONSULT DATE:  2019    REASON FOR CONSULTATION:  Management of type 2 diabetes and  hyperthyroidism. CHIEF COMPLAINT AND HISTORY OF PRESENT ILLNESS:  The patient is a  71-year-old male with known history of hyperthyroidism secondary to  amiodarone, history of type 2 diabetes, admitted after his  defibrillatory was firing. He was seen recently in our office 3 months  ago for type 2 diabetes followup and for his hyperthyroidism. His A1c  was 7.9 at that time. Free T4 was 1.89, TSH was 0.038. His free T4 was  at more than 7.7 in 2018. Prior free T4 was fluctuating between 4  and more than 7.7 range when he was on amiodarone before medications  were started for management of his hyperthyroidism. The patient was  initially put on Tapazole, which was switched to PTU. The patient was  also on Novolin 70/30 for his diabetes which apparently he is not  taking. Blood sugars have been stable here while he is in the hospital  ranging between 100 and 180. The patient complained of itching over his  skin and has seen dermatologist.  Reaction could have been from  amiodarone, and he discontinued PTU at his visit. At this time, we do  not have any thyroid function tests to review. We are ordering free T4,  TSH, and also A1c. Chemistries were reviewed from yesterday and today. Sodium was 128-132, potassium was 4, chloride was 92, glucose 123-164,  BUN was 44, creatinine 1.51. CBC:  WBC count was 4.4, hemoglobin was  12.6. The patient is on IV amiodarone at this time.   Initial diagnosis  is ventricular tachycardia and systolic heart failure, history also of  atrial fibrillation. Overall, the patient is alert, awake, in no  obvious distress. PAST MEDICAL HISTORY:  Significant for type 2 diabetes, history of  hyperthyroidism secondary to amiodarone, TIA, lung disease,  hyperlipidemia, hypertension, COPD, cardiomyopathy, coronary artery  disease, arthritis, history of tobacco abuse, gout. PAST SURGICAL HISTORY:  EGD, circumcision, eye surgery, endoscopy, CABG,  angioplasty, colonoscopy, cardiac catheterization. FAMILY HISTORY:  Significant for diabetes, heart disease, emphysema,  cancer. PERSONAL AND SOCIAL HISTORY:  Former smoker. Denies any alcohol. ALLERGIES:  Include DYE, PENICILLIN, TAPAZOLE. MEDICATIONS:  The patient's home medications included Singulair;  Zaroxolyn; PTU, which was discontinued in February 150 mg twice day;  Lipitor; Novolin 70/30 at 25 units twice a day; Colcrys; Symbicort;  Protonix; Entresto; Lovenox; hydroxyzine; Cetaphil; Neurontin; Imodium;  Proventil; nitroglycerin; and ProAir. REVIEW OF SYSTEMS:  Other than ICD firing, 14-point review of systems is  negative. PHYSICAL EXAMINATION:  GENERAL:  The patient is alert, awake, appears ill. VITAL SIGNS:  Blood pressure was 92/57, pulse rate was 85, respiratory  rate is 16, temperature 98. NECK:  Supple. No goiter or thyromegaly was noted. LUNGS:  Showing bilateral clear equal air entry, minimal baseline  crackle. CARDIOVASCULAR:  Heart sounds were regular. ABDOMEN:  Soft, nonobese. EXTREMITIES:  Reveal no edema. SKIN:  Reveals patchy dark plaque-like lesions of his upper extremities. LABS:  As above. ASSESSMENT:  1. Type 2 diabetes with fair-to-moderate control. No A1c.  2.  Hyperthyroidism, more than likely due to amiodarone. 3.  Ventricular tachycardia. 4.  Cardiomyopathy. 5.  Coronary artery disease. PLAN:  At this time, we will check his free T4, TSH.   Do Accu-Cheks with  a.c. medium dose sliding scale. Hold off on any PTU or Tapazole till  thyroid function tests are reviewed. Discussed the need to restart PTU  if the patient develops hyperthyroidism. The patient did verbalized  understanding. Continue other medications as per cardiology. Thank you for the consult.         Taco Hickman MD    D: 05/26/2019 13:21:59       T: 05/26/2019 15:26:50     [id number]  Job#: [id number]     Doc#: [id number]    CC:

## 2019-05-27 VITALS
DIASTOLIC BLOOD PRESSURE: 61 MMHG | OXYGEN SATURATION: 98 % | WEIGHT: 205 LBS | TEMPERATURE: 97.7 F | HEIGHT: 71 IN | BODY MASS INDEX: 28.7 KG/M2 | SYSTOLIC BLOOD PRESSURE: 102 MMHG | HEART RATE: 87 BPM | RESPIRATION RATE: 16 BRPM

## 2019-05-27 LAB
ANION GAP SERPL CALCULATED.3IONS-SCNC: 13 MEQ/L (ref 9–15)
BUN BLDV-MCNC: 43 MG/DL (ref 8–23)
CALCIUM SERPL-MCNC: 9.4 MG/DL (ref 8.5–9.9)
CHLORIDE BLD-SCNC: 97 MEQ/L (ref 95–107)
CO2: 24 MEQ/L (ref 20–31)
CREAT SERPL-MCNC: 1.48 MG/DL (ref 0.7–1.2)
EKG ATRIAL RATE: 79 BPM
EKG ATRIAL RATE: 85 BPM
EKG ATRIAL RATE: 90 BPM
EKG P AXIS: 47 DEGREES
EKG P AXIS: 52 DEGREES
EKG P AXIS: 58 DEGREES
EKG P-R INTERVAL: 132 MS
EKG P-R INTERVAL: 134 MS
EKG P-R INTERVAL: 80 MS
EKG Q-T INTERVAL: 418 MS
EKG Q-T INTERVAL: 474 MS
EKG Q-T INTERVAL: 484 MS
EKG QRS DURATION: 142 MS
EKG QRS DURATION: 180 MS
EKG QRS DURATION: 182 MS
EKG QTC CALCULATION (BAZETT): 511 MS
EKG QTC CALCULATION (BAZETT): 554 MS
EKG QTC CALCULATION (BAZETT): 564 MS
EKG R AXIS: 197 DEGREES
EKG R AXIS: 217 DEGREES
EKG R AXIS: 96 DEGREES
EKG T AXIS: -53 DEGREES
EKG T AXIS: 38 DEGREES
EKG T AXIS: 40 DEGREES
EKG VENTRICULAR RATE: 79 BPM
EKG VENTRICULAR RATE: 85 BPM
EKG VENTRICULAR RATE: 90 BPM
GFR AFRICAN AMERICAN: 57.3
GFR NON-AFRICAN AMERICAN: 47.4
GLUCOSE BLD-MCNC: 113 MG/DL (ref 70–99)
GLUCOSE BLD-MCNC: 120 MG/DL (ref 60–115)
GLUCOSE BLD-MCNC: 190 MG/DL (ref 60–115)
INR BLD: 2.6
PERFORMED ON: ABNORMAL
PERFORMED ON: ABNORMAL
POTASSIUM SERPL-SCNC: 4 MEQ/L (ref 3.4–4.9)
PROTHROMBIN TIME: 25.1 SEC (ref 9–11.5)
SODIUM BLD-SCNC: 134 MEQ/L (ref 135–144)

## 2019-05-27 PROCEDURE — 85610 PROTHROMBIN TIME: CPT

## 2019-05-27 PROCEDURE — 36415 COLL VENOUS BLD VENIPUNCTURE: CPT

## 2019-05-27 PROCEDURE — 93005 ELECTROCARDIOGRAM TRACING: CPT | Performed by: INTERNAL MEDICINE

## 2019-05-27 PROCEDURE — 94640 AIRWAY INHALATION TREATMENT: CPT

## 2019-05-27 PROCEDURE — 2700000000 HC OXYGEN THERAPY PER DAY

## 2019-05-27 PROCEDURE — 2580000003 HC RX 258: Performed by: INTERNAL MEDICINE

## 2019-05-27 PROCEDURE — 6370000000 HC RX 637 (ALT 250 FOR IP): Performed by: INTERNAL MEDICINE

## 2019-05-27 PROCEDURE — 80048 BASIC METABOLIC PNL TOTAL CA: CPT

## 2019-05-27 RX ORDER — WARFARIN SODIUM 7.5 MG/1
TABLET ORAL
Qty: 30 TABLET | Refills: 3 | Status: SHIPPED | OUTPATIENT
Start: 2019-05-27 | End: 2021-01-26

## 2019-05-27 RX ORDER — AMIODARONE HYDROCHLORIDE 200 MG/1
200 TABLET ORAL DAILY
Qty: 30 TABLET | Refills: 3 | Status: SHIPPED | OUTPATIENT
Start: 2019-05-27 | End: 2019-05-27 | Stop reason: SDUPTHER

## 2019-05-27 RX ADMIN — METOPROLOL SUCCINATE 25 MG: 25 TABLET, EXTENDED RELEASE ORAL at 08:27

## 2019-05-27 RX ADMIN — METOLAZONE 2.5 MG: 2.5 TABLET ORAL at 08:27

## 2019-05-27 RX ADMIN — TRIAMCINOLONE ACETONIDE: 1 CREAM TOPICAL at 08:32

## 2019-05-27 RX ADMIN — SACUBITRIL AND VALSARTAN 1 TABLET: 24; 26 TABLET, FILM COATED ORAL at 08:27

## 2019-05-27 RX ADMIN — POTASSIUM CHLORIDE 10 MEQ: 750 TABLET, FILM COATED, EXTENDED RELEASE ORAL at 08:26

## 2019-05-27 RX ADMIN — PANTOPRAZOLE SODIUM 40 MG: 40 TABLET, DELAYED RELEASE ORAL at 06:00

## 2019-05-27 RX ADMIN — ALLOPURINOL 100 MG: 100 TABLET ORAL at 08:27

## 2019-05-27 RX ADMIN — Medication 10 ML: at 08:31

## 2019-05-27 RX ADMIN — CLOPIDOGREL BISULFATE 75 MG: 75 TABLET ORAL at 08:27

## 2019-05-27 RX ADMIN — Medication: at 08:28

## 2019-05-27 RX ADMIN — WARFARIN SODIUM 7.5 MG: 2.5 TABLET ORAL at 15:35

## 2019-05-27 RX ADMIN — FUROSEMIDE 40 MG: 40 TABLET ORAL at 08:26

## 2019-05-27 RX ADMIN — MOMETASONE FUROATE AND FORMOTEROL FUMARATE DIHYDRATE 2 PUFF: 200; 5 AEROSOL RESPIRATORY (INHALATION) at 07:31

## 2019-05-27 RX ADMIN — AMIODARONE HYDROCHLORIDE 200 MG: 200 TABLET ORAL at 08:27

## 2019-05-27 ASSESSMENT — PAIN SCALES - GENERAL: PAINLEVEL_OUTOF10: 0

## 2019-05-27 NOTE — PROGRESS NOTES
Nutrition Education    Type and Reason for Visit: Consult, Patient Education(CHF diet teaching)    Pt stated that he adds some salt in cooking and at the table and eats high sodium foods, per conversation. Pt agreeable to diet information. · Verbally reviewed following information with patient:[facility] Low Sodium Dietary Guidelines  · Written educational materials provided. · Contact name and number provided. · Refer to Patient Education activity for more details.     Electronically signed by Kecia Quarles RD, LD on 5/27/19 at 11:28 AM

## 2019-05-27 NOTE — DISCHARGE SUMMARY
since admission with no more ICD discharges. QTC on today's EKG is about 500 ms. Patient has a biventricular pacemaker. PHYSICAL EXAM  Laying flat in bed without shortness of breath lungs clear with distant breath sounds heart sounds are regular with soft S3 gallop abdomen soft no peripheral edema weight 190 pounds which is close to his dry weight. Physical Exam     LAB:  Recent Labs     05/25/19  0059 05/26/19  0618   WBC 6.0 4.4*   HGB 13.6* 12.6*   HCT 40.0* 37.7*    193     Recent Labs     05/25/19  0143 05/26/19  0618 05/27/19  0617   * 128* 134*   K 4.0 4.0 4.0   CL 92* 93* 97   CO2 22 23 24   BUN 42* 44* 43*   CREATININE 1.73* 1.51* 1.48*   CALCIUM 9.4 9.0 9.4     Recent Labs     05/25/19  0143   AST 22   ALT 23   BILITOT 2.2*   ALKPHOS 192*     Recent Labs     05/25/19  0059 05/26/19  0618 05/27/19  0617   INR 1.9 2.1 2.6     Recent Labs     05/25/19  0059 05/25/19  0707 05/25/19  0932   TROPONINI 0.032* 0.077* 0.084*     @LABRCNT(FREE T4:3, TSH:3)  @LABRCNT(LIPID:3) Invalid input(s): BNP;3    Imaging Studies:    Xr Chest Portable    Result Date: 5/25/2019  XR CHEST PORTABLE : 5/25/2019 CLINICAL HISTORY: Chest pain after AICD firing. COMPARISON: Portable chest and chest CTA 1/20/2019. TECHNIQUE: A portable upright AP radiograph of the chest was obtained. FINDINGS: Mild vascular congestion and predominantly interstitial infiltrates of the mid to lower lung fields is consistent with mild cardiac decompensation. The heart remains moderately enlarged, exaggerated by technique. Postoperative changes from previous CABG and a left subclavian AICD are again noted. There is no dense consolidation, significant pleural effusion, pneumothorax, or other significant changes identified. PROBABLE MILD TO MODERATE CARDIAC DECOMPENSATION. NO SIGNIFICANT PLEURAL EFFUSION OR FOCAL CONSOLIDATION.        Discharge Medications:  Current Discharge Medication List           Details   amiodarone (CORDARONE) 200 MG tablet Take 1 tablet by mouth daily  Qty: 30 tablet, Refills: 3              Details   insulin 70-30 (NOVOLIN 70/30) (70-30) 100 UNIT per ML injection vial INJECT 10 UNITS TWICE A DAY  Qty: 80 mL, Refills: 3    Associated Diagnoses: Uncontrolled type 2 diabetes mellitus with complication, with long-term current use of insulin (HCC)              Details   montelukast (SINGULAIR) 10 MG tablet TAKE 1 TABLET BY MOUTH DAILY AT BEDTIME  Refills: 0      metolazone (ZAROXOLYN) 2.5 MG tablet Take 2.5 mg by mouth daily      warfarin (COUMADIN) 5 MG tablet Take as directed by [facility] AT [facility]. Quantity equals 90 day supply. Qty: 120 tablet, Refills: 1      allopurinol (ZYLOPRIM) 100 MG tablet TAKE 1 TABLET DAILY  Qty: 90 tablet, Refills: 0      metoprolol succinate (TOPROL XL) 25 MG extended release tablet Take 25 mg by mouth 2 times daily       furosemide (LASIX) 40 MG tablet Take 1 tablet by mouth daily  Qty: 90 tablet, Refills: 1      COLCRYS 0.6 MG tablet TAKE 1 TABLET DAILY (NEEDS FOLLOW UP PRIOR TO ANY MORE REFILLS)  Qty: 90 tablet, Refills: 0      Oxygen Concentrator Oxi Go POC  Qty: 1 each, Refills: 0      potassium chloride (KLOR-CON M) 20 MEQ extended release tablet Take 1 tablet by mouth 2 times daily  Qty: 60 tablet, Refills: 3      budesonide-formoterol (SYMBICORT) 160-4.5 MCG/ACT AERO Inhale 2 puffs into the lungs 2 times daily  Qty: 3 Inhaler, Refills: 3      pantoprazole (PROTONIX) 40 MG tablet Take 1 tablet by mouth every morning (before breakfast)  Qty: 30 tablet, Refills: 3      ENTRESTO 24-26 MG per tablet Take 1 tablet by mouth 2 times daily   Refills: 11      clopidogrel (PLAVIX) 75 MG tablet Take 75 mg by mouth daily      DIGITEK 125 MCG tablet TAKE 1 TABLET DAILY  Qty: 90 tablet, Refills: 3      gabapentin (NEURONTIN) 300 MG capsule Take 1 capsule by mouth 3 times daily for 30 days. Elkin Reusin capsule, Refills: 3      BD INSULIN SYRINGE ULTRAFINE 31G X 5/16\" 0.5 ML MISC USE TWICE A DAY  Qty: 300 each, Refills: 0      albuterol (PROVENTIL) (2.5 MG/3ML) 0.083% nebulizer solution Take 3 mLs by nebulization every 6 hours as needed for Wheezing  Qty: 120 each, Refills: 5    Associated Diagnoses: Chronic obstructive pulmonary disease, unspecified COPD type (HCC)      nitroGLYCERIN (NITROSTAT) 0.4 MG SL tablet Place 1 tablet under the tongue every 5 minutes as needed for Chest pain  Qty: 25 tablet, Refills: 0      albuterol (PROAIR HFA) 108 (90 BASE) MCG/ACT inhaler Inhale 2 puffs into the lungs every 4 hours as needed for Wheezing  Qty: 3 Inhaler, Refills: 0    Associated Diagnoses: COPD (chronic obstructive pulmonary disease) (HCC)           Patient's Coumadin levels are to be checked by Coumadin clinic. Per pharmacy patient is going home on 7.5 mg today 7.5 mg tomorrow PT and INR checked on 5/29/19. I have discontinued his antibiotics and his statin, because his liver enzymes may be affected, in the setting of amiodarone use, and he is LDL is way below target. Will need digoxin level checked in 1 week along with SGOT SGPT alkaline phosphatase and basic metabolic profile. Discharge Weight 1901bs  Weight change:      Disposition: Home    Follow up [facility] 1 week EP FU 2 weeks    Follow up labs : BMP in one week,SGOT/PT/Dig level,Alk Phos in 1 week    Follow up with other consultant physicians at their directions. Condition at discharge: Pt was medically stable at the time of discharge. He is however at high risk of decompensation, and high readmission risk  . Needs follow-up closely from cardiology and endocrinology. His alkaline phosphatase is elevated, and this needs to be followed as well. Activity: activity as tolerated    Total time taken for discharging this patient: 60 minutes. Greater than 70% of time was spent focused exclusively on this patient.  Time was taken to review chart, discuss plans with consultants, reconciling medications, discussing plan answering

## 2019-05-27 NOTE — CARE COORDINATION
PT NOW ON AMIO PO.  PT FOR PM INTERROGATION TOMORROW.  ENDOCRINE ON FOR TSH 10.61  PT UP IN ROOM INDEPENDENTLY, NO NEED FOR PT/OT EVALS. DC PLAN REMAINS HOME.

## 2019-05-27 NOTE — PROGRESS NOTES
520msec    Assessment:       Chronic decompensated biventricular class III systolic congestive heart failure. Sustained ventricular tachycardia with life-saving shock therapy. Chronic kidney disease stage III  History of atrial fibrillation, on high risk medication warfarin. History of treatment with high risk medication amiodarone with subsequent hyperthyroidism, for which patient is followed by endocrinology, and amiodarone had to be held. Patient is now back on amiodarone. Extensive pruritus and rash, felt to be related to PTU, patient had stopped this medication. Chronic kidney disease stage III  High risk of decompensation due to multiple comorbidities and underlying severe LV systolic dysfunction  History of elevated liver enzymes currently normal.  Biventricular ICD in place, currently pacing  the ventricle around 90% of the time. History of remote coronary artery bypass grafting. Last cardiac catheterization:8/15 with GAYLE to LAD via LIMA   Poor prognosis with high risk of decompensation and recurrent hospital admissions. Have been following very closely.           Plan:   DC IV Amiodarone  Start PO Amiodarone 200mg bid with daily EKG  There is already some QT prolongation  Appreciate EP and Endocrinology input  Device interrogation Tuesday to distinguish if arrhythmia was afib with RVR or VT or both  INR now therapeutic. I will DC statins and Vibramycin. ? ?AV sohan ablation and continued BIV pacing if arrhythmia was afib with RVR ,given hyperthyroidism,Will discuss with EP     Patient wants to go home today. No more ectopy  Call out to  . Call out to [facility] reconciled. Will Dc with close OP FU.   Electronically signed by Maxx Brown MD on 5/27/2019 at 10:53 AM

## 2019-05-28 ENCOUNTER — CARE COORDINATION (OUTPATIENT)
Dept: CASE MANAGEMENT | Age: 69
End: 2019-05-28

## 2019-05-28 PROCEDURE — 93010 ELECTROCARDIOGRAM REPORT: CPT | Performed by: INTERNAL MEDICINE

## 2019-05-29 ENCOUNTER — HOSPITAL ENCOUNTER (OUTPATIENT)
Dept: PHARMACY | Age: 69
Setting detail: THERAPIES SERIES
Discharge: HOME | End: 2019-05-29
Payer: MEDICARE

## 2019-05-29 DIAGNOSIS — I48.0 PAROXYSMAL A-FIB (HCC): ICD-10-CM

## 2019-05-29 PROCEDURE — 99211 OFF/OP EST MAY X REQ PHY/QHP: CPT

## 2019-05-29 PROCEDURE — 85610 PROTHROMBIN TIME: CPT

## 2019-05-29 NOTE — PROGRESS NOTES
Mr. Chantel Johnson is a 81 y.o. y/o male with history of Afib who presents today for anticoagulation monitoring and adjustment. INR 2.5 is therapeutic for this patient (goal range 2-3) and is reflective of 60 mg TWD  Patient verifies current dosing regimen, patient able to verbally recall dose  Patient reports 0  missed doses since last INR   Patient denies s/sx clotting and/or stroke  Patient denies hematuria, epistaxis, rectal bleeding  Patient denies changes in diet, alcohol, or tobacco use    Reviewed medication list and drug allergies with patient, updated any medication additions or modifications accordingly - patient started amiodarone therapy on 5/27/19 after being off for 8 months- possibly due to severe itching- patient restarted amiodarone therapy at 200mg PO BID x 10 days, then 200mg daily thereafter; patient off PTU therapy at this time, but will need to re-start if patient becomes hyperthyroid     Patient also denies any pending medical or dental procedures scheduled at this time    Patient was in the hospital from 5/25/19 - 5/27/19 for arrhythmias     Patient was instructed to decrease to 57.5mg TWD ( a 4.2% decrease) and RTC 10-14 days due to amiodarone re-initiation. Stressed the importance of patient attending his appointments during this transition.      Nancy Cam, PharmD   5/29/2019 9:43 AM

## 2019-06-04 ENCOUNTER — TELEPHONE (OUTPATIENT)
Dept: PHARMACY | Facility: CLINIC | Age: 69
End: 2019-06-04

## 2019-06-04 NOTE — TELEPHONE ENCOUNTER
CLINICAL PHARMACY NOTE  Post-Discharge Transitions of Care (RANDY)    Patient was discharged from [facility] on 5/27/19. Noted RNCTC was unable to reach patient after multiple attempts. Appears he did follow-up at [facility], but has changed TCM appt to 6/26. Attempted to reach patient to review medications; left message on home & mobile #s asking for return call. Will continue to attempt to contact as appropriate.       Bhavik Milligan, PharmD, [address] [facility]  Direct: [phone]  Department, toll free: [phone], option 7

## 2019-06-04 NOTE — LETTER
[facility]  [address], [address]        Srini Montiel   [address]           06/06/19     Dear Srini Montiel,    We tried to reach you recently, after your hospital stay, to review your medications. I was unable to reach you on the telephone. We understand that medications can be confusing and it is important to discuss your medications after a hospital stay. Please call us at [phone] option 7 to discuss your medications.          Sincerely,   SHANNON Shellye Nageotte, PharmD, [address]  Phone: [phone], option 7

## 2019-06-11 RX ORDER — PROPYLTHIOURACIL 50 MG/1
TABLET ORAL
Qty: 180 TABLET | Refills: 3 | Status: SHIPPED | OUTPATIENT
Start: 2019-06-11 | End: 2019-06-25

## 2019-06-12 ENCOUNTER — HOSPITAL ENCOUNTER (OUTPATIENT)
Dept: PHARMACY | Age: 69
Setting detail: THERAPIES SERIES
Discharge: HOME | End: 2019-06-12
Payer: MEDICARE

## 2019-06-12 DIAGNOSIS — I48.0 PAROXYSMAL A-FIB (HCC): ICD-10-CM

## 2019-06-12 LAB
ALBUMIN SERPL-MCNC: 4.3 G/DL (ref 3.5–4.6)
ALP BLD-CCNC: 185 U/L (ref 35–104)
ALT SERPL-CCNC: 54 U/L (ref 0–41)
ANION GAP SERPL CALCULATED.3IONS-SCNC: 15 MEQ/L (ref 9–15)
AST SERPL-CCNC: 36 U/L (ref 0–40)
BILIRUB SERPL-MCNC: 2.3 MG/DL (ref 0.2–0.7)
BUN BLDV-MCNC: 38 MG/DL (ref 8–23)
CALCIUM SERPL-MCNC: 9.4 MG/DL (ref 8.5–9.9)
CHLORIDE BLD-SCNC: 96 MEQ/L (ref 95–107)
CO2: 24 MEQ/L (ref 20–31)
CREAT SERPL-MCNC: 1.82 MG/DL (ref 0.7–1.2)
GFR AFRICAN AMERICAN: 45.2
GFR NON-AFRICAN AMERICAN: 37.3
GLOBULIN: 3.3 G/DL (ref 2.3–3.5)
GLUCOSE BLD-MCNC: 120 MG/DL (ref 70–99)
INTERNATIONAL NORMALIZATION RATIO, POC: 2
POTASSIUM SERPL-SCNC: 4.1 MEQ/L (ref 3.4–4.9)
SODIUM BLD-SCNC: 135 MEQ/L (ref 135–144)
TOTAL PROTEIN: 7.6 G/DL (ref 6.3–8)

## 2019-06-12 PROCEDURE — 99211 OFF/OP EST MAY X REQ PHY/QHP: CPT

## 2019-06-12 PROCEDURE — 85610 PROTHROMBIN TIME: CPT

## 2019-06-12 NOTE — PROGRESS NOTES
Mr. Raj John is a 81 y.o. y/o male with history of Afib who presents today for anticoagulation monitoring and adjustment. INR 2.0 is therapeutic for this patient (goal range 2-3) and is reflective of 57.5 mg TWD  Patient verifies current dosing regimen, patient able to verbally recall dose. Patient did state he lost warfarin dosing instructions temporarily, so his doses may have been off. Patient reports no  missed doses since last INR   Patient denies s/sx clotting and/or stroke  Patient denies hematuria, epistaxis, rectal bleeding  Patient denies changes in diet, alcohol, or tobacco use  Reviewed medication list and drug allergies with patient, updated any medication additions or modifications accordingly  Patient also denies any pending medical or dental procedures scheduled at this time  Patient was instructed to continue warfarin at 57.5mg and RTC in 13 days.

## 2019-06-13 NOTE — TELEPHONE ENCOUNTER
Left another message asking for return call. Did attend anticoag appt yesterday and still has PCP/TCM appt scheduled for 6/26.

## 2019-06-19 NOTE — TELEPHONE ENCOUNTER
No return call and no answer; did not leave message for the 4th time. Letter already sent and PCP appt scheduled 6/26 as below.     No further outreach at this time.    =======================================================   For Pharmacy Admin Tracking Only    TCM Call Made?: Yes  [facility] ([facility] Select Patient?: Yes  Outreach Status: Patient Unreachable  Care Coordinator Outreach to Patient?: Yes  Time Spent (min): 10

## 2019-06-25 ENCOUNTER — HOSPITAL ENCOUNTER (OUTPATIENT)
Dept: PHARMACY | Age: 69
Setting detail: THERAPIES SERIES
Discharge: HOME | End: 2019-06-25
Payer: MEDICARE

## 2019-06-25 ENCOUNTER — OFFICE VISIT (OUTPATIENT)
Dept: ENDOCRINOLOGY | Age: 69
End: 2019-06-25
Payer: MEDICARE

## 2019-06-25 VITALS
HEIGHT: 71 IN | WEIGHT: 193 LBS | DIASTOLIC BLOOD PRESSURE: 61 MMHG | SYSTOLIC BLOOD PRESSURE: 95 MMHG | BODY MASS INDEX: 27.02 KG/M2 | HEART RATE: 77 BPM

## 2019-06-25 DIAGNOSIS — I48.0 PAROXYSMAL A-FIB (HCC): ICD-10-CM

## 2019-06-25 DIAGNOSIS — E03.9 HYPOTHYROIDISM, UNSPECIFIED TYPE: ICD-10-CM

## 2019-06-25 LAB
CHP ED QC CHECK: NORMAL
GLUCOSE BLD-MCNC: 159 MG/DL
INTERNATIONAL NORMALIZATION RATIO, POC: 1.7

## 2019-06-25 PROCEDURE — 99211 OFF/OP EST MAY X REQ PHY/QHP: CPT | Performed by: PHARMACIST

## 2019-06-25 PROCEDURE — 99213 OFFICE O/P EST LOW 20 MIN: CPT | Performed by: INTERNAL MEDICINE

## 2019-06-25 PROCEDURE — 85610 PROTHROMBIN TIME: CPT | Performed by: PHARMACIST

## 2019-06-25 PROCEDURE — 82962 GLUCOSE BLOOD TEST: CPT | Performed by: INTERNAL MEDICINE

## 2019-06-25 RX ORDER — ATORVASTATIN CALCIUM 80 MG/1
80 TABLET, FILM COATED ORAL DAILY
COMMUNITY

## 2019-06-25 RX ORDER — AMIODARONE HYDROCHLORIDE 200 MG/1
200 TABLET ORAL DAILY
COMMUNITY
End: 2019-11-01 | Stop reason: ALTCHOICE

## 2019-06-25 RX ORDER — GABAPENTIN 300 MG/1
300 CAPSULE ORAL 3 TIMES DAILY
Qty: 270 CAPSULE | Refills: 3 | Status: SHIPPED | OUTPATIENT
Start: 2019-06-25 | End: 2019-11-26

## 2019-06-25 NOTE — PROGRESS NOTES
Subjective:      Patient ID: Eric Ball is a 81 y.o. male. 4-week a hospital follow-up on diabetes hypothyroidism  Diabetes   He presents for his follow-up diabetic visit. He has type 2 diabetes mellitus. Diabetic complications include heart disease and peripheral neuropathy. Current diabetic treatment includes insulin injections. He is currently taking insulin pre-breakfast and pre-dinner. His overall blood glucose range is 180-200 mg/dl. (Lab Results       Component                Value               Date                       LABA1C                   8.1 (H)             05/26/2019              )     Patient had hyperthyroidism from amiodarone was off of it now he is back on amiodarone his last TSH was slightly elevated patient was not started on Synthroid treatment    Results for Gloria Tan (MRN [id number]) as of 6/25/2019 11:12   Ref. Range 6/12/2019 11:18   T3, Free Latest Ref Range: 2.0 - 4.4 pg/mL 2.6   TSH Latest Ref Range: 0.440 - 3.860 uIU/mL 8.780 (H)   T4 Free Latest Ref Range: 0.84 - 1.68 ng/dL 1.20     Results for Gloria Tan (MRN [id number]) as of 6/25/2019 11:12   Ref.  Range 12/26/2018 12:52 2/1/2019 10:29 2/25/2019 13:56 5/26/2019 06:18 6/12/2019 11:18   TSH Latest Ref Range: 0.440 - 3.860 uIU/mL <0.010 (L) <0.010 (L) 0.038 (L) 10.610 (H) 8.780 (H)       Complains of fatigue dry skin and brittle nails    Patient Active Problem List   Diagnosis    Uncontrolled type 2 diabetes mellitus with complication, with long-term current use of insulin (HCC)    CAD (coronary artery disease)    Noncompliance    Pain in joint, multiple sites    COPD (chronic obstructive pulmonary disease) ([address]    Diabetes mellitus with insulin therapy ([address]    Hyperlipidemia    Hypertension    Generalized atherosclerosis    TIA (transient ischemic attack)    Elevation of level of transaminase or lactic acid dehydrogenase (LDH)    Tobacco dependence syndrome    Disorder of muscle    Mitral valve insufficiency    Acute lymphadenitis    Disorder of pancreas    Left heart failure (HCC)    Irritable bowel syndrome    Hyponatremia    Atherosclerosis of coronary artery    Generalized ischemic myocardial dysfunction    Coronary arteriosclerosis in native artery    Atrial flutter (HCC)    Acute on chronic systolic CHF (congestive heart failure), NYHA class 4 (HCC)    Paroxysmal A-fib (HCC)    Chronic combined systolic and diastolic heart failure (HCC)    Hemoptysis    SOB (shortness of breath)    CHF (congestive heart failure), NYHA class III, chronic, combined (HCC)    CHF (congestive heart failure), NYHA class I, acute on chronic, combined (HCC)    Acute systolic CHF (congestive heart failure) (HCC)    CHF (congestive heart failure), NYHA class IV, acute on chronic, combined (HCC)    Phimosis/redundant prepuce    Heart failure (HCC)    Moderate malnutrition (HCC)    Amiodarone-induced hyperthyroidism    Hyperthyroidism    Uncontrolled type 2 diabetes mellitus with hyperglycemia (HCC)    BERTRAM (acute kidney injury) ([address]    Anticoagulant long-term use    Blurred vision, bilateral    Cardiomyopathy ([address]    Chest pain    Cough in adult    Dizziness    Fatigue    Fever    Former smoker    Gout, arthritis    High risk medication use    Hyperkalemia    Hypokalemia    Hypotension (arterial)    ICD (implantable cardioverter-defibrillator) discharge    Obese    Overweight    Paroxysmal ventricular tachycardia (HCC)    Presence of automatic implantable cardioverter-defibrillator    Status post angioplasty    Swelling of multiple joints    Hypoxia    Ventricular tachycardia (HCC)    Sustained VT (ventricular tachycardia) (HCC)     Allergies   Allergen Reactions    Dye [Iodides] Shortness Of Breath    Penicillins Anaphylaxis    Tapazole [Methimazole]      Itching        Current Outpatient Medications:     amiodarone (CORDARONE) 200 MG tablet, Take 200 mg by mouth daily, Disp: , Rfl:     atorvastatin (LIPITOR) 80 MG tablet, Take 80 mg by mouth daily, Disp: , Rfl:     gabapentin (NEURONTIN) 300 MG capsule, Take 1 capsule by mouth 3 times daily for 30 days. , Disp: 270 capsule, Rfl: 3    insulin 70-30 (NOVOLIN 70/30) (70-30) 100 UNIT per ML injection vial, INJECT 10 UNITS TWICE A DAY, Disp: 80 mL, Rfl: 3    montelukast (SINGULAIR) 10 MG tablet, TAKE 1 TABLET BY MOUTH DAILY AT BEDTIME, Disp: , Rfl: 0    metolazone (ZAROXOLYN) 2.5 MG tablet, Take 2.5 mg by mouth daily, Disp: , Rfl:     warfarin (COUMADIN) 5 MG tablet, Take as directed by [facility] AT [facility]. Quantity equals 90 day supply. , Disp: 120 tablet, Rfl: 1    allopurinol (ZYLOPRIM) 100 MG tablet, TAKE 1 TABLET DAILY, Disp: 90 tablet, Rfl: 0    metoprolol succinate (TOPROL XL) 25 MG extended release tablet, Take 25 mg by mouth 2 times daily , Disp: , Rfl:     furosemide (LASIX) 40 MG tablet, Take 1 tablet by mouth daily (Patient taking differently: Take 40 mg by mouth Take 1 tab in the AM & 1/2 tab 2:00pm), Disp: 90 tablet, Rfl: 1    BD INSULIN SYRINGE ULTRAFINE 31G X 5/16\" 0.5 ML MISC, USE TWICE A DAY, Disp: 300 each, Rfl: 0    COLCRYS 0.6 MG tablet, TAKE 1 TABLET DAILY (NEEDS FOLLOW UP PRIOR TO ANY MORE REFILLS), Disp: 90 tablet, Rfl: 0    Oxygen Concentrator, Oxi Go POC, Disp: 1 each, Rfl: 0    potassium chloride (KLOR-CON M) 20 MEQ extended release tablet, Take 1 tablet by mouth 2 times daily (Patient taking differently: Take 20 mEq by mouth Take 1/2 tab BID), Disp: 60 tablet, Rfl: 3    budesonide-formoterol (SYMBICORT) 160-4.5 MCG/ACT AERO, Inhale 2 puffs into the lungs 2 times daily, Disp: 3 Inhaler, Rfl: 3    albuterol (PROVENTIL) (2.5 MG/3ML) 0.083% nebulizer solution, Take 3 mLs by nebulization every 6 hours as needed for Wheezing, Disp: 120 each, Rfl: 5    nitroGLYCERIN (NITROSTAT) 0.4 MG SL tablet, Place 1 tablet under the tongue every 5 minutes as needed for Chest pain, Disp: TSH without Reflex           Plan:      Orders Placed This Encounter   Procedures    Basic Metabolic Panel     Standing Status:   Future     Standing Expiration Date:   6/25/2020    Hemoglobin A1C     Standing Status:   Future     Standing Expiration Date:   6/25/2020    Microalbumin / Creatinine Urine Ratio     Standing Status:   Future     Standing Expiration Date:   6/25/2020    T4, Free     Standing Status:   Future     Standing Expiration Date:   6/25/2020    TSH without Reflex     Standing Status:   Future     Standing Expiration Date:   6/25/2020    POCT Glucose     Continue Novolin 70/30 10 units twice a day would monitor thyroid function  Orders Placed This Encounter   Medications    gabapentin (NEURONTIN) 300 MG capsule     Sig: Take 1 capsule by mouth 3 times daily for 30 days.      Dispense:  270 capsule     Refill:  3             Sena Sweeney MD

## 2019-06-25 NOTE — PROGRESS NOTES
Mr. Ashanti Curry is a 81 y.o. y/o male with history of Afib who presents today for anticoagulation monitoring and adjustment. INR 1.7 is subtherapeutic for this patient (goal range 2.0-3.0) and is reflective of 57.5 mg TWD  Patient verifies current dosing regimen, patient able to verbally recall dose  Patient reports 0  missed doses since last INR   Patient denies s/sx clotting and/or stroke  Patient denies hematuria, epistaxis, rectal bleeding  Patient denies changes in diet, alcohol, or tobacco use    Patient brought all of his medications in and medications were reviewed by the pharmacist. The patient had both 5 mg and 7.5 mg warfarin tablets but states that he only uses the 5 mg tablets. Patient was instructed not to take the 7.5 mg tablets and the prescription bottle was marked. Patient states that he will be discussing his medications with his primary provider because he states he is confused on what he should be taking. Patient states that he is still taking amiodarone as directed by the provider.      Reviewed medication list and drug allergies with patient, updated any medication additions or modifications accordingly  Patient also denies any pending medical or dental procedures scheduled at this time  Patient was instructed to increase TWD to 60mg (4.3%) and RTC 2 weeks

## 2019-07-10 ENCOUNTER — HOSPITAL ENCOUNTER (OUTPATIENT)
Dept: PHARMACY | Age: 69
Setting detail: THERAPIES SERIES
Discharge: HOME | End: 2019-07-10
Payer: MEDICARE

## 2019-07-10 DIAGNOSIS — I48.0 PAROXYSMAL A-FIB (HCC): ICD-10-CM

## 2019-07-10 LAB — INTERNATIONAL NORMALIZATION RATIO, POC: 3

## 2019-07-10 PROCEDURE — 99211 OFF/OP EST MAY X REQ PHY/QHP: CPT

## 2019-07-10 PROCEDURE — 85610 PROTHROMBIN TIME: CPT

## 2019-07-12 ENCOUNTER — OFFICE VISIT (OUTPATIENT)
Dept: FAMILY MEDICINE CLINIC | Age: 69
End: 2019-07-12
Payer: MEDICARE

## 2019-07-12 VITALS
WEIGHT: 203 LBS | DIASTOLIC BLOOD PRESSURE: 60 MMHG | SYSTOLIC BLOOD PRESSURE: 96 MMHG | HEART RATE: 102 BPM | BODY MASS INDEX: 28.42 KG/M2 | OXYGEN SATURATION: 97 % | HEIGHT: 71 IN | RESPIRATION RATE: 13 BRPM | TEMPERATURE: 98 F

## 2019-07-12 DIAGNOSIS — R22.0 RIGHT FACIAL SWELLING: Primary | ICD-10-CM

## 2019-07-12 DIAGNOSIS — R22.0 FACIAL MASS: ICD-10-CM

## 2019-07-12 PROCEDURE — 99213 OFFICE O/P EST LOW 20 MIN: CPT | Performed by: FAMILY MEDICINE

## 2019-07-12 RX ORDER — DOXYCYCLINE HYCLATE 100 MG
100 TABLET ORAL 2 TIMES DAILY
Qty: 20 TABLET | Refills: 0 | Status: SHIPPED | OUTPATIENT
Start: 2019-07-12 | End: 2019-07-22

## 2019-07-12 RX ORDER — PROPYLTHIOURACIL 50 MG/1
50 TABLET ORAL 3 TIMES DAILY
COMMUNITY
End: 2019-11-01 | Stop reason: ALTCHOICE

## 2019-07-12 ASSESSMENT — ENCOUNTER SYMPTOMS
FACIAL SWELLING: 1
VOICE CHANGE: 0
SORE THROAT: 0
TROUBLE SWALLOWING: 0

## 2019-07-12 NOTE — PROGRESS NOTES
Subjective:      Patient ID: Dima Allen is a 81 y.o. male    HPIpatient here in follow up from recent hospital stay in may. Did have multiple medication adjustments thru specialists. Not sure of some of doses of what he should be taking ? Here with 3 weeks hx of right side facial swelling. Does feel painful at times. No ear drainage or ear pain. No fever. No injury. No mouth pain. Review of Systems   Constitutional: Negative for chills. HENT: Positive for facial swelling. Negative for sore throat, trouble swallowing and voice change. Skin: Negative for rash.      Reviewed allergy, medical, social, surgical, family and med list changes and updated   Files     Social History     Socioeconomic History    Marital status:      Spouse name: None    Number of children: None    Years of education: None    Highest education level: None   Occupational History    None   Social Needs    Financial resource strain: None    Food insecurity:     Worry: None     Inability: None    Transportation needs:     Medical: None     Non-medical: None   Tobacco Use    Smoking status: Former Smoker     Packs/day: 1.50     Years: 25.00     Pack years: 37.50     Last attempt to quit: 2012     Years since quittin.5    Smokeless tobacco: Never Used   Substance and Sexual Activity    Alcohol use: No    Drug use: No    Sexual activity: None   Lifestyle    Physical activity:     Days per week: None     Minutes per session: None    Stress: None   Relationships    Social connections:     Talks on phone: None     Gets together: None     Attends Protestant service: None     Active member of club or organization: None     Attends meetings of clubs or organizations: None     Relationship status: None    Intimate partner violence:     Fear of current or ex partner: None     Emotionally abused: None     Physically abused: None     Forced sexual activity: None   Other Topics Concern    None   Social History TAKE 1 TABLET DAILY 90 tablet 3    albuterol (PROAIR HFA) 108 (90 BASE) MCG/ACT inhaler Inhale 2 puffs into the lungs every 4 hours as needed for Wheezing 3 Inhaler 0    gabapentin (NEURONTIN) 300 MG capsule Take 1 capsule by mouth 3 times daily for 30 days. 270 capsule 3    CORDARONE 200 MG tablet Take 1 tablet by mouth 2 times daily for 10 doses After 6/1/19,decrease Amiodarone to 200mg daily 40 tablet 3    warfarin (COUMADIN) 7.5 MG tablet 7.5mg daily 30 tablet 3    montelukast (SINGULAIR) 10 MG tablet TAKE 1 TABLET BY MOUTH DAILY AT BEDTIME  0    COLCRYS 0.6 MG tablet TAKE 1 TABLET DAILY (NEEDS FOLLOW UP PRIOR TO ANY MORE REFILLS) 90 tablet 0     No current facility-administered medications for this visit. Family History   Problem Relation Age of Onset   [facility] Cancer Brother     Diabetes Mother     Heart Disease Father     Emphysema Father      Past Medical History:   Diagnosis Date    Arthritis     CAD (coronary artery disease)     [facility] [address] [facility]     COPD [facility]chronic obstructive pulmonary disease) [facility] [address] [facility]     Defibrillator activation     Diabetes mellitus with insulin therapy ([facility] [address] [facility]     Generalized and unspecified atherosclerosis     Gout     Hyperlipidemia     Hypertension     Lung disease     Pain in joint, multiple sites     Prolonged emergence from general anesthesia     Status post angioplasty     TIA (transient ischemic attack)     Tobacco abuse     Type II or unspecified type diabetes mellitus without mention of complication, not stated as uncontrolled      Objective:   BP 96/60   Pulse 102   Temp 98 °F (36.7 °C)   Resp 13   Ht 5' 11\" (1.803 m)   Wt 203 lb (92.1 kg)   SpO2 97%   BMI 28.31 kg/m²     Physical Exam   HENT:   Head:       Area of mild swelling and tenderness which is quarter sized and well localized   No fluctuance    Heent: T.M normal bilat no pharyngeal injection.   No exudate-wears upper and lower dentures                 Nares patent   Neck: No anter

## 2019-07-23 ENCOUNTER — CARE COORDINATION (OUTPATIENT)
Dept: CARE COORDINATION | Age: 69
End: 2019-07-23

## 2019-07-24 ENCOUNTER — HOSPITAL ENCOUNTER (OUTPATIENT)
Dept: PHARMACY | Age: 69
Setting detail: THERAPIES SERIES
Discharge: HOME | End: 2019-07-24
Payer: MEDICARE

## 2019-07-24 DIAGNOSIS — I48.0 PAROXYSMAL A-FIB (HCC): ICD-10-CM

## 2019-07-24 LAB — INTERNATIONAL NORMALIZATION RATIO, POC: 2.8

## 2019-07-24 PROCEDURE — 85610 PROTHROMBIN TIME: CPT

## 2019-07-24 PROCEDURE — 99211 OFF/OP EST MAY X REQ PHY/QHP: CPT

## 2019-07-31 ENCOUNTER — CARE COORDINATION (OUTPATIENT)
Dept: CARE COORDINATION | Age: 69
End: 2019-07-31

## 2019-08-07 ENCOUNTER — HOSPITAL ENCOUNTER (OUTPATIENT)
Dept: PHARMACY | Age: 69
Setting detail: THERAPIES SERIES
Discharge: HOME | End: 2019-08-07
Payer: MEDICARE

## 2019-08-07 DIAGNOSIS — I48.0 PAROXYSMAL A-FIB (HCC): ICD-10-CM

## 2019-08-07 PROCEDURE — 99211 OFF/OP EST MAY X REQ PHY/QHP: CPT

## 2019-08-07 PROCEDURE — 85610 PROTHROMBIN TIME: CPT

## 2019-08-09 ENCOUNTER — CARE COORDINATION (OUTPATIENT)
Dept: CARE COORDINATION | Age: 69
End: 2019-08-09

## 2019-08-19 ENCOUNTER — HOSPITAL ENCOUNTER (OUTPATIENT)
Dept: CARDIOLOGY | Age: 69
Discharge: HOME | End: 2019-08-19

## 2019-08-19 PROCEDURE — 93284 PRGRMG EVAL IMPLANTABLE DFB: CPT

## 2019-08-19 PROCEDURE — 93290 INTERROG DEV EVAL ICPMS IP: CPT

## 2019-08-20 ENCOUNTER — OFFICE VISIT (OUTPATIENT)
Dept: FAMILY MEDICINE CLINIC | Age: 69
End: 2019-08-20
Payer: MEDICARE

## 2019-08-20 VITALS
OXYGEN SATURATION: 98 % | BODY MASS INDEX: 27.86 KG/M2 | RESPIRATION RATE: 16 BRPM | DIASTOLIC BLOOD PRESSURE: 60 MMHG | SYSTOLIC BLOOD PRESSURE: 100 MMHG | TEMPERATURE: 96.6 F | HEIGHT: 71 IN | HEART RATE: 94 BPM | WEIGHT: 199 LBS

## 2019-08-20 DIAGNOSIS — R74.8 ELEVATED LIVER ENZYMES: Primary | ICD-10-CM

## 2019-08-20 DIAGNOSIS — R74.8 ELEVATED LIVER ENZYMES: ICD-10-CM

## 2019-08-20 LAB
ALBUMIN SERPL-MCNC: 4.2 G/DL (ref 3.5–4.6)
ALP BLD-CCNC: 176 U/L (ref 35–104)
ALT SERPL-CCNC: 15 U/L (ref 0–41)
AST SERPL-CCNC: 24 U/L (ref 0–40)
BILIRUB SERPL-MCNC: 1.2 MG/DL (ref 0.2–0.7)
BILIRUBIN DIRECT: 0.3 MG/DL (ref 0–0.4)
BILIRUBIN, INDIRECT: 0.9 MG/DL (ref 0–0.6)
TOTAL PROTEIN: 7.6 G/DL (ref 6.3–8)

## 2019-08-20 PROCEDURE — 99213 OFFICE O/P EST LOW 20 MIN: CPT | Performed by: FAMILY MEDICINE

## 2019-08-20 ASSESSMENT — ENCOUNTER SYMPTOMS: ABDOMINAL PAIN: 0

## 2019-08-20 NOTE — PROGRESS NOTES
Subjective:      Patient ID: Sanya Boateng is a 81 y.o. male    HPI  Here in follow up for elevated liver enzymes. Has had some adjustments with medications recently. Review of Systems   Constitutional: Positive for fatigue. Negative for fever. Gastrointestinal: Negative for abdominal pain. Skin: Negative for rash. Neurological: Negative for weakness.      Reviewed allergy, medical, social, surgical, family and med list changes and updated   Files     Social History     Socioeconomic History    Marital status:      Spouse name: None    Number of children: None    Years of education: None    Highest education level: None   Occupational History    None   Social Needs    Financial resource strain: None    Food insecurity:     Worry: None     Inability: None    Transportation needs:     Medical: None     Non-medical: None   Tobacco Use    Smoking status: Former Smoker     Packs/day: 1.50     Years: 25.00     Pack years: 37.50     Last attempt to quit: 2012     Years since quittin.6    Smokeless tobacco: Never Used   Substance and Sexual Activity    Alcohol use: No    Drug use: No    Sexual activity: None   Lifestyle    Physical activity:     Days per week: None     Minutes per session: None    Stress: None   Relationships    Social connections:     Talks on phone: None     Gets together: None     Attends Jewish service: None     Active member of club or organization: None     Attends meetings of clubs or organizations: None     Relationship status: None    Intimate partner violence:     Fear of current or ex partner: None     Emotionally abused: None     Physically abused: None     Forced sexual activity: None   Other Topics Concern    None   Social History Narrative    None     Current Outpatient Medications   Medication Sig Dispense Refill    propylthiouracil (PTU) 50 MG tablet Take 50 mg by mouth 3 times daily      amiodarone (CORDARONE) 200 MG tablet Take 200 mg

## 2019-08-28 ENCOUNTER — HOSPITAL ENCOUNTER (OUTPATIENT)
Dept: PHARMACY | Age: 69
Setting detail: THERAPIES SERIES
Discharge: HOME | End: 2019-08-28
Payer: MEDICARE

## 2019-08-28 DIAGNOSIS — I48.0 PAROXYSMAL A-FIB (HCC): ICD-10-CM

## 2019-08-28 LAB — INTERNATIONAL NORMALIZATION RATIO, POC: 4.5

## 2019-08-28 PROCEDURE — 99211 OFF/OP EST MAY X REQ PHY/QHP: CPT

## 2019-08-28 PROCEDURE — 85610 PROTHROMBIN TIME: CPT

## 2019-08-28 NOTE — PROGRESS NOTES
Mr. Arthurine Hatchet is a 81 y.o. y/o male with history of Afib who presents today for anticoagulation monitoring and adjustment. INR 4.5 is supra-therapeutic for this patient (goal range 2-3) and is reflective of 60 mg TWD  Patient verifies current dosing regimen, patient able to verbally recall dose  Patient reports 0  missed doses since last INR   Patient denies s/sx clotting and/or stroke  Patient denies hematuria, epistaxis, rectal bleeding  Patient denies changes in diet, alcohol, or tobacco use  Reviewed medication list and drug allergies with patient, updated any medication additions or modifications accordingly-- patient stated about 3 months ago, patient restarted amiodarone therapy (he was previously taken off amiodarone therapy as he had severe itching for several months)- amiodarone restarted at the end of May. Patient reported back and jaw, per pateint, supposed to have follow up. Patient also reported that he has been vomting for a while, however, inconsistent pattern - patient had appointment with Dr. Francisco J Leonard in which they are waiting on liver function tests (per note, may refer to GI).    Patient also denies any pending medical or dental procedures scheduled at this time  Patient was instructed to hold warfarin today only, then take 5mg (1 tablet only - normal dosage 7.5mg), then decrease to 57.5mg TWD (4.2% decrease in TWD) and RTC 10-14 days    Rosalba Esparza, PharmD   8/28/2019 9:34 AM

## 2019-09-01 ENCOUNTER — HOSPITAL ENCOUNTER (EMERGENCY)
Age: 69
Discharge: HOME | End: 2019-09-01
Attending: EMERGENCY MEDICINE
Payer: MEDICARE

## 2019-09-01 ENCOUNTER — APPOINTMENT (OUTPATIENT)
Dept: GENERAL RADIOLOGY | Age: 69
End: 2019-09-01
Payer: MEDICARE

## 2019-09-01 VITALS
DIASTOLIC BLOOD PRESSURE: 70 MMHG | HEIGHT: 71 IN | OXYGEN SATURATION: 97 % | HEART RATE: 100 BPM | RESPIRATION RATE: 18 BRPM | BODY MASS INDEX: 28.28 KG/M2 | WEIGHT: 202 LBS | SYSTOLIC BLOOD PRESSURE: 110 MMHG | TEMPERATURE: 99.1 F

## 2019-09-01 DIAGNOSIS — J44.1 COPD EXACERBATION (HCC): Primary | ICD-10-CM

## 2019-09-01 LAB
ANION GAP SERPL CALCULATED.3IONS-SCNC: 16 MEQ/L (ref 9–15)
BASE EXCESS VENOUS: -3 (ref -3–3)
BASOPHILS ABSOLUTE: 0 K/UL (ref 0–0.2)
BASOPHILS RELATIVE PERCENT: 0.5 %
BUN BLDV-MCNC: 24 MG/DL (ref 8–23)
CALCIUM IONIZED: 1.14 MMOL/L (ref 1.12–1.32)
CALCIUM SERPL-MCNC: 9.1 MG/DL (ref 8.5–9.9)
CHLORIDE BLD-SCNC: 100 MEQ/L (ref 95–107)
CO2: 21 MEQ/L (ref 20–31)
CREAT SERPL-MCNC: 1.47 MG/DL (ref 0.7–1.2)
EOSINOPHILS ABSOLUTE: 0 K/UL (ref 0–0.7)
EOSINOPHILS RELATIVE PERCENT: 0.2 %
GFR AFRICAN AMERICAN: 49
GFR AFRICAN AMERICAN: 57.7
GFR NON-AFRICAN AMERICAN: 40
GFR NON-AFRICAN AMERICAN: 47.7
GLUCOSE BLD-MCNC: 173 MG/DL (ref 60–115)
GLUCOSE BLD-MCNC: 176 MG/DL (ref 70–99)
HCO3 VENOUS: 21.6 MMOL/L (ref 23–29)
HCT VFR BLD CALC: 38.2 % (ref 42–52)
HEMOGLOBIN: 12.3 G/DL (ref 14–18)
HEMOGLOBIN: 13.2 GM/DL (ref 13.5–17.5)
LACTATE: 1.66 MMOL/L (ref 0.4–2)
LACTIC ACID: 1.6 MMOL/L (ref 0.5–2.2)
LYMPHOCYTES ABSOLUTE: 0.5 K/UL (ref 1–4.8)
LYMPHOCYTES RELATIVE PERCENT: 8.4 %
MCH RBC QN AUTO: 28.1 PG (ref 27–31.3)
MCHC RBC AUTO-ENTMCNC: 32.1 % (ref 33–37)
MCV RBC AUTO: 87.5 FL (ref 80–100)
MONOCYTES ABSOLUTE: 0.5 K/UL (ref 0.2–0.8)
MONOCYTES RELATIVE PERCENT: 8.1 %
NEUTROPHILS ABSOLUTE: 4.7 K/UL (ref 1.4–6.5)
NEUTROPHILS RELATIVE PERCENT: 82.8 %
O2 SAT, VEN: 91 %
PCO2, VEN: 33 MM HG (ref 40–50)
PDW BLD-RTO: 16 % (ref 11.5–14.5)
PERFORMED ON: ABNORMAL
PH VENOUS: 7.42 (ref 7.35–7.45)
PLATELET # BLD: 156 K/UL (ref 130–400)
PO2, VEN: 59 MM HG
POC CHLORIDE: 104 MEQ/L (ref 99–110)
POC CREATININE: 1.7 MG/DL (ref 0.8–1.3)
POC FIO2: 2
POC HEMATOCRIT: 39 % (ref 41–53)
POC POTASSIUM: 3.9 MEQ/L (ref 3.5–5.1)
POC SAMPLE TYPE: ABNORMAL
POC SODIUM: 137 MEQ/L (ref 136–145)
POTASSIUM SERPL-SCNC: 4.2 MEQ/L (ref 3.4–4.9)
PRO-BNP: 5432 PG/ML
RBC # BLD: 4.36 M/UL (ref 4.7–6.1)
SODIUM BLD-SCNC: 137 MEQ/L (ref 135–144)
TCO2 CALC VENOUS: 23 MMOL/L
TROPONIN: <0.01 NG/ML (ref 0–0.01)
WBC # BLD: 5.7 K/UL (ref 4.8–10.8)

## 2019-09-01 PROCEDURE — 82435 ASSAY OF BLOOD CHLORIDE: CPT

## 2019-09-01 PROCEDURE — 6360000002 HC RX W HCPCS: Performed by: EMERGENCY MEDICINE

## 2019-09-01 PROCEDURE — 99285 EMERGENCY DEPT VISIT HI MDM: CPT

## 2019-09-01 PROCEDURE — 85014 HEMATOCRIT: CPT

## 2019-09-01 PROCEDURE — 94640 AIRWAY INHALATION TREATMENT: CPT

## 2019-09-01 PROCEDURE — 71046 X-RAY EXAM CHEST 2 VIEWS: CPT

## 2019-09-01 PROCEDURE — 82803 BLOOD GASES ANY COMBINATION: CPT

## 2019-09-01 PROCEDURE — 36415 COLL VENOUS BLD VENIPUNCTURE: CPT

## 2019-09-01 PROCEDURE — 87040 BLOOD CULTURE FOR BACTERIA: CPT

## 2019-09-01 PROCEDURE — 2580000003 HC RX 258: Performed by: EMERGENCY MEDICINE

## 2019-09-01 PROCEDURE — 83880 ASSAY OF NATRIURETIC PEPTIDE: CPT

## 2019-09-01 PROCEDURE — 82565 ASSAY OF CREATININE: CPT

## 2019-09-01 PROCEDURE — 36600 WITHDRAWAL OF ARTERIAL BLOOD: CPT

## 2019-09-01 PROCEDURE — 93005 ELECTROCARDIOGRAM TRACING: CPT | Performed by: EMERGENCY MEDICINE

## 2019-09-01 PROCEDURE — 84484 ASSAY OF TROPONIN QUANT: CPT

## 2019-09-01 PROCEDURE — 82330 ASSAY OF CALCIUM: CPT

## 2019-09-01 PROCEDURE — 84132 ASSAY OF SERUM POTASSIUM: CPT

## 2019-09-01 PROCEDURE — 84295 ASSAY OF SERUM SODIUM: CPT

## 2019-09-01 PROCEDURE — 2700000000 HC OXYGEN THERAPY PER DAY

## 2019-09-01 PROCEDURE — 96374 THER/PROPH/DIAG INJ IV PUSH: CPT

## 2019-09-01 PROCEDURE — 80048 BASIC METABOLIC PNL TOTAL CA: CPT

## 2019-09-01 PROCEDURE — 83605 ASSAY OF LACTIC ACID: CPT

## 2019-09-01 PROCEDURE — 6370000000 HC RX 637 (ALT 250 FOR IP): Performed by: EMERGENCY MEDICINE

## 2019-09-01 PROCEDURE — 85025 COMPLETE CBC W/AUTO DIFF WBC: CPT

## 2019-09-01 RX ORDER — ASPIRIN 81 MG/1
324 TABLET, CHEWABLE ORAL ONCE
Status: COMPLETED | OUTPATIENT
Start: 2019-09-01 | End: 2019-09-01

## 2019-09-01 RX ORDER — METHYLPREDNISOLONE SODIUM SUCCINATE 125 MG/2ML
125 INJECTION, POWDER, LYOPHILIZED, FOR SOLUTION INTRAMUSCULAR; INTRAVENOUS ONCE
Status: COMPLETED | OUTPATIENT
Start: 2019-09-01 | End: 2019-09-01

## 2019-09-01 RX ORDER — DOXYCYCLINE HYCLATE 100 MG
100 TABLET ORAL 2 TIMES DAILY
Qty: 14 TABLET | Refills: 0 | Status: SHIPPED | OUTPATIENT
Start: 2019-09-01 | End: 2019-09-08

## 2019-09-01 RX ORDER — DOXYCYCLINE HYCLATE 100 MG/1
100 CAPSULE ORAL ONCE
Status: COMPLETED | OUTPATIENT
Start: 2019-09-01 | End: 2019-09-01

## 2019-09-01 RX ORDER — 0.9 % SODIUM CHLORIDE 0.9 %
500 INTRAVENOUS SOLUTION INTRAVENOUS ONCE
Status: COMPLETED | OUTPATIENT
Start: 2019-09-01 | End: 2019-09-01

## 2019-09-01 RX ADMIN — ALBUTEROL SULFATE 2.5 MG: 2.5 SOLUTION RESPIRATORY (INHALATION) at 02:40

## 2019-09-01 RX ADMIN — DOXYCYCLINE HYCLATE 100 MG: 100 CAPSULE ORAL at 03:03

## 2019-09-01 RX ADMIN — ASPIRIN 81 MG 324 MG: 81 TABLET ORAL at 02:00

## 2019-09-01 RX ADMIN — SODIUM CHLORIDE 500 ML: 9 INJECTION, SOLUTION INTRAVENOUS at 02:01

## 2019-09-01 RX ADMIN — METHYLPREDNISOLONE SODIUM SUCCINATE 125 MG: 125 INJECTION, POWDER, FOR SOLUTION INTRAMUSCULAR; INTRAVENOUS at 03:04

## 2019-09-01 ASSESSMENT — ENCOUNTER SYMPTOMS
SHORTNESS OF BREATH: 1
WHEEZING: 0

## 2019-09-01 ASSESSMENT — PULMONARY FUNCTION TESTS: PEFR_L/MIN: 150

## 2019-09-01 NOTE — ED TRIAGE NOTES
Patient c/o increased shortness of breath, started 2 hours ago, cough with yellow sputum, had chills yesterday, denies any pain, he wears 2l o2 at all times at home.

## 2019-09-01 NOTE — ED PROVIDER NOTES
week: None     Minutes per session: None    Stress: None   Relationships    Social connections:     Talks on phone: None     Gets together: None     Attends Amish service: None     Active member of club or organization: None     Attends meetings of clubs or organizations: None     Relationship status: None    Intimate partner violence:     Fear of current or ex partner: None     Emotionally abused: None     Physically abused: None     Forced sexual activity: None   Other Topics Concern    None   Social History Narrative    None       SCREENINGS               PHYSICAL EXAM    (up to 7 for level 4, 8 or more for level 5)     ED Triage Vitals [09/01/19 0136]   BP Temp Temp Source Pulse Resp SpO2 Height Weight   117/76 99.1 °F (37.3 °C) Oral 121 24 94 % 5' 11\" (1.803 m) 202 lb (91.6 kg)       Physical Exam   Constitutional: He is oriented to person, place, and time. He appears well-developed. No distress. HENT:   Head: Normocephalic and atraumatic. Eyes: Conjunctivae and EOM are normal.   Neck: Normal range of motion. Neck supple. Cardiovascular: Normal rate and regular rhythm. Pulmonary/Chest: Effort normal.   Diminished throughout   Abdominal: Soft. Bowel sounds are normal.   Musculoskeletal: Normal range of motion. He exhibits no edema or deformity. Neurological: He is alert and oriented to person, place, and time. No cranial nerve deficit. Skin: Skin is warm and dry. Psychiatric: He has a normal mood and affect. Thought content normal.   Nursing note and vitals reviewed.       DIAGNOSTIC RESULTS     EKG: All EKG's are interpreted by the Emergency Department Physician who either signs or Co-signs this chart in the absence of a cardiologist.    Ventricularly paced rhythm, rate of 109, no ST elevations/ depressions, normal intervals    RADIOLOGY:   Non-plain film images such as CT, Ultrasound and MRI are read by the radiologist. Plain radiographic images are visualized and preliminarily

## 2019-09-03 PROCEDURE — 93010 ELECTROCARDIOGRAM REPORT: CPT | Performed by: INTERNAL MEDICINE

## 2019-09-04 DIAGNOSIS — R74.8 ELEVATED LIVER ENZYMES: Primary | ICD-10-CM

## 2019-09-04 LAB
EKG ATRIAL RATE: 109 BPM
EKG P AXIS: 52 DEGREES
EKG P-R INTERVAL: 200 MS
EKG Q-T INTERVAL: 360 MS
EKG QRS DURATION: 118 MS
EKG QTC CALCULATION (BAZETT): 484 MS
EKG R AXIS: -36 DEGREES
EKG T AXIS: 93 DEGREES
EKG VENTRICULAR RATE: 109 BPM

## 2019-09-06 LAB
BLOOD CULTURE, ROUTINE: NORMAL
CULTURE, BLOOD 2: NORMAL

## 2019-09-09 ENCOUNTER — HOSPITAL ENCOUNTER (OUTPATIENT)
Dept: PHARMACY | Age: 69
Setting detail: THERAPIES SERIES
Discharge: HOME | End: 2019-09-09
Payer: MEDICARE

## 2019-09-09 DIAGNOSIS — I48.0 PAROXYSMAL A-FIB (HCC): ICD-10-CM

## 2019-09-09 LAB — INTERNATIONAL NORMALIZATION RATIO, POC: 1.3

## 2019-09-09 PROCEDURE — 99211 OFF/OP EST MAY X REQ PHY/QHP: CPT

## 2019-09-09 PROCEDURE — 85610 PROTHROMBIN TIME: CPT

## 2019-09-09 RX ORDER — PROPYLTHIOURACIL 50 MG/1
TABLET ORAL
Qty: 540 TABLET | Refills: 1 | Status: SHIPPED | OUTPATIENT
Start: 2019-09-09 | End: 2019-11-01 | Stop reason: ALTCHOICE

## 2019-09-09 NOTE — PROGRESS NOTES
Mr. Calista Pallas is a 81 y.o. y/o male with history of Afib who presents today for anticoagulation monitoring and adjustment. INR 1.3 is subtherapeutic for this patient (goal range 2-3) and is reflective of ~57.5 mg TWD  Patient verifies current dosing regimen, patient able to verbally recall dose  Patient reports some  missed doses since last INR, he does not recall when they were. He also went to the emergency room last week but did not stay in the hospital.   Patient is confused about what dose is has been taking. It does not seem as though he's been taking his warfarin as instructed at the last visit. Patient denies s/sx clotting and/or stroke  Patient denies hematuria, epistaxis, rectal bleeding  Patient denies changes in diet, alcohol, or tobacco use  Reviewed medication list and drug allergies with patient, updated any medication additions or modifications accordingly  Patient was prescribed doxycyline at the ER and is still taking it. He was given a blue pill (patient does not know what it is and no names sound familiar) but has not started taking it yet. Showed him pictures of doxycycline pills but he states it is not the ones he has. Instructed patient to bring medication to his next appointment. Patient states he has had a cold lately but states he has not taken any medication specifically for this. Patient also denies any pending medical or dental procedures scheduled at this time  Patient was instructed to take a booster dose of 15 mg today only then resume current regimen of 57.5 mg TWD and RTC in 1.5 weeks.     Deion Nagy, PharmD  9/9/2019 9:24 AM

## 2019-09-12 RX ORDER — MONTELUKAST SODIUM 10 MG/1
TABLET ORAL
Qty: 30 TABLET | Refills: 2 | Status: SHIPPED | OUTPATIENT
Start: 2019-09-12 | End: 2020-01-17

## 2019-09-12 RX ORDER — ALLOPURINOL 100 MG/1
TABLET ORAL
Qty: 90 TABLET | Refills: 0 | Status: SHIPPED | OUTPATIENT
Start: 2019-09-12 | End: 2019-12-17 | Stop reason: SDUPTHER

## 2019-09-18 ENCOUNTER — HOSPITAL ENCOUNTER (OUTPATIENT)
Dept: PHARMACY | Age: 69
Setting detail: THERAPIES SERIES
Discharge: HOME | End: 2019-09-18
Payer: MEDICARE

## 2019-09-18 DIAGNOSIS — I48.0 PAROXYSMAL A-FIB (HCC): ICD-10-CM

## 2019-09-18 LAB — INTERNATIONAL NORMALIZATION RATIO, POC: 3.4

## 2019-09-18 PROCEDURE — 85610 PROTHROMBIN TIME: CPT | Performed by: PHARMACIST

## 2019-09-18 PROCEDURE — 99211 OFF/OP EST MAY X REQ PHY/QHP: CPT | Performed by: PHARMACIST

## 2019-09-26 ENCOUNTER — HOSPITAL ENCOUNTER (OUTPATIENT)
Dept: PHARMACY | Age: 69
Setting detail: THERAPIES SERIES
Discharge: HOME | End: 2019-09-26
Payer: MEDICARE

## 2019-09-26 DIAGNOSIS — I48.0 PAROXYSMAL A-FIB (HCC): ICD-10-CM

## 2019-09-26 LAB — INTERNATIONAL NORMALIZATION RATIO, POC: 2

## 2019-09-26 PROCEDURE — 99211 OFF/OP EST MAY X REQ PHY/QHP: CPT | Performed by: PHARMACIST

## 2019-09-26 PROCEDURE — 85610 PROTHROMBIN TIME: CPT | Performed by: PHARMACIST

## 2019-10-10 ENCOUNTER — HOSPITAL ENCOUNTER (OUTPATIENT)
Dept: PHARMACY | Age: 69
Setting detail: THERAPIES SERIES
Discharge: HOME | End: 2019-10-10
Payer: MEDICARE

## 2019-10-10 DIAGNOSIS — I48.0 PAROXYSMAL A-FIB (HCC): ICD-10-CM

## 2019-10-10 LAB — INTERNATIONAL NORMALIZATION RATIO, POC: 2.4

## 2019-10-10 PROCEDURE — 85610 PROTHROMBIN TIME: CPT

## 2019-10-10 PROCEDURE — 99211 OFF/OP EST MAY X REQ PHY/QHP: CPT

## 2019-10-18 LAB
ALBUMIN SERPL-MCNC: 4.2 G/DL (ref 3.5–4.6)
ALP BLD-CCNC: 193 U/L (ref 35–104)
ALT SERPL-CCNC: 26 U/L (ref 0–41)
ANION GAP SERPL CALCULATED.3IONS-SCNC: 18 MEQ/L (ref 9–15)
AST SERPL-CCNC: 28 U/L (ref 0–40)
BILIRUB SERPL-MCNC: 2.6 MG/DL (ref 0.2–0.7)
BILIRUBIN DIRECT: 0.9 MG/DL (ref 0–0.4)
BILIRUBIN, INDIRECT: 1.7 MG/DL (ref 0–0.6)
BUN BLDV-MCNC: 34 MG/DL (ref 8–23)
CALCIUM SERPL-MCNC: 9.6 MG/DL (ref 8.5–9.9)
CHLORIDE BLD-SCNC: 94 MEQ/L (ref 95–107)
CHOLESTEROL, FASTING: 104 MG/DL (ref 0–199)
CO2: 26 MEQ/L (ref 20–31)
CREAT SERPL-MCNC: 1.52 MG/DL (ref 0.7–1.2)
GFR AFRICAN AMERICAN: 55.5
GFR NON-AFRICAN AMERICAN: 45.9
GLUCOSE BLD-MCNC: 107 MG/DL (ref 70–99)
HDLC SERPL-MCNC: 57 MG/DL (ref 40–59)
LDL CHOLESTEROL CALCULATED: 34 MG/DL (ref 0–129)
POTASSIUM SERPL-SCNC: 4 MEQ/L (ref 3.4–4.9)
SODIUM BLD-SCNC: 138 MEQ/L (ref 135–144)
TOTAL PROTEIN: 7.3 G/DL (ref 6.3–8)
TRIGLYCERIDE, FASTING: 66 MG/DL (ref 0–150)

## 2019-10-24 ENCOUNTER — HOSPITAL ENCOUNTER (OUTPATIENT)
Dept: PHARMACY | Age: 69
Setting detail: THERAPIES SERIES
Discharge: HOME | End: 2019-10-24
Payer: MEDICARE

## 2019-10-24 DIAGNOSIS — I48.0 PAROXYSMAL A-FIB (HCC): ICD-10-CM

## 2019-10-24 LAB — INTERNATIONAL NORMALIZATION RATIO, POC: 1.7

## 2019-10-24 PROCEDURE — 99211 OFF/OP EST MAY X REQ PHY/QHP: CPT

## 2019-10-24 PROCEDURE — 85610 PROTHROMBIN TIME: CPT

## 2019-11-01 ENCOUNTER — OFFICE VISIT (OUTPATIENT)
Dept: ENDOCRINOLOGY | Age: 69
End: 2019-11-01
Payer: MEDICARE

## 2019-11-01 VITALS
SYSTOLIC BLOOD PRESSURE: 97 MMHG | HEART RATE: 103 BPM | BODY MASS INDEX: 28.28 KG/M2 | DIASTOLIC BLOOD PRESSURE: 65 MMHG | WEIGHT: 202 LBS | HEIGHT: 71 IN

## 2019-11-01 DIAGNOSIS — E03.9 HYPOTHYROIDISM, UNSPECIFIED TYPE: ICD-10-CM

## 2019-11-01 LAB
CHP ED QC CHECK: NORMAL
GLUCOSE BLD-MCNC: 167 MG/DL
HBA1C MFR BLD: 7.8 %
T4 FREE: 1.3 NG/DL (ref 0.84–1.68)
TSH SERPL DL<=0.05 MIU/L-ACNC: 6.95 UIU/ML (ref 0.44–3.86)

## 2019-11-01 PROCEDURE — 83036 HEMOGLOBIN GLYCOSYLATED A1C: CPT | Performed by: INTERNAL MEDICINE

## 2019-11-01 PROCEDURE — 99213 OFFICE O/P EST LOW 20 MIN: CPT | Performed by: INTERNAL MEDICINE

## 2019-11-01 PROCEDURE — 82962 GLUCOSE BLOOD TEST: CPT | Performed by: INTERNAL MEDICINE

## 2019-11-07 ENCOUNTER — TELEPHONE (OUTPATIENT)
Dept: PHARMACY | Age: 69
End: 2019-11-07

## 2019-11-07 DIAGNOSIS — I48.0 PAROXYSMAL A-FIB (HCC): ICD-10-CM

## 2019-11-14 ENCOUNTER — TELEPHONE (OUTPATIENT)
Dept: PHARMACY | Age: 69
End: 2019-11-14

## 2019-11-14 DIAGNOSIS — I48.0 PAROXYSMAL A-FIB (HCC): ICD-10-CM

## 2019-11-16 ENCOUNTER — HOSPITAL ENCOUNTER (INPATIENT)
Age: 69
LOS: 4 days | Discharge: HOME | DRG: 291 | End: 2019-11-20
Attending: EMERGENCY MEDICINE | Admitting: INTERNAL MEDICINE
Payer: MEDICARE

## 2019-11-16 ENCOUNTER — APPOINTMENT (OUTPATIENT)
Dept: GENERAL RADIOLOGY | Age: 69
DRG: 291 | End: 2019-11-16
Payer: MEDICARE

## 2019-11-16 DIAGNOSIS — I50.43 CHF (CONGESTIVE HEART FAILURE), NYHA CLASS IV, ACUTE ON CHRONIC, COMBINED (HCC): ICD-10-CM

## 2019-11-16 DIAGNOSIS — N18.9 CHRONIC RENAL FAILURE, UNSPECIFIED CKD STAGE: ICD-10-CM

## 2019-11-16 DIAGNOSIS — R77.8 ELEVATED TROPONIN: ICD-10-CM

## 2019-11-16 DIAGNOSIS — N18.30 CKD (CHRONIC KIDNEY DISEASE) STAGE 3, GFR 30-59 ML/MIN (HCC): ICD-10-CM

## 2019-11-16 DIAGNOSIS — I50.82 BIVENTRICULAR CONGESTIVE HEART FAILURE (HCC): Primary | ICD-10-CM

## 2019-11-16 PROBLEM — I50.9 ACUTE DECOMPENSATED HEART FAILURE (HCC): Status: ACTIVE | Noted: 2019-11-16

## 2019-11-16 LAB
ALBUMIN SERPL-MCNC: 3.8 G/DL (ref 3.5–4.6)
ALP BLD-CCNC: 203 U/L (ref 35–104)
ALT SERPL-CCNC: 27 U/L (ref 0–41)
ANION GAP SERPL CALCULATED.3IONS-SCNC: 13 MEQ/L (ref 9–15)
APTT: 35.7 SEC (ref 24.4–36.8)
AST SERPL-CCNC: 44 U/L (ref 0–40)
BASOPHILS ABSOLUTE: 0 K/UL (ref 0–0.2)
BASOPHILS RELATIVE PERCENT: 0.5 %
BILIRUB SERPL-MCNC: 2.3 MG/DL (ref 0.2–0.7)
BUN BLDV-MCNC: 57 MG/DL (ref 8–23)
CALCIUM SERPL-MCNC: 9.3 MG/DL (ref 8.5–9.9)
CHLORIDE BLD-SCNC: 94 MEQ/L (ref 95–107)
CK MB: 3.3 NG/ML (ref 0–6.7)
CO2: 23 MEQ/L (ref 20–31)
CREAT SERPL-MCNC: 1.87 MG/DL (ref 0.7–1.2)
CREATINE KINASE-MB INDEX: 1.3 % (ref 0–3.5)
DIGOXIN LEVEL: 0.8 NG/ML (ref 0.8–2)
EOSINOPHILS ABSOLUTE: 0 K/UL (ref 0–0.7)
EOSINOPHILS RELATIVE PERCENT: 0.1 %
GFR AFRICAN AMERICAN: 43.7
GFR NON-AFRICAN AMERICAN: 36.1
GLOBULIN: 3.3 G/DL (ref 2.3–3.5)
GLUCOSE BLD-MCNC: 144 MG/DL (ref 70–99)
HCT VFR BLD CALC: 37.2 % (ref 42–52)
HEMOGLOBIN: 11.8 G/DL (ref 14–18)
INR BLD: 3
LACTIC ACID: 1.7 MMOL/L (ref 0.5–2.2)
LYMPHOCYTES ABSOLUTE: 0.7 K/UL (ref 1–4.8)
LYMPHOCYTES RELATIVE PERCENT: 13.4 %
MCH RBC QN AUTO: 27.7 PG (ref 27–31.3)
MCHC RBC AUTO-ENTMCNC: 31.7 % (ref 33–37)
MCV RBC AUTO: 87.5 FL (ref 80–100)
MONOCYTES ABSOLUTE: 0.5 K/UL (ref 0.2–0.8)
MONOCYTES RELATIVE PERCENT: 10.9 %
NEUTROPHILS ABSOLUTE: 3.8 K/UL (ref 1.4–6.5)
NEUTROPHILS RELATIVE PERCENT: 75.1 %
PDW BLD-RTO: 15.7 % (ref 11.5–14.5)
PLATELET # BLD: 189 K/UL (ref 130–400)
POTASSIUM SERPL-SCNC: 4.6 MEQ/L (ref 3.4–4.9)
PRO-BNP: 7464 PG/ML
PROTHROMBIN TIME: 32.2 SEC (ref 12.3–14.9)
RBC # BLD: 4.25 M/UL (ref 4.7–6.1)
SODIUM BLD-SCNC: 130 MEQ/L (ref 135–144)
TOTAL CK: 253 U/L (ref 0–190)
TOTAL PROTEIN: 7.1 G/DL (ref 6.3–8)
TROPONIN: 0.02 NG/ML (ref 0–0.01)
TSH SERPL DL<=0.05 MIU/L-ACNC: 7.34 UIU/ML (ref 0.44–3.86)
WBC # BLD: 5 K/UL (ref 4.8–10.8)

## 2019-11-16 PROCEDURE — 85025 COMPLETE CBC W/AUTO DIFF WBC: CPT

## 2019-11-16 PROCEDURE — 82248 BILIRUBIN DIRECT: CPT

## 2019-11-16 PROCEDURE — 84443 ASSAY THYROID STIM HORMONE: CPT

## 2019-11-16 PROCEDURE — 94640 AIRWAY INHALATION TREATMENT: CPT

## 2019-11-16 PROCEDURE — 80053 COMPREHEN METABOLIC PANEL: CPT

## 2019-11-16 PROCEDURE — 2700000000 HC OXYGEN THERAPY PER DAY

## 2019-11-16 PROCEDURE — 85610 PROTHROMBIN TIME: CPT

## 2019-11-16 PROCEDURE — 36415 COLL VENOUS BLD VENIPUNCTURE: CPT

## 2019-11-16 PROCEDURE — 85730 THROMBOPLASTIN TIME PARTIAL: CPT

## 2019-11-16 PROCEDURE — 96374 THER/PROPH/DIAG INJ IV PUSH: CPT

## 2019-11-16 PROCEDURE — 82247 BILIRUBIN TOTAL: CPT

## 2019-11-16 PROCEDURE — 80162 ASSAY OF DIGOXIN TOTAL: CPT

## 2019-11-16 PROCEDURE — 84484 ASSAY OF TROPONIN QUANT: CPT

## 2019-11-16 PROCEDURE — 6360000002 HC RX W HCPCS: Performed by: EMERGENCY MEDICINE

## 2019-11-16 PROCEDURE — 71045 X-RAY EXAM CHEST 1 VIEW: CPT

## 2019-11-16 PROCEDURE — 2060000000 HC ICU INTERMEDIATE R&B

## 2019-11-16 PROCEDURE — 6370000000 HC RX 637 (ALT 250 FOR IP): Performed by: EMERGENCY MEDICINE

## 2019-11-16 PROCEDURE — 82550 ASSAY OF CK (CPK): CPT

## 2019-11-16 PROCEDURE — 2580000003 HC RX 258: Performed by: EMERGENCY MEDICINE

## 2019-11-16 PROCEDURE — 83605 ASSAY OF LACTIC ACID: CPT

## 2019-11-16 PROCEDURE — 83880 ASSAY OF NATRIURETIC PEPTIDE: CPT

## 2019-11-16 PROCEDURE — 96375 TX/PRO/DX INJ NEW DRUG ADDON: CPT

## 2019-11-16 PROCEDURE — 82553 CREATINE MB FRACTION: CPT

## 2019-11-16 PROCEDURE — 99285 EMERGENCY DEPT VISIT HI MDM: CPT

## 2019-11-16 RX ORDER — DIPHENHYDRAMINE HYDROCHLORIDE 50 MG/ML
12.5 INJECTION INTRAMUSCULAR; INTRAVENOUS ONCE
Status: COMPLETED | OUTPATIENT
Start: 2019-11-16 | End: 2019-11-16

## 2019-11-16 RX ORDER — DIGOXIN 125 MCG
125 TABLET ORAL DAILY
Status: DISCONTINUED | OUTPATIENT
Start: 2019-11-17 | End: 2019-11-20 | Stop reason: HOSPADM

## 2019-11-16 RX ORDER — DEXTROSE MONOHYDRATE 25 G/50ML
12.5 INJECTION, SOLUTION INTRAVENOUS PRN
Status: DISCONTINUED | OUTPATIENT
Start: 2019-11-16 | End: 2019-11-20 | Stop reason: HOSPADM

## 2019-11-16 RX ORDER — ONDANSETRON 2 MG/ML
4 INJECTION INTRAMUSCULAR; INTRAVENOUS EVERY 6 HOURS PRN
Status: DISCONTINUED | OUTPATIENT
Start: 2019-11-16 | End: 2019-11-20 | Stop reason: HOSPADM

## 2019-11-16 RX ORDER — SODIUM CHLORIDE 0.9 % (FLUSH) 0.9 %
10 SYRINGE (ML) INJECTION PRN
Status: DISCONTINUED | OUTPATIENT
Start: 2019-11-16 | End: 2019-11-20 | Stop reason: HOSPADM

## 2019-11-16 RX ORDER — SODIUM CHLORIDE 0.9 % (FLUSH) 0.9 %
10 SYRINGE (ML) INJECTION EVERY 12 HOURS SCHEDULED
Status: DISCONTINUED | OUTPATIENT
Start: 2019-11-17 | End: 2019-11-20 | Stop reason: HOSPADM

## 2019-11-16 RX ORDER — DEXTROSE MONOHYDRATE 50 MG/ML
100 INJECTION, SOLUTION INTRAVENOUS PRN
Status: DISCONTINUED | OUTPATIENT
Start: 2019-11-16 | End: 2019-11-20 | Stop reason: HOSPADM

## 2019-11-16 RX ORDER — SPIRONOLACTONE 25 MG/1
25 TABLET ORAL DAILY
Status: DISCONTINUED | OUTPATIENT
Start: 2019-11-17 | End: 2019-11-20 | Stop reason: HOSPADM

## 2019-11-16 RX ORDER — NICOTINE POLACRILEX 4 MG
15 LOZENGE BUCCAL PRN
Status: DISCONTINUED | OUTPATIENT
Start: 2019-11-16 | End: 2019-11-20 | Stop reason: HOSPADM

## 2019-11-16 RX ORDER — PANTOPRAZOLE SODIUM 40 MG/1
40 TABLET, DELAYED RELEASE ORAL
Status: DISCONTINUED | OUTPATIENT
Start: 2019-11-17 | End: 2019-11-20 | Stop reason: HOSPADM

## 2019-11-16 RX ORDER — METOPROLOL SUCCINATE 25 MG/1
25 TABLET, EXTENDED RELEASE ORAL 2 TIMES DAILY
Status: DISCONTINUED | OUTPATIENT
Start: 2019-11-17 | End: 2019-11-20 | Stop reason: HOSPADM

## 2019-11-16 RX ORDER — IPRATROPIUM BROMIDE AND ALBUTEROL SULFATE 2.5; .5 MG/3ML; MG/3ML
1 SOLUTION RESPIRATORY (INHALATION) ONCE
Status: COMPLETED | OUTPATIENT
Start: 2019-11-16 | End: 2019-11-16

## 2019-11-16 RX ORDER — ACETAMINOPHEN 325 MG/1
650 TABLET ORAL EVERY 4 HOURS PRN
Status: DISCONTINUED | OUTPATIENT
Start: 2019-11-16 | End: 2019-11-20 | Stop reason: HOSPADM

## 2019-11-16 RX ADMIN — DIPHENHYDRAMINE HYDROCHLORIDE 12.5 MG: 50 INJECTION, SOLUTION INTRAMUSCULAR; INTRAVENOUS at 21:51

## 2019-11-16 RX ADMIN — IPRATROPIUM BROMIDE AND ALBUTEROL SULFATE 1 AMPULE: .5; 3 SOLUTION RESPIRATORY (INHALATION) at 21:29

## 2019-11-16 RX ADMIN — FUROSEMIDE 2.5 MG/HR: 10 INJECTION, SOLUTION INTRAVENOUS at 22:30

## 2019-11-16 ASSESSMENT — ENCOUNTER SYMPTOMS
STRIDOR: 0
ALLERGIC/IMMUNOLOGIC NEGATIVE: 1
NAUSEA: 0
ABDOMINAL PAIN: 0
SHORTNESS OF BREATH: 1
TROUBLE SWALLOWING: 0
RHINORRHEA: 0
WHEEZING: 0
EYES NEGATIVE: 1
VOMITING: 0

## 2019-11-17 PROBLEM — R17 ELEVATED BILIRUBIN: Status: ACTIVE | Noted: 2019-11-17

## 2019-11-17 LAB
ALBUMIN SERPL-MCNC: 3.5 G/DL (ref 3.5–4.6)
ALP BLD-CCNC: 176 U/L (ref 35–104)
ALT SERPL-CCNC: 25 U/L (ref 0–41)
ANION GAP SERPL CALCULATED.3IONS-SCNC: 12 MEQ/L (ref 9–15)
ANION GAP SERPL CALCULATED.3IONS-SCNC: 14 MEQ/L (ref 9–15)
AST SERPL-CCNC: 35 U/L (ref 0–40)
BILIRUB SERPL-MCNC: 2.3 MG/DL (ref 0.2–0.7)
BILIRUB SERPL-MCNC: 2.3 MG/DL (ref 0.2–0.7)
BILIRUBIN DIRECT: 0.9 MG/DL (ref 0–0.4)
BILIRUBIN DIRECT: 1 MG/DL (ref 0–0.4)
BILIRUBIN, INDIRECT: 1.3 MG/DL (ref 0–0.6)
BILIRUBIN, INDIRECT: 1.4 MG/DL (ref 0–0.6)
BUN BLDV-MCNC: 52 MG/DL (ref 8–23)
BUN BLDV-MCNC: 54 MG/DL (ref 8–23)
CALCIUM SERPL-MCNC: 8.9 MG/DL (ref 8.5–9.9)
CALCIUM SERPL-MCNC: 9.1 MG/DL (ref 8.5–9.9)
CHLORIDE BLD-SCNC: 93 MEQ/L (ref 95–107)
CHLORIDE BLD-SCNC: 94 MEQ/L (ref 95–107)
CHOLESTEROL, TOTAL: 94 MG/DL (ref 0–199)
CO2: 23 MEQ/L (ref 20–31)
CO2: 24 MEQ/L (ref 20–31)
CREAT SERPL-MCNC: 1.66 MG/DL (ref 0.7–1.2)
CREAT SERPL-MCNC: 1.7 MG/DL (ref 0.7–1.2)
GFR AFRICAN AMERICAN: 48.8
GFR AFRICAN AMERICAN: 50.1
GFR NON-AFRICAN AMERICAN: 40.3
GFR NON-AFRICAN AMERICAN: 41.4
GLUCOSE BLD-MCNC: 120 MG/DL (ref 60–115)
GLUCOSE BLD-MCNC: 122 MG/DL (ref 60–115)
GLUCOSE BLD-MCNC: 122 MG/DL (ref 70–99)
GLUCOSE BLD-MCNC: 133 MG/DL (ref 60–115)
GLUCOSE BLD-MCNC: 134 MG/DL (ref 70–99)
GLUCOSE BLD-MCNC: 140 MG/DL (ref 60–115)
GLUCOSE BLD-MCNC: 152 MG/DL (ref 60–115)
HDLC SERPL-MCNC: 50 MG/DL (ref 40–59)
INR BLD: 3
LDL CHOLESTEROL CALCULATED: 30 MG/DL (ref 0–129)
MAGNESIUM: 1.8 MG/DL (ref 1.7–2.4)
MAGNESIUM: 2 MG/DL (ref 1.7–2.4)
PERFORMED ON: ABNORMAL
POTASSIUM SERPL-SCNC: 3.8 MEQ/L (ref 3.4–4.9)
POTASSIUM SERPL-SCNC: 4.1 MEQ/L (ref 3.4–4.9)
PROTHROMBIN TIME: 32.8 SEC (ref 12.3–14.9)
SODIUM BLD-SCNC: 129 MEQ/L (ref 135–144)
SODIUM BLD-SCNC: 131 MEQ/L (ref 135–144)
T3 FREE: 2 PG/ML (ref 2–4.4)
T4 FREE: 1.35 NG/DL (ref 0.84–1.68)
TOTAL PROTEIN: 6.3 G/DL (ref 6.3–8)
TRIGL SERPL-MCNC: 72 MG/DL (ref 0–150)
TROPONIN: 0.02 NG/ML (ref 0–0.01)

## 2019-11-17 PROCEDURE — 84481 FREE ASSAY (FT-3): CPT

## 2019-11-17 PROCEDURE — 93005 ELECTROCARDIOGRAM TRACING: CPT | Performed by: HOSPITALIST

## 2019-11-17 PROCEDURE — 84439 ASSAY OF FREE THYROXINE: CPT

## 2019-11-17 PROCEDURE — 83735 ASSAY OF MAGNESIUM: CPT

## 2019-11-17 PROCEDURE — 85610 PROTHROMBIN TIME: CPT

## 2019-11-17 PROCEDURE — 2580000003 HC RX 258: Performed by: HOSPITALIST

## 2019-11-17 PROCEDURE — 80061 LIPID PANEL: CPT

## 2019-11-17 PROCEDURE — 6370000000 HC RX 637 (ALT 250 FOR IP): Performed by: INTERNAL MEDICINE

## 2019-11-17 PROCEDURE — 6370000000 HC RX 637 (ALT 250 FOR IP): Performed by: HOSPITALIST

## 2019-11-17 PROCEDURE — 2060000000 HC ICU INTERMEDIATE R&B

## 2019-11-17 PROCEDURE — 80076 HEPATIC FUNCTION PANEL: CPT

## 2019-11-17 PROCEDURE — 36415 COLL VENOUS BLD VENIPUNCTURE: CPT

## 2019-11-17 PROCEDURE — 2700000000 HC OXYGEN THERAPY PER DAY

## 2019-11-17 PROCEDURE — 80048 BASIC METABOLIC PNL TOTAL CA: CPT

## 2019-11-17 PROCEDURE — 84484 ASSAY OF TROPONIN QUANT: CPT

## 2019-11-17 RX ORDER — POTASSIUM CHLORIDE 7.45 MG/ML
10 INJECTION INTRAVENOUS PRN
Status: DISCONTINUED | OUTPATIENT
Start: 2019-11-17 | End: 2019-11-20 | Stop reason: HOSPADM

## 2019-11-17 RX ORDER — DIPHENHYDRAMINE HCL 25 MG
25 TABLET ORAL EVERY 6 HOURS PRN
Status: DISCONTINUED | OUTPATIENT
Start: 2019-11-17 | End: 2019-11-20 | Stop reason: HOSPADM

## 2019-11-17 RX ORDER — GUAIFENESIN 600 MG/1
600 TABLET, EXTENDED RELEASE ORAL 2 TIMES DAILY
Status: DISCONTINUED | OUTPATIENT
Start: 2019-11-17 | End: 2019-11-20 | Stop reason: HOSPADM

## 2019-11-17 RX ORDER — WARFARIN SODIUM 2.5 MG/1
2.5 TABLET ORAL
Status: COMPLETED | OUTPATIENT
Start: 2019-11-17 | End: 2019-11-17

## 2019-11-17 RX ORDER — POTASSIUM CHLORIDE 20 MEQ/1
40 TABLET, EXTENDED RELEASE ORAL PRN
Status: DISCONTINUED | OUTPATIENT
Start: 2019-11-17 | End: 2019-11-20 | Stop reason: HOSPADM

## 2019-11-17 RX ORDER — MAGNESIUM SULFATE IN WATER 40 MG/ML
2 INJECTION, SOLUTION INTRAVENOUS 3 TIMES DAILY PRN
Status: DISCONTINUED | OUTPATIENT
Start: 2019-11-17 | End: 2019-11-20 | Stop reason: HOSPADM

## 2019-11-17 RX ORDER — BUMETANIDE 1 MG/1
1 TABLET ORAL 2 TIMES DAILY
Refills: 11 | Status: ON HOLD | COMMUNITY
Start: 2019-10-15 | End: 2019-11-20 | Stop reason: SDUPTHER

## 2019-11-17 RX ADMIN — SPIRONOLACTONE 25 MG: 25 TABLET ORAL at 09:17

## 2019-11-17 RX ADMIN — GUAIFENESIN 600 MG: 600 TABLET, EXTENDED RELEASE ORAL at 09:17

## 2019-11-17 RX ADMIN — INSULIN LISPRO 2 UNITS: 100 INJECTION, SOLUTION INTRAVENOUS; SUBCUTANEOUS at 08:04

## 2019-11-17 RX ADMIN — PANTOPRAZOLE SODIUM 40 MG: 40 TABLET, DELAYED RELEASE ORAL at 06:15

## 2019-11-17 RX ADMIN — Medication 10 ML: at 22:04

## 2019-11-17 RX ADMIN — GUAIFENESIN 600 MG: 600 TABLET, EXTENDED RELEASE ORAL at 01:02

## 2019-11-17 RX ADMIN — DIGOXIN 125 MCG: 125 TABLET ORAL at 09:17

## 2019-11-17 RX ADMIN — SACUBITRIL AND VALSARTAN 1 TABLET: 24; 26 TABLET, FILM COATED ORAL at 21:59

## 2019-11-17 RX ADMIN — DIPHENHYDRAMINE HCL 25 MG: 25 TABLET ORAL at 16:29

## 2019-11-17 RX ADMIN — NITROGLYCERIN 1 INCH: 20 OINTMENT TOPICAL at 01:02

## 2019-11-17 RX ADMIN — METOPROLOL SUCCINATE 25 MG: 25 TABLET, EXTENDED RELEASE ORAL at 01:02

## 2019-11-17 RX ADMIN — GUAIFENESIN 600 MG: 600 TABLET, EXTENDED RELEASE ORAL at 21:59

## 2019-11-17 RX ADMIN — SACUBITRIL AND VALSARTAN 1 TABLET: 24; 26 TABLET, FILM COATED ORAL at 01:02

## 2019-11-17 RX ADMIN — NITROGLYCERIN 1 INCH: 20 OINTMENT TOPICAL at 06:15

## 2019-11-17 RX ADMIN — SACUBITRIL AND VALSARTAN 1 TABLET: 24; 26 TABLET, FILM COATED ORAL at 09:17

## 2019-11-17 RX ADMIN — WARFARIN SODIUM 2.5 MG: 2.5 TABLET ORAL at 17:41

## 2019-11-17 ASSESSMENT — PAIN SCALES - GENERAL: PAINLEVEL_OUTOF10: 0

## 2019-11-18 LAB
ALBUMIN SERPL-MCNC: 3.6 G/DL (ref 3.5–4.6)
ALP BLD-CCNC: 171 U/L (ref 35–104)
ALT SERPL-CCNC: 25 U/L (ref 0–41)
ANION GAP SERPL CALCULATED.3IONS-SCNC: 13 MEQ/L (ref 9–15)
ANION GAP SERPL CALCULATED.3IONS-SCNC: 14 MEQ/L (ref 9–15)
ANION GAP SERPL CALCULATED.3IONS-SCNC: 15 MEQ/L (ref 9–15)
AST SERPL-CCNC: 32 U/L (ref 0–40)
BILIRUB SERPL-MCNC: 2.4 MG/DL (ref 0.2–0.7)
BILIRUBIN DIRECT: 1 MG/DL (ref 0–0.4)
BILIRUBIN, INDIRECT: 1.4 MG/DL (ref 0–0.6)
BUN BLDV-MCNC: 49 MG/DL (ref 8–23)
BUN BLDV-MCNC: 49 MG/DL (ref 8–23)
BUN BLDV-MCNC: 51 MG/DL (ref 8–23)
CALCIUM SERPL-MCNC: 9 MG/DL (ref 8.5–9.9)
CALCIUM SERPL-MCNC: 9.1 MG/DL (ref 8.5–9.9)
CALCIUM SERPL-MCNC: 9.3 MG/DL (ref 8.5–9.9)
CHLORIDE BLD-SCNC: 92 MEQ/L (ref 95–107)
CHLORIDE BLD-SCNC: 93 MEQ/L (ref 95–107)
CHLORIDE BLD-SCNC: 93 MEQ/L (ref 95–107)
CO2: 24 MEQ/L (ref 20–31)
CO2: 26 MEQ/L (ref 20–31)
CO2: 27 MEQ/L (ref 20–31)
CREAT SERPL-MCNC: 1.56 MG/DL (ref 0.7–1.2)
CREAT SERPL-MCNC: 1.59 MG/DL (ref 0.7–1.2)
CREAT SERPL-MCNC: 1.66 MG/DL (ref 0.7–1.2)
GFR AFRICAN AMERICAN: 50.1
GFR AFRICAN AMERICAN: 52.7
GFR AFRICAN AMERICAN: 53.9
GFR NON-AFRICAN AMERICAN: 41.4
GFR NON-AFRICAN AMERICAN: 43.6
GFR NON-AFRICAN AMERICAN: 44.5
GLUCOSE BLD-MCNC: 118 MG/DL (ref 70–99)
GLUCOSE BLD-MCNC: 123 MG/DL (ref 60–115)
GLUCOSE BLD-MCNC: 145 MG/DL (ref 60–115)
GLUCOSE BLD-MCNC: 153 MG/DL (ref 60–115)
GLUCOSE BLD-MCNC: 154 MG/DL (ref 70–99)
GLUCOSE BLD-MCNC: 95 MG/DL (ref 70–99)
GLUCOSE BLD-MCNC: 99 MG/DL (ref 60–115)
INR BLD: 2.3
MAGNESIUM: 1.8 MG/DL (ref 1.7–2.4)
MAGNESIUM: 1.8 MG/DL (ref 1.7–2.4)
MAGNESIUM: 1.9 MG/DL (ref 1.7–2.4)
PERFORMED ON: ABNORMAL
PERFORMED ON: NORMAL
POTASSIUM SERPL-SCNC: 3.8 MEQ/L (ref 3.4–4.9)
POTASSIUM SERPL-SCNC: 4 MEQ/L (ref 3.4–4.9)
POTASSIUM SERPL-SCNC: 4.4 MEQ/L (ref 3.4–4.9)
PROTHROMBIN TIME: 26.1 SEC (ref 12.3–14.9)
SODIUM BLD-SCNC: 131 MEQ/L (ref 135–144)
SODIUM BLD-SCNC: 132 MEQ/L (ref 135–144)
SODIUM BLD-SCNC: 134 MEQ/L (ref 135–144)
TOTAL PROTEIN: 6.3 G/DL (ref 6.3–8)

## 2019-11-18 PROCEDURE — 85027 COMPLETE CBC AUTOMATED: CPT

## 2019-11-18 PROCEDURE — 93010 ELECTROCARDIOGRAM REPORT: CPT | Performed by: INTERNAL MEDICINE

## 2019-11-18 PROCEDURE — 6370000000 HC RX 637 (ALT 250 FOR IP): Performed by: INTERNAL MEDICINE

## 2019-11-18 PROCEDURE — 80048 BASIC METABOLIC PNL TOTAL CA: CPT

## 2019-11-18 PROCEDURE — 85610 PROTHROMBIN TIME: CPT

## 2019-11-18 PROCEDURE — 2580000003 HC RX 258: Performed by: HOSPITALIST

## 2019-11-18 PROCEDURE — 83735 ASSAY OF MAGNESIUM: CPT

## 2019-11-18 PROCEDURE — 36415 COLL VENOUS BLD VENIPUNCTURE: CPT

## 2019-11-18 PROCEDURE — 2060000000 HC ICU INTERMEDIATE R&B

## 2019-11-18 PROCEDURE — 6360000002 HC RX W HCPCS: Performed by: INTERNAL MEDICINE

## 2019-11-18 PROCEDURE — 93005 ELECTROCARDIOGRAM TRACING: CPT | Performed by: HOSPITALIST

## 2019-11-18 PROCEDURE — 6370000000 HC RX 637 (ALT 250 FOR IP): Performed by: HOSPITALIST

## 2019-11-18 PROCEDURE — 80076 HEPATIC FUNCTION PANEL: CPT

## 2019-11-18 RX ORDER — AMMONIUM LACTATE 12 G/100G
LOTION TOPICAL PRN
Status: DISCONTINUED | OUTPATIENT
Start: 2019-11-18 | End: 2019-11-20 | Stop reason: HOSPADM

## 2019-11-18 RX ORDER — WARFARIN SODIUM 5 MG/1
10 TABLET ORAL
Status: COMPLETED | OUTPATIENT
Start: 2019-11-18 | End: 2019-11-18

## 2019-11-18 RX ORDER — DOBUTAMINE HYDROCHLORIDE 200 MG/100ML
5 INJECTION INTRAVENOUS CONTINUOUS
Status: DISCONTINUED | OUTPATIENT
Start: 2019-11-18 | End: 2019-11-20 | Stop reason: HOSPADM

## 2019-11-18 RX ADMIN — INSULIN LISPRO 2 UNITS: 100 INJECTION, SOLUTION INTRAVENOUS; SUBCUTANEOUS at 11:50

## 2019-11-18 RX ADMIN — Medication 10 ML: at 23:01

## 2019-11-18 RX ADMIN — GUAIFENESIN 600 MG: 600 TABLET, EXTENDED RELEASE ORAL at 09:50

## 2019-11-18 RX ADMIN — SACUBITRIL AND VALSARTAN 1 TABLET: 24; 26 TABLET, FILM COATED ORAL at 20:05

## 2019-11-18 RX ADMIN — DIGOXIN 125 MCG: 125 TABLET ORAL at 09:50

## 2019-11-18 RX ADMIN — SACUBITRIL AND VALSARTAN 1 TABLET: 24; 26 TABLET, FILM COATED ORAL at 09:50

## 2019-11-18 RX ADMIN — DOBUTAMINE HYDROCHLORIDE 2.5 MCG/KG/MIN: 200 INJECTION INTRAVENOUS at 14:51

## 2019-11-18 RX ADMIN — METOPROLOL SUCCINATE 25 MG: 25 TABLET, EXTENDED RELEASE ORAL at 09:50

## 2019-11-18 RX ADMIN — GUAIFENESIN 600 MG: 600 TABLET, EXTENDED RELEASE ORAL at 20:05

## 2019-11-18 RX ADMIN — PANTOPRAZOLE SODIUM 40 MG: 40 TABLET, DELAYED RELEASE ORAL at 05:33

## 2019-11-18 RX ADMIN — SPIRONOLACTONE 25 MG: 25 TABLET ORAL at 09:50

## 2019-11-18 RX ADMIN — WARFARIN SODIUM 10 MG: 5 TABLET ORAL at 18:54

## 2019-11-18 ASSESSMENT — PAIN SCALES - GENERAL
PAINLEVEL_OUTOF10: 0
PAINLEVEL_OUTOF10: 0

## 2019-11-19 LAB
ALBUMIN SERPL-MCNC: 3.7 G/DL (ref 3.5–4.6)
ALP BLD-CCNC: 179 U/L (ref 35–104)
ALT SERPL-CCNC: 25 U/L (ref 0–41)
ANION GAP SERPL CALCULATED.3IONS-SCNC: 13 MEQ/L (ref 9–15)
ANION GAP SERPL CALCULATED.3IONS-SCNC: 15 MEQ/L (ref 9–15)
ANION GAP SERPL CALCULATED.3IONS-SCNC: 16 MEQ/L (ref 9–15)
AST SERPL-CCNC: 32 U/L (ref 0–40)
BILIRUB SERPL-MCNC: 2.5 MG/DL (ref 0.2–0.7)
BILIRUBIN DIRECT: 1.1 MG/DL (ref 0–0.4)
BILIRUBIN, INDIRECT: 1.4 MG/DL (ref 0–0.6)
BUN BLDV-MCNC: 44 MG/DL (ref 8–23)
BUN BLDV-MCNC: 45 MG/DL (ref 8–23)
BUN BLDV-MCNC: 49 MG/DL (ref 8–23)
CALCIUM SERPL-MCNC: 9.2 MG/DL (ref 8.5–9.9)
CALCIUM SERPL-MCNC: 9.2 MG/DL (ref 8.5–9.9)
CALCIUM SERPL-MCNC: 9.3 MG/DL (ref 8.5–9.9)
CHLORIDE BLD-SCNC: 88 MEQ/L (ref 95–107)
CHLORIDE BLD-SCNC: 89 MEQ/L (ref 95–107)
CHLORIDE BLD-SCNC: 91 MEQ/L (ref 95–107)
CO2: 26 MEQ/L (ref 20–31)
CO2: 27 MEQ/L (ref 20–31)
CO2: 30 MEQ/L (ref 20–31)
CREAT SERPL-MCNC: 1.61 MG/DL (ref 0.7–1.2)
CREAT SERPL-MCNC: 1.76 MG/DL (ref 0.7–1.2)
CREAT SERPL-MCNC: 1.89 MG/DL (ref 0.7–1.2)
GFR AFRICAN AMERICAN: 43.2
GFR AFRICAN AMERICAN: 46.9
GFR AFRICAN AMERICAN: 51.9
GFR NON-AFRICAN AMERICAN: 35.7
GFR NON-AFRICAN AMERICAN: 38.7
GFR NON-AFRICAN AMERICAN: 42.9
GLUCOSE BLD-MCNC: 100 MG/DL (ref 70–99)
GLUCOSE BLD-MCNC: 105 MG/DL (ref 60–115)
GLUCOSE BLD-MCNC: 134 MG/DL (ref 60–115)
GLUCOSE BLD-MCNC: 139 MG/DL (ref 60–115)
GLUCOSE BLD-MCNC: 143 MG/DL (ref 70–99)
GLUCOSE BLD-MCNC: 157 MG/DL (ref 60–115)
GLUCOSE BLD-MCNC: 85 MG/DL (ref 70–99)
HCT VFR BLD CALC: 36.2 % (ref 42–52)
HEMOGLOBIN: 11.4 G/DL (ref 14–18)
INR BLD: 2
MAGNESIUM: 1.8 MG/DL (ref 1.7–2.4)
MAGNESIUM: 1.9 MG/DL (ref 1.7–2.4)
MAGNESIUM: 1.9 MG/DL (ref 1.7–2.4)
MCH RBC QN AUTO: 27.9 PG (ref 27–31.3)
MCHC RBC AUTO-ENTMCNC: 31.5 % (ref 33–37)
MCV RBC AUTO: 88.5 FL (ref 80–100)
PDW BLD-RTO: 16 % (ref 11.5–14.5)
PERFORMED ON: ABNORMAL
PERFORMED ON: NORMAL
PLATELET # BLD: 192 K/UL (ref 130–400)
POTASSIUM SERPL-SCNC: 3.6 MEQ/L (ref 3.4–4.9)
POTASSIUM SERPL-SCNC: 3.9 MEQ/L (ref 3.4–4.9)
POTASSIUM SERPL-SCNC: 4.1 MEQ/L (ref 3.4–4.9)
PRO-BNP: 4546 PG/ML
PROTHROMBIN TIME: 23.6 SEC (ref 12.3–14.9)
RBC # BLD: 4.09 M/UL (ref 4.7–6.1)
SODIUM BLD-SCNC: 130 MEQ/L (ref 135–144)
SODIUM BLD-SCNC: 131 MEQ/L (ref 135–144)
SODIUM BLD-SCNC: 134 MEQ/L (ref 135–144)
TOTAL PROTEIN: 6.6 G/DL (ref 6.3–8)
WBC # BLD: 4.5 K/UL (ref 4.8–10.8)

## 2019-11-19 PROCEDURE — 6360000002 HC RX W HCPCS: Performed by: INTERNAL MEDICINE

## 2019-11-19 PROCEDURE — 83880 ASSAY OF NATRIURETIC PEPTIDE: CPT

## 2019-11-19 PROCEDURE — 80076 HEPATIC FUNCTION PANEL: CPT

## 2019-11-19 PROCEDURE — 2700000000 HC OXYGEN THERAPY PER DAY

## 2019-11-19 PROCEDURE — 83735 ASSAY OF MAGNESIUM: CPT

## 2019-11-19 PROCEDURE — 85610 PROTHROMBIN TIME: CPT

## 2019-11-19 PROCEDURE — 6370000000 HC RX 637 (ALT 250 FOR IP): Performed by: HOSPITALIST

## 2019-11-19 PROCEDURE — 36415 COLL VENOUS BLD VENIPUNCTURE: CPT

## 2019-11-19 PROCEDURE — 6370000000 HC RX 637 (ALT 250 FOR IP): Performed by: INTERNAL MEDICINE

## 2019-11-19 PROCEDURE — 80048 BASIC METABOLIC PNL TOTAL CA: CPT

## 2019-11-19 PROCEDURE — 2060000000 HC ICU INTERMEDIATE R&B

## 2019-11-19 RX ORDER — TOLVAPTAN 15 MG/1
15 TABLET ORAL ONCE
Status: COMPLETED | OUTPATIENT
Start: 2019-11-19 | End: 2019-11-19

## 2019-11-19 RX ADMIN — TOLVAPTAN 15 MG: 15 TABLET ORAL at 23:23

## 2019-11-19 RX ADMIN — GUAIFENESIN 600 MG: 600 TABLET, EXTENDED RELEASE ORAL at 20:06

## 2019-11-19 RX ADMIN — DOBUTAMINE HYDROCHLORIDE 5 MCG/KG/MIN: 200 INJECTION INTRAVENOUS at 05:00

## 2019-11-19 RX ADMIN — DIGOXIN 125 MCG: 125 TABLET ORAL at 08:17

## 2019-11-19 RX ADMIN — METOPROLOL SUCCINATE 25 MG: 25 TABLET, EXTENDED RELEASE ORAL at 08:17

## 2019-11-19 RX ADMIN — SACUBITRIL AND VALSARTAN 1 TABLET: 24; 26 TABLET, FILM COATED ORAL at 20:06

## 2019-11-19 RX ADMIN — SACUBITRIL AND VALSARTAN 1 TABLET: 24; 26 TABLET, FILM COATED ORAL at 08:17

## 2019-11-19 RX ADMIN — SPIRONOLACTONE 25 MG: 25 TABLET ORAL at 08:18

## 2019-11-19 RX ADMIN — WARFARIN SODIUM 7.5 MG: 2.5 TABLET ORAL at 16:28

## 2019-11-19 RX ADMIN — GUAIFENESIN 600 MG: 600 TABLET, EXTENDED RELEASE ORAL at 08:18

## 2019-11-19 RX ADMIN — PANTOPRAZOLE SODIUM 40 MG: 40 TABLET, DELAYED RELEASE ORAL at 06:38

## 2019-11-19 ASSESSMENT — PAIN SCALES - GENERAL
PAINLEVEL_OUTOF10: 0

## 2019-11-20 VITALS
HEIGHT: 71 IN | BODY MASS INDEX: 27.37 KG/M2 | OXYGEN SATURATION: 98 % | RESPIRATION RATE: 18 BRPM | WEIGHT: 195.5 LBS | SYSTOLIC BLOOD PRESSURE: 95 MMHG | HEART RATE: 74 BPM | DIASTOLIC BLOOD PRESSURE: 59 MMHG | TEMPERATURE: 97.7 F

## 2019-11-20 PROBLEM — I42.4 CARDIOMYOPATHY OF END-STAGE CONGENITAL HEART DISEASE (HCC): Status: ACTIVE | Noted: 2018-12-10

## 2019-11-20 LAB
ALBUMIN SERPL-MCNC: 3.8 G/DL (ref 3.5–4.6)
ALP BLD-CCNC: 196 U/L (ref 35–104)
ALT SERPL-CCNC: 23 U/L (ref 0–41)
ANION GAP SERPL CALCULATED.3IONS-SCNC: 12 MEQ/L (ref 9–15)
ANION GAP SERPL CALCULATED.3IONS-SCNC: 13 MEQ/L (ref 9–15)
ANION GAP SERPL CALCULATED.3IONS-SCNC: 14 MEQ/L (ref 9–15)
AST SERPL-CCNC: 28 U/L (ref 0–40)
BILIRUB SERPL-MCNC: 2.2 MG/DL (ref 0.2–0.7)
BILIRUBIN DIRECT: 1 MG/DL (ref 0–0.4)
BILIRUBIN, INDIRECT: 1.2 MG/DL (ref 0–0.6)
BUN BLDV-MCNC: 39 MG/DL (ref 8–23)
BUN BLDV-MCNC: 39 MG/DL (ref 8–23)
BUN BLDV-MCNC: 44 MG/DL (ref 8–23)
CALCIUM SERPL-MCNC: 9.3 MG/DL (ref 8.5–9.9)
CALCIUM SERPL-MCNC: 9.7 MG/DL (ref 8.5–9.9)
CALCIUM SERPL-MCNC: 9.7 MG/DL (ref 8.5–9.9)
CHLORIDE BLD-SCNC: 91 MEQ/L (ref 95–107)
CHLORIDE BLD-SCNC: 97 MEQ/L (ref 95–107)
CHLORIDE BLD-SCNC: 98 MEQ/L (ref 95–107)
CO2: 26 MEQ/L (ref 20–31)
CO2: 26 MEQ/L (ref 20–31)
CO2: 27 MEQ/L (ref 20–31)
CREAT SERPL-MCNC: 1.6 MG/DL (ref 0.7–1.2)
CREAT SERPL-MCNC: 1.69 MG/DL (ref 0.7–1.2)
CREAT SERPL-MCNC: 1.85 MG/DL (ref 0.7–1.2)
EKG ATRIAL RATE: 86 BPM
EKG ATRIAL RATE: 87 BPM
EKG ATRIAL RATE: 92 BPM
EKG ATRIAL RATE: 94 BPM
EKG ATRIAL RATE: 96 BPM
EKG P AXIS: 53 DEGREES
EKG P AXIS: 54 DEGREES
EKG P AXIS: 57 DEGREES
EKG P AXIS: 64 DEGREES
EKG P-R INTERVAL: 130 MS
EKG P-R INTERVAL: 150 MS
EKG P-R INTERVAL: 156 MS
EKG P-R INTERVAL: 202 MS
EKG Q-T INTERVAL: 400 MS
EKG Q-T INTERVAL: 426 MS
EKG Q-T INTERVAL: 436 MS
EKG Q-T INTERVAL: 452 MS
EKG Q-T INTERVAL: 452 MS
EKG QRS DURATION: 118 MS
EKG QRS DURATION: 154 MS
EKG QRS DURATION: 166 MS
EKG QTC CALCULATION (BAZETT): 494 MS
EKG QTC CALCULATION (BAZETT): 524 MS
EKG QTC CALCULATION (BAZETT): 532 MS
EKG QTC CALCULATION (BAZETT): 540 MS
EKG QTC CALCULATION (BAZETT): 571 MS
EKG R AXIS: -71 DEGREES
EKG R AXIS: 159 DEGREES
EKG R AXIS: 182 DEGREES
EKG R AXIS: 227 DEGREES
EKG R AXIS: 270 DEGREES
EKG T AXIS: 127 DEGREES
EKG T AXIS: 18 DEGREES
EKG T AXIS: 38 DEGREES
EKG T AXIS: 49 DEGREES
EKG T AXIS: 52 DEGREES
EKG VENTRICULAR RATE: 86 BPM
EKG VENTRICULAR RATE: 87 BPM
EKG VENTRICULAR RATE: 92 BPM
EKG VENTRICULAR RATE: 94 BPM
EKG VENTRICULAR RATE: 96 BPM
GFR AFRICAN AMERICAN: 44.3
GFR AFRICAN AMERICAN: 49.1
GFR AFRICAN AMERICAN: 52.3
GFR NON-AFRICAN AMERICAN: 36.6
GFR NON-AFRICAN AMERICAN: 40.6
GFR NON-AFRICAN AMERICAN: 43.2
GLUCOSE BLD-MCNC: 107 MG/DL (ref 70–99)
GLUCOSE BLD-MCNC: 109 MG/DL (ref 60–115)
GLUCOSE BLD-MCNC: 124 MG/DL (ref 70–99)
GLUCOSE BLD-MCNC: 145 MG/DL (ref 60–115)
GLUCOSE BLD-MCNC: 146 MG/DL (ref 70–99)
GLUCOSE BLD-MCNC: 188 MG/DL (ref 60–115)
INR BLD: 2.3
MAGNESIUM: 1.9 MG/DL (ref 1.7–2.4)
MAGNESIUM: 2 MG/DL (ref 1.7–2.4)
MAGNESIUM: 2.3 MG/DL (ref 1.7–2.4)
PERFORMED ON: ABNORMAL
PERFORMED ON: ABNORMAL
PERFORMED ON: NORMAL
POTASSIUM SERPL-SCNC: 4 MEQ/L (ref 3.4–4.9)
POTASSIUM SERPL-SCNC: 4.1 MEQ/L (ref 3.4–4.9)
POTASSIUM SERPL-SCNC: 4.8 MEQ/L (ref 3.4–4.9)
PROTHROMBIN TIME: 26.3 SEC (ref 12.3–14.9)
SODIUM BLD-SCNC: 132 MEQ/L (ref 135–144)
SODIUM BLD-SCNC: 135 MEQ/L (ref 135–144)
SODIUM BLD-SCNC: 137 MEQ/L (ref 135–144)
TOTAL PROTEIN: 7.1 G/DL (ref 6.3–8)

## 2019-11-20 PROCEDURE — 93005 ELECTROCARDIOGRAM TRACING: CPT | Performed by: HOSPITALIST

## 2019-11-20 PROCEDURE — 80076 HEPATIC FUNCTION PANEL: CPT

## 2019-11-20 PROCEDURE — 2580000003 HC RX 258: Performed by: HOSPITALIST

## 2019-11-20 PROCEDURE — 80048 BASIC METABOLIC PNL TOTAL CA: CPT

## 2019-11-20 PROCEDURE — 85610 PROTHROMBIN TIME: CPT

## 2019-11-20 PROCEDURE — 6360000002 HC RX W HCPCS: Performed by: INTERNAL MEDICINE

## 2019-11-20 PROCEDURE — 83735 ASSAY OF MAGNESIUM: CPT

## 2019-11-20 PROCEDURE — 6370000000 HC RX 637 (ALT 250 FOR IP): Performed by: HOSPITALIST

## 2019-11-20 PROCEDURE — 36415 COLL VENOUS BLD VENIPUNCTURE: CPT

## 2019-11-20 RX ORDER — METOLAZONE 2.5 MG/1
2.5 TABLET ORAL
Qty: 30 TABLET | Refills: 3 | Status: SHIPPED | OUTPATIENT
Start: 2019-11-20 | End: 2019-12-04 | Stop reason: SDUPTHER

## 2019-11-20 RX ORDER — BUMETANIDE 1 MG/1
2 TABLET ORAL 2 TIMES DAILY
Qty: 60 TABLET | Refills: 11 | Status: SHIPPED | OUTPATIENT
Start: 2019-11-20 | End: 2019-12-04 | Stop reason: SDUPTHER

## 2019-11-20 RX ORDER — METOLAZONE 2.5 MG/1
2.5 TABLET ORAL
Qty: 30 TABLET | Refills: 3 | Status: SHIPPED | OUTPATIENT
Start: 2019-11-20 | End: 2019-11-20 | Stop reason: SDUPTHER

## 2019-11-20 RX ORDER — SPIRONOLACTONE 25 MG/1
25 TABLET ORAL DAILY
Qty: 30 TABLET | Refills: 3 | Status: SHIPPED | OUTPATIENT
Start: 2019-11-21

## 2019-11-20 RX ADMIN — SACUBITRIL AND VALSARTAN 1 TABLET: 24; 26 TABLET, FILM COATED ORAL at 08:22

## 2019-11-20 RX ADMIN — DOBUTAMINE HYDROCHLORIDE 5 MCG/KG/MIN: 200 INJECTION INTRAVENOUS at 06:06

## 2019-11-20 RX ADMIN — INSULIN LISPRO 2 UNITS: 100 INJECTION, SOLUTION INTRAVENOUS; SUBCUTANEOUS at 16:21

## 2019-11-20 RX ADMIN — DIGOXIN 125 MCG: 125 TABLET ORAL at 08:22

## 2019-11-20 RX ADMIN — GUAIFENESIN 600 MG: 600 TABLET, EXTENDED RELEASE ORAL at 08:22

## 2019-11-20 RX ADMIN — METOPROLOL SUCCINATE 25 MG: 25 TABLET, EXTENDED RELEASE ORAL at 08:22

## 2019-11-20 RX ADMIN — WARFARIN SODIUM 7.5 MG: 2.5 TABLET ORAL at 18:41

## 2019-11-20 RX ADMIN — PANTOPRAZOLE SODIUM 40 MG: 40 TABLET, DELAYED RELEASE ORAL at 05:54

## 2019-11-20 RX ADMIN — SPIRONOLACTONE 25 MG: 25 TABLET ORAL at 08:22

## 2019-11-20 RX ADMIN — INSULIN LISPRO 2 UNITS: 100 INJECTION, SOLUTION INTRAVENOUS; SUBCUTANEOUS at 12:08

## 2019-11-20 RX ADMIN — Medication 10 ML: at 08:22

## 2019-11-20 ASSESSMENT — PAIN SCALES - GENERAL: PAINLEVEL_OUTOF10: 0

## 2019-11-21 ENCOUNTER — CARE COORDINATION (OUTPATIENT)
Dept: CASE MANAGEMENT | Age: 69
End: 2019-11-21

## 2019-11-21 ENCOUNTER — TELEPHONE (OUTPATIENT)
Dept: PHARMACY | Facility: CLINIC | Age: 69
End: 2019-11-21

## 2019-11-21 ENCOUNTER — TELEPHONE (OUTPATIENT)
Dept: PHARMACY | Age: 69
End: 2019-11-21

## 2019-11-21 DIAGNOSIS — I48.0 PAROXYSMAL A-FIB (HCC): ICD-10-CM

## 2019-11-22 ENCOUNTER — CARE COORDINATION (OUTPATIENT)
Dept: CASE MANAGEMENT | Age: 69
End: 2019-11-22

## 2019-11-23 DIAGNOSIS — I50.43 CHF (CONGESTIVE HEART FAILURE), NYHA CLASS IV, ACUTE ON CHRONIC, COMBINED (HCC): ICD-10-CM

## 2019-11-23 DIAGNOSIS — I50.1 LEFT HEART FAILURE (HCC): ICD-10-CM

## 2019-11-23 DIAGNOSIS — N18.30 CKD (CHRONIC KIDNEY DISEASE) STAGE 3, GFR 30-59 ML/MIN (HCC): ICD-10-CM

## 2019-11-23 DIAGNOSIS — I50.42 CHF (CONGESTIVE HEART FAILURE), NYHA CLASS III, CHRONIC, COMBINED (HCC): Primary | ICD-10-CM

## 2019-11-23 LAB
ALBUMIN SERPL-MCNC: 4.1 G/DL (ref 3.5–4.6)
ALP BLD-CCNC: 229 U/L (ref 35–104)
ALT SERPL-CCNC: 33 U/L (ref 0–41)
ANION GAP SERPL CALCULATED.3IONS-SCNC: 13 MEQ/L (ref 9–15)
ANION GAP SERPL CALCULATED.3IONS-SCNC: 14 MEQ/L (ref 9–15)
AST SERPL-CCNC: 38 U/L (ref 0–40)
BILIRUB SERPL-MCNC: 1.4 MG/DL (ref 0.2–0.7)
BUN BLDV-MCNC: 54 MG/DL (ref 8–23)
BUN BLDV-MCNC: 55 MG/DL (ref 8–23)
CALCIUM SERPL-MCNC: 9.2 MG/DL (ref 8.5–9.9)
CALCIUM SERPL-MCNC: 9.4 MG/DL (ref 8.5–9.9)
CHLORIDE BLD-SCNC: 92 MEQ/L (ref 95–107)
CHLORIDE BLD-SCNC: 93 MEQ/L (ref 95–107)
CO2: 24 MEQ/L (ref 20–31)
CO2: 25 MEQ/L (ref 20–31)
CREAT SERPL-MCNC: 2 MG/DL (ref 0.7–1.2)
CREAT SERPL-MCNC: 2.04 MG/DL (ref 0.7–1.2)
GFR AFRICAN AMERICAN: 39.5
GFR AFRICAN AMERICAN: 40.4
GFR NON-AFRICAN AMERICAN: 32.7
GFR NON-AFRICAN AMERICAN: 33.4
GLOBULIN: 2.9 G/DL (ref 2.3–3.5)
GLUCOSE BLD-MCNC: 94 MG/DL (ref 70–99)
GLUCOSE BLD-MCNC: 95 MG/DL (ref 70–99)
HCT VFR BLD CALC: 39.1 % (ref 42–52)
HEMOGLOBIN: 12.5 G/DL (ref 14–18)
MCH RBC QN AUTO: 28.1 PG (ref 27–31.3)
MCHC RBC AUTO-ENTMCNC: 31.9 % (ref 33–37)
MCV RBC AUTO: 88.1 FL (ref 80–100)
PDW BLD-RTO: 16.2 % (ref 11.5–14.5)
PLATELET # BLD: 227 K/UL (ref 130–400)
POTASSIUM SERPL-SCNC: 4.6 MEQ/L (ref 3.4–4.9)
POTASSIUM SERPL-SCNC: 4.6 MEQ/L (ref 3.4–4.9)
RBC # BLD: 4.43 M/UL (ref 4.7–6.1)
SODIUM BLD-SCNC: 130 MEQ/L (ref 135–144)
SODIUM BLD-SCNC: 131 MEQ/L (ref 135–144)
TOTAL PROTEIN: 7 G/DL (ref 6.3–8)
TSH SERPL DL<=0.05 MIU/L-ACNC: 8.37 UIU/ML (ref 0.44–3.86)
WBC # BLD: 3.5 K/UL (ref 4.8–10.8)

## 2019-11-23 PROCEDURE — 93010 ELECTROCARDIOGRAM REPORT: CPT | Performed by: INTERNAL MEDICINE

## 2019-11-25 ENCOUNTER — CARE COORDINATION (OUTPATIENT)
Dept: CARE COORDINATION | Age: 69
End: 2019-11-25

## 2019-11-25 ENCOUNTER — CARE COORDINATION (OUTPATIENT)
Dept: CASE MANAGEMENT | Age: 69
End: 2019-11-25

## 2019-11-26 ENCOUNTER — TELEPHONE (OUTPATIENT)
Dept: FAMILY MEDICINE CLINIC | Age: 69
End: 2019-11-26

## 2019-11-26 ENCOUNTER — OFFICE VISIT (OUTPATIENT)
Dept: FAMILY MEDICINE CLINIC | Age: 69
End: 2019-11-26
Payer: MEDICARE

## 2019-11-26 VITALS
DIASTOLIC BLOOD PRESSURE: 60 MMHG | OXYGEN SATURATION: 97 % | TEMPERATURE: 97 F | HEART RATE: 93 BPM | SYSTOLIC BLOOD PRESSURE: 90 MMHG | BODY MASS INDEX: 27.31 KG/M2 | WEIGHT: 195.8 LBS

## 2019-11-26 DIAGNOSIS — I50.42 CHF (CONGESTIVE HEART FAILURE), NYHA CLASS III, CHRONIC, COMBINED (HCC): ICD-10-CM

## 2019-11-26 DIAGNOSIS — R42 DIZZINESS: Primary | ICD-10-CM

## 2019-11-26 PROCEDURE — 1111F DSCHRG MED/CURRENT MED MERGE: CPT | Performed by: INTERNAL MEDICINE

## 2019-11-26 PROCEDURE — 99495 TRANSJ CARE MGMT MOD F2F 14D: CPT | Performed by: INTERNAL MEDICINE

## 2019-11-26 RX ORDER — MECLIZINE HCL 12.5 MG/1
12.5 TABLET ORAL 2 TIMES DAILY PRN
Qty: 60 TABLET | Refills: 0 | Status: SHIPPED | OUTPATIENT
Start: 2019-11-26 | End: 2019-12-26

## 2019-11-26 RX ORDER — NITROGLYCERIN 0.4 MG/1
0.4 TABLET SUBLINGUAL EVERY 5 MIN PRN
Qty: 25 TABLET | Refills: 0 | Status: SHIPPED | OUTPATIENT
Start: 2019-11-26

## 2019-11-26 RX ORDER — MECLIZINE HCL 12.5 MG/1
TABLET ORAL
Refills: 0 | COMMUNITY
Start: 2019-09-06 | End: 2019-11-26 | Stop reason: SDUPTHER

## 2019-11-27 ENCOUNTER — TELEPHONE (OUTPATIENT)
Dept: PHARMACY | Age: 69
End: 2019-11-27

## 2019-11-27 DIAGNOSIS — I48.0 PAROXYSMAL A-FIB (HCC): ICD-10-CM

## 2019-11-27 LAB
INR BLD: 2.3
PROTHROMBIN TIME: 26.2 SEC (ref 12.3–14.9)

## 2019-12-02 ENCOUNTER — CARE COORDINATION (OUTPATIENT)
Dept: CARE COORDINATION | Age: 69
End: 2019-12-02

## 2019-12-03 LAB
ANION GAP SERPL CALCULATED.3IONS-SCNC: 14 MEQ/L (ref 9–15)
BUN BLDV-MCNC: 46 MG/DL (ref 8–23)
CALCIUM SERPL-MCNC: 9.8 MG/DL (ref 8.5–9.9)
CHLORIDE BLD-SCNC: 98 MEQ/L (ref 95–107)
CO2: 24 MEQ/L (ref 20–31)
CREAT SERPL-MCNC: 1.38 MG/DL (ref 0.7–1.2)
GFR AFRICAN AMERICAN: >60
GFR NON-AFRICAN AMERICAN: 51.3
GLUCOSE BLD-MCNC: 107 MG/DL (ref 70–99)
POTASSIUM SERPL-SCNC: 4.6 MEQ/L (ref 3.4–4.9)
SODIUM BLD-SCNC: 136 MEQ/L (ref 135–144)

## 2019-12-04 ENCOUNTER — OFFICE VISIT (OUTPATIENT)
Dept: FAMILY MEDICINE CLINIC | Age: 69
End: 2019-12-04
Payer: MEDICARE

## 2019-12-04 VITALS
SYSTOLIC BLOOD PRESSURE: 118 MMHG | WEIGHT: 193.6 LBS | HEART RATE: 94 BPM | OXYGEN SATURATION: 97 % | BODY MASS INDEX: 27 KG/M2 | DIASTOLIC BLOOD PRESSURE: 64 MMHG | TEMPERATURE: 97.7 F

## 2019-12-04 DIAGNOSIS — R94.6 ABNORMAL THYROID FUNCTION TEST: Primary | ICD-10-CM

## 2019-12-04 DIAGNOSIS — I48.0 PAROXYSMAL A-FIB (HCC): Chronic | ICD-10-CM

## 2019-12-04 DIAGNOSIS — R94.6 ABNORMAL THYROID FUNCTION TEST: ICD-10-CM

## 2019-12-04 DIAGNOSIS — L29.9 LOCALIZED PRURITUS: ICD-10-CM

## 2019-12-04 DIAGNOSIS — J44.9 CHRONIC OBSTRUCTIVE PULMONARY DISEASE, UNSPECIFIED COPD TYPE (HCC): ICD-10-CM

## 2019-12-04 DIAGNOSIS — E03.9 HYPOTHYROIDISM, UNSPECIFIED TYPE: ICD-10-CM

## 2019-12-04 DIAGNOSIS — I50.1 LEFT HEART FAILURE (HCC): ICD-10-CM

## 2019-12-04 LAB — T4 FREE: 1.36 NG/DL (ref 0.84–1.68)

## 2019-12-04 PROCEDURE — 99213 OFFICE O/P EST LOW 20 MIN: CPT | Performed by: INTERNAL MEDICINE

## 2019-12-04 RX ORDER — METOPROLOL SUCCINATE 25 MG/1
TABLET, EXTENDED RELEASE ORAL
Status: ON HOLD | COMMUNITY
Start: 2019-12-04 | End: 2020-09-12 | Stop reason: HOSPADM

## 2019-12-04 RX ORDER — BUMETANIDE 1 MG/1
1 TABLET ORAL 2 TIMES DAILY
Qty: 60 TABLET | Refills: 11 | COMMUNITY
Start: 2019-12-04

## 2019-12-04 RX ORDER — METOLAZONE 2.5 MG/1
TABLET ORAL
Qty: 30 TABLET | Refills: 3 | COMMUNITY
Start: 2019-12-04 | End: 2020-05-20 | Stop reason: SDUPTHER

## 2019-12-05 ENCOUNTER — CARE COORDINATION (OUTPATIENT)
Dept: CASE MANAGEMENT | Age: 69
End: 2019-12-05

## 2019-12-10 ENCOUNTER — CARE COORDINATION (OUTPATIENT)
Dept: CARE COORDINATION | Age: 69
End: 2019-12-10

## 2019-12-11 ENCOUNTER — OFFICE VISIT (OUTPATIENT)
Dept: FAMILY MEDICINE CLINIC | Age: 69
End: 2019-12-11
Payer: MEDICARE

## 2019-12-11 VITALS
HEART RATE: 98 BPM | TEMPERATURE: 97.8 F | SYSTOLIC BLOOD PRESSURE: 116 MMHG | OXYGEN SATURATION: 97 % | DIASTOLIC BLOOD PRESSURE: 62 MMHG | WEIGHT: 190.8 LBS | BODY MASS INDEX: 26.61 KG/M2

## 2019-12-11 DIAGNOSIS — L29.9 PRURITUS: Primary | ICD-10-CM

## 2019-12-11 PROCEDURE — 99213 OFFICE O/P EST LOW 20 MIN: CPT | Performed by: INTERNAL MEDICINE

## 2019-12-11 PROCEDURE — 3288F FALL RISK ASSESSMENT DOCD: CPT | Performed by: INTERNAL MEDICINE

## 2019-12-11 RX ORDER — HYDROXYZINE HYDROCHLORIDE 25 MG/1
25 TABLET, FILM COATED ORAL EVERY 8 HOURS PRN
Qty: 42 TABLET | Refills: 0 | Status: SHIPPED | OUTPATIENT
Start: 2019-12-11 | End: 2019-12-25

## 2019-12-11 RX ORDER — FEXOFENADINE HCL 180 MG/1
180 TABLET ORAL DAILY
Qty: 30 TABLET | Refills: 0 | Status: SHIPPED | OUTPATIENT
Start: 2019-12-11 | End: 2020-02-17

## 2019-12-17 RX ORDER — ALLOPURINOL 100 MG/1
TABLET ORAL
Qty: 90 TABLET | Refills: 0 | Status: SHIPPED | OUTPATIENT
Start: 2019-12-17 | End: 2019-12-18 | Stop reason: SDUPTHER

## 2019-12-18 ENCOUNTER — TELEPHONE (OUTPATIENT)
Dept: PHARMACY | Age: 69
End: 2019-12-18

## 2019-12-18 DIAGNOSIS — I48.0 PAROXYSMAL A-FIB (HCC): ICD-10-CM

## 2019-12-18 LAB
INR BLD: 2.9
PROTHROMBIN TIME: 31.9 SEC (ref 12.3–14.9)

## 2019-12-18 RX ORDER — ALLOPURINOL 100 MG/1
TABLET ORAL
Qty: 90 TABLET | Refills: 0 | Status: SHIPPED | OUTPATIENT
Start: 2019-12-18 | End: 2020-03-16

## 2019-12-27 ENCOUNTER — CARE COORDINATION (OUTPATIENT)
Dept: CARE COORDINATION | Age: 69
End: 2019-12-27

## 2019-12-27 ENCOUNTER — OFFICE VISIT (OUTPATIENT)
Dept: FAMILY MEDICINE CLINIC | Age: 69
End: 2019-12-27
Payer: MEDICARE

## 2019-12-27 VITALS
BODY MASS INDEX: 26.42 KG/M2 | SYSTOLIC BLOOD PRESSURE: 100 MMHG | HEART RATE: 60 BPM | WEIGHT: 189.4 LBS | TEMPERATURE: 97.5 F | DIASTOLIC BLOOD PRESSURE: 60 MMHG

## 2019-12-27 DIAGNOSIS — I10 ESSENTIAL HYPERTENSION: ICD-10-CM

## 2019-12-27 DIAGNOSIS — L29.9 PRURITUS: Primary | ICD-10-CM

## 2019-12-27 DIAGNOSIS — E11.65 UNCONTROLLED TYPE 2 DIABETES MELLITUS WITH HYPERGLYCEMIA (HCC): ICD-10-CM

## 2019-12-27 LAB — HBA1C MFR BLD: 8.3 %

## 2019-12-27 PROCEDURE — 99213 OFFICE O/P EST LOW 20 MIN: CPT | Performed by: INTERNAL MEDICINE

## 2019-12-27 PROCEDURE — 83036 HEMOGLOBIN GLYCOSYLATED A1C: CPT | Performed by: INTERNAL MEDICINE

## 2019-12-27 RX ORDER — NEBULIZER AND COMPRESSOR
1 EACH MISCELLANEOUS DAILY
Qty: 1 KIT | Refills: 0 | Status: ON HOLD | OUTPATIENT
Start: 2019-12-27 | End: 2020-10-26

## 2019-12-27 ASSESSMENT — ENCOUNTER SYMPTOMS
SORE THROAT: 0
RHINORRHEA: 0
RECTAL PAIN: 0
SINUS PRESSURE: 0
APNEA: 0
SINUS PAIN: 0
ABDOMINAL DISTENTION: 0
COLOR CHANGE: 0
ABDOMINAL PAIN: 0
CONSTIPATION: 0
WHEEZING: 0
EYE REDNESS: 0
TROUBLE SWALLOWING: 0
EYE DISCHARGE: 0
BACK PAIN: 0
EYE ITCHING: 0
NAUSEA: 0
PHOTOPHOBIA: 0
VOICE CHANGE: 0
DIARRHEA: 0
COUGH: 0
BLOOD IN STOOL: 0
DYSPNEA ASSOCIATED WITH: EXERTION
EYE PAIN: 0
SHORTNESS OF BREATH: 0
CHEST TIGHTNESS: 0
VOMITING: 0
FACIAL SWELLING: 0

## 2019-12-27 NOTE — CARE COORDINATION
Ambulatory Care Coordination Note  12/27/2019  CM Risk Score: 16  Charlson 10 Year Mortality Risk Score: 100%     ACC: Long Price RN    Summary Note:  Met with patient in coordination with scheduled office visit with Connie Love MD. Medications reconciled with provider visit. Introduced myself and the Care Coordination program. Patient enrolled. Patient's family/POA, Beatriz Moncada,  present at visit. He states he will be  due to patient's memory impairment. He states he pre fills weekly medication box for patient. Discussed with caregiver referral to Clinical [facility]. Caregiver verbalizes agreement. Instructed on availability of same day, next day, and Walk-In Care. Caregiver declined referral to [email or web address] at this time. He states he would prefer to discuss further with patient. Provided patient's caregiver with a Care Coordination folder with my contact information and educational materials. Heart Failure patient education handouts provided: Heart failure Zones, My heart Failure Plan of Care, Self-Check plan for HF Management, Low salt Diet: How to Read a Nutrition Label, Reading Food Labels to Look for Sodium, Avoid Hidden Sources of Sodium, What to Look for at the [employer], and Eating Out for People With Heart Failure. COPD patient handouts provided: COPD Zones, How to Recognize COPD Flare-ups, My COPD Goals, Breathing Easier with COPD, Pursed-Lip Breathing, and Calm Your Stress: Relaxation Techniques. Diabetes educational handouts: Diabetes Zones, Type 2 Diabetes Changes: How and Why,  Building a Balance Meal, Reading a Nutrition Facts label, Checking Your Blood Sugar, Low Blood Sugar, High Blood Sugar,  Know Your Numbers, and Planning Healthy Meals. Referral to Clinical Rx made by [facility] message.     Lab Results   Component Value Date    LABA1C 8.3 12/27/2019    LABA1C 7.8 11/01/2019    LABA1C 8.1 (H) 05/26/2019       Diabetes Assessment    Medic Alert ID:  No  Meal Planning:  Plate Method, Avoidance of concentrated sweets   How often do you test your blood sugar?:  Daily   Do you have barriers with adherence to non-pharmacologic self-management interventions?  (Nutrition/Exercise/Self-Monitoring):  Yes   Have you ever had to go to the ED for symptoms of low blood sugar?:  No       No patient-reported symptoms   Do you have hyperglycemia symptoms?:  No   Do you have hypoglycemia symptoms?:  No   Blood Sugar Monitoring Regimen:  Once a Day   Blood Sugar Trends:  No Change      ,   Congestive Heart Failure Assessment    Are you currently restricting fluids?:  No Restriction  Do you understand a low sodium diet?:  Yes  Do you understand how to read food labels?:  Yes (Comment: pt states he reads them \"sometimes\")  How many restaurant meals do you eat per week?:  1-2  Do you salt your food before tasting it?:  No     No patient-reported symptoms      Symptoms:   CHF associated fatigue: Pos      Symptom course:  stable  Weight trend:  stable (Comment: Per POA)  Salt intake watch compared to last visit:  stable      and   COPD Assessment    Does the patient understand envrionmental exposure?:  Yes  Is the patient able to verbalize Rescue vs. Long Acting medications?:  Yes  Does the patient have a nebulizer?:  Yes  Does the patient use a space with inhaled medications?:  Yes     No patient-reported symptoms         Symptoms:   COPD associated increased fatigue: Pos      Symptom course:  stable  Breathlessness:  exertion  Change in chronic cough?:  No/At Baseline  Change in sputum?:  No/At Baseline  Have you had a recent diagnosis of pneumonia either by PCP or at a hospital?:  No           Ambulatory Care Coordination Assessment    Care Coordination Protocol  Program Enrollment:  Complex Care  Referral from Primary Care Provider:  No  Week 1 - Initial Assessment     Do you have all of your prescriptions and are they filled?:  Yes  Barriers to medication adherence:  None  Are you able to afford your medications?:  Yes  How often do you have trouble taking your medications the way you have been told to take them?:  I always take them as prescribed. Do you have Home O2 Therapy?:  No      Ability to seek help/take action for Emergent Urgent situations i.e. fire, crime, inclement weather or health crisis. :  Independent  Ability to ambulate to restroom:  Independent  Ability handle personal hygeine needs (bathing/dressing/grooming): Independent  Ability to manage Medications:  Needs Assistance  Ability to prepare Food Preparation:  Independent  Ability to maintain home (clean home, laundry): Independent  Ability to drive and/or has transportation:  Independent  Ability to do shopping:  Independent  Ability to manage finances:  Needs Assistance  Is patient able to live independently?:  Yes     Current Housing:  Private Residence        Per the Fall Risk Screening, did the patient have 2 or more falls or 1 fall with injury in the past year?:  No        Are you experiencing loss of meaning?:  No  Are you experiencing loss of hope and peace?:  No     Thinking about your patient's physical health needs, are there any symptoms or problems (risk indicators) you are unsure about that require further investigation?:  No identified areas of uncertainly or problems already being investigated   Are the patients physical health problems impacting on their mental well-being?:  No identified areas of concern   Are there any problems with your patients lifestyle behaviors (alcohol, drugs, diet, exercise) that are impacting on physical or mental well-being?:  No identified areas of concern   Do you have any other concerns about your patients mental well-being?  How would you rate their severity and impact on the patient?:  No identified areas of concern   How would you rate their home environment in terms of safety and stability (including domestic violence, insecure housing, neighbor harassment)?:  Safe, stable, but with some inconsistency   How do daily activities impact on the patient's well-being? (include current or anticipated unemployment, work, caregiving, access to transportation or other):  No identified problems or perceived positive benefits   How would you rate their social network (family, work, friends)?:  Adequate participation with social networks   How would you rate their financial resources (including ability to afford all required medical care)?:  Financially secure, resources adequate, no identified problems   How wells does the patient now understand their health and well-being (symptoms, signs or risk factors) and what they need to do to manage their health?:  Little understanding which impacts on their ability to undertake better management   How well do you think your patient can engage in healthcare discussions? (Barriers include language, deafness, aphasia, alcohol or drug problems, learning difficulties, concentration): Adequate communication, with or without minor barriers   Do other services need to be involved to help this patient?:  Other care/services in place and adequate   Are current services involved with this patient well-coordinated? (Include coordination with other services you are now recommendation):  Required care/services in place and adequately coordinated   Suggested Interventions and Community Resources   Other Services or Interventions:  12/27/19 Instructed on same day, next day, and Walk-In Care. Pharmacist:  In Process   Social Work:  Declined   Zone Management Tools:  Completed         Set up/Review Goals, Set up/Review an Education Plan              Prior to Admission medications    Medication Sig Start Date End Date Taking?  Authorizing Provider   Blood Pressure Monitoring (ADULT BLOOD PRESSURE CUFF LG) KIT 1 Device by Does not apply route daily 12/27/19   Jaiden Gibson MD   allopurinol (ZYLOPRIM) 100 MG tablet TAKE 1 TABLET DAILY 12/18/19   Jaiden Gibson MD   fexofenadine (ALLEGRA) 180 MG tablet Take 1 tablet by mouth daily 12/11/19   Emily Pinedo MD   metOLazone (ZAROXOLYN) 2.5 MG tablet Take half hour before morning dose of lasix. Take on Mondays 12/4/19   Emily Pinedo MD   bumetanide [facility]) 1 MG tablet Take 2 tablets by mouth daily 12/4/19   Emily Pinedo MD   metoprolol succinate (TOPROL XL) 25 MG extended release tablet Take 1/2 tablet daily 12/4/19   Emily Pinedo MD   nitroGLYCERIN (NITROSTAT) 0.4 MG SL tablet Place 1 tablet under the tongue every 5 minutes as needed for Chest pain 11/26/19   Emily Pinedo MD   spironolactone (ALDACTONE) 25 MG tablet Take 1 tablet by mouth daily 11/21/19   Lito Menendez MD   montelukast (SINGULAIR) 10 MG tablet TAKE 1 TABLET BY MOUTH DAILY AT BEDTIME 9/12/19   Daphne Chavarria MD   atorvastatin (LIPITOR) 80 MG tablet Take 80 mg by mouth daily    Historical Provider, MD   insulin 70-30 (NOVOLIN 70/30) (70-30) 100 UNIT per ML injection vial INJECT 10 UNITS TWICE A DAY 5/27/19   Tamanna Chow MD   warfarin (COUMADIN) 7.5 MG tablet 7.5mg daily 5/27/19   Lito Menendez MD   warfarin (COUMADIN) 5 MG tablet Take as directed by [facility] AT [facility]. Quantity equals 90 day supply.  1/15/19   Harsha Garcia MD   BD INSULIN SYRINGE ULTRAFINE 31G X 5/16\" 0.5 ML MISC USE TWICE A DAY 7/30/18   Tamanna Chow MD   Oxygen Concentrator Oxi Go POC 6/27/18   Ethan Nathan MD   potassium chloride (KLOR-CON M) 20 MEQ extended release tablet Take 1 tablet by mouth 2 times daily  Patient taking differently: Take 20 mEq by mouth Take 1/2 tab BID 5/17/18   Lito Menendez MD   budesonide-formoterol (SYMBICORT) 160-4.5 MCG/ACT AERO Inhale 2 puffs into the lungs 2 times daily 3/30/18   Ethan Nathan MD   albuterol (PROVENTIL) (2.5 MG/3ML) 0.083% nebulizer solution Take 3 mLs by nebulization every 6 hours as needed for Wheezing 2/13/18   Ethan Nathan MD   pantoprazole (PROTONIX) 40 MG tablet Take 1 tablet by mouth every morning (before breakfast) 11/8/17   Ana Zamora MD   ENTRESTO 24-26 MG per tablet Take 1 tablet by mouth 2 times daily  2/16/17   Historical Provider, MD   DIGITEK 125 MCG tablet TAKE 1 TABLET DAILY 8/2/15   Taniya Proctor MD   albuterol (PROAIR HFA) 108 (90 BASE) MCG/ACT inhaler Inhale 2 puffs into the lungs every 4 hours as needed for Wheezing 5/11/15   Taniya Proctor MD       Future Appointments   Date Time Provider [facility]   1/2/2020 11:45 AM Edilson Sandhu  [facility]   1/17/2020 10:45 AM Tulio Garcia  [place], [address]

## 2019-12-30 ENCOUNTER — TELEPHONE (OUTPATIENT)
Dept: PHARMACY | Facility: CLINIC | Age: 69
End: 2019-12-30

## 2020-01-02 ENCOUNTER — OFFICE VISIT (OUTPATIENT)
Dept: ENDOCRINOLOGY | Age: 70
End: 2020-01-02
Payer: MEDICARE

## 2020-01-02 ENCOUNTER — TELEPHONE (OUTPATIENT)
Dept: PHARMACY | Age: 70
End: 2020-01-02

## 2020-01-02 VITALS
OXYGEN SATURATION: 96 % | DIASTOLIC BLOOD PRESSURE: 57 MMHG | HEART RATE: 59 BPM | SYSTOLIC BLOOD PRESSURE: 78 MMHG | RESPIRATION RATE: 16 BRPM | HEIGHT: 71 IN | BODY MASS INDEX: 25.76 KG/M2 | WEIGHT: 184 LBS

## 2020-01-02 LAB
ANION GAP SERPL CALCULATED.3IONS-SCNC: 17 MEQ/L (ref 9–15)
BUN BLDV-MCNC: 71 MG/DL (ref 8–23)
CALCIUM SERPL-MCNC: 9.9 MG/DL (ref 8.5–9.9)
CHLORIDE BLD-SCNC: 91 MEQ/L (ref 95–107)
CHP ED QC CHECK: NORMAL
CO2: 24 MEQ/L (ref 20–31)
CREAT SERPL-MCNC: 2.39 MG/DL (ref 0.7–1.2)
GFR AFRICAN AMERICAN: 32.9
GFR NON-AFRICAN AMERICAN: 27.2
GLUCOSE BLD-MCNC: 123 MG/DL (ref 70–99)
GLUCOSE BLD-MCNC: 134 MG/DL
POTASSIUM SERPL-SCNC: 4.8 MEQ/L (ref 3.4–4.9)
SODIUM BLD-SCNC: 132 MEQ/L (ref 135–144)
T4 FREE: 1.49 NG/DL (ref 0.84–1.68)
TSH SERPL DL<=0.05 MIU/L-ACNC: 3.88 UIU/ML (ref 0.44–3.86)

## 2020-01-02 PROCEDURE — 82962 GLUCOSE BLOOD TEST: CPT | Performed by: INTERNAL MEDICINE

## 2020-01-02 PROCEDURE — 99213 OFFICE O/P EST LOW 20 MIN: CPT | Performed by: INTERNAL MEDICINE

## 2020-01-02 RX ORDER — BLOOD SUGAR DIAGNOSTIC
STRIP MISCELLANEOUS
Qty: 300 EACH | Refills: 0
Start: 2020-01-02 | End: 2020-06-11 | Stop reason: SDUPTHER

## 2020-01-02 NOTE — TELEPHONE ENCOUNTER
Spoke with POA, pt is no longer using [facility] prefers our [facility] services. Will call us next week to schedule a visit due to too many dr visits lately.  I recommended being seen within the next 2 weeks    [address] [facility]  Staff Pharmacist  1/2/2020  2:11 PM

## 2020-01-02 NOTE — PROGRESS NOTES
Subjective:      Patient ID: Katie Torres is a 81 y.o. male. 2-month follow-up on diabetes hypothyroidism  Patient has been losing weight A1c has gone up  Diabetes   He presents for his follow-up diabetic visit. He has type 2 diabetes mellitus. Associated symptoms include fatigue. Symptoms are worsening. Diabetic complications include heart disease and nephropathy. Current diabetic treatment includes insulin injections (Novolin 70/30). He is currently taking insulin pre-breakfast and pre-dinner. His overall blood glucose range is 180-200 mg/dl. History of hypothyroidism in the past  Mild hypothyroidism TSH has been fluctuating last TSH was 8.370 patient not on replacement  Results for Roe Young (MRN [id number]) as of 1/2/2020 16:26   Ref. Range 5/26/2019 06:18 6/12/2019 11:18 11/1/2019 16:57 11/16/2019 21:15 11/23/2019 11:49   TSH Latest Ref Range: 0.440 - 3.860 uIU/mL 10.610 (H) 8.780 (H) 6.950 (H) 7.340 (H) 8.370 (H)       Results for Roe Young (MRN [id number]) as of 1/2/2020 16:26   Ref.  Range 12/4/2018 05:31 2/25/2019 13:56 5/26/2019 06:18 11/1/2019 16:03 12/27/2019 12:14   Hemoglobin A1C Latest Units: % 8.8 (H) 7.9 (H) 8.1 (H) 7.8 8.3       Lab Results   Component Value Date     12/03/2019    K 4.6 12/03/2019    CL 98 12/03/2019    CO2 24 12/03/2019    BUN 46 (H) 12/03/2019    CREATININE 1.38 (H) 12/03/2019    GLUCOSE 134 01/02/2020    CALCIUM 9.8 12/03/2019    PROT 7.0 11/23/2019    LABALBU 4.1 11/23/2019    BILITOT 1.4 (H) 11/23/2019    ALKPHOS 229 (H) 11/23/2019    AST 38 11/23/2019    ALT 33 11/23/2019    LABGLOM 51.3 (L) 12/03/2019    GFRAA >60.0 12/03/2019    GLOB 2.9 11/23/2019         Patient Active Problem List   Diagnosis    Uncontrolled type 2 diabetes mellitus with complication, with long-term current use of insulin (HCC)    CAD (coronary artery disease)    Noncompliance    Pain in joint, multiple sites    COPD (chronic obstructive pulmonary disease) [facility] [address]    Diabetes mellitus with insulin therapy ([address]    Hyperlipidemia    Hypertension    Generalized atherosclerosis    TIA (transient ischemic attack)    Elevation of level of transaminase or lactic acid dehydrogenase (LDH)    Tobacco dependence syndrome    Disorder of muscle    Mitral valve insufficiency    Acute lymphadenitis    Disorder of pancreas    Left heart failure (HCC)    Irritable bowel syndrome    Hyponatremia    Atherosclerosis of coronary artery    Generalized ischemic myocardial dysfunction    Coronary arteriosclerosis in native artery    Atrial flutter (HCC)    Acute on chronic systolic CHF (congestive heart failure), NYHA class 4 (HCC)    Paroxysmal A-fib (HCC)    Chronic combined systolic and diastolic heart failure (HCC)    Hemoptysis    SOB (shortness of breath)    CHF (congestive heart failure), NYHA class III, chronic, combined ([address]    CHF (congestive heart failure), NYHA class I, acute on chronic, combined (HCC)    Acute systolic CHF (congestive heart failure) (HCC)    CHF (congestive heart failure), NYHA class IV, acute on chronic, combined (HCC)    Phimosis/redundant prepuce    Moderate malnutrition (HCC)    Amiodarone-induced hyperthyroidism    Hyperthyroidism    Uncontrolled type 2 diabetes mellitus with hyperglycemia (HCC)    BERTRAM (acute kidney injury) ([address]    Anticoagulant long-term use    Blurred vision, bilateral    Cardiomyopathy of end-stage congenital heart disease ([address]    Chest pain    Cough in adult    Dizziness    Fatigue    Fever    Former smoker    Gout, arthritis    High risk medication use    Hyperkalemia    Hypokalemia    Hypotension (arterial)    ICD (implantable cardioverter-defibrillator) discharge    Obese    Overweight    Paroxysmal ventricular tachycardia (HCC)    Presence of automatic implantable cardioverter-defibrillator    Status post angioplasty    Swelling of multiple joints    Hypoxia    Ventricular tachycardia ([address]    Sustained VT [facility]ricular [facility]cardia) [facility]    Acute decompensated heart failure (HCC)    CKD (chronic kidney disease) stage 3, GFR 30-59 ml/min ([facility]    Elevated bilirubin     Allergies   Allergen Reactions    Dye [Iodides] Shortness Of Breath    Penicillins Anaphylaxis    Tapazole [Methimazole]      Itching    '      Review of Systems   Constitutional: Positive for fatigue. Vitals:    01/02/20 1129   BP: (!) 78/57   Site: Left Upper Arm   Position: Sitting   Cuff Size: Large Adult   Pulse: 59   Resp: 16   SpO2: 96%   Weight: 184 lb (83.5 kg)   Height: 5' 11\" (1.803 m)       Objective:   Physical Exam  Constitutional:       Appearance: Normal appearance. Neck:      Musculoskeletal: Normal range of motion and neck supple. Cardiovascular:      Rate and Rhythm: Bradycardia present. Neurological:      Mental Status: He is alert. Psychiatric:         Mood and Affect: Mood normal.         Assessment:       Diagnosis Orders   1. Hypothyroidism, unspecified type     2.  Uncontrolled type 2 diabetes mellitus with complication, with long-term current use of insulin ([facility])  POCT Glucose    insulin 70-30 (NOVOLIN 70/30) (70-30) 100 UNIT per ML injection vial    Basic Metabolic Panel    T4, Free    TSH without Reflex           Plan:      Orders Placed This Encounter   Procedures    Basic Metabolic Panel     Standing Status:   Future     Number of Occurrences:   1     Standing Expiration Date:   1/2/2021    T4, Free     Standing Status:   Future     Number of Occurrences:   1     Standing Expiration Date:   1/2/2021    TSH without Reflex     Standing Status:   Future     Number of Occurrences:   1     Standing Expiration Date:   1/2/2021    POCT Glucose     Orders Placed This Encounter   Medications    insulin 70-30 (NOVOLIN 70/30) (70-30) 100 UNIT per ML injection vial     Sig: INJECT 10 UNITS TWICE A DAY     Dispense:  80 mL     Refill:  3    Insulin Syringe-Needle U-100 (BD INSULIN SYRINGE ULTRAFINE)

## 2020-01-08 ENCOUNTER — TELEPHONE (OUTPATIENT)
Dept: FAMILY MEDICINE CLINIC | Age: 70
End: 2020-01-08

## 2020-01-08 ENCOUNTER — CARE COORDINATION (OUTPATIENT)
Dept: CARE COORDINATION | Age: 70
End: 2020-01-08

## 2020-01-08 NOTE — TELEPHONE ENCOUNTER
Margarita Tiwari, pt's POA returned call to office stating pt has an  appt scheduled 1/10/2020 w/ Dr Franchesca Wheatley at [facility] in [facility].

## 2020-01-15 ENCOUNTER — CARE COORDINATION (OUTPATIENT)
Dept: CARE COORDINATION | Age: 70
End: 2020-01-15

## 2020-01-16 ENCOUNTER — CARE COORDINATION (OUTPATIENT)
Dept: CARE COORDINATION | Age: 70
End: 2020-01-16

## 2020-01-16 ENCOUNTER — TELEPHONE (OUTPATIENT)
Dept: PHARMACY | Age: 70
End: 2020-01-16

## 2020-01-16 NOTE — CARE COORDINATION
Telephone call to Eri/other. Left voicemail of nature of call with request for return phone call. Call back number was provided.

## 2020-01-17 ENCOUNTER — CARE COORDINATION (OUTPATIENT)
Dept: CARE COORDINATION | Age: 70
End: 2020-01-17

## 2020-01-17 ENCOUNTER — OFFICE VISIT (OUTPATIENT)
Dept: FAMILY MEDICINE CLINIC | Age: 70
End: 2020-01-17
Payer: MEDICARE

## 2020-01-17 VITALS
SYSTOLIC BLOOD PRESSURE: 120 MMHG | HEART RATE: 100 BPM | BODY MASS INDEX: 26.44 KG/M2 | WEIGHT: 189.6 LBS | OXYGEN SATURATION: 98 % | TEMPERATURE: 97.5 F | DIASTOLIC BLOOD PRESSURE: 64 MMHG

## 2020-01-17 PROCEDURE — 99213 OFFICE O/P EST LOW 20 MIN: CPT | Performed by: INTERNAL MEDICINE

## 2020-01-17 RX ORDER — DONEPEZIL HYDROCHLORIDE 10 MG/1
10 TABLET, FILM COATED ORAL NIGHTLY
Qty: 30 TABLET | Refills: 3 | Status: SHIPPED | OUTPATIENT
Start: 2020-01-17 | End: 2020-01-20 | Stop reason: SDUPTHER

## 2020-01-17 ASSESSMENT — ENCOUNTER SYMPTOMS
SINUS PAIN: 0
CONSTIPATION: 0
EYE REDNESS: 0
EYE DISCHARGE: 0
TROUBLE SWALLOWING: 0
ABDOMINAL DISTENTION: 0
BLOOD IN STOOL: 0
NAUSEA: 0
RHINORRHEA: 0
COUGH: 0
RECTAL PAIN: 0
EYE ITCHING: 0
FACIAL SWELLING: 0
VOICE CHANGE: 0
BACK PAIN: 0
SINUS PRESSURE: 0
EYE PAIN: 0
APNEA: 0
VOMITING: 0
ABDOMINAL PAIN: 0
DIARRHEA: 0
WHEEZING: 0
SORE THROAT: 0
PHOTOPHOBIA: 0
CHEST TIGHTNESS: 0
SHORTNESS OF BREATH: 0
COLOR CHANGE: 0

## 2020-01-17 NOTE — PROGRESS NOTES
2020    Xochitl Ornelas (:  1950) is a 81 y.o. male, here for evaluation of the following medical concerns:    Other   Pertinent negatives include no abdominal pain, arthralgias, chest pain, chills, congestion, coughing, diaphoresis, fatigue, fever, headaches, joint swelling, myalgias, nausea, neck pain, numbness, rash, sore throat, vomiting or weakness. 63-year-old male with a history of systolic heart failure, hypothyroidism, paroxysmal atrial fibrillation, COPD presents for follow-up visit. At our prior visit he voiced a complaint of pruritus. Short-term memory loss: The patient is concerned that he has been experiencing short-term memory loss. He has a family history of dementia. He has become increasingly forgetful over the course of the last several months. Pruritus: The patient was referred to dermatology on her prior visit. It was recommended that he discontinue Atarax. He was provided with a medium potency steroid and is utilizing this daily. Heart failure: The patient is compliant with Entresto 24-26 mg 2 times daily, spironolactone 25 mg orally daily. Zaroxolyn 2.5 mg daily and metoprolol 25 mg orally daily. Additionally takes potassium 20 mEq orally daily he is compliant with Lipitor 80 mg orally daily follow closely by his cardiologist Dr. Aracelis Courtney. Hypothyroidism: The patient was noted to have an elevated TSH on 2019. He is not presently receiving Synthroid. Will consider initiation of Synthroid    Atrial fibrillation: Patient is receiving Digitek, metoprolol as a forementioned. He is also compliant with Coumadin  inrtherapeutic 2.9 on 19      COPD: His COPD is stable at this time he is compliant with Singulair, Symbicort and supplemental oxygen. Diabetes: A1C=8.3 in December. He monitors his blood sugars via home blood pressure monitoring kit.   He is compliant with insulin 70/30          Review of Systems   Constitutional: Negative for chills, diaphoresis, fatigue and fever. HENT: Negative for congestion, dental problem, drooling, ear discharge, ear pain, facial swelling, hearing loss, mouth sores, nosebleeds, postnasal drip, rhinorrhea, sinus pressure, sinus pain, sneezing, sore throat, tinnitus, trouble swallowing and voice change. Eyes: Negative for photophobia, pain, discharge, redness, itching and visual disturbance. Respiratory: Negative for apnea, cough, chest tightness, shortness of breath and wheezing. Cardiovascular: Negative for chest pain, palpitations and leg swelling. Gastrointestinal: Negative for abdominal distention, abdominal pain, blood in stool, constipation, diarrhea, nausea, rectal pain and vomiting. Endocrine: Negative for cold intolerance, heat intolerance, polydipsia, polyphagia and polyuria. Genitourinary: Negative for decreased urine volume, difficulty urinating, dysuria, flank pain, frequency, genital sores, hematuria and urgency. Musculoskeletal: Negative for arthralgias, back pain, gait problem, joint swelling, myalgias, neck pain and neck stiffness. Skin: Negative for color change, rash and wound. Allergic/Immunologic: Negative for environmental allergies and food allergies. Neurological: Negative for dizziness, tremors, seizures, syncope, facial asymmetry, speech difficulty, weakness, light-headedness, numbness and headaches. Hematological: Negative for adenopathy. Does not bruise/bleed easily. Psychiatric/Behavioral: Negative for agitation, confusion, decreased concentration, hallucinations, self-injury, sleep disturbance and suicidal ideas. The patient is not nervous/anxious. Prior to Visit Medications    Medication Sig Taking?  Authorizing Provider   triamcinolone (KENALOG) 0.1 % ointment AS DIRECTED TWICE A DAY EXTERNALLY ALL OVER BODY, AVOIDING FACE 30 DAYS Yes Historical Provider, MD   donepezil (ARICEPT) 10 MG tablet Take 1 tablet by mouth nightly Yes Héctor Glez Zenaida Mi MD   insulin 70-30 (NOVOLIN 70/30) (70-30) 100 UNIT per ML injection vial INJECT 10 UNITS TWICE A DAY Yes Trey Ibarra MD   Insulin Syringe-Needle U-100 (BD INSULIN SYRINGE ULTRAFINE) 31G X 5/16\" 0.5 ML MISC USE TWICE A DAY Yes Aziza Huston MD   Blood Pressure Monitoring (ADULT BLOOD PRESSURE CUFF LG) KIT 1 Device by Does not apply route daily Yes Devang Espitia MD   allopurinol (ZYLOPRIM) 100 MG tablet TAKE 1 TABLET DAILY Yes Devang Espitia MD   fexofenadine (ALLEGRA) 180 MG tablet Take 1 tablet by mouth daily Yes Devang Espitia MD   metOLazone (ZAROXOLYN) 2.5 MG tablet Take half hour before morning dose of lasix. Take on Mondays Yes Devang Espitia MD   bumetanide (BUMEX) 1 MG tablet Take 2 tablets by mouth daily Yes Devagn Espitia MD   metoprolol succinate (TOPROL XL) 25 MG extended release tablet Take 1/2 tablet daily Yes Devang Espitia MD   nitroGLYCERIN (NITROSTAT) 0.4 MG SL tablet Place 1 tablet under the tongue every 5 minutes as needed for Chest pain Yes Devang Espitia MD   spironolactone (ALDACTONE) 25 MG tablet Take 1 tablet by mouth daily Yes Deja Elias MD   atorvastatin (LIPITOR) 80 MG tablet Take 80 mg by mouth daily Yes Historical Provider, MD   warfarin (COUMADIN) 7.5 MG tablet 7.5mg daily Yes Deja Elias MD   warfarin (COUMADIN) 5 MG tablet Take as directed by [facility]. Quantity equals 90 day supply.  Yes Tristian Guerrero MD   Oxygen Concentrator Oxi Go POC Yes Devang Tellez MD   potassium chloride (KLOR-CON M) 20 MEQ extended release tablet Take 1 tablet by mouth 2 times daily  Patient taking differently: Take 20 mEq by mouth Take 1/2 tab BID Yes Deja Elias MD   budesonide-formoterol (SYMBICORT) 160-4.5 MCG/ACT AERO Inhale 2 puffs into the lungs 2 times daily Yes Devang Tellez MD   albuterol (PROVENTIL) (2.5 MG/3ML) 0.083% nebulizer solution Take 3 mLs by nebulization every 6 hours as needed for Wheezing Yes Devang Tellez MD 120/64   Site: Right Upper Arm   Position: Sitting   Cuff Size: Large Adult   Pulse: 100   Temp: 97.5 °F (36.4 °C)   TempSrc: Temporal   SpO2: 98%   Weight: 189 lb 9.6 oz (86 kg)     Estimated body mass index is 26.44 kg/m² as calculated from the following:    Height as of 1/2/20: 5' 11\" (1.803 m). Weight as of this encounter: 189 lb 9.6 oz (86 kg). Physical Exam  Constitutional:       General: He is not in acute distress. Appearance: He is well-developed. HENT:      Head: Normocephalic. Right Ear: External ear normal.      Left Ear: External ear normal.   Eyes:      Conjunctiva/sclera: Conjunctivae normal.   Neck:      Musculoskeletal: Neck supple. Vascular: No JVD. Trachea: No tracheal deviation. Cardiovascular:      Rate and Rhythm: Normal rate and regular rhythm. Heart sounds: Normal heart sounds. Pulmonary:      Effort: Pulmonary effort is normal. No respiratory distress. Breath sounds: Normal breath sounds. No wheezing or rales. Chest:      Chest wall: No tenderness. Abdominal:      General: Bowel sounds are normal. There is no distension. Palpations: Abdomen is soft. There is no mass. Tenderness: There is no tenderness. There is no guarding or rebound. Musculoskeletal:         General: No tenderness or deformity. Lymphadenopathy:      Cervical: No cervical adenopathy. Skin:     General: Skin is warm and dry. Coloration: Skin is not pale. Findings: No erythema or rash. Neurological:      Mental Status: He is alert and oriented to person, place, and time. Cranial Nerves: No cranial nerve deficit. Sensory: No sensory deficit. Motor: No abnormal muscle tone. Coordination: Coordination normal.      Deep Tendon Reflexes: Reflexes normal.   Psychiatric:         Thought Content: Thought content normal.         Judgment: Judgment normal.         ASSESSMENT/PLAN:    1.  Localized pruritus  Continue immune potency topical steroid. 2.  Hypertension- we will provide patient with a blood pressure cuff. 3. Left heart failure (HCC)  Continue Zaroxolyn, metoprolol, Bumex, spironolactone and potassium supplementation      4. Hypothyroidism, unspecified type  TSH is within normal limits. Reassess thyroid function at next office visit. 5. Paroxysmal A-fib (HCC)  Continue metoprolol and Coumadin. Monitor INR    6 chronic obstructive pulmonary disease, unspecified COPD type (HCC)  Continue Symbicort and Singulair. Continue supplemental oxygen. 7.  Short-term memory loss-trial of Aricept referral to neurology. Return in about 1 month (around 2/17/2020). An  electronic signature was used to authenticate this note.     --Connie Love MD on 1/20/2020 at 6:10 AM

## 2020-01-20 RX ORDER — DONEPEZIL HYDROCHLORIDE 10 MG/1
10 TABLET, FILM COATED ORAL NIGHTLY
Qty: 30 TABLET | Refills: 3 | Status: SHIPPED | OUTPATIENT
Start: 2020-01-20 | End: 2020-02-17

## 2020-01-20 NOTE — CARE COORDINATION
Ambulatory Care Coordination Note  1/17/2020  CM Risk Score: 16  Charlson 10 Year Mortality Risk Score: 100%     ACC: Kelly Villafuerte RN    Summary Note:  Met with patient in coordination with scheduled office visit with Nicolas Stuart MD. Patient's caregiver, Lowell Huynh,  present at visit. Medications reconciled with provider visit. Caregiver has list of current medications. He states he uses list to prefill medication box for patient. Caregiver updated provider of new ointment prescribed by Dermatology. Discussed diabetes management with patient and caregiver. Provided patient with a Care Coordination folder with my contact information and educational materials. Diabetes educational handouts: Diabetes Zones, Type 2 Diabetes Changes: How and Why,  Building a Balance Meal, Reading a Nutrition Facts label, Checking Your Blood Sugar, Low Blood Sugar, High Blood Sugar,  Know Your Numbers, and Planning Healthy Meals. Lab Results   Component Value Date    LABA1C 8.3 12/27/2019    LABA1C 7.8 11/01/2019    LABA1C 8.1 (H) 05/26/2019        Diabetes Assessment    Medic Alert ID:  No  Meal Planning:  Plate Method, Avoidance of concentrated sweets   How often do you test your blood sugar?:  Daily (Comment: 3 times a day)   Do you have barriers with adherence to non-pharmacologic self-management interventions?  (Nutrition/Exercise/Self-Monitoring):  Yes   Have you ever had to go to the ED for symptoms of low blood sugar?:  No       No patient-reported symptoms   Do you have hyperglycemia symptoms?:  No (Comment: denies)   Do you have hypoglycemia symptoms?:  No (Comment: denies)   Last Blood Sugar Value:  176   Blood Sugar Trends:  No Change      ,   Congestive Heart Failure Assessment    Are you currently restricting fluids?:  No Restriction  Do you understand a low sodium diet?:  Yes  Do you understand how to read food labels?:  Yes (Comment: pt states he reads them \"sometimes\")  How many restaurant meals do you eat per week?:  1-2  Do you salt your food before tasting it?:  No     No patient-reported symptoms      Symptoms:      Symptom course:  stable  Patient-reported weight (lb):  189  Weight trend:  fluctuating minimally (Comment: Per cg weight fluctuating +/- 1 to 2 lbs.)  Salt intake watch compared to last visit:  stable      and   COPD Assessment    Does the patient understand envrionmental exposure?:  Yes  Is the patient able to verbalize Rescue vs. Long Acting medications?:  Yes  Does the patient have a nebulizer?:  Yes  Does the patient use a space with inhaled medications?:  Yes     No patient-reported symptoms         Symptoms:      Symptom course:  stable  Change in chronic cough?:  No/At Baseline  Change in sputum?:  No/At Baseline  Self Monitoring - SaO2:  No  Have you had a recent diagnosis of pneumonia either by PCP or at a hospital?:  No           Care Coordination Interventions    Program Enrollment:  Complex Care  Referral from Primary Care Provider:  No  Suggested Interventions and Community Resources  Pharmacist:  Completed (Comment: 12/27/19 Clinical Rx - POA)  Social Work:  Declined (Comment: 12/2//2019 CG declined referral at this time. )  Zone Management Tools:  Completed (Comment: 12/27/2019 CHF, COPD, and Diabetes Zones)  Other Services or Interventions:  12/27/19 Instructed on same day, next day, and Walk-In Care. Goals Addressed                 This Visit's Progress     Conditions and Symptoms   On track     I will schedule office visits, as directed by my provider. I will keep my appointment or reschedule if I have to cancel. I will notify my provider of any barriers to my plan of care. I will follow my Zone Management tool to seek urgent or emergent care. I will notify my provider of any symptoms that indicate a worsening of my condition.      Heart Failure  COPD  Diabetes    Barriers: impairment:  Cognitive-memory, overwhelmed by complexity of regimen and lack of education  Plan for

## 2020-01-22 ENCOUNTER — HOSPITAL ENCOUNTER (OUTPATIENT)
Dept: PHARMACY | Age: 70
Setting detail: THERAPIES SERIES
Discharge: HOME | End: 2020-01-22
Payer: MEDICARE

## 2020-01-22 LAB — INTERNATIONAL NORMALIZATION RATIO, POC: 1.9

## 2020-01-22 PROCEDURE — 85610 PROTHROMBIN TIME: CPT | Performed by: PHARMACIST

## 2020-01-22 PROCEDURE — 99211 OFF/OP EST MAY X REQ PHY/QHP: CPT | Performed by: PHARMACIST

## 2020-01-22 NOTE — PROGRESS NOTES
Mr. Margarita Chávez is a 81 y.o. y/o male with history of Afib who presents today for anticoagulation monitoring and adjustment. INR 1.9 is subtherapeutic for this patient (goal range 2-3) and is reflective of 50 mg TWD. Pt has been following 50mg TWD per doctor instructions, not MM clinic dosing. Patient verifies current dosing regimen, patient able to verbally recall dose  Patient reports \"possibly\" 1-2 missed doses since last INR (last week).   Patient denies s/sx clotting and/or stroke  Patient denies hematuria, epistaxis, rectal bleeding  Patient denies changes in diet, alcohol, or tobacco use  Reviewed medication list and drug allergies with patient, updated any medication additions or modifications accordingly  Patient also denies any pending medical or dental procedures scheduled at this time  Patient was instructed to take extra 5mg tomorrow, otherwise continue 50mg TWD and RTC 2 weeks

## 2020-01-30 ENCOUNTER — CARE COORDINATION (OUTPATIENT)
Dept: CARE COORDINATION | Age: 70
End: 2020-01-30

## 2020-01-30 NOTE — CARE COORDINATION
Telephone call with Zhen Thompson who indicated that patient is having memory issues. He stated that he is patient's DPOA/Healthcare and is trying to help him. Discussed that patient maybe having difficulty affording Entresto. Eri to do some checking about this.   Discussed resources of Social Security Extra Help and patient assistance program.

## 2020-02-03 ENCOUNTER — OFFICE VISIT (OUTPATIENT)
Dept: ENDOCRINOLOGY | Age: 70
End: 2020-02-03
Payer: MEDICARE

## 2020-02-03 ENCOUNTER — HOSPITAL ENCOUNTER (OUTPATIENT)
Dept: PHARMACY | Age: 70
Setting detail: THERAPIES SERIES
Discharge: HOME | End: 2020-02-03
Payer: MEDICARE

## 2020-02-03 VITALS
BODY MASS INDEX: 25.56 KG/M2 | WEIGHT: 182.6 LBS | DIASTOLIC BLOOD PRESSURE: 62 MMHG | OXYGEN SATURATION: 93 % | HEART RATE: 89 BPM | SYSTOLIC BLOOD PRESSURE: 99 MMHG | HEIGHT: 71 IN

## 2020-02-03 LAB — INTERNATIONAL NORMALIZATION RATIO, POC: 4

## 2020-02-03 PROCEDURE — 99211 OFF/OP EST MAY X REQ PHY/QHP: CPT

## 2020-02-03 PROCEDURE — 85610 PROTHROMBIN TIME: CPT

## 2020-02-03 PROCEDURE — 99213 OFFICE O/P EST LOW 20 MIN: CPT | Performed by: INTERNAL MEDICINE

## 2020-02-09 NOTE — PROGRESS NOTES
Subjective:      Patient ID: Andie Duque. is a 81 y.o. male. 4 week f/u on hypothyroidism diabtes   Pt not on thyroid replacement   Diabetes   He presents for his follow-up diabetic visit. He has type 2 diabetes mellitus. Diabetic complications include heart disease and nephropathy. Current diabetic treatment includes insulin injections. He is currently taking insulin pre-breakfast and pre-dinner. His overall blood glucose range is 180-200 mg/dl. Results for Yaakov Danielle (MRN [id number]) as of 2/9/2020 11:04   Ref. Range 1/2/2020 12:22   Sodium Latest Ref Range: 135 - 144 mEq/L 132 (L)   Potassium Latest Ref Range: 3.4 - 4.9 mEq/L 4.8   Chloride Latest Ref Range: 95 - 107 mEq/L 91 (L)   CO2 Latest Ref Range: 20 - 31 mEq/L 24   BUN Latest Ref Range: 8 - 23 mg/dL 71 (H)   Creatinine Latest Ref Range: 0.70 - 1.20 mg/dL 2.39 (H)   Anion Gap Latest Ref Range: 9 - 15 mEq/L 17 (H)   GFR Non- Latest Ref Range: >60  27.2 (L)   GFR  Latest Ref Range: >60  32.9 (L)   Glucose Latest Ref Range: 70 - 99 mg/dL 123 (H)   Calcium Latest Ref Range: 8.5 - 9.9 mg/dL 9.9   TSH Latest Ref Range: 0.440 - 3.860 uIU/mL 3.880 (H)   T4 Free Latest Ref Range: 0.84 - 1.68 ng/dL 1.49     Results for Yaakov Danielle (MRN [id number]) as of 2/9/2020 11:04   Ref. Range 2/25/2019 13:56 5/26/2019 06:18 11/1/2019 16:03 12/27/2019 12:14   Hemoglobin A1C Latest Units: % 7.9 (H) 8.1 (H) 7.8 8.3       Review of Systems  Vitals:    02/03/20 1104   BP: 99/62   Site: Left Upper Arm   Position: Sitting   Cuff Size: Large Adult   Pulse: 89   SpO2: 93%   Weight: 182 lb 9.6 oz (82.8 kg)   Height: 5' 11\" (1.803 m)       Objective:   Physical Exam  Vitals signs reviewed. Constitutional:       Appearance: Normal appearance. HENT:      Head: Normocephalic and atraumatic. Neck:      Musculoskeletal: Normal range of motion and neck supple. Cardiovascular:      Rate and Rhythm: Normal rate.    Musculoskeletal: Normal range of motion. Neurological:      Mental Status: He is alert. Psychiatric:         Mood and Affect: Mood normal.         Assessment:       Diagnosis Orders   1. Uncontrolled type 2 diabetes mellitus with complication, with long-term current use of insulin ([facility])  Basic Metabolic Panel    Hemoglobin A1C   2. Hypothyroidism, unspecified type  T4, Free    TSH without Reflex   3.  Stage 3 chronic kidney disease ([facility] [address]  OMER Wheatley DO, NephrologyManish           Plan:      Orders Placed This Encounter   Procedures    T4, Free     Standing Status:   Future     Standing Expiration Date:   2/3/2021    TSH without Reflex     Standing Status:   Future     Standing Expiration Date:   2/3/2021    Basic Metabolic Panel     Standing Status:   Future     Standing Expiration Date:   2/3/2021    Hemoglobin A1C     Standing Status:   Future     Standing Expiration Date:   2/3/2021    OMER Wheatley DO, NephrologyManish     Referral Priority:   Routine     Referral Type:   Eval and Treat     Referral Reason:   Specialty Services Required     Referred to Provider:   Ky Koyanagi, DO     Requested Specialty:   Nephrology     Number of Visits Requested:   1     Continue novolog 70/30 10 bid   Hold thyroid replacement   Refer to renal         Michael Huynh MD

## 2020-02-10 ENCOUNTER — HOSPITAL ENCOUNTER (OUTPATIENT)
Dept: CT IMAGING | Age: 70
Discharge: HOME | End: 2020-02-12
Payer: MEDICARE

## 2020-02-10 PROCEDURE — 70450 CT HEAD/BRAIN W/O DYE: CPT

## 2020-02-12 ENCOUNTER — CARE COORDINATION (OUTPATIENT)
Dept: CARE COORDINATION | Age: 70
End: 2020-02-12

## 2020-02-12 NOTE — CARE COORDINATION
Telephone with Talon Duran. He indicated that he is DPOA for health care and finances. He is trying to figure out what patient needs at this time. He is not sure if patient needs assistance with Entresto. Discussed could provide assistance with this need if necessary.

## 2020-02-16 ASSESSMENT — ENCOUNTER SYMPTOMS
SINUS PRESSURE: 0
COLOR CHANGE: 0
RECTAL PAIN: 0
BLOOD IN STOOL: 0
EYE REDNESS: 0
PHOTOPHOBIA: 0
CHEST TIGHTNESS: 0
VOMITING: 0
EYE DISCHARGE: 0
EYE ITCHING: 0
DIARRHEA: 0
RHINORRHEA: 0
CONSTIPATION: 0
TROUBLE SWALLOWING: 0
EYE PAIN: 0
SINUS PAIN: 0
SORE THROAT: 0
COUGH: 0
FACIAL SWELLING: 0
VOICE CHANGE: 0
WHEEZING: 0
NAUSEA: 0
ABDOMINAL PAIN: 0
SHORTNESS OF BREATH: 0
BACK PAIN: 0
APNEA: 0
ABDOMINAL DISTENTION: 0

## 2020-02-17 ENCOUNTER — HOSPITAL ENCOUNTER (OUTPATIENT)
Dept: CARDIOLOGY | Age: 70
Discharge: HOME | End: 2020-02-17

## 2020-02-17 ENCOUNTER — HOSPITAL ENCOUNTER (OUTPATIENT)
Dept: PHARMACY | Age: 70
Setting detail: THERAPIES SERIES
Discharge: HOME | End: 2020-02-17
Payer: MEDICARE

## 2020-02-17 ENCOUNTER — OFFICE VISIT (OUTPATIENT)
Dept: FAMILY MEDICINE CLINIC | Age: 70
End: 2020-02-17
Payer: MEDICARE

## 2020-02-17 VITALS
DIASTOLIC BLOOD PRESSURE: 62 MMHG | TEMPERATURE: 97.5 F | BODY MASS INDEX: 25.19 KG/M2 | SYSTOLIC BLOOD PRESSURE: 102 MMHG | WEIGHT: 180.6 LBS | OXYGEN SATURATION: 97 % | HEART RATE: 102 BPM

## 2020-02-17 DIAGNOSIS — N48.89 PENILE BLEEDING: ICD-10-CM

## 2020-02-17 LAB
BASOPHILS ABSOLUTE: 0 K/UL (ref 0–0.2)
BASOPHILS RELATIVE PERCENT: 0.4 %
EOSINOPHILS ABSOLUTE: 0 K/UL (ref 0–0.7)
EOSINOPHILS RELATIVE PERCENT: 0.1 %
HCT VFR BLD CALC: 40.4 % (ref 42–52)
HEMOGLOBIN: 13.3 G/DL (ref 14–18)
INTERNATIONAL NORMALIZATION RATIO, POC: 2.4
LYMPHOCYTES ABSOLUTE: 1.4 K/UL (ref 1–4.8)
LYMPHOCYTES RELATIVE PERCENT: 23.4 %
MCH RBC QN AUTO: 27.9 PG (ref 27–31.3)
MCHC RBC AUTO-ENTMCNC: 32.9 % (ref 33–37)
MCV RBC AUTO: 84.7 FL (ref 80–100)
MONOCYTES ABSOLUTE: 0.6 K/UL (ref 0.2–0.8)
MONOCYTES RELATIVE PERCENT: 10.1 %
NEUTROPHILS ABSOLUTE: 3.9 K/UL (ref 1.4–6.5)
NEUTROPHILS RELATIVE PERCENT: 66 %
PDW BLD-RTO: 17.6 % (ref 11.5–14.5)
PLATELET # BLD: 125 K/UL (ref 130–400)
RBC # BLD: 4.77 M/UL (ref 4.7–6.1)
WBC # BLD: 5.9 K/UL (ref 4.8–10.8)

## 2020-02-17 PROCEDURE — 85610 PROTHROMBIN TIME: CPT

## 2020-02-17 PROCEDURE — 99211 OFF/OP EST MAY X REQ PHY/QHP: CPT

## 2020-02-17 PROCEDURE — 93290 INTERROG DEV EVAL ICPMS IP: CPT

## 2020-02-17 PROCEDURE — G8510 SCR DEP NEG, NO PLAN REQD: HCPCS | Performed by: INTERNAL MEDICINE

## 2020-02-17 PROCEDURE — 93284 PRGRMG EVAL IMPLANTABLE DFB: CPT

## 2020-02-17 PROCEDURE — 99213 OFFICE O/P EST LOW 20 MIN: CPT | Performed by: INTERNAL MEDICINE

## 2020-02-17 RX ORDER — HYDRALAZINE HYDROCHLORIDE 10 MG/1
10 TABLET, FILM COATED ORAL NIGHTLY
Status: ON HOLD | COMMUNITY
End: 2020-09-11

## 2020-02-17 ASSESSMENT — PATIENT HEALTH QUESTIONNAIRE - PHQ9: DEPRESSION UNABLE TO ASSESS: FUNCTIONAL CAPACITY MOTIVATION LIMITS ACCURACY

## 2020-02-17 NOTE — PROGRESS NOTES
2020    José Holland (:  1950) is a 81 y.o. male, here for evaluation of the following medical concerns:        70-year-old male with a history of systolic heart failure, hypothyroidism, paroxysmal atrial fibrillation, COPD and essential hypertension presents for follow-up visit. The patient continues to have pruritus        Short-term memory loss: I have prescribed the patient Aricept in an attempt to address his short-term memory loss. Unfortunately he has been experiencing nightmares since initiating this medication. Hypertension: The patient is compliant with Aldactone 25 mg orally daily. Pruritus: The patient's pruritus has not resolved today. He is followed by dermatology for this complaint. Heart failure: The patient is compliant with Entresto 24-26 mg 2 times daily, spironolactone 25 mg orally daily. Zaroxolyn 2.5 mg daily and metoprolol 25 mg orally daily. Additionally takes potassium 20 mEq orally daily he is compliant with Lipitor 80 mg orally daily follow closely by his cardiologist Dr. Steve Lyn. Hypothyroidism: The patient was noted to have an elevated TSH on 2019. He is not presently receiving Synthroid. Will consider initiation of Synthroid    Atrial fibrillation: Patient is receiving Digitek, metoprolol as a forementioned. He is  compliant with Coumadin  inrtherapeutic 2.9 on 2019      COPD: His COPD is stable at this time he is compliant with Singulair, Symbicort and supplemental oxygen. Diabetes: A1C=8.3 on  2019. He monitors his blood sugars via home blood pressure monitoring kit. He is compliant with insulin 70/30          Review of Systems   Constitutional: Negative for chills, diaphoresis, fatigue and fever.    HENT: Negative for congestion, dental problem, drooling, ear discharge, ear pain, facial swelling, hearing loss, mouth sores, nosebleeds, postnasal drip, rhinorrhea, sinus pressure, sinus pain, sneezing, Monitoring (ADULT BLOOD PRESSURE CUFF LG) KIT 1 Device by Does not apply route daily  Morgan Anthony MD   allopurinol (ZYLOPRIM) 100 MG tablet TAKE 1 TABLET DAILY  Morgan Anthony MD   fexofenadine (ALLEGRA) 180 MG tablet Take 1 tablet by mouth daily  Morgan Anthony MD   metOLazone (ZAROXOLYN) 2.5 MG tablet Take half hour before morning dose of lasix. Take on Mondays  Morgan Anthony MD   bumetanide (BUMEX) 1 MG tablet Take 2 tablets by mouth daily  Morgan Anthony MD   metoprolol succinate (TOPROL XL) 25 MG extended release tablet Take 1/2 tablet daily  Morgan Anthony MD   nitroGLYCERIN (NITROSTAT) 0.4 MG SL tablet Place 1 tablet under the tongue every 5 minutes as needed for Chest pain  Morgan Anthony MD   spironolactone (ALDACTONE) 25 MG tablet Take 1 tablet by mouth daily  Jose Albarado MD   atorvastatin (LIPITOR) 80 MG tablet Take 80 mg by mouth daily  Historical Provider, MD   warfarin (COUMADIN) 7.5 MG tablet 7.5mg daily  Jose Albarado MD   warfarin (COUMADIN) 5 MG tablet Take as directed by [facility], [facility]. Anticoagulation Management Service. Quantity equals 90 day supply.   Kit Farah MD   Oxygen Concentrator Oxi Go POC  Brian Metcalf MD   potassium chloride (KLOR-CON M) 20 MEQ extended release tablet Take 1 tablet by mouth 2 times daily  Patient taking differently: Take 20 mEq by mouth Take 1/2 tab BID  Jose Albarado MD   budesonide-formoterol (SYMBICORT) 160-4.5 MCG/ACT AERO Inhale 2 puffs into the lungs 2 times daily  Brian Metcalf MD   albuterol (PROVENTIL) (2.5 MG/3ML) 0.083% nebulizer solution Take 3 mLs by nebulization every 6 hours as needed for Romie Obregon MD   pantoprazole (PROTONIX) 40 MG tablet Take 1 tablet by mouth every morning (before breakfast)  Jose Albarado MD   ENTRESTO 24-26 MG per tablet Take 1 tablet by mouth 2 times daily   Historical Provider, MD   DIGITEK 125 MCG tablet TAKE 1 TABLET DAILY  Aldair Ray MD   albuterol (PROAIR HFA) 108 (90 BASE) Socioeconomic History    Marital status:      Spouse name: Not on file    Number of children: Not on file    Years of education: Not on file    Highest education level: Not on file   Occupational History    Not on file   Social Needs    Financial resource strain: Not on file    Food insecurity:     Worry: Not on file     Inability: Not on file    Transportation needs:     Medical: Not on file     Non-medical: Not on file   Tobacco Use    Smoking status: Former Smoker     Packs/day: 1.50     Years: 25.00     Pack years: 37.50     Last attempt to quit: 2012     Years since quittin.1    Smokeless tobacco: Never Used   Substance and Sexual Activity    Alcohol use: No    Drug use: No    Sexual activity: Not on file   Lifestyle    Physical activity:     Days per week: Not on file     Minutes per session: Not on file    Stress: Not on file   Relationships    Social connections:     Talks on phone: Not on file     Gets together: Not on file     Attends Hindu service: Not on file     Active member of club or organization: Not on file     Attends meetings of clubs or organizations: Not on file     Relationship status: Not on file    Intimate partner violence:     Fear of current or ex partner: Not on file     Emotionally abused: Not on file     Physically abused: Not on file     Forced sexual activity: Not on file   Other Topics Concern    Not on file   Social History Narrative    Not on file        Family History   Problem Relation Age of Onset    Cancer Brother     Diabetes Mother     Heart Disease Father     Emphysema Father        There were no vitals filed for this visit. Estimated body mass index is 25.47 kg/m² as calculated from the following:    Height as of 2/3/20: 5' 11\" (1.803 m). Weight as of 2/3/20: 182 lb 9.6 oz (82.8 kg). Physical Exam  Constitutional:       General: He is not in acute distress. Appearance: He is well-developed.    HENT:      Head: Normocephalic. Right Ear: External ear normal.      Left Ear: External ear normal.   Eyes:      Conjunctiva/sclera: Conjunctivae normal.   Neck:      Musculoskeletal: Neck supple. Vascular: No JVD. Trachea: No tracheal deviation. Cardiovascular:      Rate and Rhythm: Normal rate and regular rhythm. Heart sounds: Normal heart sounds. Pulmonary:      Effort: Pulmonary effort is normal. No respiratory distress. Breath sounds: Normal breath sounds. No wheezing or rales. Chest:      Chest wall: No tenderness. Abdominal:      General: Bowel sounds are normal. There is no distension. Palpations: Abdomen is soft. There is no mass. Tenderness: There is no abdominal tenderness. There is no guarding or rebound. Musculoskeletal:         General: No tenderness or deformity. Lymphadenopathy:      Cervical: No cervical adenopathy. Skin:     General: Skin is warm and dry. Coloration: Skin is not pale. Findings: No erythema or rash. Neurological:      Mental Status: He is alert and oriented to person, place, and time. Cranial Nerves: No cranial nerve deficit. Sensory: No sensory deficit. Motor: No abnormal muscle tone. Coordination: Coordination normal.      Deep Tendon Reflexes: Reflexes normal.   Psychiatric:         Thought Content: Thought content normal.         Judgment: Judgment normal.         ASSESSMENT/PLAN:          Hypertension- continue metoprolol 25 mg po daily       DMII -contiue novolin 70/30        Left heart failure (HCC)  Continue Zaroxolyn, metoprolol, Bumex, spironolactone and potassium supplementation      Hypothyroidism, unspecified type  TSH is within normal limits. Reassess thyroid function at next office visit. Paroxysmal A-fib (HCC)  Continue metoprolol and Coumadin.   Monitor INR        Localized pruritus  Implement recommendations as provided by dermatology         chronic obstructive pulmonary disease, unspecified

## 2020-02-17 NOTE — PROGRESS NOTES
Mr. Hero Pearl is a 81 y.o. y/o male with history of Afib who presents today for anticoagulation monitoring and adjustment.   INR 2.4 is therapeutic for this patient (goal range 2-3) and is reflective of 50 mg TWD  Patient verifies current dosing regimen, patient able to verbally recall dose  Patient reports 0  missed doses since last INR   Patient denies s/sx clotting and/or stroke  Patient denies hematuria, epistaxis, rectal bleeding  Patient denies changes in diet, alcohol, or tobacco use  Reviewed medication list and drug allergies with patient, updated any medication additions or modifications accordingly  Patient also denies any pending medical or dental procedures scheduled at this time  Patient was instructed to continue 50 mg TWD and RTC 3 weeks

## 2020-02-26 ENCOUNTER — CARE COORDINATION (OUTPATIENT)
Dept: CARE COORDINATION | Age: 70
End: 2020-02-26

## 2020-02-26 NOTE — CARE COORDINATION
Telephone call to Eri/caregiver. Left voicemail of plan to discharge from [address] [place] [address]  Provided contact information for future reference as needed.

## 2020-03-16 ENCOUNTER — HOSPITAL ENCOUNTER (OUTPATIENT)
Dept: PHARMACY | Age: 70
Setting detail: THERAPIES SERIES
Discharge: HOME | End: 2020-03-16
Payer: MEDICARE

## 2020-03-16 LAB — INTERNATIONAL NORMALIZATION RATIO, POC: 1.5

## 2020-03-16 PROCEDURE — 99211 OFF/OP EST MAY X REQ PHY/QHP: CPT

## 2020-03-16 PROCEDURE — 85610 PROTHROMBIN TIME: CPT

## 2020-03-16 RX ORDER — ALLOPURINOL 100 MG/1
TABLET ORAL
Qty: 90 TABLET | Refills: 3 | Status: SHIPPED | OUTPATIENT
Start: 2020-03-16 | End: 2021-03-11

## 2020-03-16 RX ORDER — WARFARIN SODIUM 5 MG/1
TABLET ORAL
Qty: 120 TABLET | Refills: 1 | Status: SHIPPED | OUTPATIENT
Start: 2020-03-16 | End: 2020-09-16

## 2020-03-16 NOTE — TELEPHONE ENCOUNTER
Pharmacy is requesting medication refill.  Please approve or deny this request.    Rx requested:  Requested Prescriptions     Pending Prescriptions Disp Refills    allopurinol (ZYLOPRIM) 100 MG tablet [Pharmacy Med Name: ALLOPURINOL TABS 100MG] 90 tablet 3     Sig: TAKE 1 TABLET DAILY         Last Office Visit:   2/17/2020      Next Visit Date:  Future Appointments   Date Time Provider Gordo Sam   3/16/2020 10:45 AM [address]   5/20/2020 10:30 AM Paula Rico  [place], [place] [address]

## 2020-03-24 ENCOUNTER — TELEPHONE (OUTPATIENT)
Dept: FAMILY MEDICINE CLINIC | Age: 70
End: 2020-03-24

## 2020-03-26 ENCOUNTER — CARE COORDINATION (OUTPATIENT)
Dept: CARE COORDINATION | Age: 70
End: 2020-03-26

## 2020-03-31 ENCOUNTER — HOSPITAL ENCOUNTER (OUTPATIENT)
Dept: PHARMACY | Age: 70
Setting detail: THERAPIES SERIES
Discharge: HOME | End: 2020-03-31
Payer: MEDICARE

## 2020-03-31 DIAGNOSIS — Z79.01 ANTICOAGULANT LONG-TERM USE: ICD-10-CM

## 2020-03-31 LAB
INR BLD: 2.7
PROTHROMBIN TIME: 29.7 SEC (ref 12.3–14.9)

## 2020-03-31 PROCEDURE — 99211 OFF/OP EST MAY X REQ PHY/QHP: CPT

## 2020-04-22 ENCOUNTER — ANTI-COAG VISIT (OUTPATIENT)
Dept: PHARMACY | Age: 70
End: 2020-04-22

## 2020-04-22 DIAGNOSIS — Z79.01 ANTICOAGULANT LONG-TERM USE: ICD-10-CM

## 2020-04-22 LAB
INR BLD: 1.4
PROTHROMBIN TIME: 17.6 SEC (ref 12.3–14.9)

## 2020-04-24 ENCOUNTER — TELEPHONE (OUTPATIENT)
Dept: FAMILY MEDICINE CLINIC | Age: 70
End: 2020-04-24

## 2020-04-27 ENCOUNTER — TELEMEDICINE (OUTPATIENT)
Dept: FAMILY MEDICINE CLINIC | Age: 70
End: 2020-04-27
Payer: MEDICARE

## 2020-04-27 PROCEDURE — 99213 OFFICE O/P EST LOW 20 MIN: CPT | Performed by: INTERNAL MEDICINE

## 2020-04-27 ASSESSMENT — ENCOUNTER SYMPTOMS
BLOOD IN STOOL: 0
CHEST TIGHTNESS: 0
RHINORRHEA: 0
NAUSEA: 0
COLOR CHANGE: 0
EYE REDNESS: 0
APNEA: 0
ABDOMINAL PAIN: 0
CONSTIPATION: 0
DIARRHEA: 0
SORE THROAT: 0
EYE DISCHARGE: 0
EYE ITCHING: 0
COUGH: 0
PHOTOPHOBIA: 0
FACIAL SWELLING: 0
SINUS PRESSURE: 0
BACK PAIN: 0
SINUS PAIN: 0
WHEEZING: 0
TROUBLE SWALLOWING: 0
ABDOMINAL DISTENTION: 0
VOMITING: 0
RECTAL PAIN: 0
SHORTNESS OF BREATH: 0
VOICE CHANGE: 0
EYE PAIN: 0

## 2020-04-27 ASSESSMENT — PATIENT HEALTH QUESTIONNAIRE - PHQ9
SUM OF ALL RESPONSES TO PHQ QUESTIONS 1-9: 0
SUM OF ALL RESPONSES TO PHQ QUESTIONS 1-9: 0

## 2020-04-27 ASSESSMENT — LIFESTYLE VARIABLES: HOW OFTEN DO YOU HAVE A DRINK CONTAINING ALCOHOL: 0

## 2020-04-27 NOTE — PROGRESS NOTES
diaphoresis, fatigue and fever. HENT: Negative for congestion, dental problem, drooling, ear discharge, ear pain, facial swelling, hearing loss, mouth sores, nosebleeds, postnasal drip, rhinorrhea, sinus pressure, sinus pain, sneezing, sore throat, tinnitus, trouble swallowing and voice change. Eyes: Negative for photophobia, pain, discharge, redness, itching and visual disturbance. Respiratory: Negative for apnea, cough, chest tightness, shortness of breath and wheezing. Cardiovascular: Negative for chest pain, palpitations and leg swelling. Gastrointestinal: Negative for abdominal distention, abdominal pain, blood in stool, constipation, diarrhea, nausea, rectal pain and vomiting. Endocrine: Negative for cold intolerance, heat intolerance, polydipsia, polyphagia and polyuria. Genitourinary: Negative for decreased urine volume, difficulty urinating, dysuria, flank pain, frequency, genital sores, hematuria and urgency. Musculoskeletal: Negative for arthralgias, back pain, gait problem, joint swelling, myalgias, neck pain and neck stiffness. Skin: Negative for color change, rash and wound. Allergic/Immunologic: Negative for environmental allergies and food allergies. Neurological: Negative for dizziness, tremors, seizures, syncope, facial asymmetry, speech difficulty, weakness, light-headedness, numbness and headaches. Hematological: Negative for adenopathy. Does not bruise/bleed easily. Psychiatric/Behavioral: Negative for agitation, confusion, decreased concentration, hallucinations, self-injury, sleep disturbance and suicidal ideas. The patient is not nervous/anxious. Prior to Visit Medications    Medication Sig Taking? Authorizing Provider   allopurinol (ZYLOPRIM) 100 MG tablet TAKE 1 TABLET DAILY Yes Zee Dimas MD   warfarin (COUMADIN) 5 MG tablet Take as directed by [facility] [place] AT [place] Anticoagulation Management [facility]. Quantity equals 90 day supply.  Yes Zee Dimas MD

## 2020-05-06 ENCOUNTER — HOSPITAL ENCOUNTER (OUTPATIENT)
Dept: PHARMACY | Age: 70
Setting detail: THERAPIES SERIES
Discharge: HOME | End: 2020-05-06
Payer: MEDICARE

## 2020-05-06 DIAGNOSIS — Z79.01 ANTICOAGULANT LONG-TERM USE: ICD-10-CM

## 2020-05-06 LAB
INR BLD: 1.2
PROTHROMBIN TIME: 15.5 SEC (ref 12.3–14.9)

## 2020-05-06 PROCEDURE — 99211 OFF/OP EST MAY X REQ PHY/QHP: CPT

## 2020-05-20 ENCOUNTER — VIRTUAL VISIT (OUTPATIENT)
Dept: FAMILY MEDICINE CLINIC | Age: 70
End: 2020-05-20
Payer: MEDICARE

## 2020-05-20 ENCOUNTER — HOSPITAL ENCOUNTER (OUTPATIENT)
Dept: PHARMACY | Age: 70
Setting detail: THERAPIES SERIES
Discharge: HOME | End: 2020-05-20
Payer: MEDICARE

## 2020-05-20 VITALS — SYSTOLIC BLOOD PRESSURE: 100 MMHG | DIASTOLIC BLOOD PRESSURE: 50 MMHG | HEIGHT: 71 IN | BODY MASS INDEX: 25.19 KG/M2

## 2020-05-20 DIAGNOSIS — I48.0 PAROXYSMAL A-FIB (HCC): Chronic | ICD-10-CM

## 2020-05-20 DIAGNOSIS — R31.9 HEMATURIA, UNSPECIFIED TYPE: ICD-10-CM

## 2020-05-20 LAB
BILIRUBIN URINE: NEGATIVE
BLOOD, URINE: NEGATIVE
CLARITY: CLEAR
COLOR: YELLOW
GLUCOSE URINE: NEGATIVE MG/DL
INR BLD: 2
KETONES, URINE: NEGATIVE MG/DL
LEUKOCYTE ESTERASE, URINE: NEGATIVE
NITRITE, URINE: NEGATIVE
PH UA: 5 (ref 5–9)
PROTEIN UA: NEGATIVE MG/DL
PROTHROMBIN TIME: 22.7 SEC (ref 12.3–14.9)
SPECIFIC GRAVITY UA: 1.01 (ref 1–1.03)
UROBILINOGEN, URINE: 0.2 E.U./DL

## 2020-05-20 PROCEDURE — 99214 OFFICE O/P EST MOD 30 MIN: CPT | Performed by: INTERNAL MEDICINE

## 2020-05-20 RX ORDER — METOLAZONE 2.5 MG/1
TABLET ORAL
Qty: 30 TABLET | Refills: 3 | Status: ON HOLD | COMMUNITY
Start: 2020-05-20 | End: 2020-09-12 | Stop reason: HOSPADM

## 2020-05-20 ASSESSMENT — ENCOUNTER SYMPTOMS
VOMITING: 0
CHEST TIGHTNESS: 0
EYE REDNESS: 0
FACIAL SWELLING: 0
BLOOD IN STOOL: 0
SHORTNESS OF BREATH: 0
SORE THROAT: 0
SINUS PRESSURE: 0
EYE DISCHARGE: 0
CONSTIPATION: 0
COLOR CHANGE: 0
EYE PAIN: 0
ABDOMINAL DISTENTION: 0
EYE ITCHING: 0
NAUSEA: 0
SINUS PAIN: 0
WHEEZING: 0
RECTAL PAIN: 0
ABDOMINAL PAIN: 0
VOICE CHANGE: 0
COUGH: 0
RHINORRHEA: 0
DIARRHEA: 0
PHOTOPHOBIA: 0
TROUBLE SWALLOWING: 0
APNEA: 0
BACK PAIN: 0

## 2020-05-20 NOTE — PROGRESS NOTES
PLACEMENT      CORONARY ARTERY BYPASS GRAFT      ENDOSCOPY, COLON, DIAGNOSTIC      EYE SURGERY Bilateral     Cataracts     OTHER SURGICAL HISTORY      difibrillator     IN CIRCUMCISION,OTHER,28+ D/O N/A 2018    CIRCUMCISION performed by Susan Hackett MD at [address] [facility] EGD TRANSORAL BIOPSY SINGLE/MULTIPLE N/A 2017    EGD BIOPSY performed by Tiago Cornelius MD at [address] [facility] [address] Marital status:      Spouse name: Not on file    Number of children: Not on file    Years of education: Not on file    Highest education level: Not on file   Occupational History    Not on file   Social Needs    Financial resource strain: Not on file    Food insecurity     Worry: Not on file     Inability: Not on file    Transportation needs     Medical: Not on file     Non-medical: Not on file   Tobacco Use    Smoking status: Former Smoker     Packs/day: 1.50     Years: 25.00     Pack years: 37.50     Last attempt to quit: 2012     Years since quittin.3    Smokeless tobacco: Never Used   Substance and Sexual Activity    Alcohol use: No    Drug use: No    Sexual activity: Not on file   Lifestyle    Physical activity     Days per week: Not on file     Minutes per session: Not on file    Stress: Not on file   Relationships    Social connections     Talks on phone: Not on file     Gets together: Not on file     Attends Mandaen service: Not on file     Active member of club or organization: Not on file     Attends meetings of clubs or organizations: Not on file     Relationship status: Not on file    Intimate partner violence     Fear of current or ex partner: Not on file     Emotionally abused: Not on file     Physically abused: Not on file     Forced sexual activity: Not on file   Other Topics Concern    Not on file   Social History Narrative    Not on file        Family History   Problem Relation Age of Onset    Cancer Brother     Diabetes

## 2020-05-21 ENCOUNTER — TELEPHONE (OUTPATIENT)
Dept: PHARMACY | Age: 70
End: 2020-05-21

## 2020-05-27 ENCOUNTER — OFFICE VISIT (OUTPATIENT)
Dept: FAMILY MEDICINE CLINIC | Age: 70
End: 2020-05-27
Payer: MEDICARE

## 2020-05-27 VITALS
OXYGEN SATURATION: 95 % | HEIGHT: 71 IN | DIASTOLIC BLOOD PRESSURE: 70 MMHG | HEART RATE: 89 BPM | BODY MASS INDEX: 27.16 KG/M2 | TEMPERATURE: 97.5 F | RESPIRATION RATE: 14 BRPM | SYSTOLIC BLOOD PRESSURE: 110 MMHG | WEIGHT: 194 LBS

## 2020-05-27 LAB — HBA1C MFR BLD: 8.4 %

## 2020-05-27 PROCEDURE — 99213 OFFICE O/P EST LOW 20 MIN: CPT | Performed by: INTERNAL MEDICINE

## 2020-05-27 PROCEDURE — 3052F HG A1C>EQUAL 8.0%<EQUAL 9.0%: CPT | Performed by: INTERNAL MEDICINE

## 2020-05-27 PROCEDURE — 83036 HEMOGLOBIN GLYCOSYLATED A1C: CPT | Performed by: INTERNAL MEDICINE

## 2020-05-27 ASSESSMENT — ENCOUNTER SYMPTOMS
NAUSEA: 0
CONSTIPATION: 0
EYE ITCHING: 0
RECTAL PAIN: 0
ABDOMINAL PAIN: 0
WHEEZING: 0
SHORTNESS OF BREATH: 0
COUGH: 0
EYE REDNESS: 0
COLOR CHANGE: 0
SORE THROAT: 0
SINUS PRESSURE: 0
ABDOMINAL DISTENTION: 0
VOMITING: 0
PHOTOPHOBIA: 0
APNEA: 0
VOICE CHANGE: 0
TROUBLE SWALLOWING: 0
FACIAL SWELLING: 0
DIARRHEA: 0
BACK PAIN: 0
SINUS PAIN: 0
EYE PAIN: 0
CHEST TIGHTNESS: 0
EYE DISCHARGE: 0
BLOOD IN STOOL: 0
RHINORRHEA: 0

## 2020-05-27 NOTE — PROGRESS NOTES
2020    Aysha Najera (:  1950) is a 78 y.o. male, here for evaluation of the following medical concerns: This telephone encounter is being conducted to reduce the spread of coronavirus and reduce the morbidity and mortality risk of exposure related to the virus. 53-year-old male with a history of type 2 diabetes, systolic heart failure, hypothyroidism, paroxysmal atrial fibrillation, COPD and essential hypertension presents with a complaint of hematuria. Type 2 diabetes: The patient is due for hemoglobin A1c. He reports that his blood sugars are well controlled at this time. He has not reportedly taken his insulin 70/30. Hematuria: The patient has noted hematuria over the course of the past 2 to 3 days. He is presently receiving Coumadin and his dose was recently increased due to subtherapeutic INR levels      Short-term memory loss: I have prescribed the patient Aricept in an attempt to address his short-term memory loss. Unfortunately he has been experiencing nightmares since initiating this medication. Hypertension: The patient is compliant with Aldactone 25 mg orally daily. Pt has a bp cuff at home 101/96 when assessed in his cardiologist.       Pruritus: The patient's pruritus has not resolved today. He is followed by dermatology for this complaint. Heart failure: The patient is compliant with Entresto 24-26 mg 2 times daily, spironolactone 25 mg orally daily. Zaroxolyn 2.5 mg daily and metoprolol 25 mg orally daily. Additionally takes potassium 20 mEq orally daily he is compliant with Lipitor 80 mg orally daily follow closely by his cardiologist Dr. Samantha Deleon. Hypothyroidism: The patient was noted to have an elevated TSH on 2019. He is not presently receiving Synthroid. Will consider initiation of Synthroid      Atrial fibrillation: Patient is receiving Digitek, metoprolol as a forementioned.   He is compliant with Coumadin  inr decreased concentration, hallucinations, self-injury, sleep disturbance and suicidal ideas. The patient is not nervous/anxious. Prior to Visit Medications    Medication Sig Taking? Authorizing Provider   metOLazone (ZAROXOLYN) 2.5 MG tablet Take half hour before morning dose of lasix. Take on Mondays Yes Pterona Martinez MD   allopurinol (ZYLOPRIM) 100 MG tablet TAKE 1 TABLET DAILY Yes Petrona Martinez MD   warfarin (COUMADIN) 5 MG tablet Take as directed by [facility] AT [place] Anticoagulation Management Service. Quantity equals 90 day supply.  Yes Petrona Martinez MD   hydrALAZINE (APRESOLINE) 10 MG tablet Take 10 mg by mouth nightly Yes Historical Provider, MD   triamcinolone (KENALOG) 0.1 % ointment AS DIRECTED TWICE A DAY EXTERNALLY ALL OVER BODY, AVOIDING FACE 30 DAYS Yes Historical Provider, MD   insulin 70-30 (NOVOLIN 70/30) (70-30) 100 UNIT per ML injection vial INJECT 10 UNITS TWICE A DAY Yes Trey Ibarra MD   Insulin Syringe-Needle U-100 (BD INSULIN SYRINGE ULTRAFINE) 31G X 5/16\" 0.5 ML MISC USE TWICE A DAY Yes Codey Johnson MD   Blood Pressure Monitoring (ADULT BLOOD PRESSURE CUFF LG) KIT 1 Device by Does not apply route daily Yes Petrona Martinez MD   bumetanide (BUMEX) 1 MG tablet Take 2 tablets by mouth daily Yes Petrona Martinez MD   metoprolol succinate (TOPROL XL) 25 MG extended release tablet Take 1/2 tablet daily Yes Petrona Martinez MD   nitroGLYCERIN (NITROSTAT) 0.4 MG SL tablet Place 1 tablet under the tongue every 5 minutes as needed for Chest pain Yes Petrona Martinez MD   spironolactone (ALDACTONE) 25 MG tablet Take 1 tablet by mouth daily Yes Miya Smith MD   atorvastatin (LIPITOR) 80 MG tablet Take 80 mg by mouth daily Yes Historical Provider, MD   warfarin (COUMADIN) 7.5 MG tablet 7.5mg daily Yes Miya Smith MD   Blase Jump POC Yes Linh Henry MD   potassium chloride (KLOR-CON M) 20 MEQ extended release tablet Take 1 tablet by mouth 2 times daily  Patient taking

## 2020-05-28 ENCOUNTER — TELEPHONE (OUTPATIENT)
Dept: FAMILY MEDICINE CLINIC | Age: 70
End: 2020-05-28

## 2020-06-03 ENCOUNTER — HOSPITAL ENCOUNTER (OUTPATIENT)
Dept: PHARMACY | Age: 70
Setting detail: THERAPIES SERIES
Discharge: HOME | End: 2020-06-03
Payer: MEDICARE

## 2020-06-03 ENCOUNTER — OFFICE VISIT (OUTPATIENT)
Dept: FAMILY MEDICINE CLINIC | Age: 70
End: 2020-06-03
Payer: MEDICARE

## 2020-06-03 VITALS
HEIGHT: 71 IN | SYSTOLIC BLOOD PRESSURE: 102 MMHG | TEMPERATURE: 97 F | HEART RATE: 96 BPM | WEIGHT: 194 LBS | OXYGEN SATURATION: 98 % | DIASTOLIC BLOOD PRESSURE: 52 MMHG | RESPIRATION RATE: 14 BRPM | BODY MASS INDEX: 27.16 KG/M2

## 2020-06-03 DIAGNOSIS — I48.0 PAROXYSMAL A-FIB (HCC): ICD-10-CM

## 2020-06-03 LAB
INR BLD: 2.5
PROTHROMBIN TIME: 26.8 SEC (ref 12.3–14.9)

## 2020-06-03 PROCEDURE — 99211 OFF/OP EST MAY X REQ PHY/QHP: CPT | Performed by: PHARMACIST

## 2020-06-03 PROCEDURE — 99213 OFFICE O/P EST LOW 20 MIN: CPT | Performed by: INTERNAL MEDICINE

## 2020-06-03 PROCEDURE — 3052F HG A1C>EQUAL 8.0%<EQUAL 9.0%: CPT | Performed by: INTERNAL MEDICINE

## 2020-06-03 RX ORDER — INSULIN GLARGINE 100 [IU]/ML
10 INJECTION, SOLUTION SUBCUTANEOUS NIGHTLY
Qty: 5 PEN | Refills: 0 | Status: SHIPPED | OUTPATIENT
Start: 2020-06-03 | End: 2020-07-27 | Stop reason: ALTCHOICE

## 2020-06-03 ASSESSMENT — ENCOUNTER SYMPTOMS
TROUBLE SWALLOWING: 0
BLOOD IN STOOL: 0
SHORTNESS OF BREATH: 0
EYE REDNESS: 0
RHINORRHEA: 0
CHEST TIGHTNESS: 0
BACK PAIN: 0
SORE THROAT: 0
ABDOMINAL PAIN: 0
ABDOMINAL DISTENTION: 0
VOICE CHANGE: 0
FACIAL SWELLING: 0
WHEEZING: 0
NAUSEA: 0
PHOTOPHOBIA: 0
SINUS PAIN: 0
EYE DISCHARGE: 0
EYE ITCHING: 0
COUGH: 0
APNEA: 0
VOMITING: 0
DIARRHEA: 0
COLOR CHANGE: 0
SINUS PRESSURE: 0
CONSTIPATION: 0
EYE PAIN: 0
RECTAL PAIN: 0

## 2020-06-03 NOTE — PROGRESS NOTES
hematuria. COPD: His COPD is stable at this time he is compliant with Singulair, Symbicort and supplemental oxygen. Diabetes: A1C=8.3 on  12/27/2019. He monitors his blood sugars via home blood pressure monitoring kit. He is compliant with insulin 70/30. The patient is due for an assessment of an A1c. Review of Systems   Constitutional: Negative for chills, diaphoresis, fatigue and fever. HENT: Negative for congestion, dental problem, drooling, ear discharge, ear pain, facial swelling, hearing loss, mouth sores, nosebleeds, postnasal drip, rhinorrhea, sinus pressure, sinus pain, sneezing, sore throat, tinnitus, trouble swallowing and voice change. Eyes: Negative for photophobia, pain, discharge, redness, itching and visual disturbance. Respiratory: Negative for apnea, cough, chest tightness, shortness of breath and wheezing. Cardiovascular: Negative for chest pain, palpitations and leg swelling. Gastrointestinal: Negative for abdominal distention, abdominal pain, blood in stool, constipation, diarrhea, nausea, rectal pain and vomiting. Endocrine: Negative for cold intolerance, heat intolerance, polydipsia, polyphagia and polyuria. Genitourinary: Negative for decreased urine volume, difficulty urinating, dysuria, flank pain, frequency, genital sores, hematuria and urgency. Musculoskeletal: Negative for arthralgias, back pain, gait problem, joint swelling, myalgias, neck pain and neck stiffness. Skin: Negative for color change, rash and wound. Allergic/Immunologic: Negative for environmental allergies and food allergies. Neurological: Negative for dizziness, tremors, seizures, syncope, facial asymmetry, speech difficulty, weakness, light-headedness, numbness and headaches. Hematological: Negative for adenopathy. Does not bruise/bleed easily.    Psychiatric/Behavioral: Negative for agitation, confusion, decreased concentration, hallucinations, self-injury, sleep 160-4.5 MCG/ACT AERO Inhale 2 puffs into the lungs 2 times daily  Saad Gerardo MD   albuterol (PROVENTIL) (2.5 MG/3ML) 0.083% nebulizer solution Take 3 mLs by nebulization every 6 hours as needed for Romeo MD Gracie   pantoprazole (PROTONIX) 40 MG tablet Take 1 tablet by mouth every morning (before breakfast)  Sarthak Constantino MD   ENTRESTO 24-26 MG per tablet Take 1 tablet by mouth 2 times daily   Historical Provider, MD   DIGITEK 125 MCG tablet TAKE 1 TABLET DAILY  Brianda Messina MD   albuterol (PROAIR HFA) 108 (90 BASE) MCG/ACT inhaler Inhale 2 puffs into the lungs every 4 hours as needed for Wheezing  Brianda Messina MD        Allergies   Allergen Reactions    Dye [Iodides] Shortness Of Breath    Penicillins Anaphylaxis    Tapazole [Methimazole]      Itching        Past Medical History:   Diagnosis Date    Amiodarone-induced hyperthyroidism     Arthritis     [facility] fl[facility]     CAD (coronary artery disease)     Chronic combined systolic and diastolic CHF (congestive heart failure) (HCC)     CKD (chronic kidney disease) stage 3, GFR 30-59 ml/min (HCC)     COPD (chronic obstructive pulmonary disease) ([facility]     Defibrillator activation     [facility]     Dilated cardiomyopathy ([facility]     Generalized and unspecified atherosclerosis     Gout     Hyperlipidemia     Hypertension     Noncompliance     Obesity     On home oxygen therapy     Pain in joint, multiple sites     Paroxysmal A-fib (HCC)     Paroxysmal ventricular tachycardia (HCC)     Prolonged emergence from general anesthesia     Status post angioplasty     Sustained ventricular tachycardia (HCC)     TIA (transient ischemic attack)     Tobacco abuse     Type II or unspecified type diabetes mellitus without mention of complication, not stated as uncontrolled        Past Surgical History:   Procedure Laterality Date    CARDIAC CATHETERIZATION      COLONOSCOPY      CORONARY ANGIOPLASTY  11    Dr Mason Antis CORONARY ARTERY BYPASS GRAFT      ENDOSCOPY, COLON, DIAGNOSTIC      EYE SURGERY Bilateral     Cataracts     OTHER SURGICAL HISTORY      difibrillator     IN CIRCUMCISION,OTHER,28+ D/O N/A 2018    CIRCUMCISION performed by Steve Dillon MD at [address] [place] [address] EGD TRANSORAL BIOPSY SINGLE/MULTIPLE N/A 2017    EGD BIOPSY performed by Layo Boland MD at [facility] History     Socioeconomic History    Marital status:      Spouse name: Not on file    Number of children: Not on file    Years of education: Not on file    Highest education level: Not on file   Occupational History    Not on file   Social Needs    Financial resource strain: Not on file    Food insecurity     Worry: Not on file     Inability: Not on file    Transportation needs     Medical: Not on file     Non-medical: Not on file   Tobacco Use    Smoking status: Former Smoker     Packs/day: 1.50     Years: 25.00     Pack years: 37.50     Last attempt to quit: 2012     Years since quittin.4    Smokeless tobacco: Never Used   Substance and Sexual Activity    Alcohol use: No    Drug use: No    Sexual activity: Not on file   Lifestyle    Physical activity     Days per week: Not on file     Minutes per session: Not on file    Stress: Not on file   Relationships    Social connections     Talks on phone: Not on file     Gets together: Not on file     Attends Yazdanism service: Not on file     Active member of club or organization: Not on file     Attends meetings of clubs or organizations: Not on file     Relationship status: Not on file    Intimate partner violence     Fear of current or ex partner: Not on file     Emotionally abused: Not on file     Physically abused: Not on file     Forced sexual activity: Not on file   Other Topics Concern    Not on file ASSESSMENT/PLAN:      DMII -hemoglobin A1c equals 8.4. Requested that the patient begin Lantus 10 units nightly. Provided the patient's cousin with a sliding scale by which insulin 70/30 may be administered. Referred the patient for diabetic education        Hematuria: Urinalysis was within normal limits. No further work-up indicated at this time. Hypertension- continue metoprolol 25 mg po daily for now. Consider reducing the dose of this medication. Left heart failure (HCC)  Continue Zaroxolyn, metoprolol, Bumex, spironolactone and potassium supplementation        Hypothyroidism, unspecified type  TSH is within normal limits. Reassess thyroid function at next office visit. Paroxysmal A-fib (HCC)  Continue metoprolol and Coumadin. Monitor INR        Localized pruritus  Implement recommendations as provided by dermatology        Chronic obstructive pulmonary disease, unspecified COPD type ([facility]  Continue Symbicort and Singulair. Continue supplemental oxygenPRN        Short-term memory loss- monitoring. F/u with neurology      No follow-ups on file. An  electronic signature was used to authenticate this note.     --Zee Dimas MD on 6/3/2020 at 8:45 AM

## 2020-06-04 ENCOUNTER — TELEPHONE (OUTPATIENT)
Dept: PHARMACY | Age: 70
End: 2020-06-04

## 2020-06-09 ENCOUNTER — VIRTUAL VISIT (OUTPATIENT)
Dept: FAMILY MEDICINE CLINIC | Age: 70
End: 2020-06-09
Payer: MEDICARE

## 2020-06-09 PROCEDURE — 3052F HG A1C>EQUAL 8.0%<EQUAL 9.0%: CPT | Performed by: INTERNAL MEDICINE

## 2020-06-09 PROCEDURE — 99213 OFFICE O/P EST LOW 20 MIN: CPT | Performed by: INTERNAL MEDICINE

## 2020-06-09 ASSESSMENT — ENCOUNTER SYMPTOMS
EYE REDNESS: 0
PHOTOPHOBIA: 0
EYE ITCHING: 0
SORE THROAT: 0
SINUS PRESSURE: 0
SINUS PAIN: 0
VOMITING: 0
ABDOMINAL DISTENTION: 0
BACK PAIN: 0
ABDOMINAL PAIN: 0
COUGH: 0
CHEST TIGHTNESS: 0
CONSTIPATION: 0
TROUBLE SWALLOWING: 0
WHEEZING: 0
RECTAL PAIN: 0
DIARRHEA: 0
VOICE CHANGE: 0
RHINORRHEA: 0
FACIAL SWELLING: 0
EYE DISCHARGE: 0
NAUSEA: 0
APNEA: 0
SHORTNESS OF BREATH: 0
BLOOD IN STOOL: 0
COLOR CHANGE: 0
EYE PAIN: 0

## 2020-06-09 NOTE — PROGRESS NOTES
Singulair, Symbicort and supplemental oxygen. Diabetes: A1C=8.3 on  12/27/2019. He monitors his blood sugars via home blood pressure monitoring kit. He is compliant with insulin 70/30. The patient is due for an assessment of an A1c. Review of Systems   Constitutional: Negative for chills, diaphoresis, fatigue and fever. HENT: Negative for congestion, dental problem, drooling, ear discharge, ear pain, facial swelling, hearing loss, mouth sores, nosebleeds, postnasal drip, rhinorrhea, sinus pressure, sinus pain, sneezing, sore throat, tinnitus, trouble swallowing and voice change. Eyes: Negative for photophobia, pain, discharge, redness, itching and visual disturbance. Respiratory: Negative for apnea, cough, chest tightness, shortness of breath and wheezing. Cardiovascular: Negative for chest pain, palpitations and leg swelling. Gastrointestinal: Negative for abdominal distention, abdominal pain, blood in stool, constipation, diarrhea, nausea, rectal pain and vomiting. Endocrine: Negative for cold intolerance, heat intolerance, polydipsia, polyphagia and polyuria. Genitourinary: Negative for decreased urine volume, difficulty urinating, dysuria, flank pain, frequency, genital sores, hematuria and urgency. Musculoskeletal: Negative for arthralgias, back pain, gait problem, joint swelling, myalgias, neck pain and neck stiffness. Skin: Negative for color change, rash and wound. Allergic/Immunologic: Negative for environmental allergies and food allergies. Neurological: Negative for dizziness, tremors, seizures, syncope, facial asymmetry, speech difficulty, weakness, light-headedness, numbness and headaches. Hematological: Negative for adenopathy. Does not bruise/bleed easily. Psychiatric/Behavioral: Negative for agitation, confusion, decreased concentration, hallucinations, self-injury, sleep disturbance and suicidal ideas. The patient is not nervous/anxious.         Prior to  CORONARY ANGIOPLASTY WITH STENT PLACEMENT      CORONARY ARTERY BYPASS GRAFT      ENDOSCOPY, COLON, DIAGNOSTIC      EYE SURGERY Bilateral     Cataracts     OTHER SURGICAL HISTORY      difibrillator     NE CIRCUMCISION,OTHER,28+ D/O N/A 2018    CIRCUMCISION performed by Jovita Lucas MD at [address] [place] [address] EGD TRANSORAL BIOPSY SINGLE/MULTIPLE N/A 2017    EGD BIOPSY performed by Clarice Lane MD at [facility] History     Socioeconomic History    Marital status:      Spouse name: Not on file    Number of children: Not on file    Years of education: Not on file    Highest education level: Not on file   Occupational History    Not on file   Social Needs    Financial resource strain: Not on file    Food insecurity     Worry: Not on file     Inability: Not on file    Transportation needs     Medical: Not on file     Non-medical: Not on file   Tobacco Use    Smoking status: Former Smoker     Packs/day: 1.50     Years: 25.00     Pack years: 37.50     Last attempt to quit: 2012     Years since quittin.4    Smokeless tobacco: Never Used   Substance and Sexual Activity    Alcohol use: No    Drug use: No    Sexual activity: Not on file   Lifestyle    Physical activity     Days per week: Not on file     Minutes per session: Not on file    Stress: Not on file   Relationships    Social connections     Talks on phone: Not on file     Gets together: Not on file     Attends [employer] service: Not on file     Active member of club or organization: Not on file     Attends meetings of clubs or organizations: Not on file     Relationship status: Not on file    Intimate partner violence     Fear of current or ex partner: Not on file     Emotionally abused: Not on file     Physically abused: Not on file     Forced sexual activity: Not on file   Other Topics Concern    Not on file   Social History Narrative    Not on file        Family History   Problem Relation Age of

## 2020-06-11 ENCOUNTER — VIRTUAL VISIT (OUTPATIENT)
Dept: FAMILY MEDICINE CLINIC | Age: 70
End: 2020-06-11
Payer: MEDICARE

## 2020-06-11 PROCEDURE — G0438 PPPS, INITIAL VISIT: HCPCS | Performed by: INTERNAL MEDICINE

## 2020-06-11 RX ORDER — BLOOD SUGAR DIAGNOSTIC
STRIP MISCELLANEOUS
Qty: 300 EACH | Refills: 0 | Status: ON HOLD | OUTPATIENT
Start: 2020-06-11 | End: 2020-10-26

## 2020-06-11 NOTE — PROGRESS NOTES
Medicare Annual Wellness Visit  Name: Angela Valentin NVIQDY Date: 2020   MRN: [id number] Sex: Male   Age: 78 y.o. Ethnicity: Non-/Non    : 1950 Race: Black      Emiliano Velasquez. is here for Medicare AWV    Screenings for behavioral, psychosocial and functional/safety risks, and cognitive dysfunction are all negative except as indicated below. These results, as well as other patient data from the [address] form, are documented in Flowsheets linked to this Encounter. Allergies   Allergen Reactions    Dye [Iodides] Shortness Of Breath    Penicillins Anaphylaxis    Tapazole [Methimazole]      Itching        Prior to Visit Medications    Medication Sig Taking? Authorizing Provider   Insulin Syringe-Needle U-100 (BD INSULIN SYRINGE ULTRAFINE) 31G X 5/16\" 0.5 ML MISC USE TWICE A DAY  Darryle Albert, MD   insulin glargine (LANTUS SOLOSTAR) 100 UNIT/ML injection pen Inject 10 Units into the skin nightly  Darryle Albert, MD   metOLazone (ZAROXOLYN) 2.5 MG tablet Take half hour before morning dose of lasix. Take on   Darryle Albert, MD   allopurinol (ZYLOPRIM) 100 MG tablet TAKE 1 TABLET DAILY  Darryle Albert, MD   warfarin (COUMADIN) 5 MG tablet Take as directed by [facility] EM[facility]GENC[facility] [place] [facility]. Quantity equals 90 day supply.   Darryle Albert, MD   hydrALAZINE (APRESOLINE) 10 MG tablet Take 10 mg by mouth nightly  Historical Provider, MD   triamcinolone (KENALOG) 0.1 % ointment AS DIRECTED TWICE A DAY EXTERNALLY ALL OVER BODY, AVOIDING FACE 30 DAYS  Historical Provider, MD   Blood Pressure Monitoring (ADULT BLOOD PRESSURE CUFF LG) KIT 1 Device by Does not apply route daily  Darryle Albert, MD   bumetanide (BUMEX) 1 MG tablet Take 2 tablets by mouth daily  Darryle Albert, MD   metoprolol succinate (TOPROL XL) 25 MG extended release tablet Take 1/2 tablet daily  Darryle Albert, MD   nitroGLYCERIN (NITROSTAT) 0.4 MG SL tablet Place 1 tablet under the tongue every 5 minutes as needed for Chest pain  Tung English MD   spironolactone (ALDACTONE) 25 MG tablet Take 1 tablet by mouth daily  Ricarda Nguyen MD   atorvastatin (LIPITOR) 80 MG tablet Take 80 mg by mouth daily  Historical Provider, MD   warfarin (COUMADIN) 7.5 MG tablet 7.5mg daily  Ricarda Nguyen MD   Oxygen Concentrator Oxi Go POC  Aldair Vang MD   potassium chloride (KLOR-CON M) 20 MEQ extended release tablet Take 1 tablet by mouth 2 times daily  Patient taking differently: Take 20 mEq by mouth Take 1/2 tab BID  Ricarda Nguyen MD   budesonide-formoterol (SYMBICORT) 160-4.5 MCG/ACT AERO Inhale 2 puffs into the lungs 2 times daily  Aldair Vang MD   albuterol (PROVENTIL) (2.5 MG/3ML) 0.083% nebulizer solution Take 3 mLs by nebulization every 6 hours as needed for Faiza Grande MD   pantoprazole (PROTONIX) 40 MG tablet Take 1 tablet by mouth every morning (before breakfast)  Ricarda Nguyen MD   ENTRESTO 24-26 MG per tablet Take 1 tablet by mouth 2 times daily   Historical Provider, MD   DIGITEK 125 MCG tablet TAKE 1 TABLET DAILY  Peter Delaney MD   albuterol (PROAIR HFA) 108 (90 BASE) MCG/ACT inhaler Inhale 2 puffs into the lungs every 4 hours as needed for Italia Chua MD       Past Medical History:   Diagnosis Date    Amiodarone-induced hyperthyroidism     Arthritis     At[facility] flutter ([facility]     CAD (coronary artery disease)     Chronic combined systolic and diastolic CH[facility]congestive heart failure) ([facility])     CKD (chronic kidney disease) stage 3, GFR 30-59 ml/min ([facility])     COPD (chronic obstructive pulmonary disease) ([facility]     Defibrillator activation     Diabetes mellitus with insulin therapy ([facility]     Dilated cardiomyopathy ([facility]     Generalized and unspecified atherosclerosis     Gout     Hyperlipidemia     Hypertension     Noncompliance     Obesity     On home oxygen therapy     Pain in joint, multiple sites     Paroxysmal A-fib impaired. General Appearance: alert and oriented to person, place and time, well developed and well- nourished, in no acute distress  Skin: warm and dry, no rash or erythema  Head: normocephalic and atraumatic  Eyes: pupils equal, round, and reactive to light, extraocular eye movements intact, conjunctivae normal  ENT: tympanic membrane, external ear and ear canal normal bilaterally, nose without deformity, nasal mucosa and turbinates normal without polyps  Neck: supple and non-tender without mass, no thyromegaly or thyroid nodules, no cervical lymphadenopathy  Pulmonary/Chest: clear to auscultation bilaterally- no wheezes, rales or rhonchi, normal air movement, no respiratory distress  Cardiovascular: normal rate, regular rhythm, normal S1 and S2, no murmurs, rubs, clicks, or gallops, distal pulses intact, no carotid bruits  Abdomen: soft, non-tender, non-distended, normal bowel sounds, no masses or organomegaly  Extremities: no cyanosis, clubbing or edema  Musculoskeletal: normal range of motion, no joint swelling, deformity or tenderness  Neurologic: reflexes normal and symmetric, no cranial nerve deficit, gait, coordination and speech normal    Patient's complete Health Risk Assessment and screening values have been reviewed and are found in Flowsheets. The following problems were reviewed today and where indicated follow up appointments were made and/or referrals ordered. Positive Risk Factor Screenings with Interventions:     Health Habits/Nutrition:     There is no height or weight on file to calculate BMI.   Health Habits/Nutrition Interventions:  · Inadequate physical activity:  the patient engages in adequate physical activity  · activity    Hearing/Vision:  No exam data present     Hearing/Vision Interventions:  · no concerns    ADL:     ADL Interventions:  · no issues    Personalized Preventive Plan   Current Health Maintenance Status  Immunization History   Administered Date(s) Administered    Influenza Vaccine, unspecified formulation 10/01/2011, 10/01/2012, 03/28/2013, 10/01/2013, 10/01/2014, 12/05/2016, 08/01/2017    Influenza, High Dose (Fluzone 65 yrs and older) 09/28/2017, 10/15/2018    Pneumococcal Conjugate 13-valent ([id number]) 01/25/2017, 11/12/2018    Pneumococcal Polysaccharide ([id number]) 10/01/2012, 01/04/2017    Tdap (Boostrix, Adacel) 12/10/2018        Health Maintenance   Topic Date Due    Shingles Vaccine (1 of 2) 03/18/2002    Diabetic microalbuminuria test  05/09/2013    Diabetic retinal exam  02/18/2019    Annual Wellness Visit (AWV)  05/29/2019    Colon Cancer Screen FIT/FOBT  12/17/2019    Low dose CT lung screening  01/20/2020    Flu vaccine (Season Ended) 09/01/2020    Lipid screen  11/17/2020    Diabetic foot exam  11/26/2020    TSH testing  01/02/2021    Potassium monitoring  01/02/2021    Creatinine monitoring  01/02/2021    A1C test (Diabetic or Prediabetic)  05/27/2021    Pneumococcal 65+ years Vaccine (2 of 2 - PPSV23) 01/04/2022    DTaP/Tdap/Td vaccine (2 - Td) 12/10/2028    AAA screen  Completed    Hepatitis C screen  Completed    Hepatitis A vaccine  Aged Out    Hib vaccine  Aged Out    Meningococcal (ACWY) vaccine  Aged Out     Recommendations for Baboo Due: see orders and patient instructions/AVS.  . Recommended screening schedule for the next 5-10 years is provided to the patient in written form: see Patient Instructions/AVS.    Alexandercorky Kory was seen today for medicare awv. Diagnoses and all orders for this visit:    Routine general medical examination at a health care facility              Kwadwo Olivares. is a 78 y.o. male being evaluated by a Virtual Visit (video and audio) encounter to address concerns as mentioned above. A caregiver was present when appropriate.  Due to this being a TeleHealth encounter ([facility]), evaluation of the following organ systems was limited:

## 2020-06-16 ENCOUNTER — TELEPHONE (OUTPATIENT)
Dept: FAMILY MEDICINE CLINIC | Age: 70
End: 2020-06-16

## 2020-06-22 RX ORDER — DONEPEZIL HYDROCHLORIDE 10 MG/1
TABLET, FILM COATED ORAL
Qty: 90 TABLET | Refills: 1 | Status: SHIPPED | OUTPATIENT
Start: 2020-06-22 | End: 2021-01-25

## 2020-06-24 ENCOUNTER — HOSPITAL ENCOUNTER (OUTPATIENT)
Dept: PHARMACY | Age: 70
Setting detail: THERAPIES SERIES
Discharge: HOME | End: 2020-06-24
Payer: MEDICARE

## 2020-06-24 VITALS — TEMPERATURE: 98.1 F

## 2020-06-24 LAB — INTERNATIONAL NORMALIZATION RATIO, POC: 2.4

## 2020-06-24 PROCEDURE — 99211 OFF/OP EST MAY X REQ PHY/QHP: CPT

## 2020-06-24 PROCEDURE — 85610 PROTHROMBIN TIME: CPT

## 2020-07-22 ENCOUNTER — HOSPITAL ENCOUNTER (OUTPATIENT)
Dept: PHARMACY | Age: 70
Setting detail: THERAPIES SERIES
Discharge: HOME | End: 2020-07-22
Payer: MEDICARE

## 2020-07-22 VITALS — TEMPERATURE: 97.3 F

## 2020-07-22 LAB — INTERNATIONAL NORMALIZATION RATIO, POC: 4

## 2020-07-22 PROCEDURE — 85610 PROTHROMBIN TIME: CPT

## 2020-07-22 PROCEDURE — 99211 OFF/OP EST MAY X REQ PHY/QHP: CPT

## 2020-07-22 NOTE — PROGRESS NOTES
Mr. Puneet Talamantes is a 78 y.o. y/o male with history of Afib who presents today for anticoagulation monitoring and adjustment.   INR 4.0 is supratherapeutic for this patient (goal range 2-3) and is reflective of 50 mg TWD  Patient verifies current dosing regimen, patient able to verbally recall dose  Patient reports zero  missed doses since last INR   Patient denies s/sx clotting and/or stroke  Patient denies hematuria, epistaxis, rectal bleeding  Patient denies changes in diet, alcohol, or tobacco use  Reviewed medication list and drug allergies with patient, updated any medication additions or modifications accordingly  Patient also denies any pending medical or dental procedures scheduled at this time  Patient was instructed to hold today's dose, then continue previous dosing regimen of warfarin 50 mg TWD and RTC 2 weeks    Counseled patient on increasing leafy greens and vegetable intake and how it will affect INR    CLINICAL PHARMACY CONSULT: MED RECONCILIATION/REVIEW [address]: No  Total # of Interventions Recommended: 1  - Decreased Dose #: 1  - Maintenance Safety Lab Monitoring #: 1  Total Interventions Accepted: 1  Time Spent (min): 262 Hugh Pillai, PharmD  [address]

## 2020-07-27 ENCOUNTER — OFFICE VISIT (OUTPATIENT)
Dept: ENDOCRINOLOGY | Age: 70
End: 2020-07-27
Payer: MEDICARE

## 2020-07-27 VITALS
WEIGHT: 199 LBS | DIASTOLIC BLOOD PRESSURE: 65 MMHG | OXYGEN SATURATION: 91 % | BODY MASS INDEX: 27.75 KG/M2 | SYSTOLIC BLOOD PRESSURE: 100 MMHG | HEART RATE: 98 BPM

## 2020-07-27 DIAGNOSIS — E03.9 HYPOTHYROIDISM, UNSPECIFIED TYPE: ICD-10-CM

## 2020-07-27 LAB
ANION GAP SERPL CALCULATED.3IONS-SCNC: 16 MEQ/L (ref 9–15)
BUN BLDV-MCNC: 35 MG/DL (ref 8–23)
CALCIUM SERPL-MCNC: 10 MG/DL (ref 8.5–9.9)
CHLORIDE BLD-SCNC: 95 MEQ/L (ref 95–107)
CHP ED QC CHECK: NORMAL
CO2: 24 MEQ/L (ref 20–31)
CREAT SERPL-MCNC: 1.76 MG/DL (ref 0.7–1.2)
GFR AFRICAN AMERICAN: 46.8
GFR NON-AFRICAN AMERICAN: 38.7
GLUCOSE BLD-MCNC: 163 MG/DL (ref 70–99)
GLUCOSE BLD-MCNC: 207 MG/DL
HBA1C MFR BLD: 9.1 % (ref 4.8–5.9)
POTASSIUM SERPL-SCNC: 4.6 MEQ/L (ref 3.4–4.9)
SODIUM BLD-SCNC: 135 MEQ/L (ref 135–144)
T4 FREE: 1.24 NG/DL (ref 0.84–1.68)
TSH SERPL DL<=0.05 MIU/L-ACNC: 2.71 UIU/ML (ref 0.44–3.86)

## 2020-07-27 PROCEDURE — 82962 GLUCOSE BLOOD TEST: CPT | Performed by: INTERNAL MEDICINE

## 2020-07-27 PROCEDURE — 99213 OFFICE O/P EST LOW 20 MIN: CPT | Performed by: INTERNAL MEDICINE

## 2020-07-27 NOTE — PROGRESS NOTES
Subjective:      Patient ID: Julia Mota. is a 78 y.o. male. Follow-up on type 2 diabetes hypothyroidism lab review patient is on 70/30 Novolin twice daily  Diabetes   He presents for his follow-up diabetic visit. He has type 2 diabetes mellitus. Symptoms are worsening. Diabetic complications include heart disease and nephropathy. Current diabetic treatment includes insulin injections (Novolin 70/30). He is currently taking insulin pre-breakfast and pre-dinner. His overall blood glucose range is >200 mg/dl. Results for Horacio Oro (MRN [id number]) as of 8/2/2020 23:28   Ref. Range 7/27/2020 14:52   Sodium Latest Ref Range: 135 - 144 mEq/L 135   Potassium Latest Ref Range: 3.4 - 4.9 mEq/L 4.6   Chloride Latest Ref Range: 95 - 107 mEq/L 95   CO2 Latest Ref Range: 20 - 31 mEq/L 24   BUN Latest Ref Range: 8 - 23 mg/dL 35 (H)   Creatinine Latest Ref Range: 0.70 - 1.20 mg/dL 1.76 (H)   Anion Gap Latest Ref Range: 9 - 15 mEq/L 16 (H)   GFR Non- Latest Ref Range: >60  38.7 (L)   GFR  Latest Ref Range: >60  46.8 (L)   Glucose Latest Ref Range: 70 - 99 mg/dL 163 (H)   Calcium Latest Ref Range: 8.5 - 9.9 mg/dL 10.0 (H)   Hemoglobin A1C Latest Ref Range: 4.8 - 5.9 % 9.1 (H)   TSH Latest Ref Range: 0.440 - 3.860 uIU/mL 2.710   T4 Free Latest Ref Range: 0.84 - 1.68 ng/dL 1.24     Results for Horacio Oro (MRN [id number]) as of 8/2/2020 23:28   Ref. Range 11/16/2019 21:15 11/23/2019 11:49 1/2/2020 12:22 7/27/2020 14:52   TSH Latest Ref Range: 0.440 - 3.860 uIU/mL 7.340 (H) 8.370 (H) 3.880 (H) 2.710     Results for Horacio Oro (MRN [id number]) as of 8/2/2020 23:28   Ref.  Range 5/26/2019 06:18 11/1/2019 16:03 12/27/2019 12:14 5/27/2020 09:29 7/27/2020 14:52   Hemoglobin A1C Latest Ref Range: 4.8 - 5.9 % 8.1 (H) 7.8 8.3 8.4 9.1 (H)     Patient Active Problem List   Diagnosis    Uncontrolled type 2 diabetes mellitus with complication, with long-term current use of (LIPITOR) 80 MG tablet, Take 80 mg by mouth daily, Disp: , Rfl:     warfarin (COUMADIN) 7.5 MG tablet, 7.5mg daily, Disp: 30 tablet, Rfl: 3    Oxygen Concentrator, Oxi Go POC, Disp: 1 each, Rfl: 0    potassium chloride (KLOR-CON M) 20 MEQ extended release tablet, Take 1 tablet by mouth 2 times daily (Patient taking differently: Take 20 mEq by mouth Take 1/2 tab BID), Disp: 60 tablet, Rfl: 3    budesonide-formoterol (SYMBICORT) 160-4.5 MCG/ACT AERO, Inhale 2 puffs into the lungs 2 times daily, Disp: 3 Inhaler, Rfl: 3    albuterol (PROVENTIL) (2.5 MG/3ML) 0.083% nebulizer solution, Take 3 mLs by nebulization every 6 hours as needed for Wheezing, Disp: 120 each, Rfl: 5    pantoprazole (PROTONIX) 40 MG tablet, Take 1 tablet by mouth every morning (before breakfast), Disp: 30 tablet, Rfl: 3    ENTRESTO 24-26 MG per tablet, Take 1 tablet by mouth 2 times daily , Disp: , Rfl: 11    DIGITEK 125 MCG tablet, TAKE 1 TABLET DAILY, Disp: 90 tablet, Rfl: 3    albuterol (PROAIR HFA) 108 (90 BASE) MCG/ACT inhaler, Inhale 2 puffs into the lungs every 4 hours as needed for Wheezing, Disp: 3 Inhaler, Rfl: 0      Review of Systems    Vitals:    07/27/20 1415   BP: 100/65   Pulse: 98   SpO2: 91%   Weight: 199 lb (90.3 kg)       Objective:   Physical Exam  Constitutional:       Appearance: Normal appearance. HENT:      Head: Normocephalic and atraumatic. Neck:      Musculoskeletal: Normal range of motion and neck supple. Cardiovascular:      Rate and Rhythm: Normal rate. Musculoskeletal: Normal range of motion. Right lower leg: Edema present. Left lower leg: Edema present. Neurological:      Mental Status: He is alert. Psychiatric:         Mood and Affect: Mood normal.         Assessment:       Diagnosis Orders   1. Uncontrolled type 2 diabetes mellitus with complication, with long-term current use of insulin ([facility])  POCT Glucose    Basic Metabolic Panel    Hemoglobin A1C   2.  Hypothyroidism, unspecified type  T4, Free    TSH without Reflex           Plan:      Orders Placed This Encounter   Procedures    T4, Free     Standing Status:   Future     Number of Occurrences:   1     Standing Expiration Date:   7/27/2021    TSH without Reflex     Standing Status:   Future     Number of Occurrences:   1     Standing Expiration Date:   7/27/2021    Basic Metabolic Panel     Standing Status:   Future     Number of Occurrences:   1     Standing Expiration Date:   7/27/2021    Hemoglobin A1C     Standing Status:   Future     Number of Occurrences:   1     Standing Expiration Date:   7/27/2021    POCT Glucose     Continue Novolin 70/30 10 units twice a day advised compliance with diet A1c goal of less than 8        Inessa MD Woodrow

## 2020-07-28 ENCOUNTER — CARE COORDINATION (OUTPATIENT)
Dept: CARE COORDINATION | Age: 70
End: 2020-07-28

## 2020-07-29 ENCOUNTER — CARE COORDINATION (OUTPATIENT)
Dept: CARE COORDINATION | Age: 70
End: 2020-07-29

## 2020-07-29 NOTE — CARE COORDINATION
Care Coordination call placed to patient. Unable to reach patient by phone. Message left regarding the purpose of the call. Requested call back. Provided call back number. Episode resolved - unable to contact.

## 2020-08-03 ENCOUNTER — TELEPHONE (OUTPATIENT)
Dept: ENDOCRINOLOGY | Age: 70
End: 2020-08-03

## 2020-08-03 NOTE — TELEPHONE ENCOUNTER
Patient calling to request [address]A test strips and alcohol pads. Send RX to listed [facility].

## 2020-08-04 RX ORDER — GLUCOSAMINE HCL/CHONDROITIN SU 500-400 MG
CAPSULE ORAL
Qty: 150 STRIP | Refills: 5 | Status: ON HOLD | OUTPATIENT
Start: 2020-08-04 | End: 2020-10-26

## 2020-08-04 RX ORDER — UBIQUINOL 100 MG
CAPSULE ORAL
Qty: 200 EACH | Refills: 5 | Status: ON HOLD | OUTPATIENT
Start: 2020-08-04 | End: 2020-10-26

## 2020-08-06 ENCOUNTER — TELEPHONE (OUTPATIENT)
Dept: PHARMACY | Age: 70
End: 2020-08-06

## 2020-08-10 ENCOUNTER — HOSPITAL ENCOUNTER (OUTPATIENT)
Dept: CARDIOLOGY | Age: 70
Discharge: HOME | End: 2020-08-10
Payer: MEDICARE

## 2020-08-10 PROCEDURE — 93290 INTERROG DEV EVAL ICPMS IP: CPT

## 2020-08-10 PROCEDURE — 93289 INTERROG DEVICE EVAL HEART: CPT

## 2020-08-10 PROCEDURE — 93291 INTERROG DEV EVAL SCRMS IP: CPT

## 2020-08-10 PROCEDURE — 93284 PRGRMG EVAL IMPLANTABLE DFB: CPT

## 2020-08-14 ENCOUNTER — TELEPHONE (OUTPATIENT)
Dept: PHARMACY | Age: 70
End: 2020-08-14

## 2020-08-21 ENCOUNTER — TELEPHONE (OUTPATIENT)
Dept: PHARMACY | Age: 70
End: 2020-08-21

## 2020-08-28 ENCOUNTER — TELEPHONE (OUTPATIENT)
Dept: PHARMACY | Age: 70
End: 2020-08-28

## 2020-08-28 NOTE — TELEPHONE ENCOUNTER
Left message to schedule appointment. Will send letter as patient has been called 3 times.      Natalee Navarro PharmD  8/28/2020 9:28 AM

## 2020-08-28 NOTE — LETTER
[facility] Emergency Department  Anticoagulation Management Service  [address] [place], [place] [address]Jennifer White, [address] [facility]  [phone]    8/28/2020  [address] [place] [address]    Dear Naida Garza,    This is a reminder that you are past due to have your PT/INR checked. We have attempted, unsuccessfully, to contact you by phone. If your contact information has changed please update our office with your current phone number and emergency contact. As you know, it is very important to obtain this test regularly to determine the effects of your warfarin (Coumadin®) therapy. Without regular blood tests, it is impossible to know the dose of warfarin (Coumadin®) that is right for you. The amount of warfarin (Coumadin®) that is safe and effective for you may change from time to time. Warfarin (Coumadin®) can be a dangerous medication if taken incorrectly. If your PT/INR is too low, you could develop harmful blood clots which can lead to heart attack, stroke, or pulmonary embolus (blood clot in lung). If your PT/INR is too high, serious bleeding or hemorrhage can occur. Please call us at [phone] to schedule an appointment to have your blood tested. Thank you very much in advance.     Sincerely,      Anticoagulation Management Service Staff

## 2020-09-01 RX ORDER — INSULIN GLARGINE 100 [IU]/ML
10 INJECTION, SOLUTION SUBCUTANEOUS NIGHTLY
Qty: 5 PEN | Refills: 0 | Status: SHIPPED | OUTPATIENT
Start: 2020-09-01 | End: 2020-09-21

## 2020-09-02 RX ORDER — BLOOD SUGAR DIAGNOSTIC
STRIP MISCELLANEOUS
Qty: 100 EACH | Refills: 1 | Status: ON HOLD | OUTPATIENT
Start: 2020-09-02 | End: 2020-10-26

## 2020-09-04 ENCOUNTER — TELEPHONE (OUTPATIENT)
Dept: PHARMACY | Age: 70
End: 2020-09-04

## 2020-09-04 NOTE — TELEPHONE ENCOUNTER
Left message to schedule past due appt.     [facility]  Staff Pharmacist  9/4/2020  1:53 PM

## 2020-09-11 ENCOUNTER — HOSPITAL ENCOUNTER (OUTPATIENT)
Age: 70
Setting detail: OBSERVATION
Discharge: HOME | End: 2020-09-12
Attending: EMERGENCY MEDICINE | Admitting: FAMILY MEDICINE
Payer: MEDICARE

## 2020-09-11 ENCOUNTER — APPOINTMENT (OUTPATIENT)
Dept: GENERAL RADIOLOGY | Age: 70
End: 2020-09-11
Payer: MEDICARE

## 2020-09-11 LAB
ALBUMIN SERPL-MCNC: 4 G/DL (ref 3.5–4.6)
ALP BLD-CCNC: 139 U/L (ref 35–104)
ALT SERPL-CCNC: 19 U/L (ref 0–41)
ANION GAP SERPL CALCULATED.3IONS-SCNC: 11 MEQ/L (ref 9–15)
APTT: 33.7 SEC (ref 24.4–36.8)
AST SERPL-CCNC: 17 U/L (ref 0–40)
BASOPHILS ABSOLUTE: 0 K/UL (ref 0–0.2)
BASOPHILS RELATIVE PERCENT: 0.5 %
BILIRUB SERPL-MCNC: 1.2 MG/DL (ref 0.2–0.7)
BUN BLDV-MCNC: 45 MG/DL (ref 8–23)
CALCIUM SERPL-MCNC: 9.4 MG/DL (ref 8.5–9.9)
CHLORIDE BLD-SCNC: 95 MEQ/L (ref 95–107)
CO2: 28 MEQ/L (ref 20–31)
CREAT SERPL-MCNC: 1.58 MG/DL (ref 0.7–1.2)
DIGOXIN LEVEL: 1.2 NG/ML (ref 0.8–2)
EKG ATRIAL RATE: 90 BPM
EKG P AXIS: 64 DEGREES
EKG P-R INTERVAL: 134 MS
EKG Q-T INTERVAL: 420 MS
EKG QRS DURATION: 170 MS
EKG QTC CALCULATION (BAZETT): 513 MS
EKG R AXIS: 265 DEGREES
EKG T AXIS: 71 DEGREES
EKG VENTRICULAR RATE: 90 BPM
EOSINOPHILS ABSOLUTE: 0 K/UL (ref 0–0.7)
EOSINOPHILS RELATIVE PERCENT: 0.3 %
GFR AFRICAN AMERICAN: 53
GFR NON-AFRICAN AMERICAN: 43.8
GLOBULIN: 3.2 G/DL (ref 2.3–3.5)
GLUCOSE BLD-MCNC: 149 MG/DL (ref 70–99)
GLUCOSE BLD-MCNC: 155 MG/DL (ref 60–115)
HBA1C MFR BLD: 9.2 % (ref 4.8–5.9)
HCT VFR BLD CALC: 36.3 % (ref 42–52)
HEMOGLOBIN: 11.9 G/DL (ref 14–18)
INR BLD: 2.6
LYMPHOCYTES ABSOLUTE: 1 K/UL (ref 1–4.8)
LYMPHOCYTES RELATIVE PERCENT: 16.2 %
MAGNESIUM: 1.9 MG/DL (ref 1.7–2.4)
MCH RBC QN AUTO: 27.7 PG (ref 27–31.3)
MCHC RBC AUTO-ENTMCNC: 32.9 % (ref 33–37)
MCV RBC AUTO: 84.1 FL (ref 80–100)
MONOCYTES ABSOLUTE: 0.6 K/UL (ref 0.2–0.8)
MONOCYTES RELATIVE PERCENT: 9.7 %
NEUTROPHILS ABSOLUTE: 4.5 K/UL (ref 1.4–6.5)
NEUTROPHILS RELATIVE PERCENT: 73.3 %
PDW BLD-RTO: 16.3 % (ref 11.5–14.5)
PERFORMED ON: ABNORMAL
PLATELET # BLD: 144 K/UL (ref 130–400)
POTASSIUM SERPL-SCNC: 4.3 MEQ/L (ref 3.4–4.9)
PRO-BNP: 2772 PG/ML
PROTHROMBIN TIME: 27.7 SEC (ref 12.3–14.9)
RBC # BLD: 4.32 M/UL (ref 4.7–6.1)
SODIUM BLD-SCNC: 134 MEQ/L (ref 135–144)
TOTAL PROTEIN: 7.2 G/DL (ref 6.3–8)
TROPONIN: 0.04 NG/ML (ref 0–0.01)
WBC # BLD: 6.1 K/UL (ref 4.8–10.8)

## 2020-09-11 PROCEDURE — 80162 ASSAY OF DIGOXIN TOTAL: CPT

## 2020-09-11 PROCEDURE — 36415 COLL VENOUS BLD VENIPUNCTURE: CPT

## 2020-09-11 PROCEDURE — 93010 ELECTROCARDIOGRAM REPORT: CPT | Performed by: INTERNAL MEDICINE

## 2020-09-11 PROCEDURE — 6360000002 HC RX W HCPCS: Performed by: EMERGENCY MEDICINE

## 2020-09-11 PROCEDURE — 71045 X-RAY EXAM CHEST 1 VIEW: CPT

## 2020-09-11 PROCEDURE — 83880 ASSAY OF NATRIURETIC PEPTIDE: CPT

## 2020-09-11 PROCEDURE — 85025 COMPLETE CBC W/AUTO DIFF WBC: CPT

## 2020-09-11 PROCEDURE — 83036 HEMOGLOBIN GLYCOSYLATED A1C: CPT

## 2020-09-11 PROCEDURE — 80053 COMPREHEN METABOLIC PANEL: CPT

## 2020-09-11 PROCEDURE — 99285 EMERGENCY DEPT VISIT HI MDM: CPT

## 2020-09-11 PROCEDURE — 96366 THER/PROPH/DIAG IV INF ADDON: CPT

## 2020-09-11 PROCEDURE — 85610 PROTHROMBIN TIME: CPT

## 2020-09-11 PROCEDURE — 2580000003 HC RX 258: Performed by: FAMILY MEDICINE

## 2020-09-11 PROCEDURE — G0378 HOSPITAL OBSERVATION PER HR: HCPCS

## 2020-09-11 PROCEDURE — 96365 THER/PROPH/DIAG IV INF INIT: CPT

## 2020-09-11 PROCEDURE — 84484 ASSAY OF TROPONIN QUANT: CPT

## 2020-09-11 PROCEDURE — 6370000000 HC RX 637 (ALT 250 FOR IP): Performed by: INTERNAL MEDICINE

## 2020-09-11 PROCEDURE — 85730 THROMBOPLASTIN TIME PARTIAL: CPT

## 2020-09-11 PROCEDURE — 83735 ASSAY OF MAGNESIUM: CPT

## 2020-09-11 PROCEDURE — 93005 ELECTROCARDIOGRAM TRACING: CPT | Performed by: EMERGENCY MEDICINE

## 2020-09-11 RX ORDER — ASPIRIN 81 MG/1
81 TABLET ORAL DAILY
Status: DISCONTINUED | OUTPATIENT
Start: 2020-09-11 | End: 2020-09-12 | Stop reason: HOSPADM

## 2020-09-11 RX ORDER — NICOTINE POLACRILEX 4 MG
15 LOZENGE BUCCAL PRN
Status: DISCONTINUED | OUTPATIENT
Start: 2020-09-11 | End: 2020-09-12 | Stop reason: HOSPADM

## 2020-09-11 RX ORDER — DIGOXIN 125 MCG
125 TABLET ORAL EVERY EVENING
Status: DISCONTINUED | OUTPATIENT
Start: 2020-09-11 | End: 2020-09-12 | Stop reason: HOSPADM

## 2020-09-11 RX ORDER — WARFARIN SODIUM 5 MG/1
10 TABLET ORAL
Status: ACTIVE | OUTPATIENT
Start: 2020-09-11 | End: 2020-09-12

## 2020-09-11 RX ORDER — POLYETHYLENE GLYCOL 3350 17 G/17G
17 POWDER, FOR SOLUTION ORAL DAILY PRN
Status: DISCONTINUED | OUTPATIENT
Start: 2020-09-11 | End: 2020-09-12 | Stop reason: HOSPADM

## 2020-09-11 RX ORDER — SODIUM CHLORIDE 0.9 % (FLUSH) 0.9 %
10 SYRINGE (ML) INJECTION PRN
Status: DISCONTINUED | OUTPATIENT
Start: 2020-09-11 | End: 2020-09-12 | Stop reason: HOSPADM

## 2020-09-11 RX ORDER — BUMETANIDE 1 MG/1
1 TABLET ORAL 2 TIMES DAILY
Status: DISCONTINUED | OUTPATIENT
Start: 2020-09-11 | End: 2020-09-12 | Stop reason: HOSPADM

## 2020-09-11 RX ORDER — NITROGLYCERIN 0.4 MG/1
0.4 TABLET SUBLINGUAL EVERY 5 MIN PRN
Status: DISCONTINUED | OUTPATIENT
Start: 2020-09-11 | End: 2020-09-12 | Stop reason: HOSPADM

## 2020-09-11 RX ORDER — ACETAMINOPHEN 80 MG
TABLET,CHEWABLE ORAL ONCE
Status: DISCONTINUED | OUTPATIENT
Start: 2020-09-11 | End: 2020-09-12 | Stop reason: HOSPADM

## 2020-09-11 RX ORDER — ONDANSETRON 2 MG/ML
4 INJECTION INTRAMUSCULAR; INTRAVENOUS EVERY 6 HOURS PRN
Status: DISCONTINUED | OUTPATIENT
Start: 2020-09-11 | End: 2020-09-11

## 2020-09-11 RX ORDER — ACETAMINOPHEN 325 MG/1
650 TABLET ORAL EVERY 6 HOURS PRN
Status: DISCONTINUED | OUTPATIENT
Start: 2020-09-11 | End: 2020-09-12 | Stop reason: HOSPADM

## 2020-09-11 RX ORDER — SODIUM CHLORIDE 0.9 % (FLUSH) 0.9 %
10 SYRINGE (ML) INJECTION EVERY 12 HOURS SCHEDULED
Status: DISCONTINUED | OUTPATIENT
Start: 2020-09-11 | End: 2020-09-12 | Stop reason: HOSPADM

## 2020-09-11 RX ORDER — METOPROLOL SUCCINATE 25 MG/1
12.5 TABLET, EXTENDED RELEASE ORAL DAILY
Status: DISCONTINUED | OUTPATIENT
Start: 2020-09-11 | End: 2020-09-11

## 2020-09-11 RX ORDER — MAGNESIUM SULFATE IN WATER 40 MG/ML
4 INJECTION, SOLUTION INTRAVENOUS ONCE
Status: COMPLETED | OUTPATIENT
Start: 2020-09-11 | End: 2020-09-11

## 2020-09-11 RX ORDER — DEXTROSE MONOHYDRATE 50 MG/ML
100 INJECTION, SOLUTION INTRAVENOUS PRN
Status: DISCONTINUED | OUTPATIENT
Start: 2020-09-11 | End: 2020-09-12 | Stop reason: HOSPADM

## 2020-09-11 RX ORDER — SPIRONOLACTONE 25 MG/1
25 TABLET ORAL DAILY
Status: DISCONTINUED | OUTPATIENT
Start: 2020-09-11 | End: 2020-09-12 | Stop reason: HOSPADM

## 2020-09-11 RX ORDER — METOPROLOL SUCCINATE 25 MG/1
25 TABLET, EXTENDED RELEASE ORAL DAILY
Status: DISCONTINUED | OUTPATIENT
Start: 2020-09-11 | End: 2020-09-12 | Stop reason: HOSPADM

## 2020-09-11 RX ORDER — ATORVASTATIN CALCIUM 80 MG/1
80 TABLET, FILM COATED ORAL NIGHTLY
Status: DISCONTINUED | OUTPATIENT
Start: 2020-09-11 | End: 2020-09-12 | Stop reason: HOSPADM

## 2020-09-11 RX ORDER — METOPROLOL TARTRATE 5 MG/5ML
2.5 INJECTION INTRAVENOUS
Status: DISCONTINUED | OUTPATIENT
Start: 2020-09-11 | End: 2020-09-12 | Stop reason: HOSPADM

## 2020-09-11 RX ORDER — DEXTROSE MONOHYDRATE 25 G/50ML
12.5 INJECTION, SOLUTION INTRAVENOUS PRN
Status: DISCONTINUED | OUTPATIENT
Start: 2020-09-11 | End: 2020-09-12 | Stop reason: HOSPADM

## 2020-09-11 RX ORDER — ACETAMINOPHEN 650 MG/1
650 SUPPOSITORY RECTAL EVERY 6 HOURS PRN
Status: DISCONTINUED | OUTPATIENT
Start: 2020-09-11 | End: 2020-09-12 | Stop reason: HOSPADM

## 2020-09-11 RX ORDER — PROMETHAZINE HYDROCHLORIDE 25 MG/1
12.5 TABLET ORAL EVERY 6 HOURS PRN
Status: DISCONTINUED | OUTPATIENT
Start: 2020-09-11 | End: 2020-09-11

## 2020-09-11 RX ADMIN — ATORVASTATIN CALCIUM 80 MG: 80 TABLET, FILM COATED ORAL at 21:56

## 2020-09-11 RX ADMIN — ASPIRIN 81 MG: 81 TABLET, COATED ORAL at 18:47

## 2020-09-11 RX ADMIN — SACUBITRIL AND VALSARTAN 1 TABLET: 24; 26 TABLET, FILM COATED ORAL at 21:56

## 2020-09-11 RX ADMIN — MAGNESIUM SULFATE HEPTAHYDRATE 4 G: 40 INJECTION, SOLUTION INTRAVENOUS at 11:59

## 2020-09-11 RX ADMIN — BUMETANIDE 1 MG: 1 TABLET ORAL at 21:56

## 2020-09-11 RX ADMIN — Medication 10 ML: at 21:56

## 2020-09-11 ASSESSMENT — ENCOUNTER SYMPTOMS
BACK PAIN: 0
SORE THROAT: 0
VOMITING: 0
SHORTNESS OF BREATH: 0
DIARRHEA: 0
ABDOMINAL PAIN: 0
COUGH: 0
NAUSEA: 0

## 2020-09-11 NOTE — H&P
Hospital Medicine  History and Physical    Patient:  Shyanne Pascual. MRN: [id number]    CHIEF COMPLAINT:    Chief Complaint   Patient presents with    Pacemaker Problem     pt states that his defib fired 5-6 times approx 2 weeks ago       History Obtained From:  patient  Primary Care Physician: Matilda Han MD    HISTORY OF PRESENT ILLNESS:   The patient is a 78 y.o. male who presents with a history of sCHF, vtach s/p aicd/ppm, who has had his aicd fire multiple times over the past 2 weeks. He denies chest pain.       Past Medical History:      Diagnosis Date    Amiodarone-induced hyperthyroidism     Arthritis     Atrial flutter ([address]     CAD (coronary artery disease)     Chronic combined systolic and diastolic CHF (congestive heart failure) (HCC)     CKD (chronic kidney disease) stage 3, GFR 30-59 ml/min (HCC)     COPD (chronic obstructive pulmonary disease) ([address]     Defibrillator activation     Diabetes mellitus with insulin therapy ([address]     Dilated cardiomyopathy ([address]     Generalized and unspecified atherosclerosis     Gout     Hyperlipidemia     Hypertension     Noncompliance     Obesity     On home oxygen therapy     Pain in joint, multiple sites     Paroxysmal A-fib (HCC)     Paroxysmal ventricular tachycardia (HCC)     Prolonged emergence from general anesthesia     Status post angioplasty     Sustained ventricular tachycardia (HCC)     TIA (transient ischemic attack)     Tobacco abuse     Type II or unspecified type diabetes mellitus without mention of complication, not stated as uncontrolled        Past Surgical History:      Procedure Laterality Date    CARDIAC CATHETERIZATION      COLONOSCOPY      CORONARY ANGIOPLASTY  4/29/11    Dr Josh Glaser CORONARY ARTERY BYPASS GRAFT  2012    ENDOSCOPY, COLON, DIAGNOSTIC      EYE SURGERY Bilateral     Cataracts     OTHER SURGICAL HISTORY      difibrillator     MS CIRCUMCISION,OTHER,28+ D/O N/A 8/29/2018    CIRCUMCISION performed by Haroon Vogt MD at [address] [place] [address] EGD TRANSORAL BIOPSY SINGLE/MULTIPLE N/A 11/5/2017    EGD BIOPSY performed by Cha Edwards MD at [facility]       Medications Prior to Admission:    Prior to Admission medications    Medication Sig Start Date End Date Taking? Authorizing Provider   LAWRENCE SOLOSTAR 100 UNIT/ML injection pen INJECT 10 UNITS INTO THE SKIN NIGHTLY  Patient taking differently: Inject 10 Units into the skin 2 times daily Patient states he takes 10 units BID am and hs 9/1/20  Yes Dillon Atwood MD   blood glucose monitor strips Test 4x daily Dx E11.65 8/4/20  Yes Austin Reddy MD   Alcohol Swabs (ALCOHOL PREP) 70 % PADS Use 8 daily 8/4/20  Yes Austin Reddy MD   donepezil (ARICEPT) 10 MG tablet TAKE 1 TABLET BY MOUTH EVERY DAY AT NIGHT 6/22/20  Yes Dillon Atwood MD   Insulin Syringe-Needle U-100 (BD INSULIN SYRINGE ULTRAFINE) 31G X 5/16\" 0.5 ML MISC USE TWICE A DAY 6/11/20  Yes Dillon Atwood MD   metOLazone (ZAROXOLYN) 2.5 MG tablet Take half hour before morning dose of lasix. Take on Mondays 5/20/20  Yes Dillon Atwood MD   allopurinol (ZYLOPRIM) 100 MG tablet TAKE 1 TABLET DAILY 3/16/20  Yes Dillon Atwood MD   warfarin (COUMADIN) 5 MG tablet Take as directed by [facility] [place] [facility] [place] Anticoagulation Management Service. Quantity equals 90 day supply.  3/16/20  Yes Dillon Atwood MD   Blood Pressure Monitoring (ADULT BLOOD PRESSURE CUFF LG) KIT 1 Device by Does not apply route daily 12/27/19  Yes Dillon Atwood MD   [facility]) 1 MG tablet Take 1 mg by mouth 2 times daily  12/4/19  Yes Dillon Atwood MD   metoprolol succinate (TOPROL XL) 25 MG extended release tablet Take 1/2 tablet daily 12/4/19  Yes Dillon Atwood MD   nitroGLYCERIN (NITROSTAT) 0.4 MG SL tablet Place 1 tablet under the tongue every 5 minutes as needed for Chest pain 11/26/19  Yes Dillon Atwood MD   spironolactone (ALDACTONE) 25 MG tablet Take 1 tablet by mouth daily 11/21/19  Yes Christy Ash MD   atorvastatin (LIPITOR) 80 MG tablet Take 80 mg by mouth nightly    Yes Historical Provider, MD   Oxygen Concentrator Oxi Go POC 6/27/18  Yes Martha Harvey MD   potassium chloride (KLOR-CON M) 20 MEQ extended release tablet Take 1 tablet by mouth 2 times daily  Patient taking differently: Take 20 mEq by mouth Take 1/2 tab BID 5/17/18  Yes Christy Ash MD   pantoprazole (PROTONIX) 40 MG tablet Take 1 tablet by mouth every morning (before breakfast) 11/8/17  Yes Christy Ash MD   ENTRESTO 24-26 MG per tablet Take 1 tablet by mouth 2 times daily  2/16/17  Yes Historical Provider, MD   DIGITEK 125 MCG tablet TAKE 1 TABLET DAILY 8/2/15  Yes Evelyn Cisneros MD   Insulin Pen Needle (PEN NEEDLES) 32G X 5 MM MISC Use to inject Lantus 9/2/20   Ramirez Ortiz MD   warfarin (COUMADIN) 7.5 MG tablet 7.5mg daily 5/27/19   Christy Ash MD   budesonide-formoterol (SYMBICORT) 160-4.5 MCG/ACT AERO Inhale 2 puffs into the lungs 2 times daily 3/30/18   Martha Harvey MD   albuterol (PROVENTIL) (2.5 MG/3ML) 0.083% nebulizer solution Take 3 mLs by nebulization every 6 hours as needed for Wheezing 2/13/18   Martha Harvey MD   albuterol (PROAIR HFA) 108 (90 BASE) MCG/ACT inhaler Inhale 2 puffs into the lungs every 4 hours as needed for Wheezing 5/11/15   Evelyn Cisneros MD       Allergies:  Dye [iodides]; Penicillins; and Tapazole [methimazole]    Social History:   TOBACCO:   reports that he quit smoking about 8 years ago. He has a 37.50 pack-year smoking history. He has never used smokeless tobacco.  ETOH:   reports no history of alcohol use. Family History:       Problem Relation Age of Onset    Cancer Brother     Diabetes Mother     Heart Disease Father     Emphysema Father        REVIEW OF SYSTEMS:  Ten systems reviewed and negative except for as above.      Physical Exam:    Vitals: BP (!) 90/48   Pulse 53   Temp 97.5 °F leads unchanged. Median sternotomy for CABG. Lungs and pleura:  Emphysematous changes. No focal consolidation. No pleural effusion. No pneumothorax. Normal pulmonary vascular pattern. Cardiomediastinal silhouette:  Stable moderately enlarged. Aortic atherosclerotic calcification. Other:  No acute osseous findings. No acute radiographic abnormality or significant interval change. EKG: paced rhythm    Assessment and Plan     Firing AICD due to vtach, acute on chronic systolic heart failure    Cardiology consult, consideration of change in antiarrythmics at cardiology discretion. On bumex, aldactone. ON digoxin and bblocker, entresto.     BERTRAM on CKD 3    Improved, no iv fluids due to schf    DM2    SSI, ac/hs checks    DVT proph - on warfarin    Patient Active Problem List   Diagnosis Code    Uncontrolled type 2 diabetes mellitus with complication, with long-term current use of insulin ([facility]) E11.8, E11.65, Z79.4    CAD (coronary artery disease) I25.10    Noncompliance Z91.19    Pain in joint, multiple sites M25.50    COPD (chronic obstructive pulmonary disease) ([facility] J44.9    Diabetes mellitus with insulin therapy ([facility] E11.9, Z79.4    Hyperlipidemia E78.5    Hypertension I10    Generalized atherosclerosis I70.91    TIA (transient ischemic attack) G45.9    Elevation of level of transaminase or lactic acid dehydrogenase (LDH) R74.0    Tobacco dependence syndrome F17.200    Disorder of muscle M62.9    Mitral valve insufficiency I34.0    Acute lymphadenitis L04.9    Disorder of pancreas K86.9    Left heart failure (HCC) I50.1    Irritable bowel syndrome K58.9    Hyponatremia E87.1    Atherosclerosis of coronary artery I25.10    Generalized ischemic myocardial dysfunction I25.5    Coronary arteriosclerosis in native artery I25.10    Atrial flutter (HCC) I48.92    Acute on chronic systolic CHF (congestive heart failure), NYHA class 4 (HCC) I50.23    Paroxysmal A-fib (HCC) I48.0    Chronic combined systolic and diastolic heart failure (HCC) I50.42    Hemoptysis R04.2    SOB (shortness of breath) R06.02    CHF (congestive heart failure), NYHA class III, chronic, combined (HCC) I50.42    CHF (congestive heart failure), NYHA class I, acute on chronic, combined (HCC) M68.50    Acute systolic CHF (congestive heart failure) (HCC) I50.21    CHF (congestive heart failure), NYHA class IV, acute on chronic, combined (HCC) I50.43    Phimosis/redundant prepuce [id number]    Moderate malnutrition (HCC) E44.0    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    Hyperthyroidism E05.90    Uncontrolled type 2 diabetes mellitus with hyperglycemia (HCC) E11.65    BERTRAM (acute kidney injury) ([facility] N17.9    Anticoagulant long-term use Z79.01    Blurred vision, bilateral H53.8    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    Chest pain R07.9    Cough in adult R05    Dizziness R42    Fatigue R53.83    Fever R50.9    Former smoker Z87.891    Gout, arthritis M10.9    High risk medication use Z79.899    Hyperkalemia E87.5    Hypokalemia E87.6    Hypotension (arterial) I95.9    ICD (implantable cardioverter-defibrillator) discharge Z45.02    Obese E66.9    Overweight E66.3    Paroxysmal ventricular tachycardia (HCC) I47.2    Presence of automatic implantable cardioverter-defibrillator Z95.810    Status post angioplasty Z98.62    Swelling of multiple joints M25.40    Hypoxia R09.02    Ventricular tachycardia (HCC) I47.2    Sustained VT (ventricular tachycardia) (HCC) I47.2    Acute decompensated heart failure (HCC) I50.9    CKD (chronic kidney disease) stage 3, GFR 30-59 ml/min (HCC) N18.3    Elevated bilirubin R17       Sameera Lopez MD  Admitting Hospitalist

## 2020-09-11 NOTE — CONSULTS
CARDIOLOGY/ELECTROPHYSIOLOGY CONSULTATION    PATIENT NAME: Anne Marie Schneider. PATIENT MRN: [id number]  SERVICE DATE:  9/11/2020  SERVICE TIME: 6:05 PM    CONSULTANT: Skip Fernandez. Ruma Kay MD  PRIMARY CARE PHYSICIAN: Denia Medina MD  ATTENDING PHYSICIAN: Juan Cordoba MD      IMPRESSIONS:  1. Coronary artery disease, S/P 4-vessel CABG in 2013, and some PCI's since then. He is followed chronically by Dr. Preethi Cid. 2. Prior severe mitral regurgitation, S/P mitral valve repair at time of CABG. 3. Severe ischemic cardiomyopathy, with LVEF 15-20%, and chronic NYHA functional class II congestive heart failure. He is on guideline-directed medical therapy, including beta blockers, an ARNI, aldosterone antagonist, digoxin, and diuretics. 4. S/P Medtronic DDDR ICD in 2013, with upgrade to biventricular DDDR ICD in 2018. The device function is normal.  5. Paroxysmal atrial flutter by history, without breakthroughs of this particular rhythm recently. The etiology of this is likely a combination of HTN, COPD, and CAD. The patient is on a simple \"rate control strategy\" at present with AVN blockers (digoxin, metoprolol). He has a CHADS-VASc score of at least 5 (HTN, CAD, CHF, DM, age>65), and is appropriately anticoagulated with warfarin. 6. Recent ICD shock on 9/5/2020, likely due to sustained atrial tachycardia, rather than to ventricular tachycardia, based upon ICD interrogation. The device beeping likely represents notification of this event. 7. Frequent transient non-sustained ventricular tachycardia also noted by ICD interrogation. The patient did not tolerate prior therapy with amiodarone. 8. Type II diabetes mellitus, on insulin therapy. The patient has presumed diabetic and hypertensive nephropathy as well, with chronic renal insufficiency. 9. Chronic obstructive pulmonary disease. 10. Chronic cognitive dysfunction, on Aricept therapy. RECOMMENDATIONS:  1.  The patient may continue his current medical regimen for now  2. In regard to his atrial tachycardia being mistakenly identified by the ICD as ventricular, various approaches could be taken. A very attractive approach would be to perform an AV junction ablation to eliminate the possibility of rapid conduction of atrial arrhythmias. Another approach would be to reprogram the device to more stringent criteria for ICD shock delivery (e.g. having 2 VT zones, with ATP only in the slower zone and shocks in the faster zone only), or to require AV dissociation for VT detection as part of his SVT/VT discrimination algorithm. 3. The patient is free for discharge home after his congestive heart failure is felt to be optimally treated and compensated. He will follow up with Dr. Tameka Duran to discuss the options noted above. If the patient undergoes AVJ RFA to eliminate his potential for spurious shocks for atrial tachyarrhythmias, he might also be considered for empiric mexiletine therapy to minimize his ventricular ectopy and ventricular tachycardia episodes. Thank you for this consultation. SIGNATURE: Elias Stovall MD, [facility]   OFFICE: [phone]    ================================================================    REASON FOR CONSULTATION: Recent ICD shock    HISTORY:  Puneet Talaamntes is a 78 y.o. -American male with an extensive past cardiac history who presented on 9/11/2020 because of worsening heart failure symptoms and a report of a recent ICD shock at home. The patient states that his ICD shock occurred about a week early, when he was bringing groceries into his house. He did not have any antecedent lightheadedness, chest pain, or palpitations, but received a single ICD shock. He reported this to Dr. Ravi Santillan today, and also stated that his ICD has been periodically \"beeping\" at him. He also has apparently had worsening exertional dyspnea, prompting his admission to /Telemetry.     PAST MEDICAL HISTORY: The patient has a history of hypertension, diffuse apical impulse and grade I/VI holosystolic murmur at apex  Resp: Clear to auscultation bilaterally, without wheezes or rales  Abd: Non-distended, with no tenderness, no masses, and no organomegaly noted  Ext: Peripheral pulses intact; no peripheral edema  Neuro: Normal gross motor and sensory function; no focal deficits noted  Skin: No lesions noted    EKG: Sinus rhythm at 90 bpm with atrial sensing and biventricular pacing (dominant LV pacing morphology); frequent PVC's    ICD CHECK (9/11/2020): Normal ICD function, with good lead parameters (A lead: P 3.8 mV, threshold 0.375 V @ 0.4 ms, impedance 475 ohms; RV lead: R 18.3 mV, threshold 0.5 V @ 0.4 ms, pacing impedance 418 ohms, shock system impedance 54 ohms; LV lead: polarity LV1-to-LV2, threshold 0.75 V @ 0.4 ms, impedance 646 ohms); normal DDD pacing  bpm, with 82% effective BiV pacing; the AF burden is <0.1%. The unit is programmed to a VT zone between 128 and 188 bpm, with 2 burst pacing therapies, 2 ramp pacing therapies, a 20 J shock, and then 35 J shocks. The VF zone is defined as >188 bpm, with all therapies being 35 J shocks after ATP during charging. The patient has had numerous episodes of both supraventricular and non-sustained ventricular tachycardia. His shock occurred on 9/5/2020 at 18:54, and was due to atrial tachycardia with 1:1 AV conduction at around 150 bpm; the ATP during this sped up the V rate, but did not affect the atrial tachycardia at all; ultimately a 20 J shock followed by a 35 J shock were delivered, after which the atrial rate slowed. LABS: 09/11/20  1000   WBC 6.1   HGB 11.9   HCT 36.3*      INR 2.6   *   K 4.3   CO2 28   BUN 45*   Cr 1.58   MG 1.9   A pro-BNP level was elevated at 2,772.

## 2020-09-11 NOTE — ED NOTES
Patient reports that he has a Medtronic pacemaker/defibrillator. Pacemaker interrogated with confirmation of data being sent.       [place] Gaytan RN  09/11/20 9011

## 2020-09-11 NOTE — ED TRIAGE NOTES
Pt presents to ED from home with c/o defibrillator problem. Pt states that approximately 2 weeks ago, his defib fired 5-6 times. Pt states his was not seen following this. Pt's cardiologist is Dr. Terrell Quarles. Pt denies cp when defib fired. Upon assessment, pt is A/Ox4, skin appropriate for ethnicity/w/d, resp even and unlabored, msp's intact. Pt denies cp, sob, n/v/d, fever, or chills.

## 2020-09-11 NOTE — PROGRESS NOTES
Clinical Pharmacy Note    Ramona Rogel is a 78 y.o. male for whom pharmacy has been asked to manage warfarin therapy. Reason for Admission: V-Tach (defibrillator activation)    Consulting Physician: Chester Marie MD  Warfarin dose prior to admission: 50 mg TWD  Warfarin Indication: A-Fib  Target INR range: 2-3  Order for concomitant anticoagulant therapy: ASA EC 81 mg    Outpatient warfarin provider: Chester Marie MD/[facility]    Past Medical History:   Diagnosis Date    Amiodarone-induced hyperthyroidism     Arthritis     Atrial flutter (HCC)     CAD (coronary artery disease)     Chronic combined systolic and diastolic CHF (congestive heart failure) (HCC)     CKD (chronic kidney disease) stage 3, GFR 30-59 ml/min (HCC)     COPD (chronic obstructive pulmonary disease) ([facility]     Defibrillator activation     Diabetes mellitus with insulin therapy ([facility]     Dilated cardiomyopathy ([facility]     Generalized and unspecified atherosclerosis     Gout     Hyperlipidemia     Hypertension     Noncompliance     Obesity     On home oxygen therapy     Pain in joint, multiple sites     Paroxysmal A-fib (HCC)     Paroxysmal ventricular tachycardia (HCC)     Prolonged emergence from general anesthesia     Status post angioplasty     Sustained ventricular tachycardia (HCC)     TIA (transient ischemic attack)     Tobacco abuse     Type II or unspecified type diabetes mellitus without mention of complication, not stated as uncontrolled                Recent Labs     09/11/20  1000   INR 2.6     Recent Labs     09/11/20  1000   HGB 11.9*   HCT 36.3*          Current warfarin drug-drug interactions: A-Fib    Date INR Warfarin Dose   09/11/20 2.6 10 mg                                   INR of 2.6 is therapeutic, goal range of 2-3 (A-Fib). Will give home dose of 10 mg warfarin today. Daily PT/INR until stable within therapeutic range. ALFREDO Raygoza. Ph.  9/11/2020  5:13 PM      Thank you for

## 2020-09-11 NOTE — CONSULTS
See full consult dictated on September 11    Assessment:  Patient came in because his AICD is beeping. Denies it going off, but did a couple weeks ago. Long history of ischemic cardiomyopathy-sees Dr. Nagy Hands not a great candidate at this point for any revascularization for her evaluation in a relatively recent fashion  Sees Dr. Tameka Duran for biventricular AICD. Interrogation apparently shows a multiple arrhythmias, including supraventricular tachycardia and ventricular tachycardia  High risk medication-Coumadin  Digoxin therapy  Has been tried on Tikosyn as well as amiodarone in the past  Stage III renal insufficiency  Borderline hypotension      Plan: We will recontact Medtr[employer] agent to verify findings on AICD check in ER  Check digoxin level  Patient appears to be compliant with his medications as best as possible, and Coumadin is therapeutic  No apparent distress  Expect a little enzyme rise, but no evidence of plaque rupture by history.   History of thyroid disease  Chest x-ray showed no obvious fluid overload  We will check for OptiVol and Medtronic device  EP consultation, may be a candidate for AV node ablation  Apparently transplant has been discussed with patient the past, and he is pleasantly refused  See orders  Dr. Bentley Bamberger

## 2020-09-11 NOTE — CARE COORDINATION
[facility] Case Management Initial Discharge Assessment    Met with Patient to discuss discharge plan. PCP: Denia Medina MD                          Date of Last Visit: one month    If no PCP, list provided? N/A    Discharge Planning    Living Arrangements: independently at home    Who do you live with? ALONE    Who helps you with your care:  self    If lives at home:     Do you have any barriers navigating in your home? no    Patient can perform ADL? Yes    Current Services (outpatient and in home) :  None    Dialysis: No    Is transportation available to get to your appointments? Yes    DME Equipment:  yes - USES WALKER AND OR CANE ALL THE TIME, SHOWER CHAIR    Respiratory equipment: None    Respiratory provider:  no     Pharmacy:  yes [facility] [address] with Medication Assistance Program?  No      Patient agreeable to [address]? Declined, THIS WAS TRIED BEFORE     Patient agreeable to SNF/Rehab? Yes, Company PT STATES THAT HE HAS BEEN GOING TO [address] [place] [address]    Other discharge needs identified? Other DENIES    Freedom of choice list provided with basic dialogue that supports the patient's individualized plan of care/goals and shares the quality data associated with the providers. Yes    Does Patient Have a High-Risk for Readmission Diagnosis (CHF, PN, MI, COPD)? Yes, see care coordinator assessment      The plan for Transition of Care is related to the following treatment goals: TREAMENT AND EVALUATION    Initial Discharge Plan? (Note: please see concurrent daily documentation for any updates after initial note). HOME    The Patient and/or patient representative:  was provided with choice of any post-acute providers for care and equipment and agrees with discharge plan  Yes    Electronically signed by Mahogany Martinez RN on 9/11/2020 at 12:12 PM

## 2020-09-11 NOTE — ED PROVIDER NOTES
[address] [facility] ED  eMERGENCYdEPARTMENT eNCOUnter      Pt Name: Sumit Nieto MRN: [id number]  Birthdate 1950  Date of evaluation: 9/11/2020  Courtney Parks MD    CHIEF COMPLAINT           HPI  Sumit Nieto is a 78 y.o. male per chart review has a h/o CAD, CHF, DM II, vtach s/p defibrillator, COPD, HTn, hpl, TIA, CKD presents to the ED with defibrillator fire. Pt notes 2 weeks ago he was leaning over to pick something up and felt his defibrillator fire 5-6 times. Pt currently denies fever, n/v, cp, sob, dysuria, diarrhea. ROS  Review of Systems   Constitutional: Negative for activity change, chills and fever. HENT: Negative for ear pain and sore throat. Eyes: Negative for visual disturbance. Respiratory: Negative for cough and shortness of breath. Cardiovascular: Negative for chest pain, palpitations and leg swelling. Defibrillator fire   Gastrointestinal: Negative for abdominal pain, diarrhea, nausea and vomiting. Genitourinary: Negative for dysuria. Musculoskeletal: Negative for back pain. Skin: Negative for rash. Neurological: Negative for dizziness and weakness. Except as noted above the remainder of the review of systems was reviewed and negative.        PAST MEDICAL HISTORY     Past Medical History:   Diagnosis Date    Amiodarone-induced hyperthyroidism     Arthritis     Atrial flutter ([address]     CAD (coronary artery disease)     Chronic combined systolic and diastolic CHF (congestive heart failure) (HCC)     CKD (chronic kidney disease) stage 3, GFR 30-59 ml/min (HCC)     COPD (chronic obstructive pulmonary disease) ([address]     Defibrillator activation     Diabetes mellitus with insulin therapy ([address]     Dilated cardiomyopathy ([address]     Generalized and unspecified atherosclerosis     Gout     Hyperlipidemia     Hypertension     Noncompliance     Obesity     On home oxygen therapy     Pain in joint, multiple sites     [facility]ox[facility]mal [facility]fib [facility]     Paroxysmal ventricular tachycardia (HCC)     Prolonged emergence from general anesthesia     Status post angioplasty     Sustained ventricular tachycardia (HCC)     TIA (transient ischemic attack)     Tobacco abuse     Type II or unspecified type diabetes mellitus without mention of complication, not stated as uncontrolled          SURGICAL HISTORY       Past Surgical History:   Procedure Laterality Date    CARDIAC CATHETERIZATION      COLONOSCOPY      CORONARY ANGIOPLASTY  4/29/11    Dr Valerie Kitchen CORONARY ARTERY BYPASS GRAFT  2012    ENDOSCOPY, COLON, DIAGNOSTIC      EYE SURGERY Bilateral     Cataracts     OTHER SURGICAL HISTORY      difibrillator     DE CIRCUMCISION,OTHER,28+ D/O N/A 8/29/2018    CIRCUMCISION performed by Fei Kong MD at [facility] EGD TRANSORAL BIOPSY SINGLE/MULTIPLE N/A 11/5/2017    EGD BIOPSY performed by Vickie Parada MD at [facility]       Previous Medications    ALBUTEROL (PROAIR HFA) 108 (90 BASE) MCG/ACT INHALER    Inhale 2 puffs into the lungs every 4 hours as needed for Wheezing    ALBUTEROL (PROVENTIL) (2.5 MG/3ML) 0.083% NEBULIZER SOLUTION    Take 3 mLs by nebulization every 6 hours as needed for Wheezing    ALCOHOL SWABS (ALCOHOL PREP) 70 % PADS    Use 8 daily    ALLOPURINOL (ZYLOPRIM) 100 MG TABLET    TAKE 1 TABLET DAILY    ATORVASTATIN (LIPITOR) 80 MG TABLET    Take 80 mg by mouth daily    BLOOD GLUCOSE MONITOR STRIPS    Test 4x daily Dx E11.65    BLOOD PRESSURE MONITORING (ADULT BLOOD PRESSURE CUFF LG) KIT    1 Device by Does not apply route daily    BUDESONIDE-FORMOTEROL (SYMBICORT) 160-4.5 MCG/ACT AERO    Inhale 2 puffs into the lungs 2 times daily    BUMETANIDE (BUMEX) 1 MG TABLET    Take 2 tablets by mouth daily    DIGITEK 125 MCG TABLET    TAKE 1 TABLET DAILY    DONEPEZIL (ARICEPT) 10 MG TABLET    TAKE 1 TABLET BY Inability: None    Transportation needs     Medical: None     Non-medical: None   Tobacco Use    Smoking status: Former Smoker     Packs/day: 1.50     Years: 25.00     Pack years: 37.50     Last attempt to quit: 2012     Years since quittin.7    Smokeless tobacco: Never Used   Substance and Sexual Activity    Alcohol use: No    Drug use: No    Sexual activity: None   Lifestyle    Physical activity     Days per week: None     Minutes per session: None    Stress: None   Relationships    Social connections     Talks on phone: None     Gets together: None     Attends Jain service: None     Active member of club or organization: None     Attends meetings of clubs or organizations: None     Relationship status: None    Intimate partner violence     Fear of current or ex partner: None     Emotionally abused: None     Physically abused: None     Forced sexual activity: None   Other Topics Concern    None   Social History Narrative    None         PHYSICAL EXAM       ED Triage Vitals   BP Temp Temp Source Pulse Resp SpO2 Height Weight   20 0942 20 0940 20 0940 20 0940 20 0940 20 0940 20 0940 20 0940   (!) 87/56 97.6 °F (36.4 °C) Temporal 88 17 97 % 5' 11\" (1.803 m) 195 lb (88.5 kg)       Physical Exam  Vitals signs and nursing note reviewed. Constitutional:       Appearance: He is well-developed. HENT:      Head: Normocephalic. Right Ear: External ear normal.      Left Ear: External ear normal.   Eyes:      Conjunctiva/sclera: Conjunctivae normal.      Pupils: Pupils are equal, round, and reactive to light. Neck:      Musculoskeletal: Normal range of motion and neck supple. Cardiovascular:      Rate and Rhythm: Normal rate and regular rhythm. Heart sounds: Normal heart sounds. Pulmonary:      Effort: Pulmonary effort is normal.      Breath sounds: Normal breath sounds.    Abdominal:      General: Bowel sounds are normal. There is no distension. Palpations: Abdomen is soft. Tenderness: There is no abdominal tenderness. Musculoskeletal: Normal range of motion. Skin:     General: Skin is warm and dry. Neurological:      Mental Status: He is alert and oriented to person, place, and time. MDM  77 yo male presents to the ED with defibrillator fire. Pt is afebrile, hemodynamically stable. EKG shows AV paced rhythm with HR 90. Labs remarkable for glucose 149, BUn 45, Cr 1.58, Hb 11.9, troponin 0.039, INR 2.6, BNP 2772. CXR shows cardiomegaly. Pacemaker interrogated and pt with episodes of VT and SVT. Case discussed with Dr. Tova Álvarez (cardiology) who recommended IV Mg and admission. Pt in the past unable to tolerate amiodarone. Case then discussed with Dr. Riccardo Denis (medicine) who accepted the pt. Pt admitted to medicine in stable condition. FINAL IMPRESSION      1. Ventricular tachycardia ([address]    2. Defibrillator discharge    3.  Stage 3 chronic kidney disease (HCC)    4. Elevated troponin          DISPOSITION/PLAN   DISPOSITION Admitted 09/11/2020 11:54:14 AM        DISCHARGE MEDICATIONS:  [unfilled]         Anupam Umanzor MD(electronically signed)  Attending Emergency Physician            Anupam Umanzor MD  09/11/20 [id number]

## 2020-09-11 NOTE — FLOWSHEET NOTE
Patient arrived to room. Admission compete. Dr. Sr January here saw patient. Patient unsure of some of his medications and call to friend who helps him with medications went to voice mail./left message. [facility] pharmacy called and home medications verified.

## 2020-09-12 VITALS
RESPIRATION RATE: 18 BRPM | SYSTOLIC BLOOD PRESSURE: 89 MMHG | BODY MASS INDEX: 26.46 KG/M2 | WEIGHT: 189 LBS | TEMPERATURE: 97.8 F | DIASTOLIC BLOOD PRESSURE: 62 MMHG | HEIGHT: 71 IN | OXYGEN SATURATION: 100 % | HEART RATE: 94 BPM

## 2020-09-12 LAB
ANION GAP SERPL CALCULATED.3IONS-SCNC: 13 MEQ/L (ref 9–15)
BUN BLDV-MCNC: 38 MG/DL (ref 8–23)
CALCIUM SERPL-MCNC: 9.5 MG/DL (ref 8.5–9.9)
CHLORIDE BLD-SCNC: 95 MEQ/L (ref 95–107)
CO2: 27 MEQ/L (ref 20–31)
CREAT SERPL-MCNC: 1.34 MG/DL (ref 0.7–1.2)
DIGOXIN LEVEL: 1.4 NG/ML (ref 0.8–2)
GFR AFRICAN AMERICAN: >60
GFR NON-AFRICAN AMERICAN: 52.9
GLUCOSE BLD-MCNC: 137 MG/DL (ref 60–115)
GLUCOSE BLD-MCNC: 139 MG/DL (ref 70–99)
GLUCOSE BLD-MCNC: 150 MG/DL (ref 60–115)
INR BLD: 2.4
MAGNESIUM: 2.3 MG/DL (ref 1.7–2.4)
PERFORMED ON: ABNORMAL
PERFORMED ON: ABNORMAL
POTASSIUM REFLEX MAGNESIUM: 4.2 MEQ/L (ref 3.4–4.9)
POTASSIUM SERPL-SCNC: 4.2 MEQ/L (ref 3.4–4.9)
PROTHROMBIN TIME: 26 SEC (ref 12.3–14.9)
SODIUM BLD-SCNC: 135 MEQ/L (ref 135–144)

## 2020-09-12 PROCEDURE — 6370000000 HC RX 637 (ALT 250 FOR IP): Performed by: FAMILY MEDICINE

## 2020-09-12 PROCEDURE — G0378 HOSPITAL OBSERVATION PER HR: HCPCS

## 2020-09-12 PROCEDURE — 80048 BASIC METABOLIC PNL TOTAL CA: CPT

## 2020-09-12 PROCEDURE — 2580000003 HC RX 258: Performed by: FAMILY MEDICINE

## 2020-09-12 PROCEDURE — 36415 COLL VENOUS BLD VENIPUNCTURE: CPT

## 2020-09-12 PROCEDURE — 80162 ASSAY OF DIGOXIN TOTAL: CPT

## 2020-09-12 PROCEDURE — 83735 ASSAY OF MAGNESIUM: CPT

## 2020-09-12 PROCEDURE — 6370000000 HC RX 637 (ALT 250 FOR IP): Performed by: INTERNAL MEDICINE

## 2020-09-12 PROCEDURE — 85610 PROTHROMBIN TIME: CPT

## 2020-09-12 RX ORDER — SACUBITRIL AND VALSARTAN 24; 26 MG/1; MG/1
0.5 TABLET, FILM COATED ORAL 2 TIMES DAILY
Qty: 60 TABLET | Refills: 11 | Status: SHIPPED | OUTPATIENT
Start: 2020-09-12 | End: 2020-09-12 | Stop reason: HOSPADM

## 2020-09-12 RX ORDER — METOPROLOL SUCCINATE 25 MG/1
50 TABLET, EXTENDED RELEASE ORAL DAILY
Qty: 30 TABLET | Refills: 5 | Status: ON HOLD | OUTPATIENT
Start: 2020-09-12 | End: 2021-01-14 | Stop reason: SDUPTHER

## 2020-09-12 RX ORDER — WARFARIN SODIUM 5 MG/1
5 TABLET ORAL
Status: DISCONTINUED | OUTPATIENT
Start: 2020-09-12 | End: 2020-09-12 | Stop reason: HOSPADM

## 2020-09-12 RX ORDER — LANOLIN ALCOHOL/MO/W.PET/CERES
400 CREAM (GRAM) TOPICAL DAILY
Status: DISCONTINUED | OUTPATIENT
Start: 2020-09-12 | End: 2020-09-12 | Stop reason: HOSPADM

## 2020-09-12 RX ORDER — SACUBITRIL AND VALSARTAN 24; 26 MG/1; MG/1
0.5 TABLET, FILM COATED ORAL 2 TIMES DAILY
Qty: 60 TABLET | Refills: 5 | Status: ON HOLD | OUTPATIENT
Start: 2020-09-12 | End: 2021-01-14 | Stop reason: HOSPADM

## 2020-09-12 RX ORDER — LANOLIN ALCOHOL/MO/W.PET/CERES
400 CREAM (GRAM) TOPICAL DAILY
Qty: 30 TABLET | Refills: 5 | Status: SHIPPED | OUTPATIENT
Start: 2020-09-12

## 2020-09-12 RX ORDER — POTASSIUM CHLORIDE 20 MEQ/1
20 TABLET, EXTENDED RELEASE ORAL DAILY
Qty: 60 TABLET | Refills: 3 | Status: SHIPPED | OUTPATIENT
Start: 2020-09-12

## 2020-09-12 RX ADMIN — BUMETANIDE 1 MG: 1 TABLET ORAL at 09:30

## 2020-09-12 RX ADMIN — Medication 400 MG: at 11:57

## 2020-09-12 RX ADMIN — METOPROLOL SUCCINATE 25 MG: 25 TABLET, EXTENDED RELEASE ORAL at 09:30

## 2020-09-12 RX ADMIN — Medication 10 ML: at 09:33

## 2020-09-12 RX ADMIN — INSULIN LISPRO 2 UNITS: 100 INJECTION, SOLUTION INTRAVENOUS; SUBCUTANEOUS at 07:26

## 2020-09-12 RX ADMIN — SPIRONOLACTONE 25 MG: 25 TABLET ORAL at 09:30

## 2020-09-12 RX ADMIN — ASPIRIN 81 MG: 81 TABLET, COATED ORAL at 09:30

## 2020-09-12 NOTE — FLOWSHEET NOTE
Perfect serve to Dr. Cathy Lopez to notify that holding entresto at this time d/t hypotension. Will recheck blood pressure soon. Electronically signed by Kwadwo Benton RN on 9/12/2020 at 9:42 AM    Dr. Washington  to see patient and aware of hypotension and that AM entresto was held. Dr. Parker Woodall states that he is going to change some of the patients medications and that pt is ok to d/c from cardiology perspective.   Electronically signed by Kwadwo Bneton RN on 9/12/2020 at 10:47 AM

## 2020-09-12 NOTE — FLOWSHEET NOTE
Discharge instructions reviewed with patient. Everything explained in detail as RN aware that pt has difficulty with reading. Critical areas highlighted. Pt aware of new scripts to  as well as medication changes. Pt states he has no further questions at this time. Transport request placed to take patient to mothers car who states she will pick him up at the turn around. Electronically signed by Nikolai Rosales RN on 9/12/2020 at 3:25 PM

## 2020-09-12 NOTE — DISCHARGE SUMMARY
Physician Discharge Summary     Patient ID:  Danie Bowen  [id number]  78 y.o.  1950    Admit date: 9/11/2020    Discharge date : 09/12/20     Admitting Physician: Kameron Rolon MD     Discharge Physician: Alejandra Prater MD     Admission Diagnoses: Ventricular tachycardia [facility]) [I47.2]    Discharge Diagnoses: Arrythmia suspected atrial tachycardia, ventricular tachycardia, bertram on ckd3    Admission Condition: fair    Discharged Condition: good    Hospital Course:   Firing AICD due to [address]     Cardiology consult, consideration of change in antiarrythmics at cardiology discretion. On bumex, aldactone.   ON digoxin and bblocker, entresto.     BERTRAM on CKD 3     Improved, no iv fluids due to schf     DM2     SSI, ac/hs checks     DVT proph - on warfarin    Consults: cardiology    Significant Diagnostic Studies: as below    Discharge Exam:  BP 89/62   Pulse 94   Temp 97.8 °F (36.6 °C) (Oral)   Resp 18   Ht 5' 11\" (1.803 m)   Wt 189 lb (85.7 kg)   SpO2 100%   BMI 26.36 kg/m²   General appearance: alert, appears stated age and cooperative  Lungs: clear to auscultation bilaterally  Heart: regular rate and rhythm, S1, S2 normal, no murmur, click, rub or gallop  Abdomen: soft, non-tender; bowel sounds normal; no masses,  no organomegaly  Extremities: extremities normal, atraumatic, no cyanosis or edema  Skin: Skin color, texture, turgor normal. No rashes or lesions    Labs:   Recent Labs     09/11/20  1000   WBC 6.1   HGB 11.9*   HCT 36.3*        Recent Labs     09/11/20  1000 09/12/20  0628   * 135   K 4.3 4.2  4.2   CL 95 95   CO2 28 27   BUN 45* 38*   CREATININE 1.58* 1.34*   CALCIUM 9.4 9.5     Recent Labs     09/11/20  1000   AST 17   ALT 19   BILITOT 1.2*   ALKPHOS 139*     Recent Labs     09/11/20  1000 09/12/20  0628   INR 2.6 2.4     Recent Labs     09/11/20  1000   TROPONINI 0.039*       Urinalysis:   Lab Results   Component Value Date    NITRU Negative 05/20/2020    WBCUA 0-2 01/20/2019    BACTERIA Negative 01/20/2019    RBCUA 6-10 01/20/2019    BLOODU Negative 05/20/2020    SPECGRAV 1.011 05/20/2020    GLUCOSEU Negative 05/20/2020       Radiology:   Most recent    Chest CT      WITH CONTRAST:No results found for this or any previous visit. WITHOUT CONTRAST: No results found for this or any previous visit. CXR      2-view:   Results for orders placed during the hospital encounter of 09/01/19   XR CHEST STANDARD (2 VW)    Narrative EXAMINATION: XR CHEST (2 VW)    CLINICAL HISTORY: SHORTNESS OF BREATH    COMPARISONS: MAY 25, 2019    FINDINGS: Median sternotomy. Pacemaker generator and wires unchanged. Cardiopericardial silhouette enlarged and unchanged. Mild blunting left costophrenic angle again identified. Scarring lung bases. Aorta calcified and tortuous. Impression STABLE CARDIOMEGALY WITH POSTSURGICAL CHANGE DISCUSSED. Portable:   Results for orders placed during the hospital encounter of 09/11/20   XR CHEST PORTABLE    Narrative XR CHEST PORTABLE    Clinical History:   Defibrillator fire    pacemaker/defib fired . Comparison:  11/16/2019      RESULT:     Lines, tubes, and devices:  Left-sided ICD with leads unchanged. Median sternotomy for CABG. Lungs and pleura:  Emphysematous changes. No focal consolidation. No pleural effusion. No pneumothorax. Normal pulmonary vascular pattern. Cardiomediastinal silhouette:  Stable moderately enlarged. Aortic atherosclerotic calcification. Other:  No acute osseous findings. Impression No acute radiographic abnormality or significant interval change. Echo No results found for this or any previous visit. Disposition: home    In process/preliminary results:  Outstanding Order Results     No orders found for last 30 day(s).           Patient Instructions:   Current Discharge Medication List      START taking these medications    Details   magnesium oxide (MAG-OX) 400 (240 Mg) MG tablet Take 1 tablet by mouth daily  Qty: 30 tablet, Refills: 5         CONTINUE these medications which have CHANGED    Details   !! ENTRESTO 24-26 MG per tablet Take 0.5 tablets by mouth 2 times daily  Qty: 60 tablet, Refills: 11      metoprolol succinate (TOPROL XL) 25 MG extended release tablet Take 2 tablets by mouth daily  Qty: 30 tablet, Refills: 5      !! sacubitril-valsartan (ENTRESTO) 24-26 MG per tablet Take 0.5 tablets by mouth 2 times daily  Qty: 60 tablet, Refills: 5      potassium chloride (KLOR-CON M) 20 MEQ extended release tablet Take 1 tablet by mouth daily  Qty: 60 tablet, Refills: 3       !! - Potential duplicate medications found. Please discuss with provider. CONTINUE these medications which have NOT CHANGED    Details   LANTUS SOLOSTAR 100 UNIT/ML injection pen INJECT 10 UNITS INTO THE SKIN NIGHTLY  Qty: 5 pen, Refills: 0      blood glucose monitor strips Test 4x daily Dx E11.65  Qty: 150 strip, Refills: 5    Comments: Brand per patient preference. May round up to next available package size. Alcohol Swabs (ALCOHOL PREP) 70 % PADS Use 8 daily  Qty: 200 each, Refills: 5      donepezil (ARICEPT) 10 MG tablet TAKE 1 TABLET BY MOUTH EVERY DAY AT NIGHT  Qty: 90 tablet, Refills: 1      Insulin Syringe-Needle U-100 (BD INSULIN SYRINGE ULTRAFINE) 31G X 5/16\" 0.5 ML MISC USE TWICE A DAY  Qty: 300 each, Refills: 0      allopurinol (ZYLOPRIM) 100 MG tablet TAKE 1 TABLET DAILY  Qty: 90 tablet, Refills: 3      !! warfarin (COUMADIN) 5 MG tablet Take as directed by [facility] [place] [facility] [place] [facility]. Quantity equals 90 day supply.   Qty: 120 tablet, Refills: 1      Blood Pressure Monitoring (ADULT BLOOD PRESSURE CUFF LG) KIT 1 Device by Does not apply route daily  Qty: 1 kit, Refills: 0    Associated Diagnoses: Essential hypertension      bumetanide (BUMEX) 1 MG tablet Take 1 mg by mouth 2 times daily   Qty: 60 tablet, Refills: 11      nitroGLYCERIN (NITROSTAT) 0.4 MG SL tablet Place 1 tablet under the tongue every 5 minutes as needed for Chest pain  Qty: 25 tablet, Refills: 0      spironolactone (ALDACTONE) 25 MG tablet Take 1 tablet by mouth daily  Qty: 30 tablet, Refills: 3      atorvastatin (LIPITOR) 80 MG tablet Take 80 mg by mouth nightly       Oxygen Concentrator Oxi Go POC  Qty: 1 each, Refills: 0      pantoprazole (PROTONIX) 40 MG tablet Take 1 tablet by mouth every morning (before breakfast)  Qty: 30 tablet, Refills: 3      DIGITEK 125 MCG tablet TAKE 1 TABLET DAILY  Qty: 90 tablet, Refills: 3      Insulin Pen Needle (PEN NEEDLES) 32G X 5 MM MISC Use to inject Lantus  Qty: 100 each, Refills: 1      !! warfarin (COUMADIN) 7.5 MG tablet 7.5mg daily  Qty: 30 tablet, Refills: 3      budesonide-formoterol (SYMBICORT) 160-4.5 MCG/ACT AERO Inhale 2 puffs into the lungs 2 times daily  Qty: 3 Inhaler, Refills: 3      albuterol (PROVENTIL) (2.5 MG/3ML) 0.083% nebulizer solution Take 3 mLs by nebulization every 6 hours as needed for Wheezing  Qty: 120 each, Refills: 5    Associated Diagnoses: Chronic obstructive pulmonary disease, unspecified COPD type (HCC)      albuterol (PROAIR HFA) 108 (90 BASE) MCG/ACT inhaler Inhale 2 puffs into the lungs every 4 hours as needed for Wheezing  Qty: 3 Inhaler, Refills: 0    Associated Diagnoses: COPD (chronic obstructive pulmonary disease) ([facility]       ! ! - Potential duplicate medications found. Please discuss with provider. STOP taking these medications       metOLazone (ZAROXOLYN) 2.5 MG tablet Comments:   Reason for Stopping:         hydrALAZINE (APRESOLINE) 10 MG tablet Comments:   Reason for Stopping:         triamcinolone (KENALOG) 0.1 % ointment Comments:   Reason for Stopping:             Activity: activity as tolerated  Diet: diabetic diet  Wound Care: none needed    Follow-up with PCP in 1 week, Dr. Nessa Terrazas 2-3 weeks, Dr. Bolling Ormond 1 month.     DC time 35 minutes    Signed:  Electronically signed by Ester Garcia MD on 9/12/2020 at 1:48 PM

## 2020-09-12 NOTE — CONSULTS
ventricle to be normal.  His LIMA to the LAD was  patent. There is significant lesion distal to the left internal mammary  bypass graft. The patient's circumflex had a patent vein graft to that  circumflex. The patient's right vein graft is widely patent. The right coronary is totally occluded. The patient obviously had bypass surgery. There is a saphenous vein  graft to right coronary, saphenous vein graft to diagonal, saphenous  vein graft to obtuse marginal, and a LIMA to the LAD with a lesion;  however, distal to takeoff. The patient also has diabetes apparently. MEDICATIONS:  Currently, his medications include enteric-coated baby  aspirin. He is on Coumadin therapy. The patient is also on Lipitor 80  mg a day, Bumex 1 mg p.o. b.i.d., digoxin 0.125 mg a day. He is on  Humulin coverage. He is on half of metoprolol 12.5 mg a day, Entresto  24/26 b.i.d., spironolactone 25 mg a day, and Coumadin therapy. ALLERGIES:  Include IODINE, PENICILLIN, and TAPAZOLE. The patient also  has been tried on DOFETILIDE in the past and unable to tolerate that, as  well as AMIODARONE, there is some itching involved with these  medications. REVIEW OF SYSTEMS:  Other 12-point review of systems were otherwise  negative. PHYSICAL EXAMINATION:  GENERAL:  On exam, the patient appears to be in no acute distress, very  pleasant patient. HEENT:  Atraumatic and normocephalic. No xanthelasma is noted. No  icterus is noted. CHEST:  AP chest is normal.  He has got evidence of open heart surgery. He also had an AICD well-healed, left chest wall. Lungs are coarse, but  clear bilaterally without rhonchi, rales, or wheeze. CARDIOVASCULAR:  He has got regular rate and rhythm. Faint murmur is  noted. No S3 or S4 is noted. ABDOMEN:  Benign without peritoneal signs. EXTREMITIES:  Upper extremity was symmetrical.  There is a trace of  edema, but no calf tenderness is elicited.     DIAGNOSTIC STUDIES:  The patient's EKG, his rhythm is sinus rhythm and  is paced. Other laboratory work is notable for the following:  His troponin is  mildly elevated at 0.039. His proBNP is elevated. His sodium 134, BUN  is 45, and creatinine 1.58. His total bilirubin is mildly increased, as  well as alkaline phosphatase. Liver function tests were normal.  INR is  2.6. White count of 6.1 with a hemoglobin 11.9 and 143,000 platelets. ASSESSMENT:  1. The patient certainly did have two shocks. It is unclear even to  the [employer] rep whether this is due to SVT or VT. Certainly, he will  be a candidate for both given his history. 2.  The actual beeping is most likely due to the transmission type of  error, as opposed to indicate that the defibrillator is presently on  verge of going off. However, it did go off roughly six to seven days  ago as described above. 3.  Relative hypotension. 4.  Severe ischemic cardiomyopathy. Most recent echocardiogram shows  this was done in 10/2019. At that time, the patient shows severe  four-chamber enlargement, ejection fraction less than 20%, moderately  enlarged right ventricular, moderately reduced RV function, moderately  enlarged right and left atrium, mildly elevated RVSP, and dilated RV. 5.  Chronic stage III renal insufficiency. The patient's OptiVol is normal indicating no evidence of fluid  overload, the patient denies shortness of breath. Expected trivial elevation troponins given his ischemic cardiomyopathy. PLAN:  1. In general, we will gently increase his beta blockade. 2.  We will check digoxin level to make sure it is therapeutic. 3.  EP consultation to see if this patient will be a candidate for AV  node ablation, given the fact that the patient is being paced anyway. 4.  Continue to follow troponins and check thyroids. 5.  Pharmacy did continue to follow for anticoagulation/Coumadin. 6.  Further recommendations are pending.           Shelley Chopra MD    D: 09/11/2020 17:17:16       T: 09/11/2020 17:28:08     [email or web address]  Job#: [id number]     Doc#: [id number]    CC:

## 2020-09-12 NOTE — PROGRESS NOTES
[facility] OF [facility] [place] Note      Patient: Debi Coyne. Unit/Bed: Q213/F598-40  YOB: 1950  MRN: [id number]  Admit Date:  9/11/2020  Hospital Day: 0    Rounding Date: 9/12/2020    Rounding Cardiologist:  ELANA Rivero MD    PRIMARY Cardiologist: Juan Carlos/Urban        Subjective Complaint:   Denies any chest pain with exertion or at rest, palpitations, syncope, or edema.,  Ambulated without a great deal difficulty. Physical Examination:     BP (!) 98/54   Pulse 97   Temp 97.9 °F (36.6 °C) (Oral)   Resp 16   Ht 5' 11\" (1.803 m)   Wt 189 lb (85.7 kg)   SpO2 100%   BMI 26.36 kg/m²         Intake/Output Summary (Last 24 hours) at 9/12/2020 1045  Last data filed at 9/12/2020 0930  Gross per 24 hour   Intake 930 ml   Output 2300 ml   Net -1370 ml                  Argentry Nancy Overcast. examined at bedside in in no apparent distress and cooperative. Focused exam reveals:     Cardiac: Heart regular rate and rhythm with a systolic murmur and occasional ectopy     Lungs:  clear to auscultation bilaterally- no wheezes, rales or rhonchi, normal air movement, no respiratory distress    Extremities:   Trace edema    Telemetry:      normal sinus , paced and sinus tachycardia         LABS:   CBC:   Recent Labs     09/11/20  1000   WBC 6.1   HGB 11.9*         BMP:    Recent Labs     09/11/20  1000 09/12/20  0628   * 135   K 4.3 4.2  4.2   CL 95 95   CO2 28 27   BUN 45* 38*   CREATININE 1.58* 1.34*   GLUCOSE 149* 139*              Troponin: No results for input(s): TROPONINT in the last 72 hours.      BNP:   Recent Labs     09/11/20  1000   PROBNP 2,772      INR:   Recent Labs     09/11/20  1000 09/12/20  0628   INR 2.6 2.4      Mg:   Recent Labs     09/12/20  0628   MG 2.3       Cardiac Testing:    none    Assessment:  Patient with AICD firing, as an outpatient  SVT triggering AICD, but also has some ventricular rhythms  Hypotension  Coronary disease  Mitral valve disease  Chronic Coumadin therapy  High risk medication-digoxin  Plan:  1. Please see Dr. Mathew Ahumada consult  2. We will reduce Entresto, but increase beta-blockade  3. Close follow-up with Dr. Jose Roldan consider AV node ablation or reprogramming defibrillator  4. Okay to discharge home from a cardiovascular standpoint, but will arrange follow-up in the next couple of weeks with Dr. Claudell Aran  5.  See orders  Electronically signed by Dany Bernal MD on 9/12/2020 at 10:45 AM

## 2020-09-14 ENCOUNTER — CARE COORDINATION (OUTPATIENT)
Dept: CASE MANAGEMENT | Age: 70
End: 2020-09-14

## 2020-09-14 NOTE — CARE COORDINATION
9/11-9/12/2020 ED [facility] AICD firing/Arrythmia suspected atrial tachycardia, ventricular tachycardia, izabela on ckd3. NR  No CV-19 lab performed this admission. Needs to schedule appts. Patient contacted regarding [id number] initial outreach. Request call back to P: [phone].  Advised to schedule appts, as per AVS.

## 2020-09-15 ENCOUNTER — CARE COORDINATION (OUTPATIENT)
Dept: CASE MANAGEMENT | Age: 70
End: 2020-09-15

## 2020-09-15 NOTE — CARE COORDINATION
9/11-9/12/2020 ED [facility] AICD firing/Arrythmia suspected atrial tachycardia, ventricular tachycardia, izabela on ckd3. NR  No CV-19 lab performed this admission. Needs to schedule appts. Patient contacted regarding [id number] initial screening. Two call attempts. Pt declined to speak. CTN s/o.

## 2020-09-16 RX ORDER — WARFARIN SODIUM 5 MG/1
TABLET ORAL
Qty: 120 TABLET | Refills: 1 | Status: SHIPPED | OUTPATIENT
Start: 2020-09-16 | End: 2021-01-25

## 2020-09-18 ENCOUNTER — TELEPHONE (OUTPATIENT)
Dept: PHARMACY | Age: 70
End: 2020-09-18

## 2020-09-18 NOTE — TELEPHONE ENCOUNTER
Patient was inpatient at [facility] discharged 9/12.  Called and left message to ask if patient ready to make appointment  INR   Date Value Ref Range Status   09/12/2020 2.4  Final   Updated tracker to date that reflects when patient should be contacted next

## 2020-09-21 RX ORDER — INSULIN GLARGINE 100 [IU]/ML
10 INJECTION, SOLUTION SUBCUTANEOUS NIGHTLY
Qty: 5 PEN | Refills: 0 | Status: SHIPPED | OUTPATIENT
Start: 2020-09-21

## 2020-09-24 ENCOUNTER — TELEPHONE (OUTPATIENT)
Dept: FAMILY MEDICINE CLINIC | Age: 70
End: 2020-09-24

## 2020-09-25 ENCOUNTER — TELEPHONE (OUTPATIENT)
Dept: PHARMACY | Age: 70
End: 2020-09-25

## 2020-09-25 ENCOUNTER — TELEPHONE (OUTPATIENT)
Dept: FAMILY MEDICINE CLINIC | Age: 70
End: 2020-09-25

## 2020-09-25 NOTE — TELEPHONE ENCOUNTER
[address] (preffered number according to phone comments) and set up appointment to check INR for 9/29/20 @2:00pm

## 2020-09-29 ENCOUNTER — HOSPITAL ENCOUNTER (OUTPATIENT)
Dept: PHARMACY | Age: 70
Setting detail: THERAPIES SERIES
Discharge: HOME | End: 2020-09-29
Payer: MEDICARE

## 2020-09-29 VITALS — TEMPERATURE: 97.8 F

## 2020-09-29 LAB — INTERNATIONAL NORMALIZATION RATIO, POC: 2.1

## 2020-09-29 PROCEDURE — 99211 OFF/OP EST MAY X REQ PHY/QHP: CPT

## 2020-09-29 PROCEDURE — 85610 PROTHROMBIN TIME: CPT

## 2020-09-29 NOTE — PROGRESS NOTES
Mr. Stanislav Cabello is a 78 y.o. y/o male with history of Afib who presents today for anticoagulation monitoring and adjustment. INR 2.1 is therapeutic for this patient (goal range 2-3) and is reflective of 50 mg TWD  Patient verifies current dosing regimen, patient able to verbally recall dose  Patient reports 0 missed doses since last INR   Patient denies s/sx clotting and/or stroke  Patient denies hematuria, epistaxis, rectal bleeding  Patient denies changes in diet, alcohol, or tobacco use  Reviewed medication list and drug allergies with patient, updated any medication additions or modifications accordingly  Patient also denies any pending medical or dental procedures scheduled at this time    Hospital admission: Firing AICD due to vtach, acute on chronic systolic HF. Has a procedure on oct 26th, patient states they were told they will NOT need to hold warfarin.     Patient was instructed to continue current regimen at 50 mg TWD and RTC 3 weeks    CLINICAL PHARMACY CONSULT: [place] [id number] [place]: Yes  Total # of Interventions Recommended: 1  - Maintenance Safety Lab Monitoring #: 1  Total Interventions Accepted: 1  Time Spent (min): [place], PharmD.  9/29/2020

## 2020-10-09 ENCOUNTER — TELEPHONE (OUTPATIENT)
Dept: PHARMACY | Age: 70
End: 2020-10-09

## 2020-10-09 ENCOUNTER — HOSPITAL ENCOUNTER (EMERGENCY)
Age: 70
Discharge: HOME | End: 2020-10-09
Attending: EMERGENCY MEDICINE
Payer: MEDICARE

## 2020-10-09 VITALS
HEART RATE: 80 BPM | RESPIRATION RATE: 14 BRPM | TEMPERATURE: 97.9 F | DIASTOLIC BLOOD PRESSURE: 72 MMHG | OXYGEN SATURATION: 99 % | SYSTOLIC BLOOD PRESSURE: 103 MMHG

## 2020-10-09 LAB
BILIRUBIN URINE: NEGATIVE
BLOOD, URINE: NEGATIVE
CLARITY: CLEAR
COLOR: YELLOW
GLUCOSE URINE: NEGATIVE MG/DL
KETONES, URINE: NEGATIVE MG/DL
LEUKOCYTE ESTERASE, URINE: NEGATIVE
NITRITE, URINE: NEGATIVE
PH UA: 6 (ref 5–9)
PROTEIN UA: NEGATIVE MG/DL
SPECIFIC GRAVITY UA: 1.01 (ref 1–1.03)
URINE REFLEX TO CULTURE: NORMAL
UROBILINOGEN, URINE: 0.2 E.U./DL

## 2020-10-09 PROCEDURE — 81003 URINALYSIS AUTO W/O SCOPE: CPT

## 2020-10-09 PROCEDURE — 99283 EMERGENCY DEPT VISIT LOW MDM: CPT

## 2020-10-09 PROCEDURE — 99282 EMERGENCY DEPT VISIT SF MDM: CPT

## 2020-10-09 NOTE — TELEPHONE ENCOUNTER
Sister-in-law called and spoke with Laura. Asked if warfarin could cause blood in urine. Was advised if bleeding to go to ED. Not currently bleeding. Pt has appt with Dr Riley Bird on 10/12 and appt with anti-coag clinic on 10/20.

## 2020-10-09 NOTE — ED PROVIDER NOTES
[facility] ED  EMERGENCY DEPARTMENT ENCOUNTER      Pt Name: Brigette Montano MRN: [id number]  Birthdate 1950  Date of evaluation: 10/9/2020  Provider: Joe Olguin MD    CHIEF COMPLAINT       Chief Complaint   Patient presents with    Hematuria     pt states he has had some blood in his urine            HISTORY OF PRESENT ILLNESS   (Location/Symptom, Timing/Onset, Context/Setting, Quality, Duration, Modifying Factors, Severity)  Note limiting factors. 42-year-old male presenting with blood on his penis. He noted bleeding about 2 days ago. Symptoms have since resolved. He is on Coumadin for arrhythmias. Notes no back pain, fevers or urinary symptoms. Symptoms have since improved. He states occasionally he may have blood in his urine. No history of kidney problems noted. Presently asymptomatic. Nursing Notes were reviewed. REVIEW OF SYSTEMS    (2-9 systems for level 4, 10 or more for level 5)     Review of Systems   Genitourinary: Positive for hematuria. All other systems reviewed and are negative. Except as noted above the remainder of the review of systems was reviewed and negative.        PAST MEDICAL HISTORY     Past Medical History:   Diagnosis Date    Amiodarone-induced hyperthyroidism     Arthritis     Atrial flutter ([address]     CAD (coronary artery disease)     Chronic combined systolic and diastolic CHF (congestive heart failure) (HCC)     CKD (chronic kidney disease) stage 3, GFR 30-59 ml/min (HCC)     COPD (chronic obstructive pulmonary disease) ([address]     Defibrillator activation     Diabetes mellitus with insulin therapy ([address]     Dilated cardiomyopathy ([address]     Generalized and unspecified atherosclerosis     Gout     Hyperlipidemia     Hypertension     Noncompliance     Obesity     On home oxygen therapy     Pain in joint, multiple sites     Paroxysmal A-fib ([address]     Paroxysmal ventricular tachycardia (HCC)     Prolonged emergence from general anesthesia     Status post angioplasty     Sustained ventricular tachycardia (HCC)     TIA (transient ischemic attack)     Tobacco abuse     Type II or unspecified type diabetes mellitus without mention of complication, not stated as uncontrolled          SURGICAL HISTORY       Past Surgical History:   Procedure Laterality Date    CARDIAC CATHETERIZATION      COLONOSCOPY      CORONARY ANGIOPLASTY  4/29/11    Dr Miryam Patel CORONARY ARTERY BYPASS GRAFT  2012    ENDOSCOPY, COLON, DIAGNOSTIC      EYE SURGERY Bilateral     Cataracts     OTHER SURGICAL HISTORY      difibrillator     ME CIRCUMCISION,OTHER,28+ D/O N/A 8/29/2018    CIRCUMCISION performed by Tata Felder MD at [facility] EGD TRANSORAL BIOPSY SINGLE/MULTIPLE N/A 11/5/2017    EGD BIOPSY performed by Simi Machado MD at [facility]       Current Discharge Medication List      CONTINUE these medications which have NOT CHANGED    Details   LANTUS SOLOSTAR 100 UNIT/ML injection pen INJECT 10 UNITS INTO THE SKIN NIGHTLY  Qty: 5 pen, Refills: 0      !! warfarin (COUMADIN) 5 MG tablet TAKE AS DIRECTED BY [facility]. QUANTITY EQUALS 90 DAY SUPPLY  Qty: 120 tablet, Refills: 1      metoprolol succinate (TOPROL XL) 25 MG extended release tablet Take 2 tablets by mouth daily  Qty: 30 tablet, Refills: 5      sacubitril-valsartan (ENTRESTO) 24-26 MG per tablet Take 0.5 tablets by mouth 2 times daily  Qty: 60 tablet, Refills: 5      potassium chloride (KLOR-CON M) 20 MEQ extended release tablet Take 1 tablet by mouth daily  Qty: 60 tablet, Refills: 3      Insulin Pen Needle (PEN NEEDLES) 32G X 5 MM MISC Use to inject Lantus  Qty: 100 each, Refills: 1      blood glucose monitor strips Test 4x daily Dx E11.65  Qty: 150 strip, Refills: 5    Comments: Brand per patient preference.  May round up to next available package size.       Alcohol Swabs (ALCOHOL PREP) 70 % PADS Use 8 daily  Qty: 200 each, Refills: 5      donepezil (ARICEPT) 10 MG tablet TAKE 1 TABLET BY MOUTH EVERY DAY AT NIGHT  Qty: 90 tablet, Refills: 1      Insulin Syringe-Needle U-100 (BD INSULIN SYRINGE ULTRAFINE) 31G X 5/16\" 0.5 ML MISC USE TWICE A DAY  Qty: 300 each, Refills: 0      allopurinol (ZYLOPRIM) 100 MG tablet TAKE 1 TABLET DAILY  Qty: 90 tablet, Refills: 3      Blood Pressure Monitoring (ADULT BLOOD PRESSURE CUFF LG) KIT 1 Device by Does not apply route daily  Qty: 1 kit, Refills: 0    Associated Diagnoses: Essential hypertension      bumetanide (BUMEX) 1 MG tablet Take 1 mg by mouth 2 times daily   Qty: 60 tablet, Refills: 11      nitroGLYCERIN (NITROSTAT) 0.4 MG SL tablet Place 1 tablet under the tongue every 5 minutes as needed for Chest pain  Qty: 25 tablet, Refills: 0      spironolactone (ALDACTONE) 25 MG tablet Take 1 tablet by mouth daily  Qty: 30 tablet, Refills: 3      atorvastatin (LIPITOR) 80 MG tablet Take 80 mg by mouth nightly       !! warfarin (COUMADIN) 7.5 MG tablet 7.5mg daily  Qty: 30 tablet, Refills: 3      Oxygen Concentrator Oxi Go POC  Qty: 1 each, Refills: 0      budesonide-formoterol (SYMBICORT) 160-4.5 MCG/ACT AERO Inhale 2 puffs into the lungs 2 times daily  Qty: 3 Inhaler, Refills: 3      albuterol (PROVENTIL) (2.5 MG/3ML) 0.083% nebulizer solution Take 3 mLs by nebulization every 6 hours as needed for Wheezing  Qty: 120 each, Refills: 5    Associated Diagnoses: Chronic obstructive pulmonary disease, unspecified COPD type (HCC)      pantoprazole (PROTONIX) 40 MG tablet Take 1 tablet by mouth every morning (before breakfast)  Qty: 30 tablet, Refills: 3      DIGITEK 125 MCG tablet TAKE 1 TABLET DAILY  Qty: 90 tablet, Refills: 3      albuterol (PROAIR HFA) 108 (90 BASE) MCG/ACT inhaler Inhale 2 puffs into the lungs every 4 hours as needed for Wheezing  Qty: 3 Inhaler, Refills: 0    Associated Diagnoses: COPD (chronic obstructive pulmonary disease) (HCC)      magnesium oxide (MAG-OX) 400 (240 Mg) MG tablet Take 1 tablet by mouth daily  Qty: 30 tablet, Refills: 5       !! - Potential duplicate medications found. Please discuss with provider. ALLERGIES     Dye [iodides];  Penicillins; and Tapazole [methimazole]    FAMILY HISTORY       Family History   Problem Relation Age of Onset    Cancer Brother     Diabetes Mother     Heart Disease Father     Emphysema Father           SOCIAL HISTORY       Social History     Socioeconomic History    Marital status:      Spouse name: Not on file    Number of children: Not on file    Years of education: Not on file    Highest education level: Not on file   Occupational History    Not on file   Social Needs    Financial resource strain: Not on file    Food insecurity     Worry: Not on file     Inability: Not on file    Transportation needs     Medical: Not on file     Non-medical: Not on file   Tobacco Use    Smoking status: Former Smoker     Packs/day: 1.50     Years: 25.00     Pack years: 37.50     Last attempt to quit: 2012     Years since quittin.7    Smokeless tobacco: Never Used   Substance and Sexual Activity    Alcohol use: No    Drug use: No    Sexual activity: Not on file   Lifestyle    Physical activity     Days per week: Not on file     Minutes per session: Not on file    Stress: Not on file   Relationships    Social connections     Talks on phone: Not on file     Gets together: Not on file     Attends Voodoo service: Not on file     Active member of club or organization: Not on file     Attends meetings of clubs or organizations: Not on file     Relationship status: Not on file    Intimate partner violence     Fear of current or ex partner: Not on file     Emotionally abused: Not on file     Physically abused: Not on file     Forced sexual activity: Not on file   Other Topics Concern    Not on file   Social History Narrative  Not on file       SCREENINGS               PHYSICAL EXAM    (up to 7 for level 4, 8 or more for level 5)     ED Triage Vitals [10/09/20 1351]   BP Temp Temp src Pulse Resp SpO2 Height Weight   103/72 97.9 °F (36.6 °C) -- 80 14 99 % -- --       Physical Exam  Vitals signs and nursing note reviewed. Constitutional:       General: He is not in acute distress. Appearance: Normal appearance. He is well-developed. HENT:      Head: Normocephalic and atraumatic. Eyes:      Extraocular Movements: Extraocular movements intact. Conjunctiva/sclera: Conjunctivae normal.   Neck:      Musculoskeletal: Normal range of motion and neck supple. Cardiovascular:      Rate and Rhythm: Normal rate and regular rhythm. Pulmonary:      Effort: Pulmonary effort is normal.      Breath sounds: Normal breath sounds. Abdominal:      General: Bowel sounds are normal.      Palpations: Abdomen is soft. Genitourinary:     Penis: Normal.       Scrotum/Testes: Normal.      Comments: No external bleeding noted  Musculoskeletal: Normal range of motion. General: No deformity. Skin:     General: Skin is warm and dry. Capillary Refill: Capillary refill takes less than 2 seconds. Neurological:      General: No focal deficit present. Mental Status: He is alert and oriented to person, place, and time. Mental status is at baseline. Cranial Nerves: No cranial nerve deficit. Psychiatric:         Thought Content:  Thought content normal.         DIAGNOSTIC RESULTS     EKG: All EKG's are interpreted by the Emergency Department Physician who either signs or Co-signs this chart in the absence of a cardiologist.    RADIOLOGY:   Non-plain film images such as CT, Ultrasound and MRI are read by the radiologist. Plain radiographic images are visualized and preliminarily interpreted by the emergency physician with the below findings:    Interpretation per the Radiologist below, if available at the time of this note:    No orders to display       LABS:  Labs Reviewed   URINE RT REFLEX TO CULTURE       All other labs were within normal range or not returned as of this dictation. EMERGENCY DEPARTMENT COURSE and DIFFERENTIAL DIAGNOSIS/MDM:   Vitals:    Vitals:    10/09/20 1351   BP: 103/72   Pulse: 80   Resp: 14   Temp: 97.9 °F (36.6 °C)   SpO2: 99%       MDM  Number of Diagnoses or Management Options  Feared complaint without diagnosis:   Diagnosis management comments: 82-year-old male presenting with concern for blood in the urine. Patient states symptoms have resolved and now has no complaints. Urinalysis shows no blood. Patient will be discharged home in good condition. Patient has been hemodynamically stable throughout ED course and is appropriate for outpatient follow up. Patient should follow up with PCP in 2-3 days or return to ED immediately for any new or worsening symptoms. Patient is well appearing on discharge and agreeable with plan of care. Patient Progress  Patient progress: stable      Procedures    CRITICAL CARE TIME   Total Critical Care time was 0 minutes, excluding separately reportable procedures. There was a high probability of clinically significant/life threatening deterioration in the patient's condition which required my urgent intervention. FINAL IMPRESSION      1.  Feared complaint without diagnosis          DISPOSITION/PLAN   DISPOSITION        (Please note that portions of this note were completed with a voice recognition program.  Efforts were made to edit the dictations but occasionally words are mis-transcribed.)    Dustin Lopez MD (electronically signed)  Attending Emergency Physician        Dustin Lopez MD  10/09/20 1801

## 2020-10-12 ENCOUNTER — CARE COORDINATION (OUTPATIENT)
Dept: CARE COORDINATION | Age: 70
End: 2020-10-12

## 2020-10-12 SDOH — ECONOMIC STABILITY: FOOD INSECURITY: WITHIN THE PAST 12 MONTHS, YOU WORRIED THAT YOUR FOOD WOULD RUN OUT BEFORE YOU GOT MONEY TO BUY MORE.: NEVER TRUE

## 2020-10-12 SDOH — SOCIAL STABILITY: SOCIAL NETWORK: HOW OFTEN DO YOU GET TOGETHER WITH FRIENDS OR RELATIVES?: MORE THAN THREE TIMES A WEEK

## 2020-10-12 SDOH — HEALTH STABILITY: PHYSICAL HEALTH: ON AVERAGE, HOW MANY MINUTES DO YOU ENGAGE IN EXERCISE AT THIS LEVEL?: 0 MIN

## 2020-10-12 SDOH — SOCIAL STABILITY: SOCIAL NETWORK: HOW OFTEN DO YOU ATTEND CHURCH OR RELIGIOUS SERVICES?: MORE THAN 4 TIMES PER YEAR

## 2020-10-12 SDOH — SOCIAL STABILITY: SOCIAL NETWORK
DO YOU BELONG TO ANY CLUBS OR ORGANIZATIONS SUCH AS CHURCH GROUPS UNIONS, FRATERNAL OR ATHLETIC GROUPS, OR SCHOOL GROUPS?: YES

## 2020-10-12 SDOH — ECONOMIC STABILITY: TRANSPORTATION INSECURITY
IN THE PAST 12 MONTHS, HAS THE LACK OF TRANSPORTATION KEPT YOU FROM MEDICAL APPOINTMENTS OR FROM GETTING MEDICATIONS?: NO

## 2020-10-12 SDOH — HEALTH STABILITY: PHYSICAL HEALTH: ON AVERAGE, HOW MANY DAYS PER WEEK DO YOU ENGAGE IN MODERATE TO STRENUOUS EXERCISE (LIKE A BRISK WALK)?: 0 DAYS

## 2020-10-12 SDOH — SOCIAL STABILITY: SOCIAL NETWORK
IN A TYPICAL WEEK, HOW MANY TIMES DO YOU TALK ON THE PHONE WITH FAMILY, FRIENDS, OR NEIGHBORS?: MORE THAN THREE TIMES A WEEK

## 2020-10-12 SDOH — HEALTH STABILITY: MENTAL HEALTH
STRESS IS WHEN SOMEONE FEELS TENSE, NERVOUS, ANXIOUS, OR CAN'T SLEEP AT NIGHT BECAUSE THEIR MIND IS TROUBLED. HOW STRESSED ARE YOU?: NOT AT ALL

## 2020-10-12 SDOH — SOCIAL STABILITY: SOCIAL NETWORK: HOW OFTEN DO YOU ATTENT MEETINGS OF THE CLUB OR ORGANIZATION YOU BELONG TO?: 1 TO 4 TIMES PER YEAR

## 2020-10-12 SDOH — ECONOMIC STABILITY: TRANSPORTATION INSECURITY
IN THE PAST 12 MONTHS, HAS LACK OF TRANSPORTATION KEPT YOU FROM MEETINGS, WORK, OR FROM GETTING THINGS NEEDED FOR DAILY LIVING?: NO

## 2020-10-12 SDOH — ECONOMIC STABILITY: FOOD INSECURITY: WITHIN THE PAST 12 MONTHS, THE FOOD YOU BOUGHT JUST DIDN'T LAST AND YOU DIDN'T HAVE MONEY TO GET MORE.: NEVER TRUE

## 2020-10-12 SDOH — SOCIAL STABILITY: SOCIAL NETWORK: ARE YOU MARRIED, WIDOWED, DIVORCED, SEPARATED, NEVER MARRIED, OR LIVING WITH A PARTNER?: DIVORCED

## 2020-10-12 SDOH — ECONOMIC STABILITY: INCOME INSECURITY: HOW HARD IS IT FOR YOU TO PAY FOR THE VERY BASICS LIKE FOOD, HOUSING, MEDICAL CARE, AND HEATING?: SOMEWHAT HARD

## 2020-10-12 ASSESSMENT — ENCOUNTER SYMPTOMS: DYSPNEA ASSOCIATED WITH: EXERTION

## 2020-10-12 NOTE — CARE COORDINATION
Ambulatory Care Coordination Note  10/12/2020  CM Risk Score: 8  Charlson 10 Year Mortality Risk Score: 100%     ACC: Daniel Osborne, RN    Summary Note: Patient contacted by phone for Care Coordination enrollment. Introduced myself and the Care Coordination program. Patient agrees to participate in program.  Patient states his mother-in-law sets up his medications. Deferred  home medications reconciliation. Will contact patient's mother-in-law for medication reconciliation and to discuss referral to Clinical Rx. Discussed referral to [address] [place] [address]. Patient declined referral.  Instructed patient on availability of same day, next day, and Walk-In care. Referral to [facility]  sent by [employer] . A CC Welcome Letter with COPD, Heart Failure, and Diabetes Zone sheets printed and mailed to patients home address. A call placed to patient's mother-in-law. A message left regarding the purpose of call. Requested call back. Provided call back number. Lab Results   Component Value Date    LABA1C 9.2 (H) 09/11/2020    LABA1C 9.1 (H) 07/27/2020    LABA1C 8.4 05/27/2020       Diabetes Assessment    Medic Alert ID:  No  Meal Planning:  Plate Method, Avoidance of concentrated sweets   How often do you test your blood sugar?:  Daily (Comment: 2 times a day)   Do you have barriers with adherence to non-pharmacologic self-management interventions?  (Nutrition/Exercise/Self-Monitoring):  Yes   Have you ever had to go to the ED for symptoms of low blood sugar?:  No       Increase or Decrease trend in Blood Sugars   Do you have hyperglycemia symptoms?:  No   Do you have hypoglycemia symptoms?:  No   Last Blood Sugar Value:  147   Blood Sugar Trends:  No Change      ,   Congestive Heart Failure Assessment    Are you currently restricting fluids?:  No Restriction  Do you understand a low sodium diet?:  Yes  Do you understand how to read food labels?:  Yes (Comment: pt states he reads them \"sometimes\")  How many urination at night?:  Yes  Do you use rails/bars?:  Yes  Do you have a non-slip tub mat?:  Yes     Are you experiencing loss of meaning?:  No  Are you experiencing loss of hope and peace?:  No     Thinking about your patient's physical health needs, are there any symptoms or problems (risk indicators) you are unsure about that require further investigation?:  No identified areas of uncertainly or problems already being investigated   Are the patients physical health problems impacting on their mental well-being?:  No identified areas of concern   Are there any problems with your patients lifestyle behaviors (alcohol, drugs, diet, exercise) that are impacting on physical or mental well-being?:  No identified areas of concern   Do you have any other concerns about your patients mental well-being? How would you rate their severity and impact on the patient?:  No identified areas of concern   How would you rate their home environment in terms of safety and stability (including domestic violence, insecure housing, neighbor harassment)?:  Consistently safe, supportive, stable, no identified problems   How do daily activities impact on the patient's well-being? (include current or anticipated unemployment, work, caregiving, access to transportation or other):  Some general dissatisfaction but no concern   How would you rate their social network (family, work, friends)?:  Adequate participation with social networks   How would you rate their financial resources (including ability to afford all required medical care)?:  Financially secure, some resource challenges   How wells does the patient now understand their health and well-being (symptoms, signs or risk factors) and what they need to do to manage their health?:  Reasonable to good understanding but do not feel able to engage with advice at this time   How well do you think your patient can engage in healthcare discussions?  (Barriers include language, deafness, aphasia, alcohol or drug problems, learning difficulties, concentration): Adequate communication, with or without minor barriers   Do other services need to be involved to help this patient?:  Other care/services in place and adequate   Are current services involved with this patient well-coordinated? (Include coordination with other services you are now recommendation):  Required care/services in place and adequately coordinated   Suggested Interventions and Community Resources  Diabetes Education:  Completed (Comment: Patient scheduled to see Diabetes Management 10/15/20.)    Other Services or Interventions:  10/12/20 Instructe don same day, next day, and Walk-In Care. Pharmacist:  In Process   Registered Dietician:  Declined   Social Work:  In Process   Other Services:  Not Started   Zone Management Tools: In Process         Set up/Review an Education Plan, Set up/Review Goals              Prior to Admission medications    Medication Sig Start Date End Date Taking? Authorizing Provider   LANTUS SOLOSTAR 100 UNIT/ML injection pen INJECT 10 UNITS INTO THE SKIN NIGHTLY 9/21/20   Shana Schrader MD   warfarin (COUMADIN) 5 MG tablet TAKE AS DIRECTED BY [facility].  QUANTITY EQUALS 90 DAY SUPPLY 9/16/20   Shana Schrader MD   metoprolol succinate (TOPROL XL) 25 MG extended release tablet Take 2 tablets by mouth daily 9/12/20   Judy Villarreal MD   sacubitril-valsartan [facility]) 24-26 MG per tablet Take 0.5 tablets by mouth 2 times daily 9/12/20   Judy Villarreal MD   magnesium oxide (MAG-OX) 400 (240 Mg) MG tablet Take 1 tablet by mouth daily 9/12/20   Judy Villarreal MD   potassium chloride (KLOR-CON M) 20 MEQ extended release tablet Take 1 tablet by mouth daily 9/12/20   Judy Villarreal MD   Insulin Pen Needle (PEN NEEDLES) 32G X 5 MM MISC Use to inject Lantus 9/2/20   Shana Schrader MD   blood glucose monitor strips Test 4x daily Dx E11.65 8/4/20   Trey MANAGEMENT [facility]   10/20/2020  2:00 PM SCHEDULE, [address]   10/26/2020  1:00 PM [facility] CATH LAB RM 3 [facility] CATH [address] [place] [address]   10/29/2020 11:15 AM Natan Mendez MD [address]   11/9/2020  2:30 PM [facility]

## 2020-10-19 ENCOUNTER — CARE COORDINATION (OUTPATIENT)
Dept: CARE COORDINATION | Age: 70
End: 2020-10-19

## 2020-10-19 NOTE — CARE COORDINATION
Date/Time:  10/19/2020 2:31 PM  Attempted to reach patient by telephone for Care Coordination F/U. No answer No voicemail. Will attempt to reach patient again.

## 2020-10-20 ENCOUNTER — TELEPHONE (OUTPATIENT)
Dept: ADMINISTRATIVE | Age: 70
End: 2020-10-20

## 2020-10-20 ENCOUNTER — NURSE ONLY (OUTPATIENT)
Dept: PRIMARY CARE CLINIC | Age: 70
End: 2020-10-20

## 2020-10-20 ENCOUNTER — HOSPITAL ENCOUNTER (OUTPATIENT)
Dept: PHARMACY | Age: 70
Setting detail: THERAPIES SERIES
Discharge: HOME | End: 2020-10-20
Payer: MEDICARE

## 2020-10-20 VITALS — TEMPERATURE: 97.6 F

## 2020-10-20 LAB — INTERNATIONAL NORMALIZATION RATIO, POC: 2.8

## 2020-10-20 PROCEDURE — U0003 INFECTIOUS AGENT DETECTION BY NUCLEIC ACID (DNA OR RNA); SEVERE ACUTE RESPIRATORY SYNDROME CORONAVIRUS 2 (SARS-COV-2) (CORONAVIRUS DISEASE [COVID-19]), AMPLIFIED PROBE TECHNIQUE, MAKING USE OF HIGH THROUGHPUT TECHNOLOGIES AS DESCRIBED BY CMS-2020-01-R: HCPCS

## 2020-10-20 PROCEDURE — 85610 PROTHROMBIN TIME: CPT

## 2020-10-20 PROCEDURE — 99211 OFF/OP EST MAY X REQ PHY/QHP: CPT

## 2020-10-20 NOTE — PROGRESS NOTES
Mr. Marce Ramos is a 78 y.o. y/o male with history of Afib who presents today for anticoagulation monitoring and adjustment. INR 2.8 is therapeutic for this patient (goal range 2-3) and is reflective of 50 mg TWD  Patient verifies current dosing regimen, patient able to verbally recall dose  Patient reports no  missed doses since last INR   Patient denies s/sx clotting and/or stroke  Patient denies hematuria, epistaxis, rectal bleeding  Patient denies changes in diet, alcohol, or tobacco use  Reviewed medication list and drug allergies with patient, updated any medication additions or modifications accordingly    Patient has an ablation scheduled for 10/26    Patient was instructed to start amiodarone by Dr. Danica Corrales.  Monitor INR closely for changes    Patient was instructed to continue current regimen of 50 mg TWD and RTC 3 weeks    CLINICAL PHARMACY CONSULT: [place] [id number] [place]: Yes  Total # of Interventions Recommended: 1  - Maintenance Safety Lab Monitoring #: 1  Total Interventions Accepted: 1  Time Spent (min): 30    Judie Lomax, PharmD, [address] [facility]  Staff Pharmacist  10/20/2020 1:58 PM

## 2020-10-20 NOTE — TELEPHONE ENCOUNTER
Spoke to the patients Mother in [place] and the patient (who was on another line). They requested for the patient to be seen for an ER follow up in the office. I only had a virtual visit available for 30 minutes which I scheduled the patient for on October 22 virtually. The first available for a face to face visit that worked for the patient was on November 11 but the patient will be having a procedure soon and wanted to be seen before then. They asked if he can be seen this week in the office. I called the office and spoke to BODØ. I transferred the call to her and she was going to see if Dr. Dajuan Adhikari could see the patient for a face to face. The protocol requires a 30 min visit for an ER follow up. I then called BODØ back after awhile to confirm that it was ok to cancel the virtual appointment for October 22 since the patient is now scheduled for a face to face by the office on October 23. I noticed that there were 2 appointments scheduled for the patient, the one I made on 10/22 and the one the office made for 10/23. She said it was ok to cancel the appointment for 10/22 because the patient is coming in on Friday. I told her I was going to try and call them back to confirm if they still needed to come back into the office on November 11. I attempted to call them twice, there was no answer and no voicemail. Currently there is no appointment scheduled for November 11.

## 2020-10-23 ENCOUNTER — OFFICE VISIT (OUTPATIENT)
Dept: FAMILY MEDICINE CLINIC | Age: 70
End: 2020-10-23
Payer: MEDICARE

## 2020-10-23 VITALS
BODY MASS INDEX: 26.36 KG/M2 | DIASTOLIC BLOOD PRESSURE: 70 MMHG | HEIGHT: 71 IN | RESPIRATION RATE: 14 BRPM | HEART RATE: 96 BPM | TEMPERATURE: 98 F | SYSTOLIC BLOOD PRESSURE: 112 MMHG | OXYGEN SATURATION: 98 %

## 2020-10-23 DIAGNOSIS — E11.9 DIABETES MELLITUS WITH INSULIN THERAPY (HCC): ICD-10-CM

## 2020-10-23 DIAGNOSIS — Z79.4 DIABETES MELLITUS WITH INSULIN THERAPY (HCC): ICD-10-CM

## 2020-10-23 LAB — HBA1C MFR BLD: 8.6 %

## 2020-10-23 PROCEDURE — 99214 OFFICE O/P EST MOD 30 MIN: CPT | Performed by: INTERNAL MEDICINE

## 2020-10-23 PROCEDURE — 3052F HG A1C>EQUAL 8.0%<EQUAL 9.0%: CPT | Performed by: INTERNAL MEDICINE

## 2020-10-23 ASSESSMENT — ENCOUNTER SYMPTOMS
ABDOMINAL PAIN: 0
FACIAL SWELLING: 0
EYE ITCHING: 0
RECTAL PAIN: 0
DIARRHEA: 0
SHORTNESS OF BREATH: 0
PHOTOPHOBIA: 0
EYE REDNESS: 0
COUGH: 0
EYE PAIN: 0
CHEST TIGHTNESS: 0
TROUBLE SWALLOWING: 0
SINUS PAIN: 0
EYE DISCHARGE: 0
CONSTIPATION: 0
VOMITING: 0
BLOOD IN STOOL: 0
VOICE CHANGE: 0
SORE THROAT: 0
NAUSEA: 0
SINUS PRESSURE: 0
COLOR CHANGE: 0
WHEEZING: 0
APNEA: 0
BACK PAIN: 0
RHINORRHEA: 0
ABDOMINAL DISTENTION: 0

## 2020-10-23 NOTE — PROGRESS NOTES
10/23/2020    Yefri Barriga (:  1950) is a 78 y.o. male, here for evaluation of the following medical concerns:        68-year-old male with a history of type 2 diabetes, systolic heart failure, hypothyroidism, paroxysmal atrial fibrillation, COPD and essential hypertension presents for follow-up visit. The patient voices no complaints at this time. .        Type 2 diabetes: The patient is due for hemoglobin A1c. He reports that his blood sugars are well controlled at this time. He has not reportedly taken his insulin 70/30. Hematuria: Hematuria has resolved. Short-term memory loss: This is stable at this time. Hypertension: The patient is compliant with Aldactone 25 mg orally daily. Pt has a bp cuff at home blood pressures at home have been well controlled. Blood pressure today is 112/70. Pruritus: The patient's pruritus has not resolved today. He is followed by dermatology for this complaint. Heart failure: The patient is compliant with Entresto 24-26 mg 2 times daily, spironolactone 25 mg orally daily. Zaroxolyn 2.5 mg daily and metoprolol 25 mg orally daily. Additionally takes potassium 20 mEq orally daily he is compliant with Lipitor 80 mg orally daily follow closely by his cardiologist Dr. Alicia Evans. Hypothyroidism: The patient was noted to have an elevated TSH on 2019. He is not presently receiving Synthroid. Will consider initiation of Synthroid if patient develops symptoms. Atrial fibrillation: Patient is receiving Digitek, metoprolol as a forementioned. He is compliant with Coumadin. inr was therapeutic on 2020 at 2.8 . COPD: His COPD is stable at this time he is compliant with Singulair, Symbicort and supplemental oxygen. Diabetes: A1C=8.3 on  2019. He monitors his blood sugars via home blood pressure monitoring kit. He is compliant with insulin 70/30.   The patient is due for an assessment of an A1c.          Review of Systems   Constitutional: Negative for chills, diaphoresis, fatigue and fever. HENT: Negative for congestion, dental problem, drooling, ear discharge, ear pain, facial swelling, hearing loss, mouth sores, nosebleeds, postnasal drip, rhinorrhea, sinus pressure, sinus pain, sneezing, sore throat, tinnitus, trouble swallowing and voice change. Eyes: Negative for photophobia, pain, discharge, redness, itching and visual disturbance. Respiratory: Negative for apnea, cough, chest tightness, shortness of breath and wheezing. Cardiovascular: Negative for chest pain, palpitations and leg swelling. Gastrointestinal: Negative for abdominal distention, abdominal pain, blood in stool, constipation, diarrhea, nausea, rectal pain and vomiting. Endocrine: Negative for cold intolerance, heat intolerance, polydipsia, polyphagia and polyuria. Genitourinary: Negative for decreased urine volume, difficulty urinating, dysuria, flank pain, frequency, genital sores, hematuria and urgency. Musculoskeletal: Negative for arthralgias, back pain, gait problem, joint swelling, myalgias, neck pain and neck stiffness. Skin: Negative for color change, rash and wound. Allergic/Immunologic: Negative for environmental allergies and food allergies. Neurological: Negative for dizziness, tremors, seizures, syncope, facial asymmetry, speech difficulty, weakness, light-headedness, numbness and headaches. Hematological: Negative for adenopathy. Does not bruise/bleed easily. Psychiatric/Behavioral: Negative for agitation, confusion, decreased concentration, hallucinations, self-injury, sleep disturbance and suicidal ideas. The patient is not nervous/anxious. Prior to Visit Medications    Medication Sig Taking?  Authorizing Provider   LANTUS SOLOSTAR 100 UNIT/ML injection pen INJECT 10 UNITS INTO THE SKIN NIGHTLY Yes Imelda Mooney MD   warfarin (COUMADIN) 5 MG tablet TAKE AS DIRECTED BY [facility].  QUANTITY EQUALS [address] Yes Salomón Barfield MD   metoprolol succinate (TOPROL XL) 25 MG extended release tablet Take 2 tablets by mouth daily Yes Fredirick Burkitt, MD   sacubitril-valsartan (ENTRESTO) 24-26 MG per tablet Take 0.5 tablets by mouth 2 times daily Yes Fredirick Burkitt, MD   magnesium oxide (MAG-OX) 400 (240 Mg) MG tablet Take 1 tablet by mouth daily Yes Fredirick Burkitt, MD   potassium chloride (KLOR-CON M) 20 MEQ extended release tablet Take 1 tablet by mouth daily Yes Fredirick Burkitt, MD   Insulin Pen Needle (PEN NEEDLES) 32G X 5 MM MISC Use to inject Lantus Yes Salomón Barfield MD   blood glucose monitor strips Test 4x daily Dx E11.65 Yes Esther Doyle MD   donepezil (ARICEPT) 10 MG tablet TAKE 1 TABLET BY MOUTH EVERY DAY AT NIGHT Yes Salomón Barfield MD   Insulin Syringe-Needle U-100 (BD INSULIN SYRINGE ULTRAFINE) 31G X 5/16\" 0.5 ML MISC USE TWICE A DAY Yes Salomón Barfield MD   allopurinol (ZYLOPRIM) 100 MG tablet TAKE 1 TABLET DAILY Yes Salomón Barfield MD   Blood Pressure Monitoring (ADULT BLOOD PRESSURE CUFF LG) KIT 1 Device by Does not apply route daily Yes Salomón Barfield MD   bumetanide (BUMEX) 1 MG tablet Take 1 mg by mouth 2 times daily  Yes Salomón Barfield MD   nitroGLYCERIN (NITROSTAT) 0.4 MG SL tablet Place 1 tablet under the tongue every 5 minutes as needed for Chest pain Yes Salomón Barfield MD   spironolactone (ALDACTONE) 25 MG tablet Take 1 tablet by mouth daily Yes Sylvia Macario MD   atorvastatin (LIPITOR) 80 MG tablet Take 80 mg by mouth nightly  Yes Historical Provider, MD   warfarin (COUMADIN) 7.5 MG tablet 7.5mg daily Yes Sylvia Macario MD   Oxygen Concentrator Oxi Go POC Yes Virgen Whipple MD   budesonide-formoterol (SYMBICORT) 160-4.5 MCG/ACT AERO Inhale 2 puffs into the lungs 2 times daily Yes Virgen Whipple MD   pantoprazole (PROTONIX) 40 MG tablet Take 1 tablet by mouth every morning (before breakfast) Yes Sylvia Maacrio MD   DIGITEK 125 MCG tablet TAKE 1 TABLET DAILY Yes Chiquita Cain MD   Alcohol Swabs (ALCOHOL PREP) 70 % PADS Use 8 daily  Aislinn Fonseca MD   albuterol (PROVENTIL) (2.5 MG/3ML) 0.083% nebulizer solution Take 3 mLs by nebulization every 6 hours as needed for Fitz Saavedra MD   albuterol (PROAIR HFA) 108 (90 BASE) MCG/ACT inhaler Inhale 2 puffs into the lungs every 4 hours as needed for Wheezing  Chiquita Cain MD        Allergies   Allergen Reactions    Dye [Iodides] Shortness Of Breath    Penicillins Anaphylaxis    Tapazole [Methimazole]      Itching        Past Medical History:   Diagnosis Date    Amiodarone-induced hyperthyroidism     Arthritis     Atrial flutter ([facility]     CAD (coronary artery disease)     Chronic combined systolic and diastolic CHF (congestive heart failure) (HCC)     CKD (chronic kidney disease) stage 3, GFR 30-59 ml/min     [facility]     Defibrillator activation     Diabetes mellitus with insulin therapy [facility]     Dilated cardiomyopathy ([facility]     Generalized and unspecified atherosclerosis     Gout     Hyperlipidemia     Hypertension     Noncompliance     Obesity     On home oxygen therapy     Pain in joint, multiple sites     Paroxysmal A-fib (HCC)     Paroxysmal ventricular tachycardia (HCC)     Prolonged emergence from general anesthesia     Status post angioplasty     Sustained ventricular tachycardia (HCC)     TIA (transient ischemic attack)     Tobacco abuse     Type II or unspecified type diabetes mellitus without mention of complication, not stated as uncontrolled        Past Surgical History:   Procedure Laterality Date    CARDIAC CATHETERIZATION      COLONOSCOPY      CORONARY ANGIOPLASTY  4/29/11    Dr Xie Head CORONARY ARTERY BYPASS GRAFT  2012    ENDOSCOPY, COLON, DIAGNOSTIC      EYE SURGERY Bilateral     Cataracts     OTHER SURGICAL HISTORY      difibrillator     [place] CIRCUMCISION,OTHER,28+ D/O N/A 2018    CIRCUMCISION performed by Cheryl Loomis MD at [facility] EGD TRANSORAL BIOPSY SINGLE/MULTIPLE N/A 2017    EGD BIOPSY performed by Lupe Rasheed MD at [facility] Marital status:      Spouse name: Not on file    Number of children: 2    Years of education: 12    Highest education level: High school graduate   Occupational History    Not on file   Social Needs    Financial resource strain: Somewhat hard    Food insecurity     Worry: Never true     Inability: Never true    Transportation needs     Medical: No     Non-medical: No   Tobacco Use    Smoking status: Former Smoker     Packs/day: 1.50     Years: 25.00     Pack years: 37.50     Last attempt to quit: 2012     Years since quittin.8    Smokeless tobacco: Never Used   Substance and Sexual Activity    Alcohol use: Not Currently    Drug use: No    Sexual activity: Not on file   Lifestyle    Physical activity     Days per week: 0 days     Minutes per session: 0 min    Stress: Not at all   Relationships    Social connections     Talks on phone: More than three times a week     Gets together: More than three times a week     Attends Holiness service: More than 4 times per year     Active member of club or organization: Yes     Attends meetings of clubs or organizations: 1 to 4 times per year     Relationship status:     Intimate partner violence     Fear of current or ex partner: Not on file     Emotionally abused: Not on file     Physically abused: Not on file     Forced sexual activity: Not on file   Other Topics Concern    Not on file   Social History Narrative    Not on file        Family History   Problem Relation Age of Onset    Cancer Brother     Diabetes Mother     Heart Disease Father     Emphysema Father        Vitals:    10/23/20 1055   BP: 112/70   Pulse: 96   Resp: 14   Temp: 98 °F (36.7 °C)   SpO2: 98%   Height: 5' 11\" (1.803 m)     Estimated body mass index is 26.36 kg/m² as calculated from the following:    Height as of this encounter: 5' 11\" (1.803 m). Weight as of 9/12/20: 189 lb (85.7 kg). Physical Exam  Constitutional:       General: He is not in acute distress. Appearance: He is well-developed. HENT:      Head: Normocephalic. Right Ear: External ear normal.      Left Ear: External ear normal.   Eyes:      Conjunctiva/sclera: Conjunctivae normal.      Comments: Conjunctival hemorrhage   Neck:      Musculoskeletal: Neck supple. Vascular: No JVD. Trachea: No tracheal deviation. Cardiovascular:      Rate and Rhythm: Normal rate and regular rhythm. Heart sounds: Normal heart sounds. Pulmonary:      Effort: Pulmonary effort is normal. No respiratory distress. Breath sounds: Normal breath sounds. No wheezing or rales. Chest:      Chest wall: No tenderness. Abdominal:      General: Bowel sounds are normal. There is no distension. Palpations: Abdomen is soft. There is no mass. Tenderness: There is no abdominal tenderness. There is no guarding or rebound. Musculoskeletal:         General: No tenderness or deformity. Lymphadenopathy:      Cervical: No cervical adenopathy. Skin:     General: Skin is warm and dry. Coloration: Skin is not pale. Findings: No erythema or rash. Neurological:      Mental Status: He is alert and oriented to person, place, and time. Motor: No abnormal muscle tone. Psychiatric:         Thought Content: Thought content normal.         Judgment: Judgment normal.         ASSESSMENT/PLAN:      DMII -hemoglobin A1c equals 8.6. Requested that the patient continue to monitor his Accu-Cheks 4 times daily. Will adjust insulin administration based on blood sugars. Hematuria: Stable. Monitoring.               Hypertension- continue metoprolol 25 mg po daily for now. Continue current regimen. Left heart failure (HCC)  Continue Zaroxolyn, metoprolol, Bumex, spironolactone and potassium supplementation        Hypothyroidism, unspecified type  TSH is within normal limits. Reassess thyroid function at next office visit. Paroxysmal A-fib (HCC)  Continue metoprolol and Coumadin. Monitor INR        Localized pruritus  Implement recommendations as provided by dermatology        Chronic obstructive pulmonary disease, unspecified COPD type ([facility]  Continue Symbicort and Singulair. Continue supplemental oxygen PRN        Short-term memory loss- monitoring. F/u with neurology      Return in about 3 months (around 1/23/2021). An  electronic signature was used to authenticate this note.     --Teresita Mari MD on 10/23/2020 at 11:50 AM

## 2020-10-25 LAB
CREATININE URINE: 57.1 MG/DL
MICROALBUMIN UR-MCNC: 1.3 MG/DL
MICROALBUMIN/CREAT UR-RTO: 22.8 MG/G (ref 0–30)

## 2020-10-26 ENCOUNTER — HOSPITAL ENCOUNTER (OUTPATIENT)
Dept: CARDIAC CATH/INVASIVE PROCEDURES | Age: 70
Setting detail: OBSERVATION
Discharge: HOME | End: 2020-10-27
Attending: INTERNAL MEDICINE | Admitting: INTERNAL MEDICINE
Payer: MEDICARE

## 2020-10-26 PROBLEM — I48.91 ATRIAL FIBRILLATION (HCC): Status: ACTIVE | Noted: 2020-10-26

## 2020-10-26 LAB
ALBUMIN SERPL-MCNC: 4.5 G/DL (ref 3.5–4.6)
ALP BLD-CCNC: 165 U/L (ref 35–104)
ALT SERPL-CCNC: 30 U/L (ref 0–41)
ANION GAP SERPL CALCULATED.3IONS-SCNC: 15 MEQ/L (ref 9–15)
AST SERPL-CCNC: 22 U/L (ref 0–40)
BILIRUB SERPL-MCNC: 2.2 MG/DL (ref 0.2–0.7)
BUN BLDV-MCNC: 35 MG/DL (ref 8–23)
CALCIUM SERPL-MCNC: 9.8 MG/DL (ref 8.5–9.9)
CHLORIDE BLD-SCNC: 95 MEQ/L (ref 95–107)
CO2: 23 MEQ/L (ref 20–31)
CREAT SERPL-MCNC: 1.34 MG/DL (ref 0.7–1.2)
EKG ATRIAL RATE: 85 BPM
EKG P AXIS: 60 DEGREES
EKG P-R INTERVAL: 138 MS
EKG Q-T INTERVAL: 450 MS
EKG QRS DURATION: 172 MS
EKG QTC CALCULATION (BAZETT): 535 MS
EKG R AXIS: 201 DEGREES
EKG T AXIS: 20 DEGREES
EKG VENTRICULAR RATE: 85 BPM
GFR AFRICAN AMERICAN: >60
GFR NON-AFRICAN AMERICAN: 52.9
GLOBULIN: 3.1 G/DL (ref 2.3–3.5)
GLUCOSE BLD-MCNC: 153 MG/DL (ref 70–99)
GLUCOSE BLD-MCNC: 229 MG/DL (ref 60–115)
HCT VFR BLD CALC: 38.5 % (ref 42–52)
HEMOGLOBIN: 12.4 G/DL (ref 14–18)
INR BLD: 1.8
MCH RBC QN AUTO: 27.7 PG (ref 27–31.3)
MCHC RBC AUTO-ENTMCNC: 32.1 % (ref 33–37)
MCV RBC AUTO: 86.2 FL (ref 80–100)
PDW BLD-RTO: 15.9 % (ref 11.5–14.5)
PERFORMED ON: ABNORMAL
PLATELET # BLD: 169 K/UL (ref 130–400)
POTASSIUM SERPL-SCNC: 4.1 MEQ/L (ref 3.4–4.9)
PROTHROMBIN TIME: 20.7 SEC (ref 12.3–14.9)
RBC # BLD: 4.47 M/UL (ref 4.7–6.1)
SODIUM BLD-SCNC: 133 MEQ/L (ref 135–144)
TOTAL PROTEIN: 7.6 G/DL (ref 6.3–8)
WBC # BLD: 6.8 K/UL (ref 4.8–10.8)

## 2020-10-26 PROCEDURE — 93005 ELECTROCARDIOGRAM TRACING: CPT | Performed by: INTERNAL MEDICINE

## 2020-10-26 PROCEDURE — G0378 HOSPITAL OBSERVATION PER HR: HCPCS

## 2020-10-26 PROCEDURE — 2500000003 HC RX 250 WO HCPCS

## 2020-10-26 PROCEDURE — 2580000003 HC RX 258

## 2020-10-26 PROCEDURE — 94664 DEMO&/EVAL PT USE INHALER: CPT

## 2020-10-26 PROCEDURE — C1894 INTRO/SHEATH, NON-LASER: HCPCS

## 2020-10-26 PROCEDURE — 6370000000 HC RX 637 (ALT 250 FOR IP): Performed by: INTERNAL MEDICINE

## 2020-10-26 PROCEDURE — 2709999900 HC NON-CHARGEABLE SUPPLY

## 2020-10-26 PROCEDURE — 85027 COMPLETE CBC AUTOMATED: CPT

## 2020-10-26 PROCEDURE — 93650 ICAR CATH ABLTJ AV NODE FUNC: CPT | Performed by: INTERNAL MEDICINE

## 2020-10-26 PROCEDURE — C1732 CATH, EP, DIAG/ABL, 3D/VECT: HCPCS

## 2020-10-26 PROCEDURE — 80053 COMPREHEN METABOLIC PANEL: CPT

## 2020-10-26 PROCEDURE — 85610 PROTHROMBIN TIME: CPT

## 2020-10-26 PROCEDURE — 2580000003 HC RX 258: Performed by: INTERNAL MEDICINE

## 2020-10-26 RX ORDER — ATORVASTATIN CALCIUM 80 MG/1
80 TABLET, FILM COATED ORAL DAILY
Status: DISCONTINUED | OUTPATIENT
Start: 2020-10-26 | End: 2020-10-27 | Stop reason: HOSPADM

## 2020-10-26 RX ORDER — LANOLIN ALCOHOL/MO/W.PET/CERES
400 CREAM (GRAM) TOPICAL DAILY
Status: DISCONTINUED | OUTPATIENT
Start: 2020-10-26 | End: 2020-10-27 | Stop reason: HOSPADM

## 2020-10-26 RX ORDER — WARFARIN SODIUM 5 MG/1
7.5 TABLET ORAL DAILY
Status: DISCONTINUED | OUTPATIENT
Start: 2020-10-26 | End: 2020-10-26 | Stop reason: DRUGHIGH

## 2020-10-26 RX ORDER — METOPROLOL SUCCINATE 50 MG/1
50 TABLET, EXTENDED RELEASE ORAL DAILY
Status: DISCONTINUED | OUTPATIENT
Start: 2020-10-26 | End: 2020-10-27 | Stop reason: HOSPADM

## 2020-10-26 RX ORDER — DONEPEZIL HYDROCHLORIDE 10 MG/1
10 TABLET, FILM COATED ORAL NIGHTLY
Status: DISCONTINUED | OUTPATIENT
Start: 2020-10-26 | End: 2020-10-27 | Stop reason: HOSPADM

## 2020-10-26 RX ORDER — SODIUM CHLORIDE 450 MG/100ML
INJECTION, SOLUTION INTRAVENOUS CONTINUOUS
Status: DISCONTINUED | OUTPATIENT
Start: 2020-10-26 | End: 2020-10-27 | Stop reason: HOSPADM

## 2020-10-26 RX ORDER — POTASSIUM CHLORIDE 20 MEQ/1
20 TABLET, EXTENDED RELEASE ORAL DAILY
Status: DISCONTINUED | OUTPATIENT
Start: 2020-10-26 | End: 2020-10-27 | Stop reason: HOSPADM

## 2020-10-26 RX ORDER — WARFARIN SODIUM 5 MG/1
5 TABLET ORAL
Status: DISCONTINUED | OUTPATIENT
Start: 2020-10-27 | End: 2020-10-27 | Stop reason: HOSPADM

## 2020-10-26 RX ORDER — INSULIN GLARGINE 100 [IU]/ML
10 INJECTION, SOLUTION SUBCUTANEOUS NIGHTLY
Status: DISCONTINUED | OUTPATIENT
Start: 2020-10-26 | End: 2020-10-27 | Stop reason: HOSPADM

## 2020-10-26 RX ORDER — ACETAMINOPHEN 325 MG/1
650 TABLET ORAL EVERY 4 HOURS PRN
Status: DISCONTINUED | OUTPATIENT
Start: 2020-10-26 | End: 2020-10-27 | Stop reason: HOSPADM

## 2020-10-26 RX ORDER — BUMETANIDE 1 MG/1
1 TABLET ORAL 2 TIMES DAILY
Status: DISCONTINUED | OUTPATIENT
Start: 2020-10-26 | End: 2020-10-27 | Stop reason: HOSPADM

## 2020-10-26 RX ORDER — SODIUM CHLORIDE 0.9 % (FLUSH) 0.9 %
10 SYRINGE (ML) INJECTION PRN
Status: DISCONTINUED | OUTPATIENT
Start: 2020-10-26 | End: 2020-10-27 | Stop reason: HOSPADM

## 2020-10-26 RX ORDER — SPIRONOLACTONE 25 MG/1
25 TABLET ORAL DAILY
Status: DISCONTINUED | OUTPATIENT
Start: 2020-10-26 | End: 2020-10-27 | Stop reason: HOSPADM

## 2020-10-26 RX ORDER — PANTOPRAZOLE SODIUM 40 MG/1
40 TABLET, DELAYED RELEASE ORAL DAILY
Status: DISCONTINUED | OUTPATIENT
Start: 2020-10-26 | End: 2020-10-27 | Stop reason: HOSPADM

## 2020-10-26 RX ORDER — WARFARIN SODIUM 5 MG/1
10 TABLET ORAL
Status: DISCONTINUED | OUTPATIENT
Start: 2020-10-26 | End: 2020-10-27 | Stop reason: HOSPADM

## 2020-10-26 RX ORDER — ALBUTEROL SULFATE 2.5 MG/3ML
2.5 SOLUTION RESPIRATORY (INHALATION) EVERY 6 HOURS PRN
Status: DISCONTINUED | OUTPATIENT
Start: 2020-10-26 | End: 2020-10-27 | Stop reason: HOSPADM

## 2020-10-26 RX ORDER — BUDESONIDE AND FORMOTEROL FUMARATE DIHYDRATE 160; 4.5 UG/1; UG/1
2 AEROSOL RESPIRATORY (INHALATION) 2 TIMES DAILY
Status: DISCONTINUED | OUTPATIENT
Start: 2020-10-26 | End: 2020-10-27 | Stop reason: HOSPADM

## 2020-10-26 RX ORDER — SODIUM CHLORIDE 0.9 % (FLUSH) 0.9 %
10 SYRINGE (ML) INJECTION EVERY 12 HOURS SCHEDULED
Status: DISCONTINUED | OUTPATIENT
Start: 2020-10-26 | End: 2020-10-27 | Stop reason: HOSPADM

## 2020-10-26 RX ORDER — DIGOXIN 125 MCG
125 TABLET ORAL DAILY
Status: DISCONTINUED | OUTPATIENT
Start: 2020-10-26 | End: 2020-10-27 | Stop reason: HOSPADM

## 2020-10-26 RX ADMIN — ATORVASTATIN CALCIUM 80 MG: 80 TABLET, FILM COATED ORAL at 20:12

## 2020-10-26 RX ADMIN — Medication 10 ML: at 21:29

## 2020-10-26 RX ADMIN — SACUBITRIL AND VALSARTAN 0.5 TABLET: 24; 26 TABLET, FILM COATED ORAL at 20:13

## 2020-10-26 RX ADMIN — INSULIN GLARGINE 10 UNITS: 100 INJECTION, SOLUTION SUBCUTANEOUS at 20:15

## 2020-10-26 RX ADMIN — WARFARIN SODIUM 10 MG: 5 TABLET ORAL at 20:13

## 2020-10-26 RX ADMIN — DONEPEZIL HYDROCHLORIDE 10 MG: 10 TABLET, FILM COATED ORAL at 20:13

## 2020-10-26 RX ADMIN — BUMETANIDE 1 MG: 1 TABLET ORAL at 20:13

## 2020-10-26 ASSESSMENT — PAIN SCALES - GENERAL
PAINLEVEL_OUTOF10: 0
PAINLEVEL_OUTOF10: 0

## 2020-10-26 NOTE — PROGRESS NOTES
Report called to [address], Patient right femoral access site remains stable with no bleeding or hematoma noted, VSS, Patient tolerating PO intake without complication.   Awaiting transport

## 2020-10-26 NOTE — PROGRESS NOTES
[facility] AT [place] Respiratory Therapy Evaluation   Current Order:  Albuterol q6prn      Home Regimen: prn      Ordering Physician: silvina  Re-evaluation Date:  --     Diagnosis: cath      Patient Status: Stable / Unstable + Physician notified    The following MDI Criteria must be met in order to convert aerosol to MDI with spacer. If unable to meet, MDI will be converted to aerosol:  []  Patient able to demonstrate the ability to use MDI effectively  []  Patient alert and cooperative  []  Patient able to take deep breath with 5-10 second hold  []  Medication(s) available in this delivery method   []  Peak flow greater than or equal to 200 ml/min            Current Order Substituted To  (same drug, same frequency)   Aerosol to MDI [] Albuterol Sulfate 0.083% unit dose by aerosol Albuterol Sulfate MDI 2 puffs by inhalation with spacer    [] Levalbuterol 1.25 mg unit dose by aerosol Levalbuterol MDI 2 puffs by inhalation with spacer    [] Levalbuterol 0.63 mg unit dose by aerosol Levalbuterol MDI 2 puffs by inhalation with spacer    [] Ipratropium Bromide 0.02% unit dose by aerosol Ipratropium Bromide MDI 2 puffs by inhalation with spacer    [] Duoneb (Ipratropium + Albuterol) unit dose by aerosol Ipratropium MDI + Albuterol MDI 2 puffs by inhalation w/spacer   MDI to Aerosol [] Albuterol Sulfate MDI Albuterol Sulfate 0.083% unit dose by aerosol    [] Levalbuterol MDI 2 puffs by inhalation Levalbuterol 1.25 mg unit dose by aerosol    [] Ipratropium Bromide MDI by inhalation Ipratropium Bromide 0.02% unit dose by aerosol    [] Combivent (Ipratropium + Albuterol) MDI by inhalation Duoneb (Ipratropium + Albuterol) unit dose by aerosol   Treatment Assessment [Frequency/Schedule]:  Change frequency to: ______________no changes____________________________________per Protocol, P&T, MEC      Points 0 1 2 3 4   Pulmonary Status  Non-Smoker  []   Smoking history   < 20 pack years  []   Smoking history  ?  20 pack years  []   Pulmonary Disorder  (acute or chronic)  [x]   Severe or Chronic w/ Exacerbation  []     Surgical Status No []   Surgeries     General [x]   Surgery Lower []   Abdominal Thoracic or []   Upper Abdominal Thoracic with  PulmonaryDisorder  []     Chest X-ray Clear/Not  Ordered     [x]  Chronic Changes  Results Pending  []  Infiltrates, atelectasis, pleural effusion, or edema  []  Infiltrates in more than one lobe []  Infiltrate + Atelectasis, &/or pleural effusion  []    Respiratory Pattern Regular,  RR = 12-20 [x]  Increased,  RR = 21-25 []  DAVIS, irregular,  or RR = 26-30 []  Decreased FEV1  or RR = 31-35 []  Severe SOB, use  of accessory muscles, or RR ? 35  []    Mental Status Alert, oriented,  Cooperative [x]  Confused but Follows commands []  Lethargic or unable to follow commands []  Obtunded  []  Comatose  []    Breath Sounds Clear to  auscultation  [x]  Decreased unilaterally or  in bases only []  Decreased  bilaterally  []  Crackles or intermittent wheezes []  Wheezes []    Cough Strong, Spontan., & nonproductive [x]  Strong,  spontaneous, &  productive []  Weak,  Nonproductive []  Weak, productive or  with wheezes []  No spontaneous  cough or may require suctioning []    Level of Activity Ambulatory [x]  Ambulatory w/ Assist  []  Non-ambulatory []  Paraplegic []  Quadriplegic []    Total    Score:___4____     Triage Score:____5____      Tri       Triage:     1. (>20) Freq: Q3    2. (16-20) Freq: Q4   3. (11-15) Freq: QID & Albuterol Q2 PRN    4. (6-10) Freq: TID & Albuterol Q2 PRN    5. (0-5) Freq Q4prn

## 2020-10-26 NOTE — FLOWSHEET NOTE
Spoke with pharmacy who states that patient is followed by coumadin clinic and his dose is alternating 5mg Sun, Tues, Thurs, Sat with 10mg Mon, Wed, Fri. They will order accordingly.  Electronically signed by Joi Adam RN on 10/26/2020 at 6:40 PM

## 2020-10-26 NOTE — FLOWSHEET NOTE
Attempted to clarify coumadin dosing schedule for patient. Released orders has 2 separate orders of coumadin to be given tonight - one for 5mg and one for 7.5mg. I spoke with Dr. Fior Hoffman who said pt to continue his home alternating dose of coumadin and for me to check his notes. Unable to find the daily schedule in notes, or in the navigators section of the med list. Pt also unable to tell me what he is taking at home. Pt states, some days I take 2  Pills and some days I take 1.  Electronically signed by Chacha Kaye RN on 10/26/2020 at 6:27 PM

## 2020-10-26 NOTE — BRIEF OP NOTE
Brief Postoperative Note      Patient: Refugio Cruz.   YOB: 1950  MRN: [id number]    Date of Procedure:  10/26/20    Successful RFA of AV sohan area to prevent atrial fibrillation with RVR    Electronically signed by Temo De Luna MD on 10/26/2020 at 4:17 PM

## 2020-10-27 VITALS
WEIGHT: 200 LBS | BODY MASS INDEX: 28 KG/M2 | HEART RATE: 90 BPM | RESPIRATION RATE: 18 BRPM | DIASTOLIC BLOOD PRESSURE: 65 MMHG | TEMPERATURE: 97.9 F | SYSTOLIC BLOOD PRESSURE: 102 MMHG | OXYGEN SATURATION: 99 % | HEIGHT: 71 IN

## 2020-10-27 LAB
GLUCOSE BLD-MCNC: 130 MG/DL (ref 60–115)
GLUCOSE BLD-MCNC: 155 MG/DL (ref 60–115)
GLUCOSE BLD-MCNC: 159 MG/DL (ref 60–115)
PERFORMED ON: ABNORMAL

## 2020-10-27 PROCEDURE — 2580000003 HC RX 258: Performed by: INTERNAL MEDICINE

## 2020-10-27 PROCEDURE — G0378 HOSPITAL OBSERVATION PER HR: HCPCS

## 2020-10-27 PROCEDURE — 6370000000 HC RX 637 (ALT 250 FOR IP): Performed by: INTERNAL MEDICINE

## 2020-10-27 RX ADMIN — POTASSIUM CHLORIDE 20 MEQ: 20 TABLET, EXTENDED RELEASE ORAL at 08:45

## 2020-10-27 RX ADMIN — METOPROLOL SUCCINATE 50 MG: 50 TABLET, EXTENDED RELEASE ORAL at 11:40

## 2020-10-27 RX ADMIN — SACUBITRIL AND VALSARTAN 0.5 TABLET: 24; 26 TABLET, FILM COATED ORAL at 08:44

## 2020-10-27 RX ADMIN — BUMETANIDE 1 MG: 1 TABLET ORAL at 08:44

## 2020-10-27 RX ADMIN — SODIUM CHLORIDE 50 ML/HR: 4.5 INJECTION, SOLUTION INTRAVENOUS at 11:41

## 2020-10-27 RX ADMIN — SPIRONOLACTONE 25 MG: 25 TABLET ORAL at 08:45

## 2020-10-27 RX ADMIN — PANTOPRAZOLE SODIUM 40 MG: 40 TABLET, DELAYED RELEASE ORAL at 08:46

## 2020-10-27 RX ADMIN — DIGOXIN 125 MCG: 125 TABLET ORAL at 08:44

## 2020-10-27 RX ADMIN — Medication 400 MG: at 08:43

## 2020-10-27 ASSESSMENT — PAIN SCALES - GENERAL
PAINLEVEL_OUTOF10: 0
PAINLEVEL_OUTOF10: 1
PAINLEVEL_OUTOF10: 0

## 2020-10-27 NOTE — DISCHARGE SUMMARY
Patient is doing well    Patient was admitted for atrial fibrillation with episodes of right ventricular response. He underwent AV sohan ablation on October 26, 6526 with no complications. Overnight, he has been doing well. He denies any symptoms of chest pain or shortness of breath or palpitations. Telemetry monitor shows ventricular paced rhythm. Patient will be discharged home on current medical therapy and follow-up with electrophysiology service at [facility] in 4 to 6 weeks. Device clinic will readjust lower rate of the device to DDD 80 bpm in 30 days and also to DDDR  bpm in 30 days later. Discharge more than 30 minutes.       Harpreet Perez MD

## 2020-10-27 NOTE — FLOWSHEET NOTE
Pt is alert and oriented. Accepted AM meds without problem. Tele VPM with underlying SR. Pt is on room air. VSS. Ate good for breakfast. No c/o this morning. IVF infusing without problem. Call light in reach.

## 2020-10-27 NOTE — FLOWSHEET NOTE
Pt given discharge instructions. Demonstrated good understanding. No questions or concerns noted at this time. Pt is being wheeled out by PCA.

## 2020-10-27 NOTE — PROCEDURES
[address]                      [address], [address] [facility]                                 PROCEDURE NOTE    PATIENT NAME: Thao Godwin                    :        1950  MED REC NO:   [id number]                            ROOM:       C716  ACCOUNT NO:   [de-identified]                           ADMIT DATE: 10/26/2020  PROVIDER:     Ibrahima Dumont MD    DATE OF PROCEDURE:  10/26/2020    LOCATION:  This procedure was performed in Electrophysiology Laboratory  on 10/26/2020. PROCEDURES PERFORMED:  1. Fluoroscopy. 2.  Bundle of His recording. 3.  Ablation of AV sohan area to prevent atrial fibrillation and rapid  ventricular response. 4.  Pre and post biventricular ICD interrogation and reprogramming. 5.  3D mapping. 6.  Device testing. PATIENT HISTORY:  Please refer to the history and physical in the  patient's medical chart. History of congestive heart failure, atrial  fibrillation, difficult to control rate with episodes of atrial  fibrillation and rapid  ventricular response that ended with ICD  therapies, ventricular tachycardia. The patient is scheduled for  electrophysiology study and ablation of the AV sohan area to prevent  atrial fibrillation and left ventricular response. PROCEDURE NARRATIVE:  The device was interrogated and reprogrammed with  all therapies turned off. The procedure, risks, benefits, and possible  complications were explained to the patient and informed consent was  obtained. The patient was in a fasting state. A grounding pad was  placed. Self-adhesive anterior and posterior defibrillation pads were  applied. A defibrillator waveform was set to biphasic. The patient was  set up for a continuous monitoring of 12-lead EKG and pulse oximetry. Blood pressure was monitored. The procedure was performed under IV  conscious sedation. The bilateral groins were prepped and draped in the  usual sterile fashion.   Using Seldinger technique the right femoral vein  was accessed. A wire was advanced to the heart under fluoroscopy  guidance, the sheath was placed over the wire and the dilator and the  wires were removed. An ablation catheter was introduced to the heart  under fluoroscopy guidance and it was placed in the AV sohan area. Extensive mapping with intracardiac electrodes and 3D mapping were  performed of that area. Successful radiofrequency ablation was  delivered in that area to produce complete heart block. The device was  interrogated and reprogrammed after the procedure. Thirty minutes of  waiting period was performed with evidence of complete heart block. The  catheter was removed under fluoroscopy guidance. The device was  reinterrogated. The sheath was removed from the right groin and manual  pressure was performed with no evidence of bleeding or hematoma in that  area. The patient left the EP laboratory in a stable condition. COMPLICATIONS:  The patient tolerated the procedure without any  complications or incidents. No complications occurred. HVH interval 52  msec. SUMMARY:  1. Successful ablation of the His bundle area to prevent atrial  fibrillation and rapid ventricular response. 2.  Status post biventricular ICD system with appropriate sensing,  capturing impedances of all leads. FOLLOWUP:  1. The patient will remain in hospital overnight for telemetry  monitoring and observation with anticipated discharge the next day of  the procedure. 2.  Follow with the [address] as scheduled for reprogramming the  device. The patient will have a reprogram of the device in 30 days,  decrease in the rate from DDD 90 beats per minute to DDD 80 beats per  minute and 30 days later to DDDR  beats per minute. 3.  The patient was instructed for bleeding, swelling, or signs of  infection. 4.  Continue with warfarin therapy.         Rachana Arias MD    D: 10/26/2020 16:24:52       T: 10/26/2020

## 2020-10-28 ENCOUNTER — CARE COORDINATION (OUTPATIENT)
Dept: CASE MANAGEMENT | Age: 70
End: 2020-10-28

## 2020-10-28 NOTE — CARE COORDINATION
[facility] Transitions Initial Follow Up Call    Call within 2 business days of discharge: Yes    Patient: Roya Ken. Patient : 1950   MRN: [id number]  Reason for Admission: 10/26-10/27/2020 [address] w/ RVR, s/p Ablation. Discharge Date: 10/27/20 RARS: Readmission Risk Score: 28  NR  No CV-19 lab performed this admit. Neg on 10/22/2020. PCP appt 11/3 1:00. CV-19 Transitions    Care Transitions attempted initial CV-19 screening outreach and number rings no answer. Will try again.

## 2020-10-29 ENCOUNTER — CARE COORDINATION (OUTPATIENT)
Dept: CASE MANAGEMENT | Age: 70
End: 2020-10-29

## 2020-10-29 PROCEDURE — 1111F DSCHRG MED/CURRENT MED MERGE: CPT | Performed by: FAMILY MEDICINE

## 2020-10-29 NOTE — CARE COORDINATION
[facility] Transitions Initial Follow Up Call     Call within 2 business days of discharge: Yes     Patient: Kyra Lawson. Patient : 1950   MRN: [id number]         Reason for Admission: 10/26-10/27/2020 [address] w/ RVR, s/p Ablation. Discharge Date: 10/27/20     RARS: Readmission Risk Score: 28  NR  No CV-19 lab performed this admit. Neg on 10/22/2020. PCP appt 11/3 1:00. Med rec/1111F order completed. CV-19 Transitions       Challenges to be reviewed by the provider   Additional needs identified to be addressed with provider No  none    Discussed COVID-19 related testing which was not done at this time. Test results were not done. Patient informed of results, if available? NA         Method of communication with provider : none    Advance Care Planning:   Does patient have an Advance Directive:  not on file. Was this a readmission? No  Patient stated reason for admission: A-fib  Patients top risk factors for readmission: medical condition    Care Transition Nurse (CTN) contacted the patient by telephone to perform post hospital discharge assessment. Verified name and  with patient as identifiers. Provided introduction to self, and explanation of the CTN role. CTN reviewed discharge instructions, medical action plan and red flags with family who verbalized understanding. Family given an opportunity to ask questions and does not have any further questions or concerns at this time. Were discharge instructions available to patient? Yes. Reviewed appropriate site of care based on symptoms and resources available to patient including: When to call 911 and Reviewed symptoms of CV-19 and Influenza to report. . The family agrees to contact the PCP office for questions related to their healthcare. Medication reconciliation was performed with family, who verbalizes understanding of administration of home medications.  Advised obtaining a 90-day supply of all daily and as-needed medications. Covid Risk Education    Patient has following risk factors of: heart failure, diabetes and chronic kidney disease. Education provided regarding infection prevention, and signs and symptoms of COVID-19 and when to seek medical attention with family who verbalized understanding. Discussed exposure protocols and quarantine From CDC: Are you at higher risk for severe illness?   and given an opportunity for questions and concerns. The family agrees to contact the COVID-19 hotline [phone] or PCP office for questions related to COVID-19. For more information on steps you can take to protect yourself, see CDC's How to Protect Yourself     Patient/family/caregiver given information for [employer] and agrees to enroll no  Patient's preferred e-mail: declines  Patient's preferred phone number: declines    Discussed follow-up appointments. If no appointment was previously scheduled, appointment scheduling offered: Reviewed. Family states he will schedule [facility] appt. Is follow up appointment scheduled within 7 days of discharge? Yes  Non-[facility] follow up appointment(s):     Plan for follow-up call in 7-10 days based on severity of symptoms and risk factors. Plan for next call: symptom management-Spoke w/ pt and family. Reports no cardiac concerns at this time. Denies any chest pain/pressure, palpitations, or dizziness. Denies any fever, chills, orf flu-like symptoms. Reports occasional cough. no new/worsened edema. CTN provided contact information for future needs.

## 2020-11-07 ENCOUNTER — CARE COORDINATION (OUTPATIENT)
Dept: CASE MANAGEMENT | Age: 70
End: 2020-11-07

## 2020-11-07 NOTE — CARE COORDINATION
Attempted to reach pt for follow up call. Unable to leave message.      Man Milton RN  Care Transition Coordinator  [phone]

## 2020-11-09 ENCOUNTER — HOSPITAL ENCOUNTER (OUTPATIENT)
Dept: PHARMACY | Age: 70
Setting detail: THERAPIES SERIES
Discharge: HOME | End: 2020-11-09
Payer: MEDICARE

## 2020-11-09 VITALS — TEMPERATURE: 97.5 F

## 2020-11-09 LAB — INTERNATIONAL NORMALIZATION RATIO, POC: 4.1

## 2020-11-09 PROCEDURE — 85610 PROTHROMBIN TIME: CPT

## 2020-11-09 PROCEDURE — 99211 OFF/OP EST MAY X REQ PHY/QHP: CPT

## 2020-11-09 NOTE — PROGRESS NOTES
Mr. Simone Milligan is a 78 y.o. y/o male with history of Afib who presents today for anticoagulation monitoring and adjustment.   INR 4.1 is supratherapeutic for this patient (goal range 2-3) and is reflective of 50 mg TWD  Patient verifies current dosing regimen, patient able to verbally recall dose  Patient reports no  missed doses since last INR   Patient denies s/sx clotting and/or stroke  Patient denies hematuria, epistaxis, rectal bleeding  Patient denies changes in diet, alcohol, or tobacco use  Reviewed medication list and drug allergies with patient, updated any medication additions or modifications accordingly    Patient started amiodaronebefore ablation on 10/13    Patient also denies any pending medical or dental procedures scheduled at this time  Patient was instructed to hold today then decrease TWD to 40 mg and RTC 2 weeks    CLINICAL PHARMACY CONSULT: MED [facility]ONCILI[facility] Yes  Total # of Interventions Recommended: 2  - Decreased Dose #: 1  - Maintenance Safety Lab Monitoring #: 1  Total Interventions Accepted: 2  Time Spent (min): 30    Dinorah Hansen, PharmD, [facility]  Staff Pharmacist  11/9/2020 2:39 PM

## 2020-11-10 ENCOUNTER — TELEMEDICINE (OUTPATIENT)
Dept: FAMILY MEDICINE CLINIC | Age: 70
End: 2020-11-10
Payer: MEDICARE

## 2020-11-10 PROCEDURE — 99495 TRANSJ CARE MGMT MOD F2F 14D: CPT | Performed by: INTERNAL MEDICINE

## 2020-11-10 PROCEDURE — 1111F DSCHRG MED/CURRENT MED MERGE: CPT | Performed by: INTERNAL MEDICINE

## 2020-11-10 NOTE — PROGRESS NOTES
Post-Discharge Transitional Care Management Services or Hospital Follow Up      Yefri Barriga YOB: 1950    Date of Office Visit:  11/10/2020  Date of Hospital Admission: 10/26/20  Date of Hospital Discharge: 10/27/20  Risk of hospital readmission (high >=14%.  Medium >=10%) :Readmission Risk Score: 28      Care management risk score Rising risk (score 2-5) and Complex Care (Scores >=6): 8     Non face to face  following discharge, date last encounter closed (first attempt may have been earlier): 10/28/2020  9:59 AM    Call initiated 2 business days of discharge: Yes    Patient Active Problem List   Diagnosis    Uncontrolled type 2 diabetes mellitus with complication, with long-term current use of insulin ([address]    Noncompliance    Pain in joint, multiple sites    COPD (chronic obstructive pulmonary disease) ([address]    Diabetes mellitus with insulin therapy ([address]    Hyperlipidemia    Hypertension    Generalized atherosclerosis    TIA (transient ischemic attack)    Elevation of level of transaminase or lactic acid dehydrogenase (LDH)    Tobacco dependence syndrome    Disorder of muscle    Mitral valve insufficiency    Acute lymphadenitis    Disorder of pancreas    Left heart failure (HCC)    Irritable bowel syndrome    Hyponatremia    Atherosclerosis of coronary artery    Generalized ischemic myocardial dysfunction    Coronary arteriosclerosis in native artery    Atrial flutter (HCC)    Acute on chronic systolic CHF (congestive heart failure), NYHA class 4 (HCC)    Paroxysmal A-fib (HCC)    Chronic combined systolic and diastolic heart failure (HCC)    Hemoptysis    SOB (shortness of breath)    CHF (congestive heart failure), NYHA class III, chronic, combined ([address]    CHF (congestive heart failure), NYHA class I, acute on chronic, combined (HCC)    Acute systolic CHF (congestive heart failure) (HCC)    CHF (congestive heart failure), NYHA class IV, acute on chronic, combined ([facility]    Phimosis/redundant prepuce    Moderate malnutrition ([facility]    Amiodarone-induced hyperthyroidism    Hyperthyroidism    Uncontrolled type 2 diabetes mellitus with hyperglycemia (HCC)    BERTRAM (acute kidney injury) ([facility]    Anticoagulant long-term use    Blurred vision, bilateral    Cardiomyopathy of end-stage congenital heart disease ([facility]    Chest pain    Cough in adult    Dizziness    Fatigue    Fever    Former smoker    Gout, arthritis    High risk medication use    Hyperkalemia    Hypokalemia    Hypotension (arterial)    ICD (implantable cardioverter-defibrillator) discharge    Obese    Overweight    Paroxysmal ventricular tachycardia (HCC)    Presence of automatic implantable cardioverter-defibrillator    Status post angioplasty    Swelling of multiple joints    Hypoxia    Ventricular tachycardia (HCC)    Sustained VT (ventricular tachycardia) (HCC)    Acute decompensated heart failure (HCC)    CKD (chronic kidney disease) stage 3, GFR 30-59 ml/min    Elevated bilirubin    Atrial fibrillation (HCC)       Allergies   Allergen Reactions    Dye [Iodides] Shortness Of Breath    Penicillins Anaphylaxis    Plavix [Clopidogrel]     Tapazole [Methimazole]      Itching        Medications listed as ordered at the time of discharge from [facility].    Home Medication Instructions BAILEE:    Printed on:11/10/20 6987   Medication Information                      albuterol (PROAIR HFA) 108 (90 BASE) MCG/ACT inhaler  Inhale 2 puffs into the lungs every 4 hours as needed for Wheezing             albuterol (PROVENTIL) (2.5 MG/3ML) 0.083% nebulizer solution  Take 3 mLs by nebulization every 6 hours as needed for Wheezing             allopurinol (ZYLOPRIM) 100 MG tablet  TAKE 1 TABLET DAILY             atorvastatin (LIPITOR) 80 MG tablet  Take 80 mg by mouth daily              budesonide-formoterol (SYMBICORT) 160-4.5 MCG/ACT AERO  Inhale 2 puffs into the lungs 2 times daily             bumetanide (BUMEX) 1 MG tablet  Take 1 mg by mouth 2 times daily              DIGITEK 125 MCG tablet  TAKE 1 TABLET DAILY             donepezil (ARICEPT) 10 MG tablet  TAKE 1 TABLET BY MOUTH EVERY DAY AT NIGHT             LANTUS SOLOSTAR 100 UNIT/ML injection pen  INJECT 10 UNITS INTO THE SKIN NIGHTLY             magnesium oxide (MAG-OX) 400 (240 Mg) MG tablet  Take 1 tablet by mouth daily             metoprolol succinate (TOPROL XL) 25 MG extended release tablet  Take 2 tablets by mouth daily             nitroGLYCERIN (NITROSTAT) 0.4 MG SL tablet  Place 1 tablet under the tongue every 5 minutes as needed for Chest pain             pantoprazole (PROTONIX) 40 MG tablet  Take 1 tablet by mouth every morning (before breakfast)             potassium chloride (KLOR-CON M) 20 MEQ extended release tablet  Take 1 tablet by mouth daily             sacubitril-valsartan (ENTRESTO) 24-26 MG per tablet  Take 0.5 tablets by mouth 2 times daily             spironolactone (ALDACTONE) 25 MG tablet  Take 1 tablet by mouth daily             warfarin (COUMADIN) 5 MG tablet  TAKE AS DIRECTED BY [facility]. QUANTITY EQUALS 90 DAY SUPPLY             warfarin (COUMADIN) 7.5 MG tablet  7.5mg daily                   Medications marked \"taking\" at this time  No outpatient medications have been marked as taking for the 11/10/20 encounter (Telemedicine) with Prince Lara MD.        Medications patient taking as of now reconciled against medications ordered at time of hospital discharge: Yes    No chief complaint on file. History of Present illness - Follow up of Hospital diagnosis(es): Atrial fibrillation    Inpatient course: Discharge summary reviewed- see chart. 57-year-old male recently admitted to [facility] for atrial fibrillation. The patient was assessed by cardiology pacemaker was interrogated. Patient's heart rate normalized.   Medications were adjusted and the patient was discharged home in stable condition. Interval history/Current status: Since being discharged the patient is doing well. He voices no complaints at this time. A comprehensive review of systems was negative except for what was noted in the HPI. There were no vitals filed for this visit. There is no height or weight on file to calculate BMI. Wt Readings from Last 3 Encounters:   10/26/20 200 lb (90.7 kg)   09/12/20 189 lb (85.7 kg)   07/27/20 199 lb (90.3 kg)     BP Readings from Last 3 Encounters:   10/27/20 102/65   10/23/20 112/70   10/09/20 103/72        Physical Exam:virtual visit      Assessment/Plan:  1. Paroxysmal A-fib (HCC)    - [place] DISCHARGE MEDS RECONCILED W/ CURRENT OUTPATIENT MED LIST        Medical Decision Making: moderate complexity        TELEHEALTH EVALUATION -- Audio/Visual (During LGART-21 public health emergency)    -   Renate Meyer. is a 78 y.o. male being evaluated by a Virtual Visit (video visit) encounter to address concerns as mentioned above. A caregiver was present when appropriate. Due to this being a TeleHealth encounter (During YITGX-26 public health emergency), evaluation of the following organ systems was limited: Vitals/Constitutional/EENT/Resp/CV/GI//MS/Neuro/Skin/Heme-Lymph-Imm. Pursuant to the emergency declaration under the [address] [place] [address] and the [employer] and [employer] Act, this Virtual Visit was conducted with patient's (and/or legal guardian's) consent, to reduce the patient's risk of exposure to COVID-19 and provide necessary medical care. The patient (and/or legal guardian) has also been advised to contact this office for worsening conditions or problems, and seek emergency medical treatment and/or call 911 if deemed necessary.      Services were provided through a video synchronous discussion virtually to substitute for in-person clinic visit. Type of encounter was _x_ Doxy __ MyChart ___Facetime    Patient was located at their home. Provider was located at their ___ home or        _x___ office. --Gume Reed MD on 11/10/2020 at 1:39 PM    An electronic signature was used to authenticate this note.

## 2020-11-23 ENCOUNTER — HOSPITAL ENCOUNTER (OUTPATIENT)
Dept: PHARMACY | Age: 70
Setting detail: THERAPIES SERIES
Discharge: HOME | End: 2020-11-23
Payer: MEDICARE

## 2020-11-23 VITALS — TEMPERATURE: 97.2 F

## 2020-11-23 LAB — INTERNATIONAL NORMALIZATION RATIO, POC: 1.6

## 2020-11-23 PROCEDURE — 99211 OFF/OP EST MAY X REQ PHY/QHP: CPT

## 2020-11-23 PROCEDURE — 85610 PROTHROMBIN TIME: CPT

## 2020-11-23 NOTE — PROGRESS NOTES
Mr. Shabbir Hou is a 78 y.o. y/o male with history of Afib who presents today for anticoagulation monitoring and adjustment. INR 1.6 is sub therapeutic for this patient (goal range 2-3) and is reflective of 40 mg TWD  Patient verifies current dosing regimen, patient able to verbally recall dose  Patient reports 0  missed doses since last INR   Patient denies s/sx clotting and/or stroke  Patient denies hematuria, epistaxis, rectal bleeding  Patient denies changes in diet, alcohol, or tobacco use  Reviewed medication list and drug allergies with patient, updated any medication additions or modifications accordingly  Patient also denies any pending medical or dental procedures scheduled at this time  Patient prefers not to split tablets.  Discussed possibly changing tablet size when refill needed  Patient was instructed to increase to 45 mg TWD (12.5%) and RTC 2 weeks    CLINICAL PHARMACY CONSULT: MED RECONCILIATION/REVIEW ADDENDUM    For Pharmacy Admin Tracking Only    PHSO: Yes  Total # of Interventions Recommended: 2  - Increased Dose #: 1  - Maintenance Safety Lab Monitoring #: 1    Total Interventions Accepted: 2  Time Spent (min): 15    Srini Nogueira, [facility]

## 2020-12-01 ENCOUNTER — CARE COORDINATION (OUTPATIENT)
Dept: CARE COORDINATION | Age: 70
End: 2020-12-01

## 2020-12-01 NOTE — CARE COORDINATION
Attempted to contact patient by phone for CC follow-up. Unable to reach patient by phone. Unable to leave message.

## 2020-12-03 ENCOUNTER — HOSPITAL ENCOUNTER (OUTPATIENT)
Dept: CARDIOLOGY | Age: 70
Discharge: HOME | End: 2020-12-03
Payer: MEDICARE

## 2020-12-03 PROCEDURE — 93290 INTERROG DEV EVAL ICPMS IP: CPT

## 2020-12-03 PROCEDURE — 93284 PRGRMG EVAL IMPLANTABLE DFB: CPT

## 2020-12-05 ENCOUNTER — CARE COORDINATION (OUTPATIENT)
Dept: CARE COORDINATION | Age: 70
End: 2020-12-05

## 2020-12-08 ENCOUNTER — TELEPHONE (OUTPATIENT)
Dept: PHARMACY | Age: 70
End: 2020-12-08

## 2020-12-13 ENCOUNTER — HOSPITAL ENCOUNTER (EMERGENCY)
Age: 70
Discharge: HOME | End: 2020-12-14
Payer: MEDICARE

## 2020-12-13 ENCOUNTER — APPOINTMENT (OUTPATIENT)
Dept: GENERAL RADIOLOGY | Age: 70
End: 2020-12-13
Payer: MEDICARE

## 2020-12-13 VITALS
BODY MASS INDEX: 28 KG/M2 | TEMPERATURE: 98 F | RESPIRATION RATE: 18 BRPM | WEIGHT: 200 LBS | DIASTOLIC BLOOD PRESSURE: 66 MMHG | SYSTOLIC BLOOD PRESSURE: 103 MMHG | HEIGHT: 71 IN | HEART RATE: 83 BPM | OXYGEN SATURATION: 94 %

## 2020-12-13 DIAGNOSIS — N18.30 STAGE 3 CHRONIC KIDNEY DISEASE, UNSPECIFIED WHETHER STAGE 3A OR 3B CKD (HCC): ICD-10-CM

## 2020-12-13 DIAGNOSIS — I50.9 ACUTE ON CHRONIC CONGESTIVE HEART FAILURE, UNSPECIFIED HEART FAILURE TYPE (HCC): ICD-10-CM

## 2020-12-13 DIAGNOSIS — R06.02 SHORTNESS OF BREATH: Primary | ICD-10-CM

## 2020-12-13 LAB
ALBUMIN SERPL-MCNC: 3.9 G/DL (ref 3.5–4.6)
ALP BLD-CCNC: 173 U/L (ref 35–104)
ALT SERPL-CCNC: 23 U/L (ref 0–41)
ANION GAP SERPL CALCULATED.3IONS-SCNC: 10 MEQ/L (ref 9–15)
APTT: 35.9 SEC (ref 24.4–36.8)
AST SERPL-CCNC: 26 U/L (ref 0–40)
BACTERIA: NEGATIVE /HPF
BASOPHILS ABSOLUTE: 0 K/UL (ref 0–0.2)
BASOPHILS RELATIVE PERCENT: 0.8 %
BILIRUB SERPL-MCNC: 1.8 MG/DL (ref 0.2–0.7)
BILIRUBIN URINE: NEGATIVE
BLOOD, URINE: ABNORMAL
BUN BLDV-MCNC: 18 MG/DL (ref 8–23)
CALCIUM SERPL-MCNC: 9 MG/DL (ref 8.5–9.9)
CHLORIDE BLD-SCNC: 98 MEQ/L (ref 95–107)
CLARITY: CLEAR
CO2: 26 MEQ/L (ref 20–31)
COLOR: ABNORMAL
CREAT SERPL-MCNC: 1.81 MG/DL (ref 0.7–1.2)
EKG ATRIAL RATE: 81 BPM
EKG P AXIS: 51 DEGREES
EKG P-R INTERVAL: 134 MS
EKG Q-T INTERVAL: 436 MS
EKG QRS DURATION: 174 MS
EKG QTC CALCULATION (BAZETT): 506 MS
EKG R AXIS: 268 DEGREES
EKG T AXIS: 64 DEGREES
EKG VENTRICULAR RATE: 81 BPM
EOSINOPHILS ABSOLUTE: 0 K/UL (ref 0–0.7)
EOSINOPHILS RELATIVE PERCENT: 0.2 %
EPITHELIAL CELLS, UA: ABNORMAL /HPF (ref 0–5)
GFR AFRICAN AMERICAN: 45.2
GFR NON-AFRICAN AMERICAN: 37.4
GLOBULIN: 3.5 G/DL (ref 2.3–3.5)
GLUCOSE BLD-MCNC: 140 MG/DL (ref 70–99)
GLUCOSE URINE: NEGATIVE MG/DL
HCT VFR BLD CALC: 36.9 % (ref 42–52)
HEMOGLOBIN: 12 G/DL (ref 14–18)
HYALINE CASTS: ABNORMAL /HPF (ref 0–5)
INR BLD: 2.1
KETONES, URINE: ABNORMAL MG/DL
LACTIC ACID: 1.1 MMOL/L (ref 0.5–2.2)
LEUKOCYTE ESTERASE, URINE: NEGATIVE
LYMPHOCYTES ABSOLUTE: 0.9 K/UL (ref 1–4.8)
LYMPHOCYTES RELATIVE PERCENT: 19.1 %
MAGNESIUM: 2.2 MG/DL (ref 1.7–2.4)
MCH RBC QN AUTO: 28 PG (ref 27–31.3)
MCHC RBC AUTO-ENTMCNC: 32.4 % (ref 33–37)
MCV RBC AUTO: 86.3 FL (ref 80–100)
MONOCYTES ABSOLUTE: 0.5 K/UL (ref 0.2–0.8)
MONOCYTES RELATIVE PERCENT: 10 %
NEUTROPHILS ABSOLUTE: 3.4 K/UL (ref 1.4–6.5)
NEUTROPHILS RELATIVE PERCENT: 69.9 %
NITRITE, URINE: NEGATIVE
PDW BLD-RTO: 16.9 % (ref 11.5–14.5)
PH UA: 6 (ref 5–9)
PLATELET # BLD: 201 K/UL (ref 130–400)
POTASSIUM SERPL-SCNC: 4.9 MEQ/L (ref 3.4–4.9)
PRO-BNP: 8311 PG/ML
PROTEIN UA: >=300 MG/DL
PROTHROMBIN TIME: 23.8 SEC (ref 12.3–14.9)
RBC # BLD: 4.28 M/UL (ref 4.7–6.1)
RBC UA: ABNORMAL /HPF (ref 0–5)
SARS-COV-2, NAAT: NOT DETECTED
SODIUM BLD-SCNC: 134 MEQ/L (ref 135–144)
SPECIFIC GRAVITY UA: 1.02 (ref 1–1.03)
TOTAL CK: 122 U/L (ref 0–190)
TOTAL PROTEIN: 7.4 G/DL (ref 6.3–8)
TROPONIN: 0.01 NG/ML (ref 0–0.01)
URINE REFLEX TO CULTURE: ABNORMAL
UROBILINOGEN, URINE: 1 E.U./DL
WBC # BLD: 4.8 K/UL (ref 4.8–10.8)
WBC UA: ABNORMAL /HPF (ref 0–5)

## 2020-12-13 PROCEDURE — 83735 ASSAY OF MAGNESIUM: CPT

## 2020-12-13 PROCEDURE — U0002 COVID-19 LAB TEST NON-CDC: HCPCS

## 2020-12-13 PROCEDURE — 36415 COLL VENOUS BLD VENIPUNCTURE: CPT

## 2020-12-13 PROCEDURE — 83605 ASSAY OF LACTIC ACID: CPT

## 2020-12-13 PROCEDURE — 85610 PROTHROMBIN TIME: CPT

## 2020-12-13 PROCEDURE — 87040 BLOOD CULTURE FOR BACTERIA: CPT

## 2020-12-13 PROCEDURE — 82550 ASSAY OF CK (CPK): CPT

## 2020-12-13 PROCEDURE — 81001 URINALYSIS AUTO W/SCOPE: CPT

## 2020-12-13 PROCEDURE — 84484 ASSAY OF TROPONIN QUANT: CPT

## 2020-12-13 PROCEDURE — 85025 COMPLETE CBC W/AUTO DIFF WBC: CPT

## 2020-12-13 PROCEDURE — 85730 THROMBOPLASTIN TIME PARTIAL: CPT

## 2020-12-13 PROCEDURE — 99283 EMERGENCY DEPT VISIT LOW MDM: CPT

## 2020-12-13 PROCEDURE — 80053 COMPREHEN METABOLIC PANEL: CPT

## 2020-12-13 PROCEDURE — 71045 X-RAY EXAM CHEST 1 VIEW: CPT

## 2020-12-13 PROCEDURE — 93005 ELECTROCARDIOGRAM TRACING: CPT | Performed by: STUDENT IN AN ORGANIZED HEALTH CARE EDUCATION/TRAINING PROGRAM

## 2020-12-13 PROCEDURE — 83880 ASSAY OF NATRIURETIC PEPTIDE: CPT

## 2020-12-13 RX ORDER — BUMETANIDE 0.25 MG/ML
1 INJECTION, SOLUTION INTRAMUSCULAR; INTRAVENOUS ONCE
Status: DISCONTINUED | OUTPATIENT
Start: 2020-12-13 | End: 2020-12-13

## 2020-12-14 PROCEDURE — 93010 ELECTROCARDIOGRAM REPORT: CPT | Performed by: INTERNAL MEDICINE

## 2020-12-14 ASSESSMENT — ENCOUNTER SYMPTOMS
ABDOMINAL PAIN: 0
NAUSEA: 0
COUGH: 0
VOMITING: 0
WHEEZING: 0
PHOTOPHOBIA: 0
SHORTNESS OF BREATH: 1

## 2020-12-14 NOTE — ED NOTES
Per Irma NP to ambulate patient and monitor pulse ox   Patients sat's stayed at 95 percent.      Satish Olivares RN  12/13/20 2035

## 2020-12-14 NOTE — ED PROVIDER NOTES
Diagnosis Date    Amiodarone-induced hyperthyroidism     Arthritis     Atrial flutter ([facility]     CAD (coronary artery disease)     Chronic combined systolic and diastolic CHF (congestive heart failure) (HCC)     CKD (chronic kidney disease) stage 3, GFR 30-59 ml/min     COPD [facility] obstructive [facility]) [facility]     Defibrillator activation     Diabetes mellitus with insulin therapy ([facility]     Dilated cardiomyopathy ([facility]     Generalized and unspecified atherosclerosis     Gout     Hyperlipidemia     Hypertension     Noncompliance     Obesity     On home oxygen therapy     Pain in joint, multiple sites     Paroxysmal A-fib (HCC)     Paroxysmal ventricular tachycardia (HCC)     Prolonged emergence from general anesthesia     Status post angioplasty     Sustained ventricular tachycardia (HCC)     TIA (transient ischemic attack)     Tobacco abuse     Type II or unspecified type diabetes mellitus without mention of complication, not stated as uncontrolled          SURGICAL HISTORY       Past Surgical History:   Procedure Laterality Date    CARDIAC CATHETERIZATION      COLONOSCOPY      CORONARY ANGIOPLASTY  4/29/11    Dr Jessi Muñiz CORONARY ARTERY BYPASS GRAFT  2012    ENDOSCOPY, COLON, DIAGNOSTIC      EYE SURGERY Bilateral     Cataracts     OTHER SURGICAL HISTORY      difibrillator     PA CIRCUMCISION,OTHER,28+ D/O N/A 8/29/2018    CIRCUMCISION performed by Santa Mcintyre MD at [facility] EGD TRANSORAL BIOPSY SINGLE/MULTIPLE N/A 11/5/2017    EGD BIOPSY performed by Ivy Hensley MD at [address] [facility]       Discharge Medication List as of 12/13/2020 11:58 PM      CONTINUE these medications which have NOT CHANGED    Details   LANTUS SOLOSTAR 100 UNIT/ML injection pen INJECT 10 UNITS INTO THE SKIN NIGHTLY, Disp-5 pen,R-0Normal      !! warfarin (COUMADIN) 5 MG tablet TAKE AS DIRECTED BY [facility].  QUANTITY EQUALS 90 DAY SUPPLY, Disp-120 tablet,R-1Normal      metoprolol succinate (TOPROL XL) 25 MG extended release tablet Take 2 tablets by mouth daily, Disp-30 tablet,R-5Normal      sacubitril-valsartan (ENTRESTO) 24-26 MG per tablet Take 0.5 tablets by mouth 2 times daily, Disp-60 tablet,R-5Normal      magnesium oxide (MAG-OX) 400 (240 Mg) MG tablet Take 1 tablet by mouth daily, Disp-30 tablet,R-5Normal      potassium chloride (KLOR-CON M) 20 MEQ extended release tablet Take 1 tablet by mouth daily, Disp-60 tablet,R-3Normal      donepezil (ARICEPT) 10 MG tablet TAKE 1 TABLET BY MOUTH EVERY DAY AT NIGHT, Disp-90 tablet, R-1Normal      allopurinol (ZYLOPRIM) 100 MG tablet TAKE 1 TABLET DAILY, Disp-90 tablet, R-3Normal      bumetanide (BUMEX) 1 MG tablet Take 1 mg by mouth 2 times daily , Disp-60 tablet,R-11Historical Med      nitroGLYCERIN (NITROSTAT) 0.4 MG SL tablet Place 1 tablet under the tongue every 5 minutes as needed for Chest pain, Disp-25 tablet, R-0Normal      spironolactone (ALDACTONE) 25 MG tablet Take 1 tablet by mouth daily, Disp-30 tablet, R-3Normal      atorvastatin (LIPITOR) 80 MG tablet Take 80 mg by mouth daily Historical Med      !! warfarin (COUMADIN) 7.5 MG tablet 7.5mg daily, Disp-30 tablet, R-3Normal      budesonide-formoterol (SYMBICORT) 160-4.5 MCG/ACT AERO Inhale 2 puffs into the lungs 2 times daily, Disp-3 Inhaler, R-3Normal      albuterol (PROVENTIL) (2.5 MG/3ML) 0.083% nebulizer solution Take 3 mLs by nebulization every 6 hours as needed for Wheezing, Disp-120 each, R-5Normal      pantoprazole (PROTONIX) 40 MG tablet Take 1 tablet by mouth every morning (before breakfast), Disp-30 tablet, R-3Print      DIGITEK 125 MCG tablet TAKE 1 TABLET DAILY, Disp-90 tablet, R-3      albuterol (PROAIR HFA) 108 (90 BASE) MCG/ACT inhaler Inhale 2 puffs into the lungs every 4 hours as needed for Wheezing, Disp-3 Inhaler, R-0 !! - Potential duplicate medications found. Please discuss with provider.           ALLERGIES     Dye [iodides], Penicillins, Plavix [clopidogrel], and Tapazole [methimazole]    FAMILY HISTORY       Family History   Problem Relation Age of Onset    Cancer Brother     Diabetes Mother     Heart Disease Father     Emphysema Father           SOCIAL HISTORY       Social History     Socioeconomic History    Marital status:      Spouse name: None    Number of children: 2    Years of education: 15    Highest education level: High school graduate   Occupational History    None   Social Needs    Financial resource strain: Somewhat hard    Food insecurity     Worry: Never true     Inability: Never true    Transportation needs     Medical: No     Non-medical: No   Tobacco Use    Smoking status: Former Smoker     Packs/day: 1.50     Years: 25.00     Pack years: 37.50     Quit date: 2012     Years since quittin.9    Smokeless tobacco: Never Used   Substance and Sexual Activity    Alcohol use: Not Currently    Drug use: No    Sexual activity: None   Lifestyle    Physical activity     Days per week: 0 days     Minutes per session: 0 min    Stress: Not at all   Relationships    Social connections     Talks on phone: More than three times a week     Gets together: More than three times a week     Attends [employer] service: More than 4 times per year     Active member of club or organization: Yes     Attends meetings of clubs or organizations: 1 to 4 times per year     Relationship status:     Intimate partner violence     Fear of current or ex partner: None     Emotionally abused: None     Physically abused: None     Forced sexual activity: None   Other Topics Concern    None   Social History Narrative    None       SCREENINGS                        PHYSICAL EXAM    (up to 7 for level 4, 8 or more for level 5)     ED Triage Vitals [20 2116]   BP Temp Temp Source Pulse Resp SpO2 Height Weight   103/66 98 °F (36.7 °C) Oral 83 18 94 % 5' 11\" (1.803 m) 200 lb (90.7 kg)       Physical Exam  Constitutional:       General: He is not in acute distress. Appearance: He is well-developed. He is not ill-appearing, toxic-appearing or diaphoretic. HENT:      Head: Normocephalic and atraumatic. Nose: Nose normal.      Mouth/Throat:      Mouth: Mucous membranes are moist.   Eyes:      Pupils: Pupils are equal, round, and reactive to light. Neck:      Musculoskeletal: Normal range of motion. Cardiovascular:      Rate and Rhythm: Normal rate and regular rhythm. Pulses: Normal pulses. Heart sounds: No murmur. No friction rub. No gallop. Pulmonary:      Effort: Pulmonary effort is normal. No accessory muscle usage, prolonged expiration or respiratory distress. Breath sounds: Decreased breath sounds (diffuse) present. No wheezing, rhonchi or rales. Abdominal:      General: There is no distension. Tenderness: There is no abdominal tenderness. Musculoskeletal:         General: No swelling. Right lower leg: Edema (2+ non pitting) present. Left lower leg: Edema (2+ non pitting) present. Skin:     General: Skin is warm and dry. Capillary Refill: Capillary refill takes less than 2 seconds. Findings: No rash. Neurological:      General: No focal deficit present. Mental Status: He is alert and oriented to person, place, and time. DIAGNOSTIC RESULTS     EKG: All EKG's are interpreted by the Emergency Department Physician who either signs or Co-signs this chart in the absence of a cardiologist.    EKG shows ventricular paced rhythm NSR with HR 81, normal axis, normal intervals, no ST changes. No changes compared to 9/11/20.       RADIOLOGY:   Non-plain film images such as CT, Ultrasound and MRI are read by the radiologist. Plain radiographic images are visualized and preliminarily interpreted by the emergency physician with the below findings:    CXR shows cardiomegaly, stable from prior.      Interpretation per the Radiologist below, if available at the time of this note:    XR CHEST PORTABLE    (Results Pending)         ED BEDSIDE ULTRASOUND:   Performed by ED Physician - none    LABS:  Labs Reviewed   PROTIME-INR - Abnormal; Notable for the following components:       Result Value    Protime 23.8 (*)     All other components within normal limits   CBC WITH AUTO DIFFERENTIAL - Abnormal; Notable for the following components:    RBC 4.28 (*)     Hemoglobin 12.0 (*)     Hematocrit 36.9 (*)     MCHC 32.4 (*)     RDW 16.9 (*)     Lymphocytes Absolute 0.9 (*)     All other components within normal limits   COMPREHENSIVE METABOLIC PANEL - Abnormal; Notable for the following components:    Sodium 134 (*)     Glucose 140 (*)     CREATININE 1.81 (*)     GFR Non- 37.4 (*)     GFR  45.2 (*)     Total Bilirubin 1.8 (*)     Alkaline Phosphatase 173 (*)     All other components within normal limits   TROPONIN - Abnormal; Notable for the following components:    Troponin 0.013 (*)     All other components within normal limits    Narrative:     Ty Croft tel. [phone],  Chemistry results called to and read back by RN [facility], 12/13/2020 22:08, by  Arya Gonzalez RT REFLEX TO CULTURE - Abnormal; Notable for the following components:    Color, UA DARK YELLOW (*)     Ketones, Urine TRACE (*)     Blood, Urine TRACE (*)     Protein, UA >=300 (*)     All other components within normal limits   MICROSCOPIC URINALYSIS - Abnormal; Notable for the following components:    RBC, UA 6-10 (*)     All other components within normal limits   CULTURE, BLOOD 2   CULTURE, BLOOD 1   APTT   BRAIN NATRIURETIC PEPTIDE    Narrative:     Ty Croft tel. [phone],  Chemistry results called to and read back by [facility] 12/13/2020 22:08, by  Michael Dee   CK   MAGNESIUM   LACTIC ACID, PLASMA   COVID-19       All other labs were within normal range or not returned as of this dictation. EMERGENCY DEPARTMENT COURSE and DIFFERENTIAL DIAGNOSIS/MDM:   Vitals:    Vitals:    12/13/20 2116   BP: 103/66   Pulse: 83   Resp: 18   Temp: 98 °F (36.7 °C)   TempSrc: Oral   SpO2: 94%   Weight: 200 lb (90.7 kg)   Height: 5' 11\" (1.803 m)       MDM     Patient is a 30-year-old male who presents to the ED with shortness of breath. He is afebrile and hemodynamically stable. Patient has decreased breath sounds bilaterally however no evidence of bleeding. No respiratory distress. He is 94% at rest on room air. EKG shows ventricular paced rhythm NSR with HR 81, normal axis, normal intervals, no ST changes. No changes compared to 9/11/20. BNP elevated to 8300, slightly increased from baseline. CBC remarkable for anemia with hemoglobin of 12. CMP remarkable for creatinine of 1.8 and GFR 37, stable. tbili 1.8, alk phos 173, stable from prior. Trop 0.013 which is decreased from baseline. Remainder of labs unremarkable. UA negative for UTI. Blood cultures pending. CXR shows cardiomegaly. No evidence of infiltrates. Pt is covid negative. Suspect pt with CHF exacerbation. He is not hypoxic at rest. He was ambulated in the ED and remained 94-95% on room air. Pt care giver was contacted and recommended to increase Bumex from 1 pill in the morning to 2 pills for the next 3 days. Spoke with ED attending Dr. Andrew Mak who agrees to this plan and disposition. Pt is to return to the ED for worsening sx. Follow up with Dr. Best Sheldon tomorrow morning. Pt given warning signs for which he should return. Pt and pt caregiver understand and agree to plan, all questions answered. REASSESSMENT          CRITICAL CARE TIME   Total Critical Care time was 0 minutes, excluding separately reportable procedures. There was a high probability of clinically significant/life threatening deterioration in the patient's condition which required my urgent intervention.

## 2020-12-14 NOTE — ED TRIAGE NOTES
Patient states that he became SOB when he had his pacemaker adjusted from 5.7 to 5. 5. he denies pain. 93% with no o2. He is now on a NC and 95%. He is alert and orientated x 4. Pink, warm and dry. Abc's are intact. This started a week ago. Patient ambulated to bed. Will continue to monitor.

## 2020-12-14 NOTE — ED NOTES
Irma DE SANTIAGO at bedside   Xray tech at [address], [address] [place] [address]  12/13/20 2720

## 2020-12-15 ENCOUNTER — CARE COORDINATION (OUTPATIENT)
Dept: CARE COORDINATION | Age: 70
End: 2020-12-15

## 2020-12-15 RX ORDER — BENZONATATE 200 MG/1
200 CAPSULE ORAL 3 TIMES DAILY PRN
Qty: 30 CAPSULE | Refills: 1 | Status: SHIPPED | OUTPATIENT
Start: 2020-12-15 | End: 2020-12-22

## 2020-12-16 ENCOUNTER — TELEPHONE (OUTPATIENT)
Dept: PHARMACY | Age: 70
End: 2020-12-16

## 2020-12-17 ENCOUNTER — CARE COORDINATION (OUTPATIENT)
Dept: CARE COORDINATION | Age: 70
End: 2020-12-17

## 2020-12-18 ENCOUNTER — HOSPITAL ENCOUNTER (OUTPATIENT)
Dept: PHARMACY | Age: 70
Setting detail: THERAPIES SERIES
Discharge: HOME | End: 2020-12-18
Payer: MEDICARE

## 2020-12-18 VITALS — TEMPERATURE: 96.7 F

## 2020-12-18 LAB — INTERNATIONAL NORMALIZATION RATIO, POC: 2.7

## 2020-12-18 PROCEDURE — 85610 PROTHROMBIN TIME: CPT

## 2020-12-18 PROCEDURE — 99211 OFF/OP EST MAY X REQ PHY/QHP: CPT

## 2020-12-18 NOTE — PROGRESS NOTES
Mr. Kyra Quinones is a 78 y.o. y/o male with history of Afib who presents today for anticoagulation monitoring and adjustment.   INR 2.7 is therapeutic for this patient (goal range 2-3) and is reflective of 45 mg TWD  Patient verifies current dosing regimen, patient able to verbally recall dose  Patient reports 0  missed doses since last INR   Patient denies s/sx clotting and/or stroke  Patient denies hematuria, epistaxis, rectal bleeding  Patient denies changes in diet, alcohol, or tobacco use  Reviewed medication list and drug allergies with patient, updated any medication additions or modifications accordingly  Patient was in ED for SOB, negative COVID, rx for Tessalon  Patient also denies any pending medical or dental procedures scheduled at this time  Patient was instructed to continue 45 mg TWD and RTC 2-3 weeks    CLINICAL PHARMACY CONSULT: MED RECONCILIATION/REVIEW [address]: Yes  Total # of Interventions Recommended: 1    - Maintenance Safety Lab Monitoring #: 1    Total Interventions Accepted: 1  Time Spent (min): [address], [facility]

## 2020-12-19 LAB
BLOOD CULTURE, ROUTINE: NORMAL
CULTURE, BLOOD 2: NORMAL

## 2020-12-28 NOTE — CARE COORDINATION
Ambulatory Care Coordination Note  7/9/2018  CM Risk Score: 7  Tasia Mortality Risk Score: 7.09    ACC: Viki Jackson, RN    Summary Note: Called pt to follow up on progress, discuss any new or ongoing concerns, and to reinforce/ provide pt education. LVMM requesting a call back to discuss. Care Coordination Interventions    Program Enrollment:  Complex Care  Referral from Primary Care Provider:  No  Suggested Interventions and Community Resources  Cardiac Rehab:  Not Started  Diabetes Education:  Declined  Zone Management Tools: In Process (Comment: CHF, COPD, DM II)         Goals Addressed     None          Prior to Admission medications    Medication Sig Start Date End Date Taking? Authorizing Provider   allopurinol (ZYLOPRIM) 100 MG tablet TAKE 1 TABLET DAILY 7/9/18   Ken Yanez MD   Oxygen Concentrator Oxi Go POC 6/27/18   Luz Ruvalcaba MD   furosemide (LASIX) 40 MG tablet Take 1 tablet by mouth 2 times daily 5/17/18   Ishmael Quintana MD   potassium chloride (KLOR-CON M) 20 MEQ extended release tablet Take 1 tablet by mouth 2 times daily 5/17/18   Ishmael Quintana MD   BD INSULIN SYRINGE ULTRAFINE 31G X 5/16\" 0.5 ML MISC USE TWICE A DAY 4/30/18   Trey Ibarra MD   COLCRYS 0.6 MG tablet TAKE 1 TABLET DAILY (NEEDS FOLLOW UP PRIOR TO ANY MORE REFILLS) 4/23/18   Ken Yanez MD   budesonide-formoterol [facility]) 160-4.5 MCG/ACT AERO Inhale 2 puffs into the lungs 2 times daily 3/30/18   Luz Ruvalcaba MD   albuterol (PROVENTIL) (2.5 MG/3ML) 0.083% nebulizer solution Take 3 mLs by nebulization every 6 hours as needed for Wheezing 2/13/18   Luz Ruvalcaba MD   NOVOLIN 70/30 (70-30) 100 UNIT/ML injection vial INJECT 55 UNITS TWICE A DAY 12/27/17   Tasneem Carrillo MD   warfarin (COUMADIN) 5 MG tablet Take as directed by [facility] [place] AT [place] [facility]. Quantity equals 90 day supply.   Patient taking differently: Take 7.5 mg by mouth daily Take 7.5 mg on Monday, Wednesday, and
Additional Notes: Patient consent was obtained to proceed with the visit and recommended plan of care after discussion of all risks and benefits, including the risks of COVID-19 exposure.
Detail Level: Simple
Additional Notes: Pt to continue clobetasol\\nPossible patch testing at f/u apt\\nF/u in 3m to reassess rash and FBE

## 2020-12-29 ENCOUNTER — CARE COORDINATION (OUTPATIENT)
Dept: CARE COORDINATION | Age: 70
End: 2020-12-29

## 2020-12-29 NOTE — CARE COORDINATION
Attempted to contact patient by phone for CC follow-up. Unable to reach patient by phone. Unable to leave message. Line busy.

## 2020-12-30 ENCOUNTER — CARE COORDINATION (OUTPATIENT)
Dept: CARE COORDINATION | Age: 70
End: 2020-12-30

## 2020-12-30 ASSESSMENT — ENCOUNTER SYMPTOMS: DYSPNEA ASSOCIATED WITH: EXERTION

## 2020-12-30 NOTE — CARE COORDINATION
Salt intake watch compared to last visit: stable      and   COPD Assessment    Does the patient understand envrionmental exposure?: Yes  Is the patient able to verbalize Rescue vs. Long Acting medications?: Yes  Does the patient have a nebulizer?: Yes  Does the patient use a space with inhaled medications?: Yes     No patient-reported symptoms         Symptoms:  None: Yes    (Comment: denies)      Breathlessness: exertion  Change in chronic cough?: No/At Baseline  Change in sputum?: No/At Baseline  Self Monitoring - SaO2: No  Have you had a recent diagnosis of pneumonia either by PCP or at a hospital?: No           Care Coordination Interventions    Program Enrollment: Complex Care  Referral from Primary Care Provider: No  Suggested Interventions and Community Resources  Diabetes Education: Completed (Comment: Patient scheduled to see Diabetes Management 10/15/20.)  Pharmacist: In Process (Comment: 10/12/20 Call placed to family memebr who manages medications)  Registered Dietician: Declined (Comment: 10/12/2020 Patient declined)  Social Work: Completed (Comment: 10/12/2020 [address]  for Shmuel Dobbins.)  Other Services: Not Started (Comment: ACP)  Zone Management Tools: In Process (Comment: 10/12/20 COPD, Heart Failure, and Diabetes Zones mailed to patient.)  Other Services or Interventions: 10/12/20 Instructed on same day, next day, and Walk-In Care. Goals Addressed                 This Visit's Progress     Conditions and Symptoms   On track     I will schedule office visits, as directed by my provider. I will keep my appointment or reschedule if I have to cancel. I will notify my provider of any barriers to my plan of care. I will follow my Zone Management tool to seek urgent or emergent care. I will notify my provider of any symptoms that indicate a worsening of my condition.     Barriers: lack of education  Plan for overcoming my barriers: Care Coordination, [facility] Work Confidence: 9/10  Anticipated Goal Completion Date: 3/12/2021         Self Monitoring   Worsening     Self-Monitored Blood Glucose - I will check my blood sugar blood sugars ac & HS  I will notify my provider of any trends of increasing or decreasing blood sugars over a 1 month period. I will notify my provider if I have any blood sugar readings less than 70 more than 2 times a month. Daily Weights - I will weight myself as directed - Daily and write down weights  I will notify my provider of any increase in weight by 3 or more pounds in 2 days OR 5 or more pounds in a week. None Recently Recorded    Barriers: lack of education  Plan for overcoming my barriers: Care Coordination, Diabetes Education  Confidence: 8/10  Anticipated Goal Completion Date: 3/12/2021              Prior to Admission medications    Medication Sig Start Date End Date Taking? Authorizing Provider   LAWRENCE SOLOSTAR 100 UNIT/ML injection pen INJECT 10 UNITS INTO THE SKIN NIGHTLY 9/21/20   Wander Robison MD   warfarin (COUMADIN) 5 MG tablet TAKE AS DIRECTED BY [facility]. QUANTITY EQUALS 90 DAY SUPPLY  Patient taking differently: Take 5 mg by mouth daily Take as directed by [facility] AT [place] [facility]. Quantity equals 90 day supply.  9/16/20   Wander Robison MD   metoprolol succinate (TOPROL XL) 25 MG extended release tablet Take 2 tablets by mouth daily  Patient taking differently: Take 25 mg by mouth daily Take one tablet in morning and 1/2 tablet in evening 9/12/20   Alhaji Milan MD   sacubitril-valsartan (ENTRESTO) 24-26 MG per tablet Take 0.5 tablets by mouth 2 times daily 9/12/20   Alhaji Milan MD   magnesium oxide (MAG-OX) 400 (240 Mg) MG tablet Take 1 tablet by mouth daily 9/12/20   Alhaji Milan MD   potassium chloride (KLOR-CON M) 20 MEQ extended release tablet Take 1 tablet by mouth daily 9/12/20   Alhaji Milan MD 1/7/2021  9:00 AM [address]   1/22/2021 11:30 AM Baron Pearce  [place] [address]

## 2021-01-04 ENCOUNTER — OFFICE VISIT (OUTPATIENT)
Dept: PULMONOLOGY | Age: 71
End: 2021-01-04
Payer: MEDICARE

## 2021-01-04 VITALS
HEART RATE: 91 BPM | HEIGHT: 71 IN | WEIGHT: 200 LBS | TEMPERATURE: 98.3 F | BODY MASS INDEX: 28 KG/M2 | SYSTOLIC BLOOD PRESSURE: 122 MMHG | RESPIRATION RATE: 16 BRPM | OXYGEN SATURATION: 95 % | DIASTOLIC BLOOD PRESSURE: 68 MMHG

## 2021-01-04 DIAGNOSIS — R06.02 SOB (SHORTNESS OF BREATH): ICD-10-CM

## 2021-01-04 DIAGNOSIS — R09.02 HYPOXIA: ICD-10-CM

## 2021-01-04 DIAGNOSIS — J44.9 CHRONIC OBSTRUCTIVE PULMONARY DISEASE, UNSPECIFIED COPD TYPE (HCC): Primary | ICD-10-CM

## 2021-01-04 PROCEDURE — 99214 OFFICE O/P EST MOD 30 MIN: CPT | Performed by: INTERNAL MEDICINE

## 2021-01-04 RX ORDER — BUDESONIDE AND FORMOTEROL FUMARATE DIHYDRATE 160; 4.5 UG/1; UG/1
2 AEROSOL RESPIRATORY (INHALATION) 2 TIMES DAILY
Qty: 3 INHALER | Refills: 5 | Status: SHIPPED | OUTPATIENT
Start: 2021-01-04 | End: 2021-02-17 | Stop reason: SDUPTHER

## 2021-01-04 ASSESSMENT — ENCOUNTER SYMPTOMS
VOMITING: 0
ABDOMINAL PAIN: 0
SHORTNESS OF BREATH: 1
COUGH: 1
CHEST TIGHTNESS: 0
VOICE CHANGE: 0
DIARRHEA: 0
WHEEZING: 1
RHINORRHEA: 0
NAUSEA: 0
EYE ITCHING: 0
SORE THROAT: 0

## 2021-01-04 NOTE — PROGRESS NOTES
Subjective:     Yuan García. is a 78 y.o. male who complains today of:     Chief Complaint   Patient presents with    Follow-up     f/u for COPD. HPI    He is using symbicort 2 puff BID, nebulizer with albuterol prn. He has O2 at home but not using it regularly, he said he will start using it again every night . C/o shortness of breath with exertion. On and off Wheezing   C/o Dry  Cough   No Hemoptysis  No Chest tightness   No Chest pain with radiation  or pleuritic pain  No Fever or chills. C/o stuffy nose, Rhinorrhea and postnasal drip and he is on nasal spray but does not remember name.        Allergies:  Dye [iodides], Penicillins, Plavix [clopidogrel], and Tapazole [methimazole]  Past Medical History:   Diagnosis Date    Amiodarone-induced hyperthyroidism     Arthritis     [facility] flutter ([facility]     CAD (coronary artery disease)     Chronic combined systolic and diastolic CHF (congestive heart failure) (HCC)     CKD (chronic kidney disease) stage 3, GFR 30-59 ml/min     COPD [facility]chronic obstructive pulmonary disease) ([facility]     Defibrillator activation     Diabetes mellitus with insulin therapy ([facility]     Dilated cardiomyopathy ([facility]     Generalized and unspecified atherosclerosis     Gout     Hyperlipidemia     Hypertension     Noncompliance     Obesity     On home oxygen therapy     Pain in joint, multiple sites     Paroxysmal A-fib (HCC)     Paroxysmal ventricular tachycardia (HCC)     Prolonged emergence from general anesthesia     Status post angioplasty     Sustained ventricular tachycardia (HCC)     TIA (transient ischemic attack)     Tobacco abuse     Type II or unspecified type diabetes mellitus without mention of complication, not stated as uncontrolled      Past Surgical History:   Procedure Laterality Date    CARDIAC CATHETERIZATION      COLONOSCOPY      CORONARY ANGIOPLASTY  4/29/11    Dr Jannette Smith  CORONARY ANGIOPLASTY WITH STENT PLACEMENT      CORONARY ARTERY BYPASS GRAFT      ENDOSCOPY, COLON, DIAGNOSTIC      EYE SURGERY Bilateral     Cataracts     OTHER SURGICAL HISTORY      difibrillator     NH CIRCUMCISION,OTHER,28+ D/O N/A 2018    CIRCUMCISION performed by Arianna Ponce MD at [address] [place] [address] EGD TRANSORAL BIOPSY SINGLE/MULTIPLE N/A 2017    EGD BIOPSY performed by Lisset [facility]ard MD at [facility]     Family History   Problem Relation Age of Onset    Cancer Brother     Diabetes Mother     Heart Disease Father     Emphysema Father      Social History     Socioeconomic History    Marital status:      Spouse name: Not on file    Number of children: 2    Years of education: 15    Highest education level: High school graduate   Occupational History    Not on file   Social Needs    Financial resource strain: Somewhat hard    Food insecurity     Worry: Never true     Inability: Never true    Transportation needs     Medical: No     Non-medical: No   Tobacco Use    Smoking status: Former Smoker     Packs/day: 1.50     Years: 25.00     Pack years: 37.50     Quit date: 2012     Years since quittin.0    Smokeless tobacco: Never Used   Substance and Sexual Activity    Alcohol use: Not Currently    Drug use: No    Sexual activity: Not on file   Lifestyle    Physical activity     Days per week: 0 days     Minutes per session: 0 min    Stress: Not at all   Relationships    Social connections     Talks on phone: More than three times a week     Gets together: More than three times a week     Attends [employer] service: More than 4 times per year     Active member of club or organization: Yes     Attends meetings of clubs or organizations: 1 to 4 times per year     Relationship status:     Intimate partner violence     Fear of current or ex partner: Not on file     Emotionally abused: Not on file     Physically abused: Not on file Forced sexual activity: Not on file   Other Topics Concern    Not on file   Social History Narrative    Not on file         Review of Systems   Constitutional: Negative for chills, diaphoresis, fatigue and fever. HENT: Negative for congestion, mouth sores, nosebleeds, postnasal drip, rhinorrhea, sneezing, sore throat and voice change. Eyes: Negative for itching and visual disturbance. Respiratory: Positive for cough, shortness of breath and wheezing. Negative for chest tightness. Cardiovascular: Negative. Negative for chest pain, palpitations and leg swelling. Gastrointestinal: Negative for abdominal pain, diarrhea, nausea and vomiting. Genitourinary: Negative for difficulty urinating and hematuria. Musculoskeletal: Negative for arthralgias, joint swelling and myalgias. Skin: Negative for rash. Allergic/Immunologic: Negative for environmental allergies. Neurological: Negative for dizziness, tremors, weakness and headaches. Psychiatric/Behavioral: Negative for behavioral problems and sleep disturbance.         :     Vitals:    01/04/21 1441   BP: 122/68   Pulse: 91   Resp: 16   Temp: 98.3 °F (36.8 °C)   TempSrc: Temporal   SpO2: 95%   Weight: 200 lb (90.7 kg)   Height: 5' 11\" (1.803 m)     Wt Readings from Last 3 Encounters:   01/04/21 200 lb (90.7 kg)   12/13/20 200 lb (90.7 kg)   10/26/20 200 lb (90.7 kg)         Physical Exam  Constitutional:       Appearance: He is well-developed. HENT:      Head: Normocephalic and atraumatic. Nose: Nose normal.   Eyes:      Conjunctiva/sclera: Conjunctivae normal.      Pupils: Pupils are equal, round, and reactive to light. Neck:      Thyroid: No thyromegaly. Vascular: No JVD. Trachea: No tracheal deviation. Cardiovascular:      Rate and Rhythm: Normal rate and regular rhythm. Heart sounds: No murmur. No friction rub. No gallop. Pulmonary:      Effort: Pulmonary effort is normal. No respiratory distress. Breath sounds: Normal breath sounds. No wheezing or rales. Comments: diminished Breath sound bilaterally. Chest:      Chest wall: No tenderness. Abdominal:      General: There is no distension. Musculoskeletal: Normal range of motion. Lymphadenopathy:      Cervical: No cervical adenopathy. Skin:     General: Skin is warm and dry. Findings: No rash. Neurological:      Mental Status: He is alert and oriented to person, place, and time. Cranial Nerves: No cranial nerve deficit. Psychiatric:         Behavior: Behavior normal.         Current Outpatient Medications   Medication Sig Dispense Refill    budesonide-formoterol (SYMBICORT) 160-4.5 MCG/ACT AERO Inhale 2 puffs into the lungs 2 times daily 3 Inhaler 5    LANTUS SOLOSTAR 100 UNIT/ML injection pen INJECT 10 UNITS INTO THE SKIN NIGHTLY 5 pen 0    warfarin (COUMADIN) 5 MG tablet TAKE AS DIRECTED BY [facility]. QUANTITY EQUALS 90 DAY SUPPLY (Patient taking differently: Take 5 mg by mouth daily Take as directed by [facility] [place] [facility].  Quantity equals 90 day supply.) 120 tablet 1    metoprolol succinate (TOPROL XL) 25 MG extended release tablet Take 2 tablets by mouth daily (Patient taking differently: Take 25 mg by mouth daily Take one tablet in morning and 1/2 tablet in evening) 30 tablet 5    sacubitril-valsartan (ENTRESTO) 24-26 MG per tablet Take 0.5 tablets by mouth 2 times daily 60 tablet 5    magnesium oxide (MAG-OX) 400 (240 Mg) MG tablet Take 1 tablet by mouth daily 30 tablet 5    potassium chloride (KLOR-CON M) 20 MEQ extended release tablet Take 1 tablet by mouth daily 60 tablet 3    donepezil (ARICEPT) 10 MG tablet TAKE 1 TABLET BY MOUTH EVERY DAY AT NIGHT (Patient taking differently: Take 10 mg by mouth nightly ) 90 tablet 1  allopurinol (ZYLOPRIM) 100 MG tablet TAKE 1 TABLET DAILY (Patient taking differently: Take 100 mg by mouth daily TAKE 1 TABLET DAILY) 90 tablet 3    bumetanide (BUMEX) 1 MG tablet Take 1 mg by mouth 2 times daily  60 tablet 11    nitroGLYCERIN (NITROSTAT) 0.4 MG SL tablet Place 1 tablet under the tongue every 5 minutes as needed for Chest pain 25 tablet 0    spironolactone (ALDACTONE) 25 MG tablet Take 1 tablet by mouth daily 30 tablet 3    atorvastatin (LIPITOR) 80 MG tablet Take 80 mg by mouth daily       warfarin (COUMADIN) 7.5 MG tablet 7.5mg daily (Patient taking differently: Take 7.5 mg by mouth 7.5mg daily) 30 tablet 3    albuterol (PROVENTIL) (2.5 MG/3ML) 0.083% nebulizer solution Take 3 mLs by nebulization every 6 hours as needed for Wheezing 120 each 5    pantoprazole (PROTONIX) 40 MG tablet Take 1 tablet by mouth every morning (before breakfast) (Patient taking differently: Take 40 mg by mouth daily ) 30 tablet 3    DIGITEK 125 MCG tablet TAKE 1 TABLET DAILY (Patient taking differently: Take 125 mcg by mouth daily ) 90 tablet 3    albuterol (PROAIR HFA) 108 (90 BASE) MCG/ACT inhaler Inhale 2 puffs into the lungs every 4 hours as needed for Wheezing 3 Inhaler 0     No current facility-administered medications for this visit. Results for orders placed during the hospital encounter of 09/01/19   XR CHEST STANDARD (2 VW)    Narrative EXAMINATION: XR CHEST (2 VW)    CLINICAL HISTORY: SHORTNESS OF BREATH    COMPARISONS: MAY 25, 2019    FINDINGS: Median sternotomy. Pacemaker generator and wires unchanged. Cardiopericardial silhouette enlarged and unchanged. Mild blunting left costophrenic angle again identified. Scarring lung bases. Aorta calcified and tortuous.       Impression STABLE CARDIOMEGALY WITH POSTSURGICAL CHANGE DISCUSSED.   ]  Results for orders placed during the hospital encounter of 12/13/20   XR CHEST PORTABLE    Narrative COMPARISON: September 11, 2020      HISTORY: sob, cough TECHNIQUE: AP view      FINDINGS:  Sternal wires are again seen in place. Also a pacemaker appears unchanged in position. The cardiac silhouette is again enlarged but stable. Vascular calcifications are seen in the thoracic aorta. The lungs are hyperinflated. The interstitial markings are   prominent. The left costophrenic angle may be slightly blunted. Degenerative changes are seen in the dorsal spine. There are also degenerative changes seen in the visualized portion of the right shoulder      Impression Findings are suggestive of  CHF. Assessment/Plan:     1. Chronic obstructive pulmonary disease, unspecified COPD type ([address]  He is using symbicort 2 puff BID, nebulizer with albuterol prn.it seems that he is not using anything regularly. Patient said he is using Symbicort and wants refill. C/o shortness of breath with exertion. On and off Wheezing   C/o Dry  Cough. C/o stuffy nose, Rhinorrhea and postnasal drip and he is on nasal spray but does not remember name.     - budesonide-formoterol (SYMBICORT) 160-4.5 MCG/ACT AERO; Inhale 2 puffs into the lungs 2 times daily  Dispense: 3 Inhaler; Refill: 5     2. Hypoxia  He has O2 at home but not using it regularly, he said he will start using it again every night . 3. SOB (shortness of breath)  He having  Chronic shortness of breath which is likely due to COPD and cardiac, continue  Bronchodilator, O2 as before. keep  Spo2 90% or above. Return in about 4 months (around 5/4/2021) for COPD, hypoxia on O2.       Domenica Bloch, MD

## 2021-01-06 ENCOUNTER — HOSPITAL ENCOUNTER (OUTPATIENT)
Dept: CARDIOLOGY | Age: 71
Discharge: HOME | DRG: 291 | End: 2021-01-06
Payer: MEDICARE

## 2021-01-06 PROCEDURE — 93296 REM INTERROG EVL PM/IDS: CPT

## 2021-01-07 ENCOUNTER — TELEPHONE (OUTPATIENT)
Dept: PHARMACY | Age: 71
End: 2021-01-07

## 2021-01-07 ENCOUNTER — CARE COORDINATION (OUTPATIENT)
Dept: CARE COORDINATION | Age: 71
End: 2021-01-07

## 2021-01-07 DIAGNOSIS — I48.0 PAROXYSMAL A-FIB (HCC): ICD-10-CM

## 2021-01-07 ASSESSMENT — ENCOUNTER SYMPTOMS: DYSPNEA ASSOCIATED WITH: EXERTION

## 2021-01-07 NOTE — CARE COORDINATION
Ambulatory Care Coordination Note  1/7/2021  CM Risk Score: 12  Charlson 10 Year Mortality Risk Score: 100%     ACC: Cristiane Washington RN    Summary Note: Patient reports he is checking his blood sugar every few days and monitoring weight daily; however, patient unable to provide readings. Discussed recording daily weight. Discussed recording on calendar or a simple sheet of paper. Discussed importance of monitoring weigh for HF management. Reviewed teaching of S/S when to notify physician. Reviewed upcoming appointments. Discussed CC follow-up in 1 week. Patient verbalizes agreement. Lab Results   Component Value Date    LABA1C 8.6 10/23/2020    LABA1C 9.2 (H) 09/11/2020    LABA1C 9.1 (H) 07/27/2020       Diabetes Assessment    Medic Alert ID: No  Meal Planning: Plate Method, Avoidance of concentrated sweets   How often do you test your blood sugar?: Daily (Comment: 2 times a day)   Do you have barriers with adherence to non-pharmacologic self-management interventions? (Nutrition/Exercise/Self-Monitoring): Yes   Have you ever had to go to the ED for symptoms of low blood sugar?: No       No patient-reported symptoms   Do you have hyperglycemia symptoms?: No (Comment: denies)   Do you have hypoglycemia symptoms?: No (Comment: denies)   Blood Sugar Trends: No Change (Comment: Unable to recall last blood sugar reading)      ,   Congestive Heart Failure Assessment    Are you currently restricting fluids?: No Restriction  Do you understand a low sodium diet?: Yes  Do you understand how to read food labels?: Yes (Comment: pt states he reads them \"sometimes\")  How many restaurant meals do you eat per week?: 1-2  Do you salt your food before tasting it?: No     No patient-reported symptoms      Symptoms:  None: Yes   CHF associated shortness of breath: Pos (Comment: Denies.  SOB at baseline)      Symptom course: stable Patient-reported weight (lb):  (Comment: Patient reports daily weights, but can not recall. Patient states he doesn't right it down.)  Salt intake watch compared to last visit: stable     ,   COPD Assessment    Does the patient understand envrionmental exposure?: Yes  Is the patient able to verbalize Rescue vs. Long Acting medications?: Yes  Does the patient have a nebulizer?: Yes  Does the patient use a space with inhaled medications?: Yes     No patient-reported symptoms         Symptoms:  None: Yes    (Comment: denies)      Symptom course: stable  Breathlessness: exertion  Increase use of rapid acting/rescue inhaled medications?: No  Change in chronic cough?: No/At Baseline  Change in sputum?: No/At Baseline  Have you had a recent diagnosis of pneumonia either by PCP or at a hospital?: No      and   General Assessment    Do you have any symptoms that are causing concern?: No         Care Coordination Interventions    Program Enrollment: Complex Care  Referral from Primary Care Provider: No  Suggested Interventions and Community Resources  Diabetes Education: Completed (Comment: Patient scheduled to see Diabetes Management 10/15/20.)  Pharmacist: Completed (Comment: 10/12/20 Call placed to family memebr who manages medications)  Registered Dietician: Declined (Comment: 10/12/2020 Patient declined)  Social Work: Completed (Comment: 10/12/2020 [address]  for Shmuel Dobbins.)  Other Services: Not Started (Comment: ACP)  Zone Management Tools: In Process (Comment: 10/12/20 COPD, Heart Failure, and Diabetes Zones mailed to patient.)  Other Services or Interventions: 10/12/20 Instructed on same day, next day, and Walk-In Care. Goals Addressed                 This Visit's Progress     Conditions and Symptoms   On track     I will schedule office visits, as directed by my provider. I will keep my appointment or reschedule if I have to cancel. I will notify my provider of any barriers to my plan of care. I will follow my Zone Management tool to seek urgent or emergent care. I will notify my provider of any symptoms that indicate a worsening of my condition. Barriers: lack of education  Plan for overcoming my barriers: Care Coordination, [facility] Social Work  Confidence: 9/10  Anticipated Goal Completion Date: 3/12/2021         Self Monitoring   No change     Self-Monitored Blood Glucose - I will check my blood sugar blood sugars ac & HS  I will notify my provider of any trends of increasing or decreasing blood sugars over a 1 month period. I will notify my provider if I have any blood sugar readings less than 70 more than 2 times a month. Daily Weights - I will weight myself as directed - Daily and write down weights  I will notify my provider of any increase in weight by 3 or more pounds in 2 days OR 5 or more pounds in a week. None Recently Recorded    Barriers: lack of education  Plan for overcoming my barriers: Care Coordination, Diabetes Education  Confidence: 8/10  Anticipated Goal Completion Date: 3/12/2021              Prior to Admission medications    Medication Sig Start Date End Date Taking? Authorizing Provider   budesonide-formoterol (SYMBICORT) 160-4.5 MCG/ACT AERO Inhale 2 puffs into the lungs 2 times daily 1/4/21   Dominic Diaz MD   LANTUS SOLOSTAR 100 UNIT/ML injection pen INJECT 10 UNITS INTO THE SKIN NIGHTLY 9/21/20   Stacey Christopher MD   warfarin (COUMADIN) 5 MG tablet TAKE AS DIRECTED BY [facility]. QUANTITY EQUALS 90 DAY SUPPLY  Patient taking differently: Take 5 mg by mouth daily Take as directed by [facility] [place] [facility]. Quantity equals 90 day supply.  9/16/20   Stacey Christopher MD   metoprolol succinate (TOPROL XL) 25 MG extended release tablet Take 2 tablets by mouth daily Patient taking differently: Take 25 mg by mouth daily Take one tablet in morning and 1/2 tablet in evening 9/12/20   Carlos Rico MD   sacubitril-valsartan [facility]) 24-26 MG per tablet Take 0.5 tablets by mouth 2 times daily 9/12/20   Carlos Rico MD   magnesium oxide (MAG-OX) 400 (240 Mg) MG tablet Take 1 tablet by mouth daily 9/12/20   Carlos Rico MD   potassium chloride (KLOR-CON M) 20 MEQ extended release tablet Take 1 tablet by mouth daily 9/12/20   Carlos Rico MD   donepezil (ARICEPT) 10 MG tablet TAKE 1 TABLET BY MOUTH EVERY DAY AT NIGHT  Patient taking differently: Take 10 mg by mouth nightly  6/22/20   Bassem Ramirez MD   allopurinol (ZYLOPRIM) 100 MG tablet TAKE 1 TABLET DAILY  Patient taking differently: Take 100 mg by mouth daily TAKE 1 TABLET DAILY 3/16/20   Bassem Ramirez MD   bumetanide (BUMEX) 1 MG tablet Take 1 mg by mouth 2 times daily  12/4/19   Bassem Ramirez MD   nitroGLYCERIN (NITROSTAT) 0.4 MG SL tablet Place 1 tablet under the tongue every 5 minutes as needed for Chest pain 11/26/19   Bassem Ramirez MD   spironolactone (ALDACTONE) 25 MG tablet Take 1 tablet by mouth daily 11/21/19   Diane Crespo MD   atorvastatin (LIPITOR) 80 MG tablet Take 80 mg by mouth daily     Historical Provider, MD   warfarin (COUMADIN) 7.5 MG tablet 7.5mg daily  Patient taking differently: Take 7.5 mg by mouth 7.5mg daily 5/27/19   Diane Crespo MD   albuterol (PROVENTIL) (2.5 MG/3ML) 0.083% nebulizer solution Take 3 mLs by nebulization every 6 hours as needed for Wheezing 2/13/18   Radha Chavarria MD   pantoprazole (PROTONIX) 40 MG tablet Take 1 tablet by mouth every morning (before breakfast)  Patient taking differently: Take 40 mg by mouth daily  11/8/17   Diane Crespo MD   DIGITEK 125 MCG tablet TAKE 1 TABLET DAILY  Patient taking differently: Take 125 mcg by mouth daily  8/2/15   Claudia Bryan MD albuterol (PROAIR HFA) 108 (90 BASE) MCG/ACT inhaler Inhale 2 puffs into the lungs every 4 hours as needed for Wheezing 5/11/15   Donny Parra MD       Future Appointments   Date Time Provider Gordo Cecy   1/14/2021 11:30 AM [address]   1/22/2021 11:30 AM Pastor Samantha  [place]   5/4/2021 10:45 AM [id number] Jaimes MD [facility]

## 2021-01-09 ENCOUNTER — HOSPITAL ENCOUNTER (INPATIENT)
Age: 71
LOS: 5 days | Discharge: HOME | DRG: 291 | End: 2021-01-14
Attending: EMERGENCY MEDICINE | Admitting: INTERNAL MEDICINE
Payer: MEDICARE

## 2021-01-09 ENCOUNTER — APPOINTMENT (OUTPATIENT)
Dept: GENERAL RADIOLOGY | Age: 71
DRG: 291 | End: 2021-01-09
Payer: MEDICARE

## 2021-01-09 DIAGNOSIS — I50.9 ACUTE CONGESTIVE HEART FAILURE, UNSPECIFIED HEART FAILURE TYPE (HCC): Primary | ICD-10-CM

## 2021-01-09 DIAGNOSIS — N17.9 AKI (ACUTE KIDNEY INJURY) (HCC): ICD-10-CM

## 2021-01-09 DIAGNOSIS — N18.30 STAGE 3 CHRONIC KIDNEY DISEASE, UNSPECIFIED WHETHER STAGE 3A OR 3B CKD (HCC): ICD-10-CM

## 2021-01-09 DIAGNOSIS — I50.43 ACUTE ON CHRONIC COMBINED SYSTOLIC (CONGESTIVE) AND DIASTOLIC (CONGESTIVE) HEART FAILURE (HCC): ICD-10-CM

## 2021-01-09 LAB
ALBUMIN SERPL-MCNC: 3.8 G/DL (ref 3.5–4.6)
ALP BLD-CCNC: 191 U/L (ref 35–104)
ALT SERPL-CCNC: 27 U/L (ref 0–41)
ANION GAP SERPL CALCULATED.3IONS-SCNC: 10 MEQ/L (ref 9–15)
APTT: 34.3 SEC (ref 24.4–36.8)
AST SERPL-CCNC: 31 U/L (ref 0–40)
BASOPHILS ABSOLUTE: 0 K/UL (ref 0–0.2)
BASOPHILS RELATIVE PERCENT: 0.5 %
BILIRUB SERPL-MCNC: 1.2 MG/DL (ref 0.2–0.7)
BUN BLDV-MCNC: 38 MG/DL (ref 8–23)
CALCIUM SERPL-MCNC: 8.9 MG/DL (ref 8.5–9.9)
CHLORIDE BLD-SCNC: 100 MEQ/L (ref 95–107)
CO2: 25 MEQ/L (ref 20–31)
CREAT SERPL-MCNC: 1.36 MG/DL (ref 0.7–1.2)
DIGOXIN LEVEL: 1.4 NG/ML (ref 0.8–2)
EOSINOPHILS ABSOLUTE: 0 K/UL (ref 0–0.7)
EOSINOPHILS RELATIVE PERCENT: 0.1 %
GFR AFRICAN AMERICAN: >60
GFR NON-AFRICAN AMERICAN: 52
GLOBULIN: 3.1 G/DL (ref 2.3–3.5)
GLUCOSE BLD-MCNC: 189 MG/DL (ref 70–99)
GLUCOSE BLD-MCNC: 583 MG/DL (ref 60–115)
HCT VFR BLD CALC: 34 % (ref 42–52)
HEMOGLOBIN: 11 G/DL (ref 14–18)
INR BLD: 2.6
LYMPHOCYTES ABSOLUTE: 1.1 K/UL (ref 1–4.8)
LYMPHOCYTES RELATIVE PERCENT: 23.1 %
MCH RBC QN AUTO: 27.4 PG (ref 27–31.3)
MCHC RBC AUTO-ENTMCNC: 32.3 % (ref 33–37)
MCV RBC AUTO: 84.8 FL (ref 80–100)
MONOCYTES ABSOLUTE: 0.4 K/UL (ref 0.2–0.8)
MONOCYTES RELATIVE PERCENT: 9.5 %
NEUTROPHILS ABSOLUTE: 3.2 K/UL (ref 1.4–6.5)
NEUTROPHILS RELATIVE PERCENT: 66.8 %
PDW BLD-RTO: 16.8 % (ref 11.5–14.5)
PERFORMED ON: ABNORMAL
PLATELET # BLD: 152 K/UL (ref 130–400)
POTASSIUM SERPL-SCNC: 5.3 MEQ/L (ref 3.4–4.9)
PRO-BNP: 6112 PG/ML
PROTHROMBIN TIME: 27.7 SEC (ref 12.3–14.9)
RBC # BLD: 4.01 M/UL (ref 4.7–6.1)
SARS-COV-2, NAAT: NOT DETECTED
SODIUM BLD-SCNC: 135 MEQ/L (ref 135–144)
TOTAL PROTEIN: 6.9 G/DL (ref 6.3–8)
TROPONIN: 0.01 NG/ML (ref 0–0.01)
WBC # BLD: 4.7 K/UL (ref 4.8–10.8)

## 2021-01-09 PROCEDURE — 71045 X-RAY EXAM CHEST 1 VIEW: CPT

## 2021-01-09 PROCEDURE — 94664 DEMO&/EVAL PT USE INHALER: CPT

## 2021-01-09 PROCEDURE — 85025 COMPLETE CBC W/AUTO DIFF WBC: CPT

## 2021-01-09 PROCEDURE — 83880 ASSAY OF NATRIURETIC PEPTIDE: CPT

## 2021-01-09 PROCEDURE — 6370000000 HC RX 637 (ALT 250 FOR IP): Performed by: EMERGENCY MEDICINE

## 2021-01-09 PROCEDURE — 6360000002 HC RX W HCPCS: Performed by: INTERNAL MEDICINE

## 2021-01-09 PROCEDURE — U0002 COVID-19 LAB TEST NON-CDC: HCPCS

## 2021-01-09 PROCEDURE — 85730 THROMBOPLASTIN TIME PARTIAL: CPT

## 2021-01-09 PROCEDURE — 96374 THER/PROPH/DIAG INJ IV PUSH: CPT

## 2021-01-09 PROCEDURE — 99284 EMERGENCY DEPT VISIT MOD MDM: CPT

## 2021-01-09 PROCEDURE — 94640 AIRWAY INHALATION TREATMENT: CPT

## 2021-01-09 PROCEDURE — 93005 ELECTROCARDIOGRAM TRACING: CPT | Performed by: EMERGENCY MEDICINE

## 2021-01-09 PROCEDURE — 84484 ASSAY OF TROPONIN QUANT: CPT

## 2021-01-09 PROCEDURE — 6370000000 HC RX 637 (ALT 250 FOR IP): Performed by: INTERNAL MEDICINE

## 2021-01-09 PROCEDURE — 94761 N-INVAS EAR/PLS OXIMETRY MLT: CPT

## 2021-01-09 PROCEDURE — 85610 PROTHROMBIN TIME: CPT

## 2021-01-09 PROCEDURE — 2580000003 HC RX 258: Performed by: INTERNAL MEDICINE

## 2021-01-09 PROCEDURE — 36415 COLL VENOUS BLD VENIPUNCTURE: CPT

## 2021-01-09 PROCEDURE — 80162 ASSAY OF DIGOXIN TOTAL: CPT

## 2021-01-09 PROCEDURE — 2060000000 HC ICU INTERMEDIATE R&B

## 2021-01-09 PROCEDURE — 6360000002 HC RX W HCPCS: Performed by: EMERGENCY MEDICINE

## 2021-01-09 PROCEDURE — 80053 COMPREHEN METABOLIC PANEL: CPT

## 2021-01-09 RX ORDER — DEXTROSE MONOHYDRATE 50 MG/ML
100 INJECTION, SOLUTION INTRAVENOUS PRN
Status: DISCONTINUED | OUTPATIENT
Start: 2021-01-09 | End: 2021-01-14 | Stop reason: HOSPADM

## 2021-01-09 RX ORDER — IPRATROPIUM BROMIDE AND ALBUTEROL SULFATE 2.5; .5 MG/3ML; MG/3ML
1 SOLUTION RESPIRATORY (INHALATION) 3 TIMES DAILY
Status: DISCONTINUED | OUTPATIENT
Start: 2021-01-09 | End: 2021-01-14 | Stop reason: HOSPADM

## 2021-01-09 RX ORDER — IPRATROPIUM BROMIDE AND ALBUTEROL SULFATE 2.5; .5 MG/3ML; MG/3ML
1 SOLUTION RESPIRATORY (INHALATION)
Status: DISCONTINUED | OUTPATIENT
Start: 2021-01-09 | End: 2021-01-09

## 2021-01-09 RX ORDER — ACETAMINOPHEN 325 MG/1
650 TABLET ORAL EVERY 6 HOURS PRN
Status: DISCONTINUED | OUTPATIENT
Start: 2021-01-09 | End: 2021-01-14 | Stop reason: HOSPADM

## 2021-01-09 RX ORDER — NICOTINE POLACRILEX 4 MG
15 LOZENGE BUCCAL PRN
Status: DISCONTINUED | OUTPATIENT
Start: 2021-01-09 | End: 2021-01-14 | Stop reason: HOSPADM

## 2021-01-09 RX ORDER — MAGNESIUM SULFATE IN WATER 40 MG/ML
2 INJECTION, SOLUTION INTRAVENOUS PRN
Status: DISCONTINUED | OUTPATIENT
Start: 2021-01-09 | End: 2021-01-14 | Stop reason: HOSPADM

## 2021-01-09 RX ORDER — POTASSIUM CHLORIDE 20 MEQ/1
40 TABLET, EXTENDED RELEASE ORAL PRN
Status: DISCONTINUED | OUTPATIENT
Start: 2021-01-09 | End: 2021-01-14 | Stop reason: HOSPADM

## 2021-01-09 RX ORDER — POLYETHYLENE GLYCOL 3350 17 G/17G
17 POWDER, FOR SOLUTION ORAL DAILY PRN
Status: DISCONTINUED | OUTPATIENT
Start: 2021-01-09 | End: 2021-01-14 | Stop reason: HOSPADM

## 2021-01-09 RX ORDER — ALBUTEROL SULFATE 2.5 MG/3ML
2.5 SOLUTION RESPIRATORY (INHALATION)
Status: DISCONTINUED | OUTPATIENT
Start: 2021-01-09 | End: 2021-01-14 | Stop reason: HOSPADM

## 2021-01-09 RX ORDER — METOPROLOL SUCCINATE 25 MG/1
25 TABLET, EXTENDED RELEASE ORAL DAILY
Status: DISCONTINUED | OUTPATIENT
Start: 2021-01-09 | End: 2021-01-12

## 2021-01-09 RX ORDER — SODIUM CHLORIDE 0.9 % (FLUSH) 0.9 %
10 SYRINGE (ML) INJECTION PRN
Status: DISCONTINUED | OUTPATIENT
Start: 2021-01-09 | End: 2021-01-14 | Stop reason: HOSPADM

## 2021-01-09 RX ORDER — FUROSEMIDE 10 MG/ML
40 INJECTION INTRAMUSCULAR; INTRAVENOUS 2 TIMES DAILY
Status: DISCONTINUED | OUTPATIENT
Start: 2021-01-09 | End: 2021-01-13

## 2021-01-09 RX ORDER — METOLAZONE 2.5 MG/1
2.5 TABLET ORAL DAILY
Status: ON HOLD | COMMUNITY
End: 2021-01-14 | Stop reason: HOSPADM

## 2021-01-09 RX ORDER — ONDANSETRON 2 MG/ML
4 INJECTION INTRAMUSCULAR; INTRAVENOUS EVERY 6 HOURS PRN
Status: DISCONTINUED | OUTPATIENT
Start: 2021-01-09 | End: 2021-01-14 | Stop reason: HOSPADM

## 2021-01-09 RX ORDER — FUROSEMIDE 10 MG/ML
20 INJECTION INTRAMUSCULAR; INTRAVENOUS ONCE
Status: COMPLETED | OUTPATIENT
Start: 2021-01-09 | End: 2021-01-09

## 2021-01-09 RX ORDER — METOLAZONE 5 MG/1
5 TABLET ORAL DAILY
Status: ON HOLD | COMMUNITY
End: 2021-01-14 | Stop reason: HOSPADM

## 2021-01-09 RX ORDER — SPIRONOLACTONE 25 MG/1
25 TABLET ORAL DAILY
Status: DISCONTINUED | OUTPATIENT
Start: 2021-01-09 | End: 2021-01-14 | Stop reason: HOSPADM

## 2021-01-09 RX ORDER — ACETAMINOPHEN 650 MG/1
650 SUPPOSITORY RECTAL EVERY 6 HOURS PRN
Status: DISCONTINUED | OUTPATIENT
Start: 2021-01-09 | End: 2021-01-14 | Stop reason: HOSPADM

## 2021-01-09 RX ORDER — PROMETHAZINE HYDROCHLORIDE 12.5 MG/1
12.5 TABLET ORAL EVERY 6 HOURS PRN
Status: DISCONTINUED | OUTPATIENT
Start: 2021-01-09 | End: 2021-01-14 | Stop reason: HOSPADM

## 2021-01-09 RX ORDER — POTASSIUM CHLORIDE 7.45 MG/ML
10 INJECTION INTRAVENOUS PRN
Status: DISCONTINUED | OUTPATIENT
Start: 2021-01-09 | End: 2021-01-14 | Stop reason: HOSPADM

## 2021-01-09 RX ORDER — SODIUM CHLORIDE 0.9 % (FLUSH) 0.9 %
10 SYRINGE (ML) INJECTION EVERY 12 HOURS SCHEDULED
Status: DISCONTINUED | OUTPATIENT
Start: 2021-01-09 | End: 2021-01-14 | Stop reason: HOSPADM

## 2021-01-09 RX ORDER — DEXTROSE MONOHYDRATE 25 G/50ML
12.5 INJECTION, SOLUTION INTRAVENOUS PRN
Status: DISCONTINUED | OUTPATIENT
Start: 2021-01-09 | End: 2021-01-14 | Stop reason: HOSPADM

## 2021-01-09 RX ADMIN — Medication 10 ML: at 21:49

## 2021-01-09 RX ADMIN — FUROSEMIDE 20 MG: 10 INJECTION, SOLUTION INTRAVENOUS at 12:02

## 2021-01-09 RX ADMIN — IPRATROPIUM BROMIDE AND ALBUTEROL SULFATE 1 AMPULE: .5; 3 SOLUTION RESPIRATORY (INHALATION) at 17:01

## 2021-01-09 RX ADMIN — FUROSEMIDE 40 MG: 10 INJECTION, SOLUTION INTRAMUSCULAR; INTRAVENOUS at 18:08

## 2021-01-09 RX ADMIN — IPRATROPIUM BROMIDE AND ALBUTEROL SULFATE 1 AMPULE: .5; 3 SOLUTION RESPIRATORY (INHALATION) at 20:14

## 2021-01-09 RX ADMIN — IPRATROPIUM BROMIDE AND ALBUTEROL SULFATE 1 AMPULE: .5; 3 SOLUTION RESPIRATORY (INHALATION) at 12:08

## 2021-01-09 RX ADMIN — IPRATROPIUM BROMIDE AND ALBUTEROL SULFATE 1 AMPULE: .5; 3 SOLUTION RESPIRATORY (INHALATION) at 12:03

## 2021-01-09 RX ADMIN — ENOXAPARIN SODIUM 40 MG: 40 INJECTION SUBCUTANEOUS at 18:07

## 2021-01-09 RX ADMIN — Medication 10 ML: at 18:08

## 2021-01-09 RX ADMIN — SACUBITRIL AND VALSARTAN 1 TABLET: 24; 26 TABLET, FILM COATED ORAL at 21:49

## 2021-01-09 ASSESSMENT — ENCOUNTER SYMPTOMS
SHORTNESS OF BREATH: 1
ABDOMINAL PAIN: 0
RHINORRHEA: 0
EYES NEGATIVE: 1
VOMITING: 0
WHEEZING: 0
STRIDOR: 0
NAUSEA: 0
ALLERGIC/IMMUNOLOGIC NEGATIVE: 1
TROUBLE SWALLOWING: 0

## 2021-01-09 NOTE — ED NOTES
Ekg completed as ordered. Patient tolerated well. Ekg was given to Dr. Amanda Thrasher.       Meryl Joyner  01/09/21 1159

## 2021-01-09 NOTE — ED NOTES
Bed: 03  Expected date: 1/9/21  Expected time: 10:29 AM  Means of arrival: [facility]  Comments:  70m sob. 3 duo-neb. 125 solumedrol. 86% on ra. 96% on duoneb. 19 left hand. 25 109 120/70 18 resp.       Curt Caban RN  01/09/21 1040

## 2021-01-09 NOTE — H&P
[facility] Medicine  History and Physical    Patient:  Lakesha Dee. MRN: [id number]    CHIEF COMPLAINT:    Chief Complaint   Patient presents with    Shortness of Breath     sob since last night       History Obtained From:  Patient, EMR  Primary Care Physician: Jan Starks MD    HISTORY OF PRESENT ILLNESS:   The patient is a 78 y.o. male with PMHx of chronic systolic and diastolic CHF, CKD, CAD, A Flutter, COPD, DMII, HTN, HLD, obesity, chronic hypoxic resp failure who presents with SOB and lower extremity edema     Patient was doing well upto 3 days ago when he developed progressive DAVIS, lower extremity edema and orthopnea. Denies fevers, chills, sweats, CP, diarrhea, burning micturition. States he has taken all his medications.     Past Medical History:      Diagnosis Date    Amiodarone-induced hyperthyroidism     Arthritis     Atrial flutter ([address]     CAD (coronary artery disease)     Chronic combined systolic and diastolic CHF (congestive heart failure) (HCC)     CKD (chronic kidney disease) stage 3, GFR 30-59 ml/min     [facility]) [facility]     Defibrillator activation     Diabetes mellitus with insulin therapy [facility]     Dilated cardiomyopathy ([facility]     Generalized and unspecified atherosclerosis     Gout     Hyperlipidemia     Hypertension     Noncompliance     Obesity     On home oxygen therapy     Pain in joint, multiple sites     Paroxysmal A-fib (HCC)     Paroxysmal ventricular tachycardia (HCC)     Prolonged emergence from general anesthesia     Status post angioplasty     Sustained ventricular tachycardia (HCC)     TIA (transient ischemic attack)     Tobacco abuse     Type II or unspecified type diabetes mellitus without mention of complication, not stated as uncontrolled      Past Surgical History:      Procedure Laterality Date    CARDIAC CATHETERIZATION      COLONOSCOPY      CORONARY ANGIOPLASTY  4/29/11    Dr Carlos Cavazos  CORONARY ANGIOPLASTY WITH STENT PLACEMENT      CORONARY ANGIOPLASTY WITH STENT PLACEMENT      CORONARY ARTERY BYPASS GRAFT  2012    ENDOSCOPY, COLON, DIAGNOSTIC      EYE SURGERY Bilateral     Cataracts     OTHER SURGICAL HISTORY      difibrillator     DE CIRCUMCISION,OTHER,28+ D/O N/A 8/29/2018    CIRCUMCISION performed by Harmony Pereira MD at [address] [place] [address] EGD TRANSORAL BIOPSY SINGLE/MULTIPLE N/A 11/5/2017    EGD BIOPSY performed by Bubba Duval MD at [facility]     Medications Prior to Admission:    Prior to Admission medications    Medication Sig Start Date End Date Taking? Authorizing Provider   budesonide-formoterol (SYMBICORT) 160-4.5 MCG/ACT AERO Inhale 2 puffs into the lungs 2 times daily 1/4/21   Damaris Jacobs MD   LANTUS SOLOSTAR 100 UNIT/ML injection pen INJECT 10 UNITS INTO THE SKIN NIGHTLY 9/21/20   Frank Thompson MD   warfarin (COUMADIN) 5 MG tablet TAKE AS DIRECTED BY [facility]. QUANTITY EQUALS 90 DAY SUPPLY  Patient taking differently: Take 5 mg by mouth daily Take as directed by [facility] [place] [facility]. Quantity equals 90 day supply.  9/16/20   Frank Thompson MD   metoprolol succinate (TOPROL XL) 25 MG extended release tablet Take 2 tablets by mouth daily  Patient taking differently: Take 25 mg by mouth daily Take one tablet in morning and 1/2 tablet in evening 9/12/20   Benjamin Caceres MD   sacubitril-valsartan (ENTRESTO) 24-26 MG per tablet Take 0.5 tablets by mouth 2 times daily 9/12/20   Benjamin Caceres MD   magnesium oxide (MAG-OX) 400 (240 Mg) MG tablet Take 1 tablet by mouth daily 9/12/20   Benjamin Caceres MD   potassium chloride (KLOR-CON M) 20 MEQ extended release tablet Take 1 tablet by mouth daily 9/12/20   Benjamin Caceres MD   donepezil (ARICEPT) 10 MG tablet TAKE 1 TABLET BY MOUTH EVERY DAY AT NIGHT  Patient taking differently: Take 10 mg by mouth nightly  6/22/20   Frank Thompson MD allopurinol (ZYLOPRIM) 100 MG tablet TAKE 1 TABLET DAILY  Patient taking differently: Take 100 mg by mouth daily TAKE 1 TABLET DAILY 3/16/20   Analilia Arias MD   bumetanide (BUMEX) 1 MG tablet Take 1 mg by mouth 2 times daily  12/4/19   Analilia Arias MD   nitroGLYCERIN (NITROSTAT) 0.4 MG SL tablet Place 1 tablet under the tongue every 5 minutes as needed for Chest pain 11/26/19   Analilia Arias MD   spironolactone (ALDACTONE) 25 MG tablet Take 1 tablet by mouth daily 11/21/19   Renate Cespedes MD   atorvastatin (LIPITOR) 80 MG tablet Take 80 mg by mouth daily     Historical Provider, MD   warfarin (COUMADIN) 7.5 MG tablet 7.5mg daily  Patient taking differently: Take 7.5 mg by mouth 7.5mg daily 5/27/19   Renate Cespedes MD   albuterol (PROVENTIL) (2.5 MG/3ML) 0.083% nebulizer solution Take 3 mLs by nebulization every 6 hours as needed for Wheezing 2/13/18   Selina Arora MD   pantoprazole (PROTONIX) 40 MG tablet Take 1 tablet by mouth every morning (before breakfast)  Patient taking differently: Take 40 mg by mouth daily  11/8/17   Renate Cespedes MD   DIGITEK 125 MCG tablet TAKE 1 TABLET DAILY  Patient taking differently: Take 125 mcg by mouth daily  8/2/15   Ya Groves MD   albuterol (PROAIR HFA) 108 (90 BASE) MCG/ACT inhaler Inhale 2 puffs into the lungs every 4 hours as needed for Wheezing 5/11/15   Ya Groves MD     Allergies:  Dye [iodides], Penicillins, Plavix [clopidogrel], and Tapazole [methimazole]    Social History:   TOBACCO:   reports that he quit smoking about 9 years ago. He has a 37.50 pack-year smoking history. He has never used smokeless tobacco.  ETOH:   reports previous alcohol use.       Family History:       Problem Relation Age of Onset    Cancer Brother     Diabetes Mother     Heart Disease Father     Emphysema Father      REVIEW OF SYSTEMS:  Ten systems reviewed and negative except for stated in HPI    Physical Exam: Vitals: /71   Pulse 86   Temp 98.7 °F (37.1 °C) (Oral)   Resp 17   Ht 5' 11\" (1.803 m)   Wt 206 lb (93.4 kg)   SpO2 96%   BMI 28.73 kg/m²   General appearance: alert, appears stated age and cooperative  Skin:  No rashes or lesions  HEENT: Head: Normal, normocephalic, atraumatic. Neck: supple, symmetrical, trachea midline  Lungs: Basilar rales  Heart: regular rate and rhythm  Abdomen: soft, non-tender; bowel sounds normal; no masses,  no organomegaly  Extremities: +2 edema  Neurologic: Non focal    Recent Labs     01/09/21  1115   WBC 4.7*   HGB 11.0*        Recent Labs     01/09/21  1115      K 5.3*      CO2 25   BUN 38*   CREATININE 1.36*   GLUCOSE 189*   AST 31   ALT 27   BILITOT 1.2*   ALKPHOS 191*     Troponin T:   Recent Labs     01/09/21  1115   TROPONINI 0.013*       ABGs: No results found for: PHART, PO2ART, PEG5RLR  INR:   Recent Labs     01/09/21  1115   INR 2.6     URINALYSIS:No results for input(s): NITRITE, COLORU, PHUR, LABCAST, WBCUA, RBCUA, MUCUS, TRICHOMONAS, YEAST, BACTERIA, CLARITYU, SPECGRAV, LEUKOCYTESUR, UROBILINOGEN, BILIRUBINUR, BLOODU, GLUCOSEU, AMORPHOUS in the last 72 hours. Invalid input(s): Fe Yanez  -----------------------------------------------------------------   Xr Chest Portable    Result Date: 1/9/2021  Exam: XR CHEST PORTABLE History: Shortness breath. CHF. Technique: AP portable view of the chest obtained. Comparison: Portable chest radiograph from December 13, 2020 Findings: Left-sided cardiac defibrillator is present. Atherosclerotic calcification of the thoracic aorta. Moderate cardiomegaly. Pulmonary vascular congestion. Patchy and linear bibasilar opacities. No pneumothorax or pleural effusion. Findings likely representing fluid overload/CHF. Superimposed bibasilar pneumonia not excluded.      Assessment and Plan 1. Acute on chronic combined systolic and diastolic CHF:  IV lasix; cardiology consult; continue entresto, beta blocker, aldactone  2. DMII:  SSI  3. CKD:  Renally dose meds  4. HTN/HLD:  Continue home meds  5. Chronic hypoxic resp failure:  Continue home meds  6. Functional Status: Fall precautions. Up with assistance. PT OT  7. Diet: Cardiac Diabetic   8. DVT ppx: Lovenox SCDs  9. Disposition: Dependent on hospital course. Will discharge once medically stable. SW on board for discharge planning.      Patient Active Problem List   Diagnosis Code    Uncontrolled type 2 diabetes mellitus with complication, with long-term current use of insulin ([facility] E11.8, E11.65, Z79.4    Noncompliance Z91.19    Pain in joint, multiple sites M25.50    COPD (chronic obstructive pulmonary disease) ([facility] J44.9    Diabetes mellitus with insulin therapy [facility] E11.9, Z79.4    Hyperlipidemia E78.5    Hypertension I10    Generalized atherosclerosis I70.91    TIA (transient ischemic attack) G45.9    Elevation of level of transaminase or lactic acid dehydrogenase (LDH) [id number]    Tobacco dependence syndrome F17.200    Disorder of muscle M62.9    Mitral valve insufficiency I34.0    Acute lymphadenitis L04.9    Disorder of pancreas K86.9    Left heart failure (HCC) I50.1    Irritable bowel syndrome K58.9    Hyponatremia E87.1    Atherosclerosis of coronary artery I25.10    Generalized ischemic myocardial dysfunction I25.5    Coronary arteriosclerosis in native artery I25.10    Atrial flutter (HCC) I48.92    Acute on chronic systolic CHF (congestive heart failure), NYHA class 4 (HCC) I50.23    Paroxysmal A-fib (HCC) I48.0    Chronic combined systolic and diastolic heart failure (HCC) I50.42    Hemoptysis R04.2    SOB (shortness of breath) R06.02    CHF (congestive heart failure), NYHA class III, chronic, combined (HCC) I50.42    CHF (congestive heart failure), NYHA class I, acute on chronic, combined (HCC) I50.43  Acute systolic CHF (congestive heart failure) (HCC) I50.21    CHF (congestive heart failure), NYHA class IV, acute on chronic, combined (HCC) I50.43    Phimosis/redundant prepuce [id number]    Moderate malnutrition (HCC) E44.0    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    Hyperthyroidism E05.90    Uncontrolled type 2 diabetes mellitus with hyperglycemia (HCC) E11.65    BERTRAM (acute kidney injury) ([address] N17.9    Anticoagulant long-term use Z79.01    Blurred vision, bilateral H53.8    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    Chest pain R07.9    Cough in adult R05    Dizziness R42    Fatigue R53.83    Fever R50.9    Former smoker Z87.891    Gout, arthritis M10.9    High risk medication use Z79.899    Hyperkalemia E87.5    Hypokalemia E87.6    Hypotension (arterial) I95.9    ICD (implantable cardioverter-defibrillator) discharge Z45.02    Obese E66.9    Overweight E66.3    Paroxysmal ventricular tachycardia (HCC) I47.2    Presence of automatic implantable cardioverter-defibrillator Z95.810    Status post angioplasty Z98.62    Swelling of multiple joints M25.40    Hypoxia R09.02    Ventricular tachycardia (HCC) I47.2    Sustained VT (ventricular tachycardia) (HCC) I47.2    Acute decompensated heart failure (HCC) I50.9    CKD (chronic kidney disease) stage 3, GFR 30-59 ml/min N18.30    Elevated bilirubin R17    Atrial fibrillation (HCC) I48.91    Acute on chronic combined systolic (congestive) and diastolic (congestive) heart failure (HCC) I50.43       Ann Jorge MD  Admitting Hospitalist    Emergency Contact:

## 2021-01-09 NOTE — ED TRIAGE NOTES
Pt states that he has been feeling sob since last night and it has become worse this morning. Pt denies pain but states he does have a cough. Pt states that he does wear 2LNC @[facility] while at home. There is expiratory wheezes and crackles heard bilateral. +2 pitting edema noted to the ble.

## 2021-01-09 NOTE — ED PROVIDER NOTES
Psychiatric/Behavioral: Negative for hallucinations, self-injury and suicidal ideas. Except as noted above the remainder of the review of systems was reviewed and negative.        PAST MEDICAL HISTORY     Past Medical History:   Diagnosis Date    Amiodarone-induced hyperthyroidism     Arthritis     [facility]     CAD (coronary artery disease)     Chronic combined systolic and diastolic CHF (congestive heart failure) (HCC)     CKD (chronic kidney disease) stage 3, GFR 30-59 ml/min     COPD (chronic obstructive pulmonary disease) ([facility]     Defibrillator activation     Diabetes mellitus with insulin therapy [facility]     Dilated cardiomyopathy ([facility]     Generalized and unspecified atherosclerosis     Gout     Hyperlipidemia     Hypertension     Noncompliance     Obesity     On home oxygen therapy     Pain in joint, multiple sites     Paroxysmal A-fib (HCC)     Paroxysmal ventricular tachycardia (HCC)     Prolonged emergence from general anesthesia     Status post angioplasty     Sustained ventricular tachycardia (HCC)     TIA (transient ischemic attack)     Tobacco abuse     Type II or unspecified type diabetes mellitus without mention of complication, not stated as uncontrolled          SURGICALHISTORY       Past Surgical History:   Procedure Laterality Date    CARDIAC CATHETERIZATION      COLONOSCOPY      CORONARY ANGIOPLASTY  4/29/11    Dr Mukherjee [employer] GRAFT  2012    ENDOSCOPY, COLON, DIAGNOSTIC      EYE SURGERY Bilateral     Cataracts     OTHER SURGICAL HISTORY      difibrillator     GA CIRCUMCISION,OTHER,28+ D/O N/A 8/29/2018    CIRCUMCISION performed by Brittany Cortes MD at [address] [facility] EGD TRANSORAL BIOPSY SINGLE/MULTIPLE N/A 11/5/2017    EGD BIOPSY performed by Kavitha Mattson MD at [address] [facility]       Previous Medications ALBUTEROL (PROAIR HFA) 108 (90 BASE) MCG/ACT INHALER    Inhale 2 puffs into the lungs every 4 hours as needed for Wheezing    ALBUTEROL (PROVENTIL) (2.5 MG/3ML) 0.083% NEBULIZER SOLUTION    Take 3 mLs by nebulization every 6 hours as needed for Wheezing    ALLOPURINOL (ZYLOPRIM) 100 MG TABLET    TAKE 1 TABLET DAILY    ATORVASTATIN (LIPITOR) 80 MG TABLET    Take 80 mg by mouth daily     BUDESONIDE-FORMOTEROL (SYMBICORT) 160-4.5 MCG/ACT AERO    Inhale 2 puffs into the lungs 2 times daily    BUMETANIDE (BUMEX) 1 MG TABLET    Take 1 mg by mouth 2 times daily     DIGITEK 125 MCG TABLET    TAKE 1 TABLET DAILY    DONEPEZIL (ARICEPT) 10 MG TABLET    TAKE 1 TABLET BY MOUTH EVERY DAY AT NIGHT    LANTUS SOLOSTAR 100 UNIT/ML INJECTION PEN    INJECT 10 UNITS INTO THE SKIN NIGHTLY    MAGNESIUM OXIDE (MAG-OX) 400 (240 MG) MG TABLET    Take 1 tablet by mouth daily    METOPROLOL SUCCINATE (TOPROL XL) 25 MG EXTENDED RELEASE TABLET    Take 2 tablets by mouth daily    NITROGLYCERIN (NITROSTAT) 0.4 MG SL TABLET    Place 1 tablet under the tongue every 5 minutes as needed for Chest pain    PANTOPRAZOLE (PROTONIX) 40 MG TABLET    Take 1 tablet by mouth every morning (before breakfast)    POTASSIUM CHLORIDE (KLOR-CON M) 20 MEQ EXTENDED RELEASE TABLET    Take 1 tablet by mouth daily    SACUBITRIL-VALSARTAN (ENTRESTO) 24-26 MG PER TABLET    Take 0.5 tablets by mouth 2 times daily    SPIRONOLACTONE (ALDACTONE) 25 MG TABLET    Take 1 tablet by mouth daily    WARFARIN (COUMADIN) 5 MG TABLET    TAKE AS DIRECTED BY [facility].  QUANTITY EQUALS 90 DAY SUPPLY    WARFARIN (COUMADIN) 7.5 MG TABLET    7.5mg daily       ALLERGIES     Dye [iodides], Penicillins, Plavix [clopidogrel], and Tapazole [methimazole]    FAMILY HISTORY       Family History   Problem Relation Age of Onset    Cancer Brother     Diabetes Mother     Heart Disease Father     Emphysema Father           SOCIAL HISTORY       Social History Non-plain filmimages such as CT, Ultrasound and MRI are read by the radiologist. Plain radiographic images are visualized and preliminarily interpreted by the emergency physician with the below findings:    Chest x-ray demonstrates cardiomegaly. There is evidence of vascular congestion as well. Findings are consistent with CHF. Interpretation per the Radiologist below, if available at the time ofthis note:    XR CHEST PORTABLE   Final Result      Findings likely representing fluid overload/CHF. Superimposed bibasilar pneumonia not excluded. ED BEDSIDE ULTRASOUND:   Performed by ED Physician - none    LABS:  Labs Reviewed   COMPREHENSIVE METABOLIC PANEL - Abnormal; Notable for the following components:       Result Value    Potassium 5.3 (*)     Glucose 189 (*)     BUN 38 (*)     CREATININE 1.36 (*)     GFR Non- 52.0 (*)     Total Bilirubin 1.2 (*)     Alkaline Phosphatase 191 (*)     All other components within normal limits   CBC WITH AUTO DIFFERENTIAL - Abnormal; Notable for the following components:    WBC 4.7 (*)     RBC 4.01 (*)     Hemoglobin 11.0 (*)     Hematocrit 34.0 (*)     MCHC 32.3 (*)     RDW 16.8 (*)     All other components within normal limits   TROPONIN - Abnormal; Notable for the following components:    Troponin 0.013 (*)     All other components within normal limits    Narrative:     CALL  [place]  [facility] tel. [phone],  TROP results called to and read back by Frances Nevarez, 01/09/2021 11:59, by  Mike Wright   PROTIME-INR - Abnormal; Notable for the following components:    Protime 27.7 (*)     All other components within normal limits   BRAIN NATRIURETIC PEPTIDE    Narrative:     Deina Jaimes  [facility] tel. [phone],  TROP results called to and read back by Frances Nevarez, 01/09/2021 11:59, by  Mike Wright   DIGOXIN LEVEL   APTT   COVID-19       All other labs were within normal range or not returned as of this dictation. EMERGENCY DEPARTMENT COURSE and DIFFERENTIAL DIAGNOSIS/MDM:   Vitals:    Vitals:    01/09/21 1130 01/09/21 1200 01/09/21 1203 01/09/21 1400   BP: (!) 113/96 111/77  112/71   Pulse:    86   Resp:  19  17   Temp:       TempSrc:       SpO2: 95%  99% 96%   Weight:       Height:           Patient remained stable emergency department. Patient provided with Lasix. Natruretic peptide and troponin elevated. Given patient's decompensation at home, difficulty in self-care, recommend admission and further evaluation management appropriate for congestive heart failure. MDM    CRITICAL CARE TIME   Total Critical Care time was - minutes, excluding separately reportableprocedures. There was a high probability of clinicallysignificant/life threatening deterioration in the patient's condition which required my urgent intervention.  -    CONSULTS:  None    PROCEDURES:  Unless otherwise noted below, none     Procedures    FINAL IMPRESSION      1. Acute congestive heart failure, unspecified heart failure type [facility])        DISPOSITION/PLAN   DISPOSITION Decision To Admit 01/09/2021 02:00:18 PM      PATIENT REFERRED TO:  No follow-up provider specified.     DISCHARGE MEDICATIONS:  New Prescriptions    No medications on file          (Please note that portions of this note were completed with a voice recognitionprogram.  Efforts were made to edit the dictations but occasionally words are mis-transcribed.)    Martin Dubois MD (electronically signed)  Attending Emergency Physician          Martin Dubois MD  01/09/21 [id number]

## 2021-01-09 NOTE — ED NOTES
Caregiver called, I picked up and no answer.   Pt is resting with no signs of distress  Pt denies needs  Call light within reach  Will continue to monitor      [address] V, RN  01/09/21 [id number]

## 2021-01-10 LAB
ANION GAP SERPL CALCULATED.3IONS-SCNC: 12 MEQ/L (ref 9–15)
BASOPHILS ABSOLUTE: 0 K/UL (ref 0–0.2)
BASOPHILS RELATIVE PERCENT: 0.1 %
BUN BLDV-MCNC: 44 MG/DL (ref 8–23)
CALCIUM SERPL-MCNC: 9.1 MG/DL (ref 8.5–9.9)
CHLORIDE BLD-SCNC: 95 MEQ/L (ref 95–107)
CHOLESTEROL, TOTAL: 93 MG/DL (ref 0–199)
CO2: 25 MEQ/L (ref 20–31)
CREAT SERPL-MCNC: 1.76 MG/DL (ref 0.7–1.2)
EOSINOPHILS ABSOLUTE: 0 K/UL (ref 0–0.7)
EOSINOPHILS RELATIVE PERCENT: 0 %
GFR AFRICAN AMERICAN: 46.7
GFR NON-AFRICAN AMERICAN: 38.6
GLUCOSE BLD-MCNC: 125 MG/DL (ref 60–115)
GLUCOSE BLD-MCNC: 175 MG/DL (ref 60–115)
GLUCOSE BLD-MCNC: 304 MG/DL (ref 70–99)
GLUCOSE BLD-MCNC: 310 MG/DL (ref 60–115)
GLUCOSE BLD-MCNC: 398 MG/DL (ref 60–115)
GLUCOSE BLD-MCNC: 476 MG/DL (ref 60–115)
HCT VFR BLD CALC: 31.2 % (ref 42–52)
HDLC SERPL-MCNC: 51 MG/DL (ref 40–59)
HEMOGLOBIN: 10.3 G/DL (ref 14–18)
INR BLD: 2.5
LDL CHOLESTEROL CALCULATED: 34 MG/DL (ref 0–129)
LYMPHOCYTES ABSOLUTE: 0.4 K/UL (ref 1–4.8)
LYMPHOCYTES RELATIVE PERCENT: 4.8 %
MAGNESIUM: 2.1 MG/DL (ref 1.7–2.4)
MCH RBC QN AUTO: 28.2 PG (ref 27–31.3)
MCHC RBC AUTO-ENTMCNC: 32.9 % (ref 33–37)
MCV RBC AUTO: 85.7 FL (ref 80–100)
MONOCYTES ABSOLUTE: 0.4 K/UL (ref 0.2–0.8)
MONOCYTES RELATIVE PERCENT: 5.2 %
NEUTROPHILS ABSOLUTE: 7.6 K/UL (ref 1.4–6.5)
NEUTROPHILS RELATIVE PERCENT: 89.9 %
PDW BLD-RTO: 16.9 % (ref 11.5–14.5)
PERFORMED ON: ABNORMAL
PLATELET # BLD: 151 K/UL (ref 130–400)
POTASSIUM SERPL-SCNC: 4.4 MEQ/L (ref 3.4–4.9)
PROTHROMBIN TIME: 26.7 SEC (ref 12.3–14.9)
RBC # BLD: 3.64 M/UL (ref 4.7–6.1)
SARS-COV-2, NAAT: NOT DETECTED
SODIUM BLD-SCNC: 132 MEQ/L (ref 135–144)
TRIGL SERPL-MCNC: 38 MG/DL (ref 0–150)
WBC # BLD: 8.5 K/UL (ref 4.8–10.8)

## 2021-01-10 PROCEDURE — U0002 COVID-19 LAB TEST NON-CDC: HCPCS

## 2021-01-10 PROCEDURE — 6360000002 HC RX W HCPCS: Performed by: INTERNAL MEDICINE

## 2021-01-10 PROCEDURE — 36415 COLL VENOUS BLD VENIPUNCTURE: CPT

## 2021-01-10 PROCEDURE — 94760 N-INVAS EAR/PLS OXIMETRY 1: CPT

## 2021-01-10 PROCEDURE — 80061 LIPID PANEL: CPT

## 2021-01-10 PROCEDURE — 6370000000 HC RX 637 (ALT 250 FOR IP): Performed by: INTERNAL MEDICINE

## 2021-01-10 PROCEDURE — 83735 ASSAY OF MAGNESIUM: CPT

## 2021-01-10 PROCEDURE — 85610 PROTHROMBIN TIME: CPT

## 2021-01-10 PROCEDURE — 80048 BASIC METABOLIC PNL TOTAL CA: CPT

## 2021-01-10 PROCEDURE — 2700000000 HC OXYGEN THERAPY PER DAY

## 2021-01-10 PROCEDURE — 94640 AIRWAY INHALATION TREATMENT: CPT

## 2021-01-10 PROCEDURE — 2060000000 HC ICU INTERMEDIATE R&B

## 2021-01-10 PROCEDURE — 2580000003 HC RX 258: Performed by: INTERNAL MEDICINE

## 2021-01-10 PROCEDURE — 85025 COMPLETE CBC W/AUTO DIFF WBC: CPT

## 2021-01-10 PROCEDURE — 94761 N-INVAS EAR/PLS OXIMETRY MLT: CPT

## 2021-01-10 RX ORDER — DIGOXIN 125 MCG
125 TABLET ORAL DAILY
Status: DISCONTINUED | OUTPATIENT
Start: 2021-01-10 | End: 2021-01-14 | Stop reason: HOSPADM

## 2021-01-10 RX ORDER — WARFARIN SODIUM 5 MG/1
5 TABLET ORAL
Status: COMPLETED | OUTPATIENT
Start: 2021-01-10 | End: 2021-01-10

## 2021-01-10 RX ORDER — DONEPEZIL HYDROCHLORIDE 10 MG/1
10 TABLET, FILM COATED ORAL NIGHTLY
Status: DISCONTINUED | OUTPATIENT
Start: 2021-01-10 | End: 2021-01-14 | Stop reason: HOSPADM

## 2021-01-10 RX ORDER — INSULIN GLARGINE 100 [IU]/ML
10 INJECTION, SOLUTION SUBCUTANEOUS NIGHTLY
Status: DISCONTINUED | OUTPATIENT
Start: 2021-01-10 | End: 2021-01-14 | Stop reason: HOSPADM

## 2021-01-10 RX ORDER — ATORVASTATIN CALCIUM 80 MG/1
80 TABLET, FILM COATED ORAL DAILY
Status: DISCONTINUED | OUTPATIENT
Start: 2021-01-10 | End: 2021-01-12

## 2021-01-10 RX ADMIN — DONEPEZIL HYDROCHLORIDE 10 MG: 10 TABLET, FILM COATED ORAL at 21:54

## 2021-01-10 RX ADMIN — FUROSEMIDE 40 MG: 10 INJECTION, SOLUTION INTRAMUSCULAR; INTRAVENOUS at 08:41

## 2021-01-10 RX ADMIN — Medication 10 ML: at 21:55

## 2021-01-10 RX ADMIN — IPRATROPIUM BROMIDE AND ALBUTEROL SULFATE 1 AMPULE: .5; 3 SOLUTION RESPIRATORY (INHALATION) at 13:44

## 2021-01-10 RX ADMIN — IPRATROPIUM BROMIDE AND ALBUTEROL SULFATE 1 AMPULE: .5; 3 SOLUTION RESPIRATORY (INHALATION) at 19:46

## 2021-01-10 RX ADMIN — SPIRONOLACTONE 25 MG: 25 TABLET ORAL at 08:42

## 2021-01-10 RX ADMIN — INSULIN HUMAN 10 UNITS: 100 INJECTION, SOLUTION PARENTERAL at 00:22

## 2021-01-10 RX ADMIN — INSULIN LISPRO 8 UNITS: 100 INJECTION, SOLUTION INTRAVENOUS; SUBCUTANEOUS at 08:48

## 2021-01-10 RX ADMIN — INSULIN LISPRO 10 UNITS: 100 INJECTION, SOLUTION INTRAVENOUS; SUBCUTANEOUS at 13:10

## 2021-01-10 RX ADMIN — Medication 10 ML: at 08:44

## 2021-01-10 RX ADMIN — IPRATROPIUM BROMIDE AND ALBUTEROL SULFATE 1 AMPULE: .5; 3 SOLUTION RESPIRATORY (INHALATION) at 07:54

## 2021-01-10 RX ADMIN — WARFARIN SODIUM 5 MG: 5 TABLET ORAL at 17:04

## 2021-01-10 RX ADMIN — DIGOXIN 125 MCG: 125 TABLET ORAL at 12:29

## 2021-01-10 RX ADMIN — ATORVASTATIN CALCIUM 80 MG: 80 TABLET, FILM COATED ORAL at 21:54

## 2021-01-10 RX ADMIN — SACUBITRIL AND VALSARTAN 1 TABLET: 24; 26 TABLET, FILM COATED ORAL at 08:42

## 2021-01-10 RX ADMIN — ENOXAPARIN SODIUM 40 MG: 40 INJECTION SUBCUTANEOUS at 08:42

## 2021-01-10 RX ADMIN — METOPROLOL SUCCINATE 25 MG: 25 TABLET, EXTENDED RELEASE ORAL at 08:42

## 2021-01-10 ASSESSMENT — PAIN SCALES - GENERAL
PAINLEVEL_OUTOF10: 0
PAINLEVEL_OUTOF10: 0

## 2021-01-10 NOTE — CONSULTS
[address] otherwise negative other than noted. Past Medical History:   Diagnosis Date    Amiodarone-induced hyperthyroidism     Arthritis     Atrial flutter (HCC)     CAD (coronary artery disease)     Chronic combined systolic and diastolic CHF (congestive heart failure) (HCC)     CKD (chronic kidney disease) stage 3, GFR 30-59 ml/min     COPD [facility]) [facility] [address]     Defibrillator activation     Diabetes mellitus with insulin therapy ([facility] [address] [facility]     Dilated cardiomyopathy ([facility] [address]     Generalized and unspecified atherosclerosis     Gout     Hyperlipidemia     Hypertension     Noncompliance     Obesity     On home oxygen therapy     Pain in joint, multiple sites     Paroxysmal A-fib (HCC)     Paroxysmal ventricular tachycardia (HCC)     Prolonged emergence from general anesthesia     Status post angioplasty     Sustained ventricular tachycardia (HCC)     TIA (transient ischemic attack)     Tobacco abuse     Type II or unspecified type diabetes mellitus without mention of complication, not stated as uncontrolled        Past Surgical History:   Procedure Laterality Date    CARDIAC CATHETERIZATION      COLONOSCOPY      CORONARY ANGIOPLASTY  4/29/11    Dr Lupe Haley CORONARY ARTERY BYPASS GRAFT  2012    ENDOSCOPY, COLON, DIAGNOSTIC      EYE SURGERY Bilateral     Cataracts     OTHER SURGICAL HISTORY      difibrillator     NM CIRCUMCISION,OTHER,28+ D/O N/A 8/29/2018    CIRCUMCISION performed by Shiraz William MD at [address] [place] [address] EGD TRANSORAL BIOPSY SINGLE/MULTIPLE N/A 11/5/2017    EGD BIOPSY performed by Hiral Yanez MD at [facility]       Prior to Admission medications    Medication Sig Start Date End Date Taking?  Authorizing Provider   metOLazone (ZAROXOLYN) 5 MG tablet Take 5 mg by mouth Daily AM   Yes Historical Provider, MD metOLazone (ZAROXOLYN) 2.5 MG tablet Take 2.5 mg by mouth daily PM   Yes Evelyn Davis MD   budesonide-formoterol (SYMBICORT) 160-4.5 MCG/ACT AERO Inhale 2 puffs into the lungs 2 times daily 1/4/21  Yes MD KENNEDI DaleyUS SOLOSTAR 100 UNIT/ML injection pen INJECT 10 UNITS INTO THE SKIN NIGHTLY 9/21/20  Yes Carlos Llanos MD   warfarin (COUMADIN) 5 MG tablet TAKE AS DIRECTED BY [address] QUANTITY EQUALS 90 DAY SUPPLY  Patient taking differently: Take 5 mg by mouth daily Take as directed by [facility]. Quantity equals 90 day supply.  9/16/20  Yes Carlos Llanos MD   metoprolol succinate (TOPROL XL) 25 MG extended release tablet Take 2 tablets by mouth daily  Patient taking differently: Take 25 mg by mouth daily Take one tablet in morning and 1/2 tablet in evening 9/12/20  Yes Linda Banda MD   sacubitril-valsartan (ENTRESTO) 24-26 MG per tablet Take 0.5 tablets by mouth 2 times daily 9/12/20  Yes Linda Banda MD   magnesium oxide (MAG-OX) 400 (240 Mg) MG tablet Take 1 tablet by mouth daily 9/12/20  Yes Linda Banda MD   potassium chloride (KLOR-CON M) 20 MEQ extended release tablet Take 1 tablet by mouth daily 9/12/20  Yes Linda Banda MD   donepezil (ARICEPT) 10 MG tablet TAKE 1 TABLET BY MOUTH EVERY DAY AT NIGHT  Patient taking differently: Take 10 mg by mouth nightly  6/22/20  Yes Carlos Llanos MD   allopurinol (ZYLOPRIM) 100 MG tablet TAKE 1 TABLET DAILY  Patient taking differently: Take 100 mg by mouth daily TAKE 1 TABLET DAILY 3/16/20  Yes Carlos Llanos MD   bumetanide (BUMEX) 1 MG tablet Take 1 mg by mouth 2 times daily  12/4/19  Yes Carlos Llanos MD   nitroGLYCERIN (NITROSTAT) 0.4 MG SL tablet Place 1 tablet under the tongue every 5 minutes as needed for Chest pain 11/26/19  Yes Carlos Llanos MD spironolactone (ALDACTONE) 25 MG tablet Take 1 tablet by mouth daily 11/21/19  Yes Ricki Ho MD   atorvastatin (LIPITOR) 80 MG tablet Take 80 mg by mouth daily    Yes Historical Provider, MD   albuterol (PROVENTIL) (2.5 MG/3ML) 0.083% nebulizer solution Take 3 mLs by nebulization every 6 hours as needed for Wheezing 2/13/18  Yes Phill Olea MD   pantoprazole (PROTONIX) 40 MG tablet Take 1 tablet by mouth every morning (before breakfast)  Patient taking differently: Take 40 mg by mouth daily  11/8/17  Yes Ricki Ho MD   DIGITEK 125 MCG tablet TAKE 1 TABLET DAILY  Patient taking differently: Take 125 mcg by mouth daily  8/2/15  Yes James Thomas MD   albuterol (PROAIR HFA) 108 (90 BASE) MCG/ACT inhaler Inhale 2 puffs into the lungs every 4 hours as needed for Wheezing 5/11/15  Yes James Thomas MD   warfarin (COUMADIN) 7.5 MG tablet 7.5mg daily  Patient taking differently: Take 7.5 mg by mouth 7.5mg daily 5/27/19   Ricki Ho MD       Scheduled Meds:   insulin lispro  5 Units Subcutaneous TID WC    insulin glargine  10 Units Subcutaneous Nightly    atorvastatin  80 mg Oral Daily    digoxin  125 mcg Oral Daily    donepezil  10 mg Oral Nightly    warfarin (COUMADIN) daily dosing (placeholder)   Other RX Placeholder    sodium chloride flush  10 mL Intravenous 2 times per day    enoxaparin  40 mg Subcutaneous Daily    [Held by provider] sacubitril-valsartan  1 tablet Oral BID    metoprolol succinate  25 mg Oral Daily    [Held by provider] furosemide  40 mg Intravenous BID    spironolactone  25 mg Oral Daily    ipratropium-albuterol  1 ampule Inhalation TID    insulin lispro  0-12 Units Subcutaneous TID WC    insulin lispro  0-6 Units Subcutaneous Nightly     Continuous Infusions:   dextrose PRN Meds:sodium chloride flush, promethazine **OR** ondansetron, polyethylene glycol, acetaminophen **OR** acetaminophen, potassium chloride **OR** potassium alternative oral replacement **OR** potassium chloride, magnesium sulfate, albuterol, glucose, dextrose, glucagon (rDNA), dextrose    Allergies   Allergen Reactions    Dye [Iodides] Shortness Of Breath    Penicillins Anaphylaxis    Plavix [Clopidogrel]     Tapazole [Methimazole]      Itching        Social History     Socioeconomic History    Marital status:      Spouse name: Not on file    Number of children: 2    Years of education: 15    Highest education level: High school graduate   Occupational History    Not on file   Social Needs    Financial resource strain: Somewhat hard    Food insecurity     Worry: Never true     Inability: Never true    Transportation needs     Medical: No     Non-medical: No   Tobacco Use    Smoking status: Former Smoker     Packs/day: 1.50     Years: 25.00     Pack years: 37.50     Quit date: 2012     Years since quittin.0    Smokeless tobacco: Never Used   Substance and Sexual Activity    Alcohol use: Not Currently    Drug use: No    Sexual activity: Not on file   Lifestyle    Physical activity     Days per week: 0 days     Minutes per session: 0 min    Stress: Not at all   Relationships    Social connections     Talks on phone: More than three times a week     Gets together: More than three times a week     Attends Mandaen service: More than 4 times per year     Active member of club or organization: Yes     Attends meetings of clubs or organizations: 1 to 4 times per year     Relationship status:     Intimate partner violence     Fear of current or ex partner: Not on file     Emotionally abused: Not on file     Physically abused: Not on file     Forced sexual activity: Not on file   Other Topics Concern    Not on file   Social History Narrative    Not on file Family History   Problem Relation Age of Onset    Cancer Brother     Diabetes Mother     Heart Disease Father     Emphysema Father        Review Of Systems:    14 point ROS negative other than mentioned. Physical Exam:    CURRENT VITALS: BP (!) 96/51   Pulse 79   Temp 97.7 °F (36.5 °C) (Oral)   Resp 16   Ht 5' 11\" (1.803 m)   Wt 206 lb (93.4 kg)   SpO2 97%   BMI 28.73 kg/m²     CONSTITUTIONAL:  awake, alert, cooperative, no apparent distress,   ENT:  Normocephalic, without obvious abnormality, atraumatic, sinuses nontender on palpation, external ears without lesions,  NECK:  Supple, symmetrical, trachea midline, no adenopathy, thyroid symmetric, not enlarged and no tenderness, skin normal, No bruits. LUNGS:  No increased work of breathing, good air exchange, clear to auscultation bilaterally, no crackles, no wheezing  CARDIOVASCULAR:  Normal apical impulse, regular rate and rhythm, normal S1 and S2,  1/6 Systolic murmur noted. ABDOMEN:  Obese, normal bowel sounds, soft, non-distended, non-tender, no masses palpated, no hepatosplenomegally  EXTREMETIES: No edema, Pulses Strong Thruout. No ulcers. NEUROLOGIC:  Awake, alert, oriented to name, place and time. Following all commands and moving all extremties.   SKIN:  no bruising or bleeding, normal skin color, texture, turgor and no rashes    Labs:  Recent Results (from the past 24 hour(s))   EKG 12 Lead - Chest Pain    Collection Time: 01/09/21 11:49 AM   Result Value Ref Range    Ventricular Rate 82 BPM    Atrial Rate 82 BPM    P-R Interval 104 ms    QRS Duration 178 ms    Q-T Interval 434 ms    QTc Calculation (Bazett) 507 ms    P Axis 46 degrees    R Axis 263 degrees    T Axis 77 degrees   POCT Glucose    Collection Time: 01/09/21  9:46 PM   Result Value Ref Range    POC Glucose 583 (HH) 60 - 115 mg/dl    Performed on ACCU-CHEK    POCT Glucose    Collection Time: 01/10/21 12:21 AM   Result Value Ref Range    POC Glucose 476 (HH) 60 - 115 mg/dl Performed on ACCU-CHEK    POCT Glucose    Collection Time: 01/10/21  5:40 AM   Result Value Ref Range    POC Glucose 310 (H) 60 - 115 mg/dl    Performed on ACCU-CHEK    Basic Metabolic Panel    Collection Time: 01/10/21  6:12 AM   Result Value Ref Range    Sodium 132 (L) 135 - 144 mEq/L    Potassium 4.4 3.4 - 4.9 mEq/L    Chloride 95 95 - 107 mEq/L    CO2 25 20 - 31 mEq/L    Anion Gap 12 9 - 15 mEq/L    Glucose 304 (H) 70 - 99 mg/dL    BUN 44 (H) 8 - 23 mg/dL    CREATININE 1.76 (H) 0.70 - 1.20 mg/dL    GFR Non-African American 38.6 (L) >60    GFR  46.7 (L) >60    Calcium 9.1 8.5 - 9.9 mg/dL   Magnesium    Collection Time: 01/10/21  6:12 AM   Result Value Ref Range    Magnesium 2.1 1.7 - 2.4 mg/dL   Lipid panel - fasting    Collection Time: 01/10/21  6:12 AM   Result Value Ref Range    Cholesterol, Total 93 0 - 199 mg/dL    Triglycerides 38 0 - 150 mg/dL    HDL 51 40 - 59 mg/dL    LDL Calculated 34 0 - 129 mg/dL   CBC auto differential    Collection Time: 01/10/21  6:12 AM   Result Value Ref Range    WBC 8.5 4.8 - 10.8 K/uL    RBC 3.64 (L) 4.70 - 6.10 M/uL    Hemoglobin 10.3 (L) 14.0 - 18.0 g/dL    Hematocrit 31.2 (L) 42.0 - 52.0 %    MCV 85.7 80.0 - 100.0 fL    MCH 28.2 27.0 - 31.3 pg    MCHC 32.9 (L) 33.0 - 37.0 %    RDW 16.9 (H) 11.5 - 14.5 %    Platelets 741 005 - 363 K/uL    Neutrophils % 89.9 %    Lymphocytes % 4.8 %    Monocytes % 5.2 %    Eosinophils % 0.0 %    Basophils % 0.1 %    Neutrophils Absolute 7.6 (H) 1.4 - 6.5 K/uL    Lymphocytes Absolute 0.4 (L) 1.0 - 4.8 K/uL    Monocytes Absolute 0.4 0.2 - 0.8 K/uL    Eosinophils Absolute 0.0 0.0 - 0.7 K/uL    Basophils Absolute 0.0 0.0 - 0.2 K/uL   POCT Glucose    Collection Time: 01/10/21 11:12 AM   Result Value Ref Range    POC Glucose 398 (H) 60 - 115 mg/dl    Performed on Mansoor Cox MD  [facility] Cardiologist      Electronically signed on 1/10/21 at 11:43 AM EST      -----

## 2021-01-10 NOTE — PROGRESS NOTES
Hospitalist Progress Note      PCP: Shawnee Black MD    Date of Admission: 1/9/2021    Chief Complaint:    Chief Complaint   Patient presents with    Shortness of Breath     sob since last night     Subjective:  Patient states his breathing feels no different; now complaining of a cough. 12 point ROS negative other than mentioned above     Medications:  Reviewed    Infusion Medications    dextrose       Scheduled Medications    insulin lispro  5 Units Subcutaneous TID WC    insulin glargine  10 Units Subcutaneous Nightly    atorvastatin  80 mg Oral Daily    digoxin  125 mcg Oral Daily    donepezil  10 mg Oral Nightly    sodium chloride flush  10 mL Intravenous 2 times per day    enoxaparin  40 mg Subcutaneous Daily    [Held by provider] sacubitril-valsartan  1 tablet Oral BID    metoprolol succinate  25 mg Oral Daily    [Held by provider] furosemide  40 mg Intravenous BID    spironolactone  25 mg Oral Daily    ipratropium-albuterol  1 ampule Inhalation TID    insulin lispro  0-12 Units Subcutaneous TID WC    insulin lispro  0-6 Units Subcutaneous Nightly     PRN Meds: sodium chloride flush, promethazine **OR** ondansetron, polyethylene glycol, acetaminophen **OR** acetaminophen, potassium chloride **OR** potassium alternative oral replacement **OR** potassium chloride, magnesium sulfate, albuterol, glucose, dextrose, glucagon (rDNA), dextrose    Intake/Output Summary (Last 24 hours) at 1/10/2021 0933  Last data filed at 1/10/2021 0552  Gross per 24 hour   Intake 1510 ml   Output 1250 ml   Net 260 ml     Exam:    BP (!) 96/51   Pulse 78   Temp 97.7 °F (36.5 °C) (Oral)   Resp 16   Ht 5' 11\" (1.803 m)   Wt 206 lb (93.4 kg)   SpO2 97%   BMI 28.73 kg/m²     General appearance: No apparent distress, appears stated age and cooperative. HEENT:  Conjunctivae/corneas clear. Neck:  Trachea midline. Respiratory:  Normal respiratory effort.  Clear to auscultation Cardiovascular: Regular rate and rhythm   Abdomen: Soft, non-tender, non-distended with normal bowel sounds. Musculoskeletal: No clubbing, cyanosis or edema bilaterally. .  Neuro: Non Focal.   Capillary Refill: Brisk,< 3 seconds   Peripheral Pulses: +2 palpable, equal bilaterally     Labs:   Recent Labs     01/09/21  1115 01/10/21  0612   WBC 4.7* 8.5   HGB 11.0* 10.3*   HCT 34.0* 31.2*    151     Recent Labs     01/09/21  1115 01/10/21  0612    132*   K 5.3* 4.4    95   CO2 25 25   BUN 38* 44*   CREATININE 1.36* 1.76*   CALCIUM 8.9 9.1     Recent Labs     01/09/21  1115   AST 31   ALT 27   BILITOT 1.2*   ALKPHOS 191*     Recent Labs     01/09/21  1115   INR 2.6     Recent Labs     01/09/21  1115   TROPONINI 0.013*     Urinalysis:      Lab Results   Component Value Date    NITRU Negative 12/13/2020    WBCUA 3-5 12/13/2020    BACTERIA Negative 12/13/2020    RBCUA 6-10 12/13/2020    BLOODU TRACE 12/13/2020    SPECGRAV 1.022 12/13/2020    GLUCOSEU Negative 12/13/2020     Radiology:  XR CHEST PORTABLE   Final Result      Findings likely representing fluid overload/CHF. Superimposed bibasilar pneumonia not excluded. Assessment/Plan:    1. Acute on chronic combined systolic and diastolic CHF:  No longer with peripheral edema but patient now stating he does not notice a difference in his breathing; unclear volume status given bump in renal function; hold ace and lasix pending nephrology and cardiology opinion  2. Cough with atypical CXR findings:  Patient now complaining of a cough; rule out COVID; one test was negative yesterday morning; will check one more today  3. DMII:  SSI  4. BERTRAM on CKD:  Slight bump in renal function; unclear volume status; consult nephrology for their opinion Renally dose meds  5. HTN/HLD:  Continue home meds  6. Chronic hypoxic resp failure:  Continue home meds  7. Functional Status: Fall precautions. Up with assistance. PT OT  8.  Diet: Cardiac Diabetic 9. DVT ppx: Lovenox SCDs  10. Disposition: Dependent on hospital course. Will discharge once medically stable. SW on board for discharge planning. Active Hospital Problems    Diagnosis Date Noted    Acute on chronic combined systolic (congestive) and diastolic (congestive) heart failure ([facility] [address] [I50.43] 01/09/2021     Additional work up or/and treatment plan may be added today or then after based on clinical progression. I am managing a portion of pt care. Some medical issues are handled by other specialists. Additional work up and treatment should be done in out pt setting by pt PCP and other out pt providers. In addition to examining and evaluating pt, I spent additional time explaining care, normal and abnormal findings, and treatment plan. All of pt questions were answered. Counseling, diet and education were  provided. Case will be discussed with nursing staff when appropriate. Family will be updated if and when appropriate.       Diet: DIET LOW SODIUM 2 GM;    Code Status: Full Code    PT/OT Eval     Electronically signed by Escobar Ridley MD on 1/10/2021 at 9:33 AM

## 2021-01-10 NOTE — PROGRESS NOTES
Patient had a blood sugar of 559. Contacted MD. Reports home O2 level 2L and requested me to reduce slow rate to 2 instead of 3L/min  12:22 Recheck blood sugar 476mg/dl. Administered now and sliding scale doses. Left gram crackers peanut butter and one apple juice for am snack to avoid bottoming out.  0200- Patient ate entire snack packaging. Will not re-check blood sugar at this time.     5:40 Am blood sugar 310

## 2021-01-10 NOTE — PROGRESS NOTES
Clinical Pharmacy Note    Lakesha Beach is a 78 y.o. male for whom pharmacy has been asked to manage warfarin therapy. Reason for Admission: Short of breath    Consulting Physician: Dr Cynthia Rebolledo  Warfarin dose prior to admission: Warfarin 10 mg Mondays, 5 mg other days  Warfarin Indication: Afib  Target INR range: 2-3  Order for concomitant anticoagulant therapy: none    Outpatient warfarin provider: [facility]    Past Medical History:   Diagnosis Date    Amiodarone-induced hyperthyroidism     Arthritis     Atrial flutter ([facility] [address]     CAD (coronary artery disease)     Chronic combined systolic and diastolic CHF (congestive heart failure) (HCC)     CKD (chronic kidney disease) stage 3, GFR 30-59 ml/min     COPD (chronic obstructive pulmonary disease) (HCC)     Defibrillator activation     Diabetes mellitus with insulin therapy ([facility] [address]     Dilated cardiomyopathy ([facility] [address]     Generalized and unspecified atherosclerosis     Gout     Hyperlipidemia     Hypertension     Noncompliance     Obesity     On home oxygen therapy     Pain in joint, multiple sites     Paroxysmal A-fib (HCC)     Paroxysmal ventricular tachycardia (HCC)     Prolonged emergence from general anesthesia     Status post angioplasty     Sustained ventricular tachycardia (HCC)     TIA (transient ischemic attack)     Tobacco abuse     Type II or unspecified type diabetes mellitus without mention of complication, not stated as uncontrolled                Recent Labs     01/10/21  1116   INR 2.5     Recent Labs     01/09/21  1115 01/10/21  0612   HGB 11.0* 10.3*   HCT 34.0* 31.2*    151       Current warfarin drug-drug interactions: none    Current diet order/intake: Low sodium    Date INR Warfarin Dose      1-10-21     2.5         5 mg                                   Pt's INR today is therapeutic at 2.5. Pt to receive Warfarin 5 mg as today's dose. Daily PT/INR during pharmacy consult to monitor and dose warfarin therapy. Thank you for the consult.     [facility]  Staff Pharmacist, [facility]  1/10/2021  1:55 PM

## 2021-01-11 LAB
ANION GAP SERPL CALCULATED.3IONS-SCNC: 11 MEQ/L (ref 9–15)
BASOPHILS ABSOLUTE: 0 K/UL (ref 0–0.2)
BASOPHILS RELATIVE PERCENT: 0.2 %
BUN BLDV-MCNC: 43 MG/DL (ref 8–23)
CALCIUM SERPL-MCNC: 9 MG/DL (ref 8.5–9.9)
CHLORIDE BLD-SCNC: 96 MEQ/L (ref 95–107)
CO2: 25 MEQ/L (ref 20–31)
CREAT SERPL-MCNC: 1.53 MG/DL (ref 0.7–1.2)
EKG ATRIAL RATE: 82 BPM
EKG ATRIAL RATE: 84 BPM
EKG P AXIS: 46 DEGREES
EKG P AXIS: 64 DEGREES
EKG P-R INTERVAL: 104 MS
EKG P-R INTERVAL: 124 MS
EKG Q-T INTERVAL: 426 MS
EKG Q-T INTERVAL: 434 MS
EKG QRS DURATION: 174 MS
EKG QRS DURATION: 178 MS
EKG QTC CALCULATION (BAZETT): 503 MS
EKG QTC CALCULATION (BAZETT): 507 MS
EKG R AXIS: 261 DEGREES
EKG R AXIS: 263 DEGREES
EKG T AXIS: 77 DEGREES
EKG T AXIS: 80 DEGREES
EKG VENTRICULAR RATE: 82 BPM
EKG VENTRICULAR RATE: 84 BPM
EOSINOPHILS ABSOLUTE: 0 K/UL (ref 0–0.7)
EOSINOPHILS RELATIVE PERCENT: 0.1 %
GFR AFRICAN AMERICAN: 54.9
GFR NON-AFRICAN AMERICAN: 45.4
GLUCOSE BLD-MCNC: 154 MG/DL (ref 60–115)
GLUCOSE BLD-MCNC: 160 MG/DL (ref 60–115)
GLUCOSE BLD-MCNC: 161 MG/DL (ref 70–99)
GLUCOSE BLD-MCNC: 175 MG/DL (ref 60–115)
GLUCOSE BLD-MCNC: 217 MG/DL (ref 60–115)
HCT VFR BLD CALC: 32 % (ref 42–52)
HEMOGLOBIN: 10.5 G/DL (ref 14–18)
INR BLD: 2.3
LV EF: 8 %
LVEF MODALITY: NORMAL
LYMPHOCYTES ABSOLUTE: 0.7 K/UL (ref 1–4.8)
LYMPHOCYTES RELATIVE PERCENT: 7.6 %
MAGNESIUM: 2.2 MG/DL (ref 1.7–2.4)
MCH RBC QN AUTO: 28.1 PG (ref 27–31.3)
MCHC RBC AUTO-ENTMCNC: 32.8 % (ref 33–37)
MCV RBC AUTO: 85.7 FL (ref 80–100)
MONOCYTES ABSOLUTE: 0.7 K/UL (ref 0.2–0.8)
MONOCYTES RELATIVE PERCENT: 7.6 %
NEUTROPHILS ABSOLUTE: 7.5 K/UL (ref 1.4–6.5)
NEUTROPHILS RELATIVE PERCENT: 84.5 %
PDW BLD-RTO: 17.3 % (ref 11.5–14.5)
PERFORMED ON: ABNORMAL
PHOSPHORUS: 2.8 MG/DL (ref 2.3–4.8)
PLATELET # BLD: 152 K/UL (ref 130–400)
POTASSIUM SERPL-SCNC: 4.1 MEQ/L (ref 3.4–4.9)
PROTHROMBIN TIME: 25 SEC (ref 12.3–14.9)
RBC # BLD: 3.74 M/UL (ref 4.7–6.1)
SODIUM BLD-SCNC: 132 MEQ/L (ref 135–144)
TSH REFLEX: 1.83 UIU/ML (ref 0.44–3.86)
WBC # BLD: 8.9 K/UL (ref 4.8–10.8)

## 2021-01-11 PROCEDURE — 84443 ASSAY THYROID STIM HORMONE: CPT

## 2021-01-11 PROCEDURE — 80048 BASIC METABOLIC PNL TOTAL CA: CPT

## 2021-01-11 PROCEDURE — 6370000000 HC RX 637 (ALT 250 FOR IP): Performed by: INTERNAL MEDICINE

## 2021-01-11 PROCEDURE — 93306 TTE W/DOPPLER COMPLETE: CPT

## 2021-01-11 PROCEDURE — 85025 COMPLETE CBC W/AUTO DIFF WBC: CPT

## 2021-01-11 PROCEDURE — 85610 PROTHROMBIN TIME: CPT

## 2021-01-11 PROCEDURE — 6360000002 HC RX W HCPCS: Performed by: INTERNAL MEDICINE

## 2021-01-11 PROCEDURE — 2580000003 HC RX 258: Performed by: INTERNAL MEDICINE

## 2021-01-11 PROCEDURE — 2060000000 HC ICU INTERMEDIATE R&B

## 2021-01-11 PROCEDURE — 93005 ELECTROCARDIOGRAM TRACING: CPT | Performed by: INTERNAL MEDICINE

## 2021-01-11 PROCEDURE — 84100 ASSAY OF PHOSPHORUS: CPT

## 2021-01-11 PROCEDURE — 36415 COLL VENOUS BLD VENIPUNCTURE: CPT

## 2021-01-11 PROCEDURE — 2700000000 HC OXYGEN THERAPY PER DAY

## 2021-01-11 PROCEDURE — 94761 N-INVAS EAR/PLS OXIMETRY MLT: CPT

## 2021-01-11 PROCEDURE — 83735 ASSAY OF MAGNESIUM: CPT

## 2021-01-11 PROCEDURE — 94640 AIRWAY INHALATION TREATMENT: CPT

## 2021-01-11 PROCEDURE — P9047 ALBUMIN (HUMAN), 25%, 50ML: HCPCS | Performed by: INTERNAL MEDICINE

## 2021-01-11 RX ORDER — WARFARIN SODIUM 5 MG/1
10 TABLET ORAL
Status: COMPLETED | OUTPATIENT
Start: 2021-01-11 | End: 2021-01-11

## 2021-01-11 RX ORDER — ALBUMIN (HUMAN) 12.5 G/50ML
50 SOLUTION INTRAVENOUS ONCE
Status: COMPLETED | OUTPATIENT
Start: 2021-01-11 | End: 2021-01-11

## 2021-01-11 RX ORDER — DOBUTAMINE HYDROCHLORIDE 200 MG/100ML
2.5 INJECTION INTRAVENOUS CONTINUOUS
Status: DISCONTINUED | OUTPATIENT
Start: 2021-01-11 | End: 2021-01-14

## 2021-01-11 RX ADMIN — Medication 10 ML: at 08:55

## 2021-01-11 RX ADMIN — WARFARIN SODIUM 10 MG: 5 TABLET ORAL at 17:16

## 2021-01-11 RX ADMIN — FUROSEMIDE 40 MG: 10 INJECTION, SOLUTION INTRAMUSCULAR; INTRAVENOUS at 08:55

## 2021-01-11 RX ADMIN — ALBUMIN (HUMAN) 50 G: 0.25 INJECTION, SOLUTION INTRAVENOUS at 18:22

## 2021-01-11 RX ADMIN — INSULIN LISPRO 4 UNITS: 100 INJECTION, SOLUTION INTRAVENOUS; SUBCUTANEOUS at 12:00

## 2021-01-11 RX ADMIN — DONEPEZIL HYDROCHLORIDE 10 MG: 10 TABLET, FILM COATED ORAL at 21:04

## 2021-01-11 RX ADMIN — IPRATROPIUM BROMIDE AND ALBUTEROL SULFATE 1 AMPULE: .5; 3 SOLUTION RESPIRATORY (INHALATION) at 13:27

## 2021-01-11 RX ADMIN — FUROSEMIDE 40 MG: 10 INJECTION, SOLUTION INTRAMUSCULAR; INTRAVENOUS at 21:44

## 2021-01-11 RX ADMIN — INSULIN GLARGINE 10 UNITS: 100 INJECTION, SOLUTION SUBCUTANEOUS at 00:45

## 2021-01-11 RX ADMIN — INSULIN LISPRO 2 UNITS: 100 INJECTION, SOLUTION INTRAVENOUS; SUBCUTANEOUS at 08:56

## 2021-01-11 RX ADMIN — INSULIN GLARGINE 10 UNITS: 100 INJECTION, SOLUTION SUBCUTANEOUS at 22:30

## 2021-01-11 RX ADMIN — IPRATROPIUM BROMIDE AND ALBUTEROL SULFATE 1 AMPULE: .5; 3 SOLUTION RESPIRATORY (INHALATION) at 07:49

## 2021-01-11 RX ADMIN — ATORVASTATIN CALCIUM 80 MG: 80 TABLET, FILM COATED ORAL at 08:55

## 2021-01-11 RX ADMIN — DIGOXIN 125 MCG: 125 TABLET ORAL at 08:55

## 2021-01-11 RX ADMIN — ENOXAPARIN SODIUM 40 MG: 40 INJECTION SUBCUTANEOUS at 08:55

## 2021-01-11 RX ADMIN — IPRATROPIUM BROMIDE AND ALBUTEROL SULFATE 1 AMPULE: .5; 3 SOLUTION RESPIRATORY (INHALATION) at 20:05

## 2021-01-11 RX ADMIN — Medication 10 ML: at 21:05

## 2021-01-11 RX ADMIN — DOBUTAMINE HYDROCHLORIDE 2.5 MCG/KG/MIN: 200 INJECTION INTRAVENOUS at 17:16

## 2021-01-11 RX ADMIN — SPIRONOLACTONE 25 MG: 25 TABLET ORAL at 08:55

## 2021-01-11 NOTE — FLOWSHEET NOTE
Toprol xl held this AM d/t hypotension. Pt did received lasix and aldactone.  Electronically signed by Ally Holcomb RN on 1/11/2021 at 9:06 AM

## 2021-01-11 NOTE — FLOWSHEET NOTE
Perfect serve to Dr. Vilma Mendez to notify of persistent hypotension and that PM dose of IV lasix was held. Electronically signed by Bob Silverman RN on 1/11/2021 at 5:29 PM    No response from perfect serve. Call out to [facility] physician on call to notify. Electronically signed by Bob Silverman RN on 1/11/2021 at 5:38 PM    Orders from Dr. Rylan Valle for albumin infusion prior to PM IV lasix dose.  Electronically signed by Bob Silverman RN on 1/11/2021 at 5:46 PM

## 2021-01-11 NOTE — PROGRESS NOTES
Nutrition Education    · Verbally reviewed information with patient  · Educated on low sodium guidelines for CHF  · Written educational materials provided. · Contact name and number provided. · Refer to Patient Education activity for more details. Met with pt to review low sodium guidelines for CHF. He stated he does eat food from restaurants at least once per week and does eat some processed foods. Reviewed 2,000 mg sodium limit with examples of foods to avoid as well as foods to choose more often. Reviewed nutrition label reading for comparing foods for best choices and identifying lower sodium foods. Discussed tips for dining out and ways to flavor foods without salt. Recipes for salt-free seasoning blends provided. All questions answered. Pt encouraged to contact RD for further questions/assistance prior to discharge, contact information provided.     Electronically signed by Karlene Rogel RD, SOLOMON on 1/11/21 at 4:38 PM EST

## 2021-01-11 NOTE — PROGRESS NOTES
Hospitalist Progress Note      PCP: Robin Emery MD    Date of Admission: 1/9/2021    Chief Complaint:    Chief Complaint   Patient presents with    Shortness of Breath     sob since last night     Subjective:  Patient states his breathing feels better today and has no complaints. 12 point ROS negative other than mentioned above     Medications:  Reviewed    Infusion Medications    dextrose       Scheduled Medications    warfarin  10 mg Oral Once    insulin lispro  5 Units Subcutaneous TID WC    insulin glargine  10 Units Subcutaneous Nightly    atorvastatin  80 mg Oral Daily    digoxin  125 mcg Oral Daily    donepezil  10 mg Oral Nightly    warfarin (COUMADIN) daily dosing (placeholder)   Other RX Placeholder    sodium chloride flush  10 mL Intravenous 2 times per day    [Held by provider] sacubitril-valsartan  1 tablet Oral BID    metoprolol succinate  25 mg Oral Daily    furosemide  40 mg Intravenous BID    spironolactone  25 mg Oral Daily    ipratropium-albuterol  1 ampule Inhalation TID    insulin lispro  0-12 Units Subcutaneous TID WC    insulin lispro  0-6 Units Subcutaneous Nightly     PRN Meds: sodium chloride flush, promethazine **OR** ondansetron, polyethylene glycol, acetaminophen **OR** acetaminophen, potassium chloride **OR** potassium alternative oral replacement **OR** potassium chloride, magnesium sulfate, albuterol, glucose, dextrose, glucagon (rDNA), dextrose    Intake/Output Summary (Last 24 hours) at 1/11/2021 1147  Last data filed at 1/11/2021 1119  Gross per 24 hour   Intake 480 ml   Output 1225 ml   Net -745 ml     Exam:    BP 96/65   Pulse 82   Temp 97.5 °F (36.4 °C)   Resp 16   Ht 5' 11\" (1.803 m)   Wt 206 lb (93.4 kg)   SpO2 99%   BMI 28.73 kg/m²     General appearance: No apparent distress, appears stated age and cooperative. HEENT:  Conjunctivae/corneas clear. Neck:  Trachea midline. Respiratory:  Normal respiratory effort.  Clear to auscultation Cardiovascular: Regular rate and rhythm   Abdomen: Soft, non-tender, non-distended with normal bowel sounds. Musculoskeletal: No clubbing, cyanosis or edema bilaterally. .  Neuro: Non Focal.   Capillary Refill: Brisk,< 3 seconds   Peripheral Pulses: +2 palpable, equal bilaterally     Labs:   Recent Labs     01/09/21  1115 01/10/21  0612 01/11/21  0547   WBC 4.7* 8.5 8.9   HGB 11.0* 10.3* 10.5*   HCT 34.0* 31.2* 32.0*    151 152     Recent Labs     01/09/21  1115 01/10/21  0612 01/11/21  0547    132* 132*   K 5.3* 4.4 4.1    95 96   CO2 25 25 25   BUN 38* 44* 43*   CREATININE 1.36* 1.76* 1.53*   CALCIUM 8.9 9.1 9.0   PHOS  --   --  2.8     Recent Labs     01/09/21  1115   AST 31   ALT 27   BILITOT 1.2*   ALKPHOS 191*     Recent Labs     01/09/21  1115 01/10/21  1116 01/11/21  0547   INR 2.6 2.5 2.3     Recent Labs     01/09/21  1115   TROPONINI 0.013*     Urinalysis:      Lab Results   Component Value Date    NITRU Negative 12/13/2020    WBCUA 3-5 12/13/2020    BACTERIA Negative 12/13/2020    RBCUA 6-10 12/13/2020    BLOODU TRACE 12/13/2020    SPECGRAV 1.022 12/13/2020    GLUCOSEU Negative 12/13/2020     Radiology:  XR CHEST PORTABLE   Final Result      Findings likely representing fluid overload/CHF. Superimposed bibasilar pneumonia not excluded. Assessment/Plan:    1. Acute on chronic combined systolic and diastolic CHF:  Improved but patients blood pressure is borderline with his entresto on hold; cardiology and nephrology are helping manage  2. Cough with atypical CXR findings:  COVID ruled out  3. DMII:  SSI  4. BERTRAM on CKD:  Slight bump in renal function; unclear volume status; consult nephrology for their opinion Renally dose meds; improving; holding ace  5. HTN/HLD:  Continue home meds  6. Chronic hypoxic resp failure:  Continue home meds  7. Functional Status: Fall precautions. Up with assistance. PT OT  8. Diet: Cardiac Diabetic   9.  DVT ppx: Lovenox SCDs 10. Disposition: Dependent on hospital course. Will discharge once medically stable. SW on board for discharge planning. Active Hospital Problems    Diagnosis Date Noted    Acute on chronic combined systolic (congestive) and diastolic (congestive) heart failure ([facility] [address] [I50.43] 01/09/2021     Additional work up or/and treatment plan may be added today or then after based on clinical progression. I am managing a portion of pt care. Some medical issues are handled by other specialists. Additional work up and treatment should be done in out pt setting by pt PCP and other out pt providers. In addition to examining and evaluating pt, I spent additional time explaining care, normal and abnormal findings, and treatment plan. All of pt questions were answered. Counseling, diet and education were  provided. Case will be discussed with nursing staff when appropriate. Family will be updated if and when appropriate.       Diet: DIET LOW SODIUM 2 GM; Carb Control: 5 carb choices (75 gms)/meal    Code Status: Full Code    PT/OT Eval     Electronically signed by Heron Trevizo MD on 1/11/2021 at 11:47 AM

## 2021-01-11 NOTE — PROGRESS NOTES
Nephrology Progress Note    Assessment/  78years old male presented to the emergency department for further evaluation and management of approximately 3 days duration of gradual onset and progressive course of lower extremity swelling and dyspnea progressed to dyspnea on minimal exertion admitted with a diagnosis of decompensated heart failure the patient does carry the chronic comorbidity of morbid obesity diabetes type 2 and chronic kidney disease stage II/III secondary to diabetic nephropathy w/ baseline Scr ~1.3-1.5 w/ eGFR =/>mid 50s     -Acute kidney injury secondary to cardiorenal syndrome type I  -Chronic kidney disease stage III secondary to diabetic nephropathy  -Volume overload with increased extracellular fluid volume secondary to decompensated heart failure the precipitating factor at this point of time is unclear whether it is infectious or dietary the patient did not have any that could explain that as well as no leukocytosis or left shift  -No associated electrolyte or acid-base imbalance  - borderline hypotensive   -No anemia requiring further management at this point of time        Plan/  - continue lasix 40 mg IV BID   - agree w/ continuing to hold entresto as BP remains low   - should be able to transition to PO diuretics soon as pt not currently overloaded w/ function close to baseline       Patient Active Problem List:     Uncontrolled type 2 diabetes mellitus with complication, with long-term current use of insulin (HCC)     Noncompliance     Pain in joint, multiple sites     COPD (chronic obstructive pulmonary disease) ([address]     Diabetes mellitus with insulin therapy ([address]     Hyperlipidemia     Hypertension     Generalized atherosclerosis     TIA (transient ischemic attack)     Elevation of level of transaminase or lactic acid dehydrogenase (LDH)     Tobacco dependence syndrome     Disorder of muscle     Mitral valve insufficiency     Acute lymphadenitis     Disorder of pancreas Left heart failure (HCC)     Irritable bowel syndrome     Hyponatremia     Atherosclerosis of coronary artery     Generalized ischemic myocardial dysfunction     Coronary arteriosclerosis in native artery     Atrial flutter (HCC)     Acute on chronic systolic CHF (congestive heart failure), NYHA class 4 (HCC)     Paroxysmal A-fib (HCC)     Chronic combined systolic and diastolic heart failure (HCC)     Hemoptysis     SOB (shortness of breath)     CHF (congestive heart failure), NYHA class III, chronic, combined (HCC)     CHF (congestive heart failure), NYHA class I, acute on chronic, combined (HCC)     Acute systolic CHF (congestive heart failure) (HCC)     CHF (congestive heart failure), NYHA class IV, acute on chronic, combined ([address]     Phimosis/redundant prepuce     Moderate malnutrition (HCC)     Amiodarone-induced hyperthyroidism     Hyperthyroidism     Uncontrolled type 2 diabetes mellitus with hyperglycemia (HCC)     BERTRAM (acute kidney injury) ([address]     Anticoagulant long-term use     Blurred vision, bilateral     Cardiomyopathy of end-stage congenital heart disease ([address]     Chest pain     Cough in adult     Dizziness     Fatigue     Fever     Former smoker     Gout, arthritis     High risk medication use     Hyperkalemia     Hypokalemia     Hypotension (arterial)     ICD (implantable cardioverter-defibrillator) discharge     Obese     Overweight     Paroxysmal ventricular tachycardia (HCC)     Presence of automatic implantable cardioverter-defibrillator     Status post angioplasty     Swelling of multiple joints     Hypoxia     Ventricular tachycardia (HCC)     Sustained VT (ventricular tachycardia) (HCC)     Acute decompensated heart failure (HCC)     CKD (chronic kidney disease) stage 3, GFR 30-59 ml/min     Elevated bilirubin     Atrial fibrillation (HCC)     Acute on chronic combined systolic (congestive) and diastolic (congestive) heart failure (HCC)      Subjective:  Admit Date: 1/9/2021 Interval History: Scr slightly better today, no acute overnight events, remains borderline hypotensive     Medications:  Scheduled Meds:   warfarin  10 mg Oral Once    insulin lispro  5 Units Subcutaneous TID WC    insulin glargine  10 Units Subcutaneous Nightly    atorvastatin  80 mg Oral Daily    digoxin  125 mcg Oral Daily    donepezil  10 mg Oral Nightly    warfarin (COUMADIN) daily dosing (placeholder)   Other RX Placeholder    sodium chloride flush  10 mL Intravenous 2 times per day    [Held by provider] sacubitril-valsartan  1 tablet Oral BID    metoprolol succinate  25 mg Oral Daily    furosemide  40 mg Intravenous BID    spironolactone  25 mg Oral Daily    ipratropium-albuterol  1 ampule Inhalation TID    insulin lispro  0-12 Units Subcutaneous TID WC    insulin lispro  0-6 Units Subcutaneous Nightly     Continuous Infusions:   dextrose         CBC:   Recent Labs     01/10/21  0612 01/11/21  0547   WBC 8.5 8.9   HGB 10.3* 10.5*    152     CMP:    Recent Labs     01/09/21  1115 01/10/21  0612 01/11/21  0547    132* 132*   K 5.3* 4.4 4.1    95 96   CO2 25 25 25   BUN 38* 44* 43*   CREATININE 1.36* 1.76* 1.53*   GLUCOSE 189* 304* 161*   CALCIUM 8.9 9.1 9.0   LABGLOM 52.0* 38.6* 45.4*     Troponin:   Recent Labs     01/09/21  1115   TROPONINI 0.013*     BNP: No results for input(s): BNP in the last 72 hours. INR:   Recent Labs     01/11/21  0547   INR 2.3     Lipids:   Recent Labs     01/10/21  0612   CHOL 93   TRIG 38   HDL 51     Liver:   Recent Labs     01/09/21  1115   AST 31   ALT 27   ALKPHOS 191*   PROT 6.9   LABALBU 3.8   BILITOT 1.2*     Iron:  No results for input(s): IRONS, FERRITIN in the last 72 hours. Invalid input(s): LABIRONS  Urinalysis: No results for input(s): UA in the last 72 hours.     Objective:  Vitals: BP 96/65   Pulse 82   Temp 97.5 °F (36.4 °C)   Resp 16   Ht 5' 11\" (1.803 m)   Wt 206 lb (93.4 kg)   SpO2 99%   BMI 28.73 kg/m² Wt Readings from Last 3 Encounters:   01/09/21 206 lb (93.4 kg)   01/04/21 200 lb (90.7 kg)   12/13/20 200 lb (90.7 kg)      24HR INTAKE/OUTPUT:      Intake/Output Summary (Last 24 hours) at 1/11/2021 1211  Last data filed at 1/11/2021 1119  Gross per 24 hour   Intake 480 ml   Output 1225 ml   Net -745 ml       General: alert, in no apparent distress  HEENT: normocephalic, atraumatic, anicteric  Lungs: non-labored respirations, clear to auscultation bilaterally  Heart: regular rate and rhythm, no murmurs or rubs  Abdomen: soft, non-tender, non-distended  Ext: no cyanosis, no peripheral edema  Neuro: alert and oriented, no gross abnormalities      Electronically signed by Bailee Ramos MD

## 2021-01-11 NOTE — PROGRESS NOTES
Clinical Pharmacy Note    Warfarin consult follow-up    Recent Labs     01/11/21  0547   INR 2.3     Recent Labs     01/09/21  1115 01/10/21  0612 01/11/21  0547   HGB 11.0* 10.3* 10.5*   HCT 34.0* 31.2* 32.0*    151 152       Significant drug:drug interactions:  None (Digoxin home med - continued)     Current diet order/intake: Low sodium    Notes:     Date INR Warfarin Dose      1-10-21     2.5         5 mg      1-11-21     2.3       10 mg                                                  INR today is therapeutic at 2.3, goal INR 2-3, for Afib. Continuing home warfarin dose of 10 mg today, (normal warfarin schedule of 10 mg on Mondays and 5 mg on all other days). Daily PT/INR during pharmacy consult to monitor and dose warfarin therapy. Syed Fry PharmD.  Candidate 2021  9:24 AM 1/11/2021

## 2021-01-11 NOTE — CONSULTS
[facility] Nephrology  Consult Note           Reason for Consult: Acute kidney injury on top of chronic kidney disease associated with increased extracellular fluid volume in a clinical setting of decompensated heart failure  Requesting Physician:  Dr. Tiny Law  Chief Complaint: Better onset of progressive course of shortness of breath increased extracellular fluid volume in the form of lower extremity edema  History Obtained From:  patient, electronic medical record    History of Present Ilness:    78y.o. year old male admitted with gradual onset and progressive course for the last 3 days of lower extremity edema and shortness of breath progressed to dyspnea on minimal exertion clinical laboratory assessment in the emergency department did meet on admission to telemetry with a diagnosis of  Past Medical History:        Diagnosis Date    Amiodarone-induced hyperthyroidism     Arthritis     Atrial flutter ([address]     CAD (coronary artery disease)     Chronic combined systolic and diastolic CHF (congestive heart failure) (HCC)     CKD (chronic kidney disease) stage 3, GFR 30-59 ml/min     COPD (chronic obstructive pulmonary disease) ([address]     Defibrillator activation     Diabetes mellitus with insulin therapy ([address]     Dilated cardiomyopathy ([address]     Generalized and unspecified atherosclerosis     Gout     Hyperlipidemia     Hypertension     Noncompliance     Obesity     On home oxygen therapy     Pain in joint, multiple sites     Paroxysmal A-fib ([address]     Paroxysmal ventricular tachycardia (HCC)     Prolonged emergence from general anesthesia     Status post angioplasty     Sustained ventricular tachycardia ([address]     TIA (transient ischemic attack)     Tobacco abuse     Type II or unspecified type diabetes mellitus without mention of complication, not stated as uncontrolled        Past Surgical History:        Procedure Laterality Date    CARDIAC CATHETERIZATION      COLONOSCOPY  donepezil (ARICEPT) 10 MG tablet TAKE 1 TABLET BY MOUTH EVERY DAY AT NIGHT (Patient taking differently: Take 10 mg by mouth nightly ) 90 tablet 1    allopurinol (ZYLOPRIM) 100 MG tablet TAKE 1 TABLET DAILY (Patient taking differently: Take 100 mg by mouth daily TAKE 1 TABLET DAILY) 90 tablet 3    bumetanide (BUMEX) 1 MG tablet Take 1 mg by mouth 2 times daily  60 tablet 11    nitroGLYCERIN (NITROSTAT) 0.4 MG SL tablet Place 1 tablet under the tongue every 5 minutes as needed for Chest pain 25 tablet 0    spironolactone (ALDACTONE) 25 MG tablet Take 1 tablet by mouth daily 30 tablet 3    atorvastatin (LIPITOR) 80 MG tablet Take 80 mg by mouth daily       albuterol (PROVENTIL) (2.5 MG/3ML) 0.083% nebulizer solution Take 3 mLs by nebulization every 6 hours as needed for Wheezing 120 each 5    pantoprazole (PROTONIX) 40 MG tablet Take 1 tablet by mouth every morning (before breakfast) (Patient taking differently: Take 40 mg by mouth daily ) 30 tablet 3    DIGITEK 125 MCG tablet TAKE 1 TABLET DAILY (Patient taking differently: Take 125 mcg by mouth daily ) 90 tablet 3    albuterol (PROAIR HFA) 108 (90 BASE) MCG/ACT inhaler Inhale 2 puffs into the lungs every 4 hours as needed for Wheezing 3 Inhaler 0    warfarin (COUMADIN) 7.5 MG tablet 7.5mg daily (Patient taking differently: Take 7.5 mg by mouth 7.5mg daily) 30 tablet 3       Allergies:  Dye [iodides], Penicillins, Plavix [clopidogrel], and Tapazole [methimazole]    Social History:    Social History     Socioeconomic History    Marital status:      Spouse name: Not on file    Number of children: 2    Years of education: 12    Highest education level: High school graduate   Occupational History    Not on file   Social Needs    Financial resource strain: Somewhat hard    Food insecurity     Worry: Never true     Inability: Never true    Transportation needs     Medical: No     Non-medical: No   Tobacco Use    Smoking status: Former Smoker Packs/day: 1.50     Years: 25.00     Pack years: 45.53     Quit date: 2012     Years since quittin.0    Smokeless tobacco: Never Used   Substance and Sexual Activity    Alcohol use: Not Currently    Drug use: No    Sexual activity: Not on file   Lifestyle    Physical activity     Days per week: 0 days     Minutes per session: 0 min    Stress: Not at all   Relationships    Social connections     Talks on phone: More than three times a week     Gets together: More than three times a week     Attends [employer] service: More than 4 times per year     Active member of club or organization: Yes     Attends meetings of clubs or organizations: 1 to 4 times per year     Relationship status:     Intimate partner violence     Fear of current or ex partner: Not on file     Emotionally abused: Not on file     Physically abused: Not on file     Forced sexual activity: Not on file   Other Topics Concern    Not on file   Social History Narrative    Not on file       Family History:   Family History   Problem Relation Age of Onset    Cancer Brother     Diabetes Mother     Heart Disease Father     Emphysema Father        Review of Systems:   Other than the shortness of breath and increased swelling of lower extremity any fever or chills no productive or nonproductive cough no nausea emesis or diarrhea no dysuria no near syncope or syncope no orthopnea proximal nocturnal dyspnea or chest pain no skin or joint involvement and no any other organ system related symptoms    Physical exam:   Constitutional:  VITALS:  BP (!) 94/59   Pulse 84   Temp 97.7 °F (36.5 °C) (Oral)   Resp 17   Ht 5' 11\" (1.803 m)   Wt 206 lb (93.4 kg)   SpO2 100%   BMI 28.73 kg/m²   Gen: alert, awake, nad  Skin: no rash, turgor wnl  Heent:  eomi, mmm  Neck: no bruits or jvd noted, thyroid normal  Lungs:  Normal expansion. Clear to auscultation. No rales, rhonchi, or wheezing. Heart: Heart sounds are normal.  Regular rate and rhythm without murmur, gallop or rub. Abdomen:  +bs, soft, nt, nd, no hepatosplenomegaly   Extremities: Extremities warm to touch, pink, with no edema. Psychiatric: mood and affect appropriate; judgement and insight intact  Musculoskeletal:  Rom, muscular strength intact; digits, nails normal    Data/  CBC:   Lab Results   Component Value Date    WBC 8.5 01/10/2021    RBC 3.64 01/10/2021    RBC 4.75 09/16/2011    HGB 10.3 01/10/2021    HCT 31.2 01/10/2021    MCV 85.7 01/10/2021    MCH 28.2 01/10/2021    MCHC 32.9 01/10/2021    RDW 16.9 01/10/2021     01/10/2021    MPV 9.4 08/19/2015     BMP:    Lab Results   Component Value Date     01/10/2021    K 4.4 01/10/2021    K 4.2 09/12/2020    CL 95 01/10/2021    CO2 25 01/10/2021    BUN 44 01/10/2021    LABALBU 3.8 01/09/2021    CREATININE 1.76 01/10/2021    CALCIUM 9.1 01/10/2021    GFRAA 46.7 01/10/2021    LABGLOM 38.6 01/10/2021    GLUCOSE 304 01/10/2021    GLUCOSE 128 05/09/2012     Xr Chest Portable    Result Date: 1/9/2021  Exam: XR CHEST PORTABLE History: Shortness breath. CHF. Technique: AP portable view of the chest obtained. Comparison: Portable chest radiograph from December 13, 2020 Findings: Left-sided cardiac defibrillator is present. Atherosclerotic calcification of the thoracic aorta. Moderate cardiomegaly. Pulmonary vascular congestion. Patchy and linear bibasilar opacities. No pneumothorax or pleural effusion. Findings likely representing fluid overload/CHF. Superimposed bibasilar pneumonia not excluded.      Xr Chest Portable    Result Date: 12/14/2020 Please do not hesitate to call with questions.     Rudy Johansen

## 2021-01-11 NOTE — PROGRESS NOTES
[facility] [address] [facility] [place] Note      Patient: Storm Peck. Unit/Bed: N673/V446-62  YOB: 1950  MRN: [id number]  Admit Date:  1/9/2021  HOSPITAL day #   Rounding Cardiologist : Lauren Mishra     Subjective Complaint:   I am OK. I am coughing. Wilfred Avitia Physical Examination:     BP 93/65   Pulse 96   Temp 98.1 °F (36.7 °C)   Resp 16   Ht 5' 11\" (1.803 m)   Wt 206 lb (93.4 kg)   SpO2 96%   BMI 28.73 kg/m²  Laying flat distant breath sounds RRR. No edema. Intake/Output Summary (Last 24 hours) at 1/11/2021 1516  Last data filed at 1/11/2021 1311  Gross per 24 hour   Intake 720 ml   Output 1125 ml   Net -405 ml     Weights  Wt Readings from Last 3 Encounters:   01/09/21 206 lb (93.4 kg)   01/04/21 200 lb (90.7 kg)   12/13/20 200 lb (90.7 kg)     Assessment:  1. Chronic systolic biventricular heart failure stage D due to end-stage cardiomyopathy EF around 5 to 10%  2. Not much room to uptitrate medications because of borderline blood pressures  3. Unable to maximize heart failure medications because of symptomatic arterial hypotension  4. High risk medication-amiodarone and warfarin    Recommendations:  1. Previously I have tried IV dobutamine, but patient did not tolerate. We could try again at 2.5 mics per kilo per minute. 2.  Nephrology addressing fluid and electrolytes. 3.  High risk for readmission. ECHO:       Left ventricular size is moderately increased . Mild concentric left ventricular hypertrophy. Generalized hypokinesis of the left ventricle with overall severe   impairment of LV systolic function. Left ventricular ejection fraction is visually estimated at 5-10%. Mildly enlarged right ventricle cavity. Right ventricle global systolic function is moderately reduced . Pacer wire visualized in right ventricle. Severe left atrial enlargement. Moderate right atrial enlargement. Mitral valve leaflets are mildly thickened with preserved leaflet   mobility. Mild (1-2+) mitral regurgitation is present. Moderate annular calcification. Mildly thickened aortic valve leaflets with preserved leaflet mobility. Moderate (2-3+) tricuspid regurgitation with estimated RVSP of 45 mm Hg. Signature   ----------------------------------------------------------------   Electronically signed by Chuy Bui MD(Interpreting   physician) on 01/11/2021 12:27 PM      LABS:   CBC:   Recent Labs     01/09/21  1115 01/10/21  0612 01/11/21  0547   WBC 4.7* 8.5 8.9   HGB 11.0* 10.3* 10.5*    151 152      BMP:    Recent Labs     01/09/21  1115 01/10/21  0612 01/11/21  0547    132* 132*   K 5.3* 4.4 4.1    95 96   CO2 25 25 25   BUN 38* 44* 43*   CREATININE 1.36* 1.76* 1.53*   GLUCOSE 189* 304* 161*              Troponin: No results for input(s): TROPONINT in the last 72 hours.      BNP:   Recent Labs     01/09/21  1115   PROBNP 6,112      INR:   Recent Labs     01/09/21  1115 01/10/21  1116 01/11/21  0547   INR 2.6 2.5 2.3      Mg:   Recent Labs     01/11/21  0547   MG 2.2         Electronically signed by Renate Cespedes MD on 1/11/2021 at 3:16 PM

## 2021-01-12 ENCOUNTER — CARE COORDINATION (OUTPATIENT)
Dept: CARE COORDINATION | Age: 71
End: 2021-01-12

## 2021-01-12 LAB
ANION GAP SERPL CALCULATED.3IONS-SCNC: 11 MEQ/L (ref 9–15)
BASOPHILS ABSOLUTE: 0 K/UL (ref 0–0.2)
BASOPHILS RELATIVE PERCENT: 0.5 %
BUN BLDV-MCNC: 32 MG/DL (ref 8–23)
CALCIUM SERPL-MCNC: 8.9 MG/DL (ref 8.5–9.9)
CHLORIDE BLD-SCNC: 91 MEQ/L (ref 95–107)
CO2: 27 MEQ/L (ref 20–31)
CREAT SERPL-MCNC: 1.32 MG/DL (ref 0.7–1.2)
EOSINOPHILS ABSOLUTE: 0 K/UL (ref 0–0.7)
EOSINOPHILS RELATIVE PERCENT: 0.1 %
GFR AFRICAN AMERICAN: >60
GFR NON-AFRICAN AMERICAN: 53.8
GLUCOSE BLD-MCNC: 143 MG/DL (ref 60–115)
GLUCOSE BLD-MCNC: 148 MG/DL (ref 60–115)
GLUCOSE BLD-MCNC: 191 MG/DL (ref 60–115)
GLUCOSE BLD-MCNC: 238 MG/DL (ref 70–99)
GLUCOSE BLD-MCNC: 252 MG/DL (ref 60–115)
HCT VFR BLD CALC: 32.1 % (ref 42–52)
HEMOGLOBIN: 10.4 G/DL (ref 14–18)
INR BLD: 2
LYMPHOCYTES ABSOLUTE: 0.6 K/UL (ref 1–4.8)
LYMPHOCYTES RELATIVE PERCENT: 8 %
MCH RBC QN AUTO: 27.6 PG (ref 27–31.3)
MCHC RBC AUTO-ENTMCNC: 32.3 % (ref 33–37)
MCV RBC AUTO: 85.3 FL (ref 80–100)
MONOCYTES ABSOLUTE: 0.8 K/UL (ref 0.2–0.8)
MONOCYTES RELATIVE PERCENT: 10.2 %
NEUTROPHILS ABSOLUTE: 6.3 K/UL (ref 1.4–6.5)
NEUTROPHILS RELATIVE PERCENT: 81.2 %
PDW BLD-RTO: 17.1 % (ref 11.5–14.5)
PERFORMED ON: ABNORMAL
PLATELET # BLD: 148 K/UL (ref 130–400)
POTASSIUM SERPL-SCNC: 4 MEQ/L (ref 3.4–4.9)
PRO-BNP: 8683 PG/ML
PROTHROMBIN TIME: 22.9 SEC (ref 12.3–14.9)
RBC # BLD: 3.76 M/UL (ref 4.7–6.1)
SODIUM BLD-SCNC: 129 MEQ/L (ref 135–144)
WBC # BLD: 7.7 K/UL (ref 4.8–10.8)

## 2021-01-12 PROCEDURE — 94640 AIRWAY INHALATION TREATMENT: CPT

## 2021-01-12 PROCEDURE — 2060000000 HC ICU INTERMEDIATE R&B

## 2021-01-12 PROCEDURE — 93010 ELECTROCARDIOGRAM REPORT: CPT | Performed by: INTERNAL MEDICINE

## 2021-01-12 PROCEDURE — 6370000000 HC RX 637 (ALT 250 FOR IP): Performed by: INTERNAL MEDICINE

## 2021-01-12 PROCEDURE — 6360000002 HC RX W HCPCS: Performed by: INTERNAL MEDICINE

## 2021-01-12 PROCEDURE — 85025 COMPLETE CBC W/AUTO DIFF WBC: CPT

## 2021-01-12 PROCEDURE — 85610 PROTHROMBIN TIME: CPT

## 2021-01-12 PROCEDURE — 94761 N-INVAS EAR/PLS OXIMETRY MLT: CPT

## 2021-01-12 PROCEDURE — 94664 DEMO&/EVAL PT USE INHALER: CPT

## 2021-01-12 PROCEDURE — 97165 OT EVAL LOW COMPLEX 30 MIN: CPT

## 2021-01-12 PROCEDURE — 2580000003 HC RX 258: Performed by: INTERNAL MEDICINE

## 2021-01-12 PROCEDURE — 80048 BASIC METABOLIC PNL TOTAL CA: CPT

## 2021-01-12 PROCEDURE — 97161 PT EVAL LOW COMPLEX 20 MIN: CPT

## 2021-01-12 PROCEDURE — 36415 COLL VENOUS BLD VENIPUNCTURE: CPT

## 2021-01-12 PROCEDURE — 83880 ASSAY OF NATRIURETIC PEPTIDE: CPT

## 2021-01-12 RX ORDER — METOPROLOL SUCCINATE 25 MG/1
25 TABLET, EXTENDED RELEASE ORAL DAILY
Status: DISCONTINUED | OUTPATIENT
Start: 2021-01-13 | End: 2021-01-14 | Stop reason: HOSPADM

## 2021-01-12 RX ORDER — ATORVASTATIN CALCIUM 40 MG/1
40 TABLET, FILM COATED ORAL NIGHTLY
Status: DISCONTINUED | OUTPATIENT
Start: 2021-01-12 | End: 2021-01-14 | Stop reason: HOSPADM

## 2021-01-12 RX ORDER — WARFARIN SODIUM 5 MG/1
5 TABLET ORAL
Status: COMPLETED | OUTPATIENT
Start: 2021-01-12 | End: 2021-01-12

## 2021-01-12 RX ADMIN — Medication 10 ML: at 21:44

## 2021-01-12 RX ADMIN — DIGOXIN 125 MCG: 125 TABLET ORAL at 08:58

## 2021-01-12 RX ADMIN — INSULIN GLARGINE 10 UNITS: 100 INJECTION, SOLUTION SUBCUTANEOUS at 21:44

## 2021-01-12 RX ADMIN — DONEPEZIL HYDROCHLORIDE 10 MG: 10 TABLET, FILM COATED ORAL at 21:43

## 2021-01-12 RX ADMIN — WARFARIN SODIUM 5 MG: 5 TABLET ORAL at 18:09

## 2021-01-12 RX ADMIN — ATORVASTATIN CALCIUM 40 MG: 40 TABLET, FILM COATED ORAL at 21:43

## 2021-01-12 RX ADMIN — FUROSEMIDE 40 MG: 10 INJECTION, SOLUTION INTRAMUSCULAR; INTRAVENOUS at 08:58

## 2021-01-12 RX ADMIN — Medication 10 ML: at 08:59

## 2021-01-12 RX ADMIN — INSULIN LISPRO 2 UNITS: 100 INJECTION, SOLUTION INTRAVENOUS; SUBCUTANEOUS at 18:10

## 2021-01-12 RX ADMIN — IPRATROPIUM BROMIDE AND ALBUTEROL SULFATE 1 AMPULE: .5; 3 SOLUTION RESPIRATORY (INHALATION) at 19:27

## 2021-01-12 RX ADMIN — ATORVASTATIN CALCIUM 80 MG: 80 TABLET, FILM COATED ORAL at 08:59

## 2021-01-12 RX ADMIN — INSULIN LISPRO 2 UNITS: 100 INJECTION, SOLUTION INTRAVENOUS; SUBCUTANEOUS at 13:05

## 2021-01-12 RX ADMIN — FUROSEMIDE 40 MG: 10 INJECTION, SOLUTION INTRAMUSCULAR; INTRAVENOUS at 18:09

## 2021-01-12 RX ADMIN — IPRATROPIUM BROMIDE AND ALBUTEROL SULFATE 1 AMPULE: .5; 3 SOLUTION RESPIRATORY (INHALATION) at 07:33

## 2021-01-12 RX ADMIN — METOPROLOL SUCCINATE 25 MG: 25 TABLET, EXTENDED RELEASE ORAL at 08:58

## 2021-01-12 RX ADMIN — INSULIN LISPRO 6 UNITS: 100 INJECTION, SOLUTION INTRAVENOUS; SUBCUTANEOUS at 08:56

## 2021-01-12 RX ADMIN — SPIRONOLACTONE 25 MG: 25 TABLET ORAL at 08:58

## 2021-01-12 RX ADMIN — IPRATROPIUM BROMIDE AND ALBUTEROL SULFATE 1 AMPULE: .5; 3 SOLUTION RESPIRATORY (INHALATION) at 13:57

## 2021-01-12 ASSESSMENT — PAIN SCALES - GENERAL
PAINLEVEL_OUTOF10: 0
PAINLEVEL_OUTOF10: 0

## 2021-01-12 NOTE — PROGRESS NOTES
Physical Therapy Med Surg Initial Assessment  Facility[facility]Department: [facility]  Room: [place] [address]       NAME: Love Hyatt.   : 1950 (79 y.o.)  MRN: [id number]  CODE STATUS: Full Code    Date of Service: 2021    Patient Diagnosis(es): Acute on chronic combined systolic (congestive) and diastolic (congestive) heart failure ([address] [I50.43]   Chief Complaint   Patient presents with    Shortness of Breath     sob since last night     Patient Active Problem List    Diagnosis Date Noted    Sustained VT (ventricular tachycardia) ([address] 2019     Priority: High    Acute on chronic combined systolic (congestive) and diastolic (congestive) heart failure ([address] 2021    Atrial fibrillation ([address] 10/26/2020    Elevated bilirubin 2019    Acute decompensated heart failure ([address] 2019    CKD (chronic kidney disease) stage 3, GFR 30-59 ml/min     Ventricular tachycardia ([address] 2019    Hypoxia 2019    BERTRAM (acute kidney injury) ([address] 12/10/2018    Anticoagulant long-term use 12/10/2018    Blurred vision, bilateral 12/10/2018    Cardiomyopathy of end-stage congenital heart disease ([address] 12/10/2018    Chest pain 12/10/2018    Cough in adult 12/10/2018    Dizziness 12/10/2018    Fatigue 12/10/2018    Fever 12/10/2018    Former smoker 12/10/2018    Gout, arthritis 12/10/2018    High risk medication use 12/10/2018    Hyperkalemia 12/10/2018    Hypokalemia 12/10/2018    Hypotension (arterial) 12/10/2018    ICD (implantable cardioverter-defibrillator) discharge 12/10/2018    Obese 12/10/2018    Overweight 12/10/2018    Paroxysmal ventricular tachycardia ([address] 12/10/2018    Presence of automatic implantable cardioverter-defibrillator 12/10/2018    Status post angioplasty 12/10/2018    Swelling of multiple joints 12/10/2018    Amiodarone-induced hyperthyroidism 2018    Hyperthyroidism  Uncontrolled type 2 diabetes mellitus with hyperglycemia (HCC)     Moderate malnutrition ([address] 12/01/2018    Phimosis/redundant prepuce     CHF (congestive heart failure), NYHA class IV, acute on chronic, combined ([address] 05/16/2018    CHF (congestive heart failure), NYHA class I, acute on chronic, combined ([address] 68/25/3389    Acute systolic CHF (congestive heart failure) ([address] 05/15/2018    CHF (congestive heart failure), NYHA class III, chronic, combined ([address] 04/02/2018    SOB (shortness of breath)     Chronic combined systolic and diastolic heart failure ([address] 11/04/2017    Hemoptysis 11/04/2017    Paroxysmal A-fib ([address] 10/01/2016    Pain in joint, multiple sites     COPD (chronic obstructive pulmonary disease) ([address]     Diabetes mellitus with insulin therapy ([address]     Hyperlipidemia     Hypertension     Generalized atherosclerosis     TIA (transient ischemic attack)     Mitral valve insufficiency 04/12/2013    Elevation of level of transaminase or lactic acid dehydrogenase (LDH) 04/10/2013    Atrial flutter ([address] 04/10/2013    Hyponatremia 04/04/2013    Disorder of pancreas 03/28/2013    Generalized ischemic myocardial dysfunction 03/28/2013    Coronary arteriosclerosis in native artery 03/28/2013    Acute on chronic systolic CHF (congestive heart failure), NYHA class 4 ([address] 03/28/2013    Uncontrolled type 2 diabetes mellitus with complication, with long-term current use of insulin ([address] 05/09/2012    Noncompliance 05/09/2012    Disorder of muscle 10/06/2004    Acute lymphadenitis 10/06/2004    Irritable bowel syndrome 10/06/2004    Left heart failure ([address] 06/20/2003    Atherosclerosis of coronary artery 06/20/2003    Tobacco dependence syndrome 06/27/2002        Past Medical History:   Diagnosis Date    Amiodarone-induced hyperthyroidism     Arthritis     Atrial flutter ([address]     CAD (coronary artery disease)  Chronic combined systolic and diastolic CHF (congestive heart failure) (HCC)     CKD (chronic kidney disease) stage 3, GFR 30-59 ml/min     [facility]) [facility]     Defibrillator activation     [facility]     Dilated cardiomyopathy ([facility]     Generalized and unspecified atherosclerosis     Gout     Hyperlipidemia     Hypertension     Noncompliance     Obesity     On home oxygen therapy     Pain in joint, multiple sites     Paroxysmal A-fib (HCC)     Paroxysmal ventricular tachycardia (HCC)     Prolonged emergence from general anesthesia     Status post angioplasty     Sustained ventricular tachycardia (HCC)     TIA (transient ischemic attack)     Tobacco abuse     Type II or unspecified type diabetes mellitus without mention of complication, not stated as uncontrolled      Past Surgical History:   Procedure Laterality Date    CARDIAC CATHETERIZATION      COLONOSCOPY      CORONARY ANGIOPLASTY  4/29/11    Dr Graciela Davidson CORONARY ARTERY BYPASS GRAFT  2012    ENDOSCOPY, COLON, DIAGNOSTIC      EYE SURGERY Bilateral     Cataracts     OTHER SURGICAL HISTORY      difibrillator     [place] CIRCUMCISION,OTHER,28+ D/O N/A 8/29/2018    CIRCUMCISION performed by Jayden Worthington MD at [address] [place] [address] EGD TRANSORAL BIOPSY SINGLE/MULTIPLE N/A 11/5/2017    EGD BIOPSY performed by Idalia Stewart MD at [facility] OR       Chart Reviewed: Yes  Patient assessed for rehabilitation services?: Yes  Family / Caregiver Present: No  General Comment  Comments: PT/OT co-eval    Restrictions:  Restrictions/Precautions: Fall Risk(dementia; per nursing pt is up indep in her room)     SUBJECTIVE:      Pain  Pre Treatment Pain Screening  Pain at present: 0  Intervention List: Patient able to continue with treatment    Post Treatment Pain Screening:   Pain Assessment Pain Level: 0    Prior Level of Function:  Social/Functional History  Lives With: Alone  Type of Home: House  Home Layout: One level, Laundry in basement  Home Access: Stairs to enter with rails  Entrance Stairs - Number of Steps: 2-3  Entrance Stairs - Rails: Both  Bathroom Shower/Tub: Tub/Shower unit  Bathroom Toilet: Handicap height  Bathroom Equipment: Shower chair, Grab bars in shower  Home Equipment: Rolling walker, Cane, 4 wheeled walker  ADL Assistance: [address] [place] [address] Avenue: Independent(mother in law helps cook)  Homemaking Responsibilities: (friend goes grocery shopping)  Ambulation Assistance: Independent  Transfer Assistance: Independent  Active : Yes  IADL Comments: friend completes grocery shopping, mother in law assists with cooking  Additional Comments: pt reports no recent falls; questionable historian due to dementia    OBJECTIVE:   Vision: Within Functional Limits  Hearing: Within functional limits    Cognition:  Overall Orientation Status: Impaired  Orientation Level: Disoriented to situation, Oriented to person, Oriented to place, Oriented to time  Follows Commands: Impaired(delayed responses to questions)    Observation/Palpation  Observation: 3Ls cont; IV; unable to actively move L shoulder- reports no pain L shoulder; L elbow and wrist AROM WFL    ROM:  RLE AROM: WFL  LLE AROM : WFL    Strength:  Strength RLE  Comment: grossly 4+/5  Strength LLE  Comment: grossly 4+/5    Neuro:  Balance  Posture: Fair(slouched posture seated)  Sitting - Static: Good  Sitting - Dynamic: Good  Standing - Static: Good;-(min increased sway during static standing without AD)  Standing - Dynamic: Good;-(no LOB with 360 deg turn R/L; without AD)     Tone RLE  RLE Tone: Normotonic  Tone LLE  LLE Tone: Normotonic  Motor Control  Gross Motor?: WFL  Sensation  Overall Sensation Status: Impaired(numbness/tinglining in feet and hands)    Bed mobility  Comment: NT- pt sitting in bedside chair Transfers  Sit to Stand: Stand by assistance  Stand to sit: Stand by assistance  Comment: same level of assistance with [employer] and without AD    Ambulation  Ambulation?: Yes  More Ambulation?: Yes  Ambulation 1  Surface: level tile  Device: Rolling Walker  Assistance: Stand by assistance  Gait Deviations: Slow Martha  Distance: 15 ft  Comments: decreased safety with maneuverability with 2WW- possibly due to pt did not use AD at PLOF  Ambulation 2  Surface - 2: level tile  Device 2: No device  Assistance 2: Stand by assistance  Gait Deviations: Slow Martha  Distance: 15 ft    Activity Tolerance  Activity Tolerance: Patient Tolerated treatment well;Patient limited by cognitive status  Activity Tolerance: Pt without evidence of SOB with all functional mobility this date on O2          PT Education  PT Education: Goals;PT Role;Transfer Training;Plan of Care;General Safety    ASSESSMENT:   Body structures, Functions, Activity limitations: Decreased functional mobility ; Decreased safe awareness;Decreased balance;Decreased cognition;Decreased endurance;Decreased strength;Decreased posture;Decreased sensation  Decision Making: Low Complexity  History: high  Exam: low  Clinical Presentation: low    Prognosis: Good    DISCHARGE RECOMMENDATIONS:  Discharge Recommendations: Continue to assess pending progress, Patient would benefit from continued therapy after discharge    Assessment: Pt exhibits decreased standing balance during static and dynamic activity which increases pt risk for falls; therefore, continued PT is recommended.     REQUIRES PT FOLLOW UP: Yes      PLAN OF CARE:  Plan  Times per week: 3-6 Current Treatment Recommendations: Strengthening, Transfer Training, Endurance Training, Neuromuscular Re-education, Cognitive Reorientation, Patient/Caregiver Education & Training, ROM, Manual Therapy - Soft Tissue Mobilization, Pain Management, Equipment Evaluation, Education, & procurement, Balance Training, Gait Training, Home Exercise Program, Modalities, Functional Mobility Training, Stair training, Safety Education & Training  Safety Devices  Type of devices: Nurse notified, Call light within reach    Goals:  Long term goals  Long term goal 1: Pt will demonstrate bed mobility indep for safe d/c home alone. Long term goal 2: Pt will demonstrate transfers indep without AD to allow pt to safely return home alone. Long term goal 3: Pt will demonstrate amb 150ft without AD indep to allow pt to safely amb inside his home. Long term goal 4: Pt will demonstrate stair negotiation 3 steps with B rails mod indep to allow pt to enter and exit his home safely. [facility] (6 CLICK) BASIC MOBILITY  AM-PAC Inpatient Mobility Raw Score : 19    Therapy Time:   Individual   Time In 1120   Time Out 1140   Minutes Krishna [address] [place] [address], 01/12/21 at 12:00 PM         Definitions for assistance levels  Independent = pt does not require any physical supervision or assistance from another person for activity completion. Device may be needed.   Stand by assistance = pt requires verbal cues or instructions from another person, close to but not touching, to perform the activity  Minimal assistance= pt performs 75% or more of the activity; assistance is required to complete the activity  Moderate assistance= pt performs 50% of the activity; assistance is required to complete the activity  Maximal assistance = pt performs 25% of the activity; assistance is required to complete the activity  Dependent = pt requires total physical assistance to accomplish the task

## 2021-01-12 NOTE — PROGRESS NOTES
Hospitalist Progress Note      PCP: Jeniffer Jordan MD    Date of Admission: 1/9/2021    Chief Complaint:    Chief Complaint   Patient presents with    Shortness of Breath     sob since last night     Subjective:  Patient states he feels no different; answering no to all questions but does not appear to have great insight. 12 point ROS negative other than mentioned above     Medications:  Reviewed    Infusion Medications    DOBUTamine 2.5 mcg/kg/min (01/11/21 1716)    dextrose       Scheduled Medications    warfarin  5 mg Oral Once    insulin lispro  5 Units Subcutaneous TID WC    insulin glargine  10 Units Subcutaneous Nightly    atorvastatin  80 mg Oral Daily    digoxin  125 mcg Oral Daily    donepezil  10 mg Oral Nightly    warfarin (COUMADIN) daily dosing (placeholder)   Other RX Placeholder    sodium chloride flush  10 mL Intravenous 2 times per day    [Held by provider] sacubitril-valsartan  1 tablet Oral BID    metoprolol succinate  25 mg Oral Daily    furosemide  40 mg Intravenous BID    spironolactone  25 mg Oral Daily    ipratropium-albuterol  1 ampule Inhalation TID    insulin lispro  0-12 Units Subcutaneous TID WC    insulin lispro  0-6 Units Subcutaneous Nightly     PRN Meds: sodium chloride flush, promethazine **OR** ondansetron, polyethylene glycol, acetaminophen **OR** acetaminophen, potassium chloride **OR** potassium alternative oral replacement **OR** potassium chloride, magnesium sulfate, albuterol, glucose, dextrose, glucagon (rDNA), dextrose    Intake/Output Summary (Last 24 hours) at 1/12/2021 1213  Last data filed at 1/12/2021 0858  Gross per 24 hour   Intake 250 ml   Output 3400 ml   Net -3150 ml     Exam:    /69   Pulse 102   Temp 97.9 °F (36.6 °C) (Oral)   Resp 18   Ht 5' 11\" (1.803 m)   Wt 206 lb (93.4 kg)   SpO2 98%   BMI 28.73 kg/m²     General appearance: No apparent distress, appears stated age and cooperative. HEENT:  Conjunctivae/corneas clear. Neck:  Trachea midline. Respiratory:  Normal respiratory effort. Clear to auscultation  Cardiovascular: Regular rate and rhythm   Abdomen: Soft, non-tender, non-distended with normal bowel sounds. Musculoskeletal: No clubbing, cyanosis or edema bilaterally. .  Neuro: Non Focal.   Capillary Refill: Brisk,< 3 seconds   Peripheral Pulses: +2 palpable, equal bilaterally     Labs:   Recent Labs     01/10/21  0612 01/11/21  0547 01/12/21  0552   WBC 8.5 8.9 7.7   HGB 10.3* 10.5* 10.4*   HCT 31.2* 32.0* 32.1*    152 148     Recent Labs     01/10/21  0612 01/11/21  0547 01/12/21  0552   * 132* 129*   K 4.4 4.1 4.0   CL 95 96 91*   CO2 25 25 27   BUN 44* 43* 32*   CREATININE 1.76* 1.53* 1.32*   CALCIUM 9.1 9.0 8.9   PHOS  --  2.8  --      No results for input(s): AST, ALT, BILIDIR, BILITOT, ALKPHOS in the last 72 hours. Recent Labs     01/10/21  1116 01/11/21  0547 01/12/21  0552   INR 2.5 2.3 2.0     No results for input(s): Geofm Squ[employer]b in the last 72 hours. Urinalysis:      Lab Results   Component Value Date    NITRU Negative 12/13/2020    WBCUA 3-5 12/13/2020    BACTERIA Negative 12/13/2020    RBCUA 6-10 12/13/2020    BLOODU TRACE 12/13/2020    SPECGRAV 1.022 12/13/2020    GLUCOSEU Negative 12/13/2020     Radiology:  XR CHEST PORTABLE   Final Result      Findings likely representing fluid overload/CHF. Superimposed bibasilar pneumonia not excluded. Assessment/Plan:    1. Acute on chronic combined systolic and diastolic CHF:  Improved but patients blood pressure is borderline with his entresto on hold; cardiology and nephrology are helping manage; on dobutamine  2. Cough with atypical CXR findings:  COVID ruled out  3. DMII:  SSI  4. BERTRAM on CKD:  Improving; nephrology is helping manage  5. HTN/HLD:  Continue home meds  6. Chronic hypoxic resp failure:  Continue home meds  7. Functional Status: Fall precautions. Up with assistance. PT OT  8.  Diet: Cardiac Diabetic

## 2021-01-12 NOTE — PROGRESS NOTES
[facility] OCCUPATIONAL THERAPY EVALUATION - ACUTE     NAME: Yuan García.   : 1950 (78 y.o.)  MRN: [id number]  CODE STATUS: Full Code  Room: Z993/I351-46    Date of Service: 2021    Patient Diagnosis(es): Acute on chronic combined systolic (congestive) and diastolic (congestive) heart failure ([address] [I50.43]   Chief Complaint   Patient presents with    Shortness of Breath     sob since last night     Patient Active Problem List    Diagnosis Date Noted    Sustained VT (ventricular tachycardia) ([address] 2019     Priority: High    Acute on chronic combined systolic (congestive) and diastolic (congestive) heart failure ([address] 2021    Atrial fibrillation ([address] 10/26/2020    Elevated bilirubin 2019    Acute decompensated heart failure ([address] 2019    CKD (chronic kidney disease) stage 3, GFR 30-59 ml/min     Ventricular tachycardia ([address] 2019    Hypoxia 2019    BERTRAM (acute kidney injury) ([address] 12/10/2018    Anticoagulant long-term use 12/10/2018    Blurred vision, bilateral 12/10/2018    Cardiomyopathy of end-stage congenital heart disease ([address] 12/10/2018    Chest pain 12/10/2018    Cough in adult 12/10/2018    Dizziness 12/10/2018    Fatigue 12/10/2018    Fever 12/10/2018    Former smoker 12/10/2018    Gout, arthritis 12/10/2018    High risk medication use 12/10/2018    Hyperkalemia 12/10/2018    Hypokalemia 12/10/2018    Hypotension (arterial) 12/10/2018    ICD (implantable cardioverter-defibrillator) discharge 12/10/2018    Obese 12/10/2018    Overweight 12/10/2018    Paroxysmal ventricular tachycardia ([address] 12/10/2018    Presence of automatic implantable cardioverter-defibrillator 12/10/2018    Status post angioplasty 12/10/2018    Swelling of multiple joints 12/10/2018    Amiodarone-induced hyperthyroidism 2018    Hyperthyroidism     Uncontrolled type 2 diabetes mellitus with hyperglycemia ([address]  Moderate malnutrition ([address] 12/01/2018    Phimosis/redundant prepuce     CHF (congestive heart failure), NYHA class IV, acute on chronic, combined ([address] 05/16/2018    CHF (congestive heart failure), NYHA class I, acute on chronic, combined ([address] 69/03/0169    Acute systolic CHF (congestive heart failure) ([address] 05/15/2018    CHF (congestive heart failure), NYHA class III, chronic, combined ([address] 04/02/2018    SOB (shortness of breath)     Chronic combined systolic and diastolic heart failure ([address] 11/04/2017    Hemoptysis 11/04/2017    Paroxysmal A-fib ([address] 10/01/2016    Pain in joint, multiple sites     COPD (chronic obstructive pulmonary disease) ([address]     Diabetes mellitus with insulin therapy ([address]     Hyperlipidemia     Hypertension     Generalized atherosclerosis     TIA (transient ischemic attack)     Mitral valve insufficiency 04/12/2013    Elevation of level of transaminase or lactic acid dehydrogenase (LDH) 04/10/2013    Atrial flutter ([address] 04/10/2013    Hyponatremia 04/04/2013    Disorder of pancreas 03/28/2013    Generalized ischemic myocardial dysfunction 03/28/2013    Coronary arteriosclerosis in native artery 03/28/2013    Acute on chronic systolic CHF (congestive heart failure), NYHA class 4 ([address] 03/28/2013    Uncontrolled type 2 diabetes mellitus with complication, with long-term current use of insulin ([address] 05/09/2012    Noncompliance 05/09/2012    Disorder of muscle 10/06/2004    Acute lymphadenitis 10/06/2004    Irritable bowel syndrome 10/06/2004    Left heart failure ([address] 06/20/2003    Atherosclerosis of coronary artery 06/20/2003    Tobacco dependence syndrome 06/27/2002        Past Medical History:   Diagnosis Date    Amiodarone-induced hyperthyroidism     Arthritis     Atrial flutter (HCC)     CAD (coronary artery disease)     Chronic combined systolic and diastolic CHF (congestive heart failure) ([address]  CKD (chronic kidney disease) stage 3, GFR 30-59 ml/min     COPD (chronic obstructive pulmonary disease) (HCC)     Defibrillator activation     Diabetes mellitus with insulin therapy [facility]     Dilated cardiomyopathy ([facility]     Generalized and unspecified atherosclerosis     Gout     Hyperlipidemia     Hypertension     Noncompliance     Obesity     On home oxygen therapy     Pain in joint, multiple sites     Paroxysmal A-fib (HCC)     Paroxysmal ventricular tachycardia (HCC)     Prolonged emergence from general anesthesia     Status post angioplasty     Sustained ventricular tachycardia (HCC)     TIA (transient ischemic attack)     Tobacco abuse     Type II or unspecified type diabetes mellitus without mention of complication, not stated as uncontrolled      Past Surgical History:   Procedure Laterality Date    CARDIAC CATHETERIZATION      COLONOSCOPY      CORONARY ANGIOPLASTY  4/29/11    Dr Harsha Kraus CORONARY ARTERY BYPASS GRAFT  2012    ENDOSCOPY, COLON, DIAGNOSTIC      EYE SURGERY Bilateral     Cataracts     OTHER SURGICAL HISTORY      difibrillator     NC CIRCUMCISION,OTHER,28+ D/O N/A 8/29/2018    CIRCUMCISION performed by Harsha Marion MD at [address] [place] [address] EGD TRANSORAL BIOPSY SINGLE/MULTIPLE N/A 11/5/2017    EGD BIOPSY performed by Srini Godinez MD at [facility]        Restrictions  Restrictions/Precautions: Fall Risk     Safety Devices: Safety Devices  Type of devices: Nurse notified, Call light within reach(RADHA be)        Subjective  Pre Treatment Pain Screening  Pain at present: 0  Scale Used: Numeric Score  Intervention List: Patient able to continue with treatment, Nurse/Physician notified  Comments / Details: pt denies pain however guaded at LUE and unable to demonstrated any AROM at shoulder.  RADHA Clarke notified    Pain Reassessment:   Pain Assessment Patient Currently in Pain: Denies       Prior Level of Function:  Social/Functional History  Lives With: Alone  Type of Home: House  Home Layout: One level, Laundry in basement  Home Access: Stairs to enter with rails  Entrance Stairs - Number of Steps: 2-3  Entrance Stairs - Rails: Both  Bathroom Shower/Tub: Tub/Shower unit  Bathroom Toilet: Handicap height  Bathroom Equipment: Shower chair, Grab bars in shower  Home Equipment: Rolling walker, Cane, 4 wheeled walker  ADL Assistance: [address] [place] [address] Avenue: Independent(mother in law helps cook)  Homemaking Responsibilities: (friend goes grocery shopping)  Ambulation Assistance: Independent  Transfer Assistance: Independent  Active : Yes  IADL Comments: friend completes grocery shopping, mother in law assists with cooking  Additional Comments: pt reports no recent falls; questionable historian due to dementia    OBJECTIVE:     Orientation Status:       Observation:       Cognition Status:  Cognition  Overall Cognitive Status: Exceptions  Arousal/Alertness: Appropriate responses to stimuli  Following Commands:  Follows one step commands with increased time, Follows one step commands with repetition  Attention Span: Appears intact  Memory: Decreased short term memory, Decreased long term memory  Safety Judgement: Decreased awareness of need for safety, Decreased awareness of need for assistance  Problem Solving: Assistance required to implement solutions, Assistance required to identify errors made, Assistance required to generate solutions, Assistance required to correct errors made  Insights: Decreased awareness of deficits  Initiation: Requires cues for some  Sequencing: Requires cues for some    Perception Status:       Sensation Status:  Sensation  Overall Sensation Status: Impaired(numbness/tinglining in feet and hands)    Vision and Hearing Status:  Vision  Vision: Within Functional Limits  Hearing  Hearing: Within functional limits     ROM: LUE AROM (degrees)  LUE AROM : Exceptions  LUE General AROM: NO AROM at [facility] joint; WFL distally  Left Hand AROM (degrees)  Left Hand AROM: WFL  RUE AROM (degrees)  RUE AROM : WFL  Right Hand AROM (degrees)  Right Hand AROM: WFL    Strength:  LUE Strength  Gross LUE Strength: Exceptions to [facility]  L Hand General: 3+/5  LUE Strength Comment: not formally assessed due to pt guarded on LUE with no AROM at [facility] joint  RUE Strength  Gross RUE Strength: WFL  R Hand General: 4/5  RUE Strength Comment: gross assessment    Coordination, Tone, Quality of Movement: Tone RUE  RUE Tone: Normotonic  Tone LUE  LUE Tone: Normotonic  Coordination  Movements Are Fluid And Coordinated: No  Coordination and Movement description: Left UE, Gross motor impairments    Hand Dominance:  Hand Dominance  Hand Dominance: Right    ADL Status:  ADL  Feeding: Setup  Grooming: Stand by assistance  UE Bathing: Minimal assistance  LE Bathing: Moderate assistance  UE Dressing: Moderate assistance  LE Dressing: Moderate assistance  Toileting: Moderate assistance  Additional Comments: simulated ADL seated in bedside chair.  levels as anticipated  Toilet Transfers  Toilet Transfer: Stand by assistance       Therapy key for assistance levels    Independent = Pt. is able to perform task with no assistance but may require a device   Stand by assistance = Pt. does not perform task at an independent level but does not need physical assistance, requires verbal cues  Minimal, Moderate, Maximal Assistance = Pt. requires physical assistance (25%, 50%, 75% assist from helper) for task but is able to actively participate in task   Dependent = Pt. requires total assistance with task and is not able to actively participate with task completion     Functional Mobility:  Functional Mobility  Functional - Mobility Device: Rolling Walker  Activity: To/from bathroom  Assist Level: Stand by assistance  Transfers  Sit to stand: Stand by assistance Stand to sit: Stand by assistance    Bed Mobility  Bed mobility  Comment: NT pt up in bedside chair and remained up in chair    Seated and Standing Balance:  Balance  Sitting Balance: Supervision  Standing Balance: Stand by assistance    Functional Endurance:  Activity Tolerance  Activity Tolerance: Patient Tolerated treatment well    D/C Recommendations:  OT D/C RECOMMENDATIONS  REQUIRES OT FOLLOW UP: Yes    Equipment Recommendations:  OT Equipment Recommendations  Other: continue to assess    OT Education:   OT Education  OT Education: OT Role, Plan of Care    OT Follow Up:  OT D/C RECOMMENDATIONS  REQUIRES OT FOLLOW UP: Yes       Assessment/Discharge Disposition:  Assessment: pt is a 78 yr old male presenting to [facility] with the above deficits. Pt would benefit from occupational therapy services to maximize safety and independence with ADL tasks, improve overall strength/endruance and balance for functional tasks.   Performance deficits / Impairments: Decreased functional mobility , Decreased strength, Decreased endurance, Decreased coordination, Decreased ADL status, Decreased ROM, Decreased balance, Decreased safe awareness, Decreased cognition  Prognosis: Good  Discharge Recommendations: Continue to assess pending progress  Decision Making: Medium Complexity  History: multiple  Exam: 9 perf deficits  Assistance / Modification: ModA    Six Click Score   How much help for putting on and taking off regular lower body clothing?: A Lot  How much help for Bathing?: A Lot  How much help for Toileting?: A Lot  How much help for putting on and taking off regular upper body clothing?: A Lot  How much help for taking care of personal grooming?: A Little  How much help for eating meals?: A Little  AM-[facility] Inpatient Daily Activity Raw Score: 14  AM-PAC Inpatient ADL T-Scale Score : 33.39  ADL Inpatient CMS 0-100% Score: 59.67    Plan:  Plan  Times per week: 1-3x/wk  Plan weeks: length of acute stay Current Treatment Recommendations: Strengthening, Functional Mobility Training, ROM, Endurance Training, Equipment Evaluation, Education, & procurement, Patient/Caregiver Education & Training, Balance Training, Safety Education & Training, Self-Care / ADL    Goals:   Patient will:    - Improve functional endurance to tolerate/complete 30 mins of ADL's  - Be SHORTY in UB ADLs   - Be SHORTY in LB ADLs  - Be SHORTY in ADL transfers without LOB  - Be SHORTY in toileting tasks  - Improve L UE Function (AROM, strength, motor control, tone normalization) to complete ADLs as projected. - Access appropriate D/C site with as few architectural barriers as possible.     Patient Goal: Patient goals : to get better     Discussed and agreed upon: Yes Comments:     Therapy Time:   OT Individual Minutes  Time In: 1120  Time Out: 1140  Minutes: 20    Eval: 20 minutes     Electronically signed by:    LUIS Deras  1/12/2021, 11:59 AM

## 2021-01-12 NOTE — PROGRESS NOTES
[facility] Daily Progress Note  Name: Pan Huerta. Age: 78 y.o. Gender: male  CodeStatus: Full Code    Primary Cardiology: Dr. Kaylee Harden MD  [facility] Cardiology: Dr. Kaylee Ochoa. Aimee Lawler APRN-[facility]   Primary Care Provider: Sharyle Gauss, MD  Admission Date: 1/9/2021    Chief complaint/Associated symptoms:   Virginia Donnelly was seen and examined at bedside. Currently sitting up in a chair watching television    States that he feels slightly better and shortness of breath has improved    Denies chest pain, chest pressure, chest tightness, palpitations, lightheadedness or dizziness    Assessment:  1. SOB  2. Edema  3. Orthopnea  4. RTI, Covid rule out  5. CHF, Systolic, Acute on Chronic, Stage D, Class 3-4 due to end-stage cardiomyopathy, LVEF 5 to 10%  6. ICM LVEF 20%  7. Post AICD  8. CAD post CABG, negative troponins to date. 9. VHD, Post MV repair 2013  10. PAFIB, post AVN ablation October 2020  11. HTN  12. HLP  13. DM  14. LTAC on Warfarin  15. High Risk Medication, on Amiodarone, Digoxin and Warfarin. 16. COPD  17. CKI  18. TIA Hx  19. Past Tobacco  20. Poor Historian  21. IVP dye allergy  22. Hyponatremia: Na 129  23. Elevated BNP; 8683      Plan:  1. Monitor on telemetry  2. Monitor electrolytes, suggest maintaining K + =/> 4.0 and Mag =/> 2.0  3. Continue dobutamine at 2.5 mics per kilo per minute, will need to monitor closely due to elevated heart rate. 4.    Hyponatremia and fluid balance per nephrology  5. Further recommendations per Dr. Caretha Habermann    Physical Exam  Constitutional:  Well developed, awake/alert/oriented x3, no distress, alert and cooperative. Respiratory/Thorax: Diminished throughout lung fields  Cardiovascular: Regular, rate and rhythm, no murmurs, normal S1 and S2, PMI non displaced. Gastrointestinal:  Non distended, soft, non-tender, no rebound tenderness or guarding,   Genitourinary:  deferred  Musculoskeletal:  No apparent injury. Extremities: Trace bilateral lower leg edema,  DP 1+ equal bilaterally. Neurological:  Alert and oriented x3. Moves extremities spontaneous with purpose  Psychological:  Appropriate mood and behavior  Skin:  Warm and dry,        Allergies: Dye [Iodides]  Penicillins  Plavix [Clopidogrel]  Tapazole [Methimazole]    Medications:  Reviewed  Home Medications    Infusion Medications:    DOBUTamine 2.5 mcg/kg/min (01/11/21 1716)    dextrose       Scheduled Medications:    insulin lispro  5 Units Subcutaneous TID WC    insulin glargine  10 Units Subcutaneous Nightly    atorvastatin  80 mg Oral Daily    digoxin  125 mcg Oral Daily    donepezil  10 mg Oral Nightly    warfarin (COUMADIN) daily dosing (placeholder)   Other RX Placeholder    sodium chloride flush  10 mL Intravenous 2 times per day    [Held by provider] sacubitril-valsartan  1 tablet Oral BID    metoprolol succinate  25 mg Oral Daily    furosemide  40 mg Intravenous BID    spironolactone  25 mg Oral Daily    ipratropium-albuterol  1 ampule Inhalation TID    insulin lispro  0-12 Units Subcutaneous TID WC    insulin lispro  0-6 Units Subcutaneous Nightly     PRN Meds: sodium chloride flush, promethazine **OR** ondansetron, polyethylene glycol, acetaminophen **OR** acetaminophen, potassium chloride **OR** potassium alternative oral replacement **OR** potassium chloride, magnesium sulfate, albuterol, glucose, dextrose, glucagon (rDNA), dextrose    Vitals  Vitals:    01/12/21 0750   BP: 108/69   Pulse: 102   Resp: 18   Temp: 97.9 °F (36.6 °C)   SpO2: 98%       I&O  4325 ml     Telemetry:  Paced     ECHO 1/11/2021  Left ventricular size is moderately increased . Mild concentric left ventricular hypertrophy. Generalized hypokinesis of the left ventricle with overall severe   impairment of LV systolic function. Left ventricular ejection fraction is visually estimated at 5-10%. Mildly enlarged right ventricle cavity. Impression Findings likely representing fluid overload/CHF. Superimposed bibasilar pneumonia not excluded. Active Hospital Problems    Diagnosis Date Noted    Acute on chronic combined systolic (congestive) and diastolic (congestive) heart failure ([facility] [I50.43] 01/09/2021     Priority: Low       Additional work up or/and treatment plan may be added today or then after based on clinical progression. I am managing a portion of pt care. Some medical issues are handled by other specialists. Additional work up and treatment should be done in out pt setting by pt PCP and other out pt providers. In addition to examining and evaluating pt, I spent additional time explaining care, normaland abnormal findings, and treatment plan. All of pt questions were answered. Counseling, diet and education were provided. Case will be discussed with nursing staff when appropriate. Family will be updated if and whenappropriate.       Electronically signed by ALE Rodriguez CNP on 1/12/2021 at 10:16 AM

## 2021-01-12 NOTE — PROGRESS NOTES
[facility] EMERGENCY [facility] AT [place] Respiratory Therapy Evaluation   Current Order: duoneb tid and albuterol q2prn     Home Regimen   prn  Ordering Physician:    Ra Lomeli  Re-evaluation Date: none     Diagnosis:CHF  Patient Status: Stable / Unstable + Physician notified    The following MDI Criteria must be met in order to convert aerosol to MDI with spacer. If unable to meet, MDI will be converted to aerosol:  []  Patient able to demonstrate the ability to use MDI effectively  []  Patient alert and cooperative  []  Patient able to take deep breath with 5-10 second hold  []  Medication(s) available in this delivery method   []  Peak flow greater than or equal to 200 ml/min            Current Order Substituted To  (same drug, same frequency)   Aerosol to MDI [] Albuterol Sulfate 0.083% unit dose by aerosol Albuterol Sulfate MDI 2 puffs by inhalation with spacer    [] Levalbuterol 1.25 mg unit dose by aerosol Levalbuterol MDI 2 puffs by inhalation with spacer    [] Levalbuterol 0.63 mg unit dose by aerosol Levalbuterol MDI 2 puffs by inhalation with spacer    [] Ipratropium Bromide 0.02% unit dose by aerosol Ipratropium Bromide MDI 2 puffs by inhalation with spacer    [] Duoneb (Ipratropium + Albuterol) unit dose by aerosol Ipratropium MDI + Albuterol MDI 2 puffs by inhalation w/spacer   MDI to Aerosol [] Albuterol Sulfate MDI Albuterol Sulfate 0.083% unit dose by aerosol    [] Levalbuterol MDI 2 puffs by inhalation Levalbuterol 1.25 mg unit dose by aerosol    [] Ipratropium Bromide MDI by inhalation Ipratropium Bromide 0.02% unit dose by aerosol    [] Combivent (Ipratropium + Albuterol) MDI by inhalation Duoneb (Ipratropium + Albuterol) unit dose by aerosol   Treatment Assessment [Frequency/Schedule]:  Change frequency to: _______Tid and PRN___________________________________________per Protocol, P&T, MEC      Points 0 1 2 3 4   Pulmonary Status  Non-Smoker  []   Smoking history   < 20 pack years  []   Smoking history  ?  20 pack years []   Pulmonary Disorder  (acute or chronic)  [x]   Severe or Chronic w/ Exacerbation  []     Surgical Status No [x]   Surgeries     General []   Surgery Lower []   Abdominal Thoracic or []   Upper Abdominal Thoracic with  PulmonaryDisorder  []     Chest X-ray Clear/Not  Ordered     []  Chronic Changes  Results Pending  []  Infiltrates, atelectasis, pleural effusion, or edema  [x]  Infiltrates in more than one lobe []  Infiltrate + Atelectasis, &/or pleural effusion  []    Respiratory Pattern Regular,  RR = 12-20 [x]  Increased,  RR = 21-25 []  DAVIS, irregular,  or RR = 26-30 []  Decreased FEV1  or RR = 31-35 []  Severe SOB, use  of accessory muscles, or RR ? 35  []    Mental Status Alert, oriented,  Cooperative [x]  Confused but Follows commands []  Lethargic or unable to follow commands []  Obtunded  []  Comatose  []    Breath Sounds Clear to  auscultation  []  Decreased unilaterally or  in bases only []  Decreased  bilaterally  [x]  Crackles or intermittent wheezes []  Wheezes []    Cough Strong, Spontan., & nonproductive [x]  Strong,  spontaneous, &  productive []  Weak,  Nonproductive []  Weak, productive or  with wheezes []  No spontaneous  cough or may require suctioning []    Level of Activity Ambulatory [x]  Ambulatory w/ Assist  []  Non-ambulatory []  Paraplegic []  Quadriplegic []    Total    Score:___7____     Triage Score:___4___      Tri       Triage:     1. (>20) Freq: Q3    2. (16-20) Freq: Q4   3. (11-15) Freq: QID & Albuterol Q2 PRN    4. (6-10) Freq: TID & Albuterol Q2 PRN    5. (0-5) Freq Q4prn

## 2021-01-12 NOTE — CARE COORDINATION
Patient  Currently admitted to [facility] EMERGENCY [facility] AT [place] as of 1/9/2021. Current AMB Care Coordination Episode resolved.

## 2021-01-12 NOTE — PROGRESS NOTES
Clinical Pharmacy Note    Warfarin consult follow-up    Recent Labs     01/12/21  0552   INR 2.0     Recent Labs     01/10/21  0612 01/11/21  0547 01/12/21  0552   HGB 10.3* 10.5* 10.4*   HCT 31.2* 32.0* 32.1*    152 148     Significant drug:drug interactions:  None (Digoxin home med - continued)      Current diet order/intake: Low sodium     Notes:     Date INR Warfarin Dose      1-10-21     2.5         5 mg      1-11-21     2.3       10 mg        1-12-21      2.0         5 mg                                      INR today is therapeutic at 2.0, goal INR 2-3, for Afib. Continuing home warfarin dose of 5 mg today, (normal warfarin schedule of 10 mg on Mondays and 5 mg on all other days). Daily PT/INR during pharmacy consult to monitor and dose warfarin therapy. Lara Chance, PharmD.  Candidate 2021  10:43 AM 1/12/2021

## 2021-01-12 NOTE — PROGRESS NOTES
Nephrology Progress Note    Assessment/  78years old male presented to the emergency department for further evaluation and management of approximately 3 days duration of gradual onset and progressive course of lower extremity swelling and dyspnea progressed to dyspnea on minimal exertion admitted with a diagnosis of decompensated heart failure the patient does carry the chronic comorbidity of morbid obesity diabetes type 2 and chronic kidney disease stage II/III secondary to diabetic nephropathy w/ baseline Scr ~1.3-1.5 w/ eGFR =/>mid 50s     -Acute kidney injury secondary to cardiorenal syndrome type I  -Chronic kidney disease stage III secondary to diabetic nephropathy  -Volume overload with increased extracellular fluid volume secondary to decompensated heart failure the precipitating factor at this point of time is unclear whether it is infectious or dietary the patient did not have any that could explain that as well as no leukocytosis or left shift  - borderline hypotensive        Plan/  - ok to change back to bumex 1 mg QD from renal standpoint, na worsening as well, will defer   - defer plan to restart entresto to cardiology, remains hypotensive at times   - monitor function, currently at baseline      Patient Active Problem List:     Uncontrolled type 2 diabetes mellitus with complication, with long-term current use of insulin ([address]     Noncompliance     Pain in joint, multiple sites     COPD (chronic obstructive pulmonary disease) ([address]     Diabetes mellitus with insulin therapy ([address]     Hyperlipidemia     Hypertension     Generalized atherosclerosis     TIA (transient ischemic attack)     Elevation of level of transaminase or lactic acid dehydrogenase (LDH)     Tobacco dependence syndrome     Disorder of muscle     Mitral valve insufficiency     Acute lymphadenitis     Disorder of pancreas     Left heart failure (HCC)     Irritable bowel syndrome     Hyponatremia     Atherosclerosis of coronary artery Generalized ischemic myocardial dysfunction     Coronary arteriosclerosis in native artery     Atrial flutter (HCC)     Acute on chronic systolic CHF (congestive heart failure), NYHA class 4 (HCC)     Paroxysmal A-fib (HCC)     Chronic combined systolic and diastolic heart failure (HCC)     Hemoptysis     SOB (shortness of breath)     CHF (congestive heart failure), NYHA class III, chronic, combined (HCC)     CHF (congestive heart failure), NYHA class I, acute on chronic, combined (HCC)     Acute systolic CHF (congestive heart failure) (HCC)     CHF (congestive heart failure), NYHA class IV, acute on chronic, combined ([address]     Phimosis/redundant prepuce     Moderate malnutrition (HCC)     Amiodarone-induced hyperthyroidism     Hyperthyroidism     Uncontrolled type 2 diabetes mellitus with hyperglycemia (HCC)     BERTRAM (acute kidney injury) ([address]     Anticoagulant long-term use     Blurred vision, bilateral     Cardiomyopathy of end-stage congenital heart disease ([address]     Chest pain     Cough in adult     Dizziness     Fatigue     Fever     Former smoker     Gout, arthritis     High risk medication use     Hyperkalemia     Hypokalemia     Hypotension (arterial)     ICD (implantable cardioverter-defibrillator) discharge     Obese     Overweight     Paroxysmal ventricular tachycardia (HCC)     Presence of automatic implantable cardioverter-defibrillator     Status post angioplasty     Swelling of multiple joints     Hypoxia     Ventricular tachycardia (HCC)     Sustained VT (ventricular tachycardia) (HCC)     Acute decompensated heart failure (HCC)     CKD (chronic kidney disease) stage 3, GFR 30-59 ml/min     Elevated bilirubin     Atrial fibrillation (HCC)     Acute on chronic combined systolic (congestive) and diastolic (congestive) heart failure (HCC)      Subjective:  Admit Date: 1/9/2021    Interval History: renal function now at baseline, Na low     Medications:  Scheduled Meds:   warfarin  5 mg Oral Once  insulin lispro  5 Units Subcutaneous TID     insulin glargine  10 Units Subcutaneous Nightly    atorvastatin  80 mg Oral Daily    digoxin  125 mcg Oral Daily    donepezil  10 mg Oral Nightly    warfarin (COUMADIN) daily dosing (placeholder)   Other RX Placeholder    sodium chloride flush  10 mL Intravenous 2 times per day    [Held by provider] sacubitril-valsartan  1 tablet Oral BID    metoprolol succinate  25 mg Oral Daily    furosemide  40 mg Intravenous BID    spironolactone  25 mg Oral Daily    ipratropium-albuterol  1 ampule Inhalation TID    insulin lispro  0-12 Units Subcutaneous TID     insulin lispro  0-6 Units Subcutaneous Nightly     Continuous Infusions:   DOBUTamine 2.5 mcg/kg/min (01/11/21 1716)    dextrose         CBC:   Recent Labs     01/11/21  0547 01/12/21  0552   WBC 8.9 7.7   HGB 10.5* 10.4*    148     CMP:    Recent Labs     01/10/21  0612 01/11/21  0547 01/12/21  0552   * 132* 129*   K 4.4 4.1 4.0   CL 95 96 91*   CO2 25 25 27   BUN 44* 43* 32*   CREATININE 1.76* 1.53* 1.32*   GLUCOSE 304* 161* 238*   CALCIUM 9.1 9.0 8.9   LABGLOM 38.6* 45.4* 53.8*     Troponin:   No results for input(s): TROPONINI in the last 72 hours. BNP: No results for input(s): BNP in the last 72 hours. INR:   Recent Labs     01/12/21  0552   INR 2.0     Lipids:   Recent Labs     01/10/21  0612   CHOL 93   TRIG 38   HDL 51     Liver:   No results for input(s): AST, ALT, ALKPHOS, PROT, LABALBU, BILITOT in the last 72 hours. Invalid input(s): BILDIR  Iron:  No results for input(s): IRONS, FERRITIN in the last 72 hours. Invalid input(s): LABIRONS  Urinalysis: No results for input(s): UA in the last 72 hours.     Objective:  Vitals: /69   Pulse 102   Temp 97.9 °F (36.6 °C) (Oral)   Resp 18   Ht 5' 11\" (1.803 m)   Wt 206 lb (93.4 kg)   SpO2 98%   BMI 28.73 kg/m²    Wt Readings from Last 3 Encounters:   01/09/21 206 lb (93.4 kg)   01/04/21 200 lb (90.7 kg) 12/13/20 200 lb (90.7 kg)      24HR INTAKE/OUTPUT:      Intake/Output Summary (Last 24 hours) at 1/12/2021 1201  Last data filed at 1/12/2021 0858  Gross per 24 hour   Intake 250 ml   Output 3400 ml   Net -3150 ml       General: alert, in no apparent distress  HEENT: normocephalic, atraumatic, anicteric  Lungs: non-labored respirations, clear to auscultation bilaterally  Heart: regular rate and rhythm, no murmurs or rubs  Abdomen: soft, non-tender, non-distended  Ext: no cyanosis, no peripheral edema  Neuro: alert and oriented, no gross abnormalities      Electronically signed by Melodie Oleary MD

## 2021-01-12 NOTE — PROGRESS NOTES
Spiritual Care Services     Summary of Visit:    Initial visit. Patient is resting in bed. Patient wanted to sleep. This  did inquire about his HCPOA document. The document lists a Yobany Busby as his POA, however, the patient stated that he doesn't want him to be his poa anymore; they have no contact. Patient preferred that [place] or Maxine Nagy be his POA, however, patient didn't want to do another document at this time. Spiritual Assessment/Intervention/Outcomes:    Encounter Summary  Services provided to[de-identified] Patient  Referral/Consult From[de-identified] Rounding  Support System: Family members, Friends/neighbors  Continue Visiting: Yes  Complexity of Encounter: Low  Length of Encounter: 15 minutes  Advance Care Planning: Yes  Routine  Type: Initial  Assessment: Approachable, Passive, Coping  Intervention: Sustaining presence/ Ministry of presence, Empowerment  Outcome: Acceptance, Engaged in conversation, [address] (For Healthcare)  Pre-existing DNR Comfort Care/DNR Arrest/DNI Order: No  Healthcare Directive: No, patient does not have an advance directive for healthcare treatment  Type of Healthcare Directive: Other (Comment)(Old document in Epic)  Copy in Chart: Other (Comment)  Chart Copy Status [de-identified] Other (Comment)(Patient needs new document)  Date Reviewed and Current[de-identified] 01/12/21  Healthcare Agent Appointed: Adult siblings  Healthcare Agent's Name: [id number] Agent's Phone Number: [phone]  If you are unable to speak for yourself, does your Healthcare Agent or Legal Spokesperson know your healthcare wishes?: Yes           Values / Beliefs  Do you have any ethnic, cultural, sacramental, or spiritual Restorationist needs you would like us to be aware of while you are in the hospital?: No    Care Plan:    No follow up required unless patient requests.     Spiritual Care Services   Electronically signed by Ying Katz on 1/12/21 at 2:22 PM EST

## 2021-01-13 ENCOUNTER — APPOINTMENT (OUTPATIENT)
Dept: CT IMAGING | Age: 71
DRG: 291 | End: 2021-01-13
Payer: MEDICARE

## 2021-01-13 ENCOUNTER — APPOINTMENT (OUTPATIENT)
Dept: ULTRASOUND IMAGING | Age: 71
DRG: 291 | End: 2021-01-13
Payer: MEDICARE

## 2021-01-13 LAB
ANION GAP SERPL CALCULATED.3IONS-SCNC: 10 MEQ/L (ref 9–15)
BASOPHILS ABSOLUTE: 0 K/UL (ref 0–0.2)
BASOPHILS RELATIVE PERCENT: 0.2 %
BUN BLDV-MCNC: 28 MG/DL (ref 8–23)
CALCIUM SERPL-MCNC: 9 MG/DL (ref 8.5–9.9)
CHLORIDE BLD-SCNC: 96 MEQ/L (ref 95–107)
CO2: 29 MEQ/L (ref 20–31)
CREAT SERPL-MCNC: 1.29 MG/DL (ref 0.7–1.2)
EOSINOPHILS ABSOLUTE: 0 K/UL (ref 0–0.7)
EOSINOPHILS RELATIVE PERCENT: 0.2 %
GFR AFRICAN AMERICAN: >60
GFR NON-AFRICAN AMERICAN: 55.3
GLUCOSE BLD-MCNC: 142 MG/DL (ref 60–115)
GLUCOSE BLD-MCNC: 165 MG/DL (ref 70–99)
GLUCOSE BLD-MCNC: 169 MG/DL (ref 60–115)
GLUCOSE BLD-MCNC: 204 MG/DL (ref 60–115)
GLUCOSE BLD-MCNC: 210 MG/DL (ref 60–115)
HCT VFR BLD CALC: 32 % (ref 42–52)
HEMOGLOBIN: 10.4 G/DL (ref 14–18)
INR BLD: 2.1
LYMPHOCYTES ABSOLUTE: 0.6 K/UL (ref 1–4.8)
LYMPHOCYTES RELATIVE PERCENT: 9.1 %
MCH RBC QN AUTO: 27.6 PG (ref 27–31.3)
MCHC RBC AUTO-ENTMCNC: 32.5 % (ref 33–37)
MCV RBC AUTO: 84.9 FL (ref 80–100)
MONOCYTES ABSOLUTE: 0.6 K/UL (ref 0.2–0.8)
MONOCYTES RELATIVE PERCENT: 9.7 %
NEUTROPHILS ABSOLUTE: 5 K/UL (ref 1.4–6.5)
NEUTROPHILS RELATIVE PERCENT: 80.8 %
PDW BLD-RTO: 17.1 % (ref 11.5–14.5)
PERFORMED ON: ABNORMAL
PLATELET # BLD: 159 K/UL (ref 130–400)
POTASSIUM SERPL-SCNC: 4 MEQ/L (ref 3.4–4.9)
PROTHROMBIN TIME: 23.6 SEC (ref 12.3–14.9)
RBC # BLD: 3.77 M/UL (ref 4.7–6.1)
SODIUM BLD-SCNC: 135 MEQ/L (ref 135–144)
WBC # BLD: 6.2 K/UL (ref 4.8–10.8)

## 2021-01-13 PROCEDURE — 2700000000 HC OXYGEN THERAPY PER DAY

## 2021-01-13 PROCEDURE — 36415 COLL VENOUS BLD VENIPUNCTURE: CPT

## 2021-01-13 PROCEDURE — 97116 GAIT TRAINING THERAPY: CPT

## 2021-01-13 PROCEDURE — 97112 NEUROMUSCULAR REEDUCATION: CPT

## 2021-01-13 PROCEDURE — 93880 EXTRACRANIAL BILAT STUDY: CPT

## 2021-01-13 PROCEDURE — 6360000002 HC RX W HCPCS: Performed by: INTERNAL MEDICINE

## 2021-01-13 PROCEDURE — 94640 AIRWAY INHALATION TREATMENT: CPT

## 2021-01-13 PROCEDURE — 6370000000 HC RX 637 (ALT 250 FOR IP): Performed by: INTERNAL MEDICINE

## 2021-01-13 PROCEDURE — 85025 COMPLETE CBC W/AUTO DIFF WBC: CPT

## 2021-01-13 PROCEDURE — 70450 CT HEAD/BRAIN W/O DYE: CPT

## 2021-01-13 PROCEDURE — 2580000003 HC RX 258: Performed by: INTERNAL MEDICINE

## 2021-01-13 PROCEDURE — 85610 PROTHROMBIN TIME: CPT

## 2021-01-13 PROCEDURE — 6370000000 HC RX 637 (ALT 250 FOR IP): Performed by: PSYCHIATRY & NEUROLOGY

## 2021-01-13 PROCEDURE — 80048 BASIC METABOLIC PNL TOTAL CA: CPT

## 2021-01-13 PROCEDURE — 94761 N-INVAS EAR/PLS OXIMETRY MLT: CPT

## 2021-01-13 PROCEDURE — 2060000000 HC ICU INTERMEDIATE R&B

## 2021-01-13 PROCEDURE — 99222 1ST HOSP IP/OBS MODERATE 55: CPT | Performed by: PSYCHIATRY & NEUROLOGY

## 2021-01-13 RX ORDER — WARFARIN SODIUM 5 MG/1
5 TABLET ORAL
Status: COMPLETED | OUTPATIENT
Start: 2021-01-13 | End: 2021-01-13

## 2021-01-13 RX ORDER — ASPIRIN 81 MG/1
81 TABLET ORAL ONCE
Status: COMPLETED | OUTPATIENT
Start: 2021-01-13 | End: 2021-01-13

## 2021-01-13 RX ORDER — BUMETANIDE 1 MG/1
1 TABLET ORAL 2 TIMES DAILY
Status: DISCONTINUED | OUTPATIENT
Start: 2021-01-14 | End: 2021-01-14 | Stop reason: HOSPADM

## 2021-01-13 RX ADMIN — INSULIN GLARGINE 10 UNITS: 100 INJECTION, SOLUTION SUBCUTANEOUS at 21:48

## 2021-01-13 RX ADMIN — INSULIN LISPRO 2 UNITS: 100 INJECTION, SOLUTION INTRAVENOUS; SUBCUTANEOUS at 10:14

## 2021-01-13 RX ADMIN — WARFARIN SODIUM 5 MG: 5 TABLET ORAL at 17:30

## 2021-01-13 RX ADMIN — INSULIN LISPRO 2 UNITS: 100 INJECTION, SOLUTION INTRAVENOUS; SUBCUTANEOUS at 17:26

## 2021-01-13 RX ADMIN — FUROSEMIDE 40 MG: 10 INJECTION, SOLUTION INTRAMUSCULAR; INTRAVENOUS at 10:01

## 2021-01-13 RX ADMIN — SPIRONOLACTONE 25 MG: 25 TABLET ORAL at 10:02

## 2021-01-13 RX ADMIN — DIGOXIN 125 MCG: 125 TABLET ORAL at 10:01

## 2021-01-13 RX ADMIN — Medication 10 ML: at 10:05

## 2021-01-13 RX ADMIN — IPRATROPIUM BROMIDE AND ALBUTEROL SULFATE 1 AMPULE: .5; 3 SOLUTION RESPIRATORY (INHALATION) at 07:44

## 2021-01-13 RX ADMIN — INSULIN LISPRO 4 UNITS: 100 INJECTION, SOLUTION INTRAVENOUS; SUBCUTANEOUS at 14:14

## 2021-01-13 RX ADMIN — IPRATROPIUM BROMIDE AND ALBUTEROL SULFATE 1 AMPULE: .5; 3 SOLUTION RESPIRATORY (INHALATION) at 13:23

## 2021-01-13 RX ADMIN — METOPROLOL SUCCINATE 25 MG: 25 TABLET, EXTENDED RELEASE ORAL at 10:02

## 2021-01-13 RX ADMIN — IPRATROPIUM BROMIDE AND ALBUTEROL SULFATE 1 AMPULE: .5; 3 SOLUTION RESPIRATORY (INHALATION) at 20:28

## 2021-01-13 RX ADMIN — ASPIRIN 81 MG: 81 TABLET, COATED ORAL at 10:13

## 2021-01-13 RX ADMIN — DOBUTAMINE HYDROCHLORIDE 2.5 MCG/KG/MIN: 200 INJECTION INTRAVENOUS at 10:02

## 2021-01-13 RX ADMIN — ATORVASTATIN CALCIUM 40 MG: 40 TABLET, FILM COATED ORAL at 21:45

## 2021-01-13 RX ADMIN — DONEPEZIL HYDROCHLORIDE 10 MG: 10 TABLET, FILM COATED ORAL at 21:45

## 2021-01-13 ASSESSMENT — ENCOUNTER SYMPTOMS
COLOR CHANGE: 0
TROUBLE SWALLOWING: 0
BACK PAIN: 0
SHORTNESS OF BREATH: 0
NAUSEA: 0
CHOKING: 0
PHOTOPHOBIA: 0
VOMITING: 0

## 2021-01-13 NOTE — CONSULTS
Subjective:      Patient ID: Bernard Saravia is a 78 y.o. male who presents today for weakness. HPI 80-year-old right-handed gentleman was admitted with a history of shortness of breath. A week ago patient started to have shortness of breath and he was not using his oxygen he was seen by Dr. Gilmore Dus patient was then admitted with congestive heart failure. He has paroxysmal atrial fibrillation and he is on Coumadin. INR remains at around 2. Yesterday it was noted that he had proximal weakness of the left upper extremity, but this has resolved. Further history is obtained from his daughter. Patient lives alone is mostly independent in his activity of daily living. He was seen for memory disorder few years ago though he functions well at home. He is independent. Patient denies any symptoms at this time. Denies any neck pain he denies any back pain. Is not any recent falls injuries trauma. Review of Systems   Constitutional: Negative for fever. HENT: Negative for ear pain, tinnitus and trouble swallowing. Eyes: Negative for photophobia and visual disturbance. Respiratory: Negative for choking and shortness of breath. Cardiovascular: Negative for chest pain and palpitations. Gastrointestinal: Negative for nausea and vomiting. Musculoskeletal: Negative for back pain, gait problem, joint swelling, myalgias, neck pain and neck stiffness. Skin: Negative for color change. Allergic/Immunologic: Negative for food allergies. Neurological: Positive for weakness. Negative for dizziness, tremors, seizures, syncope, facial asymmetry, speech difficulty, light-headedness, numbness and headaches. Psychiatric/Behavioral: Negative for behavioral problems, confusion, hallucinations and sleep disturbance.        Past Medical History:   Diagnosis Date    Amiodarone-induced hyperthyroidism     Arthritis     [facility] fl[facility]     CAD (coronary artery disease)  Chronic combined systolic and diastolic CHF (congestive heart failure) (HCC)     CKD (chronic kidney disease) stage 3, GFR 30-59 ml/min     [facility]     Defibrillator activation     Diabetes mellitus with insulin therapy ([facility]     Dilated cardiomyopathy ([facility]     Generalized and unspecified atherosclerosis     Gout     Hyperlipidemia     Hypertension     Noncompliance     Obesity     On home oxygen therapy     Pain in joint, multiple sites     Paroxysmal A-fib (HCC)     Paroxysmal ventricular tachycardia (HCC)     Prolonged emergence from general anesthesia     Status post angioplasty     Sustained ventricular tachycardia (HCC)     TIA (transient ischemic attack)     Tobacco abuse     Type II or unspecified type diabetes mellitus without mention of complication, not stated as uncontrolled      Past Surgical History:   Procedure Laterality Date    CARDIAC CATHETERIZATION      COLONOSCOPY      CORONARY ANGIOPLASTY  4/29/11    Dr Gini Sandoval CORONARY ARTERY BYPASS GRAFT  2012    ENDOSCOPY, COLON, DIAGNOSTIC      EYE SURGERY Bilateral     Cataracts     OTHER SURGICAL HISTORY      difibrillator     ID CIRCUMCISION,OTHER,28+ D/O N/A 8/29/2018    CIRCUMCISION performed by Tamera Duran MD at [address] [facility] EGD TRANSORAL BIOPSY SINGLE/MULTIPLE N/A 11/5/2017    [facility]SY [facility]as [facility] History     Socioeconomic History    Marital status:      Spouse name: Not on file    Number of children: 2    Years of education: 15    Highest education level: High school graduate   Occupational History    Not on file   Social Needs    Financial resource strain: Somewhat hard    Food insecurity     Worry: Never true     Inability: Never true    Transportation needs     Medical: No     Non-medical: No   Tobacco Use  insulin lispro (HUMALOG) injection vial 5 Units  5 Units Subcutaneous TID WC Constantino Reeder MD   5 Units at 01/12/21 1810    insulin glargine (LANTUS) injection vial 10 Units  10 Units Subcutaneous Nightly Constantino Reeder MD   10 Units at 01/12/21 2144    digoxin (LANOXIN) tablet 125 mcg  125 mcg Oral Daily Constantino Reeder MD   125 mcg at 01/12/21 0858    donepezil (ARICEPT) tablet 10 mg  10 mg Oral Nightly Constantino Reeder MD   10 mg at 01/12/21 2143    warfarin (COUMADIN) daily dosing (placeholder)   Other RX Placeholder Constantino Reeder MD        sodium chloride flush 0.9 % injection 10 mL  10 mL Intravenous 2 times per day Constantino Reeder MD   10 mL at 01/12/21 2144    sodium chloride flush 0.9 % injection 10 mL  10 mL Intravenous PRN Consatntino Reeder MD        promethazine (PHENERGAN) tablet 12.5 mg  12.5 mg Oral Q6H PRN Constantino Reeder MD        Or    ondansetron (ZOFRAN) injection 4 mg  4 mg Intravenous Q6H PRN Constantino Reeder MD        polyethylene glycol (GLYCOLAX) packet 17 g  17 g Oral Daily PRN Constantino Reeder MD        acetaminophen (TYLENOL) tablet 650 mg  650 mg Oral Q6H PRN Constantino Reeder MD        Or    acetaminophen (TYLENOL) suppository 650 mg  650 mg Rectal Q6H PRN Constantino Reeder MD        potassium chloride (KLOR-CON M) extended release tablet 40 mEq  40 mEq Oral PRN Constantino Reeder MD        Or    potassium bicarb-citric acid (EFFER-K) effervescent tablet 40 mEq  40 mEq Oral PRN Constantino Reeder MD        Or    potassium chloride 10 mEq/100 mL IVPB (Peripheral Line)  10 mEq Intravenous PRN Constantino Reeder MD        magnesium sulfate 2 g in 50 mL IVPB premix  2 g Intravenous PRN Constantino Reeder MD        [Held by provider] sacubitril-valsartan (ENTRESTO) 24-26 MG per tablet 1 tablet  1 tablet Oral BID Constantino Reeder MD   1 tablet at 01/10/21 0842    furosemide (LASIX) injection 40 mg  40 mg Intravenous BID Constantino Reeder MD   40 mg at 01/12/21 1805  spironolactone (ALDACTONE) tablet 25 mg  25 mg Oral Daily Ary Winn MD   25 mg at 01/12/21 0858    ipratropium-albuterol (DUONEB) nebulizer solution 1 ampule  1 ampule Inhalation TID Ary Winn MD   1 ampule at 01/13/21 0744    albuterol (PROVENTIL) nebulizer solution 2.5 mg  2.5 mg Nebulization Q2H PRN Ary Winn MD        insulin lispro (HUMALOG) injection vial 0-12 Units  0-12 Units Subcutaneous TID WC Diann Chase MD   2 Units at 01/12/21 1810    insulin lispro (HUMALOG) injection vial 0-6 Units  0-6 Units Subcutaneous Nightly Diann Chase MD   1 Units at 01/12/21 2149    glucose (GLUTOSE) 40 % oral gel 15 g  15 g Oral PRN Diann Chase MD        dextrose 50 % IV solution  12.5 g Intravenous PRN Diann Chase MD        glucagon (rDNA) injection 1 mg  1 mg Intramuscular PRN Diann Chase MD        dextrose 5 % solution  100 mL/hr Intravenous PRN Diann Chase MD            Objective:   BP 96/66   Pulse 94   Temp 98.1 °F (36.7 °C) (Oral)   Resp 18   Ht 5' 11\" (1.803 m)   Wt 206 lb (93.4 kg)   SpO2 98%   BMI 28.73 kg/m²     Physical Exam  Vitals signs reviewed. Eyes:      Pupils: Pupils are equal, round, and reactive to light. Neck:      Musculoskeletal: Normal range of motion. Cardiovascular:      Rate and Rhythm: Normal rate and regular rhythm. Heart sounds: No murmur. Pulmonary:      Effort: Pulmonary effort is normal.      Breath sounds: Normal breath sounds. Abdominal:      General: Bowel sounds are normal.   Musculoskeletal: Normal range of motion. Skin:     General: Skin is warm. Neurological:      Mental Status: He is alert and oriented to person, place, and time. Cranial Nerves: No cranial nerve deficit. Sensory: No sensory deficit. Motor: No abnormal muscle tone.       Coordination: Coordination normal. HDL 51 01/10/2021    LDLCALC 34 01/10/2021     No results found for: LABAMPH, BARBSCNU, LABBENZ, CANNAB, COCAINESCRN, LABMETH, OPIATESCREENURINE, PHENCYCLIDINESCREENURINE, PPXUR, ETOH  No results found for: LITHIUM, DILFRTOT, VALPROATE    Assessment:   Cerebral TIA with proximal weakness of the left upper extremity which is resolved. Patient has multiple risk factors for the same and his INRs appear to be borderline at 2.0. Recommended that we add aspirin to this Coumadin as if his INR is likely to fluctuate this will protect him. He has history of atrial fibrillation. Neurologic examination is normal.  I await PT evaluation if this is changed from his baseline then we will consider further intervention and evaluation. Commended a carotid ultrasound for now. Next well patient does have mild cognitive impairment and is at borderline scores for dementia. He lives alone and is otherwise independent in his activities of daily living. We will range for a CT scan of the head in the event that he has any additional issues with his ambulation after physical therapy evaluation an MRI of the brain may be considered. Findings were discussed with the daughter. Silvestre Saxena MD, [address], American Board of Psychiatry & Neurology  Board Certified in Vascular Neurology  Board Certified in Neuromuscular Medicine  Certified in [address]err[address] 38 ;l        Plan:

## 2021-01-13 NOTE — PROGRESS NOTES
[facility] Daily Progress Note  Name: Lakesha Dee. Age: 78 y.o. Gender: male  CodeStatus: Full Code    Primary Cardiology: Dr. Jim Call MD  [address] [facility] Cardiology: Dr. Jim Najera. Bindu Ann APRN-CNP   Primary Care Provider: Suyapa Macdonald MD  Admission Date: 1/9/2021    Chief complaint/Associated symptoms:   Teola Apley was seen and examined at bedside. Currently resting comfortably in bed watching television. Continues with Dobutamine drip. Denies chest pain, chest pressure, chest tightness, palpitations, shortness of breath,  lightheadedness or dizziness    Assessment:  1. SOB  2. Edema  3. Orthopnea  4. RTI, Covid rule out  5. CHF, Systolic, Acute on Chronic, Stage D, Class 3-4 due to end-stage cardiomyopathy, LVEF 5 to 10%  6. ICM LVEF 20%  7. Post AICD  8. CAD post CABG, negative troponins to date. 9. VHD, Post MV repair 2013  10. PAFIB, post AVN ablation October 2020  11. HTN  12. HLP  13. DM  14. LTAC on Warfarin  15. High Risk Medication, on Amiodarone, Digoxin and Warfarin. 16. COPD  17. CKI  18. TIA Hx  19. Past Tobacco  20. Poor Historian  21. IVP dye allergy  22. Hyponatremia: Na 129. Improved Na 135.  23.  Elevated BNP; 8683      Plan:  1. Monitor on telemetry  2. Monitor electrolytes, suggest maintaining K + =/> 4.0 and Mag =/> 2.0  3. Continue dobutamine at 2.5 mics per kilo per minute  4. Fluid balance per nephrology  5. Further recommendations per Dr. Carlos Cavazos        Physical Exam  Constitutional:  Well developed, awake/alert/oriented x3, no distress, alert and cooperative. Respiratory/Thorax: Diminished throughout lung fields  Cardiovascular: Distant heart sounds. Regular, rate and rhythm, no murmurs, normal S1 and S2, PMI non displaced. Gastrointestinal:  Non distended, soft, non-tender, no rebound tenderness or guarding,   Genitourinary:  deferred  Musculoskeletal:  No apparent injury. Right ventricle global systolic function is moderately reduced . Pacer wire visualized in right ventricle. Severe left atrial enlargement. Moderate right atrial enlargement. Mitral valve leaflets are mildly thickened with preserved leaflet   mobility. Mild (1-2+) mitral regurgitation is present. Moderate annular calcification. Mildly thickened aortic valve leaflets with preserved leaflet mobility. Moderate (2-3+) tricuspid regurgitation with estimated RVSP of 45 mm Hg. Labs:   Recent Labs     01/11/21  0547 01/12/21  0552 01/13/21  0559   WBC 8.9 7.7 6.2   HGB 10.5* 10.4* 10.4*   HCT 32.0* 32.1* 32.0*    148 159     Recent Labs     01/11/21  0547 01/12/21  0552 01/13/21  0559   * 129* 135   K 4.1 4.0 4.0   CL 96 91* 96   CO2 25 27 29   BUN 43* 32* 28*   CREATININE 1.53* 1.32* 1.29*   CALCIUM 9.0 8.9 9.0   PHOS 2.8  --   --      No results for input(s): AST, ALT, BILIDIR, BILITOT, ALKPHOS in the last 72 hours. Recent Labs     01/11/21  0547 01/12/21  0552 01/13/21  0600   INR 2.3 2.0 2.1     No results for input(s): CKTOTAL, TROPONINI in the last 72 hours. Urinalysis:   Lab Results   Component Value Date    NITRU Negative 12/13/2020    WBCUA 3-5 12/13/2020    BACTERIA Negative 12/13/2020    RBCUA 6-10 12/13/2020    BLOODU TRACE 12/13/2020    SPECGRAV 1.022 12/13/2020    GLUCOSEU Negative 12/13/2020       Radiology:          Portable:   Results for orders placed during the hospital encounter of 01/09/21   XR CHEST PORTABLE    Narrative Exam: XR CHEST PORTABLE    History: Shortness breath. CHF. Technique: AP portable view of the chest obtained. Comparison: Portable chest radiograph from December 13, 2020    Findings:    Left-sided cardiac defibrillator is present. Atherosclerotic calcification of the thoracic aorta. Moderate cardiomegaly. Pulmonary vascular congestion. Patchy and linear bibasilar opacities. No pneumothorax or pleural effusion. Impression Findings likely representing fluid overload/CHF. Superimposed bibasilar pneumonia not excluded. Active Hospital Problems    Diagnosis Date Noted    Acute on chronic combined systolic (congestive) and diastolic (congestive) heart failure ([facility] [I50.43] 01/09/2021     Priority: Low       Additional work up or/and treatment plan may be added today or then after based on clinical progression. I am managing a portion of pt care. Some medical issues are handled by other specialists. Additional work up and treatment should be done in out pt setting by pt PCP and other out pt providers. In addition to examining and evaluating pt, I spent additional time explaining care, normaland abnormal findings, and treatment plan. All of pt questions were answered. Counseling, diet and education were provided. Case will be discussed with nursing staff when appropriate. Family will be updated if and whenappropriate.       Electronically signed by ALE Parker CNP on 1/13/2021 at 10:53 AM

## 2021-01-13 NOTE — PROGRESS NOTES
Hospitalist Progress Note      PCP: Claire Cevallos MD    Date of Admission: 1/9/2021    Chief Complaint:    Chief Complaint   Patient presents with    Shortness of Breath     sob since last night     Subjective:  Patient states he feels no different than yesterday. .  12 point ROS negative other than mentioned above     Medications:  Reviewed    Infusion Medications    DOBUTamine 2.5 mcg/kg/min (01/13/21 1002)    dextrose       Scheduled Medications    warfarin  5 mg Oral Once    [START ON 1/14/2021] bumetanide  1 mg Oral BID    metoprolol succinate  25 mg Oral Daily    atorvastatin  40 mg Oral Nightly    insulin lispro  5 Units Subcutaneous TID WC    insulin glargine  10 Units Subcutaneous Nightly    digoxin  125 mcg Oral Daily    donepezil  10 mg Oral Nightly    warfarin (COUMADIN) daily dosing (placeholder)   Other RX Placeholder    sodium chloride flush  10 mL Intravenous 2 times per day    [Held by provider] sacubitril-valsartan  1 tablet Oral BID    spironolactone  25 mg Oral Daily    ipratropium-albuterol  1 ampule Inhalation TID    insulin lispro  0-12 Units Subcutaneous TID WC    insulin lispro  0-6 Units Subcutaneous Nightly     PRN Meds: sodium chloride flush, promethazine **OR** ondansetron, polyethylene glycol, acetaminophen **OR** acetaminophen, potassium chloride **OR** potassium alternative oral replacement **OR** potassium chloride, magnesium sulfate, albuterol, glucose, dextrose, glucagon (rDNA), dextrose    Intake/Output Summary (Last 24 hours) at 1/13/2021 1546  Last data filed at 1/13/2021 0540  Gross per 24 hour   Intake 950.07 ml   Output 1750 ml   Net -799.93 ml     Exam:    /61   Pulse 95   Temp 98.1 °F (36.7 °C) (Oral)   Resp 18   Ht 5' 11\" (1.803 m)   Wt 206 lb (93.4 kg)   SpO2 97%   BMI 28.73 kg/m²     General appearance: No apparent distress, appears stated age and cooperative. HEENT:  Conjunctivae/corneas clear. Neck:  Trachea midline. Respiratory:  Normal respiratory effort. Clear to auscultation  Cardiovascular: Regular rate and rhythm   Abdomen: Soft, non-tender, non-distended with normal bowel sounds. Musculoskeletal: No clubbing, cyanosis or edema bilaterally. .  Neuro: Non Focal.   Capillary Refill: Brisk,< 3 seconds   Peripheral Pulses: +2 palpable, equal bilaterally     Labs:   Recent Labs     01/11/21  0547 01/12/21  0552 01/13/21  0559   WBC 8.9 7.7 6.2   HGB 10.5* 10.4* 10.4*   HCT 32.0* 32.1* 32.0*    148 159     Recent Labs     01/11/21  0547 01/12/21  0552 01/13/21  0559   * 129* 135   K 4.1 4.0 4.0   CL 96 91* 96   CO2 25 27 29   BUN 43* 32* 28*   CREATININE 1.53* 1.32* 1.29*   CALCIUM 9.0 8.9 9.0   PHOS 2.8  --   --      No results for input(s): AST, ALT, BILIDIR, BILITOT, ALKPHOS in the last 72 hours. Recent Labs     01/11/21  0547 01/12/21  0552 01/13/21  0600   INR 2.3 2.0 2.1     No results for input(s): CKTOTAL, TROPONINI in the last 72 hours. Urinalysis:      Lab Results   Component Value Date    NITRU Negative 12/13/2020    WBCUA 3-5 12/13/2020    BACTERIA Negative 12/13/2020    RBCUA 6-10 12/13/2020    BLOODU TRACE 12/13/2020    SPECGRAV 1.022 12/13/2020    GLUCOSEU Negative 12/13/2020     Radiology:  US CAROTID ARTERY BILATERAL   Final Result   Impression: 1. No hemodynamic significant stenosis on the right   2. No hemodynamic significant stenosis seen on the left      Validated velocity measurements with angiographic measurements, velocity criteria are extrapolated from diameter data as defined by the Society of radiologist in ultrasound consensus conference radiology 2003; 229; 340-346. CT HEAD WO CONTRAST   Final Result      There are no acute intracranial changes. XR CHEST PORTABLE   Final Result      Findings likely representing fluid overload/CHF. Superimposed bibasilar pneumonia not excluded.               Assessment/Plan: 1. Acute on chronic combined systolic and diastolic CHF:  Improved but patients blood pressure is borderline with his entresto on hold; cardiology and nephrology are helping manage; on dobutamine; entresto resumption when ok with cardiology based on SBP  2. Cough with atypical CXR findings:  COVID ruled out  3. DMII:  SSI  4. BERTRAM on CKD:  Improving; nephrology is helping manage  5. HTN/HLD:  Continue home meds  6. Chronic hypoxic resp failure:  Continue home meds  7. Functional Status: Fall precautions. Up with assistance. PT OT  8. Diet: Cardiac Diabetic   9. DVT ppx: Lovenox SCDs  10. Disposition: Dependent on hospital course. Will discharge once medically stable. SW on board for discharge planning. Active Hospital Problems    Diagnosis Date Noted    Acute on chronic combined systolic (congestive) and diastolic (congestive) heart failure ([facility] [I50.43] 01/09/2021     Additional work up or/and treatment plan may be added today or then after based on clinical progression. I am managing a portion of pt care. Some medical issues are handled by other specialists. Additional work up and treatment should be done in out pt setting by pt PCP and other out pt providers. In addition to examining and evaluating pt, I spent additional time explaining care, normal and abnormal findings, and treatment plan. All of pt questions were answered. Counseling, diet and education were  provided. Case will be discussed with nursing staff when appropriate. Family will be updated if and when appropriate.       Diet: DIET LOW SODIUM 2 GM; Carb Control: 5 carb choices (75 gms)/meal    Code Status: Full Code    PT/OT Eval     Electronically signed by Ann Jorge MD on 1/13/2021 at 3:46 PM

## 2021-01-13 NOTE — PROGRESS NOTES
Physical Therapy Med Surg Daily Treatment Note  Fac[facility]Department[facility]  [id number]       NAME: Shemar Green.   : 1950 (79 y.o.)  MRN: [id number]  CODE STATUS: Full Code    Date of Service: 2021    Patient Diagnosis(es): Acute on chronic combined systolic (congestive) and diastolic (congestive) heart failure ([address] [I50.43]   Chief Complaint   Patient presents with    Shortness of Breath     sob since last night     Patient Active Problem List    Diagnosis Date Noted    Sustained VT (ventricular tachycardia) ([address] 2019     Priority: High    Acute on chronic combined systolic (congestive) and diastolic (congestive) heart failure ([address] 2021    Atrial fibrillation ([address] 10/26/2020    Elevated bilirubin 2019    Acute decompensated heart failure ([address] 2019    CKD (chronic kidney disease) stage 3, GFR 30-59 ml/min     Ventricular tachycardia ([address] 2019    Hypoxia 2019    BERTRAM (acute kidney injury) ([address] 12/10/2018    Anticoagulant long-term use 12/10/2018    Blurred vision, bilateral 12/10/2018    Cardiomyopathy of end-stage congenital heart disease ([address] 12/10/2018    Chest pain 12/10/2018    Cough in adult 12/10/2018    Dizziness 12/10/2018    Fatigue 12/10/2018    Fever 12/10/2018    Former smoker 12/10/2018    Gout, arthritis 12/10/2018    High risk medication use 12/10/2018    Hyperkalemia 12/10/2018    Hypokalemia 12/10/2018    Hypotension (arterial) 12/10/2018    ICD (implantable cardioverter-defibrillator) discharge 12/10/2018    Obese 12/10/2018    Overweight 12/10/2018    Paroxysmal ventricular tachycardia ([address] 12/10/2018    Presence of automatic implantable cardioverter-defibrillator 12/10/2018    Status post angioplasty 12/10/2018    Swelling of multiple joints 12/10/2018    Amiodarone-induced hyperthyroidism 2018    Hyperthyroidism  Uncontrolled type 2 diabetes mellitus with hyperglycemia (HCC)     Moderate malnutrition ([address] 12/01/2018    Phimosis/redundant prepuce     CHF (congestive heart failure), NYHA class IV, acute on chronic, combined ([address] 05/16/2018    CHF (congestive heart failure), NYHA class I, acute on chronic, combined ([address] 13/47/5045    Acute systolic CHF (congestive heart failure) ([address] 05/15/2018    CHF (congestive heart failure), NYHA class III, chronic, combined ([address] 04/02/2018    SOB (shortness of breath)     Chronic combined systolic and diastolic heart failure ([address] 11/04/2017    Hemoptysis 11/04/2017    Paroxysmal A-fib ([address] 10/01/2016    Pain in joint, multiple sites     COPD (chronic obstructive pulmonary disease) ([address]     Diabetes mellitus with insulin therapy ([address]     Hyperlipidemia     Hypertension     Generalized atherosclerosis     TIA (transient ischemic attack)     Mitral valve insufficiency 04/12/2013    Elevation of level of transaminase or lactic acid dehydrogenase (LDH) 04/10/2013    Atrial flutter ([address] 04/10/2013    Hyponatremia 04/04/2013    Disorder of pancreas 03/28/2013    Generalized ischemic myocardial dysfunction 03/28/2013    Coronary arteriosclerosis in native artery 03/28/2013    Acute on chronic systolic CHF (congestive heart failure), NYHA class 4 ([address] 03/28/2013    Uncontrolled type 2 diabetes mellitus with complication, with long-term current use of insulin ([address] 05/09/2012    Noncompliance 05/09/2012    Disorder of muscle 10/06/2004    Acute lymphadenitis 10/06/2004    Irritable bowel syndrome 10/06/2004    Left heart failure ([address] 06/20/2003    Atherosclerosis of coronary artery 06/20/2003    Tobacco dependence syndrome 06/27/2002        Past Medical History:   Diagnosis Date    Amiodarone-induced hyperthyroidism     Arthritis     Atrial flutter ([address]     CAD (coronary artery disease)  Chronic combined systolic and diastolic CHF (congestive heart failure) (HCC)     CKD (chronic kidney disease) stage 3, GFR 30-59 ml/min     COPD (chronic obstructive pulmonary disease) ([address]     Defibrillator activation     Diabetes mellitus with insulin therapy ([address]     Dilated cardiomyopathy ([address]     Generalized and unspecified atherosclerosis     Gout     Hyperlipidemia     Hypertension     Noncompliance     Obesity     On home oxygen therapy     Pain in joint, multiple sites     Paroxysmal A-fib (HCC)     Paroxysmal ventricular tachycardia (HCC)     Prolonged emergence from general anesthesia     Status post angioplasty     Sustained ventricular tachycardia (HCC)     TIA (transient ischemic attack)     Tobacco abuse     Type II or unspecified type diabetes mellitus without mention of complication, not stated as uncontrolled      Past Surgical History:   Procedure Laterality Date    CARDIAC CATHETERIZATION      COLONOSCOPY      CORONARY ANGIOPLASTY  4/29/11    Dr Hilda Rogers CORONARY ARTERY BYPASS GRAFT  2012    ENDOSCOPY, COLON, DIAGNOSTIC      EYE SURGERY Bilateral     Cataracts     OTHER SURGICAL HISTORY      difibrillator     VT CIRCUMCISION,OTHER,28+ D/O N/A 8/29/2018    CIRCUMCISION performed by Alan Diaz MD at [facility] EGD TRANSORAL BIOPSY SINGLE/MULTIPLE N/A 11/5/2017    EGD BIOPSY performed by Baldwin Cheadle, MD at [address] [facility]  Restrictions/Precautions: Fall Risk    SUBJECTIVE   Subjective  Subjective: I'm doing pretty good. General Comment  Comments:  All tasks performed on RA to test O2 levels; 94-96% before activity - 92-94% after activity; NC donned after tasks    Pre-Session Pain Report  Pre Treatment Pain Screening  Pain at present: 0  Intervention List: Patient able to continue with treatment          Post-Session Pain Report  Pain Assessment Pain Level: 0         OBJECTIVE   Orientation  Orientation Level: Oriented to person;Oriented to place;Oriented to situation;Disoriented to time    Bed mobility  Bridging: Independent  Rolling to Left: Independent  Rolling to Right: Independent  Supine to Sit: Independent  Sit to Supine: Independent  Comment: bed flat without use of hand rails; steady    Transfers  Sit to Stand: Supervision  Stand to sit: Supervision  Comment: increased time to complete, otherwise steady    Ambulation  More Ambulation?: No  Ambulation 1  Surface: level tile  Device: No Device  Assistance: Stand by assistance  Quality of Gait: NBOS, reciprocal gait pattern with slow geri, deviates from straight path, decreased arm swing; vc's for pacing  Gait Deviations: Slow Geri  Distance: 100'x2              Neuromuscular Education  Neuromuscular Comments: DGI activity in coronel - mild<>moderate gait deviations with head turns, 360* turns, step overs; 4 step instruction - pt repeat steps back to therapist at each step for reassurance                          Activity Tolerance  Activity Tolerance: Patient Tolerated treatment well          ASSESSMENT   Assessment: Good participation; Pt able to increase ambulatory distance without complaint and without device. Performed DGI activity in [place] with mild<>Moderate gait deviations. Pt reported being tired at end of treatment. Discharge Recommendations:  Continue to assess pending progress, Patient would benefit from continued therapy after discharge    Goals  Long term goals  Long term goal 1: Pt will demonstrate bed mobility indep for safe d/c home alone. Long term goal 2: Pt will demonstrate transfers indep without AD to allow pt to safely return home alone. Long term goal 3: Pt will demonstrate amb 150ft without AD indep to allow pt to safely amb inside his home. Long term goal 4: Pt will demonstrate stair negotiation 3 steps with B rails mod indep to allow pt to enter and exit his home safely. PLAN    Safety Devices  Type of devices: All fall risk precautions in place; Bed alarm in place;Call light within reach; Left in bed     AMPAC (6 CLICK) BASIC MOBILITY  AM-PAC Inpatient Mobility Raw Score : 20      Therapy Time   Individual   Time In 0917   Time Out 0944   Minutes 27      bm/Trsf - 7 mins  Gait - 10 mins  NMR - 10 mins       Elbert Low PTA, 01/13/21 at 9:57 AM       Definitions for assistance levels  Independent = pt does not require any physical supervision or assistance from another person for activity completion. Device may be needed.   Stand by assistance = pt requires verbal cues or instructions from another person, close to but not touching, to perform the activity  Minimal assistance= pt performs 75% or more of the activity; assistance is required to complete the activity  Moderate assistance= pt performs 50% of the activity; assistance is required to complete the activity  Maximal assistance = pt performs 25% of the activity; assistance is required to complete the activity  Dependent = pt requires total physical assistance to accomplish the task

## 2021-01-13 NOTE — PROGRESS NOTES
Nephrology Progress Note    Assessment/  78years old male presented to the emergency department for further evaluation and management of approximately 3 days duration of gradual onset and progressive course of lower extremity swelling and dyspnea progressed to dyspnea on minimal exertion admitted with a diagnosis of decompensated heart failure the patient does carry the chronic comorbidity of morbid obesity diabetes type 2 and chronic kidney disease stage II/III secondary to diabetic nephropathy w/ baseline Scr ~1.3-1.5 w/ eGFR =/>mid 50s     -Acute kidney injury secondary to cardiorenal syndrome type I  -Chronic kidney disease stage III secondary to diabetic nephropathy  -Volume overload with increased extracellular fluid volume secondary to decompensated heart failure the precipitating factor at this point of time is unclear whether it is infectious or dietary the patient did not have any that could explain that as well as no leukocytosis or left shift  - borderline hypotensive        Plan/  - changing lasix back to PO bumex tomorrow   - defer plan to restart entresto to cardiology, remains hypotensive at times   - monitor function, currently at baseline and hyponatremia has resolved  - as no more therapeutic changes being made from a renal standpoint will sign off  - please contact us for any issues that may arise   - f/u in 1 month (has been referred to us in the past)       Patient Active Problem List:     Uncontrolled type 2 diabetes mellitus with complication, with long-term current use of insulin ([address]     Noncompliance     Pain in joint, multiple sites     COPD (chronic obstructive pulmonary disease) ([address]     Diabetes mellitus with insulin therapy ([address]     Hyperlipidemia     Hypertension     Generalized atherosclerosis     TIA (transient ischemic attack)     Elevation of level of transaminase or lactic acid dehydrogenase (LDH)     Tobacco dependence syndrome     Disorder of muscle Mitral valve insufficiency     Acute lymphadenitis     Disorder of pancreas     Left heart failure (HCC)     Irritable bowel syndrome     Hyponatremia     Atherosclerosis of coronary artery     Generalized ischemic myocardial dysfunction     Coronary arteriosclerosis in native artery     Atrial flutter (HCC)     Acute on chronic systolic CHF (congestive heart failure), NYHA class 4 (HCC)     Paroxysmal A-fib (HCC)     Chronic combined systolic and diastolic heart failure (HCC)     Hemoptysis     SOB (shortness of breath)     CHF (congestive heart failure), NYHA class III, chronic, combined (HCC)     CHF (congestive heart failure), NYHA class I, acute on chronic, combined (HCC)     Acute systolic CHF (congestive heart failure) (HCC)     CHF (congestive heart failure), NYHA class IV, acute on chronic, combined ([address]     Phimosis/redundant prepuce     Moderate malnutrition (HCC)     Amiodarone-induced hyperthyroidism     Hyperthyroidism     Uncontrolled type 2 diabetes mellitus with hyperglycemia (HCC)     BERTRAM (acute kidney injury) ([address]     Anticoagulant long-term use     Blurred vision, bilateral     Cardiomyopathy of end-stage congenital heart disease ([address]     Chest pain     Cough in adult     Dizziness     Fatigue     Fever     Former smoker     Gout, arthritis     High risk medication use     Hyperkalemia     Hypokalemia     Hypotension (arterial)     ICD (implantable cardioverter-defibrillator) discharge     Obese     Overweight     Paroxysmal ventricular tachycardia (HCC)     Presence of automatic implantable cardioverter-defibrillator     Status post angioplasty     Swelling of multiple joints     Hypoxia     Ventricular tachycardia (HCC)     Sustained VT (ventricular tachycardia) (HCC)     Acute decompensated heart failure (HCC)     CKD (chronic kidney disease) stage 3, GFR 30-59 ml/min     Elevated bilirubin     Atrial fibrillation (HCC) Acute on chronic combined systolic (congestive) and diastolic (congestive) heart failure (HCC)      Subjective:  Admit Date: 1/9/2021    Interval History: renal function now at baseline, Na now wnl    Medications:  Scheduled Meds:   warfarin  5 mg Oral Once    metoprolol succinate  25 mg Oral Daily    atorvastatin  40 mg Oral Nightly    insulin lispro  5 Units Subcutaneous TID WC    insulin glargine  10 Units Subcutaneous Nightly    digoxin  125 mcg Oral Daily    donepezil  10 mg Oral Nightly    warfarin (COUMADIN) daily dosing (placeholder)   Other RX Placeholder    sodium chloride flush  10 mL Intravenous 2 times per day    [Held by provider] sacubitril-valsartan  1 tablet Oral BID    furosemide  40 mg Intravenous BID    spironolactone  25 mg Oral Daily    ipratropium-albuterol  1 ampule Inhalation TID    insulin lispro  0-12 Units Subcutaneous TID WC    insulin lispro  0-6 Units Subcutaneous Nightly     Continuous Infusions:   DOBUTamine 2.5 mcg/kg/min (01/13/21 1002)    dextrose         CBC:   Recent Labs     01/12/21  0552 01/13/21  0559   WBC 7.7 6.2   HGB 10.4* 10.4*    159     CMP:    Recent Labs     01/11/21  0547 01/12/21  0552 01/13/21  0559   * 129* 135   K 4.1 4.0 4.0   CL 96 91* 96   CO2 25 27 29   BUN 43* 32* 28*   CREATININE 1.53* 1.32* 1.29*   GLUCOSE 161* 238* 165*   CALCIUM 9.0 8.9 9.0   LABGLOM 45.4* 53.8* 55.3*     Troponin:   No results for input(s): TROPONINI in the last 72 hours. BNP: No results for input(s): BNP in the last 72 hours. INR:   Recent Labs     01/13/21  0600   INR 2.1     Lipids:   No results for input(s): CHOL, LDLDIRECT, TRIG, HDL, AMYLASE, LIPASE in the last 72 hours. Liver:   No results for input(s): AST, ALT, ALKPHOS, PROT, LABALBU, BILITOT in the last 72 hours. Invalid input(s): BILDIR  Iron:  No results for input(s): IRONS, FERRITIN in the last 72 hours.     Invalid input(s): Linda Todd Urinalysis: No results for input(s): UA in the last 72 hours.     Objective:  Vitals: /72   Pulse 94   Temp 98.1 °F (36.7 °C) (Oral)   Resp 18   Ht 5' 11\" (1.803 m)   Wt 206 lb (93.4 kg)   SpO2 98%   BMI 28.73 kg/m²    Wt Readings from Last 3 Encounters:   01/09/21 206 lb (93.4 kg)   01/04/21 200 lb (90.7 kg)   12/13/20 200 lb (90.7 kg)      24HR INTAKE/OUTPUT:      Intake/Output Summary (Last 24 hours) at 1/13/2021 1255  Last data filed at 1/13/2021 0540  Gross per 24 hour   Intake 950.07 ml   Output 1750 ml   Net -799.93 ml       General: alert, in no apparent distress  HEENT: normocephalic, atraumatic, anicteric  Lungs: non-labored respirations, clear to auscultation bilaterally  Heart: regular rate and rhythm, no murmurs or rubs  Abdomen: soft, non-tender, non-distended  Ext: no cyanosis, no peripheral edema  Neuro: alert and oriented, no gross abnormalities      Electronically signed by Tina Cam MD

## 2021-01-14 VITALS
OXYGEN SATURATION: 98 % | SYSTOLIC BLOOD PRESSURE: 111 MMHG | HEIGHT: 71 IN | WEIGHT: 198.19 LBS | DIASTOLIC BLOOD PRESSURE: 70 MMHG | BODY MASS INDEX: 27.75 KG/M2 | TEMPERATURE: 97.7 F | HEART RATE: 87 BPM | RESPIRATION RATE: 18 BRPM

## 2021-01-14 LAB
ANION GAP SERPL CALCULATED.3IONS-SCNC: 11 MEQ/L (ref 9–15)
BASOPHILS ABSOLUTE: 0 K/UL (ref 0–0.2)
BASOPHILS RELATIVE PERCENT: 0.3 %
BUN BLDV-MCNC: 27 MG/DL (ref 8–23)
CALCIUM SERPL-MCNC: 8.9 MG/DL (ref 8.5–9.9)
CHLORIDE BLD-SCNC: 93 MEQ/L (ref 95–107)
CO2: 26 MEQ/L (ref 20–31)
CREAT SERPL-MCNC: 1.29 MG/DL (ref 0.7–1.2)
EOSINOPHILS ABSOLUTE: 0 K/UL (ref 0–0.7)
EOSINOPHILS RELATIVE PERCENT: 0.3 %
GFR AFRICAN AMERICAN: >60
GFR NON-AFRICAN AMERICAN: 55.3
GLUCOSE BLD-MCNC: 105 MG/DL (ref 60–115)
GLUCOSE BLD-MCNC: 335 MG/DL (ref 60–115)
GLUCOSE BLD-MCNC: 59 MG/DL (ref 60–115)
GLUCOSE BLD-MCNC: 96 MG/DL (ref 70–99)
HCT VFR BLD CALC: 31.1 % (ref 42–52)
HEMOGLOBIN: 10.1 G/DL (ref 14–18)
INR BLD: 2
LYMPHOCYTES ABSOLUTE: 0.7 K/UL (ref 1–4.8)
LYMPHOCYTES RELATIVE PERCENT: 12.7 %
MCH RBC QN AUTO: 27.6 PG (ref 27–31.3)
MCHC RBC AUTO-ENTMCNC: 32.7 % (ref 33–37)
MCV RBC AUTO: 84.5 FL (ref 80–100)
MONOCYTES ABSOLUTE: 0.6 K/UL (ref 0.2–0.8)
MONOCYTES RELATIVE PERCENT: 10.7 %
NEUTROPHILS ABSOLUTE: 4.2 K/UL (ref 1.4–6.5)
NEUTROPHILS RELATIVE PERCENT: 76 %
PDW BLD-RTO: 17.2 % (ref 11.5–14.5)
PERFORMED ON: ABNORMAL
PERFORMED ON: ABNORMAL
PERFORMED ON: NORMAL
PLATELET # BLD: 150 K/UL (ref 130–400)
POTASSIUM SERPL-SCNC: 4.1 MEQ/L (ref 3.4–4.9)
PROTHROMBIN TIME: 22.3 SEC (ref 12.3–14.9)
RBC # BLD: 3.67 M/UL (ref 4.7–6.1)
SODIUM BLD-SCNC: 130 MEQ/L (ref 135–144)
WBC # BLD: 5.6 K/UL (ref 4.8–10.8)

## 2021-01-14 PROCEDURE — 94761 N-INVAS EAR/PLS OXIMETRY MLT: CPT

## 2021-01-14 PROCEDURE — 36415 COLL VENOUS BLD VENIPUNCTURE: CPT

## 2021-01-14 PROCEDURE — 94640 AIRWAY INHALATION TREATMENT: CPT

## 2021-01-14 PROCEDURE — 97116 GAIT TRAINING THERAPY: CPT

## 2021-01-14 PROCEDURE — 85610 PROTHROMBIN TIME: CPT

## 2021-01-14 PROCEDURE — 6370000000 HC RX 637 (ALT 250 FOR IP): Performed by: INTERNAL MEDICINE

## 2021-01-14 PROCEDURE — 97112 NEUROMUSCULAR REEDUCATION: CPT

## 2021-01-14 PROCEDURE — 6370000000 HC RX 637 (ALT 250 FOR IP): Performed by: NURSE PRACTITIONER

## 2021-01-14 PROCEDURE — 2700000000 HC OXYGEN THERAPY PER DAY

## 2021-01-14 PROCEDURE — 85025 COMPLETE CBC W/AUTO DIFF WBC: CPT

## 2021-01-14 PROCEDURE — 6370000000 HC RX 637 (ALT 250 FOR IP): Performed by: STUDENT IN AN ORGANIZED HEALTH CARE EDUCATION/TRAINING PROGRAM

## 2021-01-14 PROCEDURE — 80048 BASIC METABOLIC PNL TOTAL CA: CPT

## 2021-01-14 PROCEDURE — APPSS30 APP SPLIT SHARED TIME 16-30 MINUTES: Performed by: NURSE PRACTITIONER

## 2021-01-14 PROCEDURE — 99233 SBSQ HOSP IP/OBS HIGH 50: CPT | Performed by: PSYCHIATRY & NEUROLOGY

## 2021-01-14 RX ORDER — METOPROLOL SUCCINATE 25 MG/1
25 TABLET, EXTENDED RELEASE ORAL DAILY
Qty: 45 TABLET | Refills: 0 | Status: ON HOLD | OUTPATIENT
Start: 2021-01-14 | End: 2021-02-15 | Stop reason: HOSPADM

## 2021-01-14 RX ORDER — WARFARIN SODIUM 5 MG/1
7.5 TABLET ORAL
Status: COMPLETED | OUTPATIENT
Start: 2021-01-14 | End: 2021-01-14

## 2021-01-14 RX ADMIN — METOPROLOL SUCCINATE 25 MG: 25 TABLET, EXTENDED RELEASE ORAL at 07:57

## 2021-01-14 RX ADMIN — IPRATROPIUM BROMIDE AND ALBUTEROL SULFATE 1 AMPULE: .5; 3 SOLUTION RESPIRATORY (INHALATION) at 13:40

## 2021-01-14 RX ADMIN — ACETAMINOPHEN 650 MG: 325 TABLET, FILM COATED ORAL at 07:57

## 2021-01-14 RX ADMIN — BUMETANIDE 1 MG: 1 TABLET ORAL at 07:57

## 2021-01-14 RX ADMIN — DIGOXIN 125 MCG: 125 TABLET ORAL at 07:57

## 2021-01-14 RX ADMIN — WARFARIN SODIUM 7.5 MG: 5 TABLET ORAL at 17:11

## 2021-01-14 RX ADMIN — SPIRONOLACTONE 25 MG: 25 TABLET ORAL at 07:57

## 2021-01-14 RX ADMIN — INSULIN LISPRO 8 UNITS: 100 INJECTION, SOLUTION INTRAVENOUS; SUBCUTANEOUS at 11:28

## 2021-01-14 RX ADMIN — SACUBITRIL AND VALSARTAN 0.5 TABLET: 24; 26 TABLET, FILM COATED ORAL at 17:11

## 2021-01-14 ASSESSMENT — ENCOUNTER SYMPTOMS
COLOR CHANGE: 0
VOMITING: 0
COUGH: 0
NAUSEA: 0
SHORTNESS OF BREATH: 0
TROUBLE SWALLOWING: 0
CHEST TIGHTNESS: 0
WHEEZING: 0

## 2021-01-14 ASSESSMENT — PAIN DESCRIPTION - LOCATION: LOCATION: GENERALIZED

## 2021-01-14 ASSESSMENT — PAIN SCALES - GENERAL
PAINLEVEL_OUTOF10: 6
PAINLEVEL_OUTOF10: 3
PAINLEVEL_OUTOF10: 3

## 2021-01-14 ASSESSMENT — PAIN DESCRIPTION - PAIN TYPE: TYPE: ACUTE PAIN;CHRONIC PAIN

## 2021-01-14 NOTE — CARE COORDINATION
MET WITH PATIENT, DISCUSSED DC PLANNING. PT IS AGREEABLE TO [facility], FREEDOM OF CHOICE GIVEN, PT WOULD LIKE [facility], HAS HAD IN THE PAST. [facility]. STILL NEED ORDER, DR WALDEN AWARE. SECOND IMM REVIEWED VERBAL CONSENT GIVEN.

## 2021-01-14 NOTE — FLOWSHEET NOTE
2300- Resumed care of pt from day shift RNARLYN. 0600- Notified by monitor room, pt had 10 beat run of V-tach.

## 2021-01-14 NOTE — PROGRESS NOTES
[facility] Daily Progress Note  Name: Luiz Wilson. Age: 78 y.o. Gender: male  CodeStatus: Full Code    Primary Cardiology: Dr. Srinivasa Almanzar MD  [address] Cardiology: Dr. Srinivasa Nials. Mirna Thompson APRN-CNP   Primary Care Provider: Lupe Chambers MD  Admission Date: 1/9/2021    Chief complaint/Associated symptoms:   Gagandeep Hilliard was seen and examined at bedside. Currently sitting up in chair, anxious to go home. Dobutamine drip discontinued      Denies chest pain, chest pressure, chest tightness, palpitations, shortness of breath,  lightheadedness or dizziness    Assessment:  1. SOB  2. Edema  3. Orthopnea  4. RTI, Covid rule out  5. CHF, Systolic, Acute on Chronic, Stage D, Class 3-4 due to end-stage cardiomyopathy, LVEF 5 to 10%  6. ICM LVEF 20%  7. Post AICD  8. CAD post CABG, negative troponins to date. 9. VHD, Post MV repair 2013  10. PAFIB, post AVN ablation October 2020  11. HTN  12. HLP  13. DM  14. LTAC on Warfarin  15. High Risk Medication, on Amiodarone, Digoxin and Warfarin. 16. COPD  17. CKI  18. TIA Hx  19. Past Tobacco  20. Poor Historian  21. IVP dye allergy  22. Hyponatremia: Na 130.    23.  Elevated BNP; 8683      Plan:  1. Monitor on telemetry  2. Monitor electrolytes, suggest maintaining K + =/> 4.0 and Mag =/> 2.0  3. Discontinue dobutamine   4. Resume Entresto at adjusted dose. 5.  Fluid balance and electrolytes per nephrology  6. Follow up arranged in [facility] office. 7.  Further recommendations per Dr. Joy Moreau        Physical Exam  Constitutional:  Well developed, awake/alert/oriented x3, no distress, alert and cooperative. Respiratory/Thorax: Diminished throughout lung fields  Cardiovascular: Distant heart sounds. Regular, rate and rhythm, no murmurs, normal S1 and S2, PMI non displaced. Gastrointestinal:  Non distended, soft, non-tender, no rebound tenderness or guarding,   Genitourinary:  deferred  Musculoskeletal:  No apparent injury. Extremities: Trace bilateral lower leg edema,  DP 1+ equal bilaterally. Neurological:  Alert and oriented x3. Moves extremities spontaneous with purpose  Psychological:  Appropriate mood and behavior  Skin:  Warm and dry,        Allergies: Dye [Iodides]  Penicillins  Plavix [Clopidogrel]  Tapazole [Methimazole]    Medications:  Reviewed  Home Medications    Infusion Medications:    dextrose       Scheduled Medications:    bumetanide  1 mg Oral BID    metoprolol succinate  25 mg Oral Daily    atorvastatin  40 mg Oral Nightly    insulin lispro  5 Units Subcutaneous TID WC    insulin glargine  10 Units Subcutaneous Nightly    digoxin  125 mcg Oral Daily    donepezil  10 mg Oral Nightly    warfarin (COUMADIN) daily dosing (placeholder)   Other RX Placeholder    sodium chloride flush  10 mL Intravenous 2 times per day    [Held by provider] sacubitril-valsartan  1 tablet Oral BID    spironolactone  25 mg Oral Daily    ipratropium-albuterol  1 ampule Inhalation TID    insulin lispro  0-12 Units Subcutaneous TID WC    insulin lispro  0-6 Units Subcutaneous Nightly     PRN Meds: sodium chloride flush, promethazine **OR** ondansetron, polyethylene glycol, acetaminophen **OR** acetaminophen, potassium chloride **OR** potassium alternative oral replacement **OR** potassium chloride, magnesium sulfate, albuterol, glucose, dextrose, glucagon (rDNA), dextrose    Vitals  Vitals:    01/14/21 0722   BP: 109/63   Pulse: 89   Resp: 18   Temp: 97.9 °F (36.6 °C)   SpO2: 99%       I&O  925 ml     Telemetry:  Paced     ECHO 1/11/2021  Left ventricular size is moderately increased . Mild concentric left ventricular hypertrophy. Generalized hypokinesis of the left ventricle with overall severe   impairment of LV systolic function. Left ventricular ejection fraction is visually estimated at 5-10%. Mildly enlarged right ventricle cavity. Right ventricle global systolic function is moderately reduced . Pacer wire visualized in right ventricle. Severe left atrial enlargement. Moderate right atrial enlargement. Mitral valve leaflets are mildly thickened with preserved leaflet   mobility. Mild (1-2+) mitral regurgitation is present. Moderate annular calcification. Mildly thickened aortic valve leaflets with preserved leaflet mobility. Moderate (2-3+) tricuspid regurgitation with estimated RVSP of 45 mm Hg. Labs:   Recent Labs     01/12/21  0552 01/13/21  0559 01/14/21  0624   WBC 7.7 6.2 5.6   HGB 10.4* 10.4* 10.1*   HCT 32.1* 32.0* 31.1*    159 150     Recent Labs     01/12/21  0552 01/13/21  0559 01/14/21  0624   * 135 130*   K 4.0 4.0 4.1   CL 91* 96 93*   CO2 27 29 26   BUN 32* 28* 27*   CREATININE 1.32* 1.29* 1.29*   CALCIUM 8.9 9.0 8.9     No results for input(s): AST, ALT, BILIDIR, BILITOT, ALKPHOS in the last 72 hours. Recent Labs     01/12/21  0552 01/13/21  0600 01/14/21  0624   INR 2.0 2.1 2.0     No results for input(s): CKTOTAL, TROPONINI in the last 72 hours. Urinalysis:   Lab Results   Component Value Date    NITRU Negative 12/13/2020    WBCUA 3-5 12/13/2020    BACTERIA Negative 12/13/2020    RBCUA 6-10 12/13/2020    BLOODU TRACE 12/13/2020    SPECGRAV 1.022 12/13/2020    GLUCOSEU Negative 12/13/2020       Radiology:          Portable:   Results for orders placed during the hospital encounter of 01/09/21   XR CHEST PORTABLE    Narrative Exam: XR CHEST PORTABLE    History: Shortness breath. CHF. Technique: AP portable view of the chest obtained. Comparison: Portable chest radiograph from December 13, 2020    Findings:    Left-sided cardiac defibrillator is present. Atherosclerotic calcification of the thoracic aorta. Moderate cardiomegaly. Pulmonary vascular congestion. Patchy and linear bibasilar opacities. No pneumothorax or pleural effusion. Impression Findings likely representing fluid overload/CHF. Superimposed bibasilar pneumonia not excluded. Active Hospital Problems    Diagnosis Date Noted    Acute on chronic combined systolic (congestive) and diastolic (congestive) heart failure ([facility] [I50.43] 01/09/2021     Priority: Low       Additional work up or/and treatment plan may be added today or then after based on clinical progression. I am managing a portion of pt care. Some medical issues are handled by other specialists. Additional work up and treatment should be done in out pt setting by pt PCP and other out pt providers. In addition to examining and evaluating pt, I spent additional time explaining care, normaland abnormal findings, and treatment plan. All of pt questions were answered. Counseling, diet and education were provided. Case will be discussed with nursing staff when appropriate. Family will be updated if and whenappropriate.       Electronically signed by ALE Olson CNP on 1/14/2021 at 9:41 AM

## 2021-01-14 NOTE — PROGRESS NOTES
Physical Therapy Med Surg Daily Treatment Note  [facility]  [facility]       NAME: Love Hyatt.   : 1950 (79 y.o.)  MRN: [id number]  CODE STATUS: Full Code    Date of Service: 2021    Patient Diagnosis(es): Acute on chronic combined systolic (congestive) and diastolic (congestive) heart failure ([address] [I50.43]   Chief Complaint   Patient presents with    Shortness of Breath     sob since last night     Patient Active Problem List    Diagnosis Date Noted    Sustained VT (ventricular tachycardia) ([address] 2019     Priority: High    Acute on chronic combined systolic (congestive) and diastolic (congestive) heart failure ([address] 2021    Atrial fibrillation ([address] 10/26/2020    Elevated bilirubin 2019    Acute decompensated heart failure ([address] 2019    CKD (chronic kidney disease) stage 3, GFR 30-59 ml/min     Ventricular tachycardia ([address] 2019    Hypoxia 2019    BERTRAM (acute kidney injury) ([address] 12/10/2018    Anticoagulant long-term use 12/10/2018    Blurred vision, bilateral 12/10/2018    Cardiomyopathy of end-stage congenital heart disease ([address] 12/10/2018    Chest pain 12/10/2018    Cough in adult 12/10/2018    Dizziness 12/10/2018    Fatigue 12/10/2018    Fever 12/10/2018    Former smoker 12/10/2018    Gout, arthritis 12/10/2018    High risk medication use 12/10/2018    Hyperkalemia 12/10/2018    Hypokalemia 12/10/2018    Hypotension (arterial) 12/10/2018    ICD (implantable cardioverter-defibrillator) discharge 12/10/2018    Obese 12/10/2018    Overweight 12/10/2018    Paroxysmal ventricular tachycardia ([address] 12/10/2018    Presence of automatic implantable cardioverter-defibrillator 12/10/2018    Status post angioplasty 12/10/2018    Swelling of multiple joints 12/10/2018    Amiodarone-induced hyperthyroidism 2018    Hyperthyroidism  Uncontrolled type 2 diabetes mellitus with hyperglycemia (HCC)     Moderate malnutrition ([address] 12/01/2018    Phimosis/redundant prepuce     CHF (congestive heart failure), NYHA class IV, acute on chronic, combined ([address] 05/16/2018    CHF (congestive heart failure), NYHA class I, acute on chronic, combined ([address] 47/81/0511    Acute systolic CHF (congestive heart failure) ([address] 05/15/2018    CHF (congestive heart failure), NYHA class III, chronic, combined ([address] 04/02/2018    SOB (shortness of breath)     Chronic combined systolic and diastolic heart failure ([address] 11/04/2017    Hemoptysis 11/04/2017    Paroxysmal A-fib ([address] 10/01/2016    Pain in joint, multiple sites     COPD (chronic obstructive pulmonary disease) ([address]     Diabetes mellitus with insulin therapy ([address]     Hyperlipidemia     Hypertension     Generalized atherosclerosis     TIA (transient ischemic attack)     Mitral valve insufficiency 04/12/2013    Elevation of level of transaminase or lactic acid dehydrogenase (LDH) 04/10/2013    Atrial flutter ([address] 04/10/2013    Hyponatremia 04/04/2013    Disorder of pancreas 03/28/2013    Generalized ischemic myocardial dysfunction 03/28/2013    Coronary arteriosclerosis in native artery 03/28/2013    Acute on chronic systolic CHF (congestive heart failure), NYHA class 4 ([address] 03/28/2013    Uncontrolled type 2 diabetes mellitus with complication, with long-term current use of insulin ([address] 05/09/2012    Noncompliance 05/09/2012    Disorder of muscle 10/06/2004    Acute lymphadenitis 10/06/2004    Irritable bowel syndrome 10/06/2004    Left heart failure ([address] 06/20/2003    Atherosclerosis of coronary artery 06/20/2003    Tobacco dependence syndrome 06/27/2002        Past Medical History:   Diagnosis Date    Amiodarone-induced hyperthyroidism     Arthritis     Atrial flutter ([address]     CAD (coronary artery disease)  Chronic combined systolic and diastolic CHF (congestive heart failure) (HCC)     CKD (chronic kidney disease) stage 3, GFR 30-59 ml/min     COPD ([facility]ronic obstructive pulmonary disease) [facility]     Defibrillator activation     Diabetes mellitus with insulin therapy ([facility]     Dilated cardiomyopathy ([facility]     Generalized and unspecified atherosclerosis     Gout     Hyperlipidemia     Hypertension     Noncompliance     Obesity     On home oxygen therapy     Pain in joint, multiple sites     Paroxysmal A-fib (HCC)     Paroxysmal ventricular tachycardia (HCC)     Prolonged emergence from general anesthesia     Status post angioplasty     Sustained ventricular tachycardia (HCC)     TIA (transient ischemic attack)     Tobacco abuse     Type II or unspecified type diabetes mellitus without mention of complication, not stated as uncontrolled      Past Surgical History:   Procedure Laterality Date    CARDIAC CATHETERIZATION      COLONOSCOPY      CORONARY ANGIOPLASTY  4/29/11    Dr Frank Cummins CORONARY ARTERY BYPASS GRAFT  2012    ENDOSCOPY, COLON, DIAGNOSTIC      EYE SURGERY Bilateral     Cataracts     OTHER SURGICAL HISTORY      difibrillator     NE CIRCUMCISION,OTHER,28+ D/O N/A 8/29/2018    CIRCUMCISION performed by Rosalind Aguero MD at [address] [place] [address] EGD TRANSORAL BIOPSY SINGLE/MULTIPLE N/A 11/5/2017    EGD BIOPSY performed by Varsha Hurley MD at [facility]  Chart Reviewed: Yes  Response To Previous Treatment: Patient with no complaints from previous session.   Family / Caregiver Present: No    Pre-Session Pain Report  Denies   Post-Session Pain Report  Denies     OBJECTIVE        Bed mobility  Bridging: Independent  Rolling to Right: Independent  Supine to Sit: Independent    Transfers  Sit to Stand: Supervision  Stand to sit: Supervision    Ambulation Ambulation?: Yes  Ambulation 1  Surface: level tile  Device: Single point cane  Assistance: Modified Independent;Supervision  Quality of Gait: good geri, NBOS, reciprocal, decreased arm swing/trunk rotation, no LOB or path deviations  Distance: 150 feet x2    Neuromuscular Education  Neuromuscular Comments: sit to stand x5 without UE support, single steps forward x10 bilateral     ASSESSMENT   Body structures, Functions, Activity limitations: Decreased functional mobility ; Decreased safe awareness;Decreased balance;Decreased cognition;Decreased endurance;Decreased strength;Decreased posture;Decreased sensation  Assessment: Gait training completed with st cane as patient states he uses one most of the time at home. Patient ambulates 300 feet without  LOB/safety concerns. C/o mild dyspnea post ambulation. SPo2 97% on 3 liters  O2     Discharge Recommendations:  Continue to assess pending progress, Patient would benefit from continued therapy after discharge    Goals  Long term goals  Long term goal 1: Pt will demonstrate bed mobility indep for safe d/c home alone. Long term goal 2: Pt will demonstrate transfers indep without AD to allow pt to safely return home alone. Long term goal 3: Pt will demonstrate amb 150ft without AD indep to allow pt to safely amb inside his home. Long term goal 4: Pt will demonstrate stair negotiation 3 steps with B rails mod indep to allow pt to enter and exit his home safely. PLAN    Times per week: 3-6  Safety Devices  Type of devices:  All fall risk precautions in place, Bed alarm in place, Call light within reach, Left in bed     [facility] (6 CLICK) Mandie Garcia 28 Inpatient Mobility Raw Score : 22     Therapy Time   Individual   Time In 0855   Time Out 0920   Minutes 25     Timed Code Treatment Minutes: 25 Minutes   gt 15  Neuro [address]E Floors, PTA, 01/14/21 at 9:26 AM         Definitions for assistance levels Independent = pt does not require any physical supervision or assistance from another person for activity completion. Device may be needed.   Stand by assistance = pt requires verbal cues or instructions from another person, close to but not touching, to perform the activity  Minimal assistance= pt performs 75% or more of the activity; assistance is required to complete the activity  Moderate assistance= pt performs 50% of the activity; assistance is required to complete the activity  Maximal assistance = pt performs 25% of the activity; assistance is required to complete the activity  Dependent = pt requires total physical assistance to accomplish the task

## 2021-01-14 NOTE — DISCHARGE SUMMARY
Hospital Medicine Discharge Summary    Pauline Locke. :  1950  MRN:  [id number]    Admit date:  2021  Discharge date:  2021    Admitting Physician:  Edith Rich MD  Primary Care Physician:  Riki Rosales MD      Discharge Diagnoses:    Acute decompensated combined systolic and diastolic CHF    Chief Complaint   Patient presents with    Shortness of Breath     sob since last night     Hospital Course:   Patient presented with SOB and lower extremity edema from Acute decompensated combined systolic and diastolic CHF. Diuresed well but was slightly hypotensive and renal function worsened slightly; placed on a dobumatine drip by cardiology with improvement in his renal function. When he SBP tolerated it they placed him on a low dose of entresto. He is now being discharged home with home health. Exam on discharge:   /63   Pulse 89   Temp 97.9 °F (36.6 °C) (Oral)   Resp 18   Ht 5' 11\" (1.803 m)   Wt 198 lb 3.1 oz (89.9 kg)   SpO2 99%   BMI 27.64 kg/m²   General appearance: No apparent distress, appears stated age and cooperative. HEENT:  Conjunctivae/corneas clear. Neck:  Trachea midline. Respiratory:  Normal respiratory effort. Clear to auscultation  Cardiovascular: Regular rate and rhythm   Abdomen: Soft, non-tender, non-distended with normal bowel sounds. Musculoskeletal: No clubbing, cyanosis or edema bilaterally. .  Neuro: Non Focal.   Capillary Refill: Brisk,< 3 seconds   Peripheral Pulses: +2 palpable, equal bilaterally      Patient was seen by the following consultants while admitted to [facility]:   Consults:  [facility] NURSE/COORDINATOR  IP CONSULT TO DIETITIAN  IP CONSULT TO CARDIOLOGY  IP CONSULT TO NEPHROLOGY  IP CONSULT TO PHARMACY  IP CONSULT TO NEUROLOGY  IP CONSULT TO PALLIATIVE CARE  IP CONSULT TO HOME CARE NEEDS    Significant Diagnostic Studies:    Refer to chart Please refer to chart if no studies are shown here    Xr Chest Portable    Result Date: 1/9/2021  Exam: XR CHEST PORTABLE History: Shortness breath. CHF. Technique: AP portable view of the chest obtained. Comparison: Portable chest radiograph from December 13, 2020 Findings: Left-sided cardiac defibrillator is present. Atherosclerotic calcification of the thoracic aorta. Moderate cardiomegaly. Pulmonary vascular congestion. Patchy and linear bibasilar opacities. No pneumothorax or pleural effusion. Findings likely representing fluid overload/CHF. Superimposed bibasilar pneumonia not excluded. Discharge Medications:       Reji Garcia    Home Medication Instructions YKH:[id number]    Printed on:01/14/21 5918   Medication Information                      albuterol (PROAIR HFA) 108 (90 BASE) MCG/ACT inhaler  Inhale 2 puffs into the lungs every 4 hours as needed for Wheezing             albuterol (PROVENTIL) (2.5 MG/3ML) 0.083% nebulizer solution  Take 3 mLs by nebulization every 6 hours as needed for Wheezing             allopurinol (ZYLOPRIM) 100 MG tablet  TAKE 1 TABLET DAILY             atorvastatin (LIPITOR) 80 MG tablet  Take 80 mg by mouth daily              budesonide-formoterol (SYMBICORT) 160-4.5 MCG/ACT AERO  Inhale 2 puffs into the lungs 2 times daily             bumetanide (BUMEX) 1 MG tablet  Take 1 mg by mouth 2 times daily              DIGITEK 125 MCG tablet  TAKE 1 TABLET DAILY             donepezil (ARICEPT) 10 MG tablet  TAKE 1 TABLET BY MOUTH EVERY DAY AT NIGHT             LANTUS SOLOSTAR 100 UNIT/ML injection pen  INJECT 10 UNITS INTO THE SKIN NIGHTLY             magnesium oxide (MAG-OX) 400 (240 Mg) MG tablet  Take 1 tablet by mouth daily             metOLazone (ZAROXOLYN) 2.5 MG tablet  Take 2.5 mg by mouth daily PM             metOLazone (ZAROXOLYN) 5 MG tablet  Take 5 mg by mouth Daily AM             metoprolol succinate (TOPROL XL) 25 MG extended release tablet Take 2 tablets by mouth daily             nitroGLYCERIN (NITROSTAT) 0.4 MG SL tablet  Place 1 tablet under the tongue every 5 minutes as needed for Chest pain             pantoprazole (PROTONIX) 40 MG tablet  Take 1 tablet by mouth every morning (before breakfast)             potassium chloride (KLOR-CON M) 20 MEQ extended release tablet  Take 1 tablet by mouth daily             sacubitril-valsartan (ENTRESTO) 24-26 MG per tablet  Take 1 tablet by mouth every evening             spironolactone (ALDACTONE) 25 MG tablet  Take 1 tablet by mouth daily             warfarin (COUMADIN) 5 MG tablet  TAKE [facility] [place] [facility]. QUANTITY EQUALS 90 DAY SUPPLY             warfarin (COUMADIN) 7.5 MG tablet  7.5mg daily                 Disposition:   If discharged to Home, Any [place] [address] needs that were indicated and/or required as been addressed and set up by Social Work. Condition at discharge: good     Activity: activity as tolerated    Total time taken for discharging this patient: 40 minutes. Greater than 70% of time was spent focused exclusively on this patient. Time was taken to review chart, discuss plans with consultants, reconciling medications, discussing plan answering questions with patient.      Harriet Dacosta  1/14/2021, 12:56 PM  ----------------------------------------------------------------------------------------------------------------------    Bernard Saravia,

## 2021-01-14 NOTE — PROGRESS NOTES
Clinical Pharmacy Note    Warfarin consult follow-up    Recent Labs     01/14/21  0624   INR 2.0     Recent Labs     01/12/21  0552 01/13/21  0559 01/14/21  0624   HGB 10.4* 10.4* 10.1*   HCT 32.1* 32.0* 31.1*    159 150     Significant drug:drug interactions:  None (Digoxin home med - continued)      Current diet order/intake: Low sodium    Notes:     Date INR Warfarin Dose      1-10-21     2.5         5 mg      1-11-21     2.3       10 mg        1-12-21      2.0         5 mg       1-13-21     2.1         5 mg      1-14-21     2.0        7.5 mg                     INR today is therapeutic at 2.0, goal INR 2-3, for Afib. Will boost today in an effort to avoid subtherapeutic levels, warfarin dose of 7.5 mg today, (normal warfarin schedule of 10 mg on Mondays and 5 mg on all other days). Daily PT/INR during pharmacy consult to monitor and dose warfarin therapy. Janet KruegerD.  Candidate 2021  10:24 AM 1/14/2021

## 2021-01-14 NOTE — PROGRESS NOTES
[facility] Neurology Daily Progress Note  Name: Karly Junior. Age: 78 y.o. Gender: male  CodeStatus: Full Code  Allergies: Dye [Iodides]  Penicillins  Plavix [Clopidogrel]  Tapazole [Methimazole]    Chief Complaint:Shortness of Breath (sob since last night)    Primary Care Provider: Yumi Osawld MD  InpatientTreatment Team: Treatment Team: Attending Provider: Bud Melgar MD; Consulting Physician: Natali He MD; Consulting Physician: Jonathan Gibson MD; Utilization Reviewer: Mike Milligan RN; : Randy Farrar, RADHA; : Miesha Dial, RADHA; Registered Nurse: Cordie Cheadle, RN; : ROGER Worley; Patient Care Tech: Alea Velázquez  Admission Date: 1/9/2021      HPI   Pt seen and examined for neuro follow up for cerebral TIA in the setting of severe systolic heart failure and end-stage cardiomyopathy with ejection fraction of 5 to 10%. Patient was on Coumadin prior to admission. Aspirin has been added. No recurrent focal neuro deficits. No complaints    Vitals:    01/14/21 1420   BP: (!) 96/57   Pulse: 88   Resp: 18   Temp: 97.9 °F (36.6 °C)   SpO2: 100%      Review of Systems   Constitutional: Positive for fatigue. Negative for fever. HENT: Negative for hearing loss and trouble swallowing. Eyes: Negative for visual disturbance. Respiratory: Negative for cough, chest tightness, shortness of breath and wheezing. Cardiovascular: Negative for chest pain, palpitations and leg swelling. Gastrointestinal: Negative for nausea and vomiting. Musculoskeletal: Negative for gait problem. Skin: Negative for color change and rash. Neurological: Negative for dizziness, tremors, seizures, syncope, facial asymmetry, speech difficulty, weakness, light-headedness, numbness and headaches. Psychiatric/Behavioral: Negative for agitation, confusion and hallucinations. The patient is not nervous/anxious.     weakness  Physical Exam Vitals signs and nursing note reviewed. Constitutional:       General: He is not in acute distress. Appearance: He is not diaphoretic. HENT:      Head: Normocephalic and atraumatic. Eyes:      Extraocular Movements: Extraocular movements intact. Pupils: Pupils are equal, round, and reactive to light. Cardiovascular:      Rate and Rhythm: Normal rate and regular rhythm. Pulmonary:      Effort: Pulmonary effort is normal. No respiratory distress. Breath sounds: Normal breath sounds. Abdominal:      General: Bowel sounds are normal.      Palpations: Abdomen is soft. Skin:     General: Skin is warm and dry. Neurological:      General: No focal deficit present. Mental Status: He is alert and oriented to person, place, and time. Mental status is at baseline. Cranial Nerves: No cranial nerve deficit. Motor: No weakness, tremor or seizure activity.        nonfocalexam         Medications:  Reviewed    Infusion Medications:    dextrose       Scheduled Medications:    sacubitril-valsartan  0.5 tablet Oral QPM    warfarin  7.5 mg Oral Once    bumetanide  1 mg Oral BID    metoprolol succinate  25 mg Oral Daily    atorvastatin  40 mg Oral Nightly    insulin lispro  5 Units Subcutaneous TID WC    insulin glargine  10 Units Subcutaneous Nightly    digoxin  125 mcg Oral Daily    donepezil  10 mg Oral Nightly    warfarin (COUMADIN) daily dosing (placeholder)   Other RX Placeholder    sodium chloride flush  10 mL Intravenous 2 times per day    spironolactone  25 mg Oral Daily    ipratropium-albuterol  1 ampule Inhalation TID    insulin lispro  0-12 Units Subcutaneous TID WC    insulin lispro  0-6 Units Subcutaneous Nightly Mid CCA 140cm/s                Dist CCA 126cm/s                Prox ICA 77cm/s                Mid ICA 78cm/s                Dist ICA 81cm/s                Prox ECA 90cm/s                Prox VERT 54cm/s                Bulb  ICA/CCA 0.6      Impression Impression: 1. No hemodynamic significant stenosis on the right   2. No hemodynamic significant stenosis seen on the left    Validated velocity measurements with angiographic measurements, velocity criteria are extrapolated from diameter data as defined by the Society of radiologist in ultrasound consensus conference radiology 2003; 229; 340-346. Echo No results found for this or any previous visit. Assessment/Plan:    Cerebral TIA with proximal weakness of the left upper extremity which is resolved. Patient has multiple risk factors for the same and his INRs appear to be borderline at 2.0. Recommended that we add aspirin to this Coumadin as if his INR is likely to fluctuate this will protect him. He has history of atrial fibrillation. Neurologic examination is normal.  I await PT evaluation if this is changed from his baseline then we will consider further intervention and evaluation. Commended a carotid ultrasound for now. Next well patient does have mild cognitive impairment and is at borderline scores for dementia. He lives alone and is otherwise independent in his activities of daily living. We will range for a CT scan of the head in the event that he has any additional issues with his ambulation after physical therapy evaluation an MRI of the brain may be considered. Findings were discussed with the daughter.     Cerebral TIA  Chronic systolic biventricular heart failure with end-stage cardiomyopathy with ejection fraction of 5 to 10%  Continue Coumadin  Aspirin added  Carotid duplex with no significant stenosis  CT of the head negative for any acute intracranial changes  Okay to DC from neurology standpoint  Follow-up 4 to 6 weeks I have personally performed a face to face diagnostic evaluation on this patient, reviewed all data and investigations, and am the sole provider of all clinical decisions on the neurological status of this patient. pt nonfocal but futigue      Silvestre Lloyd Asp, MD, [address], American Board of Psychiatry & Neurology  Board Certified in Vascular Neurology  Board Certified in Neuromuscular Medicine  Certified in Neurorehabilitation     Collaborating physicians: Dr Gabino Quintana    Electronically signed by ALE Yuan CNP on 1/14/2021 at 4:07 PM

## 2021-01-15 ENCOUNTER — TELEPHONE (OUTPATIENT)
Dept: PHARMACY | Age: 71
End: 2021-01-15

## 2021-01-15 ENCOUNTER — CARE COORDINATION (OUTPATIENT)
Dept: CASE MANAGEMENT | Age: 71
End: 2021-01-15

## 2021-01-15 DIAGNOSIS — I48.0 PAROXYSMAL A-FIB (HCC): ICD-10-CM

## 2021-01-15 NOTE — CARE COORDINATION
Nicole 45 Transitions Initial Follow Up Call    Call within 2 business days of discharge: Yes    Patient: Hermes Khanna. Patient : 1950   MRN: [id number]  Reason for Admission: [address] IP -2021 Acute decompensated combined systolic and diastolic CHF. Discharge Date: 21 RARS: Readmission Risk Score: 25  NR  1/10 CV-19 neg. [facility]  Dr Chrystal Jack  12:30. Care Transitions      Challenges to be reviewed by the provider   Additional needs identified to be addressed with provider No  home health care-HC    Discussed COVID-19 related testing which was available at this time. Test results were negative. Patient informed of results, if available? Yes         Method of communication with provider : none    Advance Care Planning:   Does patient have an Advance Directive:  reviewed and current. Was this a readmission? No  Patient stated reason for admission: CHF  Patients top risk factors for readmission: medical condition    Care Transition Nurse (CTN) contacted the family by telephone to perform post hospital discharge assessment. Verified name and  with family as identifiers. Provided introduction to self, and explanation of the CTN role. CTN reviewed discharge instructions, medical action plan and red flags with family who verbalized understanding. Family given an opportunity to ask questions and does not have any further questions or concerns at this time. Were discharge instructions available to patient? Yes. Reviewed appropriate site of care based on symptoms and resources available to patient including: When to call 911 and Reviewed s/s fluid overload to report/go to ED. Reviewed symptoms of CV-19 to report/go to ED. Alen Schulz The family agrees to contact the PCP office for questions related to their healthcare. Medication reconciliation was performed with family, who verbalizes understanding of administration of home medications.  Advised obtaining a 90-day supply of all daily and as-needed medications. Covid Risk Education    Patient has following risk factors of: COPD and diabetes. Education provided regarding infection prevention, and signs and symptoms of COVID-19 and when to seek medical attention with family who verbalized understanding. Discussed exposure protocols and quarantine From CDC: Are you at higher risk for severe illness?   and given an opportunity for questions and concerns. The family agrees to contact the COVID-19 hotline [phone] or PCP office for questions related to COVID-19. For more information on steps you can take to protect yourself, see CDC's How to Protect Yourself     Discussed follow-up appointments. If no appointment was previously scheduled, appointment scheduling offered: Cardiology 1/20 12:30. Is follow up appointment scheduled within 7 days of discharge? Yes  Non-[facility] follow up appointment(s):     Plan for follow-up call in 5-7 days based on severity of symptoms and risk factors. Plan for next call: CHF  CTN provided contact information for future needs. Caregiver reports pt has \"chronic cough and sneezing for years now\". Caregiver had CV-19 last December. Reviewed symptoms to monitor for and report/go to ED, v/u. States no new/worsened LE edema. Wearing O2 continuous now. Denies any current resp concerns and reports no fevers, chills, or flu-like complaints. [facility] services and seen today. Denies any home or med needs.     Care Transitions 24 Hour Call    Do you have all of your prescriptions and are they filled?: Yes  Post Acute Services: Home Health (Comment: [facility])  Patient DME: Shower chair, Straight cane, Walker  Patient Home Equipment: Nebulizer  Do you have support at home?: Alone  Are you an active caregiver in your home?: No  Care Transitions Interventions     Other Services: Completed (Comment: AC referral.)          Follow Up  Future Appointments   Date Time Provider Gordo Sam   1/21/2021  1:00 PM [facility] [id number] [facility] [place]   1/22/2021 11:30 AM Paul Sauceda  [place] [facility]   5/4/2021 10:45 AM [id number] Adrianna Jaimes MD [address] [facility]

## 2021-01-18 ENCOUNTER — TELEPHONE (OUTPATIENT)
Dept: FAMILY MEDICINE CLINIC | Age: 71
End: 2021-01-18

## 2021-01-18 NOTE — PROGRESS NOTES
Physical Therapy  Facility/Department: [facility] [id number]/[id number]  Physical Therapy Discharge      NAME: Pan Huerta.     : 1950 (79 y.o.)  MRN: [id number]    Account: [de-identified]  Gender: male      Patient has been discharged from acute care hospital. DC patient from current PT program.      Electronically signed by Sophia Cleaning PT on 21 at 12:26 PM EST

## 2021-01-22 ENCOUNTER — TELEPHONE (OUTPATIENT)
Dept: PHARMACY | Age: 71
End: 2021-01-22

## 2021-01-22 ENCOUNTER — OFFICE VISIT (OUTPATIENT)
Dept: FAMILY MEDICINE CLINIC | Age: 71
End: 2021-01-22
Payer: MEDICARE

## 2021-01-22 VITALS
HEIGHT: 66 IN | WEIGHT: 199 LBS | RESPIRATION RATE: 14 BRPM | SYSTOLIC BLOOD PRESSURE: 108 MMHG | OXYGEN SATURATION: 96 % | DIASTOLIC BLOOD PRESSURE: 60 MMHG | BODY MASS INDEX: 31.98 KG/M2 | TEMPERATURE: 97 F | HEART RATE: 80 BPM

## 2021-01-22 DIAGNOSIS — I50.42 CHF (CONGESTIVE HEART FAILURE), NYHA CLASS III, CHRONIC, COMBINED (HCC): ICD-10-CM

## 2021-01-22 DIAGNOSIS — Z79.4 DIABETES MELLITUS WITH INSULIN THERAPY (HCC): Primary | ICD-10-CM

## 2021-01-22 DIAGNOSIS — R31.9 HEMATURIA, UNSPECIFIED TYPE: ICD-10-CM

## 2021-01-22 DIAGNOSIS — I48.0 PAROXYSMAL A-FIB (HCC): ICD-10-CM

## 2021-01-22 DIAGNOSIS — Z87.39 HISTORY OF GOUT: ICD-10-CM

## 2021-01-22 DIAGNOSIS — E03.9 HYPOTHYROIDISM, UNSPECIFIED TYPE: ICD-10-CM

## 2021-01-22 DIAGNOSIS — I48.0 PAROXYSMAL A-FIB (HCC): Chronic | ICD-10-CM

## 2021-01-22 DIAGNOSIS — Z12.11 SCREENING FOR COLORECTAL CANCER: ICD-10-CM

## 2021-01-22 DIAGNOSIS — I10 ESSENTIAL HYPERTENSION: ICD-10-CM

## 2021-01-22 DIAGNOSIS — Z12.12 SCREENING FOR COLORECTAL CANCER: ICD-10-CM

## 2021-01-22 DIAGNOSIS — E11.9 DIABETES MELLITUS WITH INSULIN THERAPY (HCC): Primary | ICD-10-CM

## 2021-01-22 LAB
ANION GAP SERPL CALCULATED.3IONS-SCNC: 13 MEQ/L (ref 9–15)
BUN BLDV-MCNC: 43 MG/DL (ref 8–23)
CALCIUM SERPL-MCNC: 9.7 MG/DL (ref 8.5–9.9)
CHLORIDE BLD-SCNC: 92 MEQ/L (ref 95–107)
CO2: 26 MEQ/L (ref 20–31)
CREAT SERPL-MCNC: 1.79 MG/DL (ref 0.7–1.2)
GFR AFRICAN AMERICAN: 45.8
GFR NON-AFRICAN AMERICAN: 37.9
GLUCOSE BLD-MCNC: 150 MG/DL (ref 70–99)
HBA1C MFR BLD: 8.9 %
POTASSIUM SERPL-SCNC: 4.3 MEQ/L (ref 3.4–4.9)
SODIUM BLD-SCNC: 131 MEQ/L (ref 135–144)

## 2021-01-22 PROCEDURE — G8510 SCR DEP NEG, NO PLAN REQD: HCPCS | Performed by: INTERNAL MEDICINE

## 2021-01-22 PROCEDURE — 83036 HEMOGLOBIN GLYCOSYLATED A1C: CPT | Performed by: INTERNAL MEDICINE

## 2021-01-22 PROCEDURE — 99214 OFFICE O/P EST MOD 30 MIN: CPT | Performed by: INTERNAL MEDICINE

## 2021-01-22 PROCEDURE — 3052F HG A1C>EQUAL 8.0%<EQUAL 9.0%: CPT | Performed by: INTERNAL MEDICINE

## 2021-01-22 ASSESSMENT — ENCOUNTER SYMPTOMS
SINUS PAIN: 0
PHOTOPHOBIA: 0
SHORTNESS OF BREATH: 0
COUGH: 0
RECTAL PAIN: 0
VOICE CHANGE: 0
ABDOMINAL DISTENTION: 0
SINUS PRESSURE: 0
VOMITING: 0
ABDOMINAL PAIN: 0
SORE THROAT: 0
RHINORRHEA: 0
APNEA: 0
COLOR CHANGE: 0
WHEEZING: 0
BACK PAIN: 0
CONSTIPATION: 0
EYE DISCHARGE: 0
EYE PAIN: 0
FACIAL SWELLING: 0
EYE ITCHING: 0
BLOOD IN STOOL: 0
DIARRHEA: 0
EYE REDNESS: 0
CHEST TIGHTNESS: 0
NAUSEA: 0
TROUBLE SWALLOWING: 0

## 2021-01-22 ASSESSMENT — PATIENT HEALTH QUESTIONNAIRE - PHQ9
SUM OF ALL RESPONSES TO PHQ9 QUESTIONS 1 & 2: 0
SUM OF ALL RESPONSES TO PHQ QUESTIONS 1-9: 0
1. LITTLE INTEREST OR PLEASURE IN DOING THINGS: 0

## 2021-01-22 NOTE — PROGRESS NOTES
2021    Suyapa Jasmine (:  1950) is a 78 y.o. male, here for evaluation of the following medical concerns:        28-year-old male with a history of type 2 diabetes, systolic heart failure recent ejection fraction of approximately 5-10%, hypothyroidism, paroxysmal atrial fibrillation, COPD and essential hypertension presents for follow-up visit. The patient voices no complaints at this time. Heart failure: The patient is compliant with , spironolactone 25 mg orally daily. Zaroxolyn 2.5 mg daily and metoprolol 25 mg orally daily. Additionally takes potassium 20 mEq orally daily he is compliant with Lipitor 80 mg orally daily follow closely by his cardiologist Dr. Hamlet Gonzalez. The patient expresses a desire to receive a heart transplant. Type 2 diabetes: Patient's hemoglobin A1c is presently 8.9. He presently takes Lantus 10 units nightly. Hematuria: Hematuria has resolved. Short-term memory loss: This is stable at this time. Hypertension: The patient is compliant with Aldactone 25 mg orally daily. Bumex 1 mg twice daily pt has a bp cuff at home blood pressures at home have been well controlled. Blood pressure today is 112/70. Pruritus: The patient's pruritus has not resolved today. He is followed by dermatology for this complaint. Hypothyroidism: The patient was noted to have an elevated TSH on 2019. He is not presently receiving Synthroid. Will consider initiation of Synthroid if patient develops symptoms. Atrial fibrillation: Patient is receiving Digitek, metoprolol as a forementioned. He is compliant with Coumadin. inr was therapeutic on 2020 at 2.8 . COPD: His COPD is stable at this time he is compliant with Singulair, Symbicort and supplemental oxygen.       History of gout: Compliant with allopurinol 100 mg daily    Review of Systems Constitutional: Negative for chills, diaphoresis, fatigue and fever. HENT: Negative for congestion, dental problem, drooling, ear discharge, ear pain, facial swelling, hearing loss, mouth sores, nosebleeds, postnasal drip, rhinorrhea, sinus pressure, sinus pain, sneezing, sore throat, tinnitus, trouble swallowing and voice change. Eyes: Negative for photophobia, pain, discharge, redness, itching and visual disturbance. Respiratory: Negative for apnea, cough, chest tightness, shortness of breath and wheezing. Cardiovascular: Negative for chest pain, palpitations and leg swelling. Gastrointestinal: Negative for abdominal distention, abdominal pain, blood in stool, constipation, diarrhea, nausea, rectal pain and vomiting. Endocrine: Negative for cold intolerance, heat intolerance, polydipsia, polyphagia and polyuria. Genitourinary: Negative for decreased urine volume, difficulty urinating, dysuria, flank pain, frequency, genital sores, hematuria and urgency. Musculoskeletal: Negative for arthralgias, back pain, gait problem, joint swelling, myalgias, neck pain and neck stiffness. Skin: Negative for color change, rash and wound. Allergic/Immunologic: Negative for environmental allergies and food allergies. Neurological: Negative for dizziness, tremors, seizures, syncope, facial asymmetry, speech difficulty, weakness, light-headedness, numbness and headaches. Hematological: Negative for adenopathy. Does not bruise/bleed easily. Psychiatric/Behavioral: Negative for agitation, confusion, decreased concentration, hallucinations, self-injury, sleep disturbance and suicidal ideas. The patient is not nervous/anxious. Prior to Visit Medications    Medication Sig Taking?  Authorizing Provider   Dulaglutide 0.75 MG/0.5ML SOPN Inject 0.75 mg into the skin once a week Yes Stacey Christopher MD metoprolol succinate (TOPROL XL) 25 MG extended release tablet Take 1 tablet by mouth daily Take one tablet in morning and 1/2 tablet in evening. Pt has this med  And does not need refill  Nereyda Mayorga MD   budesonide-formoterol (SYMBICORT) 160-4.5 MCG/ACT AERO Inhale 2 puffs into the lungs 2 times daily  Simi Huitron MD   LANTUS SOLOSTAR 100 UNIT/ML injection pen INJECT 10 UNITS INTO THE SKIN NIGHTLY  Carlos Llanos MD   warfarin (COUMADIN) 5 MG tablet TAKE AS DIRECTED BY [facility]. QUANTITY EQUALS 90 DAY SUPPLY  Patient taking differently: Take 5 mg by mouth daily Take as directed by [facility] [place] [facility]. Quantity equals 90 day supply.   Carlos Llanos MD   magnesium oxide (MAG-OX) 400 (240 Mg) MG tablet Take 1 tablet by mouth daily  Linda Banda MD   potassium chloride (KLOR-CON M) 20 MEQ extended release tablet Take 1 tablet by mouth daily  Linda Banda MD   donepezil (ARICEPT) 10 MG tablet TAKE 1 TABLET BY MOUTH EVERY DAY AT NIGHT  Patient taking differently: Take 10 mg by mouth nightly   Carlos Llanos MD   allopurinol (ZYLOPRIM) 100 MG tablet TAKE 1 TABLET DAILY  Patient taking differently: Take 100 mg by mouth daily TAKE 1 TABLET DAILY  Carlos Llanos MD   bumetanide (BUMEX) 1 MG tablet Take 1 mg by mouth 2 times daily   Carlos Llanos MD   nitroGLYCERIN (NITROSTAT) 0.4 MG SL tablet Place 1 tablet under the tongue every 5 minutes as needed for Chest pain  Carlos Llanos MD   spironolactone (ALDACTONE) 25 MG tablet Take 1 tablet by mouth daily  Nereyda Mayorga MD   atorvastatin (LIPITOR) 80 MG tablet Take 80 mg by mouth daily   Historical Provider, MD   warfarin (COUMADIN) 7.5 MG tablet 7.5mg daily  Patient taking differently: Take 7.5 mg by mouth 7.5mg daily  Nereyda Mayorga MD albuterol (PROVENTIL) (2.5 MG/3ML) 0.083% nebulizer solution Take 3 mLs by nebulization every 6 hours as needed for Wheezing  Susanna Rodriguez MD   pantoprazole (PROTONIX) 40 MG tablet Take 1 tablet by mouth every morning (before breakfast)  Patient taking differently: Take 40 mg by mouth daily   Jennifer De Luna MD   DIGITEK 125 MCG tablet TAKE 1 TABLET DAILY  Patient taking differently: Take 125 mcg by mouth daily   Randy Howell MD   albuterol (PROAIR HFA) 108 (90 BASE) MCG/ACT inhaler Inhale 2 puffs into the lungs every 4 hours as needed for Wheezing  Randy Howell MD        Allergies   Allergen Reactions    Dye [Iodides] Shortness Of Breath    Penicillins Anaphylaxis    Plavix [Clopidogrel]     Tapazole [Methimazole]      Itching        Past Medical History:   Diagnosis Date    Amiodarone-induced hyperthyroidism     Arthritis     [facility] fl[facility]     CAD (coronary artery disease)     Chronic combined systolic and diastolic CHF (congestive heart failure) (HCC)     CKD (chronic kidney disease) stage 3, GFR 30-59 ml/min     COPD (chronic obstructive pulmonary disease) ([facility]     Defibrillator activation     Diabetes mellitus with insulin therapy ([facility]     Dilated cardiomyopathy ([facility]     Generalized and unspecified atherosclerosis     Gout     Hyperlipidemia     Hypertension     Noncompliance     Obesity     On home oxygen therapy     Pain in joint, multiple sites     Paroxysmal A-fib (HCC)     Paroxysmal ventricular tachycardia (HCC)     Prolonged emergence from general anesthesia     Status post angioplasty     Sustained ventricular tachycardia (HCC)     TIA (transient ischemic attack)     Tobacco abuse     Type II or unspecified type diabetes mellitus without mention of complication, not stated as uncontrolled        Past Surgical History:   Procedure Laterality Date    CARDIAC CATHETERIZATION      COLONOSCOPY      CORONARY ANGIOPLASTY  4/29/11    Dr Baljeet Cole  CORONARY ANGIOPLASTY WITH STENT PLACEMENT      CORONARY ANGIOPLASTY WITH STENT PLACEMENT      CORONARY ARTERY BYPASS GRAFT      ENDOSCOPY, COLON, DIAGNOSTIC      EYE SURGERY Bilateral     Cataracts     OTHER SURGICAL HISTORY      difibrillator     MT CIRCUMCISION,OTHER,28+ D/O N/A 2018    CIRCUMCISION performed by Barrera Saleh MD at [facility] EGD TRANSORAL BIOPSY SINGLE/MULTIPLE N/A 2017    EGD BIOPSY performed by Carmen Otto MD at [address] Marital status:      Spouse name: Not on file    Number of children: 2    Years of education: 12    Highest education level: High school graduate   Occupational History    Not on file   Social Needs    Financial resource strain: Somewhat hard    Food insecurity     Worry: Never true     Inability: Never true    Transportation needs     Medical: No     Non-medical: No   Tobacco Use    Smoking status: Former Smoker     Packs/day: 1.50     Years: 25.00     Pack years: 37.50     Quit date: 2012     Years since quittin.0    Smokeless tobacco: Never Used   Substance and Sexual Activity    Alcohol use: Not Currently    Drug use: No    Sexual activity: Not on file   Lifestyle    Physical activity     Days per week: 0 days     Minutes per session: 0 min    Stress: Not at all   Relationships    Social connections     Talks on phone: More than three times a week     Gets together: More than three times a week     Attends [employer] service: More than 4 times per year     Active member of club or organization: Yes     Attends meetings of clubs or organizations: 1 to 4 times per year     Relationship status:     Intimate partner violence     Fear of current or ex partner: Not on file     Emotionally abused: Not on file     Physically abused: Not on file     Forced sexual activity: Not on file   Other Topics Concern    Not on file   Social History Narrative  Not on file        Family History   Problem Relation Age of Onset    Cancer Brother     Diabetes Mother     Heart Disease Father     Emphysema Father        Vitals:    01/22/21 1102   BP: 108/60   Pulse: 80   Resp: 14   Temp: 97 °F (36.1 °C)   SpO2: 96%   Weight: 199 lb (90.3 kg)   Height: 5' 6\" (1.676 m)     Estimated body mass index is 32.12 kg/m² as calculated from the following:    Height as of this encounter: 5' 6\" (1.676 m). Weight as of this encounter: 199 lb (90.3 kg). Physical Exam  Constitutional:       General: He is not in acute distress. Appearance: He is well-developed. Comments: Oxygen at the patient's bedside. HENT:      Head: Normocephalic. Right Ear: External ear normal.      Left Ear: External ear normal.   Eyes:      Conjunctiva/sclera: Conjunctivae normal.   Neck:      Musculoskeletal: Neck supple. Vascular: No JVD. Trachea: No tracheal deviation. Cardiovascular:      Rate and Rhythm: Normal rate and regular rhythm. Heart sounds: Normal heart sounds. Pulmonary:      Effort: Pulmonary effort is normal. No respiratory distress. Breath sounds: Normal breath sounds. No wheezing or rales. Chest:      Chest wall: No tenderness. Abdominal:      General: Bowel sounds are normal. There is no distension. Palpations: Abdomen is soft. There is no mass. Tenderness: There is no abdominal tenderness. There is no guarding or rebound. Musculoskeletal:         General: No tenderness or deformity. Skin:     General: Skin is warm and dry. Neurological:      Mental Status: He is alert and oriented to person, place, and time. Motor: No abnormal muscle tone. Psychiatric:         Thought Content: Thought content normal.         Judgment: Judgment normal.           ASSESSMENT/PLAN:      DMII -hemoglobin A1c equals 8.9. Start Trulicity. Continue Lantus. Hematuria: Stable. Monitoring. Hypertension continue metoprolol 25 mg po daily for now. Continue current regimen. Left heart failure (HCC)  Continue Zaroxolyn, metoprolol, Bumex, spironolactone and potassium supplementation        Hypothyroidism, unspecified type  TSH is within normal limits. Reassess thyroid function at next office visit. Paroxysmal A-fib (HCC)  Continue metoprolol and Coumadin. Monitor INR        Localized pruritus  Implement recommendations as provided by dermatology        Chronic obstructive pulmonary disease, unspecified COPD type ([facility]  Continue Symbicort and Singulair. Continue supplemental oxygen PRN        Short-term memory loss monitoring. F/u with neurology        History of gout: Continue allopurinol    Return in about 1 week (around 1/29/2021). An  electronic signature was used to authenticate this note.     --Shawnee Black MD on 1/22/2021 at 3:38 PM

## 2021-01-22 NOTE — TELEPHONE ENCOUNTER
Called, left message to reschedule appointment. Missed appt on 1/21/21, previously discharged from [facility] on 1/14/21.   Reset tracker

## 2021-01-25 ENCOUNTER — CARE COORDINATION (OUTPATIENT)
Dept: CARE COORDINATION | Age: 71
End: 2021-01-25

## 2021-01-25 RX ORDER — DONEPEZIL HYDROCHLORIDE 10 MG/1
TABLET, FILM COATED ORAL
Qty: 90 TABLET | Refills: 1 | Status: SHIPPED | OUTPATIENT
Start: 2021-01-25

## 2021-01-25 RX ORDER — WARFARIN SODIUM 5 MG/1
TABLET ORAL
Qty: 120 TABLET | Refills: 1 | Status: SHIPPED | OUTPATIENT
Start: 2021-01-25 | End: 2021-01-26 | Stop reason: SDUPTHER

## 2021-01-25 NOTE — CARE COORDINATION
Nicole 45 Transitions Follow Up Call    2021    Patient: Shemar Lloyd Patient : 1950   MRN: [id number]  Reason for Admission: [address] IP -2021 Acute decompensated combined systolic and diastolic CHF. Discharge Date: 21 RARS: Readmission Risk Score: 25         Spoke with: Attempted to contact patient for follow up Care Transition call. Unable to leave a voicemail message. Will attempt to reach again. Care Transitions Subsequent and Final Call    Subsequent and Final Calls  Care Transitions Interventions  Other Interventions:            Follow Up  Future Appointments   Date Time Provider Gordo Sam   2021 11:00 AM [address]   2021 11:30 AM Carlos العراقي  [facility]   2021 10:45 AM Susanna Rodriguez, [address] [place] [address]

## 2021-01-26 ENCOUNTER — HOSPITAL ENCOUNTER (OUTPATIENT)
Dept: PHARMACY | Age: 71
Setting detail: THERAPIES SERIES
Discharge: HOME | End: 2021-01-26
Payer: MEDICARE

## 2021-01-26 VITALS — TEMPERATURE: 97.1 F

## 2021-01-26 DIAGNOSIS — I48.0 PAROXYSMAL A-FIB (HCC): ICD-10-CM

## 2021-01-26 LAB — INTERNATIONAL NORMALIZATION RATIO, POC: 3.6

## 2021-01-26 PROCEDURE — 85610 PROTHROMBIN TIME: CPT

## 2021-01-26 PROCEDURE — 99211 OFF/OP EST MAY X REQ PHY/QHP: CPT

## 2021-01-26 RX ORDER — WARFARIN SODIUM 5 MG/1
TABLET ORAL
Qty: 120 TABLET | Refills: 1 | Status: SHIPPED | OUTPATIENT
Start: 2021-01-26

## 2021-01-26 NOTE — PROGRESS NOTES
Mr. Lakesha Dee. is a 78 y.o. y/o male with history of Afib who presents today for anticoagulation monitoring and adjustment. INR 3.6 is supra therapeutic for this patient (goal range 2-3) and is reflective of 45 mg TWD  Patient verifies current dosing regimen, patient able to verbally recall dose  Patient reports 0  missed doses since last INR   Patient denies s/sx clotting and/or stroke  Patient denies hematuria, epistaxis, rectal bleeding  Patient denies changes in  alcohol, or tobacco use  Patient was in [facility] 1/9/21-1/14/21 and is not back to regular diet yet.  Discussed vitamin K in diet and effect on INR and gave handout  Reviewed medication list and drug allergies with patient, updated any medication additions or modifications accordingly  Patient also denies any pending medical or dental procedures scheduled at this time  Patient was instructed to hold warfarin today 1/26, then resume 45 mg TWD and RTC 2 weeks    CLINICAL PHARMACY CONSULT: MED RECONCILIATION/REVIEW ADDENDUM    For Pharmacy Admin Tracking Only    PHSO: Yes  Total # of Interventions Recommended: 2  - Decreased Dose #: 1  - Maintenance Safety Lab Monitoring #: 1    Total Interventions Accepted: 2  Time Spent (min): [address] [facility], [facility]
d/c by RN Bimonte/DC instructions

## 2021-02-02 ENCOUNTER — CARE COORDINATION (OUTPATIENT)
Dept: CASE MANAGEMENT | Age: 71
End: 2021-02-02

## 2021-02-10 ENCOUNTER — CARE COORDINATION (OUTPATIENT)
Dept: CASE MANAGEMENT | Age: 71
End: 2021-02-10
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
## 2021-02-11 ENCOUNTER — APPOINTMENT (OUTPATIENT)
Dept: GENERAL RADIOLOGY | Age: 71
DRG: 292 | End: 2021-02-11
Payer: MEDICARE

## 2021-02-11 ENCOUNTER — HOSPITAL ENCOUNTER (INPATIENT)
Age: 71
LOS: 4 days | Discharge: HOME | DRG: 292 | End: 2021-02-15
Attending: EMERGENCY MEDICINE | Admitting: FAMILY MEDICINE
Payer: MEDICARE

## 2021-02-11 DIAGNOSIS — I50.23 ACUTE ON CHRONIC SYSTOLIC CHF (CONGESTIVE HEART FAILURE) (HCC): ICD-10-CM

## 2021-02-11 DIAGNOSIS — I50.21 ACUTE SYSTOLIC CONGESTIVE HEART FAILURE (HCC): Primary | ICD-10-CM

## 2021-02-11 DIAGNOSIS — R77.8 ELEVATED TROPONIN: ICD-10-CM

## 2021-02-11 LAB
ALBUMIN SERPL-MCNC: 3.7 G/DL (ref 3.5–4.6)
ALP BLD-CCNC: 177 U/L (ref 35–104)
ALT SERPL-CCNC: 21 U/L (ref 0–41)
ANION GAP SERPL CALCULATED.3IONS-SCNC: 11 MEQ/L (ref 9–15)
APTT: 38.2 SEC (ref 24.4–36.8)
AST SERPL-CCNC: 24 U/L (ref 0–40)
BASOPHILS ABSOLUTE: 0 K/UL (ref 0–0.2)
BASOPHILS RELATIVE PERCENT: 1 %
BILIRUB SERPL-MCNC: 2 MG/DL (ref 0.2–0.7)
BUN BLDV-MCNC: 28 MG/DL (ref 8–23)
CALCIUM SERPL-MCNC: 8.9 MG/DL (ref 8.5–9.9)
CHLORIDE BLD-SCNC: 99 MEQ/L (ref 95–107)
CO2: 24 MEQ/L (ref 20–31)
CREAT SERPL-MCNC: 1.62 MG/DL (ref 0.7–1.2)
EKG ATRIAL RATE: 82 BPM
EKG P AXIS: 64 DEGREES
EKG P-R INTERVAL: 136 MS
EKG Q-T INTERVAL: 448 MS
EKG QRS DURATION: 178 MS
EKG QTC CALCULATION (BAZETT): 523 MS
EKG R AXIS: -89 DEGREES
EKG T AXIS: 76 DEGREES
EKG VENTRICULAR RATE: 82 BPM
EOSINOPHILS ABSOLUTE: 0 K/UL (ref 0–0.7)
EOSINOPHILS RELATIVE PERCENT: 0.1 %
GFR AFRICAN AMERICAN: 51.4
GFR NON-AFRICAN AMERICAN: 42.5
GLOBULIN: 3.1 G/DL (ref 2.3–3.5)
GLUCOSE BLD-MCNC: 113 MG/DL (ref 60–115)
GLUCOSE BLD-MCNC: 123 MG/DL (ref 70–99)
HCT VFR BLD CALC: 35 % (ref 42–52)
HEMOGLOBIN: 11.2 G/DL (ref 14–18)
INR BLD: 2.5
LYMPHOCYTES ABSOLUTE: 0.9 K/UL (ref 1–4.8)
LYMPHOCYTES RELATIVE PERCENT: 16.8 %
MCH RBC QN AUTO: 27.7 PG (ref 27–31.3)
MCHC RBC AUTO-ENTMCNC: 32 % (ref 33–37)
MCV RBC AUTO: 86.5 FL (ref 80–100)
MONOCYTES ABSOLUTE: 0.5 K/UL (ref 0.2–0.8)
MONOCYTES RELATIVE PERCENT: 10.4 %
NEUTROPHILS ABSOLUTE: 3.7 K/UL (ref 1.4–6.5)
NEUTROPHILS RELATIVE PERCENT: 71.7 %
PDW BLD-RTO: 19 % (ref 11.5–14.5)
PERFORMED ON: NORMAL
PLATELET # BLD: 182 K/UL (ref 130–400)
POTASSIUM SERPL-SCNC: 3.7 MEQ/L (ref 3.4–4.9)
PRO-BNP: 9396 PG/ML
PROTHROMBIN TIME: 26.8 SEC (ref 12.3–14.9)
RBC # BLD: 4.04 M/UL (ref 4.7–6.1)
SARS-COV-2, NAAT: NOT DETECTED
SODIUM BLD-SCNC: 134 MEQ/L (ref 135–144)
TOTAL CK: 134 U/L (ref 0–190)
TOTAL PROTEIN: 6.8 G/DL (ref 6.3–8)
TROPONIN: 0.03 NG/ML (ref 0–0.01)
WBC # BLD: 5.1 K/UL (ref 4.8–10.8)

## 2021-02-11 PROCEDURE — 71045 X-RAY EXAM CHEST 1 VIEW: CPT

## 2021-02-11 PROCEDURE — 83880 ASSAY OF NATRIURETIC PEPTIDE: CPT

## 2021-02-11 PROCEDURE — 96374 THER/PROPH/DIAG INJ IV PUSH: CPT

## 2021-02-11 PROCEDURE — 85610 PROTHROMBIN TIME: CPT

## 2021-02-11 PROCEDURE — 82550 ASSAY OF CK (CPK): CPT

## 2021-02-11 PROCEDURE — U0002 COVID-19 LAB TEST NON-CDC: HCPCS

## 2021-02-11 PROCEDURE — 85025 COMPLETE CBC W/AUTO DIFF WBC: CPT

## 2021-02-11 PROCEDURE — 6360000002 HC RX W HCPCS: Performed by: EMERGENCY MEDICINE

## 2021-02-11 PROCEDURE — 1210000000 HC MED SURG R&B

## 2021-02-11 PROCEDURE — 93005 ELECTROCARDIOGRAM TRACING: CPT | Performed by: EMERGENCY MEDICINE

## 2021-02-11 PROCEDURE — 36415 COLL VENOUS BLD VENIPUNCTURE: CPT

## 2021-02-11 PROCEDURE — 85730 THROMBOPLASTIN TIME PARTIAL: CPT

## 2021-02-11 PROCEDURE — 84484 ASSAY OF TROPONIN QUANT: CPT

## 2021-02-11 PROCEDURE — 99284 EMERGENCY DEPT VISIT MOD MDM: CPT

## 2021-02-11 PROCEDURE — 80053 COMPREHEN METABOLIC PANEL: CPT

## 2021-02-11 RX ORDER — FUROSEMIDE 10 MG/ML
80 INJECTION INTRAMUSCULAR; INTRAVENOUS ONCE
Status: COMPLETED | OUTPATIENT
Start: 2021-02-11 | End: 2021-02-11

## 2021-02-11 RX ADMIN — FUROSEMIDE 80 MG: 10 INJECTION, SOLUTION INTRAMUSCULAR; INTRAVENOUS at 16:48

## 2021-02-11 ASSESSMENT — ENCOUNTER SYMPTOMS
DIARRHEA: 0
NAUSEA: 0
EYES NEGATIVE: 1
BACK PAIN: 0
ABDOMINAL PAIN: 0
GASTROINTESTINAL NEGATIVE: 1
SHORTNESS OF BREATH: 1
COUGH: 0
VOMITING: 0
SORE THROAT: 0
ALLERGIC/IMMUNOLOGIC NEGATIVE: 1

## 2021-02-11 NOTE — CARE COORDINATION
[facility] EMERGENCY [facility] Case Management Initial Discharge Assessment    Met with Patient to discuss discharge plan. PCP: Dotty Galindo MD       last Visit: [address]    If no PCP, list provided? N/A    Discharge Planning    Living Arrangements: at home dependent on family care, HIS X-MOTHER-IN-LAW    Who do you live with? SHE STAYS WITH HIM MOST OF THE DAY AND NIGHT  ? ??? Who helps you with your care:  self    If lives at home:     Do you have any barriers navigating in your home? yes - [address]    Patient can perform ADL? Yes    Current Services (outpatient and in home) :  None    Dialysis: No    Is transportation available to get to your appointments? Yes    DME Equipment:  yes - WALKER, [employer] CHAIR    Respiratory equipment: Continuous Oxygen  2 Liters     Respiratory provider:  {YES***/NO:60}     Pharmacy:  {YES***/NO:60}    Consult with Medication Assistance Program?  {YES/NO:54835}      Patient agreeable to [facility]? {NFN2:89051}    Patient agreeable to SNF/Rehab? {DKL2:48748}    Other discharge needs identified? {SCE9:04630}    Freedom of choice list provided with basic dialogue that supports the patient's individualized plan of care/goals and shares the quality data associated with the providers. {Yes No B/J:[id number]}    Does Patient Have a High-Risk for Readmission Diagnosis (CHF, PN, MI, COPD)? {hhc4:12005}    If Yes,    ? Consult with pulmonologist? {XBI7:38763}  ? Consult with cardiologist? {NBI0:18626}  ? Cardiac Rehab referral if EF <35%? {EHA0:51961}  ? Consult with Pharmacy for medication assessment prior to discharge? {FDX5:84824}  ? Consult with Behavioral health to aid in depression, anxiety, or coping issues? {BBJ0:29794}  ? Palliative Care Consult? {MEG9:15552}  ? Pulmonary Rehab order for COPD, PN, and CHF (if EF > 35%)? {IAK5:47811}   ? Does patient have a reliable scale and know how to read it (for CHF)?  {AEN8:12491}

## 2021-02-11 NOTE — FLOWSHEET NOTE
Pt up to floor ambulated to bathroom, VSS, placed on 2L O2 per NC w/ext tubing, sent from Dr Mitchel Ventura office SOB and leg swelling, 2-3 + ble edema noted.  Home medications completed, medical aware, no pain reported or needs at this time, pt on tele and denies shortness of breath on rest. Urinal at bedside and requested ice chips provided, medications placed in lockbox Electronically signed by Sandra March RN on 2/11/2021 at 6:57 PM

## 2021-02-11 NOTE — ED NOTES
Pt sent in by PCP to be admitted for CHF. Pt states he is short of breath. He has +2 pitting edema bilaterally in his lower legs. Pt's respirations are diminished bilaterally. Pt is alert and oriented x4 and skin is warm, dry and color is WNL.      Amy Wilks RN  02/11/21 [id number]

## 2021-02-11 NOTE — ED PROVIDER NOTES
[facility] ED  eMERGENCYdEPARTMENT eNCOUnter      Pt Name: Parviz Miller. MRN: [id number]  Birthdate 1950  Date of evaluation: 2/11/2021  Ludwin Garrison MD    CHIEF COMPLAINT           HPI  Parviz Miller. is a 78 y.o. male per chart review has a h/o afib/aflutter on coumadin, CKD, CHF with EF 5-10%, DM II, HTN, Hpl, presents to the ED with sob, leg swelling. Pt notes gradual onset, moderate, constant, worsening sob x 1 month. +Bilateral leg swelling. Pt denies fever, n/v, cp, ab pain, dysuria, diarrhea. ROS  Review of Systems   Constitutional: Negative for activity change, chills and fever. HENT: Negative for ear pain and sore throat. Eyes: Negative for visual disturbance. Respiratory: Positive for shortness of breath. Negative for cough. Cardiovascular: Positive for leg swelling. Negative for chest pain and palpitations. Gastrointestinal: Negative for abdominal pain, diarrhea, nausea and vomiting. Genitourinary: Negative for dysuria. Musculoskeletal: Negative for back pain. Skin: Negative for rash. Neurological: Negative for dizziness and weakness. Except as noted above the remainder of the review of systems was reviewed and negative.        PAST MEDICAL HISTORY     Past Medical History:   Diagnosis Date    Amiodarone-induced hyperthyroidism     Arthritis     Atrial flutter ([address]     CAD (coronary artery disease)     Chronic combined systolic and diastolic CHF (congestive heart failure) (HCC)     CKD (chronic kidney disease) stage 3, GFR 30-59 ml/min     COPD (chronic obstructive pulmonary disease) ([address]     Defibrillator activation     Diabetes mellitus with insulin therapy ([address]     Dilated cardiomyopathy ([address]     Generalized and unspecified atherosclerosis     Gout     Hyperlipidemia     Hypertension     Noncompliance     Obesity     On home oxygen therapy     Pain in joint, multiple sites     Paroxysmal A-fib ([address]     Paroxysmal ventricular tachycardia (HCC)     Prolonged emergence from general anesthesia     Status post angioplasty     Sustained ventricular tachycardia (HCC)     TIA (transient ischemic attack)     Tobacco abuse     Type II or unspecified type diabetes mellitus without mention of complication, not stated as uncontrolled          SURGICAL HISTORY       Past Surgical History:   Procedure Laterality Date    CARDIAC CATHETERIZATION      COLONOSCOPY      CORONARY ANGIOPLASTY  4/29/11    Dr Theodore Vieira CORONARY ARTERY BYPASS GRAFT  2012    ENDOSCOPY, COLON, DIAGNOSTIC      EYE SURGERY Bilateral     Cataracts     OTHER SURGICAL HISTORY      difibrillator     VA CIRCUMCISION,OTHER,28+ D/O N/A 8/29/2018    CIRCUMCISION performed by Sotero Gore MD at [address] [facility] EGD TRANSORAL BIOPSY SINGLE/MULTIPLE N/A 11/5/2017    EGD BIOPSY performed by Daryn Ledesma MD at [address]       Previous Medications    ALBUTEROL (PROAIR HFA) 108 (90 BASE) MCG/ACT INHALER    Inhale 2 puffs into the lungs every 4 hours as needed for Wheezing    ALBUTEROL (PROVENTIL) (2.5 MG/3ML) 0.083% NEBULIZER SOLUTION    Take 3 mLs by nebulization every 6 hours as needed for Wheezing    ALLOPURINOL (ZYLOPRIM) 100 MG TABLET    TAKE 1 TABLET DAILY    ATORVASTATIN (LIPITOR) 80 MG TABLET    Take 80 mg by mouth daily     BUDESONIDE-FORMOTEROL (SYMBICORT) 160-4.5 MCG/ACT AERO    Inhale 2 puffs into the lungs 2 times daily    BUMETANIDE (BUMEX) 1 MG TABLET    Take 1 mg by mouth 2 times daily     DIGITEK 125 MCG TABLET    TAKE 1 TABLET DAILY    DONEPEZIL (ARICEPT) 10 MG TABLET    TAKE 1 TABLET BY MOUTH EVERY DAY AT NIGHT    DULAGLUTIDE 0.75 MG/0.5ML SOPN    Inject 0.75 mg into the skin once a week    LANTUS SOLOSTAR 100 UNIT/ML INJECTION PEN    INJECT 10 UNITS INTO THE SKIN NIGHTLY    MAGNESIUM OXIDE (MAG-OX) 400 (240 MG) MG TABLET    Take 1 tablet by mouth daily    METOPROLOL SUCCINATE (TOPROL XL) 25 MG EXTENDED RELEASE TABLET    Take 1 tablet by mouth daily Take one tablet in morning and 1/2 tablet in evening. Pt has this med  And does not need refill    NITROGLYCERIN (NITROSTAT) 0.4 MG SL TABLET    Place 1 tablet under the tongue every 5 minutes as needed for Chest pain    PANTOPRAZOLE (PROTONIX) 40 MG TABLET    Take 1 tablet by mouth every morning (before breakfast)    POTASSIUM CHLORIDE (KLOR-CON M) 20 MEQ EXTENDED RELEASE TABLET    Take 1 tablet by mouth daily    SPIRONOLACTONE (ALDACTONE) 25 MG TABLET    Take 1 tablet by mouth daily    WARFARIN (COUMADIN) 5 MG TABLET    Take as directed by [address] [place] [address] Anticoagulation Management Service. Quantity for 90 day supply.        ALLERGIES     Dye [iodides], Penicillins, Plavix [clopidogrel], and Tapazole [methimazole]    FAMILY HISTORY       Family History   Problem Relation Age of Onset    Cancer Brother     Diabetes Mother     Heart Disease Father     Emphysema Father           SOCIAL HISTORY       Social History     Socioeconomic History    Marital status:      Spouse name: None    Number of children: 2    Years of education: 15    Highest education level: High school graduate   Occupational History    None   Social Needs    Financial resource strain: Somewhat hard    Food insecurity     Worry: Never true     Inability: Never true    Transportation needs     Medical: No     Non-medical: No   Tobacco Use    Smoking status: Former Smoker     Packs/day: 1.50     Years: 25.00     Pack years: 37.50     Quit date: 2012     Years since quittin.1    Smokeless tobacco: Never Used   Substance and Sexual Activity    Alcohol use: Not Currently    Drug use: No    Sexual activity: None   Lifestyle    Physical activity     Days per week: 0 days     Minutes per session: 0 min    Stress: Not at all   Relationships    Social connections     Talks on phone: More than three times a week     Gets together: More than three times a week     Attends Islam service: More than 4 times per year     Active member of club or organization: Yes     Attends meetings of clubs or organizations: 1 to 4 times per year     Relationship status:     Intimate partner violence     Fear of current or ex partner: None     Emotionally abused: None     Physically abused: None     Forced sexual activity: None   Other Topics Concern    None   Social History Narrative    None         PHYSICAL EXAM       ED Triage Vitals [02/11/21 1553]   BP Temp Temp Source Pulse Resp SpO2 Height Weight   100/61 97.6 °F (36.4 °C) Oral 84 18 99 % 5' 9\" (1.753 m) 200 lb (90.7 kg)       Physical Exam  Vitals signs and nursing note reviewed. Constitutional:       Appearance: He is well-developed. HENT:      Head: Normocephalic. Right Ear: External ear normal.      Left Ear: External ear normal.   Eyes:      Conjunctiva/sclera: Conjunctivae normal.      Pupils: Pupils are equal, round, and reactive to light. Neck:      Musculoskeletal: Normal range of motion and neck supple. Cardiovascular:      Rate and Rhythm: Normal rate and regular rhythm. Heart sounds: Normal heart sounds. Pulmonary:      Comments: Bibasilar rales  Abdominal:      General: Bowel sounds are normal. There is no distension. Palpations: Abdomen is soft. Tenderness: There is no abdominal tenderness. Musculoskeletal: Normal range of motion. Comments: 3+ edema noted in bilateral lower extremities   Skin:     General: Skin is warm and dry. Neurological:      Mental Status: He is alert and oriented to person, place, and time. Psychiatric:         Mood and Affect: Mood normal.           MDM  77 yo male presents to the ED with sob, leg swelling. Pt is afebrile, hemodynamically stable. Pt given IV lasix in the ED. EKG shows atrial paced rhythm with HR 82, normal axis, normal intervals, no ST changes.   Labs remarkable for troponin 0.028, INR 2.5, glucose 123, BUN 28, Cr 1.62, BNP 9396, Hb 11.2. Pt with a 30 lb weight gain in 1 month. Case discussed with Dr. Darya Engel who recommended admission. Case then discussed with Dr. Zuri Stout who accepted the pt. Pt admitted to medicine in stable condition. FINAL IMPRESSION      1.  Acute systolic congestive heart failure ([facility]    2. Elevated troponin          DISPOSITION/PLAN   DISPOSITION Decision To Admit 02/11/2021 04:59:36 PM        DISCHARGE MEDICATIONS:  [unfilled]         Teena De La Rosa MD(electronically signed)  Attending Emergency Physician              Teena De La Rosa MD  02/11/21 7785

## 2021-02-11 NOTE — ED TRIAGE NOTES
Patient arrived with family from Dr. Quita Villarreal office to be admitted due to CHF and swelling of BLE. Patient wears chronic home 02 2L. Patient A&OX4. Skin pink, warm, and dry. No distress noted. Patient has no complaint of chest pain, abd pain, nausea, vomiting, or diarrhea. Patient has complaint of sob with movement.

## 2021-02-11 NOTE — H&P
Hospitalist History and Physical  Name: Karthik Anthony. Age: 78 y.o. Gender: male  CodeStatus: Prior  Allergies: Dye [Iodides]  Penicillins  Plavix [Clopidogrel]  Tapazole [Methimazole]    Chief Complaint:Other (sent by Dr. Jeannette Haley to be admitted for CHF)    Primary Care Provider: Deborah Valadez MD  InpatientTreatment Team: Treatment Team: Attending Provider: Lilli Hunter MD; Registered Nurse: Reyes Burnet, RN; Consulting Physician: Huy Long MD  Admission Date: 2/11/2021      Subjective: Pt is a 78 y.o. male with h/o afib/aflutter on coumadin, CKD, CHF with EF 5-10%, DM II, HTN, Hpl, presents to the ED with 1 month history of increased shortness of breath and bilateral lower extremity edema. Pt given IV lasix in the ED. EKG shows atrial paced rhythm with HR 82, normal axis, normal intervals, no ST changes. Labs remarkable for troponin 0.028,   BUN 28, Cr 1.62, BNP 9396. Patient states he has gained 30 lbs in the last month. Cardiologist requested admission with Lasix gtt. Physical Exam  HENT:      Head: Normocephalic and atraumatic. Nose: Nose normal.      Mouth/Throat:      Mouth: Mucous membranes are dry. Pharynx: Oropharynx is clear. Eyes:      Extraocular Movements: Extraocular movements intact. Conjunctiva/sclera: Conjunctivae normal.      Pupils: Pupils are equal, round, and reactive to light. Neck:      Musculoskeletal: Normal range of motion and neck supple. Cardiovascular:      Rate and Rhythm: Normal rate and regular rhythm. Pulses: Normal pulses. Heart sounds: Normal heart sounds. Pulmonary:      Effort: Pulmonary effort is normal.      Breath sounds: Examination of the right-middle field reveals rales. Examination of the left-middle field reveals rales. Examination of the right-lower field reveals rales. Examination of the left-lower field reveals rales. Rales present.    Abdominal:      General: Bowel sounds are normal.      Palpations: Abdomen is soft.   Musculoskeletal:      Right lower leg: Edema present. Left lower leg: Edema present. Skin:     General: Skin is warm and dry. Capillary Refill: Capillary refill takes less than 2 seconds. Neurological:      General: No focal deficit present. Mental Status: He is alert and oriented to person, place, and time. Review of Systems   Constitutional: Positive for fatigue. HENT: Negative. Eyes: Negative. Respiratory: Positive for shortness of breath. Cardiovascular: Positive for leg swelling. Gastrointestinal: Negative. Endocrine: Negative. Genitourinary: Negative. Musculoskeletal: Negative. Skin: Negative. Allergic/Immunologic: Negative. Neurological: Negative. Hematological: Negative. Psychiatric/Behavioral: Negative. Medications:  Reviewed     No current facility-administered medications on file prior to encounter. Current Outpatient Medications on File Prior to Encounter   Medication Sig Dispense Refill    warfarin (COUMADIN) 5 MG tablet Take as directed by [facility] [place] [facility]. Quantity for 90 day supply. 120 tablet 1    donepezil (ARICEPT) 10 MG tablet TAKE 1 TABLET BY MOUTH EVERY DAY AT NIGHT 90 tablet 1    Dulaglutide 0.75 MG/0.5ML SOPN Inject 0.75 mg into the skin once a week 4 pen 1    metoprolol succinate (TOPROL XL) 25 MG extended release tablet Take 1 tablet by mouth daily Take one tablet in morning and 1/2 tablet in evening. Pt has this med  And does not need refill 45 tablet 0    budesonide-formoterol (SYMBICORT) 160-4.5 MCG/ACT AERO Inhale 2 puffs into the lungs 2 times daily 3 Inhaler 5    LANTUS SOLOSTAR 100 UNIT/ML injection pen INJECT 10 UNITS INTO THE SKIN NIGHTLY 5 pen 0    magnesium oxide (MAG-OX) 400 (240 Mg) MG tablet Take 1 tablet by mouth daily 30 tablet 5    potassium chloride (KLOR-CON M) 20 MEQ extended release tablet Take 1 tablet by mouth daily 60 tablet 3    allopurinol ischemic attack)     Tobacco abuse     Type II or unspecified type diabetes mellitus without mention of complication, not stated as uncontrolled        Past Surgical History:   Procedure Laterality Date    CARDIAC CATHETERIZATION      COLONOSCOPY      CORONARY ANGIOPLASTY  11    Dr Hodge Her CORONARY ARTERY BYPASS GRAFT      ENDOSCOPY, COLON, DIAGNOSTIC      EYE SURGERY Bilateral     Cataracts     OTHER SURGICAL HISTORY      difibrillator     [place] CIRCUMCISION,OTHER,28+ D/O N/A 2018    CIRCUMCISION performed by Dustin Weldon MD at [address] [facility] EGD TRANSORAL BIOPSY SINGLE/MULTIPLE N/A 2017    EGD BIOPSY performed by Refugio Walker MD at [address] History   Problem Relation Age of Onset    Cancer Brother     Diabetes Mother     Heart Disease Father     Emphysema Father         Social History     Socioeconomic History    Marital status:      Spouse name: Not on file    Number of children: 2    Years of education: 15    Highest education level: High school graduate   Occupational History    Not on file   Social Needs    Financial resource strain: Somewhat hard    Food insecurity     Worry: Never true     Inability: Never true    Transportation needs     Medical: No     Non-medical: No   Tobacco Use    Smoking status: Former Smoker     Packs/day: 1.50     Years: 25.00     Pack years: 37.50     Quit date: 2012     Years since quittin.1    Smokeless tobacco: Never Used   Substance and Sexual Activity    Alcohol use: Not Currently    Drug use: No    Sexual activity: Not on file   Lifestyle    Physical activity     Days per week: 0 days     Minutes per session: 0 min    Stress: Not at all   Relationships    Social connections     Talks on phone: More than three times a week     Gets together: More than three times a week     Attends [employer] service: More than 4 times per year     Active member of club or organization: Yes     Attends meetings of clubs or organizations: 1 to 4 times per year     Relationship status:     Intimate partner violence     Fear of current or ex partner: Not on file     Emotionally abused: Not on file     Physically abused: Not on file     Forced sexual activity: Not on file   Other Topics Concern    Not on file   Social History Narrative    Not on file        Infusion Medications:   Scheduled Medications:   PRN Meds:     Labs:   Recent Labs     02/11/21  1600   WBC 5.1   HGB 11.2*   HCT 35.0*        Recent Labs     02/11/21  1600   *   K 3.7   CL 99   CO2 24   BUN 28*   CREATININE 1.62*   CALCIUM 8.9     Recent Labs     02/11/21  1600   AST 24   ALT 21   BILITOT 2.0*   ALKPHOS 177*     Recent Labs     02/11/21  1600   INR 2.5     Recent Labs     02/11/21  1600   CKTOTAL 134   TROPONINI 0.028*       Urinalysis:   Lab Results   Component Value Date    NITRU Negative 12/13/2020    WBCUA 3-5 12/13/2020    BACTERIA Negative 12/13/2020    RBCUA 6-10 12/13/2020    BLOODU TRACE 12/13/2020    SPECGRAV 1.022 12/13/2020    GLUCOSEU Negative 12/13/2020       Radiology:   Most recent    Chest CT      WITH CONTRAST:No results found for this or any previous visit. WITHOUT CONTRAST: No results found for this or any previous visit. CXR      2-view:   Results for orders placed during the hospital encounter of 09/01/19   XR CHEST STANDARD (2 VW)    Narrative EXAMINATION: XR CHEST (2 VW)    CLINICAL HISTORY: SHORTNESS OF BREATH    COMPARISONS: MAY 25, 2019    FINDINGS: Median sternotomy. Pacemaker generator and wires unchanged. Cardiopericardial silhouette enlarged and unchanged. Mild blunting left costophrenic angle again identified. Scarring lung bases. Aorta calcified and tortuous. Impression STABLE CARDIOMEGALY WITH POSTSURGICAL CHANGE DISCUSSED.         Portable:   Results for orders placed during the hospital encounter of 01/09/21   XR CHEST PORTABLE    Narrative Exam: XR CHEST PORTABLE    History: Shortness breath. CHF. Technique: AP portable view of the chest obtained. Comparison: Portable chest radiograph from December 13, 2020    Findings:    Left-sided cardiac defibrillator is present. Atherosclerotic calcification of the thoracic aorta. Moderate cardiomegaly. Pulmonary vascular congestion. Patchy and linear bibasilar opacities. No pneumothorax or pleural effusion. Impression Findings likely representing fluid overload/CHF. Superimposed bibasilar pneumonia not excluded. Echo No results found for this or any previous visit. Assessment/Plan:    Acute on chronic decompensated systolic / diastolic heart failure: Lasix gtt, daily weights, strict intake and output. Cardio consulted. ECHO. Pro BNP ordered. Goal K and Mg 4.0 and 2.0, respectively. Telemetry. Ordered ASA and statin. Prior to discharge, will start ACEI/ARB and coreg if BP and renal function tolerates. NSTEMI II most likely; Elevated trops likely due to accumulation 2ndry to decreased renal excretion as a result of BERTRAM. Will trend and continue to assess. Cardio consult to exclude ACS or CAD. Goal K and Mg 4.0 and 2.0, respectively. If indicated, will need to replace K carefully in setting of renal insufficiency. EKG in AM. Telemetry ordered. BERTRAM on CKD III: Avoid nephrotoxic agents wherever possible. . Repeat BMP in AM. Avoiding fluctuations in BP. DMII with hyperglycemia: ISS, hypoglycemia protocol POCT Glucose TIDAC & QHS    COPD, Stable. Duonebs PRN. Incentive Spirometry, Respiratory therapist assessment. Resume home meds. A fib: rate controlled. Goal K and Mg 4.0 and 2.0, respectively. On Long term anticoagulation      I personally spent estimated 60 minutes with this patient today. Additional work up or/and treatment plan may be added today or then after based on clinical progression.  I am managing a portion of pt care. Some medical issues are handled by other specialists. Additional work up and treatment should be done in out pt setting by pt PCP and other out pt providers. In addition to examining and evaluating pt, I spent additional time explaining care, normaland abnormal findings, and treatment plan. All of pt questions were answered. Counseling, diet and education were provided. Case will be discussed with nursing staff when appropriate. Family will be updated if and when appropriate. Electronically signed by Lita Skiff, APRN - CNP on 2/11/2021 at 6:01 PM     Attending Supervising Anahy Jerez  I performed a history and physical examination on the patient and discussed the management with the nurse practitioner. I reviewed and agree with the findings and plan as documented in her note .     Electronically signed by Breana Sue MD on 2/25/21 at 2:08 PM EST

## 2021-02-12 ENCOUNTER — TELEPHONE (OUTPATIENT)
Dept: PHARMACY | Age: 71
End: 2021-02-12

## 2021-02-12 DIAGNOSIS — I48.0 PAROXYSMAL A-FIB (HCC): ICD-10-CM

## 2021-02-12 LAB
ANION GAP SERPL CALCULATED.3IONS-SCNC: 11 MEQ/L (ref 9–15)
BUN BLDV-MCNC: 28 MG/DL (ref 8–23)
CALCIUM SERPL-MCNC: 9 MG/DL (ref 8.5–9.9)
CHLORIDE BLD-SCNC: 98 MEQ/L (ref 95–107)
CHOLESTEROL, TOTAL: 80 MG/DL (ref 0–199)
CO2: 26 MEQ/L (ref 20–31)
CREAT SERPL-MCNC: 1.39 MG/DL (ref 0.7–1.2)
GFR AFRICAN AMERICAN: >60
GFR NON-AFRICAN AMERICAN: 50.7
GLUCOSE BLD-MCNC: 123 MG/DL (ref 60–115)
GLUCOSE BLD-MCNC: 129 MG/DL (ref 70–99)
GLUCOSE BLD-MCNC: 152 MG/DL (ref 60–115)
GLUCOSE BLD-MCNC: 255 MG/DL (ref 60–115)
GLUCOSE BLD-MCNC: 92 MG/DL (ref 60–115)
HBA1C MFR BLD: 9.1 % (ref 4.8–5.9)
HCT VFR BLD CALC: 32.7 % (ref 42–52)
HDLC SERPL-MCNC: 44 MG/DL (ref 40–59)
HEMOGLOBIN: 10.5 G/DL (ref 14–18)
INR BLD: 2.3
LDL CHOLESTEROL CALCULATED: 23 MG/DL (ref 0–129)
MAGNESIUM: 2.1 MG/DL (ref 1.7–2.4)
MCH RBC QN AUTO: 27.8 PG (ref 27–31.3)
MCHC RBC AUTO-ENTMCNC: 32.2 % (ref 33–37)
MCV RBC AUTO: 86.3 FL (ref 80–100)
PDW BLD-RTO: 18.8 % (ref 11.5–14.5)
PERFORMED ON: ABNORMAL
PERFORMED ON: NORMAL
PLATELET # BLD: 171 K/UL (ref 130–400)
POTASSIUM SERPL-SCNC: 3.8 MEQ/L (ref 3.4–4.9)
PROTHROMBIN TIME: 25.6 SEC (ref 12.3–14.9)
RBC # BLD: 3.79 M/UL (ref 4.7–6.1)
SODIUM BLD-SCNC: 135 MEQ/L (ref 135–144)
TRIGL SERPL-MCNC: 63 MG/DL (ref 0–150)
TROPONIN: 0.03 NG/ML (ref 0–0.01)
TROPONIN: 0.03 NG/ML (ref 0–0.01)
WBC # BLD: 4 K/UL (ref 4.8–10.8)

## 2021-02-12 PROCEDURE — 2580000003 HC RX 258: Performed by: NURSE PRACTITIONER

## 2021-02-12 PROCEDURE — 94761 N-INVAS EAR/PLS OXIMETRY MLT: CPT

## 2021-02-12 PROCEDURE — 80048 BASIC METABOLIC PNL TOTAL CA: CPT

## 2021-02-12 PROCEDURE — 83735 ASSAY OF MAGNESIUM: CPT

## 2021-02-12 PROCEDURE — 6370000000 HC RX 637 (ALT 250 FOR IP): Performed by: NURSE PRACTITIONER

## 2021-02-12 PROCEDURE — 80061 LIPID PANEL: CPT

## 2021-02-12 PROCEDURE — 83036 HEMOGLOBIN GLYCOSYLATED A1C: CPT

## 2021-02-12 PROCEDURE — 93010 ELECTROCARDIOGRAM REPORT: CPT | Performed by: INTERNAL MEDICINE

## 2021-02-12 PROCEDURE — 84484 ASSAY OF TROPONIN QUANT: CPT

## 2021-02-12 PROCEDURE — 85610 PROTHROMBIN TIME: CPT

## 2021-02-12 PROCEDURE — 2700000000 HC OXYGEN THERAPY PER DAY

## 2021-02-12 PROCEDURE — 36415 COLL VENOUS BLD VENIPUNCTURE: CPT

## 2021-02-12 PROCEDURE — 85027 COMPLETE CBC AUTOMATED: CPT

## 2021-02-12 PROCEDURE — 2060000000 HC ICU INTERMEDIATE R&B

## 2021-02-12 PROCEDURE — 6360000002 HC RX W HCPCS: Performed by: NURSE PRACTITIONER

## 2021-02-12 PROCEDURE — 6370000000 HC RX 637 (ALT 250 FOR IP): Performed by: INTERNAL MEDICINE

## 2021-02-12 PROCEDURE — 94640 AIRWAY INHALATION TREATMENT: CPT

## 2021-02-12 RX ORDER — ACETAMINOPHEN 650 MG/1
650 SUPPOSITORY RECTAL EVERY 6 HOURS PRN
Status: DISCONTINUED | OUTPATIENT
Start: 2021-02-12 | End: 2021-02-15 | Stop reason: HOSPADM

## 2021-02-12 RX ORDER — ALLOPURINOL 100 MG/1
100 TABLET ORAL DAILY
Status: DISCONTINUED | OUTPATIENT
Start: 2021-02-12 | End: 2021-02-15 | Stop reason: HOSPADM

## 2021-02-12 RX ORDER — SODIUM CHLORIDE 0.9 % (FLUSH) 0.9 %
10 SYRINGE (ML) INJECTION EVERY 12 HOURS SCHEDULED
Status: DISCONTINUED | OUTPATIENT
Start: 2021-02-12 | End: 2021-02-15 | Stop reason: HOSPADM

## 2021-02-12 RX ORDER — ONDANSETRON 2 MG/ML
4 INJECTION INTRAMUSCULAR; INTRAVENOUS EVERY 6 HOURS PRN
Status: DISCONTINUED | OUTPATIENT
Start: 2021-02-12 | End: 2021-02-15 | Stop reason: HOSPADM

## 2021-02-12 RX ORDER — NICOTINE POLACRILEX 4 MG
15 LOZENGE BUCCAL PRN
Status: DISCONTINUED | OUTPATIENT
Start: 2021-02-12 | End: 2021-02-15 | Stop reason: HOSPADM

## 2021-02-12 RX ORDER — SODIUM CHLORIDE 0.9 % (FLUSH) 0.9 %
10 SYRINGE (ML) INJECTION PRN
Status: DISCONTINUED | OUTPATIENT
Start: 2021-02-12 | End: 2021-02-15 | Stop reason: HOSPADM

## 2021-02-12 RX ORDER — DONEPEZIL HYDROCHLORIDE 10 MG/1
10 TABLET, FILM COATED ORAL NIGHTLY
Status: DISCONTINUED | OUTPATIENT
Start: 2021-02-12 | End: 2021-02-15 | Stop reason: HOSPADM

## 2021-02-12 RX ORDER — METOPROLOL SUCCINATE 25 MG/1
25 TABLET, EXTENDED RELEASE ORAL DAILY
Status: DISCONTINUED | OUTPATIENT
Start: 2021-02-12 | End: 2021-02-13

## 2021-02-12 RX ORDER — POLYETHYLENE GLYCOL 3350 17 G/17G
17 POWDER, FOR SOLUTION ORAL DAILY PRN
Status: DISCONTINUED | OUTPATIENT
Start: 2021-02-12 | End: 2021-02-15 | Stop reason: HOSPADM

## 2021-02-12 RX ORDER — DEXTROSE MONOHYDRATE 50 MG/ML
100 INJECTION, SOLUTION INTRAVENOUS PRN
Status: DISCONTINUED | OUTPATIENT
Start: 2021-02-12 | End: 2021-02-15 | Stop reason: HOSPADM

## 2021-02-12 RX ORDER — POTASSIUM CHLORIDE 20 MEQ/1
20 TABLET, EXTENDED RELEASE ORAL DAILY
Status: DISCONTINUED | OUTPATIENT
Start: 2021-02-12 | End: 2021-02-15 | Stop reason: HOSPADM

## 2021-02-12 RX ORDER — PANTOPRAZOLE SODIUM 40 MG/1
40 TABLET, DELAYED RELEASE ORAL
Status: DISCONTINUED | OUTPATIENT
Start: 2021-02-12 | End: 2021-02-15 | Stop reason: HOSPADM

## 2021-02-12 RX ORDER — ACETAMINOPHEN 325 MG/1
650 TABLET ORAL EVERY 6 HOURS PRN
Status: DISCONTINUED | OUTPATIENT
Start: 2021-02-12 | End: 2021-02-15 | Stop reason: HOSPADM

## 2021-02-12 RX ORDER — LANOLIN ALCOHOL/MO/W.PET/CERES
400 CREAM (GRAM) TOPICAL DAILY
Status: DISCONTINUED | OUTPATIENT
Start: 2021-02-12 | End: 2021-02-15 | Stop reason: HOSPADM

## 2021-02-12 RX ORDER — PROMETHAZINE HYDROCHLORIDE 12.5 MG/1
12.5 TABLET ORAL EVERY 6 HOURS PRN
Status: DISCONTINUED | OUTPATIENT
Start: 2021-02-12 | End: 2021-02-15 | Stop reason: HOSPADM

## 2021-02-12 RX ORDER — BUMETANIDE 1 MG/1
1 TABLET ORAL 2 TIMES DAILY
Status: DISCONTINUED | OUTPATIENT
Start: 2021-02-12 | End: 2021-02-13

## 2021-02-12 RX ORDER — DEXTROSE MONOHYDRATE 25 G/50ML
12.5 INJECTION, SOLUTION INTRAVENOUS PRN
Status: DISCONTINUED | OUTPATIENT
Start: 2021-02-12 | End: 2021-02-15 | Stop reason: HOSPADM

## 2021-02-12 RX ORDER — BUDESONIDE AND FORMOTEROL FUMARATE DIHYDRATE 160; 4.5 UG/1; UG/1
2 AEROSOL RESPIRATORY (INHALATION) 2 TIMES DAILY
Status: DISCONTINUED | OUTPATIENT
Start: 2021-02-12 | End: 2021-02-15 | Stop reason: HOSPADM

## 2021-02-12 RX ORDER — DIGOXIN 125 MCG
125 TABLET ORAL DAILY
Status: DISCONTINUED | OUTPATIENT
Start: 2021-02-12 | End: 2021-02-15 | Stop reason: HOSPADM

## 2021-02-12 RX ORDER — ACETAMINOPHEN 80 MG
TABLET,CHEWABLE ORAL ONCE
Status: COMPLETED | OUTPATIENT
Start: 2021-02-12 | End: 2021-02-13

## 2021-02-12 RX ORDER — ATORVASTATIN CALCIUM 80 MG/1
80 TABLET, FILM COATED ORAL DAILY
Status: DISCONTINUED | OUTPATIENT
Start: 2021-02-12 | End: 2021-02-15 | Stop reason: HOSPADM

## 2021-02-12 RX ORDER — INSULIN GLARGINE 100 [IU]/ML
10 INJECTION, SOLUTION SUBCUTANEOUS NIGHTLY
Status: DISCONTINUED | OUTPATIENT
Start: 2021-02-12 | End: 2021-02-15 | Stop reason: HOSPADM

## 2021-02-12 RX ORDER — WARFARIN SODIUM 5 MG/1
10 TABLET ORAL
Status: COMPLETED | OUTPATIENT
Start: 2021-02-12 | End: 2021-02-12

## 2021-02-12 RX ADMIN — ALLOPURINOL 100 MG: 100 TABLET ORAL at 08:21

## 2021-02-12 RX ADMIN — DIGOXIN 125 MCG: 125 TABLET ORAL at 08:21

## 2021-02-12 RX ADMIN — Medication 10 ML: at 08:22

## 2021-02-12 RX ADMIN — DONEPEZIL HYDROCHLORIDE 10 MG: 10 TABLET, FILM COATED ORAL at 21:13

## 2021-02-12 RX ADMIN — METOPROLOL SUCCINATE 25 MG: 25 TABLET, EXTENDED RELEASE ORAL at 13:58

## 2021-02-12 RX ADMIN — Medication 400 MG: at 08:21

## 2021-02-12 RX ADMIN — FUROSEMIDE 10 MG/HR: 10 INJECTION, SOLUTION INTRAVENOUS at 21:14

## 2021-02-12 RX ADMIN — WARFARIN SODIUM 10 MG: 5 TABLET ORAL at 18:00

## 2021-02-12 RX ADMIN — INSULIN GLARGINE 10 UNITS: 100 INJECTION, SOLUTION SUBCUTANEOUS at 21:14

## 2021-02-12 RX ADMIN — BUDESONIDE AND FORMOTEROL FUMARATE DIHYDRATE 2 PUFF: 160; 4.5 AEROSOL RESPIRATORY (INHALATION) at 08:15

## 2021-02-12 RX ADMIN — FUROSEMIDE 10 MG/HR: 10 INJECTION, SOLUTION INTRAVENOUS at 02:38

## 2021-02-12 RX ADMIN — PANTOPRAZOLE SODIUM 40 MG: 40 TABLET, DELAYED RELEASE ORAL at 08:21

## 2021-02-12 RX ADMIN — SACUBITRIL AND VALSARTAN 0.5 TABLET: 24; 26 TABLET, FILM COATED ORAL at 21:28

## 2021-02-12 RX ADMIN — Medication 10 ML: at 21:14

## 2021-02-12 RX ADMIN — BUDESONIDE AND FORMOTEROL FUMARATE DIHYDRATE 2 PUFF: 160; 4.5 AEROSOL RESPIRATORY (INHALATION) at 20:20

## 2021-02-12 RX ADMIN — FUROSEMIDE 10 MG/HR: 10 INJECTION, SOLUTION INTRAVENOUS at 11:54

## 2021-02-12 RX ADMIN — POTASSIUM CHLORIDE 20 MEQ: 20 TABLET, EXTENDED RELEASE ORAL at 08:21

## 2021-02-12 RX ADMIN — ATORVASTATIN CALCIUM 80 MG: 80 TABLET, FILM COATED ORAL at 21:13

## 2021-02-12 ASSESSMENT — ENCOUNTER SYMPTOMS
ABDOMINAL DISTENTION: 1
SHORTNESS OF BREATH: 1
CHEST TIGHTNESS: 0
WHEEZING: 0

## 2021-02-12 ASSESSMENT — PAIN SCALES - GENERAL
PAINLEVEL_OUTOF10: 0

## 2021-02-12 NOTE — PROGRESS NOTES
Nutrition Education    · Educated on 2Gna  · Written educational materials provided. · Contact name and number provided. · Refer to Patient Education activity for more details. · CHF educatoin. Although pt was noted as A& O x4 per flowsheet, did not seem appropriate for edcuation, repeated ' What' several times when asked questions, depsite using loud speaking voice, when asnwering questions, answers did not appera appropriate to questions. Noted pt recently educated 1/11/21.  Pt with hx of DM, current glucose < 140, however will add Carb Control to 2gNa diet to maintain glycemic control    Electronically signed by Maryam Ferrara RD, LD on 2/12/21 at 10:43 AM EST

## 2021-02-12 NOTE — CARE COORDINATION
[facility] AT [place] Case Management Initial Discharge Assessment    The LSW met with the patient to discuss the discharge plan. PCP: Anuj Aviles MD                                Date of Last Visit: The patient states his appointment was a few weeks ago. Per the , his last appointment 1/22/2021. If no PCP, list provided? N/A    Discharge Planning    Living Arrangements: The patient states he lives with 2 others - his ex mother in law and another family member    Who do you live with? See above     Who helps you with your care:  Self per the patient    If lives at home:     Do you have any barriers navigating in your home? yes     Patient can perform ADL? Yes - per the patient    Current Services (outpatient and in home) :  None    Dialysis: No    Is transportation available to get to your appointments? Yes - his ex mother in law    DME Equipment:  Delsie Frock, cane and shower chair    Respiratory equipment: Home O2 - 24/7; has nebulizer    Respiratory provider:  Medical Services     Pharmacy:  [address] [place] [address] with Medication Assistance Program?  No      Patient agreeable to [place] [id number]? Declined    Patient agreeable to SNF/Rehab? N/A    Other discharge needs identified? Freedom of choice list provided with basic dialogue that supports the patient's individualized plan of care/goals and shares the quality data associated with the providers. N/A     Does Patient Have a High-Risk for Readmission Diagnosis (CHF, PN, MI, COPD)? The plan for Transition of Care is related to the following treatment goals: Await medically stable for discharge. Initial Discharge Plan? (Note: please see concurrent daily documentation for any updates after initial note). The patient states the discharge plan is home. The patient does not want HHC at discharge. Palliative care is on consult. The Patient and/or patient representative:  The patient was provided with choice of any post-acute providers for care and equipment and agrees with discharge plan  N/A - The patient refuses [address] at this time. Palliative care is on consult. DCCOP was complete. The patient is at a medium risk for readmission (yellow).      Electronically signed by Jossie Go on 2/12/2021 at 3:57 PM

## 2021-02-12 NOTE — PROGRESS NOTES
Clinical Pharmacy Note    Gregory Edge is a 78 y.o. male for whom pharmacy has been asked to manage warfarin therapy. Reason for Admission: CHF    Consulting Physician: Mary Ann Cavanaugh NP  Warfarin dose prior to admission: 10 mg (5 mg x 2) every Mon, Fri; 5 mg (5 mg x 1) all other days  Warfarin Indication: PAF  Target INR range: 2-3  Order for concomitant anticoagulant therapy: none    Outpatient warfarin provider: Neha Shah MD; [facility]ication [facility]    Past Medical History:   Diagnosis Date    Amiodarone-induced hyperthyroidism     Arthritis     Atrial flutter ([facility] [address]     CAD (coronary artery disease)     Chronic combined systolic and diastolic CHF (congestive heart failure) (HCC)     CKD (chronic kidney disease) stage 3, GFR 30-59 ml/min     COPD (chronic obstructive pulmonary disease) (HCC)     Defibrillator activation     Diabetes mellitus with insulin therapy ([facility] [address]     Dilated cardiomyopathy (HCC)     Generalized and unspecified atherosclerosis     Gout     Hyperlipidemia     Hypertension     Noncompliance     Obesity     On home oxygen therapy     Pain in joint, multiple sites     Paroxysmal A-fib (HCC)     Paroxysmal ventricular tachycardia (HCC)     Prolonged emergence from general anesthesia     Status post angioplasty     Sustained ventricular tachycardia (HCC)     TIA (transient ischemic attack)     Tobacco abuse     Type II or unspecified type diabetes mellitus without mention of complication, not stated as uncontrolled                Recent Labs     02/11/21  1600   INR 2.5     Recent Labs     02/11/21  1600   HGB 11.2*   HCT 35.0*          Current warfarin drug-drug interactions: allopurinol, acetaminophen    Current diet order/intake: low-sodium    Date INR Warfarin Dose                                          Daily PT/INR during pharmacy consult to monitor and dose warfarin therapy. Thank you for the consult. ALFREDO Graf. Ph.  2/12/2021  6:40 AM

## 2021-02-12 NOTE — TELEPHONE ENCOUNTER
Updated POC INR date to reflect patient's next scheduled appointment date on 02/16/21    Sadia Mohan PharmD   2/12/2021 7:44 AM

## 2021-02-12 NOTE — PROGRESS NOTES
Clinical Pharmacy Note    Warfarin consult follow-up    Recent Labs     02/12/21  0714   INR 2.3     Recent Labs     02/11/21  1600 02/12/21  0600   HGB 11.2* 10.5*   HCT 35.0* 32.7*    171     Current warfarin drug-drug interactions: allopurinol, acetaminophen     Current diet order/intake: low-sodium, % of most recent PO meal    Notes:  Date INR Warfarin Dose    02/12/21  2.3  10 mg                                                      INR is therapeutic at 2.3, goal 2-3 indicated for afib. Will order home dose tonight at warfarin 10 mg (Home regimen: 10 mg Mo/Fri, 5 mg on all other days). Daily PT/INR during pharmacy consult to monitor and dose warfarin therapy. Thank you,    Supriya Dai, PharmD.   12:29 PM 2/12/2021

## 2021-02-12 NOTE — FLOWSHEET NOTE
Received from [facility], A&[facility]. V/s susie, at 2Lnc. Denies any pain/sob at this time. Lasix gtt initiated. Pt brought own meds, spoke with pharmacy about it. To send down to pharmacy so they can store it down there since pt is inpatient. 0669- sent pts own medication to pharmacy with a secure code.

## 2021-02-13 LAB
ANION GAP SERPL CALCULATED.3IONS-SCNC: 11 MEQ/L (ref 9–15)
BUN BLDV-MCNC: 24 MG/DL (ref 8–23)
CALCIUM SERPL-MCNC: 9.1 MG/DL (ref 8.5–9.9)
CHLORIDE BLD-SCNC: 96 MEQ/L (ref 95–107)
CO2: 28 MEQ/L (ref 20–31)
CREAT SERPL-MCNC: 1.36 MG/DL (ref 0.7–1.2)
GFR AFRICAN AMERICAN: >60
GFR NON-AFRICAN AMERICAN: 52
GLUCOSE BLD-MCNC: 115 MG/DL (ref 70–99)
GLUCOSE BLD-MCNC: 139 MG/DL (ref 60–115)
GLUCOSE BLD-MCNC: 155 MG/DL (ref 60–115)
GLUCOSE BLD-MCNC: 292 MG/DL (ref 60–115)
GLUCOSE BLD-MCNC: 95 MG/DL (ref 60–115)
INR BLD: 2.2
MAGNESIUM: 2 MG/DL (ref 1.7–2.4)
PERFORMED ON: ABNORMAL
PERFORMED ON: NORMAL
POTASSIUM SERPL-SCNC: 3.4 MEQ/L (ref 3.4–4.9)
PROTHROMBIN TIME: 24.1 SEC (ref 12.3–14.9)
SODIUM BLD-SCNC: 135 MEQ/L (ref 135–144)

## 2021-02-13 PROCEDURE — 94761 N-INVAS EAR/PLS OXIMETRY MLT: CPT

## 2021-02-13 PROCEDURE — 94640 AIRWAY INHALATION TREATMENT: CPT

## 2021-02-13 PROCEDURE — 80048 BASIC METABOLIC PNL TOTAL CA: CPT

## 2021-02-13 PROCEDURE — 2060000000 HC ICU INTERMEDIATE R&B

## 2021-02-13 PROCEDURE — 6370000000 HC RX 637 (ALT 250 FOR IP): Performed by: INTERNAL MEDICINE

## 2021-02-13 PROCEDURE — 2700000000 HC OXYGEN THERAPY PER DAY

## 2021-02-13 PROCEDURE — 6360000002 HC RX W HCPCS: Performed by: INTERNAL MEDICINE

## 2021-02-13 PROCEDURE — 85610 PROTHROMBIN TIME: CPT

## 2021-02-13 PROCEDURE — 2580000003 HC RX 258: Performed by: NURSE PRACTITIONER

## 2021-02-13 PROCEDURE — 2580000003 HC RX 258: Performed by: INTERNAL MEDICINE

## 2021-02-13 PROCEDURE — 83735 ASSAY OF MAGNESIUM: CPT

## 2021-02-13 PROCEDURE — 6370000000 HC RX 637 (ALT 250 FOR IP): Performed by: NURSE PRACTITIONER

## 2021-02-13 PROCEDURE — 36415 COLL VENOUS BLD VENIPUNCTURE: CPT

## 2021-02-13 RX ORDER — METOPROLOL SUCCINATE 25 MG/1
37.5 TABLET, EXTENDED RELEASE ORAL DAILY
Status: DISCONTINUED | OUTPATIENT
Start: 2021-02-14 | End: 2021-02-15 | Stop reason: HOSPADM

## 2021-02-13 RX ORDER — METOPROLOL SUCCINATE 25 MG/1
12.5 TABLET, EXTENDED RELEASE ORAL ONCE
Status: COMPLETED | OUTPATIENT
Start: 2021-02-13 | End: 2021-02-13

## 2021-02-13 RX ORDER — SPIRONOLACTONE 25 MG/1
25 TABLET ORAL DAILY
Status: DISCONTINUED | OUTPATIENT
Start: 2021-02-13 | End: 2021-02-15 | Stop reason: HOSPADM

## 2021-02-13 RX ORDER — WARFARIN SODIUM 5 MG/1
5 TABLET ORAL
Status: COMPLETED | OUTPATIENT
Start: 2021-02-13 | End: 2021-02-13

## 2021-02-13 RX ADMIN — SPIRONOLACTONE 25 MG: 25 TABLET ORAL at 15:41

## 2021-02-13 RX ADMIN — BUDESONIDE AND FORMOTEROL FUMARATE DIHYDRATE 2 PUFF: 160; 4.5 AEROSOL RESPIRATORY (INHALATION) at 07:25

## 2021-02-13 RX ADMIN — Medication 10 ML: at 22:59

## 2021-02-13 RX ADMIN — DIGOXIN 125 MCG: 125 TABLET ORAL at 10:04

## 2021-02-13 RX ADMIN — ATORVASTATIN CALCIUM 80 MG: 80 TABLET, FILM COATED ORAL at 10:04

## 2021-02-13 RX ADMIN — Medication 400 MG: at 10:04

## 2021-02-13 RX ADMIN — FUROSEMIDE 5 MG/HR: 10 INJECTION, SOLUTION INTRAVENOUS at 11:25

## 2021-02-13 RX ADMIN — DONEPEZIL HYDROCHLORIDE 10 MG: 10 TABLET, FILM COATED ORAL at 22:59

## 2021-02-13 RX ADMIN — TORSEMIDE 50 MG: 10 TABLET ORAL at 15:41

## 2021-02-13 RX ADMIN — ALLOPURINOL 100 MG: 100 TABLET ORAL at 10:04

## 2021-02-13 RX ADMIN — METOPROLOL SUCCINATE 12.5 MG: 25 TABLET, EXTENDED RELEASE ORAL at 15:42

## 2021-02-13 RX ADMIN — INSULIN GLARGINE 10 UNITS: 100 INJECTION, SOLUTION SUBCUTANEOUS at 22:59

## 2021-02-13 RX ADMIN — SACUBITRIL AND VALSARTAN 0.5 TABLET: 24; 26 TABLET, FILM COATED ORAL at 22:59

## 2021-02-13 RX ADMIN — Medication: at 01:12

## 2021-02-13 RX ADMIN — WARFARIN SODIUM 5 MG: 5 TABLET ORAL at 22:58

## 2021-02-13 RX ADMIN — PANTOPRAZOLE SODIUM 40 MG: 40 TABLET, DELAYED RELEASE ORAL at 10:10

## 2021-02-13 RX ADMIN — METOPROLOL SUCCINATE 25 MG: 25 TABLET, EXTENDED RELEASE ORAL at 10:04

## 2021-02-13 RX ADMIN — POTASSIUM CHLORIDE 20 MEQ: 20 TABLET, EXTENDED RELEASE ORAL at 10:04

## 2021-02-13 NOTE — FLOWSHEET NOTE
0025    PM  assessment completed/ reviewed. Pt : awake and resting quietly in bed  RESP:       Even and non labored        Oxygen: pt 91 on 2l. o2 increased to 3l NC. Pulse ox improved to 94 percent. Complaints of: none  Pain: denies  IV: iv lasix at 5mg/hr  TELE: paced on tele  Dressings: none  Precautions:              Falls:    30    Mikel: 19  Chart and meds reviewed. Urinal emptied. Noted Labs:     Call light in reach.   Electronically signed by Cinthya Brito RN on 2/13/2021 at 4:54 AM

## 2021-02-13 NOTE — PROGRESS NOTES
Hospitalist Daily Progress Note  Name: Jean Young. Age: 78 y.o. Gender: male  CodeStatus: Full Code  Allergies: Dye [Iodides]  Penicillins  Plavix [Clopidogrel]  Tapazole [Methimazole]    Chief Complaint:Other (sent by Dr. Yadi Ni to be admitted for CHF)      Primary Care Provider: Melly Harrington MD    InpatientTreatment Team: Treatment Team: Attending Provider: Kristan Galindo MD; Consulting Physician: Thermon Gitelman, MD; : ROGER Merrill; Utilization Reviewer: Rosina Hall RN; Registered Nurse: Alvarado Cote. Christian Corrigan; : Kaleb Diaz; Registered Nurse: Shantell Robertson RN    Admission Date: 2/11/2021      Subjective: No chest pain, sob, nausea. Leg swelling improved. Physical Exam  Vitals signs and nursing note reviewed. Constitutional:       Appearance: Normal appearance. Cardiovascular:      Rate and Rhythm: Regular rhythm. Tachycardia present. Pulmonary:      Effort: Pulmonary effort is normal.      Breath sounds: Normal breath sounds. Abdominal:      General: Bowel sounds are normal.      Palpations: Abdomen is soft. Musculoskeletal: Normal range of motion. Right lower leg: Edema present. Left lower leg: Edema present. Skin:     General: Skin is warm and dry. Neurological:      Mental Status: He is alert and oriented to person, place, and time. Mental status is at baseline.          Medications:  Reviewed    Infusion Medications:    dextrose      furosemide (LASIX) 1mg/ml infusion 5 mg/hr (02/13/21 1125)     Scheduled Medications:    warfarin  5 mg Oral Once    [START ON 2/14/2021] metoprolol succinate  37.5 mg Oral Daily    torsemide  50 mg Oral Daily    spironolactone  25 mg Oral Daily    sacubitril-valsartan  0.5 tablet Oral BID    sodium chloride flush  10 mL Intravenous 2 times per day    insulin lispro  0-12 Units Subcutaneous TID WC    insulin lispro  0-6 Units Subcutaneous Nightly    allopurinol  100 mg Oral Daily    atorvastatin  80 mg Oral Daily    budesonide-formoterol  2 puff Inhalation BID    digoxin  125 mcg Oral Daily    donepezil  10 mg Oral Nightly    insulin glargine  10 Units Subcutaneous Nightly    magnesium oxide  400 mg Oral Daily    pantoprazole  40 mg Oral QAM AC    potassium chloride  20 mEq Oral Daily    warfarin (COUMADIN) daily dosing (placeholder)   Other RX Placeholder     PRN Meds: sodium chloride flush, promethazine **OR** ondansetron, polyethylene glycol, acetaminophen **OR** acetaminophen, glucose, dextrose, glucagon (rDNA), dextrose    Labs:   Recent Labs     02/11/21  1600 02/12/21  0600   WBC 5.1 4.0*   HGB 11.2* 10.5*   HCT 35.0* 32.7*    171     Recent Labs     02/11/21  1600 02/12/21  0600 02/13/21  0608   * 135 135   K 3.7 3.8 3.4   CL 99 98 96   CO2 24 26 28   BUN 28* 28* 24*   CREATININE 1.62* 1.39* 1.36*   CALCIUM 8.9 9.0 9.1     Recent Labs     02/11/21  1600   AST 24   ALT 21   BILITOT 2.0*   ALKPHOS 177*     Recent Labs     02/11/21  1600 02/12/21  0714 02/13/21  0608   INR 2.5 2.3 2.2     Recent Labs     02/11/21  1600 02/12/21  0205 02/12/21  0600   CKTOTAL 134  --   --    TROPONINI 0.028* 0.025* 0.029*       Urinalysis:   Lab Results   Component Value Date    NITRU Negative 12/13/2020    WBCUA 3-5 12/13/2020    BACTERIA Negative 12/13/2020    RBCUA 6-10 12/13/2020    BLOODU TRACE 12/13/2020    SPECGRAV 1.022 12/13/2020    GLUCOSEU Negative 12/13/2020       Radiology:   Most recent    Chest CT      WITH CONTRAST:No results found for this or any previous visit. WITHOUT CONTRAST: No results found for this or any previous visit. CXR      2-view:   Results for orders placed during the hospital encounter of 09/01/19   XR CHEST STANDARD (2 VW)    Narrative EXAMINATION: XR CHEST (2 VW)    CLINICAL HISTORY: SHORTNESS OF BREATH    COMPARISONS: MAY 25, 2019    FINDINGS: Median sternotomy. Pacemaker generator and wires unchanged.  Cardiopericardial silhouette enlarged and unchanged. Mild blunting left costophrenic angle again identified. Scarring lung bases. Aorta calcified and tortuous. Impression STABLE CARDIOMEGALY WITH POSTSURGICAL CHANGE DISCUSSED. Portable:   Results for orders placed during the hospital encounter of 02/11/21   XR CHEST PORTABLE    Narrative EXAMINATION: XR CHEST PORTABLE    CLINICAL HISTORY: SHORTNESS OF BREATH. CHF. COMPARISONS: JANUARY 9, 2021    FINDINGS: Median sternotomy. Pacemaker generator and wires unchanged. Cardiopericardial silhouette enlarged. Aorta calcified. Pulmonary vasculature indistinct. Impression CARDIOMEGALY WITH PULMONARY VENOUS HYPERTENSION. Echo No results found for this or any previous visit. Assessment/Plan:    Active Hospital Problems    Diagnosis Date Noted    Acute on chronic systolic CHF (congestive heart failure) ([facility]) [I50.23] 02/11/2021     Acute on chronic decompensated systolic / diastolic heart failure: Lasix gtt, daily weights, strict intake and output. Cardio consulted. ECHO. Pro BNP ordered. Goal K and Mg 4.0 and 2.0, respectively. Telemetry. Ordered ASA and statin. Prior to discharge, will start ACEI/ARB and coreg if BP and renal function tolerates.     2/12 - await cardiac recs, probable decrease iv lasix today, diuresing well. NSTEMI II most likely; Elevated trops likely due to accumulation 2ndry to decreased renal excretion as a result of BERTRAM. Will trend and continue to assess. Cardio consult to exclude ACS or CAD. Goal K and Mg 4.0 and 2.0, respectively. If indicated, will need to replace K carefully in setting of renal insufficiency. EKG in AM. Telemetry ordered.      BERTRAM on CKD III: Avoid nephrotoxic agents wherever possible. . Repeat BMP in AM. Avoiding fluctuations in BP.     2/12 - improved, creatinine down    DMII with hyperglycemia: ISS, hypoglycemia protocol POCT Glucose TIDAC & QHS     COPD, Stable. Duonebs PRN.  Incentive Spirometry, Respiratory therapist

## 2021-02-13 NOTE — PROGRESS NOTES
[facility] OF THE [facility] Progress Note      Patient:  Vidal Wright YOB: 1950  MRN: [id number]   Acct: [de-identified]  Admit Date:  2/11/2021  Primary Cardiologist:Dr. Radha Carpenter      [facility] Cardiologist: Yisel Kearns      Impression/Plan:     1. Acute on chronic systolic CHF class IV: Has responded very nicely to intravenous diuretic. We will switch IV Lasix to torsemide today. I am concerned that monitoring shows what appears to be sinus tachycardia to 110. If his blood pressure allows would like to increase beta-blocker given the very low overall ejection fraction would just increase very slightly from 25-37.5 Toprol. May need to further adjust BiV settings and reduce the upper limit. Seems to be tolerating very low-dose Entresto today. Will increase to twice daily  2. No bleeding on anticoagulation  3. Would also add low-dose Aldactone to the torsemide. 4. Has follow-up next month for advanced heart failure evaluation at [facility]    Chief Complaint/Active Symptoms: Feels better today. Not quite to baseline but almost. He is lying supine. He notes left lower extremity edema. He has had no chest pain or palpitations    Review of Systems:   Fatigue otherwise negative    Admitted again with decompensated biventricular failure. Begun on Lasix drip and low-dose Entresto again trialed. CARDIAC HISTORY:  Coronary artery disease with remote CABG and mitral valve repair  Ischemic cardiomyopathy with RV dysfunction as well LVEF 5-10% with MR/TR secondary to severe biventricular dilatation  CRTD  Atrial fibrillation status post AV node ablation  To be evaluated by advanced heart failure team [facility] next month    Objective:   Vitals:    02/13/21 0401 02/13/21 0615 02/13/21 0726 02/13/21 0754   BP: 100/65   124/71   Pulse: 76   89   Resp: 18  16 17   Temp: 98 °F (36.7 °C)   97.7 °F (36.5 °C)   TempSrc: Oral      SpO2: 96%  96% 96%   Weight:  206 lb (93.4 kg)     Height:           Wt Readings from Last 3 Encounters:   02/13/21 206 lb (93.4 kg)   01/22/21 199 lb (90.3 kg)   01/14/21 198 lb 3.1 oz (89.9 kg)       TELEMETRY: normal sinus  and paced                            Rhythm Strip: ave rate 90 but some sinus tachy to 112    Physical Exam:  General Appearance: alert and oriented to person, place and time, in no acute distress  Cardiovascular: normal rate, regular rhythm, normal S1 and S2, 1/6 MR murmurs, rubs, clicks, or gallops, mild JVD  Pulmonary/Chest: clear to auscultation bilaterally- no wheezes, rales or rhonchi, normal air movement, no respiratory distress distant breath sounds however  Abdomen: soft, non-tender, non-distended, normal bowel sounds, no masses   Extremities: no cyanosis, clubbing. Mild edema  Skin: warm and dry, no rash or erythema  Eyes: EOMI  Neck: supple and non-tender without mass, no thyromegaly   Neurological: alert, oriented, normal speech, no focal findings or movement disorder noted    Allergies:   Allergies   Allergen Reactions    Dye [Iodides] Shortness Of Breath    Penicillins Anaphylaxis    Plavix [Clopidogrel]     Tapazole [Methimazole]      Itching         Medications:    warfarin  5 mg Oral Once    sodium chloride flush  10 mL Intravenous 2 times per day    insulin lispro  0-12 Units Subcutaneous TID WC    insulin lispro  0-6 Units Subcutaneous Nightly    allopurinol  100 mg Oral Daily    atorvastatin  80 mg Oral Daily    budesonide-formoterol  2 puff Inhalation BID    [Held by provider] bumetanide  1 mg Oral BID    digoxin  125 mcg Oral Daily    donepezil  10 mg Oral Nightly    insulin glargine  10 Units Subcutaneous Nightly    magnesium oxide  400 mg Oral Daily    metoprolol succinate  25 mg Oral Daily    pantoprazole  40 mg Oral QAM AC    potassium chloride  20 mEq Oral Daily    warfarin (COUMADIN) daily dosing (placeholder)   Other RX Placeholder    sacubitril-valsartan  0.5 tablet Oral Daily      dextrose      furosemide (LASIX) 1mg/ml infusion 5 mg/hr (02/13/21 1125)         Diagnostics:  EKG:  nsr v paced        CXR:           Portable:   Results for orders placed during the hospital encounter of 02/11/21   XR CHEST PORTABLE    Narrative EXAMINATION: XR CHEST PORTABLE    CLINICAL HISTORY: SHORTNESS OF BREATH. CHF. COMPARISONS: JANUARY 9, 2021    FINDINGS: Median sternotomy. Pacemaker generator and wires unchanged. Cardiopericardial silhouette enlarged. Aorta calcified. Pulmonary vasculature indistinct. Impression CARDIOMEGALY WITH PULMONARY VENOUS HYPERTENSION. Lab Data:    Cardiac Enzymes:  Recent Labs     02/11/21  1600 02/12/21  0205 02/12/21  0600   CKTOTAL 134  --   --    TROPONINI 0.028* 0.025* 0.029*     ProBNP:   Lab Results   Component Value Date    PROBNP 9,396 02/11/2021       CBC:   Recent Labs     02/11/21  1600 02/12/21  0600   WBC 5.1 4.0*   RBC 4.04* 3.79*   HGB 11.2* 10.5*   HCT 35.0* 32.7*    171       CMP:    Recent Labs     02/11/21  1600 02/12/21  0600 02/13/21  0608   * 135 135   K 3.7 3.8 3.4   CL 99 98 96   CO2 24 26 28   BUN 28* 28* 24*   CREATININE 1.62* 1.39* 1.36*   GFRAA 51.4* >60.0 >60.0   LABGLOM 42.5* 50.7* 52.0*   GLUCOSE 123* 129* 115*   CALCIUM 8.9 9.0 9.1       Hepatic Function Panel:    Recent Labs     02/11/21  1600   ALKPHOS 177*   ALT 21   AST 24   PROT 6.8   BILITOT 2.0*   LABALBU 3.7       Magnesium:    Recent Labs     02/12/21  0600 02/13/21  0608   MG 2.1 2.0       PT/INR:    Recent Labs     02/11/21  1600 02/12/21  0714 02/13/21  0608   PROTIME 26.8* 25.6* 24.1*   INR 2.5 2.3 2.2       Lipids:    Recent Labs     02/12/21  0600   CHOL 80   TRIG 63   HDL 44   LDLCALC 23       Active Problems:    Acute on chronic systolic CHF (congestive heart failure) (HCC)  Resolved Problems:    * No resolved hospital problems.  *           Electronically signed by Santa Claude, MD on 2/13/2021 at 1:41 PM

## 2021-02-13 NOTE — CONSULTS
had offered that he be evaluated for cardiac transplant. At that time he was completely against it. After several years now, he wants to reconsider it. He has an appointment with advanced heart failure clinic of [facility] in March 2021. Per my discussion with ER, patient was placed on IV Lasix infusion, and his volume status has improved significantly and he feels better. Patient reports no chest pressure tightness heaviness palpitations or ICD discharges. He has not complained of lightheadedness recently. CARDIAC HISTORY:     Subjective  Transitional Care Mgmt.:   Date of Discharge 1/14/2021   Discharging physician was Dr Anita Cortez contact was done on 1/15/2021   Reviewed/Updated: The patient has the following concern(s)   Referrals   Community Resources identified for patient/family:   Durable medical equipment:   Subjective: This is a first office visit post hospital discharge in this patient with end-stage cardiomyopathy chronic systolic biventricular heart failure functional class III, arterial hypotension limiting up titration or optimization of cardiac medications, frequently presents with right greater than left heart failure. Recently however he presented with biventricular heart failure. Echocardiogram during hospital stay showed LVEF of 5 to 10% with multiple valvular regurgitations. There were issues with medication reconciliation. Patient's exmother-in-law who is very involved in his care reported that every medication is the same as the last visit with the exception of Entresto. However the discharge summary from the hospital has several medications that are not identified on patient's prior medication list, also the dose of Toprol-XL is incorrect. A considerable amount of time was spent reiterating the importance of bringing all medications. There is a strong disparity between the discharge summary medications and what the patient believes he is taking.  As  Patient has home health care, and I reviewed the blood pressure which was around 903 systolic. On physical examination patient is alert oriented x3, slow to respond but has insight. No jugular venous distention or carotid bruit breath sounds distant lung fields clear heart sounds regular soft S3 gallop distant heart sounds abdomen soft and nontender extremities no edema blood pressure 90 systolic    Hospital notes were reviewed. Laboratory data 1/13/2021 hemoglobin 10.4 hematocrit 32 MCV 84.9 platelets 092 glucose 165 BUN 28 creatinine 1.29 GFR 55.3 INR 2.0 on 1/14/2021    Assessment:    1. End-stage cardiomyopathy biventricular heart failure functional class III with very high risk for Decompensation  2. Hospital discharge follow-up  3. Arterial hypotension limits up titration of cardiac medications  4. Persistent cough-patient to address with Dr. Hilda Bryant primary MD  5, patient is at his dry weight of 197 pounds. 6 Echocardiogram 1/11/2021-biventricular enlargement LVEF 5 to 10% RV dilatation severe biatrial enlargement 1-2+ mitral regurgitation 2-3+ tricuspid regurgitation RVSP 45 mmHg  Left atrial volume index 81 mL/m², left atrial dimension 4.7 cm  7. Status post ablation of AV node for rate control of atrial fibrillation October 2020  8. Atherosclerotic native vessel coronary artery disease with prior coronary artery bypass graft-patient is free of angina pectoris. 9. Carotid ultrasound January 2021-minimal carotid disease    Recommendations:    1. Extensive discussion with both patient and his exmother-in-law regarding the cardiomyopathy issues, the low blood pressure issues, the medication reconciliation issues, the importance of bringing all medication bottles at every office visit, and the discrepancies in the med list from discharge summary and what patient has on his current chart were also explained. 2. As far as the cough goes, would defer to Dr. Hilda Bryant, patient has upcoming visit.  Clinically patient is not volume overloaded on today's examination. 3. ACE inhibitor/ARB exclusion at this time because of arterial hypotension. 4. Device care and antiarrhythmic surveillance will be per EP service. 5. Had some end-of-life discussion, did not make any final decisions. I suggested that patient's exmother-in-law get his medical power of  so that she can be available for discussions in the event of emergency situations. 6. I offered consultation with heart failure clinic at [address] [place] [address], patient did not seem interested. If he changes his mind he will let us know. I had addressed the option of heart transplant with Mr. Miroslava Andino years ago, he did not want to go that route, and still holds the same view. At this point, he may not be eligible however. 7. There is a request for refill for Toprol-XL, I cannot authorize any medications without knowing his dose. 8. Basic metabolic profile today. 9. Keep upcoming appointment with Guido Jorgensen next week, and follow-up with me in 2 weeks.  We are attempting weekly follow-ups because of his very high risk of decompensation and readmission to the hospital.    PCP:              PAST MEDICAL HISTORY:    Past Medical History:   Diagnosis Date    Amiodarone-induced hyperthyroidism     Arthritis     Atrial fl[facility] [address] [address]     CAD (coronary artery disease)     Chronic combined systolic and diastolic CHF (congestive heart failure) (HCC)     CKD (chronic kidney disease) stage 3, GFR 30-59 ml/min     COPD (chronic obstructive pulmonary disease) (HCC)     Defibrillator activation     Diabetes mellitus with insulin therapy ([address]     Dilated cardiomyopathy ([address]     Generalized and unspecified atherosclerosis     Gout     Hyperlipidemia     Hypertension     Noncompliance     Obesity     On home oxygen therapy     Pain in joint, multiple sites     Paroxysmal A-fib ([address]     Paroxysmal ventricular tachycardia (HCC)     Prolonged emergence from general anesthesia  Status post angioplasty     Sustained ventricular tachycardia (HCC)     TIA (transient ischemic attack)     Tobacco abuse     Type II or unspecified type diabetes mellitus without mention of complication, not stated as uncontrolled        PAST SURGICAL HISTORY:  Past Surgical History:   Procedure Laterality Date    CARDIAC CATHETERIZATION      COLONOSCOPY      CORONARY ANGIOPLASTY  11    Dr Reza Haq CORONARY ARTERY BYPASS GRAFT      ENDOSCOPY, COLON, DIAGNOSTIC      EYE SURGERY Bilateral     Cataracts     OTHER SURGICAL HISTORY      difibrillator     MO CIRCUMCISION,OTHER,28+ D/O N/A 2018    CIRCUMCISION performed by Luci Newell MD at [address] [facility] EGD TRANSORAL BIOPSY SINGLE/MULTIPLE N/A 2017    EGD BIOPSY performed by Edwin Rivera MD at [address]:    Family History   Problem Relation Age of Onset    Cancer Brother     Diabetes Mother     Heart Disease Father     Emphysema Father        SOCIAL HISTORY:    Social History     Socioeconomic History    Marital status:      Spouse name: Not on file    Number of children: 2    Years of education: 15    Highest education level: High school graduate   Occupational History    Not on file   Social Needs    Financial resource strain: Somewhat hard    Food insecurity     Worry: Never true     Inability: Never true    Transportation needs     Medical: No     Non-medical: No   Tobacco Use    Smoking status: Former Smoker     Packs/day: 1.50     Years: 25.00     Pack years: 37.50     Quit date: 2012     Years since quittin.1    Smokeless tobacco: Never Used   Substance and Sexual Activity    Alcohol use: Not Currently    Drug use: No    Sexual activity: Not on file   Lifestyle    Physical activity     Days per week: 0 days     Minutes per session: 0 min    Stress: Not at all   Relationships  Social connections     Talks on phone: More than three times a week     Gets together: More than three times a week     Attends [employer] service: More than 4 times per year     Active member of club or organization: Yes     Attends meetings of clubs or organizations: 1 to 4 times per year     Relationship status:     Intimate partner violence     Fear of current or ex partner: Not on file     Emotionally abused: Not on file     Physically abused: Not on file     Forced sexual activity: Not on file   Other Topics Concern    Not on file   Social History Narrative    Not on file       MEDICATIONS:  Current Facility-Administered Medications   Medication Dose Route Frequency Provider Last Rate Last Admin    sodium chloride flush 0.9 % injection 10 mL  10 mL Intravenous 2 times per day Javid Boss, APRN - CNP   10 mL at 02/12/21 0492    sodium chloride flush 0.9 % injection 10 mL  10 mL Intravenous PRN Javid Boss, APRN - CNP        promethazine (PHENERGAN) tablet 12.5 mg  12.5 mg Oral Q6H PRN Javid Boss, APRN - CNP        Or    ondansetron (ZOFRAN) injection 4 mg  4 mg Intravenous Q6H PRN Javid Boss, APRN - CNP        polyethylene glycol (GLYCOLAX) packet 17 g  17 g Oral Daily PRN Javid Boss, APRN - CNP        acetaminophen (TYLENOL) tablet 650 mg  650 mg Oral Q6H PRN Javid Boss, APRN - CNP        Or    acetaminophen (TYLENOL) suppository 650 mg  650 mg Rectal Q6H PRN Javid Boss, APRN - CNP        furosemide (LASIX) 100 mg in dextrose 5 % 100 mL infusion  10 mg/hr Intravenous Continuous Javid Boss APRN - CNP 10 mL/hr at 02/12/21 1154 10 mg/hr at 02/12/21 1154    glucose (GLUTOSE) 40 % oral gel 15 g  15 g Oral PRN Javid Boss, APRN - CNP        dextrose 50 % IV solution  12.5 g Intravenous PRN Javid Boss, APRN - CNP        glucagon (rDNA) injection 1 mg  1 mg Intramuscular PRN Javid Boss, APRN - CNP        dextrose 5 % solution  100 mL/hr Intravenous PRN Jaswinder Hoff ALE Toussaint CNP        insulin lispro (HUMALOG) injection vial 0-12 Units  0-12 Units Subcutaneous TID WC ALE Ryan CNP   6 Units at 02/12/21 1151    insulin lispro (HUMALOG) injection vial 0-6 Units  0-6 Units Subcutaneous Nightly ALE Ryan CNP        allopurinol (ZYLOPRIM) tablet 100 mg  100 mg Oral Daily Maria Del Carmen Edmondson RN, NP   100 mg at 02/12/21 3004    atorvastatin (LIPITOR) tablet 80 mg  80 mg Oral Daily Maria Del Carmen Edmondson RN, NP        budesonide-formoterol (SYMBICORT) 160-4.5 MCG/ACT inhaler 2 puff  2 puff Inhalation BID Maria Del Carmen Edmondson RN, NP   2 puff at 02/12/21 2020    [Held by provider] bumetanide (BUMEX) tablet 1 mg  1 mg Oral BID Maria Del Carmen Edmondson RN, NP        digoxin (LANOXIN) tablet 125 mcg  125 mcg Oral Daily Maria Del Carmen Edmondson RN, NP   125 mcg at 02/12/21 2614    donepezil (ARICEPT) tablet 10 mg  10 mg Oral Nightly Maria Del Carmen Edmondson RN, NP        insulin glargine (LANTUS) injection vial 10 Units  10 Units Subcutaneous Nightly Maria Del Carmen Edmondson RN, NP        magnesium oxide (MAG-OX) tablet 400 mg  400 mg Oral Daily Maria Del Carmen Edmondson RN, NP   400 mg at 02/12/21 5560    metoprolol succinate (TOPROL XL) extended release tablet 25 mg  25 mg Oral Daily Maria Del Carmen Edmondson RN, NP   25 mg at 02/12/21 1358    pantoprazole (PROTONIX) tablet 40 mg  40 mg Oral QAM AC Maria Del Carmen Edmondson RN, NP   40 mg at 02/12/21 4469    potassium chloride (KLOR-CON M) extended release tablet 20 mEq  20 mEq Oral Daily Maria Del Carmen Edmondson RN, NP   20 mEq at 02/12/21 3755    warfarin (COUMADIN) daily dosing (placeholder)   Other RX Placeholder Maria Del Carmen Edmondson RN, NP             ALLERGIES AND DRUG INTOLERANCES:   Allergies   Allergen Reactions    Dye [Iodides] Shortness Of Breath    Penicillins Anaphylaxis    Plavix [Clopidogrel]     Tapazole [Methimazole]      Itching        ROS:   Review of Systems Constitutional: Positive for activity change and fatigue. Respiratory: Positive for shortness of breath. Negative for chest tightness and wheezing. Cardiovascular: Positive for leg swelling. Negative for chest pain and palpitations. Gastrointestinal: Positive for abdominal distention. Endocrine: Negative for cold intolerance and heat intolerance. Neurological: Negative for dizziness and syncope. Psychiatric/Behavioral: Positive for decreased concentration. Negative for agitation. /69   Pulse 87   Temp 97.8 °F (36.6 °C) (Oral)   Resp 18   Ht 5' 9\" (1.753 m)   Wt 212 lb 4.8 oz (96.3 kg)   SpO2 96%   BMI 31.35 kg/m²     Physical Exam   Constitutional: He is oriented to person, place, and time. HENT:   Head: Normocephalic and atraumatic. Eyes: Scleral icterus is present. Neck: JVD present. No tracheal deviation present. Cardiovascular: Normal rate and regular rhythm. Exam reveals gallop. Exam reveals no friction rub. No murmur heard. Pulmonary/Chest: Effort normal. No stridor. No respiratory distress. He has no wheezes. He has no rales. Abdominal: Soft. He exhibits no distension. There is no abdominal tenderness. Musculoskeletal:         General: Edema present. Neurological: He is alert and oriented to person, place, and time. No cranial nerve deficit. Skin: Skin is warm. He is diaphoretic. Psychiatric: He has a normal mood and affect. EKG: sinus with ventricular pacing. .    Imaging results:    Xr Chest Portable    Result Date: 2/11/2021  EXAMINATION: XR CHEST PORTABLE CLINICAL HISTORY: SHORTNESS OF BREATH. CHF. COMPARISONS: JANUARY 9, 2021 FINDINGS: Median sternotomy. Pacemaker generator and wires unchanged. Cardiopericardial silhouette enlarged. Aorta calcified. Pulmonary vasculature indistinct. CARDIOMEGALY WITH PULMONARY VENOUS HYPERTENSION.        LABS:  Recent Labs     02/11/21  1600 02/12/21  0600 02/12/21  0714   WBC 5.1 4.0*  --    HCT 35.0* 32.7*  --     171  --    INR 2.5  --  2.3   * 135  --    K 3.7 3.8  --    CO2 24 26  --    BUN 28* 28*  --    MG  --  2.1  --      CBC:   Recent Labs     02/11/21  1600 02/12/21  0600   WBC 5.1 4.0*   HGB 11.2* 10.5*   HCT 35.0* 32.7*    171     BMP:  Recent Labs     02/11/21  1600 02/12/21  0600   * 135   K 3.7 3.8   CL 99 98   CO2 24 26   BUN 28* 28*   CREATININE 1.62* 1.39*   LABGLOM 42.5* 50.7*     ABGs: No results found for: PH, PO2, PCO2  INR:   Recent Labs     02/11/21  1600 02/12/21  0714   INR 2.5 2.3     PRO-BNP:   Lab Results   Component Value Date    PROBNP 9,396 02/11/2021    PROBNP 8,683 01/12/2021      TSH:   Lab Results   Component Value Date    TSH 2.710 07/27/2020      Cardiac Injury Profile:   Recent Labs     02/11/21  1600 02/12/21  0205 02/12/21  0600   CKTOTAL 134  --   --    TROPONINI 0.028* 0.025* 0.029*      Lipid Profile:   Lab Results   Component Value Date    TRIG 63 02/12/2021    HDL 44 02/12/2021    LDLCALC 23 02/12/2021    CHOL 80 02/12/2021      Hemoglobin A1C: No components found for: HGBA1C       Intake/Output Summary (Last 24 hours) at 2/12/2021 2055  Last data filed at 2/12/2021 1801  Gross per 24 hour   Intake 760 ml   Output 1700 ml   Net -940 ml        IMPRESSIONS:    1. Acute on chronic class IV biventricular heart failure, LVEF 5 to 10% 2021 with multi valvular regurgitation related to annular dilatation  2. Borderline blood pressure frequently prevents us from optimizing heart failure medications to guideline directed therapy  3. Patient is legally blind, and is somewhat slow to respond  4. Has history of coronary artery bypass grafting, remote, and history of mitral valve repair  5. Has chronic kidney disease stage III  6. Elevated troponin is not due to plaque rupture  7. Patient is at very high risk of decompensation  8.  He has history of atrial fibrillation, on high risk medications, status post AV sohan ablation for rate control with BiV ICD 9. He has high risk of readmission. 10.  IDDM  11. He was on amiodarone 200 mg daily per EP   RECOMMENDATIONS:    1. IV Lasix infusion rate can be decreased. 2. We will try to reintroduce Entresto half tablet of 24/26 once a day  3. His dry weight is around 196 pounds. 4. He has not felt well on dobutamine before  5. Follow-up closely once discharged. 6. Resume Amiodarone,EP wanted him on this medication.      ================================================================      Thank you for your consideration for this consultation.     SIGNATURE: Electronically signed by Alivia Saleem MD on 2/12/2021 at 8:55 PM

## 2021-02-13 NOTE — PROGRESS NOTES
Clinical Pharmacy Note    Warfarin consult follow-up    Recent Labs     02/13/21  0608   INR 2.2     Recent Labs     02/11/21  1600 02/12/21  0600   HGB 11.2* 10.5*   HCT 35.0* 32.7*    171     Current warfarin drug-drug interactions: allopurinol, acetaminophen     Current diet order/intake: low-sodium, % of most recent PO meal    Notes:  Date INR Warfarin Dose    02/12/21  2.3  10 mg    02/13/21  2.2  5 mg                                              INR is therapeutic at 2.2, goal 2-3 indicated for afib. Will order home dose tonight at warfarin 5 mg (Home regimen: 10 mg Mo/Fri, 5 mg on all other days). Daily PT/INR during pharmacy consult to monitor and dose warfarin therapy. Thank you,    Edita Cornelius, PharmD.   12:55 PM 2/13/2021

## 2021-02-14 LAB
ANION GAP SERPL CALCULATED.3IONS-SCNC: 12 MEQ/L (ref 9–15)
BUN BLDV-MCNC: 25 MG/DL (ref 8–23)
CALCIUM SERPL-MCNC: 9.1 MG/DL (ref 8.5–9.9)
CHLORIDE BLD-SCNC: 96 MEQ/L (ref 95–107)
CO2: 26 MEQ/L (ref 20–31)
CREAT SERPL-MCNC: 1.4 MG/DL (ref 0.7–1.2)
GFR AFRICAN AMERICAN: >60
GFR NON-AFRICAN AMERICAN: 50.3
GLUCOSE BLD-MCNC: 143 MG/DL (ref 60–115)
GLUCOSE BLD-MCNC: 151 MG/DL (ref 60–115)
GLUCOSE BLD-MCNC: 215 MG/DL (ref 60–115)
GLUCOSE BLD-MCNC: 252 MG/DL (ref 60–115)
GLUCOSE BLD-MCNC: 75 MG/DL (ref 70–99)
GLUCOSE BLD-MCNC: 98 MG/DL (ref 60–115)
INR BLD: 2.2
MAGNESIUM: 2.1 MG/DL (ref 1.7–2.4)
PERFORMED ON: ABNORMAL
PERFORMED ON: NORMAL
POTASSIUM SERPL-SCNC: 3.8 MEQ/L (ref 3.4–4.9)
PROTHROMBIN TIME: 24.5 SEC (ref 12.3–14.9)
SODIUM BLD-SCNC: 134 MEQ/L (ref 135–144)

## 2021-02-14 PROCEDURE — 36415 COLL VENOUS BLD VENIPUNCTURE: CPT

## 2021-02-14 PROCEDURE — 94760 N-INVAS EAR/PLS OXIMETRY 1: CPT

## 2021-02-14 PROCEDURE — 83735 ASSAY OF MAGNESIUM: CPT

## 2021-02-14 PROCEDURE — 80048 BASIC METABOLIC PNL TOTAL CA: CPT

## 2021-02-14 PROCEDURE — 94761 N-INVAS EAR/PLS OXIMETRY MLT: CPT

## 2021-02-14 PROCEDURE — 6370000000 HC RX 637 (ALT 250 FOR IP): Performed by: INTERNAL MEDICINE

## 2021-02-14 PROCEDURE — 2060000000 HC ICU INTERMEDIATE R&B

## 2021-02-14 PROCEDURE — 2700000000 HC OXYGEN THERAPY PER DAY

## 2021-02-14 PROCEDURE — 2580000003 HC RX 258: Performed by: NURSE PRACTITIONER

## 2021-02-14 PROCEDURE — 6370000000 HC RX 637 (ALT 250 FOR IP): Performed by: NURSE PRACTITIONER

## 2021-02-14 PROCEDURE — 94640 AIRWAY INHALATION TREATMENT: CPT

## 2021-02-14 PROCEDURE — 85610 PROTHROMBIN TIME: CPT

## 2021-02-14 RX ORDER — WARFARIN SODIUM 5 MG/1
5 TABLET ORAL
Status: COMPLETED | OUTPATIENT
Start: 2021-02-14 | End: 2021-02-14

## 2021-02-14 RX ADMIN — INSULIN GLARGINE 10 UNITS: 100 INJECTION, SOLUTION SUBCUTANEOUS at 21:34

## 2021-02-14 RX ADMIN — SACUBITRIL AND VALSARTAN 0.5 TABLET: 24; 26 TABLET, FILM COATED ORAL at 20:48

## 2021-02-14 RX ADMIN — DONEPEZIL HYDROCHLORIDE 10 MG: 10 TABLET, FILM COATED ORAL at 20:48

## 2021-02-14 RX ADMIN — DIGOXIN 125 MCG: 125 TABLET ORAL at 09:08

## 2021-02-14 RX ADMIN — TORSEMIDE 50 MG: 10 TABLET ORAL at 09:08

## 2021-02-14 RX ADMIN — PANTOPRAZOLE SODIUM 40 MG: 40 TABLET, DELAYED RELEASE ORAL at 06:11

## 2021-02-14 RX ADMIN — POTASSIUM CHLORIDE 20 MEQ: 20 TABLET, EXTENDED RELEASE ORAL at 09:08

## 2021-02-14 RX ADMIN — SPIRONOLACTONE 25 MG: 25 TABLET ORAL at 09:09

## 2021-02-14 RX ADMIN — Medication 10 ML: at 21:34

## 2021-02-14 RX ADMIN — BUDESONIDE AND FORMOTEROL FUMARATE DIHYDRATE 2 PUFF: 160; 4.5 AEROSOL RESPIRATORY (INHALATION) at 19:31

## 2021-02-14 RX ADMIN — BUDESONIDE AND FORMOTEROL FUMARATE DIHYDRATE 2 PUFF: 160; 4.5 AEROSOL RESPIRATORY (INHALATION) at 07:25

## 2021-02-14 RX ADMIN — Medication 400 MG: at 09:08

## 2021-02-14 RX ADMIN — METOPROLOL SUCCINATE 37.5 MG: 25 TABLET, EXTENDED RELEASE ORAL at 09:08

## 2021-02-14 RX ADMIN — Medication 10 ML: at 09:09

## 2021-02-14 RX ADMIN — WARFARIN SODIUM 5 MG: 5 TABLET ORAL at 17:44

## 2021-02-14 RX ADMIN — SACUBITRIL AND VALSARTAN 0.5 TABLET: 24; 26 TABLET, FILM COATED ORAL at 09:08

## 2021-02-14 RX ADMIN — ATORVASTATIN CALCIUM 80 MG: 80 TABLET, FILM COATED ORAL at 09:09

## 2021-02-14 RX ADMIN — ALLOPURINOL 100 MG: 100 TABLET ORAL at 09:09

## 2021-02-14 NOTE — PROGRESS NOTES
Oral Daily    pantoprazole  40 mg Oral QAM AC    potassium chloride  20 mEq Oral Daily    warfarin (COUMADIN) daily dosing (placeholder)   Other RX Placeholder     PRN Meds: sodium chloride flush, promethazine **OR** ondansetron, polyethylene glycol, acetaminophen **OR** acetaminophen, glucose, dextrose, glucagon (rDNA), dextrose    Labs:   Recent Labs     02/11/21  1600 02/12/21  0600   WBC 5.1 4.0*   HGB 11.2* 10.5*   HCT 35.0* 32.7*    171     Recent Labs     02/11/21  1600 02/12/21  0600 02/13/21  0608   * 135 135   K 3.7 3.8 3.4   CL 99 98 96   CO2 24 26 28   BUN 28* 28* 24*   CREATININE 1.62* 1.39* 1.36*   CALCIUM 8.9 9.0 9.1     Recent Labs     02/11/21  1600   AST 24   ALT 21   BILITOT 2.0*   ALKPHOS 177*     Recent Labs     02/11/21  1600 02/12/21  0714 02/13/21  0608   INR 2.5 2.3 2.2     Recent Labs     02/11/21  1600 02/12/21  0205 02/12/21  0600   CKTOTAL 134  --   --    TROPONINI 0.028* 0.025* 0.029*       Urinalysis:   Lab Results   Component Value Date    NITRU Negative 12/13/2020    WBCUA 3-5 12/13/2020    BACTERIA Negative 12/13/2020    RBCUA 6-10 12/13/2020    BLOODU TRACE 12/13/2020    SPECGRAV 1.022 12/13/2020    GLUCOSEU Negative 12/13/2020       Radiology:   Most recent    Chest CT      WITH CONTRAST:No results found for this or any previous visit. WITHOUT CONTRAST: No results found for this or any previous visit. CXR      2-view:   Results for orders placed during the hospital encounter of 09/01/19   XR CHEST STANDARD (2 VW)    Narrative EXAMINATION: XR CHEST (2 VW)    CLINICAL HISTORY: SHORTNESS OF BREATH    COMPARISONS: MAY 25, 2019    FINDINGS: Median sternotomy. Pacemaker generator and wires unchanged. Cardiopericardial silhouette enlarged and unchanged. Mild blunting left costophrenic angle again identified. Scarring lung bases. Aorta calcified and tortuous. Impression STABLE CARDIOMEGALY WITH POSTSURGICAL CHANGE DISCUSSED.         Portable:   Results for 1: 17 AM

## 2021-02-14 NOTE — PROGRESS NOTES
Clinical Pharmacy Note    Warfarin consult follow-up    Recent Labs     02/14/21  0605   INR 2.2     Recent Labs     02/11/21  1600 02/12/21  0600   HGB 11.2* 10.5*   HCT 35.0* 32.7*    171     Current warfarin drug-drug interactions: allopurinol, acetaminophen     Current diet order/intake: low-sodium, % of most recent PO meal    Notes:  Date INR Warfarin Dose    02/12/21  2.3  10 mg    02/13/21  2.2  5 mg    02/14/21  2.2                                        INR is therapeutic again today at 2.2, goal 2-3 indicated for afib. Will continue home dose tonight at warfarin 5 mg (Home regimen: 10 mg Mo/Fri, 5 mg on all other days). Daily PT/INR during pharmacy consult to monitor and dose warfarin therapy.

## 2021-02-14 NOTE — PROGRESS NOTES
[facility] OF THE [facility] Heart Progress Note      Patient:  Kemal Peralta. YOB: 1950  MRN: [id number]   Acct: [de-identified]  Admit Date:  2/11/2021  Primary Cardiologist:Dr. Alivia Figueroa Cardiologist: Jeramie Regan      Impression/Plan:     1. Acute on chronic systolic CHF class IV: Tolerating bid low dose entresto and torsemide/aldac well thus far. Increase in beta blocker well tolerated and he has no more sinus tachy as was noted yesterday. Continue same  2. No bleeding on anticoagulation  3. Has follow-up next month for advanced heart failure evaluation at [facility]  4. Potential home tomorrow. 5. Increase activity    Chief Complaint/Active Symptoms: No angina/dyspnea/palpitations. He notes less edema. Getting close to baseline. Lying supine    Review of Systems:   Fatigue otherwise negative    Admitted again with decompensated biventricular failure. Begun on Lasix drip and low-dose Entresto again trialed.     CARDIAC HISTORY:  Coronary artery disease with remote CABG and mitral valve repair  Ischemic cardiomyopathy with RV dysfunction as well LVEF 5-10% with MR/TR secondary to severe biventricular dilatation  CRTD  Atrial fibrillation status post AV node ablation  To be evaluated by advanced heart failure team [facility] next month    Objective:   Vitals:    02/13/21 2257 02/14/21 0515 02/14/21 0726 02/14/21 0910   BP: 104/75   (!) 103/59   Pulse: 87      Resp:   16    Temp: 97.6 °F (36.4 °C)      TempSrc: Oral      SpO2: 100%  96%    Weight:  203 lb (92.1 kg)     Height:          Wt Readings from Last 3 Encounters:   02/14/21 203 lb (92.1 kg)   01/22/21 199 lb (90.3 kg)   01/14/21 198 lb 3.1 oz (89.9 kg)       TELEMETRY: normal sinus  and paced                            Rhythm Strip: ave rate 84   No more sinus tachy    Physical Exam:  General Appearance: alert and oriented to person, place and time, in no acute distress  Cardiovascular: normal rate, regular rhythm, normal S1 and S2, 1/6 MR murmurs, rubs, clicks, or gallops, mild JVD  Pulmonary/Chest: clear to auscultation bilaterally- no wheezes, rales or rhonchi, normal air movement, no respiratory distress distant breath sounds however  Abdomen: soft, non-tender, non-distended, normal bowel sounds, no masses   Extremities: no cyanosis, clubbing. Mild edema  Skin: warm and dry, no rash or erythema  Eyes: EOMI  Neck: supple and non-tender without mass, no thyromegaly   Neurological: alert, oriented, normal speech, no focal findings or movement disorder noted    Allergies: Allergies   Allergen Reactions    Dye [Iodides] Shortness Of Breath    Penicillins Anaphylaxis    Plavix [Clopidogrel]     Tapazole [Methimazole]      Itching         Medications:    metoprolol succinate  37.5 mg Oral Daily    torsemide  50 mg Oral Daily    spironolactone  25 mg Oral Daily    sacubitril-valsartan  0.5 tablet Oral BID    sodium chloride flush  10 mL Intravenous 2 times per day    insulin lispro  0-12 Units Subcutaneous TID WC    insulin lispro  0-6 Units Subcutaneous Nightly    allopurinol  100 mg Oral Daily    atorvastatin  80 mg Oral Daily    budesonide-formoterol  2 puff Inhalation BID    digoxin  125 mcg Oral Daily    donepezil  10 mg Oral Nightly    insulin glargine  10 Units Subcutaneous Nightly    magnesium oxide  400 mg Oral Daily    pantoprazole  40 mg Oral QAM AC    potassium chloride  20 mEq Oral Daily    warfarin (COUMADIN) daily dosing (placeholder)   Other RX Placeholder      dextrose           Diagnostics:  EKG:  nsr v paced        CXR:           Portable:   Results for orders placed during the hospital encounter of 02/11/21   XR CHEST PORTABLE    Narrative EXAMINATION: XR CHEST PORTABLE    CLINICAL HISTORY: SHORTNESS OF BREATH. CHF. COMPARISONS: JANUARY 9, 2021    FINDINGS: Median sternotomy. Pacemaker generator and wires unchanged. Cardiopericardial silhouette enlarged. Aorta calcified.  Pulmonary vasculature indistinct. Impression CARDIOMEGALY WITH PULMONARY VENOUS HYPERTENSION. Lab Data:    Cardiac Enzymes:  Recent Labs     02/11/21  1600 02/12/21  0205 02/12/21  0600   CKTOTAL 134  --   --    TROPONINI 0.028* 0.025* 0.029*     ProBNP:   Lab Results   Component Value Date    PROBNP 9,396 02/11/2021       CBC:   Recent Labs     02/11/21  1600 02/12/21  0600   WBC 5.1 4.0*   RBC 4.04* 3.79*   HGB 11.2* 10.5*   HCT 35.0* 32.7*    171       CMP:    Recent Labs     02/12/21  0600 02/13/21  0608 02/14/21  0605    135 134*   K 3.8 3.4 3.8   CL 98 96 96   CO2 26 28 26   BUN 28* 24* 25*   CREATININE 1.39* 1.36* 1.40*   GFRAA >60.0 >60.0 >60.0   LABGLOM 50.7* 52.0* 50.3*   GLUCOSE 129* 115* 75   CALCIUM 9.0 9.1 9.1       Hepatic Function Panel:    Recent Labs     02/11/21  1600   ALKPHOS 177*   ALT 21   AST 24   PROT 6.8   BILITOT 2.0*   LABALBU 3.7       Magnesium:    Recent Labs     02/12/21  0600 02/13/21  0608 02/14/21  0605   MG 2.1 2.0 2.1       PT/INR:    Recent Labs     02/12/21  0714 02/13/21  0608 02/14/21  0605   PROTIME 25.6* 24.1* 24.5*   INR 2.3 2.2 2.2       Lipids:    Recent Labs     02/12/21  0600   CHOL 80   TRIG 63   HDL 44   LDLCALC 23       Active Problems:    Acute on chronic systolic CHF (congestive heart failure) (HCC)  Resolved Problems:    * No resolved hospital problems.  *           Electronically signed by Anita Matos MD on 2/14/2021 at 11:45 AM

## 2021-02-14 NOTE — FLOWSHEET NOTE
9597     Am nursing  assessment completed. Pt : awake and resting at bedside. Breakfast: completed. RESP:        Even and non labored       Oxygen: 2l NC  Complaints of: none  Pain: denies  IV: SL  TELE: paced on tele  Dressings: none   Precautions:              Falls:    30   Mikel: 19  Chart and meds reviewed. Noted Labs: bun 25 cr 1.40 inr 2.2  Plan for today: awake and resting. No complaints. Iv SL. Call light in reach. Electronically signed by Femi Lo RN on 2/14/2021 at 10:50 AM    1230 set up chair. Transfer to chair independently.  Electronically signed by Femi Lo RN on 2/14/2021 at 12:50 PM

## 2021-02-15 VITALS
SYSTOLIC BLOOD PRESSURE: 111 MMHG | HEART RATE: 84 BPM | WEIGHT: 206.2 LBS | TEMPERATURE: 97.3 F | OXYGEN SATURATION: 96 % | DIASTOLIC BLOOD PRESSURE: 65 MMHG | BODY MASS INDEX: 30.54 KG/M2 | RESPIRATION RATE: 18 BRPM | HEIGHT: 69 IN

## 2021-02-15 LAB
ANION GAP SERPL CALCULATED.3IONS-SCNC: 11 MEQ/L (ref 9–15)
BUN BLDV-MCNC: 29 MG/DL (ref 8–23)
CALCIUM SERPL-MCNC: 9 MG/DL (ref 8.5–9.9)
CHLORIDE BLD-SCNC: 98 MEQ/L (ref 95–107)
CO2: 26 MEQ/L (ref 20–31)
CREAT SERPL-MCNC: 1.55 MG/DL (ref 0.7–1.2)
GFR AFRICAN AMERICAN: 54.1
GFR NON-AFRICAN AMERICAN: 44.7
GLUCOSE BLD-MCNC: 102 MG/DL (ref 60–115)
GLUCOSE BLD-MCNC: 261 MG/DL (ref 60–115)
GLUCOSE BLD-MCNC: 99 MG/DL (ref 70–99)
INR BLD: 2.6
MAGNESIUM: 2.2 MG/DL (ref 1.7–2.4)
PERFORMED ON: ABNORMAL
PERFORMED ON: NORMAL
POTASSIUM SERPL-SCNC: 4.4 MEQ/L (ref 3.4–4.9)
PROTHROMBIN TIME: 27.9 SEC (ref 12.3–14.9)
SODIUM BLD-SCNC: 135 MEQ/L (ref 135–144)

## 2021-02-15 PROCEDURE — 36415 COLL VENOUS BLD VENIPUNCTURE: CPT

## 2021-02-15 PROCEDURE — 83735 ASSAY OF MAGNESIUM: CPT

## 2021-02-15 PROCEDURE — 85610 PROTHROMBIN TIME: CPT

## 2021-02-15 PROCEDURE — 6370000000 HC RX 637 (ALT 250 FOR IP): Performed by: INTERNAL MEDICINE

## 2021-02-15 PROCEDURE — 94761 N-INVAS EAR/PLS OXIMETRY MLT: CPT

## 2021-02-15 PROCEDURE — 2580000003 HC RX 258: Performed by: NURSE PRACTITIONER

## 2021-02-15 PROCEDURE — 80048 BASIC METABOLIC PNL TOTAL CA: CPT

## 2021-02-15 PROCEDURE — 6370000000 HC RX 637 (ALT 250 FOR IP): Performed by: NURSE PRACTITIONER

## 2021-02-15 PROCEDURE — 94640 AIRWAY INHALATION TREATMENT: CPT

## 2021-02-15 PROCEDURE — 2700000000 HC OXYGEN THERAPY PER DAY

## 2021-02-15 RX ORDER — AMIODARONE HYDROCHLORIDE 200 MG/1
200 TABLET ORAL DAILY
COMMUNITY
Start: 2021-02-15

## 2021-02-15 RX ORDER — METOPROLOL SUCCINATE 25 MG/1
37.5 TABLET, EXTENDED RELEASE ORAL DAILY
Qty: 30 TABLET | Refills: 3 | Status: SHIPPED | OUTPATIENT
Start: 2021-02-16 | End: 2021-02-15

## 2021-02-15 RX ORDER — METOPROLOL SUCCINATE 25 MG/1
25 TABLET, EXTENDED RELEASE ORAL DAILY
Qty: 30 TABLET | Refills: 3 | Status: SHIPPED | OUTPATIENT
Start: 2021-02-16

## 2021-02-15 RX ORDER — WARFARIN SODIUM 5 MG/1
5 TABLET ORAL
Status: DISCONTINUED | OUTPATIENT
Start: 2021-02-15 | End: 2021-02-15 | Stop reason: HOSPADM

## 2021-02-15 RX ADMIN — PANTOPRAZOLE SODIUM 40 MG: 40 TABLET, DELAYED RELEASE ORAL at 05:22

## 2021-02-15 RX ADMIN — POTASSIUM CHLORIDE 20 MEQ: 20 TABLET, EXTENDED RELEASE ORAL at 09:21

## 2021-02-15 RX ADMIN — Medication 400 MG: at 09:22

## 2021-02-15 RX ADMIN — SACUBITRIL AND VALSARTAN 0.5 TABLET: 24; 26 TABLET, FILM COATED ORAL at 09:21

## 2021-02-15 RX ADMIN — ALLOPURINOL 100 MG: 100 TABLET ORAL at 09:21

## 2021-02-15 RX ADMIN — Medication 10 ML: at 09:22

## 2021-02-15 RX ADMIN — BUDESONIDE AND FORMOTEROL FUMARATE DIHYDRATE 2 PUFF: 160; 4.5 AEROSOL RESPIRATORY (INHALATION) at 07:25

## 2021-02-15 RX ADMIN — DIGOXIN 125 MCG: 125 TABLET ORAL at 09:21

## 2021-02-15 RX ADMIN — TORSEMIDE 50 MG: 10 TABLET ORAL at 09:21

## 2021-02-15 RX ADMIN — METOPROLOL SUCCINATE 37.5 MG: 25 TABLET, EXTENDED RELEASE ORAL at 09:22

## 2021-02-15 RX ADMIN — SPIRONOLACTONE 25 MG: 25 TABLET ORAL at 09:21

## 2021-02-15 RX ADMIN — ATORVASTATIN CALCIUM 80 MG: 80 TABLET, FILM COATED ORAL at 09:22

## 2021-02-15 NOTE — PROGRESS NOTES
[facility] OF THE [facility] Heart Progress Note      Patient: Mnajeet Holcomb. Unit/Bed: X957/E309-18  YOB: 1950  MRN: [id number]  Admit Date:  2/11/2021  HOSPITAL day #    Rounding Cardiologist : Gaudencio Deal     Subjective Complaint:   Patient feels ready to go home. He denies any shortness of breath belly distention, lower extremity edema has subsided. Kerry Grumbles Physical Examination:     /65   Pulse 84   Temp 97.3 °F (36.3 °C) (Oral)   Resp 18   Ht 5' 9\" (1.753 m)   Wt 206 lb 3.2 oz (93.5 kg)   SpO2 96%   BMI 30.45 kg/m²         Intake/Output Summary (Last 24 hours) at 2/15/2021 1238  Last data filed at 2/15/2021 1231  Gross per 24 hour   Intake 1300 ml   Output 950 ml   Net 350 ml     Weights  Wt Readings from Last 3 Encounters:   02/15/21 206 lb 3.2 oz (93.5 kg)   01/22/21 199 lb (90.3 kg)   01/14/21 198 lb 3.1 oz (89.9 kg)         He is alert oriented x3. Reiterated to him that he should bring bottles at every office visit. Lungs are clear with distant breath sounds heart sounds are regular with S3 gallop possible S4 gallop extremities show no edema. Blood pressures okay so far. IMPRESSIONS:     1. Acute on chronic class IV biventricular heart failure, LVEF 5 to 10% 2021 with multi valvular regurgitation related to annular dilatation  2. Borderline blood pressure frequently prevents us from optimizing heart failure medications to guideline directed therapy  3. Patient is legally blind, and is somewhat slow to respond  4. Has history of coronary artery bypass grafting, remote, and history of mitral valve repair  5. Has chronic kidney disease stage III  6. Elevated troponin is not due to plaque rupture  7. Patient is at very high risk of decompensation  8. He has history of atrial fibrillation, on high risk medications, status post AV sohan ablation for rate control with BiV ICD  9. He has high risk of readmission. 10.  IDDM  11.   He was on amiodarone 200 mg daily per EP       Recommendations:  Patient is ready for discharge from cardiac perspective  Very high risk of decompensation based on prior history  Close outpatient follow-up and laboratory data have been arranged  Discussed with Dr. Rip Chávez. LABS:   CBC: No results for input(s): WBC, HGB, PLT in the last 72 hours. BMP:    Recent Labs     02/13/21  0608 02/14/21  0605 02/15/21  0608    134* 135   K 3.4 3.8 4.4   CL 96 96 98   CO2 28 26 26   BUN 24* 25* 29*   CREATININE 1.36* 1.40* 1.55*   GLUCOSE 115* 75 99              Troponin: No results for input(s): TROPONINT in the last 72 hours. BNP: No results for input(s): PROBNP in the last 72 hours.    INR:   Recent Labs     02/13/21  0608 02/14/21  0605 02/15/21  0609   INR 2.2 2.2 2.6      Mg:   Recent Labs     02/15/21  0608   MG 2.2       Electronically signed by Aislinn Bowden MD on 2/15/2021 at 12:38 PM

## 2021-02-15 NOTE — CARE COORDINATION
MET WITH PATIENT, POSSIBLE DC HOME TODAY. PT DENIES HOME NEEDS, DECLINES HHC. SECOND IMM REVIEWED, AND VERBAL CONSENT GIVEN.

## 2021-02-15 NOTE — DISCHARGE INSTR - DIET
? Good nutrition is important when healing from an illness, injury, or surgery. Follow any nutrition recommendations given to you during your hospital stay. ? If you were given an oral nutrition supplement while in the hospital, continue to take this supplement at home. You can take it with meals, in-between meals, and/or before bedtime. These supplements can be purchased at most local grocery stores, pharmacies, and chain [employer]-stores. ? If you have any questions about your diet or nutrition, call the hospital and ask for the dietitian  ?  Please follow a low sodium diet

## 2021-02-15 NOTE — DISCHARGE INSTR - COC
 Influenza Vaccine, unspecified formulation 10/01/2011, 10/01/2012, 03/28/2013, 10/01/2013, 10/01/2014, 12/05/2016, 08/01/2017    Influenza, High Dose (Fluzone 65 yrs and older) 09/28/2017, 10/15/2018    Pneumococcal Conjugate 13-valent (Iqljleo22) 01/25/2017, 11/12/2018    Pneumococcal Polysaccharide (Bthohrwhe96) 10/01/2012, 01/04/2017    Tdap (Boostrix, Adacel) 12/10/2018       Active Problems:  Patient Active Problem List   Diagnosis Code    Uncontrolled type 2 diabetes mellitus with complication, with long-term current use of insulin ([facility]) E11.8, E11.65, Z79.4    Noncompliance Z91.19    Pain in joint, multiple sites M25.50    COPD (chronic obstructive pulmonary disease) [facility] J44.9    Diabetes mellitus with insulin therapy ([facility] E11.9, Z79.4    Hyperlipidemia E78.5    Hypertension I10    Generalized atherosclerosis I70.91    TIA (transient ischemic attack) G45.9    Elevation of level of transaminase or lactic acid dehydrogenase (LDH) [id number]    Tobacco dependence syndrome F17.200    Disorder of muscle M62.9    Mitral valve insufficiency I34.0    Acute lymphadenitis L04.9    Disorder of pancreas K86.9    Left heart failure ([facility]) I50.1    Irritable bowel syndrome K58.9    Hyponatremia E87.1    Atherosclerosis of coronary artery I25.10    Generalized ischemic myocardial dysfunction I25.5    Coronary arteriosclerosis in native artery I25.10    Atrial flutter (HCC) I48.92    Acute on chronic systolic CHF (congestive heart failure), NYHA class 4 ([facility]) I50.23    Paroxysmal A-fib ([facility]) I48.0    Chronic combined systolic and diastolic heart failure ([facility]) I50.42    Hemoptysis R04.2    SOB (shortness of breath) R06.02    CHF (congestive heart failure), NYHA class III, chronic, combined ([facility]) I50.42    CHF (congestive heart failure), NYHA class I, acute on chronic, combined ([facility]) H96.28    Acute systolic CHF (congestive heart failure) ([facility]) I50.21 Yes  CHF (congestive heart failure), NYHA class IV, acute on chronic, combined (HCC) I50.43    Phimosis/redundant prepuce [id number]    Moderate malnutrition (HCC) E44.0    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    Hyperthyroidism E05.90    Uncontrolled type 2 diabetes mellitus with hyperglycemia (HCC) E11.65    BERTRAM (acute kidney injury) ([facility] N17.9    Anticoagulant long-term use Z79.01    Blurred vision, bilateral H53.8    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    Chest pain R07.9    Cough in adult R05    Dizziness R42    Fatigue R53.83    Fever R50.9    Former smoker Z87.891    Gout, arthritis M10.9    High risk medication use Z79.899    Hyperkalemia E87.5    Hypokalemia E87.6    Hypotension (arterial) I95.9    ICD (implantable cardioverter-defibrillator) discharge Z45.02    Obese E66.9    Overweight E66.3    Paroxysmal ventricular tachycardia (HCC) I47.2    Presence of automatic implantable cardioverter-defibrillator Z95.810    Status post angioplasty Z98.62    Swelling of multiple joints M25.40    Hypoxia R09.02    Ventricular tachycardia (HCC) I47.2    Sustained VT (ventricular tachycardia) (HCC) I47.2    Acute decompensated heart failure (HCC) I50.9    CKD (chronic kidney disease) stage 3, GFR 30-59 ml/min N18.30    Elevated bilirubin R17    Atrial fibrillation (HCC) I48.91    Acute on chronic combined systolic (congestive) and diastolic (congestive) heart failure (HCC) I50.43    Frontal gait R26.89    Mild cognitive disorder F09    Acute on chronic systolic CHF (congestive heart failure) (HCC) I50.23       Isolation/Infection:   Isolation            No Isolation          Patient Infection Status       Infection Onset Added Last Indicated Last Indicated By Review Planned Expiration Resolved Resolved By    None active    Resolved    COVID-19 Rule Out 02/11/21 02/11/21 02/11/21 COVID-19 (Ordered)   02/11/21 Rule-Out Test Resulted COVID-19 Rule Out 01/10/21 01/10/21 01/10/21 COVID-19 (Ordered)   01/10/21 Rule-Out Test Resulted    COVID-19 Rule Out 01/09/21 01/09/21 01/09/21 COVID-19 (Ordered)   01/09/21 Rule-Out Test Resulted    COVID-19 Rule Out 12/13/20 12/13/20 12/13/20 COVID-19 (Ordered)   12/13/20 Rule-Out Test Resulted            Nurse Assessment:  Last Vital Signs: /65   Pulse 84   Temp 97.3 °F (36.3 °C) (Oral)   Resp 18   Ht 5' 9\" (1.753 m)   Wt 206 lb 3.2 oz (93.5 kg)   SpO2 96%   BMI 30.45 kg/m²     Last documented pain score (0-10 scale): Pain Level: 0  Last Weight:   Wt Readings from Last 1 Encounters:   02/15/21 206 lb 3.2 oz (93.5 kg)     Mental Status:  oriented    IV Access:  - None    Nursing Mobility/ADLs:  Walking   Independent  Transfer  Independent  Bathing  Independent  Dressing  Independent  Toileting  Independent  Feeding  [address]  Independent  Med Delivery   whole    Wound Care Documentation and Therapy:        Elimination:  Continence:   · Bowel: Yes  · Bladder: Yes  Urinary Catheter: None   Colostomy/Ileostomy/Ileal Conduit: No       Date of Last BM: 2/15/21      Intake/Output Summary (Last 24 hours) at 2/15/2021 1103  Last data filed at 2/15/2021 1026  Gross per 24 hour   Intake 1160 ml   Output 1125 ml   Net 35 ml     I/O last 3 completed shifts: In: 1080 [P.O.:1080]  Out: 900 [Urine:900]    Safety Concerns:     None     Impairments/Disabilities:      None    Nutrition Therapy:  Current Nutrition Therapy:   - Oral Diet:  Low Sodium (2gm)    Routes of Feeding: Oral  Liquids: No restrictions   Daily Fluid Restriction: no  Last Modified Barium Swallow with Video (Video Swallowing Test): not done    Treatments at the Time of Hospital Discharge:   Respiratory Treatments: ***  Oxygen Therapy:  is not on home oxygen therapy.   Ventilator:    - No ventilator support    Rehab Therapies: {THERAPEUTIC INTERVENTION:[id number]}  Weight Bearing Status/Restrictions: No weight bearing restirctions

## 2021-02-15 NOTE — DISCHARGE SUMMARY
Hospital Medicine Discharge Summary    Lazara Slater. :  1950  MRN:  [id number]    Admit date:  2021  Discharge date:  2/15/2021    Admitting Physician:  Darrell Amato MD  Primary Care Physician:  Nadeem Leonard MD      Discharge Diagnoses:      Acute on chronic decompensated systolic heart failure  NSTEMI type II-due to demand ischemia in CKD  Elevated troponin  BERTRAM on CKD 3  Diabetes type 2 with hyperglycemia  COPD  A. fib    Chief Complaint   Patient presents with    Other     sent by Dr. Vern Du to be admitted for CHF     Hospital Course:     Patient is a 80-year-old -American male with a past medical history of systolic heart failure, CKD 3, COPD, A. fib on anticoagulation who was sent to the hospital by his cardiologist, Dr. Vern Du for heart failure exacerbation. He was treated with IV diuretics for acute on chronic systolic heart failure. He was noted to have elevated troponin but this was likely related to CKD and demand ischemia rather than NSTEMI. Patient felt well prior to discharge. Did not require any oxygen. He was followed by cardiology throughout the stay. His medication were adjusted by cardiologist.  Prior to discharge, I discussed the cardiac medications with Dr. Vern Du and medication were adjusted based on the discussion. Patient will follow up with Dr. Vern Du as outpatient. Exam on discharge:   /65   Pulse 84   Temp 97.3 °F (36.3 °C) (Oral)   Resp 18   Ht 5' 9\" (1.753 m)   Wt 206 lb 3.2 oz (93.5 kg)   SpO2 96%   BMI 30.45 kg/m²   General appearance: No apparent distress, appears stated age and cooperative. HEENT: Pupils equal, round, and reactive to light. Conjunctivae/corneas clear. Neck: Supple, with full range of motion. No jugular venous distention. Trachea midline. Respiratory:  Normal respiratory effort. Clear to auscultation, bilaterally without Rales/Wheezes/Rhonchi.   Cardiovascular: Regular rate   Abdomen: Soft, non-tender, non-distended with normal bowel sounds. Musculoskeletal: No clubbing, cyanosis or edema bilaterally. Full range of motion without deformity. Skin: Skin color, texture, turgor normal.  No rashes or lesions. Neuro: Non Focal. Symetrical motor and tone. Nl Comprehension, Alert,awake and oriented. NL CN. Symetrical tone and reflexes. Psychiatric: Alert and oriented, thought content appropriate, normal insight  Capillary Refill: Brisk,< 3 seconds   Peripheral Pulses: +2 palpable, equal bilaterally     Patient was seen by the following consultants while admitted to [facility]:   Consults:  [address] Joanna Gross NURSE/COORDINATOR  IP CONSULT TO DIETITIAN  IP CONSULT TO PHARMACY    Significant Diagnostic Studies:    Refer to chart     Please refer to chart if no studies are shown here    Xr Chest Portable    Result Date: 2/11/2021  EXAMINATION: XR CHEST PORTABLE CLINICAL HISTORY: SHORTNESS OF BREATH. CHF. COMPARISONS: JANUARY 9, 2021 FINDINGS: Median sternotomy. Pacemaker generator and wires unchanged. Cardiopericardial silhouette enlarged. Aorta calcified. Pulmonary vasculature indistinct. CARDIOMEGALY WITH PULMONARY VENOUS HYPERTENSION. Discharge Medications:       [facility]    Home Medication Instructions FEJ:[id number]    Printed on:02/15/21 1108   Medication Information                      albuterol (PROAIR HFA) 108 (90 BASE) MCG/ACT inhaler  Inhale 2 puffs into the lungs every 4 hours as needed for Wheezing             albuterol (PROVENTIL) (2.5 MG/3ML) 0.083% nebulizer solution  Take 3 mLs by nebulization every 6 hours as needed for Wheezing             allopurinol (ZYLOPRIM) 100 MG tablet  TAKE 1 TABLET DAILY             atorvastatin (LIPITOR) 80 MG tablet  Take 80 mg by mouth daily              budesonide-formoterol (SYMBICORT) 160-4.5 MCG/ACT AERO  Inhale 2 puffs into the lungs 2 times daily             bumetanide (BUMEX) 1 MG tablet Take 1 mg by mouth 2 times daily              DIGITEK 125 MCG tablet  TAKE 1 TABLET DAILY             donepezil (ARICEPT) 10 MG tablet  TAKE 1 TABLET BY MOUTH EVERY DAY AT NIGHT             Dulaglutide 0.75 MG/0.5ML SOPN  Inject 0.75 mg into the skin once a week             LANTUS SOLOSTAR 100 UNIT/ML injection pen  INJECT 10 UNITS INTO THE SKIN NIGHTLY             magnesium oxide (MAG-OX) 400 (240 Mg) MG tablet  Take 1 tablet by mouth daily             metoprolol succinate (TOPROL XL) 25 MG extended release tablet  Take 1.5 tablets by mouth daily             nitroGLYCERIN (NITROSTAT) 0.4 MG SL tablet  Place 1 tablet under the tongue every 5 minutes as needed for Chest pain             pantoprazole (PROTONIX) 40 MG tablet  Take 1 tablet by mouth every morning (before breakfast)             potassium chloride (KLOR-CON M) 20 MEQ extended release tablet  Take 1 tablet by mouth daily             sacubitril-valsartan (ENTRESTO) 24-26 MG per tablet  Take 0.5 tablets by mouth 2 times daily             spironolactone (ALDACTONE) 25 MG tablet  Take 1 tablet by mouth daily             warfarin (COUMADIN) 5 MG tablet  Take as directed by [facility] AT [facility]. Quantity for 90 day supply. Disposition:   If discharged to Home, Any [place] [id number] needs that were indicated and/or required as been addressed and set up by Social Work. Condition at discharge: good     Activity: activity as tolerated    Total time taken for discharging this patient: 40 minutes. Greater than 70% of time was spent focused exclusively on this patient. Time was taken to review chart, discuss plans with consultants, reconciling medications, discussing plan answering questions with patient.      SignedNora Fill  2/15/2021, 11:08 AM  ----------------------------------------------------------------------------------------------------------------------    Steve Cano.,
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
## 2021-02-15 NOTE — PROGRESS NOTES
Oral QAM AC    potassium chloride  20 mEq Oral Daily    warfarin (COUMADIN) daily dosing (placeholder)   Other RX Placeholder     PRN Meds: sodium chloride flush, promethazine **OR** ondansetron, polyethylene glycol, acetaminophen **OR** acetaminophen, glucose, dextrose, glucagon (rDNA), dextrose    Labs:   Recent Labs     02/12/21  0600   WBC 4.0*   HGB 10.5*   HCT 32.7*        Recent Labs     02/12/21  0600 02/13/21  0608 02/14/21  0605    135 134*   K 3.8 3.4 3.8   CL 98 96 96   CO2 26 28 26   BUN 28* 24* 25*   CREATININE 1.39* 1.36* 1.40*   CALCIUM 9.0 9.1 9.1     No results for input(s): AST, ALT, BILIDIR, BILITOT, ALKPHOS in the last 72 hours. Recent Labs     02/12/21  0714 02/13/21  0608 02/14/21  0605   INR 2.3 2.2 2.2     Recent Labs     02/12/21  0205 02/12/21  0600   TROPONINI 0.025* 0.029*       Urinalysis:   Lab Results   Component Value Date    NITRU Negative 12/13/2020    WBCUA 3-5 12/13/2020    BACTERIA Negative 12/13/2020    RBCUA 6-10 12/13/2020    BLOODU TRACE 12/13/2020    SPECGRAV 1.022 12/13/2020    GLUCOSEU Negative 12/13/2020       Radiology:   Most recent    Chest CT      WITH CONTRAST:No results found for this or any previous visit. WITHOUT CONTRAST: No results found for this or any previous visit. CXR      2-view:   Results for orders placed during the hospital encounter of 09/01/19   XR CHEST STANDARD (2 VW)    Narrative EXAMINATION: XR CHEST (2 VW)    CLINICAL HISTORY: SHORTNESS OF BREATH    COMPARISONS: MAY 25, 2019    FINDINGS: Median sternotomy. Pacemaker generator and wires unchanged. Cardiopericardial silhouette enlarged and unchanged. Mild blunting left costophrenic angle again identified. Scarring lung bases. Aorta calcified and tortuous. Impression STABLE CARDIOMEGALY WITH POSTSURGICAL CHANGE DISCUSSED.         Portable:   Results for orders placed during the hospital encounter of 02/11/21   XR CHEST PORTABLE    Narrative EXAMINATION: XR CHEST PORTABLE CLINICAL HISTORY: SHORTNESS OF BREATH. CHF. COMPARISONS: JANUARY 9, 2021    FINDINGS: Median sternotomy. Pacemaker generator and wires unchanged. Cardiopericardial silhouette enlarged. Aorta calcified. Pulmonary vasculature indistinct. Impression CARDIOMEGALY WITH PULMONARY VENOUS HYPERTENSION. Echo No results found for this or any previous visit. Assessment/Plan:    Active Hospital Problems    Diagnosis Date Noted    Acute on chronic systolic CHF (congestive heart failure) ([facility]) [I50.23] 02/11/2021     Acute on chronic decompensated systolic / diastolic heart failure: Lasix gtt, daily weights, strict intake and output. Cardio consulted. ECHO. Pro BNP ordered. Goal K and Mg 4.0 and 2.0, respectively. Telemetry. Ordered ASA and statin. Prior to discharge, will start ACEI/ARB and coreg if BP and renal function tolerates.     2/12 - await cardiac recs, probable decrease iv lasix today, diuresing well. 2/13 - to po diuresis, poss dc 2/14 2/14 - d/w cardiology, recommending monitoring overnight, probable dc 2/15    NSTEMI II most likely; Elevated trops likely due to accumulation 2ndry to decreased renal excretion as a result of BERTRAM. Will trend and continue to assess. Cardio consult to exclude ACS or CAD. Goal K and Mg 4.0 and 2.0, respectively. If indicated, will need to replace K carefully in setting of renal insufficiency. EKG in AM. Telemetry ordered.      BERTRAM on CKD III: Avoid nephrotoxic agents wherever possible. . Repeat BMP in AM. Avoiding fluctuations in BP.     2/12 - improved, creatinine down    DMII with hyperglycemia: ISS, hypoglycemia protocol POCT Glucose TIDAC & QHS     COPD, Stable. Duonebs PRN. Incentive Spirometry, Respiratory therapist assessment. Resume home meds.      A fib: rate controlled. Goal K and Mg 4.0 and 2.0, respectively.  On Long term anticoagulation       Electronically signed by Patricia Bowman MD on 2/15/2021 at 12:42 AM

## 2021-02-15 NOTE — PROGRESS NOTES
Clinical Pharmacy Note    Warfarin consult follow-up    Recent Labs     02/15/21  0609   INR 2.6     No results for input(s): HGB, HCT, PLT in the last 72 hours. Notes:  Date INR Warfarin Dose    02/12/21  2.3  10 mg    02/13/21  2.2  5 mg    02/14/21  2.2  5 mg    02/15/21  2.6  5 mg                             INR of 2.6 is therapeutic, goal range of 2-3 (PAF). Will give warfarin 5 mg today (half of normal home dose) due to o.4 jump in INR in past 24 hours. Daily PT/INR during pharmacy consult to monitor and dose warfarin therapy. ALFREDO Hope. Ph.  2/15/2021  8:57 AM

## 2021-02-16 ENCOUNTER — TELEPHONE (OUTPATIENT)
Dept: PHARMACY | Age: 71
End: 2021-02-16

## 2021-02-16 ENCOUNTER — CARE COORDINATION (OUTPATIENT)
Dept: CASE MANAGEMENT | Age: 71
End: 2021-02-16

## 2021-02-16 ENCOUNTER — CARE COORDINATION (OUTPATIENT)
Dept: CARE COORDINATION | Age: 71
End: 2021-02-16

## 2021-02-16 ENCOUNTER — APPOINTMENT (OUTPATIENT)
Dept: PHARMACY | Age: 71
End: 2021-02-16
Payer: MEDICARE

## 2021-02-16 DIAGNOSIS — I48.0 PAROXYSMAL A-FIB (HCC): ICD-10-CM

## 2021-02-16 DIAGNOSIS — I50.43 CHF (CONGESTIVE HEART FAILURE), NYHA CLASS I, ACUTE ON CHRONIC, COMBINED (HCC): Primary | ICD-10-CM

## 2021-02-16 PROCEDURE — 1111F DSCHRG MED/CURRENT MED MERGE: CPT | Performed by: INTERNAL MEDICINE

## 2021-02-16 SDOH — SOCIAL STABILITY: SOCIAL INSECURITY
WITHIN THE LAST YEAR, HAVE TO BEEN RAPED OR FORCED TO HAVE ANY KIND OF SEXUAL ACTIVITY BY YOUR PARTNER OR EX-PARTNER?: NO

## 2021-02-16 SDOH — SOCIAL STABILITY: SOCIAL INSECURITY
WITHIN THE LAST YEAR, HAVE YOU BEEN KICKED, HIT, SLAPPED, OR OTHERWISE PHYSICALLY HURT BY YOUR PARTNER OR EX-PARTNER?: NO
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
## 2021-02-16 NOTE — PROGRESS NOTES
Physician Progress Note      Daniel Schneider  CSN #:                  [id number]  :                       1950  ADMIT DATE:       2021 4:07 PM  Effie Duval DATE:        2/15/2021 3:42 PM  RESPONDING  PROVIDER #:        Aníbal Avendano MD          QUERY TEXT:    Pt admitted with acute on chronic systolic CHF. Pt noted to also have ICM with   LVEF 5-10%, MR/TR, HTN, and CAD. If possible, please document in progress   notes and discharge summary the etiology of CHF, if able to be determined. The medical record reflects the following:  Risk Factors:  ICM with LVEF 5-10%, MR/TR, HTN, CAD, DM, CKD, COPD, HLD  Clinical Indicators: 2021-biventricular enlargement LVEF 5 to 10% RV   dilatation severe biatrial enlargement 1-2+ mitral regurgitation 2-3+   tricuspid regurgitation RVSP 45 mmHg  - Dr. Bertrand Yen on chronic systolic CHF class IV:   decompensated biventricular failure  Ischemic cardiomyopathy with RV dysfunction as well LVEF 5-10% with MR/TR   secondary to severe biventricular dilatation  Treatment: Lasix gtt then changed to po, Torsemide, Aldactone, Entresto,   Toprol, labs, daily wt, strict I&O's, cardiology consult    Thank you, [place] RN BSN [facility]  [phone]  Options provided:  -- CHF due to Hypertensive Heart Disease and ICMP  -- CHF not due to Hypertension but due to ICMP  -- CHF due to Hypertensive Heart Disease  -- CHF due to Hypertensive Heart Disease and CAD  -- CHF not due to Hypertension but due to CAD  -- CHF due to Hypertensive Heart Disease and Valvular Heart Disease  -- CHF not due to Hypertension but due to valvular heart disease  -- Other - I will add my own diagnosis  -- Disagree - Not applicable / Not valid  -- Disagree - Clinically unable to determine / Unknown  -- Refer to Clinical Documentation Reviewer    PROVIDER RESPONSE TEXT:    This patient has CHF not due to hypertensive heart disease, CHF is due to   ischemic cardiomyopathy.

## 2021-02-16 NOTE — LETTER
2/16/2021       [address] [place] [address]      Dear Cheyenne Banuelos.,        My name is Paulo Huynh and I am a registered nurse who partners with Evangelina Hyde MD to improve patients' health. Evangelina Hyde MD believes you would benefit from working with me. As a member of your health care team, I would work with other providers involved in your care, offer education for your specific health conditions, and connect you with additional resources as needed. I will collaborate with Evangelina Hyde MD to support you in following your treatment plan. The additional support I provide is no additional cost to you. My primary focus is to help you achieve specific goals and improve your health. We are committed to walk with you on this journey and look forward to working with you. Please call me to further discuss your healthcare needs. I am available by phone. You can reach me at [phone].         In good health,           Paulo Huynh RN

## 2021-02-17 DIAGNOSIS — J44.9 CHRONIC OBSTRUCTIVE PULMONARY DISEASE, UNSPECIFIED COPD TYPE (HCC): ICD-10-CM

## 2021-02-17 RX ORDER — DEXTROMETHORPHAN HYDROBROMIDE AND GUAIFENESIN 10; 200 MG/1; MG/1
CAPSULE, LIQUID FILLED ORAL
Qty: 168 CAPSULE | Refills: 0 | Status: SHIPPED | OUTPATIENT
Start: 2021-02-17 | End: 2021-02-22 | Stop reason: SDUPTHER

## 2021-02-17 RX ORDER — BUDESONIDE AND FORMOTEROL FUMARATE DIHYDRATE 160; 4.5 UG/1; UG/1
2 AEROSOL RESPIRATORY (INHALATION) 2 TIMES DAILY
Qty: 3 INHALER | Refills: 5 | Status: SHIPPED | OUTPATIENT
Start: 2021-02-17

## 2021-02-17 NOTE — PROGRESS NOTES
Physician Progress Note      Arnaldo Vicente  [facility] #:                  [id number]  :                       1950  ADMIT DATE:       2021 4:07 PM  [address] DATE:        2/15/2021 3:42 PM  RESPONDING  PROVIDER #:        Kailey BURT DO          QUERY TEXT:    Patient admitted with acute on chronic systolic CHF. Noted documentation of   NSTEMI type 2 per attending -, cardiology only notes elevated troponin. Please refer to 4th universal definition of MI below and document in the   medical record: The medical record reflects the following:  Risk Factors: acute on chronic systolic CHF, BERTRAM  Clinical Indicators: trop 0.028/0.025/0.029  H&P-2/15 Dr. Glee Habermann II most likely; Elevated trops likely due to   accumulation 2ndry to decreased renal excretion as a result of BERTRAM   Dr. Alexis Boateng troponin is not due to plaque rupture  Treatment: serial troponin, EKG, cardiology consult, telemetry, treatment of   CHF with Lasix gtt, Entresto    Thank  you, Gary Lovell RN BSN [facility]  [phone]    Fourth Universal Definition of Myocardial Infarction: To have a myocardial   infarction requires both an elevated troponin blood test along with at least   one of the following:  - Symptoms of acute myocardial ischemia (Types 1 - 5 MI)  - Clinical evidence of ischemia, as evidenced in an electrocardiogram (EKG)   showing new ischemic changes (Type 1, Type 2, Type 3, or Type 4a MI)  - Development of pathological Q waves (Types 1 - 5 MI)  - Imaging evidence of new loss of viable myocardium or new regional wall   motion abnormality in a pattern consistent with an ischemic etiology (Types 1   - 5 MI)  Options provided:  -- NSTEMI II present as evidenced by, Please document evidence.   -- NSTEMI type II ruled out after study and demand ischemia confirmed  -- Other - I will add my own diagnosis  -- Disagree - Not applicable / Not valid  -- Disagree - Clinically unable to determine / Unknown  -- Refer to Clinical Documentation Reviewer    PROVIDER RESPONSE TEXT:    NSTEMI type II was ruled out after study and demand ischemia confirmed.     Query created by: Yola Cooper on 2/15/2021 12:01 PM      Electronically signed by:  Ezra Bird DO 2/16/2021 7:31 PM

## 2021-02-18 ENCOUNTER — CARE COORDINATION (OUTPATIENT)
Dept: CARE COORDINATION | Age: 71
End: 2021-02-18

## 2021-02-18 RX ORDER — SENNOSIDES/DOCUSATE SODIUM 8.6MG-50MG
TABLET ORAL
COMMUNITY
Start: 2021-01-25

## 2021-02-18 NOTE — CARE COORDINATION
Received request from [facility] to contact patient to review if there were any concerns or needs for community resources. This writer called patient to discuss how he is managing in home. Reviewed housing, medication affordability, and how patient is managing household bills. Patient reports that he has his own house and is able to manage. Patient reports no issues with affording medications, cooking, buying food or paying bills. Inquired if patient may need help with cleaning or bathing and patient declined, stating that his mother-in-law helps him out. Patient report no need for help. Encouraged patient to contact this writer if he needs assistance. At this time we will no follow patient.

## 2021-02-18 NOTE — CARE COORDINATION
Ambulatory Care Coordination Note  2/18/2021  CM Risk Score: 12  Charlson 10 Year Mortality Risk Score: 100%     ACC: Hyacinth Galloway RN    Summary Note: ACM FOLLOWING FOR ONGOING NEEDS, phone call to patients home,  Art states he just spoke to an\"nurse\", requests I look for documentation as he does not want to repeat himself- will review nursing notes and follow as needed.     Plan of Care-Will follow for ongoing care needs  -CHF  Weights, symptoms, check if he received zone sheet and review, low salt diet  -DM    BG frequency, numbers, log??, dietary referral- ask again if he is agreeable  -COPD- use of o2, inhalers, ??sob, is cough better, does he use nebulizer  -medications- Does he have all his medications? ?Symbacort cough tablets? ?d  calrification of amiodarone, -[facility] pharmacy states they need a new order, family is working with the office of Ella Kelly to clarify, s    Care Coordination Interventions    Program Enrollment: Complex Care  Referral from Primary Care Provider: No  Suggested Interventions and Community Resources  Medi Set or Pill Pack: Declined (Comment: 2/16/ 21 states uses a pill box,)  Pharmacist: Terry Donovan (Comment: 2/17/21 declined)  Registered Dietician: Declined (Comment: 2/16 DECLINED )  Social Work:  (Comment: 2/16  REFERRAL MADE)  Zone Management Tools: In Process (Comment: 2/16 CHF, COPD, DM MAILED)  Other Services or Interventions: REVIEWED WALK IN CLINCICS AND SAME DAY APPOINTMENTS, CHF,COPD, DM ZONE  SHEETS         Goals Addressed    None         Prior to Admission medications    Medication Sig Start Date End Date Taking? Authorizing Provider   Dextromethorphan-guaiFENesin (ROBITUSSIN COUGH+CHEST DYANA DM)  MG CAPS Take 1 capsule as needed every 4 hours .  2/17/21   Doug Talbot MD   budesonide-formoterol [facility]) 160-4.5 MCG/ACT AERO Inhale 2 puffs into the lungs 2 times daily 2/17/21   Doug Talbot MD   sacubitril-valsartan (ENTRESTO) 24-26 MG per tablet Take 0.5 tablets by mouth 2 times daily 2/15/21   Jude Jain DO   metoprolol succinate (TOPROL XL) 25 MG extended release tablet Take 1 tablet by mouth daily 2/16/21   Ya Appiah MD   amiodarone (CORDARONE) 200 MG tablet Take 1 tablet by mouth daily 2/15/21   Jude Jain DO   warfarin (COUMADIN) 5 MG tablet Take as directed by [facility]. Quantity for 90 day supply.  1/26/21   Ya Appiah MD   donepezil (ARICEPT) 10 MG tablet TAKE 1 TABLET BY MOUTH EVERY DAY AT NIGHT 1/25/21   Silvia Yen MD   Dulaglutide 0.75 MG/0.5ML SOPN Inject 0.75 mg into the skin once a week  Patient not taking: Reported on 2/16/2021 1/22/21   Silvia Yen MD   LANTUS SOLOSTAR 100 UNIT/ML injection pen INJECT 10 UNITS INTO THE SKIN NIGHTLY 9/21/20   Silvia Yen MD   magnesium oxide (MAG-OX) 400 (240 Mg) MG tablet Take 1 tablet by mouth daily 9/12/20   Chemo Dubon MD   potassium chloride (KLOR-CON M) 20 MEQ extended release tablet Take 1 tablet by mouth daily 9/12/20   Chemo Dubon MD   allopurinol (ZYLOPRIM) 100 MG tablet TAKE 1 TABLET DAILY  Patient taking differently: Take 100 mg by mouth daily TAKE 1 TABLET DAILY 3/16/20   Silvia Yen MD   bumetanide (BUMEX) 1 MG tablet Take 1 mg by mouth 2 times daily  12/4/19   Silvia Yen MD   nitroGLYCERIN (NITROSTAT) 0.4 MG SL tablet Place 1 tablet under the tongue every 5 minutes as needed for Chest pain  Patient not taking: Reported on 2/16/2021 11/26/19   Silvia Yen MD   spironolactone (ALDACTONE) 25 MG tablet Take 1 tablet by mouth daily 11/21/19   Ya Appiah MD   atorvastatin (LIPITOR) 80 MG tablet Take 80 mg by mouth daily     Historical Provider, MD   albuterol (PROVENTIL) (2.5 MG/3ML) 0.083% nebulizer solution Take 3 mLs by nebulization every 6 hours as needed for Wheezing 2/13/18   Daryl Stevens MD   pantoprazole (PROTONIX) 40 MG tablet Take 1 tablet by mouth every morning (before breakfast)  Patient taking differently: Take 40 mg by mouth daily  11/8/17   Bridget Masterson MD   DIGITEK 125 MCG tablet TAKE 1 TABLET DAILY  Patient taking differently: Take 125 mcg by mouth daily  8/2/15   Cooper Schmidt MD   albuterol (PROAIR HFA) 108 (90 BASE) MCG/ACT inhaler Inhale 2 puffs into the lungs every 4 hours as needed for Wheezing  Patient not taking: Reported on 2/16/2021 5/11/15   Cooper Schmidt MD       Future Appointments   Date Time Provider Gordo Krugeri   2/22/2021 11:30 AM Daniel Robledo  [address]   2/26/2021 11:00 AM [address]   5/4/2021 10:45 AM Lieutenant Briana MD [facility]

## 2021-02-22 ENCOUNTER — OFFICE VISIT (OUTPATIENT)
Dept: FAMILY MEDICINE CLINIC | Age: 71
End: 2021-02-22
Payer: MEDICARE

## 2021-02-22 VITALS
TEMPERATURE: 97 F | HEART RATE: 70 BPM | HEIGHT: 67 IN | BODY MASS INDEX: 32.18 KG/M2 | SYSTOLIC BLOOD PRESSURE: 108 MMHG | OXYGEN SATURATION: 96 % | WEIGHT: 205 LBS | RESPIRATION RATE: 14 BRPM | DIASTOLIC BLOOD PRESSURE: 72 MMHG

## 2021-02-22 DIAGNOSIS — E11.9 DIABETES MELLITUS WITH INSULIN THERAPY (HCC): ICD-10-CM

## 2021-02-22 DIAGNOSIS — Z87.39 HISTORY OF GOUT: ICD-10-CM

## 2021-02-22 DIAGNOSIS — R05.9 COUGH: ICD-10-CM

## 2021-02-22 DIAGNOSIS — I48.0 PAROXYSMAL A-FIB (HCC): ICD-10-CM

## 2021-02-22 DIAGNOSIS — R31.9 HEMATURIA, UNSPECIFIED TYPE: ICD-10-CM

## 2021-02-22 DIAGNOSIS — I50.42 CHF (CONGESTIVE HEART FAILURE), NYHA CLASS III, CHRONIC, COMBINED (HCC): Primary | ICD-10-CM

## 2021-02-22 DIAGNOSIS — E03.9 HYPOTHYROIDISM, UNSPECIFIED TYPE: ICD-10-CM

## 2021-02-22 DIAGNOSIS — I10 ESSENTIAL HYPERTENSION: ICD-10-CM

## 2021-02-22 DIAGNOSIS — J44.9 CHRONIC OBSTRUCTIVE PULMONARY DISEASE, UNSPECIFIED COPD TYPE (HCC): ICD-10-CM

## 2021-02-22 DIAGNOSIS — Z79.4 DIABETES MELLITUS WITH INSULIN THERAPY (HCC): ICD-10-CM

## 2021-02-22 PROCEDURE — 99214 OFFICE O/P EST MOD 30 MIN: CPT | Performed by: INTERNAL MEDICINE

## 2021-02-22 RX ORDER — DEXTROMETHORPHAN HYDROBROMIDE AND GUAIFENESIN 10; 200 MG/1; MG/1
CAPSULE, LIQUID FILLED ORAL
Qty: 168 CAPSULE | Refills: 0 | Status: SHIPPED | OUTPATIENT
Start: 2021-02-22

## 2021-02-22 ASSESSMENT — ENCOUNTER SYMPTOMS
PHOTOPHOBIA: 0
APNEA: 0
RECTAL PAIN: 0
EYE DISCHARGE: 0
SORE THROAT: 0
NAUSEA: 0
SINUS PAIN: 0
VOICE CHANGE: 0
VOMITING: 0
FACIAL SWELLING: 0
WHEEZING: 0
CONSTIPATION: 0
COUGH: 0
RHINORRHEA: 0
ABDOMINAL PAIN: 0
CHEST TIGHTNESS: 0
EYE ITCHING: 0
DIARRHEA: 0
SINUS PRESSURE: 0
EYE REDNESS: 0
BACK PAIN: 0
EYE PAIN: 0
SHORTNESS OF BREATH: 0
TROUBLE SWALLOWING: 0
ABDOMINAL DISTENTION: 0
COLOR CHANGE: 0
BLOOD IN STOOL: 0

## 2021-02-22 NOTE — PROGRESS NOTES
2021    Anjum Maza (:  1950) is a 78 y.o. male, here for evaluation of the following medical concerns:        51-year-old male with a history of type 2 diabetes, systolic heart failure recent ejection fraction of approximately 5-10%, hypothyroidism, paroxysmal atrial fibrillation, COPD and essential hypertension presents for follow-up visit. The patient voices no complaints at this time. Heart failure: The patient is compliant with , spironolactone 25 mg orally daily. Zaroxolyn 2.5 mg daily and metoprolol 25 mg orally daily. Additionally takes potassium 20 mEq orally daily he is compliant with Lipitor 80 mg orally daily follow closely by his cardiologist Dr. Michelle Carranza. The patient expresses a desire to receive a heart transplant. Cough: The patient reports an intermittent cough. In the past this is responded well to Robitussin. The patient would like a refill for Robitussin at this time. Type 2 diabetes: Patient's hemoglobin A1c is presently 8.9. He presently takes Lantus 10 units nightly. Hematuria: Hematuria has resolved. Short-term memory loss: This is stable at this time. Hypertension: The patient is compliant with Aldactone 25 mg orally daily. Bumex 1 mg twice daily pt has a bp cuff at home blood pressures at home have been well controlled. Blood pressure today is 112/70. Pruritus: The patient's pruritus has not resolved today. He is followed by dermatology for this complaint. Hypothyroidism: The patient was noted to have an elevated TSH on 2019. He is not presently receiving Synthroid. Will consider initiation of Synthroid if patient develops symptoms. Atrial fibrillation: Patient is receiving Digitek, metoprolol as a forementioned. He is compliant with Coumadin. inr was therapeutic on 2020 at 2.8 . COPD: His COPD is stable at this time he is compliant with Singulair, Symbicort and supplemental oxygen. History of gout: Compliant with allopurinol 100 mg daily    Review of Systems   Constitutional: Negative for chills, diaphoresis, fatigue and fever. HENT: Negative for congestion, dental problem, drooling, ear discharge, ear pain, facial swelling, hearing loss, mouth sores, nosebleeds, postnasal drip, rhinorrhea, sinus pressure, sinus pain, sneezing, sore throat, tinnitus, trouble swallowing and voice change. Eyes: Negative for photophobia, pain, discharge, redness, itching and visual disturbance. Respiratory: Negative for apnea, cough, chest tightness, shortness of breath and wheezing. Cardiovascular: Negative for chest pain, palpitations and leg swelling. Gastrointestinal: Negative for abdominal distention, abdominal pain, blood in stool, constipation, diarrhea, nausea, rectal pain and vomiting. Endocrine: Negative for cold intolerance, heat intolerance, polydipsia, polyphagia and polyuria. Genitourinary: Negative for decreased urine volume, difficulty urinating, dysuria, flank pain, frequency, genital sores, hematuria and urgency. Musculoskeletal: Negative for arthralgias, back pain, gait problem, joint swelling, myalgias, neck pain and neck stiffness. Skin: Negative for color change, rash and wound. Allergic/Immunologic: Negative for environmental allergies and food allergies. Neurological: Negative for dizziness, tremors, seizures, syncope, facial asymmetry, speech difficulty, weakness, light-headedness, numbness and headaches. Hematological: Negative for adenopathy. Does not bruise/bleed easily. Psychiatric/Behavioral: Negative for agitation, confusion, decreased concentration, hallucinations, self-injury, sleep disturbance and suicidal ideas. The patient is not nervous/anxious. Prior to Visit Medications    Medication Sig Taking?  Authorizing Provider Dextromethorphan-guaiFENesin (ROBITUSSIN COUGH+CHEST DYANA DM)  MG CAPS Take 1 capsule as needed every 4 hours . Yes Pastor Samantha MD   [employer] ADVANCED GLUCOSE TEST strip TEST 4X DAILY DX E11.65  Historical Provider, MD   budesonide-formoterol (SYMBICORT) 160-4.5 MCG/ACT AERO Inhale 2 puffs into the lungs 2 times daily  Pastor Samantha MD   sacubitril-valsartan (ENTRESTO) 24-26 MG per tablet Take 0.5 tablets by mouth 2 times daily  Elan Alvarez DO   metoprolol succinate (TOPROL XL) 25 MG extended release tablet Take 1 tablet by mouth daily  Jessica Couch MD   amiodarone (CORDARONE) 200 MG tablet Take 1 tablet by mouth daily  Tony Odell DO   warfarin (COUMADIN) 5 MG tablet Take as directed by [facility] AT [place] [facility]. Quantity for 90 day supply.   Jessica Couch MD   donepezil (ARICEPT) 10 MG tablet TAKE 1 TABLET BY MOUTH EVERY DAY AT NIGHT  Pastor Samantha MD   Dulaglutide 0.75 MG/0.5ML SOPN Inject 0.75 mg into the skin once a week  Patient not taking: Reported on 2/16/2021  Pastor Samantha MD   LANTUS SOLOSTAR 100 UNIT/ML injection pen INJECT 10 UNITS INTO THE SKIN NIGHTLY  Pastor Samantha MD   magnesium oxide (MAG-OX) 400 (240 Mg) MG tablet Take 1 tablet by mouth daily  Ziggy Quinteros MD   potassium chloride (KLOR-CON M) 20 MEQ extended release tablet Take 1 tablet by mouth daily  Ziggy Quinteros MD   allopurinol (ZYLOPRIM) 100 MG tablet TAKE 1 TABLET DAILY  Patient taking differently: Take 100 mg by mouth daily TAKE 1 TABLET DAILY  Pastor Samantha MD   bumetanide (BUMEX) 1 MG tablet Take 1 mg by mouth 2 times daily   Pastor Samantha MD   nitroGLYCERIN (NITROSTAT) 0.4 MG SL tablet Place 1 tablet under the tongue every 5 minutes as needed for Chest pain  Patient not taking: Reported on 2/16/2021  Pastor Samantha MD   spironolactone (ALDACTONE) 25 MG tablet Take 1 tablet by mouth daily  Jessica Couch MD atorvastatin (LIPITOR) 80 MG tablet Take 80 mg by mouth daily   Historical Provider, MD   albuterol (PROVENTIL) (2.5 MG/3ML) 0.083% nebulizer solution Take 3 mLs by nebulization every 6 hours as needed for Wheezing  Jaxson Hercules MD   pantoprazole (PROTONIX) 40 MG tablet Take 1 tablet by mouth every morning (before breakfast)  Patient taking differently: Take 40 mg by mouth daily   Trav Burr MD   [facility]  MCG tablet TAKE 1 TABLET DAILY  Patient taking differently: Take 125 mcg by mouth daily   Tanner Saxena MD   albuterol (PROAIR HFA) 108 (90 BASE) MCG/ACT inhaler Inhale 2 puffs into the lungs every 4 hours as needed for Wheezing  Patient not taking: Reported on 2/16/2021  Tanner Saxena MD        Allergies   Allergen Reactions    Dye [Iodides] Shortness Of Breath    Penicillins Anaphylaxis    Plavix [Clopidogrel]     Tapazole [Methimazole]      Itching        Past Medical History:   Diagnosis Date    Amiodarone-induced hyperthyroidism     Arthritis     [facility]rial flutter [facility]     CAD (coronary artery disease)     Chronic combined systolic and diastolic CHF (congestive heart failure) (HCC)     CKD (chronic kidney disease) stage 3, GFR 30-59 ml/min     COPD (chronic obstructive pulmonary disease) ([facility]     Defibrillator activation     [facility]     Dilated cardiomyopathy ([facility]     Generalized and unspecified atherosclerosis     Gout     Hyperlipidemia     Hypertension     Noncompliance     Obesity     On home oxygen therapy     Pain in joint, multiple sites     Paroxysmal A-fib (HCC)     Paroxysmal ventricular tachycardia (HCC)     Prolonged emergence from general anesthesia     Status post angioplasty     Sustained ventricular tachycardia (HCC)     TIA (transient ischemic attack)     Tobacco abuse     Type II or unspecified type diabetes mellitus without mention of complication, not stated as uncontrolled        Past Surgical History: Procedure Laterality Date    CARDIAC CATHETERIZATION      COLONOSCOPY      CORONARY ANGIOPLASTY  11    Dr Maddi Richards CORONARY ARTERY BYPASS GRAFT  2012    ENDOSCOPY, COLON, DIAGNOSTIC      EYE SURGERY Bilateral     Cataracts     OTHER SURGICAL HISTORY      difibrillator     MO CIRCUMCISION,OTHER,28+ D/O N/A 2018    CIRCUMCISION performed by Nancy Lozano MD at [facility] EGD TRANSORAL BIOPSY SINGLE/MULTIPLE N/A 2017    EGD BIOPSY performed by Wilda العراقي MD at [address] Marital status:      Spouse name: Not on file    Number of children: 2    Years of education: 12    Highest education level: High school graduate   Occupational History    Occupation: [employer]     Employer: RETIRED   Social Needs    Financial resource strain: Not very hard    Food insecurity     Worry: Never true     Inability: Never true    Transportation needs     Medical: No     Non-medical: No   Tobacco Use    Smoking status: Former Smoker     Packs/day: 1.50     Years: 25.00     Pack years: 37.50     Quit date: 2012     Years since quittin.1    Smokeless tobacco: Never Used   Substance and Sexual Activity    Alcohol use: Not Currently    Drug use: No    Sexual activity: Not Currently     Partners: Female   Lifestyle    Physical activity     Days per week: 0 days     Minutes per session: 0 min    Stress: Not at all   Relationships    Social connections     Talks on phone: More than three times a week     Gets together: More than three times a week     Attends Protestant service: More than 4 times per year     Active member of club or organization: Yes     Attends meetings of clubs or organizations: 1 to 4 times per year     Relationship status:     Intimate partner violence     Fear of current or ex partner: No Emotionally abused: No     Physically abused: No     Forced sexual activity: No   Other Topics Concern    Not on file   Social History Narrative    Mr. Tania Taylor lives in a private residence, he has a family member(ex MIL) -Chapin Acuña who assists patient as needed, she fills his pill box, assists with transportation to appointments, keeps a calendar of upcoming MD visits, assists with cooking and helps him around the house if needed. Mr Page Boudreaux states he is independent with most activities, but when he is not feelling well, he needs a little assistance. He appears forgetful when discussing medications and defers a lot of questions to his family member Carri. Pt pleasant, and but offers limited interactions when discussing health care needs. Family History   Problem Relation Age of Onset    Cancer Brother     Diabetes Mother     Heart Disease Father     Emphysema Father        Vitals:    02/22/21 1128   BP: 108/72   Pulse: 70   Resp: 14   Temp: 97 °F (36.1 °C)   SpO2: 96%   Weight: 205 lb (93 kg)   Height: 5' 7\" (1.702 m)     Estimated body mass index is 32.11 kg/m² as calculated from the following:    Height as of this encounter: 5' 7\" (1.702 m). Weight as of this encounter: 205 lb (93 kg). Physical Exam  Constitutional:       General: He is not in acute distress. Appearance: He is well-developed. Comments: Oxygen at the patient's bedside. HENT:      Head: Normocephalic. Right Ear: External ear normal.      Left Ear: External ear normal.   Eyes:      Conjunctiva/sclera: Conjunctivae normal.   Neck:      Musculoskeletal: Neck supple. Vascular: No JVD. Trachea: No tracheal deviation. Cardiovascular:      Rate and Rhythm: Normal rate and regular rhythm. Heart sounds: Normal heart sounds. Pulmonary:      Effort: Pulmonary effort is normal. No respiratory distress. Breath sounds: Normal breath sounds. No wheezing or rales.    Chest: Chest wall: No tenderness. Abdominal:      General: Bowel sounds are normal. There is no distension. Palpations: Abdomen is soft. There is no mass. Tenderness: There is no abdominal tenderness. There is no guarding or rebound. Musculoskeletal:         General: No tenderness or deformity. Skin:     General: Skin is warm and dry. Neurological:      Mental Status: He is alert and oriented to person, place, and time. Motor: No abnormal muscle tone. Psychiatric:         Thought Content: Thought content normal.         Judgment: Judgment normal.           ASSESSMENT/PLAN:      DMII -hemoglobin A1c equals 8.9. Start Trulicity. Continue Lantus. Hematuria: Stable. Monitoring. Cough: Robitussin      Hypertension continue metoprolol 25 mg po daily for now. Continue current regimen. Left heart failure (HCC)  Continue Zaroxolyn, metoprolol, Bumex, spironolactone and potassium supplementation. The patient has been referred to pursue consideration for 80 heart transplant. Hypothyroidism, unspecified type  TSH is within normal limits. Reassess thyroid function at next office visit. Paroxysmal A-fib (HCC)  Continue metoprolol and Coumadin. Monitor INR        Localized pruritus  Implement recommendations as provided by dermatology        Chronic obstructive pulmonary disease, unspecified COPD type ([facility]  Continue Symbicort and Singulair. Continue supplemental oxygen PRN        Short-term memory loss monitoring. F/u with neurology        History of gout: Continue allopurinol    Return in about 3 months (around 5/22/2021). An  electronic signature was used to authenticate this note.     --Camden Torres MD on 2/23/2021 at 7:21 AM

## 2021-02-23 ENCOUNTER — CARE COORDINATION (OUTPATIENT)
Dept: CASE MANAGEMENT | Age: 71
End: 2021-02-23

## 2021-02-23 DIAGNOSIS — I50.9 ACUTE DECOMPENSATED HEART FAILURE (HCC): ICD-10-CM

## 2021-02-23 NOTE — CARE COORDINATION
Nicole 45 Transitions Follow Up Call    2021    Patient: Lord Felty. Patient : 1950   MRN: [id number]  Reason for Admission: -2/15/09689 ED [facility] Acute on chronic decompensated systolic heart failure, NSTEMI type II-due to demand ischemia in CKD, Elevated troponin, BERTRAM on CKD 3, Diabetes type 2 with hyperglycemia, COPD, A. Fib. Discharge Date: 2/15/21 RARS: Readmission Risk Score: 27  Care Transitions       Spoke with: Emearld reports pt gets exhausted taking a shower. Requesting [facility] (freedom of choice offered). Requesting BP monitor script. PCP routed. States exertional SOB. No new cough or edema concerns. Enc to work w/ ACM and outreach for any needs/concerns, v/u. Reports having/taking meds. States not on amiodarone anymore. Needs to be reviewed by the provider   Additional needs identified to be addressed with provider Yes  PCP routed request for [facility]. Method of communication with provider : chart routing    Care Transition Nurse (CTN) contacted the family by telephone to follow up after admission. Verified name and  with family as identifiers. Addressed changes since last contact: symptom management-No fluid overload or diabetic cocnerns. Fatigues easily and has exertional SOB. Discharged needs reviewed: home health care-[facility]  Follow up appointment completed? Yes    Advance Care Planning:   Does patient have an Advance Directive:  reviewed and current. CTN reviewed discharge instructions, medical action plan and red flags with family and discussed any barriers to care and/or understanding of plan of care after discharge. Discussed appropriate site of care based on symptoms and resources available to patient including: Reviewed s/s FO to report/go to ED. Cristian Angry The family agrees to contact the PCP office for questions related to their healthcare.      Patients top risk factors for readmission: level of motivation and medical condition    CTN provided contact information for future needs. PCP routed request for [facility] and BP monitor to pharmacy. ACM following. CTN s/o. Care Transitions Subsequent and Final Call    Subsequent and Final Calls  Do you have any ongoing symptoms?: Yes  Patient-reported symptoms: Fatigue, Weakness, Shortness of Breath  Have your medications changed?: Yes  Patient Reports: Reports not taking amiodarone. Do you have any questions related to your medications?: No  Do you currently have any active services?: No  Do you have any needs or concerns that I can assist you with?: Yes  Patient-reported Needs or Concerns: Requesting [facility] and BP monitor script. PCP routed. Identified Barriers: Lack of Education  Care Transitions Interventions  Other Interventions:            Follow Up  Future Appointments   Date Time Provider Gordo Sam   2/26/2021 11:00 AM [address]   5/4/2021 10:45 AM Jennifer Pozo, [address] [facility],[address]   5/24/2021 11:30 AM Eben Dunham  [facility]

## 2021-02-24 ENCOUNTER — TELEPHONE (OUTPATIENT)
Dept: FAMILY MEDICINE CLINIC | Age: 71
End: 2021-02-24

## 2021-02-24 DIAGNOSIS — I10 ESSENTIAL HYPERTENSION: Primary | ICD-10-CM

## 2021-02-24 RX ORDER — BLOOD PRESSURE TEST KIT
1 KIT MISCELLANEOUS 2 TIMES DAILY
Qty: 1 KIT | Refills: 0 | Status: SHIPPED | OUTPATIENT
Start: 2021-02-24

## 2021-02-24 NOTE — TELEPHONE ENCOUNTER
!st attempt to reach the patient there was no answer or vmail. I ordered a kit sent to [facility] [address]  I requested the wrist but its actually up to the insurance

## 2021-02-24 NOTE — TELEPHONE ENCOUNTER
Pt calling in requesting information as to how they can get Pt a new Blood Pressure Machine (he prefers the wrist type).     Please advise:    [phone]

## 2021-02-26 ENCOUNTER — HOSPITAL ENCOUNTER (OUTPATIENT)
Dept: PHARMACY | Age: 71
Setting detail: THERAPIES SERIES
Discharge: HOME | End: 2021-02-26
Payer: MEDICARE

## 2021-02-26 VITALS — TEMPERATURE: 96.8 F

## 2021-02-26 DIAGNOSIS — I48.0 PAROXYSMAL A-FIB (HCC): ICD-10-CM

## 2021-02-26 LAB
AVERAGE GLUCOSE: NORMAL
HBA1C MFR BLD: 8 %
INTERNATIONAL NORMALIZATION RATIO, POC: 1.8

## 2021-02-26 PROCEDURE — 99211 OFF/OP EST MAY X REQ PHY/QHP: CPT

## 2021-02-26 PROCEDURE — 85610 PROTHROMBIN TIME: CPT

## 2021-02-26 NOTE — PROGRESS NOTES
Mr. Todd Young. is a 78 y.o. y/o male with history of Afib who presents today for anticoagulation monitoring and adjustment. Due to COVID protocol, extensive cleaning with Virex performed before and after visit. Temperature assessed. Masks worn by both parties the entire visit. Faceshield/googles worn by me. INR 1.8 is SUB-therapeutic for this patient (goal range 2-3) and is reflective of 45 mg TWD  Patient verifies current dosing regimen, patient able to verbally recall dose  Patient reports 0 missed doses since last INR   Patient denies s/sx clotting and/or stroke  Patient denies hematuria, epistaxis, rectal bleeding  Patient denies changes in diet, alcohol, or tobacco use  Reviewed medication list and drug allergies with patient, updated any medication additions or modifications accordingly  Pt possibly D/C'd Amiodarone while in the hospital per Dr Belinda Diez.  Also started [facility]  Patient also denies any pending medical or dental procedures scheduled at this time  Patient was instructed to continue 45 mg TWD and RTC 3 weeks    CLINICAL PHARMACY CONSULT: MED RECONCILIATION/REVIEW [address]: Yes  Total # of Interventions Recommended: 0  Total Interventions Accepted: 0  Time Spent (min): 223 [facility]  Staff PharmacistCristiano  2/26/2021  10:53 AM

## 2021-02-27 ENCOUNTER — CARE COORDINATION (OUTPATIENT)
Dept: CARE COORDINATION | Age: 71
End: 2021-02-27

## 2021-02-27 NOTE — CARE COORDINATION
Ambulatory Care Coordination Note  2/27/2021  CM Risk Score: 16  Charlson 10 Year Mortality Risk Score: 100%     ACC: Shiva Philip, RN    Summary Note: ACM FOLLOWING FOR ONGOING CARE NEEDS, spoke with Art who deferred me to care giver/ex-MIL -Hanyruiz   Reviewed ongoing needs  1. CHF-  States no increased swelling, no sob, saw Dr. Jovany Grace on 2/24/21,\" she said he was doing ok\" current weight 201  Weighing daily  Reviewed CHF Precautions with Carri Christina verbalizes understanding to call MD office with weight increase of three pounds overnight or five pounds in a week. Patient to weigh daily; and record. Should weigh first thing in the morning, wearing the same type of clothes, after you urinate, before you eat breakfast.  Reviewed heart failure zones; and when should update cardiologist.   Denies any questions/concerns. 2.DM      Checking Blood glucose zelaya- taking insulin as ordered- declined dietary referral-Hanyruiz helps him with cooking sometimes  and he cooks for herself also- he is \"doing better watching the salt intake\". 3. COPD-  Cough is better, he is not using nebulizer or puffers, using 02  2L/NC as needed    4. Functional status  \"Doing ok\", sob at times with activity, but declining home care referral. He does his own personal care. 5 medications-States he has all his mediations, just took them with him to Dr. Mikaela Royal office and she reviewed them      Plan of Care-Will follow for ongoing care needs  -CHF  Weights, symptoms, check if he received zone sheet and review, low salt diet  -DM    BG frequency, numbers, log??,sympotms  -COPD- use of o2, inhalers, ??sob, is cough better, nebulizer use?  -medications- Does he have all his medications? ?      Care Coordination Interventions    Program Enrollment: Complex Care  Referral from Primary Care Provider: No  Suggested Interventions and Community Resources Medi Set or Pill Pack: Declined (Comment: 2/16/ 21 states uses a pill box,)  Pharmacist: Carl Reason (Comment: 2/17/21 declined)  Registered Dietician: Declined (Comment: 2/16 DECLINED )  Social Work:  (Comment: 2/16  REFERRAL MADE)  Zone Management Tools: In Process (Comment: 2/16 CHF, COPD, DM MAILED)  Other Services or Interventions: REVIEWED WALK IN CLINCICS AND SAME DAY APPOINTMENTS, CHF,COPD, DM ZONE  SHEETS         Goals Addressed    None         Prior to Admission medications    Medication Sig Start Date End Date Taking? Authorizing Provider   Blood Pressure KIT 1 kit by Does not apply route 2 times daily Please disburse whichever kit insurance will cover. Patient prefers a wrist bp kit 2/24/21   Carlos Llanos MD   Dextromethorphan-guaiFENesin (ROBITUSSIN COUGH+CHEST DYANA DM)  MG CAPS Take 1 capsule as needed every 4 hours . 2/22/21   Carlos Llanos MD   [facility] ADVANCED GLUCOSE TEST strip TEST 4X DAILY DX E11.65 1/25/21   Historical Provider, MD   budesonide-formoterol (SYMBICORT) 160-4.5 MCG/ACT AERO Inhale 2 puffs into the lungs 2 times daily 2/17/21   Carlos Llanos MD   sacubitril-valsartan (ENTRESTO) 24-26 MG per tablet Take 0.5 tablets by mouth 2 times daily 2/15/21   More Burns DO   metoprolol succinate (TOPROL XL) 25 MG extended release tablet Take 1 tablet by mouth daily 2/16/21   Nereyda Mayorga MD   amiodarone (CORDARONE) 200 MG tablet Take 1 tablet by mouth daily 2/15/21   More Burns DO   warfarin (COUMADIN) 5 MG tablet Take as directed by [facility] EM[facility]GENCY [facility] AT [facility]. Quantity for 90 day supply.  1/26/21   Nereyda Mayorga MD   donepezil (ARICEPT) 10 MG tablet TAKE 1 TABLET BY MOUTH EVERY DAY AT NIGHT 1/25/21   Carlos Llanos MD   Dulaglutide 0.75 MG/0.5ML SOPN Inject 0.75 mg into the skin once a week  Patient not taking: Reported on 2/16/2021 1/22/21   Carlos Llanos MD LANTUS SOLOSTAR 100 UNIT/ML injection pen INJECT 10 UNITS INTO THE SKIN NIGHTLY 20   Robbin Gross MD   magnesium oxide (MAG-OX) 400 (240 Mg) MG tablet Take 1 tablet by mouth daily 20   Ortiz Lennon MD   potassium chloride (KLOR-CON M) 20 MEQ extended release tablet Take 1 tablet by mouth daily 20   Ortiz Lennon MD   allopurinol (ZYLOPRIM) 100 MG tablet TAKE 1 TABLET DAILY  Patient taking differently: Take 100 mg by mouth daily TAKE 1 TABLET DAILY 3/16/20   Robbin Gross MD   bumetanide (BUMEX) 1 MG tablet Take 1 mg by mouth 2 times daily  19   Robbin Gross MD   nitroGLYCERIN (NITROSTAT) 0.4 MG SL tablet Place 1 tablet under the tongue every 5 minutes as needed for Chest pain  Patient not taking: Reported on 19   Robbin Gross MD   spironolactone (ALDACTONE) 25 MG tablet Take 1 tablet by mouth daily 19   David Silver MD   atorvastatin (LIPITOR) 80 MG tablet Take 80 mg by mouth daily     Historical Provider, MD   albuterol (PROVENTIL) (2.5 MG/3ML) 0.083% nebulizer solution Take 3 mLs by nebulization every 6 hours as needed for Wheezing 18   Azam Jameson MD   pantoprazole (PROTONIX) 40 MG tablet Take 1 tablet by mouth every morning (before breakfast)  Patient taking differently: Take 40 mg by mouth daily  17   David Silver MD   DIGITEK 125 MCG tablet TAKE 1 TABLET DAILY  Patient taking differently: Take 125 mcg by mouth daily  8/2/15   Kimberley Low MD   albuterol (PROAIR HFA) 108 (90 BASE) MCG/ACT inhaler Inhale 2 puffs into the lungs every 4 hours as needed for Wheezing  Patient not taking: Reported on 2021 5/11/15   Kimberley Low MD       Future Appointments   Date Time Provider Gordo Sam   3/19/2021 11:30 AM [address]   2021 10:45 AM Azam Jameson, [address] [facility],[address]   2021 11:30 AM Robbin Gross  [place], [address] Care Coordination Plan of Care: This nurse Care Coordinator will            Summary Note: ACM FOLLOWING FOR ONGOING NEEDS, phone call to patients home,  Art states he just spoke to an\"nurse\", requests I look for documentation as he does not want to repeat himself- will review nursing notes and follow as needed.     Plan of Care-Will follow for ongoing care needs  -CHF  Weights, symptoms, check if he received zone sheet and review, low salt diet  -DM    BG frequency, numbers, log??, dietary referral- ask again if he is agreeable  -COPD- use of o2, inhalers, ??sob, is cough better, does he use nebulizer  -medications- Does he have all his medications? ?Symbacort cough tablets? ?d  calrification of amiodarone, -[facility] pharmacy states they need a new order, family is working with the office of Seth Faria to clarify, s

## 2021-03-05 ENCOUNTER — CARE COORDINATION (OUTPATIENT)
Dept: CARE COORDINATION | Age: 71
End: 2021-03-05

## 2021-03-05 NOTE — CARE COORDINATION
Ambulatory Care Coordination Note  3/5/2021  CM Risk Score: 16  Charlson 10 Year Mortality Risk Score: 100%     ACC: Jay Emanuel RN    Summary Note: ACM following for ongoing Care Coordination needs:  I spoke with Art who states he is doing ok, states he is \"breathing ok', denies swelling of shortness of breath, Art was very vague in his answers and could not give me his current blood sugar or weight, I did review watching sodium and carb intake, I congratulated him on improvement in Hgbaic numbers, he requests I call later and speak with his ex MIL Carri who helps manage his care and she can give me more details, will call back later and follow up on:    1500 I called back and Art states Carri is still not there to -unable to complete assessment- will follow next week.     -CHF  Weights, symptoms, check if he received zone sheet and review, low salt diet  -DM    BG frequency, numbers, log??,sympotms review aic  -COPD- use of o2, inhalers, ??sob, is cough better, nebulizer use?  -medications- Does he have all his medications? ?           Care Coordination Interventions    Program Enrollment: Complex Care  Referral from Primary Care Provider: No  Suggested Interventions and Community Resources  Medi Set or Pill Pack: Declined (Comment: 2/16/ 21 states uses a pill box,)  Pharmacist: Kareen Sanchez (Comment: 2/17/21 declined)  Registered Dietician: Declined (Comment: 2/16 DECLINED )  Social Work:  (Comment: 2/16  REFERRAL MADE)  Zone Management Tools: In Process (Comment: 2/16 CHF, COPD, DM MAILED)  Other Services or Interventions: REVIEWED WALK IN CLINCICS AND SAME DAY APPOINTMENTS, CHF,COPD, DM ZONE  SHEETS         Goals Addressed    None         Prior to Admission medications    Medication Sig Start Date End Date Taking? Authorizing Provider   Blood Pressure KIT 1 kit by Does not apply route 2 times daily Please disburse whichever kit insurance will cover.  Patient prefers a wrist bp kit 2/24/21 MD   spironolactone (ALDACTONE) 25 MG tablet Take 1 tablet by mouth daily 11/21/19   Jeramie Barlow MD   atorvastatin (LIPITOR) 80 MG tablet Take 80 mg by mouth daily     Historical Provider, MD   albuterol (PROVENTIL) (2.5 MG/3ML) 0.083% nebulizer solution Take 3 mLs by nebulization every 6 hours as needed for Wheezing 2/13/18   Leonora Caceres MD   pantoprazole (PROTONIX) 40 MG tablet Take 1 tablet by mouth every morning (before breakfast)  Patient taking differently: Take 40 mg by mouth daily  11/8/17   Jeramie Barlow MD   DIGITEK 125 MCG tablet TAKE 1 TABLET DAILY  Patient taking differently: Take 125 mcg by mouth daily  8/2/15   Susie Fabian MD   albuterol (PROAIR HFA) 108 (90 BASE) MCG/ACT inhaler Inhale 2 puffs into the lungs every 4 hours as needed for Wheezing  Patient not taking: Reported on 2/16/2021 5/11/15   Susie Fabian MD       Future Appointments   Date Time Provider Gordo Krugeri   3/19/2021 11:30 AM [address] MGMT [facility]   4/7/2021  8:15 AM [address] [place] [address]   5/4/2021 10:45 AM Leonora Caceres MD [facility]   5/24/2021 11:30 AM Sanjuanita Hussein MD [address] [place] [address] 7

## 2021-03-11 ENCOUNTER — CARE COORDINATION (OUTPATIENT)
Dept: CARE COORDINATION | Age: 71
End: 2021-03-11

## 2021-03-11 RX ORDER — ALLOPURINOL 100 MG/1
TABLET ORAL
Qty: 90 TABLET | Refills: 3 | Status: SHIPPED | OUTPATIENT
Start: 2021-03-11

## 2021-03-11 NOTE — CARE COORDINATION
Ambulatory Care Coordination Note  3/11/2021  CM Risk Score: 16  Charlson 10 Year Mortality Risk Score: 100%     ACC: Akira Mckeon RN    Summary Note:ACM FOLLOWING FOR ONGOING NEEDS- NO ANSWER- UNABLE TO LEAVE MESSAGE- NO VOICEMAIL SET UP- WILL CALL AT LATER TIME. Care Coordination Plan of Care: This nurse Care Coordinator will FOLLOW FOR ONGOING NEEDS  CHF  Weights, symptoms, check if he received zone sheet and review, low salt diet  -DM    BG frequency, numbers, log??,sympotms review aic  -COPD- use of o2, inhalers, ??sob, is cough better, nebulizer use?  -medications- Does he have all his medications? ?     Diabetes Assessment    Medic Alert ID: No  Meal Planning: Plate Method, Avoidance of concentrated sweets   How often do you test your blood sugar?: Daily (Comment: 2 times a day)   Do you have barriers with adherence to non-pharmacologic self-management interventions?  (Nutrition/Exercise/Self-Monitoring): Yes   Have you ever had to go to the ED for symptoms of low blood sugar?: No          ,   Congestive Heart Failure Assessment    Are you currently restricting fluids?: No Restriction  Do you understand a low sodium diet?: Yes  Do you understand how to read food labels?: Yes (Comment: pt states he reads them \"sometimes\")  How many restaurant meals do you eat per week?: 1-2  Do you salt your food before tasting it?: No         Symptoms:        ,   COPD Assessment    Does the patient understand envrionmental exposure?: Yes  Is the patient able to verbalize Rescue vs. Long Acting medications?: Yes  Does the patient have a nebulizer?: Yes  Does the patient use a space with inhaled medications?: No            Symptoms:         and   General Assessment             Care Coordination Interventions    Program Enrollment: Complex Care  Referral from Primary Care Provider: No  Suggested Interventions and Community Resources  Medi Set or Pill Pack: Declined (Comment: 2/16/ 21 states uses a pill box,)  Pharmacist: Declined (Comment: 2/17/21 declined)  Registered Dietician: Declined (Comment: 2/16 DECLINED )  Social Work:  (Comment: 2/16  REFERRAL MADE)  Zone Management Tools: In Process (Comment: 2/16 CHF, COPD, DM MAILED)  Other Services or Interventions: REVIEWED WALK IN CLINCICS AND SAME DAY APPOINTMENTS, CHF,COPD, DM ZONE  SHEETS         Goals Addressed    None         Prior to Admission medications    Medication Sig Start Date End Date Taking? Authorizing Provider   allopurinol (ZYLOPRIM) 100 MG tablet TAKE 1 TABLET DAILY 3/11/21   Shawnee Black MD   Blood Pressure KIT 1 kit by Does not apply route 2 times daily Please disburse whichever kit insurance will cover. Patient prefers a wrist bp kit 2/24/21   Shawnee Black MD   Dextromethorphan-guaiFENesin (ROBITUSSIN COUGH+CHEST DYANA DM)  MG CAPS Take 1 capsule as needed every 4 hours . 2/22/21   Shawnee Black MD   [facility] ADVANCED GLUCOSE TEST strip TEST 4X DAILY DX E11.65 1/25/21   Historical Provider, MD   budesonide-formoterol (SYMBICORT) 160-4.5 MCG/ACT AERO Inhale 2 puffs into the lungs 2 times daily 2/17/21   Shawnee Black MD   sacubitril-valsartan (ENTRESTO) 24-26 MG per tablet Take 0.5 tablets by mouth 2 times daily 2/15/21   Keon Maxin, DO   metoprolol succinate (TOPROL XL) 25 MG extended release tablet Take 1 tablet by mouth daily 2/16/21   Ant Ceja MD   amiodarone (CORDARONE) 200 MG tablet Take 1 tablet by mouth daily 2/15/21   Keon Maxin, DO   warfarin (COUMADIN) 5 MG tablet Take as directed by [facility] EMERGENCY [facility] AT [facility]. Quantity for 90 day supply.  1/26/21   Ant Ceja MD   donepezil (ARICEPT) 10 MG tablet TAKE 1 TABLET BY MOUTH EVERY DAY AT NIGHT 1/25/21   Shawnee Black MD   Dulaglutide 0.75 MG/0.5ML SOPN Inject 0.75 mg into the skin once a week  Patient not taking: Reported on 2/16/2021 1/22/21   Shawnee Black MD   LANTUS SOLOSTAR 100 UNIT/ML injection pen INJECT 10 UNITS INTO THE SKIN NIGHTLY 9/21/20   Evangelina Hyde MD   magnesium oxide (MAG-OX) 400 (240 Mg) MG tablet Take 1 tablet by mouth daily 9/12/20   Kika Rodriguez MD   potassium chloride (KLOR-CON M) 20 MEQ extended release tablet Take 1 tablet by mouth daily 9/12/20   Kika Rodriguez MD   bumetanide (BUMEX) 1 MG tablet Take 1 mg by mouth 2 times daily  12/4/19   Evangelina Hyde MD   nitroGLYCERIN (NITROSTAT) 0.4 MG SL tablet Place 1 tablet under the tongue every 5 minutes as needed for Chest pain  Patient not taking: Reported on 2/16/2021 11/26/19   Evangelina Hyde MD   spironolactone (ALDACTONE) 25 MG tablet Take 1 tablet by mouth daily 11/21/19   Girma Sosa MD   atorvastatin (LIPITOR) 80 MG tablet Take 80 mg by mouth daily     Historical Provider, MD   albuterol (PROVENTIL) (2.5 MG/3ML) 0.083% nebulizer solution Take 3 mLs by nebulization every 6 hours as needed for Wheezing 2/13/18   Ramon Reynoso MD   pantoprazole (PROTONIX) 40 MG tablet Take 1 tablet by mouth every morning (before breakfast)  Patient taking differently: Take 40 mg by mouth daily  11/8/17   Girma Sosa MD   DIGITEK 125 MCG tablet TAKE 1 TABLET DAILY  Patient taking differently: Take 125 mcg by mouth daily  8/2/15   Dutch Rodriguez MD   albuterol (PROAIR HFA) 108 (90 BASE) MCG/ACT inhaler Inhale 2 puffs into the lungs every 4 hours as needed for Wheezing  Patient not taking: Reported on 2/16/2021 5/11/15   Dutch Rodriguez MD       Future Appointments   Date Time Provider Gordo Krugeri   3/19/2021 11:30 AM [address] MG[facility]   4/7/2021  8:15 AM [address] [place] [address]   5/4/2021 10:45 AM Ramon Reynoso MD [address] [place] [address]

## 2021-03-12 ENCOUNTER — TELEPHONE (OUTPATIENT)
Dept: FAMILY MEDICINE CLINIC | Age: 71
End: 2021-03-12

## 2021-03-12 NOTE — TELEPHONE ENCOUNTER
I spoke to the pts' sister in law and caretaker jigar she stated erwin moved out of the state yesterday to move in with his siblings

## 2021-03-17 ENCOUNTER — TELEPHONE (OUTPATIENT)
Dept: PHARMACY | Age: 71
End: 2021-03-17

## 2021-03-17 ENCOUNTER — CARE COORDINATION (OUTPATIENT)
Dept: CARE COORDINATION | Age: 71
End: 2021-03-17

## 2021-03-17 ENCOUNTER — TELEPHONE (OUTPATIENT)
Dept: FAMILY MEDICINE CLINIC | Age: 71
End: 2021-03-17

## 2021-03-17 DIAGNOSIS — I48.0 PAROXYSMAL A-FIB (HCC): ICD-10-CM

## 2021-03-17 NOTE — TELEPHONE ENCOUNTER
Pt has moved to [facility]    Will discharge pt at this time.     Ervin Shaver [facility]  Staff Pharmacist, Ajith Ballard  3/17/2021  9:16 AM'

## 2021-03-17 NOTE — TELEPHONE ENCOUNTER
RADHA Gilmore MD; RONDA Dowling  Clinical Staff   Caller: Unspecified (Today,  2:54 PM)             HI Dr. Jazmin Sutherland   Not sure if you are aware, Art has relocated to [place], I closed out his care coordination episode,   Thank You   Del

## 2021-03-17 NOTE — CARE COORDINATION
Ambulatory Care Coordination Note  3/17/2021  CM Risk Score: 16  Charlson 10 Year Mortality Risk Score: 100%     ACC: Sandra Carroll, RN    Summary Note: ACM CALLED TO DISCUSS CARE COORDINATION NEEDS, I SPOKE TO ART'S CARE GIVER BEE DILLON, [facility] STATES ART  HAS RELOCATED TO [facility] TO LIVE WITH HIS SISTER, HIS CARDIOLOGIST ADVISED HE NEEDED 24 HR CARE, HE IS GOING TO STAY THERE WITH FAMILY SHARING IN HIS CARE NEEDS, HE HAS FOUND A PCP IN THAT AREA AND HE WILL BE FINDING A CARDIOLOGIST TO FOLLOW AS WELL, HE IS ALSO CONSIDERING HOSPICE AS AN OPTION FOR CARE. WILL CLOSE OUT EPISODE        Care Coordination Interventions    Program Enrollment: Complex Care  Referral from Primary Care Provider: No  Suggested Interventions and Community Resources  Medi Set or Pill Pack: Declined (Comment: 2/16/ 21 states uses a pill box,)  Pharmacist: Igor Matthew (Comment: 2/17/21 declined)  Registered Dietician: Declined (Comment: 2/16 DECLINED )  Social Work:  (Comment: 2/16  REFERRAL MADE)  Zone Management Tools: In Process (Comment: 2/16 CHF, COPD, DM MAILED)  Other Services or Interventions: REVIEWED WALK IN CLINCICS AND SAME DAY APPOINTMENTS, CHF,COPD, DM ZONE  SHEETS         Goals Addressed    None         Prior to Admission medications    Medication Sig Start Date End Date Taking? Authorizing Provider   allopurinol (ZYLOPRIM) 100 MG tablet TAKE 1 TABLET DAILY 3/11/21   Kristin Clancy MD   Blood Pressure KIT 1 kit by Does not apply route 2 times daily Please disburse whichever kit insurance will cover. Patient prefers a wrist bp kit 2/24/21   Kristin Clancy MD   Dextromethorphan-guaiFENesin (ROBITUSSIN COUGH+CHEST DYANA DM)  MG CAPS Take 1 capsule as needed every 4 hours .  2/22/21   Kristin Clancy MD   [facility] ADVANCED GLUCOSE TEST strip TEST 4X DAILY DX E11.65 1/25/21   Historical Provider, MD   budesonide-formoterol (SYMBICORT) 160-4.5 MCG/ACT AERO Inhale 2 puffs into the lungs 2 times daily 2/17/21   Riki Rosales MD   sacubitril-valsartan (ENTRESTO) 24-26 MG per tablet Take 0.5 tablets by mouth 2 times daily 2/15/21   Jazzmine Singh,    metoprolol succinate (TOPROL XL) 25 MG extended release tablet Take 1 tablet by mouth daily 2/16/21   Veronica Chaney MD   amiodarone (CORDARONE) 200 MG tablet Take 1 tablet by mouth daily 2/15/21   Jazzmine Singh,    warfarin (COUMADIN) 5 MG tablet Take as directed by [facility]. Quantity for 90 day supply.  1/26/21   Veronica Chaney MD   donepezil (ARICEPT) 10 MG tablet TAKE 1 TABLET BY MOUTH EVERY DAY AT NIGHT 1/25/21   Riki Rosales MD   Dulaglutide 0.75 MG/0.5ML SOPN Inject 0.75 mg into the skin once a week  Patient not taking: Reported on 2/16/2021 1/22/21   Riki Rosales MD   LANTUS SOLOSTAR 100 UNIT/ML injection pen INJECT 10 UNITS INTO THE SKIN NIGHTLY 9/21/20   Riki Rosales MD   magnesium oxide (MAG-OX) 400 (240 Mg) MG tablet Take 1 tablet by mouth daily 9/12/20   Shilpi Delatorre MD   potassium chloride (KLOR-CON M) 20 MEQ extended release tablet Take 1 tablet by mouth daily 9/12/20   Shilpi Delatorre MD   bumetanide (BUMEX) 1 MG tablet Take 1 mg by mouth 2 times daily  12/4/19   Riki Rosales MD   nitroGLYCERIN (NITROSTAT) 0.4 MG SL tablet Place 1 tablet under the tongue every 5 minutes as needed for Chest pain  Patient not taking: Reported on 2/16/2021 11/26/19   Riki Rosales MD   spironolactone (ALDACTONE) 25 MG tablet Take 1 tablet by mouth daily 11/21/19   Veronica Chaney MD   atorvastatin (LIPITOR) 80 MG tablet Take 80 mg by mouth daily     Historical Provider, MD   albuterol (PROVENTIL) (2.5 MG/3ML) 0.083% nebulizer solution Take 3 mLs by nebulization every 6 hours as needed for Wheezing 2/13/18   Kerri Brittle, MD   pantoprazole (PROTONIX) 40 MG tablet Take 1 tablet by mouth every morning (before breakfast)  Patient taking differently: Take 40 mg by mouth daily  11/8/17   Veronica Chaney MD   DIGITEK 125 MCG tablet TAKE 1 TABLET DAILY  Patient taking differently: Take 125 mcg by mouth daily  8/2/15   Zane Stewart MD   albuterol (PROAIR HFA) 108 (90 BASE) MCG/ACT inhaler Inhale 2 puffs into the lungs every 4 hours as needed for Wheezing  Patient not taking: Reported on 2/16/2021 5/11/15   Zane Stewart MD       Future Appointments   Date Time Provider Gordo Sam   4/7/2021  8:15 AM [address]   5/4/2021 10:45 AM Melissa Yeung MD [facility]

## 2021-04-29 ENCOUNTER — TELEPHONE (OUTPATIENT)
Dept: FAMILY MEDICINE CLINIC | Age: 71
End: 2021-04-29

## 2022-04-19 NOTE — CONSULTS
Consults   Pulmonary Medicine  Consult Note      Reason for consultation: Acute COPD exacerbation, hemoptysis    Consulting physician: Dr. Estela Joyner    HISTORY OF PRESENT ILLNESS:    This is 72-year-old male with past medical history significant for COPD chronic\" she on home O2 systolic and diastolic congestive heart failure, coronary artery disease status post CABG, diabetes mellitus has been having increase in shortness of breath for last 2-3 days. He has to increase O2 use 24-hour seven-day. When patient came to ER he has episode of hemoptysis then episode of hematemesis. This morning patient also cough up small amount of clotted blood. He underwent for CT of the chest which reported no evidence of Central or segmental pulmonary embolism. There is severe bullous emphysematous disease and finding of developing interstitial pulmonary fibrosis and mediastinal adenopathy. No lung mass. He has no fever or chills. He has no more hematemesis today. Patient has been on Plavix and Coumadin which is on hold. Past Medical History:        Diagnosis Date    CAD (coronary artery disease)     [facility]     COPD [facility]chronic obstructive pulmonary disease) ([facility] [facility]     Defibrillator activation     Diabetes mellitus with insulin therapy ([facility] [facility]     Generalized and unspecified atherosclerosis     Gout     Hyperlipidemia     Hypertension     Pain in joint, multiple sites     Status post angioplasty     TIA (transient ischemic attack)     Tobacco abuse     Type II or unspecified type diabetes mellitus without mention of complication, not stated as uncontrolled        Past Surgical History:        Procedure Laterality Date    CORONARY ANGIOPLASTY  4/29/11    Dr Dori Wright Bilateral     Cataracts     OTHER SURGICAL HISTORY      difibrillator        Social History:     reports that he quit smoking about 5 years ago.  He uses smokeless tobacco. He reports that he does not drink alcohol or use drugs. Family History:       Problem Relation Age of Onset    Cancer Brother        Allergies:  Penicillins        MEDICATIONS during current hospitalization:    Continuous Infusions:   lactated ringers 100 mL/hr at 11/04/17 1347    dextrose         Scheduled Meds:   ipratropium-albuterol  1 ampule Inhalation TID    sacubitril-valsartan  1 tablet Oral BID    metolazone  2.5 mg Oral Once    furosemide  20 mg Intravenous BID    allopurinol  100 mg Oral Daily    metoprolol succinate  25 mg Oral Daily    atorvastatin  80 mg Oral Daily    digoxin  125 mcg Oral Daily    insulin lispro protamine & lispro  55 Units Subcutaneous BID WC    insulin lispro  0-6 Units Subcutaneous TID WC    insulin lispro  0-3 Units Subcutaneous Nightly    predniSONE  40 mg Oral Daily    pantoprazole  40 mg Intravenous BID    And    sodium chloride (PF)  10 mL Intravenous BID       PRN Meds:albuterol, nitroGLYCERIN, sodium chloride flush, acetaminophen, oxyCODONE-acetaminophen **OR** oxyCODONE-acetaminophen, morphine **OR** morphine, magnesium hydroxide, ondansetron, glucose, dextrose, glucagon (rDNA), dextrose    REVIEW OF SYSTEMS:  As in history of present illness  Other 14 point review of system is negative. PHYSICAL EXAM:    Vitals:  /88   Pulse 88   Temp 98.6 °F (37 °C) (Oral)   Resp 18   Ht 5' 11\" (1.803 m)   Wt 226 lb (102.5 kg)   SpO2 99%   BMI 31.52 kg/m²   General: Alert, awake, Oriented x3  .comfortable in bed, No distress. Head: Atraumatic , Normocephalic   Eyes: PERRL. No sclera icterus. No conjunctival injection. No discharge   ENT: No nasal  discharge. Pharynx clear. Neck:  Trachea midline. No thyromegaly, no JVD, No cervical adenopathy. Chest : Bilaterally symmetrical ,Normal effort,  No accessory muscle use  Lung : . Fair BS bilateral, decreased BS at bases. Few Bibasilar Rales. Bilateral expiratory wheezing. No rhonchi. No dullness on percussion. Heart[de-identified] Normal  rate. Regular rhythm. No mumur ,  Rub or gallop  ABD: Non-tender. Non-distended. No masses. No organmegaly. Normal bowel sounds. No hernia. Musculoskeleton: normal range of motion in all extremites, strength and tone   Extremities: No pitting ,Cyanosis ,No clubbing  Neuro: no cranial nerve abnormality, normal reflex and sensation, no focal weakness   Skin: Warm and dry. No erythema rash on exposed extremities. Data Review  Recent Labs      11/03/17   1430  11/04/17   0046  11/04/17   0648  11/04/17   1629   WBC  5.3   --   5.1   --    HGB  12.3*  12.0*  11.9*  12.0*  12.0*   HCT  37.8*   --   37.9*   --    PLT  162   --   164   --       Recent Labs      11/03/17   1430  11/04/17   0648   NA  136  138   K  3.6  4.3   CL  97*  97*   CO2  24  25   BUN  12  18   CREATININE  0.93  1.06   GLUCOSE  106  183*       MV Settings: ABGs: No results for input(s): PHART, VIP0MMS, PO2ART, JAZ1KYA, BEART, E1IHNQIK, GMX4BNK in the last 72 hours. O2 Device: Nasal cannula  O2 Flow Rate (L/min): 3 L/min  Lab Results   Component Value Date    LACTA 2.0 11/03/2017    LACTA 2.1 09/06/2014       Radiology  Xr Chest Standard (2 Vw)    Result Date: 11/4/2017  XR CHEST STANDARD TWO VW: 11/4/2017 CLINICAL HISTORY:  COPD . Shortness of breath. COMPARISON: Portable chest and chest CTA 11/3/2017. Upright PA and lateral radiographs of the chest were obtained. FINDINGS: Hyperinflation and coarsening of the bronchovascular structures consistent with COPD appears unchanged. The heart remains moderately enlarged with postoperative change from previous mitral valve replacement, CABG, and a left subclavian dual-lead pacer/defibrillator. There are no developing infiltrates, pleural effusions, pneumothorax, or displaced fractures identified. STABLE CHEST.      Cta Chest W Wo Contrast    Result Date: 11/3/2017  The EXAMINATION: CT scan of the chest with contrast (pulmonary embolism protocol) INDICATION: Wound not No

## 2023-03-10 NOTE — PROGRESS NOTES
Result letter mailed to pt.    file.     Social History Main Topics    Smoking status: Former Smoker     Packs/day: 1.50     Years: 25.00     Quit date: 1/1/2012    Smokeless tobacco: Never Used    Alcohol use No    Drug use: No    Sexual activity: Not on file     Other Topics Concern    Not on file     Social History Narrative    No narrative on file         Review of Systems   Constitutional: Negative for chills, diaphoresis, fatigue and fever. HENT: Negative for congestion, mouth sores, nosebleeds, postnasal drip, rhinorrhea, sneezing, sore throat and voice change. Eyes: Negative for itching and visual disturbance. Respiratory: Positive for cough, shortness of breath and wheezing. Negative for chest tightness. Cardiovascular: Negative. Negative for chest pain, palpitations and leg swelling. Gastrointestinal: Negative for abdominal pain, diarrhea, nausea and vomiting. Genitourinary: Negative for difficulty urinating and hematuria. Musculoskeletal: Negative for arthralgias, joint swelling and myalgias. Skin: Negative for rash. Allergic/Immunologic: Negative for environmental allergies. Neurological: Negative for dizziness, tremors, weakness and headaches. Psychiatric/Behavioral: Negative for behavioral problems and sleep disturbance. Objective:     Vitals:    03/30/18 1046   BP: 120/72   Pulse: 95   Temp: 97.4 °F (36.3 °C)   TempSrc: Tympanic   SpO2: 97%   Weight: 223 lb (101.2 kg)   Height: 5' 11\" (1.803 m)         Physical Exam   Constitutional: He is oriented to person, place, and time. He appears well-developed and well-nourished. HENT:   Head: Normocephalic and atraumatic. Nose: Nose normal.   Mouth/Throat: Oropharynx is clear and moist.   Eyes: Conjunctivae and EOM are normal. Pupils are equal, round, and reactive to light. Neck: No JVD present. No tracheal deviation present. No thyromegaly present. Cardiovascular: Normal rate and regular rhythm. Exam reveals no gallop and no friction rub.

## 2024-10-22 NOTE — CARE COORDINATION
[facility] [place] Case Management Initial Discharge Assessment    Met with patient to discuss discharge plan. PCP: Kristin Clancy MD                                  Date of Last Visit: Virtual visit 11/10/20. Patient is scheduled to see Dr. Ghassan Cheek next week. If no PCP, list provided? N/A    Discharge Planning    Living Arrangements: Patient lives independently at home in a one-story house. Who do you live with? Patient lives alone. Who helps you with your care:  Patient states that his mother-in-law brings him food daily but otherwise he does not have anyone to assist him at home. If lives at home: Do you have any barriers navigating in your home? No    Patient can perform ADL? Yes    Current Services (outpatient and in home) :  None    Dialysis: No    Is transportation available to get to your appointments? Yes    DME Equipment:  No    Respiratory equipment: Continuous Oxygen  2 Liters     Respiratory provider:  [facility]:  Long-term meds are mailed from [employer]. Patient obtains short-term meds from [facility] in [facility]. Consult with Medication Assistance Program?  No      Patient agreeable to [facility]? - Yes     Patient agreeable to SNF/Rehab? N/A    Other discharge needs identified? Other - To be determined after work-up and treatment. Patient may benefit from [facility] for CHF monitoring and teaching. Freedom of choice list provided with basic dialogue that supports the patient's individualized plan of care/goals and shares the quality data associated with the providers. Yes    Does Patient Have a High-Risk for Readmission Diagnosis (CHF, PN, MI, COPD)? Yes    ? History: chronic systolic and diastolic CHF, CKD, CAD, A Flutter, COPD, DM, HTN, HLD, obesity, chronic hypoxic resp failure, lower extremity edema. The plan for Transition of Care is related to the following treatment goals: Eval/mgmt of acute congestive heart failure. Status post TNK administration  Currently on dual antiplatelet therapy with aspirin and Plavix and atorvastatin  Management per primary team and neurology   Initial Discharge Plan? (Note: please see concurrent daily documentation for any updates after initial note). Home alone. Patient is agreeable to [place] [address] or rehab if indicated. The Patient and/or patient representative: Patient was provided with choice of any post-acute providers for care and equipment and agrees with discharge plan to home.  - Yes    Electronically signed by Bobby Villasenor RN on 1/9/2021 at 3:02 PM
